# Patient Record
Sex: FEMALE | Race: BLACK OR AFRICAN AMERICAN | NOT HISPANIC OR LATINO | Employment: OTHER | URBAN - METROPOLITAN AREA
[De-identification: names, ages, dates, MRNs, and addresses within clinical notes are randomized per-mention and may not be internally consistent; named-entity substitution may affect disease eponyms.]

---

## 2023-03-29 DIAGNOSIS — F32.A DEPRESSION, UNSPECIFIED DEPRESSION TYPE: Primary | ICD-10-CM

## 2023-03-30 RX ORDER — BUPROPION HYDROCHLORIDE 300 MG/1
TABLET ORAL
Qty: 90 TABLET | Refills: 0 | Status: SHIPPED | OUTPATIENT
Start: 2023-03-30 | End: 2023-09-23

## 2023-04-24 DIAGNOSIS — I10 HYPERTENSION, UNSPECIFIED TYPE: Primary | ICD-10-CM

## 2023-04-24 RX ORDER — LOSARTAN POTASSIUM 100 MG/1
100 TABLET ORAL DAILY
COMMUNITY
End: 2023-04-24 | Stop reason: SDUPTHER

## 2023-04-25 RX ORDER — LOSARTAN POTASSIUM 100 MG/1
100 TABLET ORAL DAILY
Qty: 10 TABLET | Refills: 0 | Status: SHIPPED | OUTPATIENT
Start: 2023-04-25 | End: 2023-07-20 | Stop reason: ALTCHOICE

## 2023-06-21 LAB
ALANINE AMINOTRANSFERASE (SGPT) (U/L) IN SER/PLAS: NORMAL
ASPARTATE AMINOTRANSFERASE (SGOT) (U/L) IN SER/PLAS: NORMAL
BASOPHILS (10*3/UL) IN BLOOD BY AUTOMATED COUNT: 0.04 X10E9/L (ref 0–0.1)
BASOPHILS/100 LEUKOCYTES IN BLOOD BY AUTOMATED COUNT: 0.4 % (ref 0–2)
CHOLESTEROL (MG/DL) IN SER/PLAS: NORMAL
CHOLESTEROL IN HDL (MG/DL) IN SER/PLAS: NORMAL
CHOLESTEROL/HDL RATIO: NORMAL
EOSINOPHILS (10*3/UL) IN BLOOD BY AUTOMATED COUNT: 0.51 X10E9/L (ref 0–0.4)
EOSINOPHILS/100 LEUKOCYTES IN BLOOD BY AUTOMATED COUNT: 5.1 % (ref 0–6)
ERYTHROCYTE DISTRIBUTION WIDTH (RATIO) BY AUTOMATED COUNT: 18.8 % (ref 11.5–14.5)
ERYTHROCYTE MEAN CORPUSCULAR HEMOGLOBIN CONCENTRATION (G/DL) BY AUTOMATED: 28.8 G/DL (ref 32–36)
ERYTHROCYTE MEAN CORPUSCULAR VOLUME (FL) BY AUTOMATED COUNT: 79 FL (ref 80–100)
ERYTHROCYTES (10*6/UL) IN BLOOD BY AUTOMATED COUNT: 4.95 X10E12/L (ref 4–5.2)
HEMATOCRIT (%) IN BLOOD BY AUTOMATED COUNT: 38.9 % (ref 36–46)
HEMOGLOBIN (G/DL) IN BLOOD: 11.2 G/DL (ref 12–16)
IMMATURE GRANULOCYTES/100 LEUKOCYTES IN BLOOD BY AUTOMATED COUNT: 0.4 % (ref 0–0.9)
LDL: NORMAL
LEUKOCYTES (10*3/UL) IN BLOOD BY AUTOMATED COUNT: 10.1 X10E9/L (ref 4.4–11.3)
LYMPHOCYTES (10*3/UL) IN BLOOD BY AUTOMATED COUNT: 1.95 X10E9/L (ref 0.8–3)
LYMPHOCYTES/100 LEUKOCYTES IN BLOOD BY AUTOMATED COUNT: 19.4 % (ref 13–44)
MONOCYTES (10*3/UL) IN BLOOD BY AUTOMATED COUNT: 0.62 X10E9/L (ref 0.05–0.8)
MONOCYTES/100 LEUKOCYTES IN BLOOD BY AUTOMATED COUNT: 6.2 % (ref 2–10)
NEUTROPHILS (10*3/UL) IN BLOOD BY AUTOMATED COUNT: 6.91 X10E9/L (ref 1.6–5.5)
NEUTROPHILS/100 LEUKOCYTES IN BLOOD BY AUTOMATED COUNT: 68.5 % (ref 40–80)
NON HDL CHOLESTEROL: NORMAL
NRBC (PER 100 WBCS) BY AUTOMATED COUNT: 0 /100 WBC (ref 0–0)
PLATELETS (10*3/UL) IN BLOOD AUTOMATED COUNT: 246 X10E9/L (ref 150–450)
TRIGLYCERIDE (MG/DL) IN SER/PLAS: NORMAL
VLDL: NORMAL

## 2023-06-22 DIAGNOSIS — D50.0 IRON DEFICIENCY ANEMIA DUE TO CHRONIC BLOOD LOSS: Primary | ICD-10-CM

## 2023-06-22 LAB
ALANINE AMINOTRANSFERASE (SGPT) (U/L) IN SER/PLAS: 6 U/L (ref 7–45)
ANION GAP IN SER/PLAS: 16 MMOL/L (ref 10–20)
ASPARTATE AMINOTRANSFERASE (SGOT) (U/L) IN SER/PLAS: 10 U/L (ref 9–39)
CALCIUM (MG/DL) IN SER/PLAS: 8.9 MG/DL (ref 8.6–10.6)
CARBON DIOXIDE, TOTAL (MMOL/L) IN SER/PLAS: 23 MMOL/L (ref 21–32)
CHLORIDE (MMOL/L) IN SER/PLAS: 106 MMOL/L (ref 98–107)
CHOLESTEROL (MG/DL) IN SER/PLAS: 128 MG/DL (ref 0–199)
CHOLESTEROL IN HDL (MG/DL) IN SER/PLAS: 45.1 MG/DL
CHOLESTEROL/HDL RATIO: 2.8
CREATININE (MG/DL) IN SER/PLAS: 1.9 MG/DL (ref 0.5–1.05)
FERRITIN (UG/LL) IN SER/PLAS: 68 UG/L (ref 8–150)
GFR FEMALE: 27 ML/MIN/1.73M2
GLUCOSE (MG/DL) IN SER/PLAS: 84 MG/DL (ref 74–99)
IRON (UG/DL) IN SER/PLAS: 49 UG/DL (ref 35–150)
IRON BINDING CAPACITY (UG/DL) IN SER/PLAS: 314 UG/DL (ref 240–445)
IRON SATURATION (%) IN SER/PLAS: 16 % (ref 25–45)
LDL: 69 MG/DL (ref 0–99)
POTASSIUM (MMOL/L) IN SER/PLAS: 5 MMOL/L (ref 3.5–5.3)
SODIUM (MMOL/L) IN SER/PLAS: 140 MMOL/L (ref 136–145)
TRIGLYCERIDE (MG/DL) IN SER/PLAS: 72 MG/DL (ref 0–149)
UREA NITROGEN (MG/DL) IN SER/PLAS: 30 MG/DL (ref 6–23)
VLDL: 14 MG/DL (ref 0–40)

## 2023-06-23 ENCOUNTER — TELEPHONE (OUTPATIENT)
Dept: PRIMARY CARE | Facility: CLINIC | Age: 73
End: 2023-06-23
Payer: MEDICARE

## 2023-06-23 NOTE — TELEPHONE ENCOUNTER
----- Message from Thais Sanchez MD sent at 6/22/2023 11:49 AM EDT -----  She has iron deficiency anemia and needs colonoscopy; she is way overdue to come in; she has kidney failure and is way overdue to see nephrologist as well. Mail her this please.

## 2023-06-23 NOTE — TELEPHONE ENCOUNTER
I notified Faina Murray  of Dr. Sanchez message. Mailed results and low cholesterol, saturated fat and trans fat diet and diabetic diet as instructed.

## 2023-07-14 ENCOUNTER — OFFICE VISIT (OUTPATIENT)
Dept: PRIMARY CARE | Facility: CLINIC | Age: 73
End: 2023-07-14
Payer: MEDICARE

## 2023-07-14 VITALS
HEART RATE: 70 BPM | OXYGEN SATURATION: 95 % | SYSTOLIC BLOOD PRESSURE: 136 MMHG | DIASTOLIC BLOOD PRESSURE: 84 MMHG | BODY MASS INDEX: 49.51 KG/M2 | WEIGHT: 290 LBS | HEIGHT: 64 IN | RESPIRATION RATE: 17 BRPM | TEMPERATURE: 96.9 F

## 2023-07-14 DIAGNOSIS — I10 ESSENTIAL HYPERTENSION: ICD-10-CM

## 2023-07-14 DIAGNOSIS — N18.30 STAGE 3 CHRONIC KIDNEY DISEASE, UNSPECIFIED WHETHER STAGE 3A OR 3B CKD (MULTI): ICD-10-CM

## 2023-07-14 DIAGNOSIS — I50.32 CHRONIC DIASTOLIC HEART FAILURE (MULTI): ICD-10-CM

## 2023-07-14 DIAGNOSIS — D47.2 MGUS (MONOCLONAL GAMMOPATHY OF UNKNOWN SIGNIFICANCE): ICD-10-CM

## 2023-07-14 DIAGNOSIS — N18.30 CKD STAGE 3 DUE TO TYPE 2 DIABETES MELLITUS (MULTI): ICD-10-CM

## 2023-07-14 DIAGNOSIS — E11.22 CKD STAGE 3 DUE TO TYPE 2 DIABETES MELLITUS (MULTI): ICD-10-CM

## 2023-07-14 DIAGNOSIS — J45.40 MODERATE PERSISTENT ASTHMA WITHOUT COMPLICATION (HHS-HCC): ICD-10-CM

## 2023-07-14 DIAGNOSIS — E78.5 DYSLIPIDEMIA: ICD-10-CM

## 2023-07-14 DIAGNOSIS — M54.50 CHRONIC LOW BACK PAIN, UNSPECIFIED BACK PAIN LATERALITY, UNSPECIFIED WHETHER SCIATICA PRESENT: ICD-10-CM

## 2023-07-14 DIAGNOSIS — F32.A DEPRESSION, UNSPECIFIED DEPRESSION TYPE: ICD-10-CM

## 2023-07-14 DIAGNOSIS — G47.33 OSA ON CPAP: ICD-10-CM

## 2023-07-14 DIAGNOSIS — E66.01 CLASS 3 SEVERE OBESITY DUE TO EXCESS CALORIES WITH SERIOUS COMORBIDITY AND BODY MASS INDEX (BMI) OF 45.0 TO 49.9 IN ADULT (MULTI): ICD-10-CM

## 2023-07-14 DIAGNOSIS — E11.22 TYPE 2 DIABETES MELLITUS WITH CHRONIC KIDNEY DISEASE, WITHOUT LONG-TERM CURRENT USE OF INSULIN, UNSPECIFIED CKD STAGE (MULTI): ICD-10-CM

## 2023-07-14 DIAGNOSIS — G89.29 CHRONIC LOW BACK PAIN, UNSPECIFIED BACK PAIN LATERALITY, UNSPECIFIED WHETHER SCIATICA PRESENT: ICD-10-CM

## 2023-07-14 DIAGNOSIS — J30.9 ALLERGIC RHINITIS, UNSPECIFIED SEASONALITY, UNSPECIFIED TRIGGER: ICD-10-CM

## 2023-07-14 DIAGNOSIS — Z00.00 MEDICARE ANNUAL WELLNESS VISIT, SUBSEQUENT: Primary | ICD-10-CM

## 2023-07-14 PROBLEM — E66.9 OBESITY: Status: ACTIVE | Noted: 2023-07-14

## 2023-07-14 PROBLEM — E11.9 DIABETES MELLITUS (MULTI): Status: ACTIVE | Noted: 2023-07-14

## 2023-07-14 PROBLEM — D63.1 ANEMIA IN CKD (CHRONIC KIDNEY DISEASE): Status: ACTIVE | Noted: 2023-07-14

## 2023-07-14 PROBLEM — E66.813 CLASS 3 SEVERE OBESITY DUE TO EXCESS CALORIES WITH SERIOUS COMORBIDITY AND BODY MASS INDEX (BMI) OF 45.0 TO 49.9 IN ADULT: Status: ACTIVE | Noted: 2023-07-14

## 2023-07-14 PROBLEM — F41.9 ANXIETY: Status: ACTIVE | Noted: 2023-07-14

## 2023-07-14 PROBLEM — N18.9 ANEMIA IN CKD (CHRONIC KIDNEY DISEASE): Status: ACTIVE | Noted: 2023-07-14

## 2023-07-14 PROBLEM — E66.812 CLASS 2 OBESITY DUE TO EXCESS CALORIES IN ADULT: Status: RESOLVED | Noted: 2023-07-14 | Resolved: 2023-07-14

## 2023-07-14 PROBLEM — J41.0 SIMPLE CHRONIC BRONCHITIS (MULTI): Status: ACTIVE | Noted: 2023-07-14

## 2023-07-14 PROBLEM — R05.9 COUGH: Status: ACTIVE | Noted: 2023-07-14

## 2023-07-14 PROBLEM — H53.9 VISUAL DISTURBANCE: Status: ACTIVE | Noted: 2023-07-14

## 2023-07-14 PROBLEM — E66.09 CLASS 2 OBESITY DUE TO EXCESS CALORIES IN ADULT: Status: ACTIVE | Noted: 2023-07-14

## 2023-07-14 PROBLEM — E66.812 CLASS 2 OBESITY DUE TO EXCESS CALORIES IN ADULT: Status: ACTIVE | Noted: 2023-07-14

## 2023-07-14 PROBLEM — H53.40 VISUAL FIELD DEFECT: Status: ACTIVE | Noted: 2023-07-14

## 2023-07-14 PROBLEM — J32.9 CHRONIC SINUSITIS: Status: ACTIVE | Noted: 2023-07-14

## 2023-07-14 PROBLEM — H53.461 HEMIANOPIA OF RIGHT EYE: Status: ACTIVE | Noted: 2023-07-14

## 2023-07-14 PROBLEM — E66.9 OBESITY (BMI 30-39.9): Status: RESOLVED | Noted: 2023-07-14 | Resolved: 2023-07-14

## 2023-07-14 PROBLEM — M54.9 CHRONIC BACK PAIN: Status: ACTIVE | Noted: 2023-07-14

## 2023-07-14 PROBLEM — Z97.3 WEARS EYEGLASSES: Status: ACTIVE | Noted: 2023-07-14

## 2023-07-14 PROBLEM — H25.813 MIXED TYPE AGE-RELATED CATARACT, BOTH EYES: Status: ACTIVE | Noted: 2023-07-14

## 2023-07-14 PROBLEM — I25.10 CORONARY ATHEROSCLEROSIS: Status: ACTIVE | Noted: 2023-07-14

## 2023-07-14 PROBLEM — K21.9 GERD (GASTROESOPHAGEAL REFLUX DISEASE): Status: ACTIVE | Noted: 2023-07-14

## 2023-07-14 PROBLEM — E66.09 CLASS 2 OBESITY DUE TO EXCESS CALORIES IN ADULT: Status: RESOLVED | Noted: 2023-07-14 | Resolved: 2023-07-14

## 2023-07-14 PROBLEM — L98.9 SKIN LESION: Status: ACTIVE | Noted: 2023-07-14

## 2023-07-14 PROBLEM — N28.1 RENAL CYST: Status: ACTIVE | Noted: 2023-07-14

## 2023-07-14 PROBLEM — J45.909 ASTHMA (HHS-HCC): Status: ACTIVE | Noted: 2023-07-14

## 2023-07-14 PROBLEM — I88.9 LYMPHADENITIS: Status: ACTIVE | Noted: 2023-07-14

## 2023-07-14 PROBLEM — E66.9 OBESITY (BMI 30-39.9): Status: ACTIVE | Noted: 2023-07-14

## 2023-07-14 PROBLEM — H26.9 CATARACT: Status: ACTIVE | Noted: 2023-07-14

## 2023-07-14 PROBLEM — M47.816 DJD (DEGENERATIVE JOINT DISEASE), LUMBAR: Status: ACTIVE | Noted: 2023-07-14

## 2023-07-14 PROBLEM — K63.5 POLYPUS, COLON: Status: ACTIVE | Noted: 2023-07-14

## 2023-07-14 PROBLEM — R80.9 PROTEINURIA: Status: ACTIVE | Noted: 2023-07-14

## 2023-07-14 PROBLEM — N25.81 SECONDARY RENAL HYPERPARATHYROIDISM (MULTI): Status: ACTIVE | Noted: 2023-07-14

## 2023-07-14 PROBLEM — R77.8 ABNORMAL SERUM PROTEIN ELECTROPHORESIS: Status: ACTIVE | Noted: 2023-07-14

## 2023-07-14 PROBLEM — R06.02 SHORTNESS OF BREATH ON EXERTION: Status: ACTIVE | Noted: 2023-07-14

## 2023-07-14 PROBLEM — E16.2 HYPOGLYCEMIA: Status: ACTIVE | Noted: 2023-07-14

## 2023-07-14 PROBLEM — M54.16 LUMBAR RADICULOPATHY: Status: ACTIVE | Noted: 2023-07-14

## 2023-07-14 PROBLEM — M25.50 ARTHRALGIA: Status: ACTIVE | Noted: 2023-07-14

## 2023-07-14 PROBLEM — M25.569 KNEE PAIN: Status: ACTIVE | Noted: 2023-07-14

## 2023-07-14 PROBLEM — E16.2 HYPOGLYCEMIA: Status: RESOLVED | Noted: 2023-07-14 | Resolved: 2023-07-14

## 2023-07-14 PROCEDURE — G0439 PPPS, SUBSEQ VISIT: HCPCS | Performed by: PEDIATRICS

## 2023-07-14 PROCEDURE — 3075F SYST BP GE 130 - 139MM HG: CPT | Performed by: PEDIATRICS

## 2023-07-14 PROCEDURE — 84165 PROTEIN E-PHORESIS SERUM: CPT | Performed by: STUDENT IN AN ORGANIZED HEALTH CARE EDUCATION/TRAINING PROGRAM

## 2023-07-14 PROCEDURE — 99213 OFFICE O/P EST LOW 20 MIN: CPT | Performed by: PEDIATRICS

## 2023-07-14 PROCEDURE — 4010F ACE/ARB THERAPY RXD/TAKEN: CPT | Performed by: PEDIATRICS

## 2023-07-14 PROCEDURE — 3066F NEPHROPATHY DOC TX: CPT | Performed by: PEDIATRICS

## 2023-07-14 PROCEDURE — 80048 BASIC METABOLIC PNL TOTAL CA: CPT | Performed by: PEDIATRICS

## 2023-07-14 PROCEDURE — 86334 IMMUNOFIX E-PHORESIS SERUM: CPT

## 2023-07-14 PROCEDURE — 84165 PROTEIN E-PHORESIS SERUM: CPT

## 2023-07-14 PROCEDURE — 1036F TOBACCO NON-USER: CPT | Performed by: PEDIATRICS

## 2023-07-14 PROCEDURE — 84443 ASSAY THYROID STIM HORMONE: CPT | Performed by: PEDIATRICS

## 2023-07-14 PROCEDURE — 1170F FXNL STATUS ASSESSED: CPT | Performed by: PEDIATRICS

## 2023-07-14 PROCEDURE — 82306 VITAMIN D 25 HYDROXY: CPT | Performed by: PEDIATRICS

## 2023-07-14 PROCEDURE — 86320 SERUM IMMUNOELECTROPHORESIS: CPT | Performed by: STUDENT IN AN ORGANIZED HEALTH CARE EDUCATION/TRAINING PROGRAM

## 2023-07-14 PROCEDURE — 1126F AMNT PAIN NOTED NONE PRSNT: CPT | Performed by: PEDIATRICS

## 2023-07-14 PROCEDURE — 3079F DIAST BP 80-89 MM HG: CPT | Performed by: PEDIATRICS

## 2023-07-14 PROCEDURE — 3008F BODY MASS INDEX DOCD: CPT | Performed by: PEDIATRICS

## 2023-07-14 PROCEDURE — 83036 HEMOGLOBIN GLYCOSYLATED A1C: CPT | Performed by: PEDIATRICS

## 2023-07-14 RX ORDER — IPRATROPIUM BROMIDE AND ALBUTEROL 20; 100 UG/1; UG/1
SPRAY, METERED RESPIRATORY (INHALATION)
COMMUNITY
End: 2023-08-10 | Stop reason: ALTCHOICE

## 2023-07-14 RX ORDER — FLUTICASONE PROPIONATE 50 MCG
2 SPRAY, SUSPENSION (ML) NASAL 2 TIMES DAILY
Qty: 16 G | Refills: 3 | Status: SHIPPED | OUTPATIENT
Start: 2023-07-14

## 2023-07-14 RX ORDER — INSULIN GLARGINE 100 [IU]/ML
INJECTION, SOLUTION SUBCUTANEOUS
COMMUNITY
Start: 2014-03-07 | End: 2023-07-14 | Stop reason: ALTCHOICE

## 2023-07-14 RX ORDER — MONTELUKAST SODIUM 10 MG/1
1 TABLET ORAL NIGHTLY
COMMUNITY
Start: 2019-04-23

## 2023-07-14 RX ORDER — DULOXETIN HYDROCHLORIDE 30 MG/1
CAPSULE, DELAYED RELEASE ORAL
Qty: 60 CAPSULE | Refills: 11 | Status: SHIPPED | OUTPATIENT
Start: 2023-07-14 | End: 2023-08-10 | Stop reason: SINTOL

## 2023-07-14 RX ORDER — NITROGLYCERIN 0.4 MG/1
TABLET SUBLINGUAL
COMMUNITY
Start: 2014-08-29

## 2023-07-14 RX ORDER — ALBUTEROL SULFATE 90 UG/1
AEROSOL, METERED RESPIRATORY (INHALATION)
COMMUNITY
Start: 2022-06-16 | End: 2023-07-14 | Stop reason: SDUPTHER

## 2023-07-14 RX ORDER — LEVOTHYROXINE SODIUM 200 UG/1
TABLET ORAL
COMMUNITY
Start: 2013-10-28 | End: 2023-07-20 | Stop reason: ALTCHOICE

## 2023-07-14 RX ORDER — AMLODIPINE BESYLATE 10 MG/1
1 TABLET ORAL DAILY
COMMUNITY
Start: 2013-10-28 | End: 2023-07-14 | Stop reason: SDUPTHER

## 2023-07-14 RX ORDER — FLUTICASONE PROPIONATE 50 MCG
1 SPRAY, SUSPENSION (ML) NASAL 2 TIMES DAILY
COMMUNITY
Start: 2022-06-16 | End: 2023-07-14 | Stop reason: SDUPTHER

## 2023-07-14 RX ORDER — ALBUTEROL SULFATE 90 UG/1
2 AEROSOL, METERED RESPIRATORY (INHALATION) EVERY 4 HOURS PRN
Qty: 18 G | Refills: 2 | Status: SHIPPED | OUTPATIENT
Start: 2023-07-14 | End: 2023-12-29

## 2023-07-14 RX ORDER — POLYETHYLENE GLYCOL 3350 17 G/17G
POWDER, FOR SOLUTION ORAL EVERY OTHER DAY
COMMUNITY
Start: 2016-07-06 | End: 2023-08-10 | Stop reason: ALTCHOICE

## 2023-07-14 RX ORDER — OMEPRAZOLE 40 MG/1
CAPSULE, DELAYED RELEASE ORAL
COMMUNITY
Start: 2022-03-24 | End: 2024-04-05 | Stop reason: SDUPTHER

## 2023-07-14 RX ORDER — DULOXETIN HYDROCHLORIDE 60 MG/1
60 CAPSULE, DELAYED RELEASE ORAL DAILY
Qty: 30 CAPSULE | Refills: 5 | Status: SHIPPED | OUTPATIENT
Start: 2023-07-14 | End: 2023-08-10 | Stop reason: SINTOL

## 2023-07-14 RX ORDER — TIRZEPATIDE 2.5 MG/.5ML
2.5 INJECTION, SOLUTION SUBCUTANEOUS
Qty: 2 ML | Refills: 0 | Status: SHIPPED | OUTPATIENT
Start: 2023-07-14

## 2023-07-14 RX ORDER — PRAVASTATIN SODIUM 40 MG/1
1 TABLET ORAL NIGHTLY
COMMUNITY
Start: 2021-04-12 | End: 2024-05-08 | Stop reason: SDUPTHER

## 2023-07-14 RX ORDER — EZETIMIBE 10 MG/1
10 TABLET ORAL DAILY
Qty: 90 TABLET | Refills: 3 | Status: SHIPPED | OUTPATIENT
Start: 2023-07-14 | End: 2024-03-11 | Stop reason: SDUPTHER

## 2023-07-14 RX ORDER — ACETAMINOPHEN 500 MG
TABLET ORAL
COMMUNITY
End: 2024-06-03 | Stop reason: ALTCHOICE

## 2023-07-14 RX ORDER — TOPIRAMATE 50 MG/1
1 TABLET, FILM COATED ORAL 2 TIMES DAILY
COMMUNITY
Start: 2022-05-12 | End: 2024-06-03 | Stop reason: ALTCHOICE

## 2023-07-14 RX ORDER — AMLODIPINE BESYLATE 10 MG/1
10 TABLET ORAL DAILY
Qty: 90 TABLET | Refills: 1 | Status: SHIPPED | OUTPATIENT
Start: 2023-07-14 | End: 2023-11-28

## 2023-07-14 RX ORDER — FLUTICASONE FUROATE, UMECLIDINIUM BROMIDE AND VILANTEROL TRIFENATATE 200; 62.5; 25 UG/1; UG/1; UG/1
1 POWDER RESPIRATORY (INHALATION) DAILY
COMMUNITY
Start: 2023-06-23 | End: 2023-12-13 | Stop reason: SDUPTHER

## 2023-07-14 RX ORDER — EZETIMIBE 10 MG/1
10 TABLET ORAL DAILY
COMMUNITY
End: 2023-07-14 | Stop reason: SDUPTHER

## 2023-07-14 RX ORDER — TIOTROPIUM BROMIDE 18 UG/1
CAPSULE ORAL; RESPIRATORY (INHALATION)
COMMUNITY
Start: 2022-03-24 | End: 2023-07-14 | Stop reason: ALTCHOICE

## 2023-07-14 RX ORDER — METOPROLOL TARTRATE 100 MG/1
1 TABLET ORAL DAILY
COMMUNITY
End: 2023-07-20 | Stop reason: ALTCHOICE

## 2023-07-14 RX ORDER — METOPROLOL SUCCINATE 100 MG/1
1 TABLET, EXTENDED RELEASE ORAL DAILY
COMMUNITY
Start: 2021-05-07

## 2023-07-14 ASSESSMENT — ENCOUNTER SYMPTOMS: HYPERTENSION: 1

## 2023-07-14 ASSESSMENT — ACTIVITIES OF DAILY LIVING (ADL)
TAKING_MEDICATION: INDEPENDENT
BATHING: INDEPENDENT
DOING_HOUSEWORK: NEEDS ASSISTANCE
MANAGING_FINANCES: INDEPENDENT
DRESSING: INDEPENDENT
GROCERY_SHOPPING: INDEPENDENT

## 2023-07-14 ASSESSMENT — PAIN SCALES - GENERAL: PAINLEVEL: 0-NO PAIN

## 2023-07-14 ASSESSMENT — PATIENT HEALTH QUESTIONNAIRE - PHQ9
SUM OF ALL RESPONSES TO PHQ9 QUESTIONS 1 AND 2: 2
2. FEELING DOWN, DEPRESSED OR HOPELESS: SEVERAL DAYS
10. IF YOU CHECKED OFF ANY PROBLEMS, HOW DIFFICULT HAVE THESE PROBLEMS MADE IT FOR YOU TO DO YOUR WORK, TAKE CARE OF THINGS AT HOME, OR GET ALONG WITH OTHER PEOPLE: VERY DIFFICULT
1. LITTLE INTEREST OR PLEASURE IN DOING THINGS: SEVERAL DAYS

## 2023-07-14 NOTE — PATIENT INSTRUCTIONS
Get shingrix from pharmacy  See me in 3 months  Call me in a month about duloxetine and mounjaro effects

## 2023-07-14 NOTE — PROGRESS NOTES
"Subjective   Reason for Visit: Analia Murray is an 73 y.o. female here for a Medicare Wellness visit.               Diabetes    Hypertension      Patient with COPD, CHF, DJD, DM, and HTN is here for check up.  Mammogram and dexa and colonoscopy to be done this summer.  Patient Care Team:  Thais Sanchez MD as PCP - General     Review of Systems    Objective   Vitals:  /84 (BP Location: Left arm, Patient Position: Sitting, BP Cuff Size: Large adult)   Pulse 70   Temp 36.1 °C (96.9 °F) (Temporal)   Resp 17   Ht 1.626 m (5' 4\")   Wt 132 kg (290 lb)   LMP  (LMP Unknown)   SpO2 95%   BMI 49.78 kg/m²       Physical Exam  Vitals reviewed.   Constitutional:       General: She is not in acute distress.  HENT:      Head: Normocephalic.      Right Ear: Tympanic membrane normal.      Left Ear: Tympanic membrane normal.      Nose: Nose normal.      Mouth/Throat:      Pharynx: Oropharynx is clear.   Cardiovascular:      Rate and Rhythm: Normal rate and regular rhythm.      Heart sounds: Normal heart sounds.   Pulmonary:      Breath sounds: Normal breath sounds.   Abdominal:      Palpations: Abdomen is soft.      Tenderness: There is no abdominal tenderness.   Musculoskeletal:         General: No tenderness.   Skin:     Findings: No rash.      Comments: Pulses ok and sensation intact   Neurological:      General: No focal deficit present.      Mental Status: She is alert.   Psychiatric:         Mood and Affect: Mood normal.         Assessment/Plan   Problem List Items Addressed This Visit    None         Problem List Items Addressed This Visit       Asthma    Relevant Medications    albuterol (Ventolin HFA) 90 mcg/actuation inhaler    Chronic back pain    Relevant Orders    Tens Device Four Lead    Chronic diastolic heart failure (CMS/HCC)    Current Assessment & Plan     Saw Dr Ackerman this spring         Relevant Medications    metoprolol succinate XL (Toprol-XL) 100 mg 24 hr tablet    metoprolol tartrate " (Lopressor) 100 mg tablet    nitroglycerin (Nitrostat) 0.4 mg SL tablet    amLODIPine (Norvasc) 10 mg tablet    Other Relevant Orders    Basic Metabolic Panel    RESOLVED: Chronic kidney disease (CKD), stage III (moderate) (CMS/[facility])    Relevant Orders    Referral to Nephrology    CKD stage 3 due to type 2 diabetes mellitus (CMS/[facility])    Current Assessment & Plan     Wants a new nephrologist         Depression    Relevant Medications    DULoxetine (Cymbalta) 30 mg DR capsule    DULoxetine (Cymbalta) 60 mg DR capsule    Dyslipidemia - Primary    Current Assessment & Plan     Recently checked and normal on Pravastatin 40         Relevant Medications    ezetimibe (Zetia) 10 mg tablet    Essential hypertension    Relevant Medications    amLODIPine (Norvasc) 10 mg tablet    Diabetes mellitus (CMS/[facility])    Relevant Medications    tirzepatide (Mounjaro) 2.5 mg/0.5 mL pen injector    Other Relevant Orders    Hemoglobin A1C    Thyroid Stimulating Hormone    MGUS (monoclonal gammopathy of unknown significance)    Relevant Orders    Serum Protein Electrophoresis + Immunofixation    Referral to Hematology    Class 3 severe obesity due to excess calories with serious comorbidity and body mass index (BMI) of 45.0 to 49.9 in adult (CMS/[facility])    Current Assessment & Plan     Willing to get a referral         Relevant Orders    Vitamin D, Total    HOLDEN on CPAP    Current Assessment & Plan     Uses CPAP          Other Visit Diagnoses       Allergic rhinitis, unspecified seasonality, unspecified trigger        Relevant Medications    fluticasone (Flonase) 50 mcg/actuation nasal spray

## 2023-07-18 DIAGNOSIS — I10 ESSENTIAL HYPERTENSION: Primary | ICD-10-CM

## 2023-07-18 DIAGNOSIS — E87.5 HYPERKALEMIA: ICD-10-CM

## 2023-07-18 DIAGNOSIS — E03.9 HYPOTHYROIDISM, UNSPECIFIED TYPE: ICD-10-CM

## 2023-07-20 RX ORDER — CHLORTHALIDONE 25 MG/1
25 TABLET ORAL DAILY
Qty: 30 TABLET | Refills: 2 | Status: SHIPPED | OUTPATIENT
Start: 2023-07-20 | End: 2024-06-03

## 2023-07-20 RX ORDER — LEVOTHYROXINE SODIUM 175 UG/1
175 TABLET ORAL DAILY
Qty: 30 TABLET | Refills: 1 | Status: SHIPPED | OUTPATIENT
Start: 2023-07-20 | End: 2023-12-13 | Stop reason: ALTCHOICE

## 2023-07-23 LAB
ALBUMIN ELP: 3.5 G/DL (ref 3.4–5)
ALPHA 1: 0.4 G/DL (ref 0.2–0.6)
ALPHA 2: 0.8 G/DL (ref 0.4–1.1)
BETA: 1.4 G/DL (ref 0.5–1.2)
GAMMA GLOBULIN: 1.5 G/DL (ref 0.5–1.4)
M-PROTEIN 1: 0.2 G/DL
PATH REVIEW - SERUM IMMUNOFIXATION: NORMAL
PATH REVIEW-SERUM PROTEIN ELECTROPHORESIS: NORMAL
PROTEIN ELECTROPHORESIS INTERPRETATION: ABNORMAL
PROTEIN TOTAL: 7.7 G/DL (ref 6.4–8.2)
SERUM IMMUNOFIXATION INTERPRETATION: ABNORMAL

## 2023-07-25 ENCOUNTER — TELEPHONE (OUTPATIENT)
Dept: PRIMARY CARE | Facility: CLINIC | Age: 73
End: 2023-07-25
Payer: MEDICARE

## 2023-07-27 ENCOUNTER — TELEPHONE (OUTPATIENT)
Dept: PRIMARY CARE | Facility: CLINIC | Age: 73
End: 2023-07-27
Payer: MEDICARE

## 2023-07-27 NOTE — TELEPHONE ENCOUNTER
----- Message from Thais Sanchez MD sent at 7/23/2023  4:32 PM EDT -----  Please see hematologist about  abnormal protein in your blood.

## 2023-07-27 NOTE — TELEPHONE ENCOUNTER
----- Message from Thais Sanchez MD sent at 7/20/2023  6:46 PM EDT -----  Roxy, please add on free thyroxine index to this lab if possible. Please send this to Analia as I cannot reach her by phone:  DUE TO HIGH POTASSIUM, I CALLED DR SCHWAB, YOUR KIDNEY DOCTOR, WHO ADVISED YOU TO STOP LOSARTAN AND START CHLORTHALIDONE WHICH I ORDERED FOR YOU AT [facility]S. RECHECK LABS IN 6 WEEKS.  THYROID IS TOO HIGH; PLEASE REDUCE YOUR LEVOTHYROXINE . PLEASE  FROM [facility]'S. RECHECK LAB IN 6 WEEKS.   DR SCHWAB IS LEAVING . CALL AND ASK FOR THEM FOR A NEW DOCTOR

## 2023-08-10 ENCOUNTER — OFFICE VISIT (OUTPATIENT)
Dept: PRIMARY CARE | Facility: CLINIC | Age: 73
End: 2023-08-10
Payer: MEDICARE

## 2023-08-10 VITALS
OXYGEN SATURATION: 95 % | SYSTOLIC BLOOD PRESSURE: 160 MMHG | HEART RATE: 74 BPM | RESPIRATION RATE: 17 BRPM | HEIGHT: 64 IN | TEMPERATURE: 97.1 F | DIASTOLIC BLOOD PRESSURE: 85 MMHG | BODY MASS INDEX: 49.78 KG/M2

## 2023-08-10 DIAGNOSIS — Z12.31 SCREENING MAMMOGRAM FOR BREAST CANCER: ICD-10-CM

## 2023-08-10 DIAGNOSIS — J45.909 ASTHMA, UNSPECIFIED ASTHMA SEVERITY, UNSPECIFIED WHETHER COMPLICATED, UNSPECIFIED WHETHER PERSISTENT (HHS-HCC): Primary | ICD-10-CM

## 2023-08-10 DIAGNOSIS — I10 ESSENTIAL HYPERTENSION: ICD-10-CM

## 2023-08-10 DIAGNOSIS — D47.2 MGUS (MONOCLONAL GAMMOPATHY OF UNKNOWN SIGNIFICANCE): ICD-10-CM

## 2023-08-10 DIAGNOSIS — E11.22 CKD STAGE 3 DUE TO TYPE 2 DIABETES MELLITUS (MULTI): ICD-10-CM

## 2023-08-10 DIAGNOSIS — N18.30 CKD STAGE 3 DUE TO TYPE 2 DIABETES MELLITUS (MULTI): ICD-10-CM

## 2023-08-10 DIAGNOSIS — G47.33 OSA ON CPAP: ICD-10-CM

## 2023-08-10 PROCEDURE — 1036F TOBACCO NON-USER: CPT | Performed by: PEDIATRICS

## 2023-08-10 PROCEDURE — 1126F AMNT PAIN NOTED NONE PRSNT: CPT | Performed by: PEDIATRICS

## 2023-08-10 PROCEDURE — 99214 OFFICE O/P EST MOD 30 MIN: CPT | Performed by: PEDIATRICS

## 2023-08-10 PROCEDURE — 3077F SYST BP >= 140 MM HG: CPT | Performed by: PEDIATRICS

## 2023-08-10 PROCEDURE — 3066F NEPHROPATHY DOC TX: CPT | Performed by: PEDIATRICS

## 2023-08-10 PROCEDURE — 3079F DIAST BP 80-89 MM HG: CPT | Performed by: PEDIATRICS

## 2023-08-10 PROCEDURE — 3008F BODY MASS INDEX DOCD: CPT | Performed by: PEDIATRICS

## 2023-08-10 RX ORDER — VIT C/E/ZN/COPPR/LUTEIN/ZEAXAN 250MG-90MG
CAPSULE ORAL
COMMUNITY
Start: 2013-10-28 | End: 2023-08-10 | Stop reason: ALTCHOICE

## 2023-08-10 RX ORDER — FLUTICASONE PROPIONATE AND SALMETEROL 500; 50 UG/1; UG/1
POWDER RESPIRATORY (INHALATION)
COMMUNITY
Start: 2023-02-08 | End: 2023-08-10 | Stop reason: ALTCHOICE

## 2023-08-10 RX ORDER — AZELASTINE 1 MG/ML
SPRAY, METERED NASAL
COMMUNITY
Start: 2023-02-09 | End: 2024-06-03 | Stop reason: ALTCHOICE

## 2023-08-10 RX ORDER — AZITHROMYCIN 500 MG/1
500 TABLET, FILM COATED ORAL DAILY
Qty: 3 TABLET | Refills: 0 | COMMUNITY
Start: 2023-06-08 | End: 2023-08-10 | Stop reason: ALTCHOICE

## 2023-08-10 RX ORDER — CALCIUM CARBONATE/VITAMIN D3 600MG-5MCG
1 TABLET ORAL DAILY
COMMUNITY
Start: 2018-09-17 | End: 2024-06-03 | Stop reason: ALTCHOICE

## 2023-08-10 RX ORDER — DULAGLUTIDE 0.75 MG/.5ML
INJECTION, SOLUTION SUBCUTANEOUS
COMMUNITY
Start: 2021-06-24 | End: 2023-08-10

## 2023-08-10 RX ORDER — HYDRALAZINE HYDROCHLORIDE 10 MG/1
10 TABLET, FILM COATED ORAL 2 TIMES DAILY
Qty: 60 TABLET | Refills: 1 | Status: SHIPPED | OUTPATIENT
Start: 2023-08-10 | End: 2023-09-11 | Stop reason: SDUPTHER

## 2023-08-10 RX ORDER — FLUTICASONE PROPIONATE AND SALMETEROL 250; 50 UG/1; UG/1
POWDER RESPIRATORY (INHALATION) 2 TIMES DAILY
COMMUNITY
End: 2023-08-10 | Stop reason: ALTCHOICE

## 2023-08-10 RX ORDER — DULAGLUTIDE 1.5 MG/.5ML
INJECTION, SOLUTION SUBCUTANEOUS
COMMUNITY
Start: 2023-04-29 | End: 2024-01-15 | Stop reason: WASHOUT

## 2023-08-10 ASSESSMENT — PATIENT HEALTH QUESTIONNAIRE - PHQ9
2. FEELING DOWN, DEPRESSED OR HOPELESS: NOT AT ALL
1. LITTLE INTEREST OR PLEASURE IN DOING THINGS: NOT AT ALL
SUM OF ALL RESPONSES TO PHQ9 QUESTIONS 1 AND 2: 0

## 2023-08-10 NOTE — PATIENT INSTRUCTIONS
I will call you about Dr Ackerman's and Dr DAWSON's recommendations; Stop Advair; just take Trelegy  Return in 3 months

## 2023-08-10 NOTE — PROGRESS NOTES
"Subjective   Patient ID: Analia Murray is a 73 y.o. female who presents for high potassium and protein in the blood.    HPI   DM--Dr Glover suggested finishing Trulicity before starting Mounjaro; She has 2 more weeks of Trulicity  Duloxetine helped pain but made her anxious and tearful.  Nephrology appt set for Oct  Will be seeing Dr Ackerman soon  Has appt with Dr Jon for MGUS  Has colonoscopy next month  Review of Systems    Objective   /85 (BP Location: Left arm, Patient Position: Sitting, BP Cuff Size: Large adult)   Pulse 74   Temp 36.2 °C (97.1 °F) (Temporal)   Resp 17   Ht 1.626 m (5' 4\")   LMP  (LMP Unknown)   SpO2 95%   BMI 49.78 kg/m²     Physical Exam  Lungs clear  Heart reg  NO abd pain  No edema  Assessment/Plan   Problem List Items Addressed This Visit       Asthma - Primary    CKD stage 3 due to type 2 diabetes mellitus (CMS/HCC)    Essential hypertension    Relevant Medications    hydrALAZINE (Apresoline) 10 mg tablet    MGUS (monoclonal gammopathy of unknown significance)    HOLDEN on CPAP     Other Visit Diagnoses       Screening mammogram for breast cancer        Relevant Orders    BI mammo bilateral screening tomosynthesis          Discussed with Dr Ackerman; will try adding Hydralazine     "

## 2023-08-30 PROBLEM — E11.65 TYPE 2 DIABETES MELLITUS WITH HYPERGLYCEMIA (MULTI): Status: ACTIVE | Noted: 2023-08-30

## 2023-08-30 PROBLEM — I27.20 PULMONARY HYPERTENSION (MULTI): Status: ACTIVE | Noted: 2023-08-30

## 2023-08-30 PROBLEM — E78.2 MIXED HYPERLIPIDEMIA: Status: ACTIVE | Noted: 2023-08-30

## 2023-08-30 PROBLEM — H52.00 HYPEROPIA: Status: ACTIVE | Noted: 2023-08-30

## 2023-08-30 PROBLEM — I51.7 RIGHT VENTRICULAR DILATION: Status: ACTIVE | Noted: 2023-08-30

## 2023-08-30 RX ORDER — ASPIRIN 81 MG/1
1 TABLET ORAL DAILY
COMMUNITY
Start: 2013-10-28

## 2023-08-30 RX ORDER — LEVOTHYROXINE SODIUM 200 UG/1
200 TABLET ORAL
COMMUNITY

## 2023-08-30 RX ORDER — INSULIN GLARGINE 100 [IU]/ML
36 INJECTION, SOLUTION SUBCUTANEOUS NIGHTLY
COMMUNITY
Start: 2014-03-07 | End: 2024-06-03 | Stop reason: ALTCHOICE

## 2023-08-30 RX ORDER — DULAGLUTIDE 0.75 MG/.5ML
0.75 INJECTION, SOLUTION SUBCUTANEOUS
COMMUNITY
End: 2024-01-15 | Stop reason: WASHOUT

## 2023-08-30 RX ORDER — FUROSEMIDE 20 MG/1
20 TABLET ORAL DAILY
COMMUNITY
End: 2024-06-03 | Stop reason: SDUPTHER

## 2023-08-30 RX ORDER — POLYETHYLENE GLYCOL 3350 17 G/17G
1 POWDER, FOR SOLUTION ORAL EVERY OTHER DAY
COMMUNITY

## 2023-08-30 RX ORDER — BUDESONIDE AND FORMOTEROL FUMARATE DIHYDRATE 160; 4.5 UG/1; UG/1
2 AEROSOL RESPIRATORY (INHALATION) DAILY
COMMUNITY

## 2023-08-30 RX ORDER — ACETAMINOPHEN 325 MG/1
1-2 TABLET ORAL EVERY 6 HOURS PRN
COMMUNITY
End: 2024-06-03 | Stop reason: ALTCHOICE

## 2023-08-30 RX ORDER — VIT C/E/ZN/COPPR/LUTEIN/ZEAXAN 250MG-90MG
CAPSULE ORAL
COMMUNITY
Start: 2013-10-28

## 2023-09-08 ENCOUNTER — HOSPITAL ENCOUNTER (OUTPATIENT)
Dept: DATA CONVERSION | Facility: HOSPITAL | Age: 73
End: 2023-09-08
Attending: INTERNAL MEDICINE
Payer: MEDICARE

## 2023-09-08 DIAGNOSIS — Z79.899 OTHER LONG TERM (CURRENT) DRUG THERAPY: ICD-10-CM

## 2023-09-08 DIAGNOSIS — D12.3 BENIGN NEOPLASM OF TRANSVERSE COLON: ICD-10-CM

## 2023-09-08 DIAGNOSIS — Z12.11 ENCOUNTER FOR SCREENING FOR MALIGNANT NEOPLASM OF COLON: ICD-10-CM

## 2023-09-08 DIAGNOSIS — K57.30 DIVERTICULOSIS OF LARGE INTESTINE WITHOUT PERFORATION OR ABSCESS WITHOUT BLEEDING: ICD-10-CM

## 2023-09-08 DIAGNOSIS — D50.0 IRON DEFICIENCY ANEMIA SECONDARY TO BLOOD LOSS (CHRONIC): ICD-10-CM

## 2023-09-08 LAB — POCT GLUCOSE: 101 MG/DL (ref 74–99)

## 2023-09-11 DIAGNOSIS — E11.22 TYPE 2 DIABETES MELLITUS WITH CHRONIC KIDNEY DISEASE, WITHOUT LONG-TERM CURRENT USE OF INSULIN, UNSPECIFIED CKD STAGE (MULTI): ICD-10-CM

## 2023-09-11 DIAGNOSIS — I10 ESSENTIAL HYPERTENSION: ICD-10-CM

## 2023-09-16 RX ORDER — TIRZEPATIDE 2.5 MG/.5ML
INJECTION, SOLUTION SUBCUTANEOUS
Qty: 2 ML | Refills: 11 | OUTPATIENT
Start: 2023-09-16

## 2023-09-16 RX ORDER — HYDRALAZINE HYDROCHLORIDE 10 MG/1
10 TABLET, FILM COATED ORAL 2 TIMES DAILY
Qty: 90 TABLET | Refills: 1 | Status: SHIPPED | OUTPATIENT
Start: 2023-09-16 | End: 2024-04-05 | Stop reason: SDUPTHER

## 2023-09-22 DIAGNOSIS — F32.A DEPRESSION, UNSPECIFIED DEPRESSION TYPE: ICD-10-CM

## 2023-09-23 RX ORDER — BUPROPION HYDROCHLORIDE 300 MG/1
TABLET ORAL
Qty: 90 TABLET | Refills: 3 | Status: SHIPPED | OUTPATIENT
Start: 2023-09-23 | End: 2023-12-13 | Stop reason: ALTCHOICE

## 2023-09-28 LAB
COMPLETE PATHOLOGY REPORT: NORMAL
CONVERTED CLINICAL DIAGNOSIS-HISTORY: NORMAL
CONVERTED FINAL DIAGNOSIS: NORMAL
CONVERTED FINAL REPORT PDF LINK TO COPY AND PASTE: NORMAL
CONVERTED GROSS DESCRIPTION: NORMAL

## 2023-10-04 ENCOUNTER — TELEPHONE (OUTPATIENT)
Dept: PHARMACY | Facility: HOSPITAL | Age: 73
End: 2023-10-04
Payer: MEDICARE

## 2023-10-04 NOTE — TELEPHONE ENCOUNTER
I reviewed the progress note and agree with the resident’s findings and plans as written. Case discussed with resident.    Emeka Monge, PharmD

## 2023-10-04 NOTE — TELEPHONE ENCOUNTER
Population Health: Outreach by Ambulatory Pharmacy Team    Payor: [employer] [place]  Reason: Adherence  Medication: Losartan 100 mg tablet  Outcome: Patient Reached: Barriers Identified, patient stated that Losartan was discontinued by her cardiologist.      RYDER BARKSDALE CPhT

## 2023-11-25 DIAGNOSIS — I10 ESSENTIAL HYPERTENSION: ICD-10-CM

## 2023-11-28 RX ORDER — AMLODIPINE BESYLATE 10 MG/1
10 TABLET ORAL DAILY
Qty: 90 TABLET | Refills: 0 | Status: SHIPPED | OUTPATIENT
Start: 2023-11-28 | End: 2023-12-29

## 2023-12-06 ENCOUNTER — HOSPITAL ENCOUNTER (OUTPATIENT)
Dept: RADIOLOGY | Facility: EXTERNAL LOCATION | Age: 73
Discharge: HOME | End: 2023-12-06
Payer: MEDICARE

## 2023-12-06 DIAGNOSIS — J44.1 CHRONIC OBSTRUCTIVE PULMONARY DISEASE WITH ACUTE EXACERBATION (MULTI): ICD-10-CM

## 2023-12-13 ENCOUNTER — TELEPHONE (OUTPATIENT)
Dept: PRIMARY CARE | Facility: CLINIC | Age: 73
End: 2023-12-13

## 2023-12-13 ENCOUNTER — OFFICE VISIT (OUTPATIENT)
Dept: PRIMARY CARE | Facility: CLINIC | Age: 73
End: 2023-12-13
Payer: MEDICARE

## 2023-12-13 VITALS
HEIGHT: 64 IN | BODY MASS INDEX: 49.68 KG/M2 | OXYGEN SATURATION: 97 % | TEMPERATURE: 97.5 F | WEIGHT: 291 LBS | RESPIRATION RATE: 17 BRPM | DIASTOLIC BLOOD PRESSURE: 90 MMHG | SYSTOLIC BLOOD PRESSURE: 132 MMHG | HEART RATE: 76 BPM

## 2023-12-13 DIAGNOSIS — Z78.0 MENOPAUSE: ICD-10-CM

## 2023-12-13 DIAGNOSIS — N18.30 CKD STAGE 3 DUE TO TYPE 2 DIABETES MELLITUS (MULTI): ICD-10-CM

## 2023-12-13 DIAGNOSIS — E11.22 TYPE 2 DIABETES MELLITUS WITH CHRONIC KIDNEY DISEASE, WITHOUT LONG-TERM CURRENT USE OF INSULIN, UNSPECIFIED CKD STAGE (MULTI): ICD-10-CM

## 2023-12-13 DIAGNOSIS — E03.9 HYPOTHYROIDISM, UNSPECIFIED TYPE: ICD-10-CM

## 2023-12-13 DIAGNOSIS — E11.22 CKD STAGE 3 DUE TO TYPE 2 DIABETES MELLITUS (MULTI): ICD-10-CM

## 2023-12-13 DIAGNOSIS — I50.32 CHRONIC DIASTOLIC HEART FAILURE (MULTI): ICD-10-CM

## 2023-12-13 DIAGNOSIS — E78.5 DYSLIPIDEMIA: ICD-10-CM

## 2023-12-13 DIAGNOSIS — J44.1 CHRONIC OBSTRUCTIVE PULMONARY DISEASE WITH ACUTE EXACERBATION (MULTI): ICD-10-CM

## 2023-12-13 DIAGNOSIS — I10 HTN (HYPERTENSION), BENIGN: Primary | ICD-10-CM

## 2023-12-13 DIAGNOSIS — G47.33 OSA ON CPAP: ICD-10-CM

## 2023-12-13 DIAGNOSIS — I27.20 PULMONARY HYPERTENSION (MULTI): ICD-10-CM

## 2023-12-13 DIAGNOSIS — R77.8 ABNORMAL SERUM PROTEIN ELECTROPHORESIS: ICD-10-CM

## 2023-12-13 DIAGNOSIS — M54.16 LUMBAR RADICULOPATHY: Primary | ICD-10-CM

## 2023-12-13 DIAGNOSIS — J45.909 ASTHMA, UNSPECIFIED ASTHMA SEVERITY, UNSPECIFIED WHETHER COMPLICATED, UNSPECIFIED WHETHER PERSISTENT (HHS-HCC): ICD-10-CM

## 2023-12-13 PROCEDURE — 3075F SYST BP GE 130 - 139MM HG: CPT | Performed by: PEDIATRICS

## 2023-12-13 PROCEDURE — 3008F BODY MASS INDEX DOCD: CPT | Performed by: PEDIATRICS

## 2023-12-13 PROCEDURE — 3079F DIAST BP 80-89 MM HG: CPT | Performed by: PEDIATRICS

## 2023-12-13 PROCEDURE — 3066F NEPHROPATHY DOC TX: CPT | Performed by: PEDIATRICS

## 2023-12-13 PROCEDURE — 1036F TOBACCO NON-USER: CPT | Performed by: PEDIATRICS

## 2023-12-13 PROCEDURE — 99214 OFFICE O/P EST MOD 30 MIN: CPT | Performed by: PEDIATRICS

## 2023-12-13 PROCEDURE — 1159F MED LIST DOCD IN RCRD: CPT | Performed by: PEDIATRICS

## 2023-12-13 PROCEDURE — 1126F AMNT PAIN NOTED NONE PRSNT: CPT | Performed by: PEDIATRICS

## 2023-12-13 RX ORDER — FLUTICASONE FUROATE, UMECLIDINIUM BROMIDE AND VILANTEROL TRIFENATATE 200; 62.5; 25 UG/1; UG/1; UG/1
1 POWDER RESPIRATORY (INHALATION) DAILY
Qty: 60 EACH | Refills: 2 | Status: SHIPPED | OUTPATIENT
Start: 2023-12-13 | End: 2023-12-29

## 2023-12-13 RX ORDER — DOXYCYCLINE HYCLATE 100 MG
TABLET ORAL
COMMUNITY
Start: 2023-12-06 | End: 2024-06-03 | Stop reason: ALTCHOICE

## 2023-12-13 RX ORDER — NYSTATIN 100000 [USP'U]/ML
SUSPENSION ORAL
COMMUNITY
Start: 2023-12-06

## 2023-12-13 RX ORDER — METHYLPREDNISOLONE 4 MG/1
TABLET ORAL
COMMUNITY
Start: 2023-12-06

## 2023-12-13 ASSESSMENT — PAIN SCALES - GENERAL: PAINLEVEL: 0-NO PAIN

## 2023-12-13 NOTE — PROGRESS NOTES
"Subjective   Patient ID: Analia Murray is a 73 y.o. female who presents for Hyperlipidemia, Hypertension, COPD, and Cough.    HPI   Patient was seen at urgent care on 12/9 with cough, trouble breathing, chills, neck ache and HA. Covid test was negative. Was given doxycycline and medrol dose pack. She feels better. Can sleep now laying down. Developed thrush which was treated with nystatin.  Will see eye doctor in February  Colonoscopy done this year  Mammogram still not done  Review of Systems    Objective   /90   Pulse 76   Temp 36.4 °C (97.5 °F) (Oral)   Resp 17   Ht 1.626 m (5' 4\")   Wt 132 kg (291 lb)   LMP  (LMP Unknown)   SpO2 97%   BMI 49.95 kg/m²     Physical Exam  Lungs clear  Heart RRR  Abd soft  Assessment/Plan   Problem List Items Addressed This Visit             ICD-10-CM    Abnormal serum protein electrophoresis R77.8    Relevant Orders    Serum Protein Electrophoresis + Immunofixation    Asthma J45.909     Continue Trelegy and montelukast         Chronic diastolic heart failure (CMS/HCC) I50.32     Saw Dr Ackerman this summer         CKD stage 3 due to type 2 diabetes mellitus (CMS/HCC) E11.22, N18.30     Is under care of nephrologist         Relevant Orders    Basic Metabolic Panel    Dyslipidemia E78.5     Cholesterol 128 this summer; continue Ezetimibe and Pravastatin 40         HTN (hypertension), benign - Primary I10     Has not been checking at home  Continue Amlodipine 10, Metoprolol 100,   Chlorthalidone 25; get home BP         Diabetes mellitus (CMS/HCC) E11.9     Sugar 90s --checked last 2 weeks ago  Endocrinologist manages this         HOLDEN on CPAP G47.33    Pulmonary hypertension (CMS/HCC) I27.20     Echo recommended by cardiology this summer; not done         Relevant Orders    Transthoracic Echo (TTE) Complete    Hypothyroid E03.9     Managed by Dr Cortes          Other Visit Diagnoses         Codes    Chronic obstructive pulmonary disease with acute exacerbation " (CMS/[facility])     J44.1    Relevant Medications    Trelegy Ellipta 200-62.5-25 mcg blister with device    Menopause     Z78.0    Relevant Orders    XR DEXA bone density

## 2023-12-13 NOTE — ASSESSMENT & PLAN NOTE
Has not been checking at home  Continue Amlodipine 10, Metoprolol 100,   Chlorthalidone 25; get home BP

## 2023-12-14 PROBLEM — J32.9 CHRONIC SINUSITIS: Status: RESOLVED | Noted: 2023-07-14 | Resolved: 2023-12-14

## 2023-12-14 PROBLEM — E78.2 MIXED HYPERLIPIDEMIA: Status: RESOLVED | Noted: 2023-08-30 | Resolved: 2023-12-14

## 2023-12-14 NOTE — ADDENDUM NOTE
Addended by: AMBROSIO BLEVINS on: 12/14/2023 07:22 AM     Modules accepted: Orders, Level of Service

## 2023-12-29 DIAGNOSIS — J44.1 CHRONIC OBSTRUCTIVE PULMONARY DISEASE WITH ACUTE EXACERBATION (MULTI): ICD-10-CM

## 2023-12-29 DIAGNOSIS — E11.65 TYPE 2 DIABETES MELLITUS WITH HYPERGLYCEMIA, UNSPECIFIED WHETHER LONG TERM INSULIN USE (MULTI): Primary | ICD-10-CM

## 2023-12-29 DIAGNOSIS — J45.40 MODERATE PERSISTENT ASTHMA WITHOUT COMPLICATION (HHS-HCC): ICD-10-CM

## 2023-12-29 DIAGNOSIS — I10 ESSENTIAL HYPERTENSION: ICD-10-CM

## 2023-12-29 RX ORDER — AMLODIPINE BESYLATE 10 MG/1
10 TABLET ORAL DAILY
Qty: 90 TABLET | Refills: 0 | Status: SHIPPED | OUTPATIENT
Start: 2023-12-29 | End: 2024-04-02

## 2023-12-29 RX ORDER — ALBUTEROL SULFATE 90 UG/1
2 AEROSOL, METERED RESPIRATORY (INHALATION) EVERY 4 HOURS PRN
Qty: 54 G | Refills: 3 | Status: SHIPPED | OUTPATIENT
Start: 2023-12-29 | End: 2024-05-08

## 2023-12-29 RX ORDER — FLUTICASONE FUROATE, UMECLIDINIUM BROMIDE AND VILANTEROL TRIFENATATE 200; 62.5; 25 UG/1; UG/1; UG/1
POWDER RESPIRATORY (INHALATION)
Qty: 180 EACH | Refills: 3 | Status: SHIPPED | OUTPATIENT
Start: 2023-12-29

## 2023-12-29 RX ORDER — DULAGLUTIDE 3 MG/.5ML
3 INJECTION, SOLUTION SUBCUTANEOUS
Qty: 2 ML | Refills: 11 | Status: SHIPPED | OUTPATIENT
Start: 2023-12-29 | End: 2024-05-08 | Stop reason: SDUPTHER

## 2023-12-29 NOTE — TELEPHONE ENCOUNTER
Pt states that she has been taking Mounjaro, and she is completely out of it. She also states that the Mounjaro made her feet swell. She would like to know if she can switch back to Trulicity?

## 2024-01-02 ENCOUNTER — LAB (OUTPATIENT)
Dept: LAB | Facility: LAB | Age: 74
End: 2024-01-02
Payer: MEDICARE

## 2024-01-02 DIAGNOSIS — R77.8 ABNORMAL SERUM PROTEIN ELECTROPHORESIS: ICD-10-CM

## 2024-01-02 DIAGNOSIS — E11.22 CKD STAGE 3 DUE TO TYPE 2 DIABETES MELLITUS (MULTI): ICD-10-CM

## 2024-01-02 DIAGNOSIS — N18.30 CKD STAGE 3 DUE TO TYPE 2 DIABETES MELLITUS (MULTI): ICD-10-CM

## 2024-01-02 LAB
ANION GAP SERPL CALC-SCNC: 14 MMOL/L (ref 10–20)
BUN SERPL-MCNC: 26 MG/DL (ref 6–23)
CALCIUM SERPL-MCNC: 8.7 MG/DL (ref 8.6–10.6)
CHLORIDE SERPL-SCNC: 106 MMOL/L (ref 98–107)
CO2 SERPL-SCNC: 24 MMOL/L (ref 21–32)
CREAT SERPL-MCNC: 1.69 MG/DL (ref 0.5–1.05)
GFR SERPL CREATININE-BSD FRML MDRD: 32 ML/MIN/1.73M*2
GLUCOSE SERPL-MCNC: 80 MG/DL (ref 74–99)
POTASSIUM SERPL-SCNC: 5.2 MMOL/L (ref 3.5–5.3)
PROT SERPL-MCNC: 7.1 G/DL (ref 6.4–8.2)
PROT SERPL-MCNC: 7.1 G/DL (ref 6.4–8.2)
SODIUM SERPL-SCNC: 139 MMOL/L (ref 136–145)

## 2024-01-02 PROCEDURE — 84155 ASSAY OF PROTEIN SERUM: CPT

## 2024-01-02 PROCEDURE — 84165 PROTEIN E-PHORESIS SERUM: CPT

## 2024-01-02 PROCEDURE — 80048 BASIC METABOLIC PNL TOTAL CA: CPT

## 2024-01-02 PROCEDURE — 36415 COLL VENOUS BLD VENIPUNCTURE: CPT

## 2024-01-02 PROCEDURE — 86334 IMMUNOFIX E-PHORESIS SERUM: CPT

## 2024-01-02 PROCEDURE — 86320 SERUM IMMUNOELECTROPHORESIS: CPT | Performed by: PEDIATRICS

## 2024-01-02 PROCEDURE — 84165 PROTEIN E-PHORESIS SERUM: CPT | Performed by: PEDIATRICS

## 2024-01-07 LAB
ALBUMIN: 3.3 G/DL (ref 3.4–5)
ALPHA 1 GLOBULIN: 0.4 G/DL (ref 0.2–0.6)
ALPHA 2 GLOBULIN: 0.9 G/DL (ref 0.4–1.1)
BETA GLOBULIN: 1.3 G/DL (ref 0.5–1.2)
GAMMA GLOBULIN: 1.2 G/DL (ref 0.5–1.4)
IMMUNOFIXATION COMMENT: ABNORMAL
M-PROTEIN 1: 0.2 G/DL
PATH REVIEW - SERUM IMMUNOFIXATION: ABNORMAL
PATH REVIEW-SERUM PROTEIN ELECTROPHORESIS: ABNORMAL
PROTEIN ELECTROPHORESIS COMMENT: ABNORMAL

## 2024-01-11 DIAGNOSIS — D47.2 MGUS (MONOCLONAL GAMMOPATHY OF UNKNOWN SIGNIFICANCE): Primary | ICD-10-CM

## 2024-01-11 DIAGNOSIS — E11.22 CKD STAGE 3 DUE TO TYPE 2 DIABETES MELLITUS (MULTI): ICD-10-CM

## 2024-01-11 DIAGNOSIS — E11.22 TYPE 2 DIABETES MELLITUS WITH CHRONIC KIDNEY DISEASE, WITHOUT LONG-TERM CURRENT USE OF INSULIN, UNSPECIFIED CKD STAGE (MULTI): ICD-10-CM

## 2024-01-11 DIAGNOSIS — N18.30 CKD STAGE 3 DUE TO TYPE 2 DIABETES MELLITUS (MULTI): ICD-10-CM

## 2024-01-11 RX ORDER — DAPAGLIFLOZIN 5 MG/1
5 TABLET, FILM COATED ORAL DAILY
Qty: 30 TABLET | Refills: 0 | Status: SHIPPED | OUTPATIENT
Start: 2024-01-11 | End: 2024-01-15

## 2024-01-12 RX ORDER — DAPAGLIFLOZIN 5 MG/1
5 TABLET, FILM COATED ORAL DAILY
Qty: 30 TABLET | Refills: 0 | OUTPATIENT
Start: 2024-01-12 | End: 2025-01-11

## 2024-01-15 ENCOUNTER — TELEPHONE (OUTPATIENT)
Dept: PRIMARY CARE | Facility: CLINIC | Age: 74
End: 2024-01-15
Payer: MEDICARE

## 2024-01-15 ENCOUNTER — TELEPHONE (OUTPATIENT)
Dept: ENDOCRINOLOGY | Facility: CLINIC | Age: 74
End: 2024-01-15
Payer: MEDICARE

## 2024-01-15 DIAGNOSIS — E11.65 TYPE 2 DIABETES MELLITUS WITH HYPERGLYCEMIA, WITHOUT LONG-TERM CURRENT USE OF INSULIN (MULTI): Primary | ICD-10-CM

## 2024-01-15 DIAGNOSIS — E11.65 TYPE 2 DIABETES MELLITUS WITH HYPERGLYCEMIA, UNSPECIFIED WHETHER LONG TERM INSULIN USE (MULTI): Primary | ICD-10-CM

## 2024-01-15 NOTE — TELEPHONE ENCOUNTER
Dr. Sanchez, [place] Pharmacist at [facility] left a voicemail message saying that the farxiga is not covered by Pt. insurance, insurance prefer invokana or jardiance if with questions call [facility] at [phone].

## 2024-01-15 NOTE — TELEPHONE ENCOUNTER
Pt states that her local pharmacy does not have Trulicity 3mg, and neither does [facility]. She also states that [facility] only has Trulicity 1.5mg, or 4.5mg available. Can you send in one of these for her?

## 2024-01-25 DIAGNOSIS — E11.65 TYPE 2 DIABETES MELLITUS WITH HYPERGLYCEMIA, UNSPECIFIED WHETHER LONG TERM INSULIN USE (MULTI): Primary | ICD-10-CM

## 2024-01-25 RX ORDER — DULAGLUTIDE 4.5 MG/.5ML
0.75 INJECTION, SOLUTION SUBCUTANEOUS
Qty: 2 ML | Refills: 11 | Status: SHIPPED | OUTPATIENT
Start: 2024-01-25 | End: 2024-06-03 | Stop reason: WASHOUT

## 2024-02-27 ENCOUNTER — OFFICE VISIT (OUTPATIENT)
Dept: HEMATOLOGY/ONCOLOGY | Facility: CLINIC | Age: 74
End: 2024-02-27
Payer: MEDICARE

## 2024-02-27 VITALS
SYSTOLIC BLOOD PRESSURE: 171 MMHG | TEMPERATURE: 96.1 F | HEIGHT: 63 IN | RESPIRATION RATE: 18 BRPM | BODY MASS INDEX: 51.91 KG/M2 | HEART RATE: 96 BPM | OXYGEN SATURATION: 92 % | WEIGHT: 293 LBS | DIASTOLIC BLOOD PRESSURE: 78 MMHG

## 2024-02-27 DIAGNOSIS — D64.9 ANEMIA, UNSPECIFIED TYPE: ICD-10-CM

## 2024-02-27 DIAGNOSIS — D47.2 MGUS (MONOCLONAL GAMMOPATHY OF UNKNOWN SIGNIFICANCE): Primary | ICD-10-CM

## 2024-02-27 LAB
ALBUMIN SERPL BCP-MCNC: 3.9 G/DL (ref 3.4–5)
ALP SERPL-CCNC: 146 U/L (ref 33–136)
ALT SERPL W P-5'-P-CCNC: 7 U/L (ref 7–45)
ANION GAP SERPL CALC-SCNC: 14 MMOL/L (ref 10–20)
AST SERPL W P-5'-P-CCNC: 14 U/L (ref 9–39)
BASOPHILS # BLD AUTO: 0.02 X10*3/UL (ref 0–0.1)
BASOPHILS NFR BLD AUTO: 0.2 %
BILIRUB SERPL-MCNC: 0.4 MG/DL (ref 0–1.2)
BUN SERPL-MCNC: 26 MG/DL (ref 6–23)
CALCIUM SERPL-MCNC: 8.5 MG/DL (ref 8.6–10.3)
CHLORIDE SERPL-SCNC: 106 MMOL/L (ref 98–107)
CO2 SERPL-SCNC: 26 MMOL/L (ref 21–32)
CREAT SERPL-MCNC: 1.87 MG/DL (ref 0.5–1.05)
EGFRCR SERPLBLD CKD-EPI 2021: 28 ML/MIN/1.73M*2
EOSINOPHIL # BLD AUTO: 0.44 X10*3/UL (ref 0–0.4)
EOSINOPHIL NFR BLD AUTO: 5.4 %
ERYTHROCYTE [DISTWIDTH] IN BLOOD BY AUTOMATED COUNT: 18.4 % (ref 11.5–14.5)
FERRITIN SERPL-MCNC: 123 NG/ML (ref 8–150)
GLUCOSE SERPL-MCNC: 91 MG/DL (ref 74–99)
HCT VFR BLD AUTO: 38.1 % (ref 36–46)
HGB BLD-MCNC: 11.4 G/DL (ref 12–16)
IMM GRANULOCYTES # BLD AUTO: 0.02 X10*3/UL (ref 0–0.5)
IMM GRANULOCYTES NFR BLD AUTO: 0.2 % (ref 0–0.9)
IRON SATN MFR SERPL: 21 % (ref 25–45)
IRON SERPL-MCNC: 62 UG/DL (ref 35–150)
LYMPHOCYTES # BLD AUTO: 1.27 X10*3/UL (ref 0.8–3)
LYMPHOCYTES NFR BLD AUTO: 15.6 %
MCH RBC QN AUTO: 23.3 PG (ref 26–34)
MCHC RBC AUTO-ENTMCNC: 29.9 G/DL (ref 32–36)
MCV RBC AUTO: 78 FL (ref 80–100)
MONOCYTES # BLD AUTO: 0.59 X10*3/UL (ref 0.05–0.8)
MONOCYTES NFR BLD AUTO: 7.2 %
NEUTROPHILS # BLD AUTO: 5.8 X10*3/UL (ref 1.6–5.5)
NEUTROPHILS NFR BLD AUTO: 71.4 %
NRBC BLD-RTO: 0 /100 WBCS (ref 0–0)
PLATELET # BLD AUTO: 190 X10*3/UL (ref 150–450)
POTASSIUM SERPL-SCNC: 4.9 MMOL/L (ref 3.5–5.3)
PROT SERPL-MCNC: 7.7 G/DL (ref 6.4–8.2)
PROT SERPL-MCNC: 7.8 G/DL (ref 6.4–8.2)
RBC # BLD AUTO: 4.89 X10*6/UL (ref 4–5.2)
SODIUM SERPL-SCNC: 141 MMOL/L (ref 136–145)
TIBC SERPL-MCNC: 294 UG/DL (ref 240–445)
UIBC SERPL-MCNC: 232 UG/DL (ref 110–370)
WBC # BLD AUTO: 8.1 X10*3/UL (ref 4.4–11.3)

## 2024-02-27 PROCEDURE — 99214 OFFICE O/P EST MOD 30 MIN: CPT | Performed by: NURSE PRACTITIONER

## 2024-02-27 PROCEDURE — 84155 ASSAY OF PROTEIN SERUM: CPT | Mod: 59 | Performed by: NURSE PRACTITIONER

## 2024-02-27 PROCEDURE — 84165 PROTEIN E-PHORESIS SERUM: CPT | Performed by: NURSE PRACTITIONER

## 2024-02-27 PROCEDURE — 83521 IG LIGHT CHAINS FREE EACH: CPT | Performed by: NURSE PRACTITIONER

## 2024-02-27 PROCEDURE — 1036F TOBACCO NON-USER: CPT | Performed by: NURSE PRACTITIONER

## 2024-02-27 PROCEDURE — 1126F AMNT PAIN NOTED NONE PRSNT: CPT | Performed by: NURSE PRACTITIONER

## 2024-02-27 PROCEDURE — 82728 ASSAY OF FERRITIN: CPT | Performed by: NURSE PRACTITIONER

## 2024-02-27 PROCEDURE — 3008F BODY MASS INDEX DOCD: CPT | Performed by: NURSE PRACTITIONER

## 2024-02-27 PROCEDURE — 1159F MED LIST DOCD IN RCRD: CPT | Performed by: NURSE PRACTITIONER

## 2024-02-27 PROCEDURE — 86320 SERUM IMMUNOELECTROPHORESIS: CPT | Performed by: NURSE PRACTITIONER

## 2024-02-27 PROCEDURE — 82784 ASSAY IGA/IGD/IGG/IGM EACH: CPT | Performed by: NURSE PRACTITIONER

## 2024-02-27 PROCEDURE — 83540 ASSAY OF IRON: CPT | Performed by: NURSE PRACTITIONER

## 2024-02-27 PROCEDURE — 86334 IMMUNOFIX E-PHORESIS SERUM: CPT | Performed by: NURSE PRACTITIONER

## 2024-02-27 ASSESSMENT — PATIENT HEALTH QUESTIONNAIRE - PHQ9
SUM OF ALL RESPONSES TO PHQ9 QUESTIONS 1 AND 2: 1
1. LITTLE INTEREST OR PLEASURE IN DOING THINGS: NOT AT ALL
2. FEELING DOWN, DEPRESSED OR HOPELESS: SEVERAL DAYS

## 2024-02-27 ASSESSMENT — COLUMBIA-SUICIDE SEVERITY RATING SCALE - C-SSRS
6. HAVE YOU EVER DONE ANYTHING, STARTED TO DO ANYTHING, OR PREPARED TO DO ANYTHING TO END YOUR LIFE?: NO
1. IN THE PAST MONTH, HAVE YOU WISHED YOU WERE DEAD OR WISHED YOU COULD GO TO SLEEP AND NOT WAKE UP?: NO
2. HAVE YOU ACTUALLY HAD ANY THOUGHTS OF KILLING YOURSELF?: NO

## 2024-02-27 ASSESSMENT — PAIN SCALES - GENERAL: PAINLEVEL: 0-NO PAIN

## 2024-02-27 ASSESSMENT — ENCOUNTER SYMPTOMS
OCCASIONAL FEELINGS OF UNSTEADINESS: 0
DEPRESSION: 0
LOSS OF SENSATION IN FEET: 0

## 2024-02-27 NOTE — PROGRESS NOTES
Patient ID: Analia Murray is a 73 y.o. female.  Primary Care/Referring Physician: Dr. Thais Sanchez  Reason for Referral: MGUS    Hematologic History:  I previously saw her in November 2021, she was then again lost to follow-up. She was previously seen Dr. Jon at [facility] for her MGUS around [id number]. She was then lost to follow-up. She has also seen Dr. Vivian Canela at [facility] in 2018, lost to follow-up as they did not wish to drive to [facility]. For a time she was monitored by her primary provider, Dr. Sharp, who did draw intermittent SPEPs. The patient has now met with a new primary care provider and was again referred back to our office for her MGUS. She has long standing anemia in the setting of stage 3 chronic kidney disease.   The patient has undergone two bone marrow biopsies, first in February 2008 with 5% plasma cells noted and again in July 2018, when there was 1% plasma cells. Most recent bone survey was completed in July 2018. This was found to be negative.     Interval History:  Patient presents today for new patient evaluation. In talking to patient I discovered I had seen her in November 2021, she was then again lost to follow-up. On presentation today, the patient denies any fevers, chills or night sweats. No cough or chest pain. Shortness of breath is unchanged and has been similar for many years. No nausea or vomiting. No constipation or diarrhea.     Review of Systems:  A review of systems has been completed and are negative for complaints except what is stated in the HPI and/or past medical history    Allergies:  Ciprofloxacin, Morphine, PCN    Medications:  Medication list reviewed with patient and updated in EMR    Past Medical History:  Hyperlipidemia, HTN, COPD, asthma, chronic diastolic heart failure, CKD stage 3 in setting of DM  type 2, dyslipidemia, HOLDEN on CPAP, hypothyroidism (autoimmune thyroiditis)     Past Surgical  "History:  Diverticulitis, causing a fistula     Family History:  No known cancers or blood disorders    Social History:  , presents today with .  No history of tobacco use, no history of illicit drug use, social wine typically on holidays.    Vital Signs:  /78 (BP Location: Left arm, Patient Position: Sitting, BP Cuff Size: Large adult long)   Pulse 96   Temp 35.6 °C (96.1 °F) (Temporal)   Resp 18   Ht 1.588 m (5' 2.52\")   Wt 133 kg (293 lb 8.7 oz)   LMP  (LMP Unknown)   SpO2 92%   BMI 52.80 kg/m²     Physical Exam:  ECO-1  Pain: 0  Constitutional: Well developed, awake/alert/oriented x3, no distress, alert and cooperative. Presents in wheel chair.   Eyes: PER. sclera anicteric  ENMT: Oral mucosa moist  Respiratory/Thorax: Breathing is non-labored. Lungs are clear to auscultation bilaterally. No adventitious breath sounds  Cardiovascular: S1-S2. Regular rate and rhythm. No murmurs, rubs, or gallops appreciated  Gastrointestinal: Abdomen soft nontender, nondistended, normal active bowel sounds.   Musculoskeletal: ROM intact, no joint swelling, normal strength  Extremities: normal extremities, no cyanosis, no edema, no clubbing  Neurologic: alert and oriented x3. Nonfocal exam. No myoclonus  Psychological: Pleasant, appropriate and easily engaged     Lab Results:  2024  BUN 26, creat 1.69, eGFR 32  M-protein 0.2    2023  WBC 10.1, hemoglobin 11.2, hematocrit 38.9, platelets 146,000    Assessment:  Patient is referred back to our clinic for history of MGUS, initially diagnosed in .    PCP - Thais Sanchez   Renal - [facility]    Plan:  - MGUS - today we will update myeloma work-up including repeat CBCd, CMP, serum protein electrophoresis plus immunofixation, kappa lambda free light chains with ratio, quantitative immunoglobulins  - Anemia - we will draw iron panel and ferritin  - follow-up 6 months with labs 1 week prior (CBCd, CMP, serum protein " electrophoresis plus immunofixation, kappa lambda free light chains with ratio, quantitative immunoglobulins). If stable will plan annual visits.    Amadou Hoff

## 2024-02-27 NOTE — PROGRESS NOTES
Pt presents to clinic as new pt for MGUS with Amadou Hoff.     Pharmacy location current on file. Allergies updated. Medications reconciled.     Per orders,     Patient verbalizes understanding of the above instructions with no further questions or concerns at this time.

## 2024-02-27 NOTE — PROGRESS NOTES
Pt presents to clinic as new patient for MGUS referred to Amadou Hoff.     Pharmacy location current on file. Allergies updated. Medications reconciled.     Patient provided with a new pt folder and information on how to contact clinic.     Per orders, pt will have labs drawn and seen back in August.     Patient verbalizes understanding of the above instructions with no further questions or concerns at this time.

## 2024-02-27 NOTE — PROGRESS NOTES
"Patient ID: Analia Murray is a 73 y.o. female.  Referring Physician: Thais Sanchez MD  [address]  [address]  [place] [address]  Primary Care Provider: Thais Sanchez MD      Allergies:  Ciprofloxacin, Morphine, PCN    Medications:    Past Medical History:  Hyperlipidemia, HTN, COPD, asthma, chronic diastolic heart failure, CKD stage 3 in setting of DM  type 2, dyslipidemia, HOLDEN on CPAP, hypothyroidism (autoimmune thyroiditis)     Past Surgical History:    Family History:    Social History:    Vital Signs:  /78 (BP Location: Left arm, Patient Position: Sitting, BP Cuff Size: Large adult long)   Pulse 96   Temp 35.6 °C (96.1 °F) (Temporal)   Resp 18   Ht 1.588 m (5' 2.52\")   Wt 133 kg (293 lb 8.7 oz)   LMP  (LMP Unknown)   SpO2 92%   BMI 52.80 kg/m²     Physical Exam:  ECOG:  Pain:  Constitutional: Well developed, awake/alert/oriented x3, no distress, alert and cooperative  Eyes: PER. sclera anicteric  ENMT: Oral mucosa moist  Respiratory/Thorax: Breathing is non-labored. Lungs are clear to auscultation bilaterally. No adventitious breath sounds  Cardiovascular: S1-S2. Regular rate and rhythm. No murmurs, rubs, or gallops appreciated  Gastrointestinal: Abdomen soft nontender, nondistended, normal active bowel sounds. No hepatosplenomegly  Musculoskeletal: ROM intact, no joint swelling, normal strength  Extremities: normal extremities, no cyanosis, no edema, no clubbing  Breast:  Lymphatics:  Neurologic: alert and oriented x3. Nonfocal exam. No myoclonus  Psychological: Pleasant, appropriate and easily engaged     Lab Results:    Assessment:    Renal [facility]    Plan:  - - today we will update myeloma work-up including repeat CBCd, CMP, serum protein electrophoresis plus immunofixation, kappa lambda free light chains with ratio, quantitative immunoglobulins    Amadou Hoff, APRN-CNP                         "

## 2024-02-28 LAB
IGA SERPL-MCNC: 515 MG/DL (ref 70–400)
IGG SERPL-MCNC: 1700 MG/DL (ref 700–1600)
IGM SERPL-MCNC: 155 MG/DL (ref 40–230)
KAPPA LC SERPL-MCNC: 18.56 MG/DL (ref 0.33–1.94)
KAPPA LC/LAMBDA SER: 4.26 {RATIO} (ref 0.26–1.65)
LAMBDA LC SERPL-MCNC: 4.36 MG/DL (ref 0.57–2.63)

## 2024-02-29 LAB
ALBUMIN: 3.7 G/DL (ref 3.4–5)
ALPHA 1 GLOBULIN: 0.4 G/DL (ref 0.2–0.6)
ALPHA 2 GLOBULIN: 0.9 G/DL (ref 0.4–1.1)
BETA GLOBULIN: 1.4 G/DL (ref 0.5–1.2)
GAMMA GLOBULIN: 1.4 G/DL (ref 0.5–1.4)
IMMUNOFIXATION COMMENT: ABNORMAL
M-PROTEIN 1: 0.2 G/DL
PATH REVIEW - SERUM IMMUNOFIXATION: ABNORMAL
PATH REVIEW-SERUM PROTEIN ELECTROPHORESIS: ABNORMAL
PROTEIN ELECTROPHORESIS COMMENT: ABNORMAL

## 2024-03-11 DIAGNOSIS — E78.5 DYSLIPIDEMIA: Primary | ICD-10-CM

## 2024-03-11 RX ORDER — EZETIMIBE 10 MG/1
10 TABLET ORAL DAILY
Qty: 90 TABLET | Refills: 3 | Status: SHIPPED | OUTPATIENT
Start: 2024-03-11 | End: 2024-04-05 | Stop reason: SDUPTHER

## 2024-03-30 DIAGNOSIS — I10 ESSENTIAL HYPERTENSION: ICD-10-CM

## 2024-04-02 RX ORDER — AMLODIPINE BESYLATE 10 MG/1
10 TABLET ORAL DAILY
Qty: 90 TABLET | Refills: 0 | Status: SHIPPED | OUTPATIENT
Start: 2024-04-02 | End: 2024-05-08

## 2024-04-05 DIAGNOSIS — K21.9 GASTROESOPHAGEAL REFLUX DISEASE, UNSPECIFIED WHETHER ESOPHAGITIS PRESENT: Primary | ICD-10-CM

## 2024-04-05 DIAGNOSIS — E78.5 DYSLIPIDEMIA: ICD-10-CM

## 2024-04-05 DIAGNOSIS — I10 ESSENTIAL HYPERTENSION: ICD-10-CM

## 2024-04-06 RX ORDER — HYDRALAZINE HYDROCHLORIDE 10 MG/1
10 TABLET, FILM COATED ORAL 2 TIMES DAILY
Qty: 90 TABLET | Refills: 1 | Status: SHIPPED | OUTPATIENT
Start: 2024-04-06 | End: 2024-05-08

## 2024-04-06 RX ORDER — OMEPRAZOLE 40 MG/1
40 CAPSULE, DELAYED RELEASE ORAL
Qty: 90 CAPSULE | Refills: 1 | Status: SHIPPED | OUTPATIENT
Start: 2024-04-06

## 2024-04-06 RX ORDER — EZETIMIBE 10 MG/1
10 TABLET ORAL DAILY
Qty: 90 TABLET | Refills: 3 | Status: SHIPPED | OUTPATIENT
Start: 2024-04-06

## 2024-04-12 ENCOUNTER — HOSPITAL ENCOUNTER (OUTPATIENT)
Dept: RADIOLOGY | Facility: CLINIC | Age: 74
Discharge: HOME | End: 2024-04-12
Payer: MEDICARE

## 2024-04-12 VITALS — BODY MASS INDEX: 57.52 KG/M2 | HEIGHT: 60 IN | WEIGHT: 293 LBS

## 2024-04-12 DIAGNOSIS — Z78.0 MENOPAUSE: ICD-10-CM

## 2024-04-12 DIAGNOSIS — Z12.31 SCREENING MAMMOGRAM FOR BREAST CANCER: ICD-10-CM

## 2024-04-12 PROCEDURE — 77080 DXA BONE DENSITY AXIAL: CPT | Performed by: RADIOLOGY

## 2024-04-12 PROCEDURE — 77067 SCR MAMMO BI INCL CAD: CPT | Mod: BILATERAL PROCEDURE | Performed by: RADIOLOGY

## 2024-04-12 PROCEDURE — 77063 BREAST TOMOSYNTHESIS BI: CPT | Mod: BILATERAL PROCEDURE | Performed by: RADIOLOGY

## 2024-04-12 PROCEDURE — 77080 DXA BONE DENSITY AXIAL: CPT

## 2024-04-12 PROCEDURE — 77067 SCR MAMMO BI INCL CAD: CPT

## 2024-04-17 DIAGNOSIS — M81.0 OSTEOPOROSIS, UNSPECIFIED OSTEOPOROSIS TYPE, UNSPECIFIED PATHOLOGICAL FRACTURE PRESENCE: Primary | ICD-10-CM

## 2024-04-18 ENCOUNTER — TELEPHONE (OUTPATIENT)
Dept: PRIMARY CARE | Facility: CLINIC | Age: 74
End: 2024-04-18

## 2024-04-18 NOTE — TELEPHONE ENCOUNTER
----- Message from Thais Sanchez MD sent at 4/17/2024 10:02 PM EDT -----  Please see rheumatologist about osteoporosis as treating this condition is a bit challenging in the setting of low kidney function; referral placed; call ;

## 2024-05-07 ENCOUNTER — TELEPHONE (OUTPATIENT)
Dept: ENDOCRINOLOGY | Facility: CLINIC | Age: 74
End: 2024-05-07
Payer: MEDICARE

## 2024-05-07 DIAGNOSIS — J45.40 MODERATE PERSISTENT ASTHMA WITHOUT COMPLICATION (HHS-HCC): ICD-10-CM

## 2024-05-07 DIAGNOSIS — I10 ESSENTIAL HYPERTENSION: ICD-10-CM

## 2024-05-07 NOTE — TELEPHONE ENCOUNTER
Patient is having difficulty getting her Trulicity from her pharmacy.  She is wondering what to do.     Patient was advised to call [facility] to see if they have any Trulicity.     Romelia

## 2024-05-08 DIAGNOSIS — E11.65 TYPE 2 DIABETES MELLITUS WITH HYPERGLYCEMIA, UNSPECIFIED WHETHER LONG TERM INSULIN USE (MULTI): ICD-10-CM

## 2024-05-08 DIAGNOSIS — E78.5 DYSLIPIDEMIA: Primary | ICD-10-CM

## 2024-05-08 PROCEDURE — RXMED WILLOW AMBULATORY MEDICATION CHARGE

## 2024-05-08 RX ORDER — ALBUTEROL SULFATE 90 UG/1
AEROSOL, METERED RESPIRATORY (INHALATION)
Qty: 108 G | Refills: 3 | Status: SHIPPED | OUTPATIENT
Start: 2024-05-08

## 2024-05-08 RX ORDER — DULAGLUTIDE 3 MG/.5ML
3 INJECTION, SOLUTION SUBCUTANEOUS
Qty: 2 ML | Refills: 11 | Status: SHIPPED | OUTPATIENT
Start: 2024-05-12 | End: 2024-06-03 | Stop reason: ALTCHOICE

## 2024-05-08 RX ORDER — AMLODIPINE BESYLATE 10 MG/1
10 TABLET ORAL DAILY
Qty: 90 TABLET | Refills: 0 | Status: SHIPPED | OUTPATIENT
Start: 2024-05-08

## 2024-05-08 RX ORDER — HYDRALAZINE HYDROCHLORIDE 10 MG/1
10 TABLET, FILM COATED ORAL 2 TIMES DAILY
Qty: 180 TABLET | Refills: 0 | Status: SHIPPED | OUTPATIENT
Start: 2024-05-08

## 2024-05-09 ENCOUNTER — PHARMACY VISIT (OUTPATIENT)
Dept: PHARMACY | Facility: CLINIC | Age: 74
End: 2024-05-09
Payer: COMMERCIAL

## 2024-05-09 RX ORDER — PRAVASTATIN SODIUM 40 MG/1
40 TABLET ORAL NIGHTLY
Qty: 90 TABLET | Refills: 0 | Status: SHIPPED | OUTPATIENT
Start: 2024-05-09

## 2024-05-25 NOTE — PROGRESS NOTES
Subjective   73 y.o. female who I am asked to see in consultation for No chief complaint on file..    HPI  Consult  Osteoporosis  DM  Renal in suff   Heart and lung issues see notes       2018 PTH very high    Latest Reference Range & Units 02/27/24 14:13   GLUCOSE 74 - 99 mg/dL 91   SODIUM 136 - 145 mmol/L 141   POTASSIUM 3.5 - 5.3 mmol/L 4.9   CHLORIDE 98 - 107 mmol/L 106   Bicarbonate 21 - 32 mmol/L 26   Anion Gap 10 - 20 mmol/L 14   Blood Urea Nitrogen 6 - 23 mg/dL 26 (H)   Creatinine 0.50 - 1.05 mg/dL 1.87 (H)   EGFR >60 mL/min/1.73m*2 28 (L)   Calcium 8.6 - 10.3 mg/dL 8.5 (L)   Albumin 3.4 - 5.0 g/dL 3.9   Alkaline Phosphatase 33 - 136 U/L 146 (H)   ALT 7 - 45 U/L 7   AST 9 - 39 U/L 14   Bilirubin Total 0.0 - 1.2 mg/dL 0.4         Osteoporosis Risk Factors     Personal Hx of fracture: no  Hx of fracture in Mother: Femur  Height loss:  3-4 inches  Tobacco use: never  Alcoholism: never  Recurrent falls:  no   Inadequate physical activity: none  Calcium: 600 mg daily   Vit D: in  units onlyu    Hx of GERD or other esophageal disease:    Plans for invasive dental procedures:      ROS    LMP  (LMP Unknown)     PHYSICAL EXAM    Imaging  Bone Density: April 2024  SPINE L1-L4  Bone Mineral Density: 1.087  T-Score 0.4  Z-Score 2  Bone Mineral Density change vs baseline:  Not reported  Bone Mineral Density change vs previous: Not reported      LEFT FEMUR -TOTAL  Bone Mineral Density: 0.728  T-Score -1.8   Z-Score  -0.7  Bone Mineral Density change vs baseline: Not reported  Bone Mineral Density change vs previous: Not reported      LEFT FEMUR -NECK  Bone Mineral Density: 0.519  T-Score -3  Z-Score -1.5    FRAX SCORE     Current Outpatient Medications   Medication Sig Dispense Refill    acetaminophen (TylenoL) 325 mg tablet Take 1-2 tablets (325-650 mg) by mouth every 6 hours if needed for mild pain (1 - 3).      acetaminophen (Tylenol) 500 mg tablet TAKE 1 TABLET BY MOUTH EVERY 6 TO 8 HOURS AS NEEDED FOR PAIN       amLODIPine (Norvasc) 10 mg tablet TAKE 1 TABLET BY MOUTH ONCE  DAILY 90 tablet 0    aspirin 81 mg EC tablet Take 1 tablet (81 mg) by mouth once daily.      azelastine (Astelin) 137 mcg (0.1 %) nasal spray       budesonide-formoteroL (Symbicort) 160-4.5 mcg/actuation inhaler Inhale 2 puffs once daily.      calcium carbonate-vitamin D3 600 mg-5 mcg (200 unit) tablet Take 1 tablet by mouth once daily.      chlorthalidone (Hygroton) 25 mg tablet Take 1 tablet (25 mg) by mouth once daily. 30 tablet 2    cholecalciferol (Vitamin D-3) 25 MCG (1000 UT) capsule TAKE AS DIRECTED.      doxycycline (Vibra-Tabs) 100 mg tablet TAKE 1 TABLET BY MOUTH EVERY 12 HOURS FOR 7 DAYS. TAKE WITH FOOD. FINISH ALL MEDICAITON      dulaglutide (Trulicity) 3 mg/0.5 mL pen injector Inject 3 mg under the skin 1 (one) time per week. 2 mL 11    dulaglutide (Trulicity) 4.5 mg/0.5 mL pen injector Inject 0.75 mg under the skin 1 (one) time per week. 2 mL 11    empagliflozin (Jardiance) 10 mg Take 1 tablet (10 mg) by mouth once daily. (Patient not taking: Reported on 2/27/2024) 30 tablet 1    ezetimibe (Zetia) 10 mg tablet Take 1 tablet (10 mg) by mouth once daily. 90 tablet 3    ferrous sulfate  mg ER tablet Take 1 tablet by mouth once daily.      fluticasone (Flonase) 50 mcg/actuation nasal spray Administer 2 sprays into each nostril 2 times a day. 16 g 3    furosemide (Lasix) 20 mg tablet Take 1 tablet (20 mg) by mouth once daily.      hydrALAZINE (Apresoline) 10 mg tablet TAKE 1 TABLET BY MOUTH TWICE  DAILY 180 tablet 0    insulin glargine (Lantus U-100 Insulin) 100 unit/mL injection Inject 36 Units under the skin once daily at bedtime.      levothyroxine (Synthroid) 200 mcg tablet Take 1 tablet (200 mcg) by mouth once daily in the morning. Take before meals.      methylPREDNISolone (Medrol Dospak) 4 mg tablets TAKE BY MOUTH FOLLOW PACKAGE DIRECTIONS      metoprolol succinate XL (Toprol-XL) 100 mg 24 hr tablet Take 1 tablet (100 mg) by mouth  "once daily.      montelukast (Singulair) 10 mg tablet Take 1 tablet (10 mg) by mouth once daily at bedtime.      nitroglycerin (Nitrostat) 0.4 mg SL tablet Place under the tongue.      nystatin (Mycostatin) 100,000 unit/mL suspension SWISH AND SPIT 5 ML BY MOUTH 2-3 TIMES A DAY      omeprazole (PriLOSEC) 40 mg DR capsule Take 1 capsule (40 mg) by mouth once daily in the morning. Take before meals. 90 capsule 1    polyethylene glycol (Miralax) 17 gram packet Take 17 g by mouth every other day.      pravastatin (Pravachol) 40 mg tablet Take 1 tablet (40 mg) by mouth once daily at bedtime. 90 tablet 0    tirzepatide (Mounjaro) 2.5 mg/0.5 mL pen injector Inject 2.5 mg under the skin 1 (one) time per week. (Patient not taking: Reported on 2/27/2024) 2 mL 0    topiramate (Topamax) 50 mg tablet Take 1 tablet (50 mg) by mouth 2 times a day.      Trelegy Ellipta 200-62.5-25 mcg blister with device INHALE 1 INHALATION BY MOUTH  ONCE DAILY 180 each 3    Ventolin HFA 90 mcg/actuation inhaler USE 2 INHALATIONS BY MOUTH EVERY 4 HOURS AS NEEDED FOR WHEEZING 108 g 3     Current Facility-Administered Medications   Medication Dose Route Frequency Provider Last Rate Last Admin    dulaglutide (TRULICITY) injection 0.75 mg  0.75 mg subcutaneous Once Earl Koroma MD             Lab Review  Lab Results   Component Value Date    CALCIUM 8.5 (L) 02/27/2024     Lab Results   Component Value Date    CREATININE 1.87 (H) 02/27/2024     No results found for: \"EGFRMUTATION\"  Lab Results   Component Value Date    .1 (H) 05/29/2018     Lab Results   Component Value Date    VITD25 33 08/06/2019     Lab Results   Component Value Date    TSH 0.37 (L) 10/21/2022     No components found for: \"CMP\"     Assessment/Plan   Diagnoses and all orders for this visit:  Secondary renal hyperparathyroidism (Multi)  MGUS (monoclonal gammopathy of unknown significance)  Osteoporosis, unspecified osteoporosis type, unspecified pathological fracture " presence    Ms. Murray has osteoporosis and secondary hyperparathyroidism    Her bone density is significantly abnormal at the femoral neck with a T-score of -3.0    Her risk factors include her mother who fractured her femur, significant height loss and diabetes.    I discussed changes including increasing her vitamin D but we will get a recent vitamin D level first    I discussed disease modifying drugs to decrease risk of fracture and believe that Prolia would be the most appropriate drug for her, however we need to get her calcium level up to normal (present level 8.5) prior to beginning Prolia    She will get lab work including vitamin D CMP and serum CTX which needs to be fasting and in the AM.    I will communicate back to her about how much vitamin D to increase from her 1300 mg daily.  After her calcium is normalized she can begin the Prolia.    Repeat DXA in

## 2024-05-26 PROBLEM — M81.0 OSTEOPOROSIS: Status: ACTIVE | Noted: 2024-05-26

## 2024-05-30 DIAGNOSIS — E11.65 TYPE 2 DIABETES MELLITUS WITH HYPERGLYCEMIA, UNSPECIFIED WHETHER LONG TERM INSULIN USE (MULTI): Primary | ICD-10-CM

## 2024-05-30 NOTE — TELEPHONE ENCOUNTER
Patient requesting 0.75 mg of Trulicity be sent into [facility] until the dose she is supposed to be on is back in stock.     Rx pending to be sent in.   Romelia

## 2024-05-31 ENCOUNTER — OFFICE VISIT (OUTPATIENT)
Dept: RHEUMATOLOGY | Facility: CLINIC | Age: 74
End: 2024-05-31
Payer: MEDICARE

## 2024-05-31 VITALS
OXYGEN SATURATION: 94 % | SYSTOLIC BLOOD PRESSURE: 142 MMHG | WEIGHT: 292 LBS | HEART RATE: 81 BPM | BODY MASS INDEX: 57.03 KG/M2 | DIASTOLIC BLOOD PRESSURE: 85 MMHG

## 2024-05-31 DIAGNOSIS — M81.0 OSTEOPOROSIS, UNSPECIFIED OSTEOPOROSIS TYPE, UNSPECIFIED PATHOLOGICAL FRACTURE PRESENCE: ICD-10-CM

## 2024-05-31 DIAGNOSIS — D47.2 MGUS (MONOCLONAL GAMMOPATHY OF UNKNOWN SIGNIFICANCE): ICD-10-CM

## 2024-05-31 DIAGNOSIS — N25.81 SECONDARY RENAL HYPERPARATHYROIDISM (MULTI): Primary | ICD-10-CM

## 2024-05-31 PROCEDURE — 3077F SYST BP >= 140 MM HG: CPT | Performed by: INTERNAL MEDICINE

## 2024-05-31 PROCEDURE — 99204 OFFICE O/P NEW MOD 45 MIN: CPT | Performed by: INTERNAL MEDICINE

## 2024-05-31 PROCEDURE — 3079F DIAST BP 80-89 MM HG: CPT | Performed by: INTERNAL MEDICINE

## 2024-05-31 PROCEDURE — 1159F MED LIST DOCD IN RCRD: CPT | Performed by: INTERNAL MEDICINE

## 2024-05-31 PROCEDURE — 3008F BODY MASS INDEX DOCD: CPT | Performed by: INTERNAL MEDICINE

## 2024-06-03 ENCOUNTER — OFFICE VISIT (OUTPATIENT)
Dept: CARDIOLOGY | Facility: CLINIC | Age: 74
End: 2024-06-03
Payer: MEDICARE

## 2024-06-03 VITALS
HEIGHT: 63 IN | WEIGHT: 272 LBS | DIASTOLIC BLOOD PRESSURE: 89 MMHG | OXYGEN SATURATION: 96 % | HEART RATE: 82 BPM | BODY MASS INDEX: 48.2 KG/M2 | SYSTOLIC BLOOD PRESSURE: 143 MMHG

## 2024-06-03 DIAGNOSIS — R06.02 SHORTNESS OF BREATH ON EXERTION: ICD-10-CM

## 2024-06-03 DIAGNOSIS — E78.5 DYSLIPIDEMIA: Primary | ICD-10-CM

## 2024-06-03 DIAGNOSIS — I25.10 ATHEROSCLEROSIS OF NATIVE CORONARY ARTERY OF NATIVE HEART WITHOUT ANGINA PECTORIS: ICD-10-CM

## 2024-06-03 DIAGNOSIS — I10 HTN (HYPERTENSION), BENIGN: ICD-10-CM

## 2024-06-03 PROCEDURE — 99214 OFFICE O/P EST MOD 30 MIN: CPT | Performed by: NURSE PRACTITIONER

## 2024-06-03 PROCEDURE — 3008F BODY MASS INDEX DOCD: CPT | Performed by: NURSE PRACTITIONER

## 2024-06-03 PROCEDURE — 3077F SYST BP >= 140 MM HG: CPT | Performed by: NURSE PRACTITIONER

## 2024-06-03 PROCEDURE — 1159F MED LIST DOCD IN RCRD: CPT | Performed by: NURSE PRACTITIONER

## 2024-06-03 PROCEDURE — 3079F DIAST BP 80-89 MM HG: CPT | Performed by: NURSE PRACTITIONER

## 2024-06-03 PROCEDURE — 1036F TOBACCO NON-USER: CPT | Performed by: NURSE PRACTITIONER

## 2024-06-03 PROCEDURE — 93005 ELECTROCARDIOGRAM TRACING: CPT | Performed by: NURSE PRACTITIONER

## 2024-06-03 PROCEDURE — 1126F AMNT PAIN NOTED NONE PRSNT: CPT | Performed by: NURSE PRACTITIONER

## 2024-06-03 RX ORDER — FUROSEMIDE 20 MG/1
20 TABLET ORAL DAILY
Qty: 90 TABLET | Refills: 3 | Status: SHIPPED | OUTPATIENT
Start: 2024-06-03

## 2024-06-03 ASSESSMENT — ENCOUNTER SYMPTOMS
LOSS OF SENSATION IN FEET: 0
OCCASIONAL FEELINGS OF UNSTEADINESS: 1
DEPRESSION: 1

## 2024-06-03 ASSESSMENT — COLUMBIA-SUICIDE SEVERITY RATING SCALE - C-SSRS
1. IN THE PAST MONTH, HAVE YOU WISHED YOU WERE DEAD OR WISHED YOU COULD GO TO SLEEP AND NOT WAKE UP?: NO
6. HAVE YOU EVER DONE ANYTHING, STARTED TO DO ANYTHING, OR PREPARED TO DO ANYTHING TO END YOUR LIFE?: NO
2. HAVE YOU ACTUALLY HAD ANY THOUGHTS OF KILLING YOURSELF?: NO

## 2024-06-03 ASSESSMENT — PATIENT HEALTH QUESTIONNAIRE - PHQ9
1. LITTLE INTEREST OR PLEASURE IN DOING THINGS: SEVERAL DAYS
10. IF YOU CHECKED OFF ANY PROBLEMS, HOW DIFFICULT HAVE THESE PROBLEMS MADE IT FOR YOU TO DO YOUR WORK, TAKE CARE OF THINGS AT HOME, OR GET ALONG WITH OTHER PEOPLE: NOT DIFFICULT AT ALL
SUM OF ALL RESPONSES TO PHQ9 QUESTIONS 1 AND 2: 3
2. FEELING DOWN, DEPRESSED OR HOPELESS: MORE THAN HALF THE DAYS

## 2024-06-03 ASSESSMENT — PAIN SCALES - GENERAL: PAINLEVEL: 0-NO PAIN

## 2024-06-03 NOTE — PROGRESS NOTES
Subjective   Analia Murray is a 73 y.o. female.    Chief Complaint:  Hypertension and Hyperlipidemia    Mrs. Murray returns for a follow up. She was last seen a year ago. She is seen in collaboration with Dr. Ackerman. She has remained compliant with her medications, denying any intolerances. She unfortunately is limited in her mobility due to arthritis, though denies any chest pain at her current activity level. She is however struggling with quite a bit of dyspnea. Her dyspnea causes her to feel anxious, and she has a hard time sleeping. She denies any recent ER visits or hospitalizations. She offers no cardiovascular complaints or concerns today. She denies any complaints of chest pain,  lightheadedness, dizziness, palpitations, syncope, orthopnea, paroxysmal nocturnal dyspnea, lower extremity swelling or bleeding concerns.          Review of Systems   All other systems reviewed and are negative.      Objective   Physical Exam  Constitutional:       Appearance: Healthy appearance. In no distress  Pulmonary:      Effort: Pulmonary effort is normal.      Breath sounds: Normal breath sounds.   Cardiovascular:      Normal rate. Regular rhythm. Normal S1. Normal S2.       Murmurs: There is no murmur.      Carotids: right carotid pulse +2, no bruit heard over the right carotid. left carotid pulse +2, no bruit heard over the left carotid.  Edema:     Peripheral edema absent.   Abdominal:      Palpations: Abdomen is soft.   Musculoskeletal:       Cervical back: Normal range of motion.   Skin:     General: Skin is warm and dry. Normal color and pigmentation   Neurological:      Mental Status: Alert and oriented to person, place and time.   Psychiatric:     Mood and Affect: appropriate mood and appropriate affect.     EKG obtained and reviewed. Normal sinus rhythm. Bifascicular block. T wave abnormality. HR 78      Lab Review:   Lab Results   Component Value Date     02/27/2024    K 4.9 02/27/2024      02/27/2024    CO2 26 02/27/2024    BUN 26 (H) 02/27/2024    CREATININE 1.87 (H) 02/27/2024    GLUCOSE 91 02/27/2024    CALCIUM 8.5 (L) 02/27/2024     Lab Results   Component Value Date    WBC 8.1 02/27/2024    HGB 11.4 (L) 02/27/2024    HCT 38.1 02/27/2024    MCV 78 (L) 02/27/2024     02/27/2024     Lab Results   Component Value Date    CHOL 128 06/21/2023    TRIG 72 06/21/2023    HDL 45.1 06/21/2023       Assessment/Plan   Mrs. Murray is a pleasant 73-year-old  female with a past medical history significant for hypertension, hyperlipidemia, CKD, diabetes, HOLDEN compliant with CPAP, obesity, chronic diastolic HF and medically managed coronary artery disease by heart catheterization in 2015. Echocardiogram 6/2021 showed a reduced EF of 45-50% with a moderately enlarged RV and mild to moderate pulmonary hypertension. She presents today for routine follow up fairly stable from a cardiac standpoint. Her VS and ECG remain stable. She remains on appropriate antiplatelet and lipid lowering therapy with her LDL at goal. She unfortunately is quite limited in her mobility due to arthritis, and is starting to struggle with more dyspnea on exertion. I will increase her lasix for the next few days to see if this makes any difference. She will continue all other medications unchanged. I will have her schedule the previously ordered echocardiogram to reassess her LV function and pulmonary HTN. She will follow up with us in clinic in 2-3 weeks. She knows to call for any concerns.

## 2024-06-03 NOTE — PATIENT INSTRUCTIONS
Increase lasix to 60 mg for the next 3 days, then 40 mg daily    Call next Monday with an update    Schedule an echo    Follow up in 2-3 weeks

## 2024-06-12 ENCOUNTER — HOSPITAL ENCOUNTER (OUTPATIENT)
Dept: CARDIOLOGY | Facility: CLINIC | Age: 74
Discharge: HOME | End: 2024-06-12
Payer: MEDICARE

## 2024-06-12 DIAGNOSIS — I27.20 PULMONARY HYPERTENSION (MULTI): ICD-10-CM

## 2024-06-12 DIAGNOSIS — I25.10 ATHEROSCLEROSIS OF NATIVE CORONARY ARTERY OF NATIVE HEART WITHOUT ANGINA PECTORIS: Primary | ICD-10-CM

## 2024-06-12 PROCEDURE — 2500000004 HC RX 250 GENERAL PHARMACY W/ HCPCS (ALT 636 FOR OP/ED): Performed by: NURSE PRACTITIONER

## 2024-06-12 PROCEDURE — 93306 TTE W/DOPPLER COMPLETE: CPT

## 2024-06-12 PROCEDURE — 93306 TTE W/DOPPLER COMPLETE: CPT | Performed by: INTERNAL MEDICINE

## 2024-06-15 LAB
AORTIC VALVE MEAN GRADIENT: 4.1 MMHG
AORTIC VALVE PEAK VELOCITY: 1.31 M/S
ATRIAL RATE: 78 BPM
AV PEAK GRADIENT: 6.8 MMHG
AVA (PEAK VEL): 1.54 CM2
AVA (VTI): 1.46 CM2
EJECTION FRACTION APICAL 4 CHAMBER: 34.8
LEFT ATRIUM VOLUME AREA LENGTH INDEX BSA: 78 ML/M2
LEFT VENTRICLE INTERNAL DIMENSION DIASTOLE: 6.54 CM (ref 3.5–6)
LEFT VENTRICULAR OUTFLOW TRACT DIAMETER: 2.3 CM
MITRAL VALVE E/A RATIO: 1.32
MITRAL VALVE E/E' RATIO: 11.55
P AXIS: 27 DEGREES
P OFFSET: 168 MS
P ONSET: 120 MS
PR INTERVAL: 156 MS
Q ONSET: 198 MS
QRS COUNT: 13 BEATS
QRS DURATION: 148 MS
QT INTERVAL: 430 MS
QTC CALCULATION(BAZETT): 490 MS
QTC FREDERICIA: 469 MS
R AXIS: -52 DEGREES
RIGHT VENTRICLE FREE WALL PEAK S': 9.83 CM/S
RIGHT VENTRICLE PEAK SYSTOLIC PRESSURE: 70.4 MMHG
T AXIS: 75 DEGREES
T OFFSET: 413 MS
TRICUSPID ANNULAR PLANE SYSTOLIC EXCURSION: 2.3 CM
VENTRICULAR RATE: 78 BPM

## 2024-06-17 ENCOUNTER — APPOINTMENT (OUTPATIENT)
Dept: CARDIOLOGY | Facility: CLINIC | Age: 74
End: 2024-06-17
Payer: MEDICARE

## 2024-07-11 ENCOUNTER — APPOINTMENT (OUTPATIENT)
Dept: CARDIOLOGY | Facility: HOSPITAL | Age: 74
End: 2024-07-11
Payer: MEDICARE

## 2024-07-11 ENCOUNTER — APPOINTMENT (OUTPATIENT)
Dept: RADIOLOGY | Facility: HOSPITAL | Age: 74
End: 2024-07-11
Payer: MEDICARE

## 2024-07-11 ENCOUNTER — HOSPITAL ENCOUNTER (OUTPATIENT)
Facility: HOSPITAL | Age: 74
Setting detail: OBSERVATION
Discharge: HOME | End: 2024-07-12
Attending: EMERGENCY MEDICINE | Admitting: INTERNAL MEDICINE
Payer: MEDICARE

## 2024-07-11 DIAGNOSIS — E03.9 HYPOTHYROIDISM, UNSPECIFIED TYPE: ICD-10-CM

## 2024-07-11 DIAGNOSIS — I10 HTN (HYPERTENSION), BENIGN: ICD-10-CM

## 2024-07-11 DIAGNOSIS — R07.9 CHEST PAIN, UNSPECIFIED TYPE: Primary | ICD-10-CM

## 2024-07-11 DIAGNOSIS — K59.00 CONSTIPATION, UNSPECIFIED CONSTIPATION TYPE: ICD-10-CM

## 2024-07-11 DIAGNOSIS — J81.1 CHRONIC PULMONARY EDEMA (HHS-HCC): ICD-10-CM

## 2024-07-11 DIAGNOSIS — G47.00 INSOMNIA, UNSPECIFIED TYPE: ICD-10-CM

## 2024-07-11 LAB
ALBUMIN SERPL-MCNC: 3.9 G/DL (ref 3.5–5)
ALP BLD-CCNC: 103 U/L (ref 35–125)
ALT SERPL-CCNC: 10 U/L (ref 5–40)
ANION GAP SERPL CALC-SCNC: 11 MMOL/L
AST SERPL-CCNC: 13 U/L (ref 5–40)
BASOPHILS # BLD AUTO: 0.02 X10*3/UL (ref 0–0.1)
BASOPHILS NFR BLD AUTO: 0.3 %
BILIRUB SERPL-MCNC: 0.6 MG/DL (ref 0.1–1.2)
BUN SERPL-MCNC: 29 MG/DL (ref 8–25)
CALCIUM SERPL-MCNC: 8.5 MG/DL (ref 8.5–10.4)
CHLORIDE SERPL-SCNC: 102 MMOL/L (ref 97–107)
CO2 SERPL-SCNC: 24 MMOL/L (ref 24–31)
CREAT SERPL-MCNC: 2 MG/DL (ref 0.4–1.6)
EGFRCR SERPLBLD CKD-EPI 2021: 26 ML/MIN/1.73M*2
EOSINOPHIL # BLD AUTO: 0.14 X10*3/UL (ref 0–0.4)
EOSINOPHIL NFR BLD AUTO: 1.8 %
ERYTHROCYTE [DISTWIDTH] IN BLOOD BY AUTOMATED COUNT: 18.6 % (ref 11.5–14.5)
EST. AVERAGE GLUCOSE BLD GHB EST-MCNC: 108 MG/DL
GLUCOSE BLD MANUAL STRIP-MCNC: 115 MG/DL (ref 74–99)
GLUCOSE BLD MANUAL STRIP-MCNC: 92 MG/DL (ref 74–99)
GLUCOSE BLD MANUAL STRIP-MCNC: 94 MG/DL (ref 74–99)
GLUCOSE SERPL-MCNC: 110 MG/DL (ref 65–99)
HBA1C MFR BLD: 5.4 %
HCT VFR BLD AUTO: 34.9 % (ref 36–46)
HGB BLD-MCNC: 10.3 G/DL (ref 12–16)
IMM GRANULOCYTES # BLD AUTO: 0.03 X10*3/UL (ref 0–0.5)
IMM GRANULOCYTES NFR BLD AUTO: 0.4 % (ref 0–0.9)
LYMPHOCYTES # BLD AUTO: 1.03 X10*3/UL (ref 0.8–3)
LYMPHOCYTES NFR BLD AUTO: 12.9 %
MAGNESIUM SERPL-MCNC: 1.9 MG/DL (ref 1.6–3.1)
MCH RBC QN AUTO: 23.3 PG (ref 26–34)
MCHC RBC AUTO-ENTMCNC: 29.5 G/DL (ref 32–36)
MCV RBC AUTO: 79 FL (ref 80–100)
MONOCYTES # BLD AUTO: 0.55 X10*3/UL (ref 0.05–0.8)
MONOCYTES NFR BLD AUTO: 6.9 %
NEUTROPHILS # BLD AUTO: 6.23 X10*3/UL (ref 1.6–5.5)
NEUTROPHILS NFR BLD AUTO: 77.7 %
NRBC BLD-RTO: 0 /100 WBCS (ref 0–0)
NT-PROBNP SERPL-MCNC: 6876 PG/ML (ref 0–624)
PLATELET # BLD AUTO: 143 X10*3/UL (ref 150–450)
POTASSIUM SERPL-SCNC: 4.7 MMOL/L (ref 3.4–5.1)
PROT SERPL-MCNC: 7.7 G/DL (ref 5.9–7.9)
RBC # BLD AUTO: 4.42 X10*6/UL (ref 4–5.2)
SODIUM SERPL-SCNC: 137 MMOL/L (ref 133–145)
TROPONIN T SERPL-MCNC: 37 NG/L
TROPONIN T SERPL-MCNC: 38 NG/L
TROPONIN T SERPL-MCNC: 41 NG/L
WBC # BLD AUTO: 8 X10*3/UL (ref 4.4–11.3)

## 2024-07-11 PROCEDURE — 99285 EMERGENCY DEPT VISIT HI MDM: CPT | Mod: 25

## 2024-07-11 PROCEDURE — 2500000004 HC RX 250 GENERAL PHARMACY W/ HCPCS (ALT 636 FOR OP/ED): Performed by: NURSE PRACTITIONER

## 2024-07-11 PROCEDURE — 71046 X-RAY EXAM CHEST 2 VIEWS: CPT | Performed by: RADIOLOGY

## 2024-07-11 PROCEDURE — 71046 X-RAY EXAM CHEST 2 VIEWS: CPT

## 2024-07-11 PROCEDURE — 2500000001 HC RX 250 WO HCPCS SELF ADMINISTERED DRUGS (ALT 637 FOR MEDICARE OP): Performed by: NURSE PRACTITIONER

## 2024-07-11 PROCEDURE — 84484 ASSAY OF TROPONIN QUANT: CPT | Performed by: EMERGENCY MEDICINE

## 2024-07-11 PROCEDURE — 36415 COLL VENOUS BLD VENIPUNCTURE: CPT | Performed by: EMERGENCY MEDICINE

## 2024-07-11 PROCEDURE — 83036 HEMOGLOBIN GLYCOSYLATED A1C: CPT | Performed by: NURSE PRACTITIONER

## 2024-07-11 PROCEDURE — 99223 1ST HOSP IP/OBS HIGH 75: CPT | Performed by: NURSE PRACTITIONER

## 2024-07-11 PROCEDURE — 2500000002 HC RX 250 W HCPCS SELF ADMINISTERED DRUGS (ALT 637 FOR MEDICARE OP, ALT 636 FOR OP/ED): Performed by: NURSE PRACTITIONER

## 2024-07-11 PROCEDURE — 84075 ASSAY ALKALINE PHOSPHATASE: CPT | Performed by: EMERGENCY MEDICINE

## 2024-07-11 PROCEDURE — 96374 THER/PROPH/DIAG INJ IV PUSH: CPT | Mod: 59

## 2024-07-11 PROCEDURE — 96372 THER/PROPH/DIAG INJ SC/IM: CPT | Performed by: NURSE PRACTITIONER

## 2024-07-11 PROCEDURE — 93005 ELECTROCARDIOGRAM TRACING: CPT

## 2024-07-11 PROCEDURE — G0378 HOSPITAL OBSERVATION PER HR: HCPCS

## 2024-07-11 PROCEDURE — 2500000001 HC RX 250 WO HCPCS SELF ADMINISTERED DRUGS (ALT 637 FOR MEDICARE OP): Performed by: PHYSICIAN ASSISTANT

## 2024-07-11 PROCEDURE — 83880 ASSAY OF NATRIURETIC PEPTIDE: CPT | Performed by: PHYSICIAN ASSISTANT

## 2024-07-11 PROCEDURE — 83735 ASSAY OF MAGNESIUM: CPT | Performed by: EMERGENCY MEDICINE

## 2024-07-11 PROCEDURE — 85025 COMPLETE CBC W/AUTO DIFF WBC: CPT | Performed by: EMERGENCY MEDICINE

## 2024-07-11 PROCEDURE — 82947 ASSAY GLUCOSE BLOOD QUANT: CPT | Mod: 59

## 2024-07-11 PROCEDURE — 93010 ELECTROCARDIOGRAM REPORT: CPT | Performed by: INTERNAL MEDICINE

## 2024-07-11 RX ORDER — PRAVASTATIN SODIUM 40 MG/1
40 TABLET ORAL NIGHTLY
Status: DISCONTINUED | OUTPATIENT
Start: 2024-07-11 | End: 2024-07-12 | Stop reason: HOSPADM

## 2024-07-11 RX ORDER — ACETAMINOPHEN 650 MG/1
650 SUPPOSITORY RECTAL EVERY 4 HOURS PRN
Status: DISCONTINUED | OUTPATIENT
Start: 2024-07-11 | End: 2024-07-12 | Stop reason: HOSPADM

## 2024-07-11 RX ORDER — NAPROXEN SODIUM 220 MG/1
81 TABLET, FILM COATED ORAL DAILY
Status: DISCONTINUED | OUTPATIENT
Start: 2024-07-11 | End: 2024-07-12 | Stop reason: HOSPADM

## 2024-07-11 RX ORDER — EZETIMIBE 10 MG/1
10 TABLET ORAL DAILY
Status: DISCONTINUED | OUTPATIENT
Start: 2024-07-11 | End: 2024-07-12 | Stop reason: HOSPADM

## 2024-07-11 RX ORDER — DEXTROSE 50 % IN WATER (D50W) INTRAVENOUS SYRINGE
12.5
Status: DISCONTINUED | OUTPATIENT
Start: 2024-07-11 | End: 2024-07-12 | Stop reason: HOSPADM

## 2024-07-11 RX ORDER — FLUTICASONE FUROATE AND VILANTEROL 200; 25 UG/1; UG/1
1 POWDER RESPIRATORY (INHALATION)
Status: DISCONTINUED | OUTPATIENT
Start: 2024-07-12 | End: 2024-07-12 | Stop reason: HOSPADM

## 2024-07-11 RX ORDER — INSULIN LISPRO 100 [IU]/ML
0-5 INJECTION, SOLUTION INTRAVENOUS; SUBCUTANEOUS
Status: DISCONTINUED | OUTPATIENT
Start: 2024-07-11 | End: 2024-07-12 | Stop reason: HOSPADM

## 2024-07-11 RX ORDER — ACETAMINOPHEN 325 MG/1
650 TABLET ORAL EVERY 4 HOURS PRN
Status: DISCONTINUED | OUTPATIENT
Start: 2024-07-11 | End: 2024-07-12 | Stop reason: HOSPADM

## 2024-07-11 RX ORDER — MAGNESIUM HYDROXIDE 2400 MG/10ML
10 SUSPENSION ORAL DAILY PRN
Status: DISCONTINUED | OUTPATIENT
Start: 2024-07-11 | End: 2024-07-12 | Stop reason: HOSPADM

## 2024-07-11 RX ORDER — FERROUS SULFATE 325(65) MG
65 TABLET ORAL DAILY
Status: DISCONTINUED | OUTPATIENT
Start: 2024-07-12 | End: 2024-07-12 | Stop reason: HOSPADM

## 2024-07-11 RX ORDER — NAPROXEN SODIUM 220 MG/1
324 TABLET, FILM COATED ORAL ONCE
Status: COMPLETED | OUTPATIENT
Start: 2024-07-11 | End: 2024-07-11

## 2024-07-11 RX ORDER — ONDANSETRON 4 MG/1
4 TABLET, FILM COATED ORAL 4 TIMES DAILY PRN
Status: DISCONTINUED | OUTPATIENT
Start: 2024-07-11 | End: 2024-07-12 | Stop reason: HOSPADM

## 2024-07-11 RX ORDER — LEVOTHYROXINE SODIUM 100 UG/1
200 TABLET ORAL
Status: DISCONTINUED | OUTPATIENT
Start: 2024-07-12 | End: 2024-07-12 | Stop reason: HOSPADM

## 2024-07-11 RX ORDER — FLUTICASONE PROPIONATE 50 MCG
2 SPRAY, SUSPENSION (ML) NASAL 2 TIMES DAILY
Status: DISCONTINUED | OUTPATIENT
Start: 2024-07-11 | End: 2024-07-12 | Stop reason: HOSPADM

## 2024-07-11 RX ORDER — HYDRALAZINE HYDROCHLORIDE 10 MG/1
10 TABLET, FILM COATED ORAL 2 TIMES DAILY
Status: DISCONTINUED | OUTPATIENT
Start: 2024-07-11 | End: 2024-07-12

## 2024-07-11 RX ORDER — ONDANSETRON HYDROCHLORIDE 2 MG/ML
4 INJECTION, SOLUTION INTRAVENOUS 4 TIMES DAILY PRN
Status: DISCONTINUED | OUTPATIENT
Start: 2024-07-11 | End: 2024-07-12 | Stop reason: HOSPADM

## 2024-07-11 RX ORDER — TALC
3 POWDER (GRAM) TOPICAL NIGHTLY PRN
Status: DISCONTINUED | OUTPATIENT
Start: 2024-07-11 | End: 2024-07-12 | Stop reason: HOSPADM

## 2024-07-11 RX ORDER — ACETAMINOPHEN 160 MG/5ML
650 SOLUTION ORAL EVERY 4 HOURS PRN
Status: DISCONTINUED | OUTPATIENT
Start: 2024-07-11 | End: 2024-07-12 | Stop reason: HOSPADM

## 2024-07-11 RX ORDER — METOPROLOL SUCCINATE 100 MG/1
100 TABLET, EXTENDED RELEASE ORAL DAILY
Status: DISCONTINUED | OUTPATIENT
Start: 2024-07-12 | End: 2024-07-12 | Stop reason: HOSPADM

## 2024-07-11 RX ORDER — PANTOPRAZOLE SODIUM 40 MG/1
40 TABLET, DELAYED RELEASE ORAL
Status: DISCONTINUED | OUTPATIENT
Start: 2024-07-11 | End: 2024-07-12 | Stop reason: HOSPADM

## 2024-07-11 RX ORDER — DEXTROSE 50 % IN WATER (D50W) INTRAVENOUS SYRINGE
25
Status: DISCONTINUED | OUTPATIENT
Start: 2024-07-11 | End: 2024-07-12 | Stop reason: HOSPADM

## 2024-07-11 RX ORDER — ALBUTEROL SULFATE 0.83 MG/ML
3 SOLUTION RESPIRATORY (INHALATION) EVERY 6 HOURS PRN
Status: DISCONTINUED | OUTPATIENT
Start: 2024-07-11 | End: 2024-07-12 | Stop reason: HOSPADM

## 2024-07-11 RX ORDER — MONTELUKAST SODIUM 10 MG/1
10 TABLET ORAL NIGHTLY
Status: DISCONTINUED | OUTPATIENT
Start: 2024-07-11 | End: 2024-07-12 | Stop reason: HOSPADM

## 2024-07-11 RX ORDER — NITROGLYCERIN 0.4 MG/1
0.4 TABLET SUBLINGUAL EVERY 5 MIN PRN
Status: DISCONTINUED | OUTPATIENT
Start: 2024-07-11 | End: 2024-07-12 | Stop reason: HOSPADM

## 2024-07-11 RX ORDER — HYDROXYZINE HYDROCHLORIDE 10 MG/1
10 TABLET, FILM COATED ORAL EVERY 8 HOURS PRN
Status: DISCONTINUED | OUTPATIENT
Start: 2024-07-11 | End: 2024-07-12 | Stop reason: HOSPADM

## 2024-07-11 RX ORDER — AMLODIPINE BESYLATE 10 MG/1
10 TABLET ORAL DAILY
Status: DISCONTINUED | OUTPATIENT
Start: 2024-07-12 | End: 2024-07-12 | Stop reason: HOSPADM

## 2024-07-11 RX ORDER — ENOXAPARIN SODIUM 100 MG/ML
60 INJECTION SUBCUTANEOUS EVERY 12 HOURS SCHEDULED
Status: DISCONTINUED | OUTPATIENT
Start: 2024-07-11 | End: 2024-07-12 | Stop reason: HOSPADM

## 2024-07-11 RX ORDER — FUROSEMIDE 10 MG/ML
40 INJECTION INTRAMUSCULAR; INTRAVENOUS 2 TIMES DAILY
Status: DISCONTINUED | OUTPATIENT
Start: 2024-07-11 | End: 2024-07-12 | Stop reason: HOSPADM

## 2024-07-11 SDOH — ECONOMIC STABILITY: INCOME INSECURITY: IN THE LAST 12 MONTHS, WAS THERE A TIME WHEN YOU WERE NOT ABLE TO PAY THE MORTGAGE OR RENT ON TIME?: NO

## 2024-07-11 SDOH — SOCIAL STABILITY: SOCIAL NETWORK
IN A TYPICAL WEEK, HOW MANY TIMES DO YOU TALK ON THE PHONE WITH FAMILY, FRIENDS, OR NEIGHBORS?: MORE THAN THREE TIMES A WEEK

## 2024-07-11 SDOH — HEALTH STABILITY: MENTAL HEALTH
STRESS IS WHEN SOMEONE FEELS TENSE, NERVOUS, ANXIOUS, OR CAN'T SLEEP AT NIGHT BECAUSE THEIR MIND IS TROUBLED. HOW STRESSED ARE YOU?: NOT AT ALL

## 2024-07-11 SDOH — HEALTH STABILITY: MENTAL HEALTH: HOW MANY STANDARD DRINKS CONTAINING ALCOHOL DO YOU HAVE ON A TYPICAL DAY?: PATIENT DOES NOT DRINK

## 2024-07-11 SDOH — SOCIAL STABILITY: SOCIAL INSECURITY: ARE YOU OR HAVE YOU BEEN THREATENED OR ABUSED PHYSICALLY, EMOTIONALLY, OR SEXUALLY BY ANYONE?: NO

## 2024-07-11 SDOH — HEALTH STABILITY: MENTAL HEALTH: HOW OFTEN DO YOU HAVE A DRINK CONTAINING ALCOHOL?: NEVER

## 2024-07-11 SDOH — ECONOMIC STABILITY: FOOD INSECURITY: WITHIN THE PAST 12 MONTHS, THE FOOD YOU BOUGHT JUST DIDN'T LAST AND YOU DIDN'T HAVE MONEY TO GET MORE.: NEVER TRUE

## 2024-07-11 SDOH — SOCIAL STABILITY: SOCIAL INSECURITY
WITHIN THE LAST YEAR, HAVE YOU BEEN KICKED, HIT, SLAPPED, OR OTHERWISE PHYSICALLY HURT BY YOUR PARTNER OR EX-PARTNER?: NO

## 2024-07-11 SDOH — SOCIAL STABILITY: SOCIAL NETWORK: ARE YOU MARRIED, WIDOWED, DIVORCED, SEPARATED, NEVER MARRIED, OR LIVING WITH A PARTNER?: MARRIED

## 2024-07-11 SDOH — ECONOMIC STABILITY: INCOME INSECURITY: IN THE PAST 12 MONTHS, HAS THE ELECTRIC, GAS, OIL, OR WATER COMPANY THREATENED TO SHUT OFF SERVICE IN YOUR HOME?: NO

## 2024-07-11 SDOH — SOCIAL STABILITY: SOCIAL NETWORK: HOW OFTEN DO YOU ATTEND CHURCH OR RELIGIOUS SERVICES?: MORE THAN 4 TIMES PER YEAR

## 2024-07-11 SDOH — SOCIAL STABILITY: SOCIAL INSECURITY: WITHIN THE LAST YEAR, HAVE YOU BEEN AFRAID OF YOUR PARTNER OR EX-PARTNER?: NO

## 2024-07-11 SDOH — HEALTH STABILITY: MENTAL HEALTH
HOW OFTEN DO YOU NEED TO HAVE SOMEONE HELP YOU WHEN YOU READ INSTRUCTIONS, PAMPHLETS, OR OTHER WRITTEN MATERIAL FROM YOUR DOCTOR OR PHARMACY?: NEVER

## 2024-07-11 SDOH — SOCIAL STABILITY: SOCIAL INSECURITY: HAS ANYONE EVER THREATENED TO HURT YOUR FAMILY OR YOUR PETS?: NO

## 2024-07-11 SDOH — SOCIAL STABILITY: SOCIAL INSECURITY: HAVE YOU HAD THOUGHTS OF HARMING ANYONE ELSE?: NO

## 2024-07-11 SDOH — SOCIAL STABILITY: SOCIAL INSECURITY: POSSIBLE ABUSE REPORTED TO:: ADVOCATE

## 2024-07-11 SDOH — SOCIAL STABILITY: SOCIAL NETWORK: HOW OFTEN DO YOU GET TOGETHER WITH FRIENDS OR RELATIVES?: MORE THAN THREE TIMES A WEEK

## 2024-07-11 SDOH — ECONOMIC STABILITY: HOUSING INSECURITY: AT ANY TIME IN THE PAST 12 MONTHS, WERE YOU HOMELESS OR LIVING IN A SHELTER (INCLUDING NOW)?: NO

## 2024-07-11 SDOH — SOCIAL STABILITY: SOCIAL INSECURITY
WITHIN THE LAST YEAR, HAVE TO BEEN RAPED OR FORCED TO HAVE ANY KIND OF SEXUAL ACTIVITY BY YOUR PARTNER OR EX-PARTNER?: NO

## 2024-07-11 SDOH — HEALTH STABILITY: MENTAL HEALTH: HOW OFTEN DO YOU HAVE 6 OR MORE DRINKS ON ONE OCCASION?: NEVER

## 2024-07-11 SDOH — ECONOMIC STABILITY: INCOME INSECURITY: HOW HARD IS IT FOR YOU TO PAY FOR THE VERY BASICS LIKE FOOD, HOUSING, MEDICAL CARE, AND HEATING?: NOT VERY HARD

## 2024-07-11 SDOH — SOCIAL STABILITY: SOCIAL INSECURITY: WITHIN THE LAST YEAR, HAVE YOU BEEN HUMILIATED OR EMOTIONALLY ABUSED IN OTHER WAYS BY YOUR PARTNER OR EX-PARTNER?: NO

## 2024-07-11 SDOH — SOCIAL STABILITY: SOCIAL NETWORK
DO YOU BELONG TO ANY CLUBS OR ORGANIZATIONS SUCH AS CHURCH GROUPS UNIONS, FRATERNAL OR ATHLETIC GROUPS, OR SCHOOL GROUPS?: YES

## 2024-07-11 SDOH — SOCIAL STABILITY: SOCIAL NETWORK: HOW OFTEN DO YOU ATTENT MEETINGS OF THE CLUB OR ORGANIZATION YOU BELONG TO?: MORE THAN 4 TIMES PER YEAR

## 2024-07-11 SDOH — SOCIAL STABILITY: SOCIAL INSECURITY: DOES ANYONE TRY TO KEEP YOU FROM HAVING/CONTACTING OTHER FRIENDS OR DOING THINGS OUTSIDE YOUR HOME?: NO

## 2024-07-11 SDOH — SOCIAL STABILITY: SOCIAL INSECURITY: HAVE YOU HAD ANY THOUGHTS OF HARMING ANYONE ELSE?: NO

## 2024-07-11 SDOH — ECONOMIC STABILITY: FOOD INSECURITY: WITHIN THE PAST 12 MONTHS, YOU WORRIED THAT YOUR FOOD WOULD RUN OUT BEFORE YOU GOT MONEY TO BUY MORE.: NEVER TRUE

## 2024-07-11 SDOH — HEALTH STABILITY: PHYSICAL HEALTH: ON AVERAGE, HOW MANY DAYS PER WEEK DO YOU ENGAGE IN MODERATE TO STRENUOUS EXERCISE (LIKE A BRISK WALK)?: 0 DAYS

## 2024-07-11 SDOH — ECONOMIC STABILITY: TRANSPORTATION INSECURITY
IN THE PAST 12 MONTHS, HAS LACK OF TRANSPORTATION KEPT YOU FROM MEETINGS, WORK, OR FROM GETTING THINGS NEEDED FOR DAILY LIVING?: NO

## 2024-07-11 SDOH — HEALTH STABILITY: PHYSICAL HEALTH: ON AVERAGE, HOW MANY MINUTES DO YOU ENGAGE IN EXERCISE AT THIS LEVEL?: 10 MIN

## 2024-07-11 SDOH — ECONOMIC STABILITY: TRANSPORTATION INSECURITY
IN THE PAST 12 MONTHS, HAS THE LACK OF TRANSPORTATION KEPT YOU FROM MEDICAL APPOINTMENTS OR FROM GETTING MEDICATIONS?: NO

## 2024-07-11 SDOH — SOCIAL STABILITY: SOCIAL INSECURITY: ARE THERE ANY APPARENT SIGNS OF INJURIES/BEHAVIORS THAT COULD BE RELATED TO ABUSE/NEGLECT?: NO

## 2024-07-11 SDOH — ECONOMIC STABILITY: HOUSING INSECURITY: IN THE PAST 12 MONTHS, HOW MANY TIMES HAVE YOU MOVED WHERE YOU WERE LIVING?: 1

## 2024-07-11 SDOH — SOCIAL STABILITY: SOCIAL INSECURITY: ABUSE: ADULT

## 2024-07-11 SDOH — SOCIAL STABILITY: SOCIAL INSECURITY: DO YOU FEEL ANYONE HAS EXPLOITED OR TAKEN ADVANTAGE OF YOU FINANCIALLY OR OF YOUR PERSONAL PROPERTY?: NO

## 2024-07-11 SDOH — SOCIAL STABILITY: SOCIAL INSECURITY: DO YOU FEEL UNSAFE GOING BACK TO THE PLACE WHERE YOU ARE LIVING?: NO

## 2024-07-11 SDOH — HEALTH STABILITY: MENTAL HEALTH: EXPERIENCED ANY OF THE FOLLOWING LIFE EVENTS: DEATH OF FAMILY/FRIEND

## 2024-07-11 SDOH — SOCIAL STABILITY: SOCIAL INSECURITY: WERE YOU ABLE TO COMPLETE ALL THE BEHAVIORAL HEALTH SCREENINGS?: YES

## 2024-07-11 ASSESSMENT — ACTIVITIES OF DAILY LIVING (ADL)
DRESSING YOURSELF: NEEDS ASSISTANCE
GROOMING: NEEDS ASSISTANCE
HEARING - RIGHT EAR: FUNCTIONAL
ADEQUATE_TO_COMPLETE_ADL: YES
PATIENT'S MEMORY ADEQUATE TO SAFELY COMPLETE DAILY ACTIVITIES?: YES
TOILETING: NEEDS ASSISTANCE
HEARING - LEFT EAR: FUNCTIONAL
BATHING: NEEDS ASSISTANCE
JUDGMENT_ADEQUATE_SAFELY_COMPLETE_DAILY_ACTIVITIES: YES
WALKS IN HOME: NEEDS ASSISTANCE
FEEDING YOURSELF: INDEPENDENT

## 2024-07-11 ASSESSMENT — ENCOUNTER SYMPTOMS
ALLERGIC/IMMUNOLOGIC NEGATIVE: 1
CONSTITUTIONAL NEGATIVE: 1
WHEEZING: 0
EYES NEGATIVE: 1
HEMATOLOGIC/LYMPHATIC NEGATIVE: 1
APNEA: 0
PALPITATIONS: 0
PSYCHIATRIC NEGATIVE: 1
COUGH: 0
SHORTNESS OF BREATH: 1
ENDOCRINE NEGATIVE: 1
NEUROLOGICAL NEGATIVE: 1
CHOKING: 0
STRIDOR: 0
CHEST TIGHTNESS: 0
GASTROINTESTINAL NEGATIVE: 1
MUSCULOSKELETAL NEGATIVE: 1

## 2024-07-11 ASSESSMENT — PAIN - FUNCTIONAL ASSESSMENT
PAIN_FUNCTIONAL_ASSESSMENT: 0-10
PAIN_FUNCTIONAL_ASSESSMENT: 0-10

## 2024-07-11 ASSESSMENT — COGNITIVE AND FUNCTIONAL STATUS - GENERAL
PATIENT BASELINE BEDBOUND: NO
DRESSING REGULAR LOWER BODY CLOTHING: A LITTLE
DRESSING REGULAR UPPER BODY CLOTHING: A LITTLE
MOBILITY SCORE: 20
TOILETING: A LOT
CLIMB 3 TO 5 STEPS WITH RAILING: A LOT
DAILY ACTIVITIY SCORE: 18
PERSONAL GROOMING: A LITTLE
HELP NEEDED FOR BATHING: A LITTLE
WALKING IN HOSPITAL ROOM: A LITTLE
STANDING UP FROM CHAIR USING ARMS: A LITTLE

## 2024-07-11 ASSESSMENT — HEART SCORE
AGE: 65+
ECG: NORMAL
TROPONIN: 1-3 TIMES NORMAL LIMIT
RISK FACTORS: >2 RISK FACTORS OR HX OF ATHEROSCLEROTIC DISEASE
HISTORY: SLIGHTLY SUSPICIOUS
HEART SCORE: 5

## 2024-07-11 ASSESSMENT — PAIN DESCRIPTION - ORIENTATION: ORIENTATION: MID

## 2024-07-11 ASSESSMENT — PAIN SCALES - GENERAL
PAINLEVEL_OUTOF10: 0 - NO PAIN
PAINLEVEL_OUTOF10: 5 - MODERATE PAIN

## 2024-07-11 ASSESSMENT — LIFESTYLE VARIABLES
TOTAL SCORE: 0
HOW MANY STANDARD DRINKS CONTAINING ALCOHOL DO YOU HAVE ON A TYPICAL DAY: PATIENT DOES NOT DRINK
SKIP TO QUESTIONS 9-10: 1
AUDIT-C TOTAL SCORE: 0
HOW OFTEN DO YOU HAVE A DRINK CONTAINING ALCOHOL: NEVER
AUDIT-C TOTAL SCORE: 0
HOW OFTEN DO YOU HAVE 6 OR MORE DRINKS ON ONE OCCASION: NEVER
SKIP TO QUESTIONS 9-10: 1
HAVE YOU EVER FELT YOU SHOULD CUT DOWN ON YOUR DRINKING: NO
EVER HAD A DRINK FIRST THING IN THE MORNING TO STEADY YOUR NERVES TO GET RID OF A HANGOVER: NO
EVER FELT BAD OR GUILTY ABOUT YOUR DRINKING: NO
HAVE PEOPLE ANNOYED YOU BY CRITICIZING YOUR DRINKING: NO
SUBSTANCE_ABUSE_PAST_12_MONTHS: NO
AUDIT-C TOTAL SCORE: 0
PRESCIPTION_ABUSE_PAST_12_MONTHS: NO

## 2024-07-11 ASSESSMENT — COLUMBIA-SUICIDE SEVERITY RATING SCALE - C-SSRS
1. IN THE PAST MONTH, HAVE YOU WISHED YOU WERE DEAD OR WISHED YOU COULD GO TO SLEEP AND NOT WAKE UP?: NO
2. HAVE YOU ACTUALLY HAD ANY THOUGHTS OF KILLING YOURSELF?: NO
6. HAVE YOU EVER DONE ANYTHING, STARTED TO DO ANYTHING, OR PREPARED TO DO ANYTHING TO END YOUR LIFE?: NO

## 2024-07-11 ASSESSMENT — PATIENT HEALTH QUESTIONNAIRE - PHQ9
SUM OF ALL RESPONSES TO PHQ9 QUESTIONS 1 & 2: 0
2. FEELING DOWN, DEPRESSED OR HOPELESS: NOT AT ALL
1. LITTLE INTEREST OR PLEASURE IN DOING THINGS: NOT AT ALL

## 2024-07-11 ASSESSMENT — PAIN DESCRIPTION - ONSET: ONSET: ONGOING

## 2024-07-11 ASSESSMENT — PAIN DESCRIPTION - PROGRESSION: CLINICAL_PROGRESSION: NOT CHANGED

## 2024-07-11 ASSESSMENT — PAIN DESCRIPTION - PAIN TYPE: TYPE: ACUTE PAIN

## 2024-07-11 ASSESSMENT — PAIN DESCRIPTION - LOCATION: LOCATION: CHEST

## 2024-07-11 ASSESSMENT — PAIN DESCRIPTION - FREQUENCY: FREQUENCY: CONSTANT/CONTINUOUS

## 2024-07-11 ASSESSMENT — PAIN DESCRIPTION - DESCRIPTORS
DESCRIPTORS: ACHING
DESCRIPTORS: ACHING

## 2024-07-11 NOTE — ED TRIAGE NOTES
"Chest pain this morning at 0730, hx CHF.  Patient believes she is dehydrated r/t \"water pill\". Chest pain has subsided now, just \"pangs\".  "

## 2024-07-11 NOTE — NURSING NOTE
Assumed care of patient from ED, Patient is A&Ox4 and was ab le to transfer from [facility] to bed with minimal assistance. Patient is here for Chest pain but has not had any pain while in ED per ED nurse. Family is at bed side and patient is oriented to room. Call light and possessions within reach.

## 2024-07-11 NOTE — H&P
History Of Present Illness  Analia Murray is a 74 y.o. female presenting with chest pain, shortness of breath, and lower extremity swelling. She reports her symptoms started last night. She felt short of breath when she laid flat, so she slept sitting up. She also felt a mild pressure in the center of her chest. This AM she developed chest pain. She reports it was a pinching pain and she also felt like her chest was heavy. She took her BP and found it to be elevated at 180/121. She started getting anxious, so she came to the ED. She is currently sitting up in bed. She denies any chest pain currently. Also denies any shortness of breath at this time. She admits that she has been noncompliant with her Lasix due to it making her have to urinate constantly, which is difficult for her because she has mobility issues. She also reports she has noticed lower extremity edema that has been progressively worsening over the past few days. She follows with Dr. Ackerman for cardiology.     ED Course  EKG: Sinus rhythm with left axis deviation ventricular rate 71 no acute ischemic changes   HEART score: 5  HScTns: 41, 38  ProBNP: 6,876  BUN / creat: 29 / 2.00 (near patient's baseline)  H&H: 10.3 / 34.9 ( near baseline)  Plts: 143  CXR: cardiomegaly with mild interstitial prominence  BP: 156/97  Given ASA in ED    Past Cardiac History  -Follows with Dr. Ackerman  -TTE 6/2024: EF 45-50%,  L atrium is moderate to severely dilated, moderate mitral valve regurgitation, moderate to severe tricuspid regurgitation, severely elevated right ventricular systolic pressure, moderate pulmonic valve regurgitation, severely elevated pulmonary artery pressure, estimated PASP is 70 mmHg  -Moderate LCx and RCA lesions noted by remote catheterization in 2005.  She also had a high-grade lesion and a small branch off a diagonal artery.      Past Medical History  Past Medical History:   Diagnosis Date    CHF (congestive heart failure) (Multi)      Chronic kidney disease     Coronary artery disease     Cough, unspecified 2017    Cough    Diverticulitis of large intestine with perforation and abscess without bleeding 10/21/2016    Colonic diverticular abscess    Fistula of intestine 2017    Enterocutaneous fistula    Hyperlipidemia     Hypertension     Morbid (severe) obesity due to excess calories (Multi)     Morbid obesity    Obstructive sleep apnea (adult) (pediatric) 2018    HOLDEN on CPAP    Other conditions influencing health status     DEXA Body Composition Study    Personal history of other diseases of the circulatory system     History of hypertension    Personal history of other diseases of the digestive system 2018    History of esophagitis    Personal history of other diseases of the digestive system 2017    History of diverticulitis    Personal history of other endocrine, nutritional and metabolic disease 2020    History of hyperlipidemia    Personal history of other endocrine, nutritional and metabolic disease     History of hyperlipidemia    Personal history of other endocrine, nutritional and metabolic disease     History of type 1 diabetes mellitus    Personal history of other mental and behavioral disorders 2018    History of anxiety    Unspecified asthma, uncomplicated ([facility]) 10/05/2022    Asthma       Surgical History  Past Surgical History:   Procedure Laterality Date    CARDIAC CATHETERIZATION       SECTION, CLASSIC  2014     Section    COLONOSCOPY  2013    Complete Colonoscopy    SMALL INTESTINE SURGERY  2016    Small Bowel Resection        Social History  She reports that she has never smoked. She has never been exposed to tobacco smoke. She has never used smokeless tobacco. She reports that she does not drink alcohol and does not use drugs.    Family History  Family History   Problem Relation Name Age of Onset    Coronary artery disease Mother      Heart  attack Mother          Allergies  Ciprofloxacin, Morphine, and Penicillins    Review of Systems   Constitutional: Negative.    HENT: Negative.     Eyes: Negative.    Respiratory:  Positive for shortness of breath. Negative for apnea, cough, choking, chest tightness, wheezing and stridor.    Cardiovascular:  Positive for chest pain and leg swelling. Negative for palpitations.   Gastrointestinal: Negative.    Endocrine: Negative.    Genitourinary: Negative.    Musculoskeletal: Negative.    Skin: Negative.    Allergic/Immunologic: Negative.    Neurological: Negative.    Hematological: Negative.    Psychiatric/Behavioral: Negative.          Physical Exam  Constitutional:       General: She is not in acute distress.     Appearance: Normal appearance. She is obese. She is not ill-appearing, toxic-appearing or diaphoretic.   HENT:      Head: Normocephalic and atraumatic.      Right Ear: External ear normal.      Left Ear: External ear normal.      Nose: Nose normal.      Mouth/Throat:      Mouth: Mucous membranes are moist.   Eyes:      Extraocular Movements: Extraocular movements intact.   Cardiovascular:      Rate and Rhythm: Normal rate and regular rhythm.      Pulses: Normal pulses.      Heart sounds: Normal heart sounds.   Pulmonary:      Effort: Pulmonary effort is normal. No respiratory distress.      Breath sounds: Normal breath sounds.   Abdominal:      General: Bowel sounds are normal. There is no distension.      Palpations: Abdomen is soft.      Tenderness: There is no abdominal tenderness.   Musculoskeletal:         General: No tenderness. Normal range of motion.      Cervical back: Normal range of motion and neck supple.      Right lower le+ Pitting Edema present.      Left lower le+ Pitting Edema present.   Skin:     General: Skin is warm and dry.      Capillary Refill: Capillary refill takes less than 2 seconds.   Neurological:      General: No focal deficit present.      Mental Status: She is alert  "and oriented to person, place, and time.   Psychiatric:         Mood and Affect: Mood normal.         Behavior: Behavior normal.          Last Recorded Vitals  Blood pressure (!) 159/97, pulse 77, temperature 36.6 °C (97.9 °F), temperature source Tympanic, resp. rate 17, height 1.6 m (5' 3\"), weight 131 kg (289 lb), SpO2 100%.    Relevant Results  Scheduled medications  amLODIPine, 10 mg, oral, Daily  aspirin, 81 mg, oral, Daily  dulaglutide, 0.75 mg, subcutaneous, Once  enoxaparin, 60 mg, subcutaneous, q12h JAYA  ezetimibe, 10 mg, oral, Daily  ferrous sulfate (325 mg ferrous sulfate), 65 mg of iron, oral, Daily  fluticasone, 2 spray, Each Nostril, BID  tiotropium, 2 puff, inhalation, Daily   And  fluticasone furoate-vilanteroL, 1 puff, inhalation, Daily  furosemide, 40 mg, intravenous, BID  hydrALAZINE, 10 mg, oral, BID  insulin lispro, 0-5 Units, subcutaneous, TID  [START ON 7/12/2024] levothyroxine, 200 mcg, oral, Daily before breakfast  metoprolol succinate XL, 100 mg, oral, Daily  montelukast, 10 mg, oral, Nightly  [START ON 7/12/2024] pantoprazole, 40 mg, oral, Daily before breakfast  pravastatin, 40 mg, oral, Nightly  psyllium, 1 packet, oral, Daily      Continuous medications     PRN medications  Results for orders placed or performed during the hospital encounter of 07/11/24 (from the past 24 hour(s))   CBC and Auto Differential   Result Value Ref Range    WBC 8.0 4.4 - 11.3 x10*3/uL    nRBC 0.0 0.0 - 0.0 /100 WBCs    RBC 4.42 4.00 - 5.20 x10*6/uL    Hemoglobin 10.3 (L) 12.0 - 16.0 g/dL    Hematocrit 34.9 (L) 36.0 - 46.0 %    MCV 79 (L) 80 - 100 fL    MCH 23.3 (L) 26.0 - 34.0 pg    MCHC 29.5 (L) 32.0 - 36.0 g/dL    RDW 18.6 (H) 11.5 - 14.5 %    Platelets 143 (L) 150 - 450 x10*3/uL    Neutrophils % 77.7 40.0 - 80.0 %    Immature Granulocytes %, Automated 0.4 0.0 - 0.9 %    Lymphocytes % 12.9 13.0 - 44.0 %    Monocytes % 6.9 2.0 - 10.0 %    Eosinophils % 1.8 0.0 - 6.0 %    Basophils % 0.3 0.0 - 2.0 %    " Neutrophils Absolute 6.23 (H) 1.60 - 5.50 x10*3/uL    Immature Granulocytes Absolute, Automated 0.03 0.00 - 0.50 x10*3/uL    Lymphocytes Absolute 1.03 0.80 - 3.00 x10*3/uL    Monocytes Absolute 0.55 0.05 - 0.80 x10*3/uL    Eosinophils Absolute 0.14 0.00 - 0.40 x10*3/uL    Basophils Absolute 0.02 0.00 - 0.10 x10*3/uL   Comprehensive Metabolic Panel   Result Value Ref Range    Glucose 110 (H) 65 - 99 mg/dL    Sodium 137 133 - 145 mmol/L    Potassium 4.7 3.4 - 5.1 mmol/L    Chloride 102 97 - 107 mmol/L    Bicarbonate 24 24 - 31 mmol/L    Urea Nitrogen 29 (H) 8 - 25 mg/dL    Creatinine 2.00 (H) 0.40 - 1.60 mg/dL    eGFR 26 (L) >60 mL/min/1.73m*2    Calcium 8.5 8.5 - 10.4 mg/dL    Albumin 3.9 3.5 - 5.0 g/dL    Alkaline Phosphatase 103 35 - 125 U/L    Total Protein 7.7 5.9 - 7.9 g/dL    AST 13 5 - 40 U/L    Bilirubin, Total 0.6 0.1 - 1.2 mg/dL    ALT 10 5 - 40 U/L    Anion Gap 11 <=19 mmol/L   Magnesium   Result Value Ref Range    Magnesium 1.90 1.60 - 3.10 mg/dL   Serial Troponin, Initial ([facility])   Result Value Ref Range    Troponin T, High Sensitivity 41 (HH) <=14 ng/L   NT Pro-BNP   Result Value Ref Range    PROBNP 6,876 (H) 0 - 624 pg/mL   Serial Troponin, 2 Hour ([facility])   Result Value Ref Range    Troponin T, High Sensitivity 38 (HH) <=14 ng/L   XR chest 2 views    Result Date: 7/11/2024  Interpreted By:  Ramon Andrade, STUDY: XR CHEST 2 VIEWS;  7/11/2024 9:22 am   INDICATION: Signs/Symptoms:Chest Pain.   COMPARISON: None.   ACCESSION NUMBER(S): [id number]   ORDERING CLINICIAN: ERICA VALENCIA   FINDINGS: AP portable view of the chest is obtained.  Limited exam due to portable nature. Magnified cardiac silhouette. Prominence of both lynette. Mild interstitial prominence diffusely.       1. Cardiomegaly with mild interstitial prominence diffusely. Consider mild CHF and pulmonary edema.       MACRO: None   Signed by: Ramon Andrade 7/11/2024 10:12 AM Dictation workstation:   [id number]     PRN medications: acetaminophen  **OR** acetaminophen **OR** acetaminophen, albuterol, dextrose, dextrose, glucagon, glucagon, hydrOXYzine HCL, magnesium hydroxide, melatonin, nitroglycerin, ondansetron **OR** ondansetron, oxygen      Assessment/Plan   Principal Problem:    Chest pain, unspecified type      Acute on chronic diastolic heart failure  -2/2 noncompliance with Lasix  -ProBNP 6,876  -Give IV Lasix 40mg IV Q12hr  -Monitor I&Os, weights  -EF 45-50% on TTE last month  -Home meds: metoprolol, chlorthalidone, Lasix    Chest pain  -Now resolved  -Trend HScTn: 41, 38  -EKG nonischemic  -PRN EKG and nitroglycerin for ACS symptoms  -ASA PO QD  -NPO after midnight for possible further testing in AM      HTN  -BP mildly elevated  -Continue home Norvasc, hydralazine, metoprolol, and IV Lasix  -Monitor    CAD  -Continue statin and ezetimibe    CKD III with anemia of chronic disease  -BUN and creatinine near baseline  -H&H near baseline  -Follows with nephrology  -CBC and BMP in AM    DM II  -Follows with endocrinology  -Check HgA1c   -POC BG AC and HS  -DM diet  -SSI  -Takes Trulicity and Mounjaro    Hypothyroidism   -Follows with endocrinology  -Continue Synthroid    Asthma  -Follows with pulmonology  -No signs of exacerbation  -Continue home Trelegy and montelukast    HOLDEN  -Does not currently use CPAP    MGUS  -Follows with Hem-Onc  -H&H near baseline    DVT prophylaxis  -Lovenox and SCDs     Overall Impression / Plan  Admit for observation for CHF exacerbation and chest pain (now resolved). Diurese with IV Lasix. Chest pain work up. If improvement in fluid volume status tomorrow, anticipate DC home. If still needs further diuresis, will transition to inpatient.     I spent 60 minutes in the professional and overall care of this patient.      Nena Olivas, APRN-CNP

## 2024-07-11 NOTE — NURSING NOTE
BSSR given and patient in in bed resting with sone at bedside, Patient's daughter is going to spend the night with patient and it was Ok'ed by ANM. Call light and possessions within reach.

## 2024-07-11 NOTE — NURSING NOTE
Patient's IV clotted off called IV nurse to replace IV due to patient is a US guided insert. Patient has 10 people in room and one daughter is very controlling with staff, told nurse she could give the Lovenox injection, nurse ask if nurse could give it and daughter did allow nurse to give. Daughter also said she is going to wash mom up and nurse gathered washing supplies for daughter. Daughter would also like mo to use BSC and not bed pan. Family also was eating [employer] pa[employer] a and nurse is not sure if patient eat any.

## 2024-07-11 NOTE — LETTER
July 11, 2024    Patient: Analia Murray   YOB: 1950   Date of Visit: 7/11/2024       To Whom It May Concern:    Analia Murray was seen and treated in our emergency department on 7/11/2024.     If you have any questions or concerns, please don't hesitate to call.              CC: No Recipients  
NAUSEA

## 2024-07-11 NOTE — PROGRESS NOTES
Pharmacy Medication History Review    Analia Murray is a 74 y.o. female admitted for Chest pain, unspecified type. Pharmacy reviewed the patient's ksafs-ea-djjysssnt medications and allergies for accuracy.    Medications ADDED:  none  Medications CHANGED:  none  Medications REMOVED:   none     The list below reflects the updated PTA list. Comments regarding how patient may be taking medications differently can be found in the Admit Orders Activity  Prior to Admission Medications   Prescriptions Last Dose Informant   Yuniel Ellipta 200-62.5-25 mcg blister with device 7/10/2024 Self   Sig: INHALE 1 INHALATION BY MOUTH  ONCE DAILY   Ventolin HFA 90 mcg/actuation inhaler 7/10/2024 Self   Sig: USE 2 INHALATIONS BY MOUTH EVERY 4 HOURS AS NEEDED FOR WHEEZING   amLODIPine (Norvasc) 10 mg tablet 7/11/2024 Self   Sig: TAKE 1 TABLET BY MOUTH ONCE  DAILY   aspirin 81 mg EC tablet 7/11/2024 Self   Sig: Take 1 tablet (81 mg) by mouth once daily.   chlorthalidone (Hygroton) 25 mg tablet     Sig: Take 1 tablet (25 mg) by mouth once daily.   cholecalciferol (Vitamin D-3) 25 MCG (1000 UT) capsule 7/11/2024 Self   Sig: TAKE AS DIRECTED.   dulaglutide (Trulicity) 0.75 mg/0.5 mL pen injector 7/4/2024 Self   Sig: Inject 0.75 mg under the skin 1 (one) time per week.   ezetimibe (Zetia) 10 mg tablet 7/10/2024 Self   Sig: Take 1 tablet (10 mg) by mouth once daily.   ferrous sulfate  mg ER tablet 7/11/2024 Self   Sig: Take 1 tablet by mouth once daily.   fluticasone (Flonase) 50 mcg/actuation nasal spray Unknown Self   Sig: Administer 2 sprays into each nostril 2 times a day.   furosemide (Lasix) 20 mg tablet 7/10/2024 Self   Sig: Take 1 tablet (20 mg) by mouth once daily.   hydrALAZINE (Apresoline) 10 mg tablet 7/11/2024 Self   Sig: TAKE 1 TABLET BY MOUTH TWICE  DAILY   levothyroxine (Synthroid) 200 mcg tablet 7/11/2024 Self   Sig: Take 1 tablet (200 mcg) by mouth once daily in the morning. Take before meals. 1 tablet EVERY DAY   AND 2 ON WEDNESDAY   metoprolol succinate XL (Toprol-XL) 100 mg 24 hr tablet 7/11/2024 Self   Sig: Take 1 tablet (100 mg) by mouth once daily.   montelukast (Singulair) 10 mg tablet 7/10/2024 Self   Sig: Take 1 tablet (10 mg) by mouth once daily at bedtime.   nitroglycerin (Nitrostat) 0.4 mg SL tablet Unknown Self   Sig: Place under the tongue.   omeprazole (PriLOSEC) 40 mg DR capsule 7/10/2024 Self   Sig: Take 1 capsule (40 mg) by mouth once daily in the morning. Take before meals.   pravastatin (Pravachol) 40 mg tablet 7/10/2024 Self   Sig: Take 1 tablet (40 mg) by mouth once daily at bedtime.   tirzepatide (Mounjaro) 2.5 mg/0.5 mL pen injector Not Taking Self   Sig: Inject 2.5 mg under the skin 1 (one) time per week.   Patient not taking: Reported on 7/11/2024      Facility-Administered Medications Last Administration Doses Remaining   dulaglutide (TRULICITY) injection 0.75 mg None recorded 1           The list below reflects the updated allergy list. Please review each documented allergy for additional clarification and justification.  Allergies  Reviewed by Sofy Fernandez on 7/11/2024        Severity Reactions Comments    Ciprofloxacin High Itching, Nausea Only, Rash, Other Chills, N and V    Morphine High Unknown, Hallucinations     Penicillins High Anaphylaxis, Hives, Itching, Swelling > 20 yrs ago            Pharmacy has been updated to [facility].    Sources used to complete the med history include PTA medication list, dispense history, patient interview. Patient is a fair historian.    Below are additional concerns with the patient's PTA list.  Patient reports using advair, could not confirm with any dispense history or historical notes or AVS. Patient also reports taking calcium. Could not confirm formulation or dosing,    Sofy Fernandez, [facility]  Please reach out via [employer] Secure Chat for questions

## 2024-07-11 NOTE — ED PROVIDER NOTES
"[facility]   Chief Complaint   Patient presents with    Chest Pain     Chest pain this morning at 0730, hx CHF.  Patient believes she is dehydrated r/t \"water pill\". Chest pain has subsided now, just \"pangs\".       HPI     Patient is a 74-year-old female with a history of hypertension, hyperlipidemia, CHF, COPD and type 2 diabetes presenting for evaluation of minor chest pain, shortness of breath and extremity swelling.  Patient states that she noticed her extremities have been swollen over the last 2 days.  She took a water pill today and believes that she may be dehydrated because she has not been supplementing with oral intake.  Patient states that her chest pain began approximately 0800 this morning shortly after she woke up.  She states the pain is located to the midsternal chest, described as a tingling like sensation.  She states that the chest pain has now since subsided almost completely.  She denies radiation of pain to the back or her extremities.  Denies abdominal pain, nausea, vomiting, diarrhea or constipation.  Denies dysuria or hematuria.  No flank pain.  No headache.  No fevers or chills.  No recent illness.               [place] Coma Scale Score: 15   HEART Score: 5                   Patient History   Past Medical History:   Diagnosis Date    CHF (congestive heart failure) (Multi)     Cough, unspecified 03/02/2017    Cough    Diverticulitis of large intestine with perforation and abscess without bleeding 10/21/2016    Colonic diverticular abscess    Fistula of intestine 09/05/2017    Enterocutaneous fistula    Morbid (severe) obesity due to excess calories (Multi)     Morbid obesity    Obstructive sleep apnea (adult) (pediatric) 06/27/2018    HOLDEN on CPAP    Other conditions influencing health status     DEXA Body Composition Study    Personal history of other diseases of the circulatory system     History of hypertension    Personal history of other diseases of the digestive system 02/07/2018    History " of esophagitis    Personal history of other diseases of the digestive system 2017    History of diverticulitis    Personal history of other endocrine, nutritional and metabolic disease 2020    History of hyperlipidemia    Personal history of other endocrine, nutritional and metabolic disease     History of hyperlipidemia    Personal history of other endocrine, nutritional and metabolic disease     History of type 1 diabetes mellitus    Personal history of other mental and behavioral disorders 2018    History of anxiety    Unspecified asthma, uncomplicated ([facility]) 10/05/2022    Asthma     Past Surgical History:   Procedure Laterality Date     SECTION, CLASSIC  2014     Section    COLONOSCOPY  2013    Complete Colonoscopy    SMALL INTESTINE SURGERY  2016    Small Bowel Resection     Family History   Problem Relation Name Age of Onset    Coronary artery disease Mother      Heart attack Mother       Social History     Tobacco Use    Smoking status: Never     Passive exposure: Never    Smokeless tobacco: Never   Vaping Use    Vaping status: Never Used   Substance Use Topics    Alcohol use: Never    Drug use: Never       Physical Exam   ED Triage Vitals [24 0823]   Temperature Heart Rate Respirations BP   36.1 °C (97 °F) 74 20 (!) 148/91      Pulse Ox Temp Source Heart Rate Source Patient Position   94 % Tympanic Monitor Sitting      BP Location FiO2 (%)     Left arm --       Physical Exam  Vitals and nursing note reviewed.   Constitutional:       Appearance: Normal appearance.   HENT:      Head: Normocephalic and atraumatic.   Eyes:      Extraocular Movements: Extraocular movements intact.      Pupils: Pupils are equal, round, and reactive to light.   Cardiovascular:      Rate and Rhythm: Normal rate and regular rhythm.      Comments: +2 bilateral lower extremity pitting edema.  Pulmonary:      Effort: Pulmonary effort is normal.      Breath sounds: Normal  breath sounds.   Abdominal:      General: Abdomen is flat. Bowel sounds are normal.      Palpations: Abdomen is soft.   Musculoskeletal:      Cervical back: Normal range of motion and neck supple.   Neurological:      Mental Status: She is alert.         ED Course & MDM   ED Course as of 07/11/24 1223   Thu Jul 11, 2024   0830 EKG personally interpreted by me performed at 827  Sinus rhythm with left axis deviation ventricular rate 71 no acute ischemic changes [EF]      ED Course User Index  [EF] Jeanine PENN Jaminchristian, DO         Diagnoses as of 07/11/24 1223   Chest pain, unspecified type   Chronic pulmonary edema ([facility])       Medical Decision Making  Parts of this chart have been completed using voice recognition software. Please excuse any errors of transcription. Despite the medical decision making time stamp above-my medical decision making has taken place during the patient's entire visit. My thought process and reason for plan has been formulated from the time that I saw the patient until the time of disposition and is not specific to one specific moment during their visit and furthermore my MDM encompasses this entire chart and not only this text box.      HPI: Detailed above.    Exam: A medically appropriate exam performed, outlined above, given the known history and presentation.    History obtained from: Patient    Social Determinants of Health considered during this visit: From home    EKG interpreted by my attending physician, reviewed by myself.    Labs Reviewed   CBC WITH AUTO DIFFERENTIAL - Abnormal       Result Value    WBC 8.0      nRBC 0.0      RBC 4.42      Hemoglobin 10.3 (*)     Hematocrit 34.9 (*)     MCV 79 (*)     MCH 23.3 (*)     MCHC 29.5 (*)     RDW 18.6 (*)     Platelets 143 (*)     Neutrophils % 77.7      Immature Granulocytes %, Automated 0.4      Lymphocytes % 12.9      Monocytes % 6.9      Eosinophils % 1.8      Basophils % 0.3      Neutrophils Absolute 6.23 (*)     Immature Granulocytes  Absolute, Automated 0.03      Lymphocytes Absolute 1.03      Monocytes Absolute 0.55      Eosinophils Absolute 0.14      Basophils Absolute 0.02     COMPREHENSIVE METABOLIC PANEL - Abnormal    Glucose 110 (*)     Sodium 137      Potassium 4.7      Chloride 102      Bicarbonate 24      Urea Nitrogen 29 (*)     Creatinine 2.00 (*)     eGFR 26 (*)     Calcium 8.5      Albumin 3.9      Alkaline Phosphatase 103      Total Protein 7.7      AST 13      Bilirubin, Total 0.6      ALT 10      Anion Gap 11     SERIAL TROPONIN, INITIAL ([facility]) - Abnormal    Troponin T, High Sensitivity 41 (*)    N-TERMINAL PROBNP - Abnormal    PROBNP 6,876 (*)     Narrative:     Reference ranges are based on clinical submission data. These ranges represent the 95th percentile of normal cut-off points. As NT Pro- BNP values approach 1000 pg/ml, clinical symptoms are more likely associated with CHF.   SERIAL TROPONIN,  2 HOUR ([facility]) - Abnormal    Troponin T, High Sensitivity 38 (*)    MAGNESIUM - Normal    Magnesium 1.90     TROPONIN T SERIES, HIGH SENSITIVITY (0, 2 HR, 6 HR)    Narrative:     The following orders were created for panel order Troponin T Series, High Sensitivity (0, 2HR, 6HR).  Procedure                               Abnormality         Status                     ---------                               -----------         ------                     Serial Troponin, Initial...[[id number]]  Abnormal            Final result               Serial Troponin, 2 Hour ...[[id number]]  Abnormal            Final result               Serial Troponin, 6 Hour ...[[id number]]                                                   Please view results for these tests on the individual orders.   SERIAL TROPONIN, 6 HOUR ([facility])     XR chest 2 views   Final Result   1. Cardiomegaly with mild interstitial prominence diffusely. Consider   mild CHF and pulmonary edema.                  MACRO:   None        Signed by: Ramon Andrade 7/11/2024 10:12 AM   Dictation  workstation:   [id number]        Medications   aspirin chewable tablet 324 mg (324 mg oral Given 7/11/24 0220)     Differential diagnoses considered for this visit include: Acute coronary syndrome versus fluid overload versus STEMI versus NSTEMI    Considerations/further MDM:    Patient is a 74-year-old female presenting for evaluation of chest pain, shortness of breath and bilateral lower extremity swelling.  Signs demonstrate hypertension but otherwise within normal limits.  No hypoxia or tachycardia.  Physical examination demonstrated an obese, well-nourished well-developed female in no acute distress.  Heart and lung sounds were clear.  Abdomen was soft and nontender.  Cardiac workup was initiated on patient's arrival.    CBC was within normal limits.  CMP shows kidney function trending along patient's baseline.  Magnesium unremarkable.  proBNP elevated at 6876.  Initial troponin of 38, repeat of 41. Chest x-ray shows cardiomegaly with mild interstitial prominence diffusely.  Consider mild CHF and pulmonary edema.  Patient is complaining of chest pain with elevated heart score of 5, admittance to the hospital was warranted.  I spoke to Nena at the cardiac observation unit, and the patient was admitted to the cardiac observation unit under Dr. Vincent in good condition.    The patient/family was counseled on clinical impression, expectations, and plan along with recommendations to admission. All questions were answered and involved parties were understanding and agreeable to course of treatment. Case was discussed with admitting physician and any consultants. Bed type, ED treatment and further ED workup decided by joint decision making with admitting team and any consultants. Patient stable for admission per my assessment and further management of patient will be deferred to the inpatient setting.    Patient was seen in conjunction with attending physician Dr. Jeanine Huertas   Patient's history, physical exam,  diagnostic studies, and treatment plan were discussed thoroughly.    Procedure  Procedures     Anne Gonzalez PA-C  07/11/24 8510

## 2024-07-12 ENCOUNTER — PHARMACY VISIT (OUTPATIENT)
Dept: PHARMACY | Facility: CLINIC | Age: 74
End: 2024-07-12
Payer: COMMERCIAL

## 2024-07-12 VITALS
WEIGHT: 289 LBS | HEART RATE: 64 BPM | BODY MASS INDEX: 51.21 KG/M2 | SYSTOLIC BLOOD PRESSURE: 132 MMHG | RESPIRATION RATE: 20 BRPM | DIASTOLIC BLOOD PRESSURE: 77 MMHG | HEIGHT: 63 IN | TEMPERATURE: 97.9 F | OXYGEN SATURATION: 97 %

## 2024-07-12 LAB
ANION GAP SERPL CALC-SCNC: 10 MMOL/L
BUN SERPL-MCNC: 31 MG/DL (ref 8–25)
CALCIUM SERPL-MCNC: 8.4 MG/DL (ref 8.5–10.4)
CHLORIDE SERPL-SCNC: 103 MMOL/L (ref 97–107)
CO2 SERPL-SCNC: 27 MMOL/L (ref 24–31)
CREAT SERPL-MCNC: 2.2 MG/DL (ref 0.4–1.6)
EGFRCR SERPLBLD CKD-EPI 2021: 23 ML/MIN/1.73M*2
ERYTHROCYTE [DISTWIDTH] IN BLOOD BY AUTOMATED COUNT: 18.2 % (ref 11.5–14.5)
GLUCOSE BLD MANUAL STRIP-MCNC: 102 MG/DL (ref 74–99)
GLUCOSE BLD MANUAL STRIP-MCNC: 86 MG/DL (ref 74–99)
GLUCOSE BLD MANUAL STRIP-MCNC: 87 MG/DL (ref 74–99)
GLUCOSE SERPL-MCNC: 94 MG/DL (ref 65–99)
HCT VFR BLD AUTO: 32.2 % (ref 36–46)
HGB BLD-MCNC: 9.5 G/DL (ref 12–16)
MCH RBC QN AUTO: 23.2 PG (ref 26–34)
MCHC RBC AUTO-ENTMCNC: 29.5 G/DL (ref 32–36)
MCV RBC AUTO: 79 FL (ref 80–100)
NRBC BLD-RTO: 0 /100 WBCS (ref 0–0)
PLATELET # BLD AUTO: 135 X10*3/UL (ref 150–450)
POTASSIUM SERPL-SCNC: 4.4 MMOL/L (ref 3.4–5.1)
RBC # BLD AUTO: 4.09 X10*6/UL (ref 4–5.2)
SODIUM SERPL-SCNC: 140 MMOL/L (ref 133–145)
WBC # BLD AUTO: 7 X10*3/UL (ref 4.4–11.3)

## 2024-07-12 PROCEDURE — G0378 HOSPITAL OBSERVATION PER HR: HCPCS

## 2024-07-12 PROCEDURE — 36415 COLL VENOUS BLD VENIPUNCTURE: CPT | Performed by: NURSE PRACTITIONER

## 2024-07-12 PROCEDURE — 2500000002 HC RX 250 W HCPCS SELF ADMINISTERED DRUGS (ALT 637 FOR MEDICARE OP, ALT 636 FOR OP/ED): Performed by: NURSE PRACTITIONER

## 2024-07-12 PROCEDURE — 99239 HOSP IP/OBS DSCHRG MGMT >30: CPT

## 2024-07-12 PROCEDURE — 94664 DEMO&/EVAL PT USE INHALER: CPT | Mod: 59

## 2024-07-12 PROCEDURE — 82947 ASSAY GLUCOSE BLOOD QUANT: CPT | Mod: 59

## 2024-07-12 PROCEDURE — 94640 AIRWAY INHALATION TREATMENT: CPT

## 2024-07-12 PROCEDURE — 84443 ASSAY THYROID STIM HORMONE: CPT | Performed by: PEDIATRICS

## 2024-07-12 PROCEDURE — 85027 COMPLETE CBC AUTOMATED: CPT | Performed by: NURSE PRACTITIONER

## 2024-07-12 PROCEDURE — 9420000001 HC RT PATIENT EDUCATION 5 MIN

## 2024-07-12 PROCEDURE — RXMED WILLOW AMBULATORY MEDICATION CHARGE

## 2024-07-12 PROCEDURE — 80048 BASIC METABOLIC PNL TOTAL CA: CPT | Performed by: NURSE PRACTITIONER

## 2024-07-12 PROCEDURE — RXOTC WILLOW AMBULATORY OTC CHARGE

## 2024-07-12 PROCEDURE — 2500000004 HC RX 250 GENERAL PHARMACY W/ HCPCS (ALT 636 FOR OP/ED): Performed by: NURSE PRACTITIONER

## 2024-07-12 PROCEDURE — 96372 THER/PROPH/DIAG INJ SC/IM: CPT | Performed by: NURSE PRACTITIONER

## 2024-07-12 PROCEDURE — 96376 TX/PRO/DX INJ SAME DRUG ADON: CPT

## 2024-07-12 PROCEDURE — 2500000001 HC RX 250 WO HCPCS SELF ADMINISTERED DRUGS (ALT 637 FOR MEDICARE OP): Performed by: NURSE PRACTITIONER

## 2024-07-12 RX ORDER — HYDRALAZINE HYDROCHLORIDE 25 MG/1
25 TABLET, FILM COATED ORAL 2 TIMES DAILY
Status: DISCONTINUED | OUTPATIENT
Start: 2024-07-12 | End: 2024-07-12 | Stop reason: HOSPADM

## 2024-07-12 RX ORDER — TALC
3 POWDER (GRAM) TOPICAL NIGHTLY PRN
Qty: 14 TABLET | Refills: 0 | Status: SHIPPED | OUTPATIENT
Start: 2024-07-12 | End: 2024-07-26

## 2024-07-12 RX ORDER — HYDRALAZINE HYDROCHLORIDE 25 MG/1
25 TABLET, FILM COATED ORAL 2 TIMES DAILY
Qty: 60 TABLET | Refills: 0 | Status: SHIPPED | OUTPATIENT
Start: 2024-07-12 | End: 2024-08-11

## 2024-07-12 SDOH — ECONOMIC STABILITY: INCOME INSECURITY: IN THE LAST 12 MONTHS, WAS THERE A TIME WHEN YOU WERE NOT ABLE TO PAY THE MORTGAGE OR RENT ON TIME?: NO

## 2024-07-12 SDOH — ECONOMIC STABILITY: INCOME INSECURITY: HOW HARD IS IT FOR YOU TO PAY FOR THE VERY BASICS LIKE FOOD, HOUSING, MEDICAL CARE, AND HEATING?: NOT HARD AT ALL

## 2024-07-12 SDOH — SOCIAL STABILITY: SOCIAL NETWORK
DO YOU BELONG TO ANY CLUBS OR ORGANIZATIONS SUCH AS CHURCH GROUPS UNIONS, FRATERNAL OR ATHLETIC GROUPS, OR SCHOOL GROUPS?: NO

## 2024-07-12 SDOH — HEALTH STABILITY: MENTAL HEALTH
STRESS IS WHEN SOMEONE FEELS TENSE, NERVOUS, ANXIOUS, OR CAN'T SLEEP AT NIGHT BECAUSE THEIR MIND IS TROUBLED. HOW STRESSED ARE YOU?: TO SOME EXTENT

## 2024-07-12 SDOH — HEALTH STABILITY: MENTAL HEALTH: HOW OFTEN DO YOU HAVE 6 OR MORE DRINKS ON ONE OCCASION?: NEVER

## 2024-07-12 SDOH — ECONOMIC STABILITY: INCOME INSECURITY: IN THE PAST 12 MONTHS, HAS THE ELECTRIC, GAS, OIL, OR WATER COMPANY THREATENED TO SHUT OFF SERVICE IN YOUR HOME?: NO

## 2024-07-12 SDOH — HEALTH STABILITY: MENTAL HEALTH: HOW MANY STANDARD DRINKS CONTAINING ALCOHOL DO YOU HAVE ON A TYPICAL DAY?: PATIENT DOES NOT DRINK

## 2024-07-12 SDOH — SOCIAL STABILITY: SOCIAL NETWORK: HOW OFTEN DO YOU ATTEND CHURCH OR RELIGIOUS SERVICES?: NEVER

## 2024-07-12 SDOH — ECONOMIC STABILITY: HOUSING INSECURITY: AT ANY TIME IN THE PAST 12 MONTHS, WERE YOU HOMELESS OR LIVING IN A SHELTER (INCLUDING NOW)?: NO

## 2024-07-12 SDOH — SOCIAL STABILITY: SOCIAL NETWORK: ARE YOU MARRIED, WIDOWED, DIVORCED, SEPARATED, NEVER MARRIED, OR LIVING WITH A PARTNER?: MARRIED

## 2024-07-12 SDOH — SOCIAL STABILITY: SOCIAL NETWORK: HOW OFTEN DO YOU ATTENT MEETINGS OF THE CLUB OR ORGANIZATION YOU BELONG TO?: NEVER

## 2024-07-12 SDOH — SOCIAL STABILITY: SOCIAL INSECURITY: WITHIN THE LAST YEAR, HAVE YOU BEEN HUMILIATED OR EMOTIONALLY ABUSED IN OTHER WAYS BY YOUR PARTNER OR EX-PARTNER?: NO

## 2024-07-12 SDOH — ECONOMIC STABILITY: FOOD INSECURITY: WITHIN THE PAST 12 MONTHS, THE FOOD YOU BOUGHT JUST DIDN'T LAST AND YOU DIDN'T HAVE MONEY TO GET MORE.: NEVER TRUE

## 2024-07-12 SDOH — ECONOMIC STABILITY: HOUSING INSECURITY: IN THE PAST 12 MONTHS, HOW MANY TIMES HAVE YOU MOVED WHERE YOU WERE LIVING?: 0

## 2024-07-12 SDOH — HEALTH STABILITY: MENTAL HEALTH: HOW OFTEN DO YOU HAVE A DRINK CONTAINING ALCOHOL?: NEVER

## 2024-07-12 SDOH — ECONOMIC STABILITY: FOOD INSECURITY: WITHIN THE PAST 12 MONTHS, YOU WORRIED THAT YOUR FOOD WOULD RUN OUT BEFORE YOU GOT MONEY TO BUY MORE.: NEVER TRUE

## 2024-07-12 SDOH — SOCIAL STABILITY: SOCIAL INSECURITY: WITHIN THE LAST YEAR, HAVE YOU BEEN AFRAID OF YOUR PARTNER OR EX-PARTNER?: NO

## 2024-07-12 SDOH — HEALTH STABILITY: PHYSICAL HEALTH: ON AVERAGE, HOW MANY DAYS PER WEEK DO YOU ENGAGE IN MODERATE TO STRENUOUS EXERCISE (LIKE A BRISK WALK)?: 0 DAYS

## 2024-07-12 SDOH — SOCIAL STABILITY: SOCIAL NETWORK: HOW OFTEN DO YOU GET TOGETHER WITH FRIENDS OR RELATIVES?: MORE THAN THREE TIMES A WEEK

## 2024-07-12 SDOH — HEALTH STABILITY: PHYSICAL HEALTH: ON AVERAGE, HOW MANY MINUTES DO YOU ENGAGE IN EXERCISE AT THIS LEVEL?: 0 MIN

## 2024-07-12 ASSESSMENT — COGNITIVE AND FUNCTIONAL STATUS - GENERAL
DAILY ACTIVITIY SCORE: 18
DRESSING REGULAR LOWER BODY CLOTHING: A LITTLE
DRESSING REGULAR UPPER BODY CLOTHING: A LITTLE
CLIMB 3 TO 5 STEPS WITH RAILING: A LOT
MOBILITY SCORE: 20
PERSONAL GROOMING: A LITTLE
WALKING IN HOSPITAL ROOM: A LITTLE
STANDING UP FROM CHAIR USING ARMS: A LITTLE
HELP NEEDED FOR BATHING: A LITTLE
TOILETING: A LOT

## 2024-07-12 ASSESSMENT — LIFESTYLE VARIABLES
SKIP TO QUESTIONS 9-10: 1
AUDIT-C TOTAL SCORE: 0

## 2024-07-12 ASSESSMENT — ACTIVITIES OF DAILY LIVING (ADL): LACK_OF_TRANSPORTATION: NO

## 2024-07-12 ASSESSMENT — PAIN SCALES - GENERAL
PAINLEVEL_OUTOF10: 0 - NO PAIN
PAINLEVEL_OUTOF10: 0 - NO PAIN

## 2024-07-12 NOTE — NURSING NOTE
0730: Assumed care of patient at this time. Patient resting in bed with eyes closed. Respirations equal and unlabored. Patient prefers to wear VM vs NC for comfort. Respiratory at bedside during BSSR.

## 2024-07-12 NOTE — PROGRESS NOTES
07/12/24 0837   [facility] Disability Status   Are you deaf or do you have serious difficulty hearing? N   Are you blind or do you have serious difficulty seeing, even when wearing glasses? N   Because of a physical, mental, or emotional condition, do you have serious difficulty concentrating, remembering, or making decisions? (5 years old or older) N   Do you have serious difficulty walking or climbing stairs? Y   Do you have serious difficulty dressing or bathing? N   Because of a physical, mental, or emotional condition, do you have serious difficulty doing errands alone such as visiting the doctor? Y  ( DRIVES HER TO HER APPOINTMENTS)

## 2024-07-12 NOTE — DISCHARGE SUMMARY
Discharge Diagnosis  Chest pain, unspecified type    Issues Requiring Follow-Up  Acute on chronic diastolic heart failure    Discharge Meds     Your medication list        START taking these medications        Instructions Last Dose Given Next Dose Due   melatonin 3 mg tablet      Take 1 tablet (3 mg) by mouth as needed at bedtime for sleep for up to 14 days.       psyllium 3.4 gram packet  Commonly known as: Metamucil  Start taking on: July 13, 2024      Take 1 packet by mouth once daily.              CHANGE how you take these medications        Instructions Last Dose Given Next Dose Due   hydrALAZINE 25 mg tablet  Commonly known as: Apresoline  What changed:   medication strength  how much to take      Take 1 tablet (25 mg) by mouth 2 times a day.              CONTINUE taking these medications        Instructions Last Dose Given Next Dose Due   amLODIPine 10 mg tablet  Commonly known as: Norvasc      TAKE 1 TABLET BY MOUTH ONCE  DAILY       aspirin 81 mg EC tablet           cholecalciferol 25 MCG (1000 UT) capsule  Commonly known as: Vitamin D-3           dulaglutide 0.75 mg/0.5 mL pen injector  Commonly known as: Trulicity      Inject 0.75 mg under the skin 1 (one) time per week.       ezetimibe 10 mg tablet  Commonly known as: Zetia      Take 1 tablet (10 mg) by mouth once daily.       ferrous sulfate  mg ER tablet           fluticasone 50 mcg/actuation nasal spray  Commonly known as: Flonase      Administer 2 sprays into each nostril 2 times a day.       furosemide 20 mg tablet  Commonly known as: Lasix      Take 1 tablet (20 mg) by mouth once daily.       metoprolol succinate  mg 24 hr tablet  Commonly known as: Toprol-XL           montelukast 10 mg tablet  Commonly known as: Singulair           nitroglycerin 0.4 mg SL tablet  Commonly known as: Nitrostat           omeprazole 40 mg DR capsule  Commonly known as: PriLOSEC      Take 1 capsule (40 mg) by mouth once daily in the morning. Take before  meals.       pravastatin 40 mg tablet  Commonly known as: Pravachol      Take 1 tablet (40 mg) by mouth once daily at bedtime.       Synthroid 200 mcg tablet  Generic drug: levothyroxine           Trelegy Ellipta 200-62.5-25 mcg blister with device  Generic drug: fluticasone-umeclidin-vilanter      INHALE 1 INHALATION BY MOUTH  ONCE DAILY       Ventolin HFA 90 mcg/actuation inhaler  Generic drug: albuterol      USE 2 INHALATIONS BY MOUTH EVERY 4 HOURS AS NEEDED FOR WHEEZING              STOP taking these medications      chlorthalidone 25 mg tablet  Commonly known as: Hygroton        Mounjaro 2.5 mg/0.5 mL pen injector  Generic drug: tirzepatide                  Where to Get Your Medications        These medications were sent to [facility]  [id number] [address] [id number]      Hours: 9 AM to 6 PM Mon-Fri, 9 AM to 1 PM Sat Phone: [phone]   hydrALAZINE 25 mg tablet  melatonin 3 mg tablet  psyllium 3.4 gram packet         Test Results Pending At Discharge  Pending Labs       No current pending labs.          History Of Present Illness  Analia Murray is a 74 y.o. female presenting with chest pain, shortness of breath, and lower extremity swelling. She reports her symptoms started last night. She felt short of breath when she laid flat, so she slept sitting up. She also felt a mild pressure in the center of her chest. This AM she developed chest pain. She reports it was a pinching pain and she also felt like her chest was heavy. She took her BP and found it to be elevated at 180/121. She started getting anxious, so she came to the ED. She is currently sitting up in bed. She denies any chest pain currently. Also denies any shortness of breath at this time. She admits that she has been noncompliant with her Lasix due to it making her have to urinate constantly, which is difficult for her because she has mobility issues. She also reports she has noticed lower extremity edema that has been  progressively worsening over the past few days. She follows with Dr. Ackerman for cardiology.      ED Course  EKG: Sinus rhythm with left axis deviation ventricular rate 71 no acute ischemic changes   HEART score: 5  HScTns: 41, 38  ProBNP: 6,876  BUN / creat: 29 / 2.00 (near patient's baseline)  H&H: 10.3 / 34.9 ( near baseline)  Plts: 143  CXR: cardiomegaly with mild interstitial prominence  BP: 156/97  Given ASA in ED     Past Cardiac History  -Follows with Dr. Ackerman  -TTE 6/2024: EF 45-50%,  L atrium is moderate to severely dilated, moderate mitral valve regurgitation, moderate to severe tricuspid regurgitation, severely elevated right ventricular systolic pressure, moderate pulmonic valve regurgitation, severely elevated pulmonary artery pressure, estimated PASP is 70 mmHg  -Moderate LCx and RCA lesions noted by remote catheterization in 2005.  She also had a high-grade lesion and a small branch off a diagonal artery.    Hospital Course     You were admitted to [facility]U observation for orthopnea and shortness of breath with mild chest pressure/chest pain.  You were on diuretics, and you were non-compliant with diuretic therapy due to increased urination.  Echocardiogram from last month revealed mildly distressed left ventricular function with a moderate degree of mitral valve regurgitation, moderate to severe degree of tricuspid valve regurgitation and significant pulmonary hypertension.  Diuretic therapy was reinitiated initiated with IV Lasix 40 twice daily.  High-sensitivity troponins were slightly elevated in a flat pattern not suggestive of an acute coronary syndrome in a patient with chronic kidney disease and glomerular filtration rate of 26.  Nephrology follows for CKD. Hydralazine increased to 25 mg twice daily for better control on your hypertension.  Patient is clinically stable.  Her lower extremity edema 2+ bilaterally.  She is clinically stable for discharge.  She is being discharged  home today on furosemide 20 mg daily.  Educate about discharge instructions and medication compliance.  Follow-up with her cardiologist Dr. Ackerman in 2 weeks.    Follow-up with her primary care next week as scheduled on 2024.    Pertinent Physical Exam At Time of Discharge  Physical Exam  Vitals and nursing note reviewed.   Constitutional:       General: She is not in acute distress.     Appearance: She is obese. She is not diaphoretic.   HENT:      Head: Normocephalic and atraumatic.      Nose: Nose normal.      Mouth/Throat:      Mouth: Mucous membranes are moist.   Eyes:      Pupils: Pupils are equal, round, and reactive to light.   Cardiovascular:      Rate and Rhythm: Normal rate and regular rhythm.      Pulses: Normal pulses.      Heart sounds: Normal heart sounds.   Pulmonary:      Effort: Pulmonary effort is normal. No respiratory distress.      Breath sounds: Normal breath sounds. No wheezing.   Chest:      Chest wall: No tenderness.   Abdominal:      General: Bowel sounds are normal. There is no distension.      Palpations: Abdomen is soft.      Tenderness: There is no abdominal tenderness.   Genitourinary:     Comments: Deferred  Musculoskeletal:         General: Swelling present. Normal range of motion.      Cervical back: Normal range of motion and neck supple.      Right lower le+ Edema present.      Left lower le+ Edema present.      Right foot: Swelling present.      Left foot: Swelling present.   Skin:     General: Skin is warm and dry.      Capillary Refill: Capillary refill takes less than 2 seconds.   Neurological:      General: No focal deficit present.      Mental Status: She is alert and oriented to person, place, and time.   Psychiatric:         Mood and Affect: Mood normal.         Behavior: Behavior normal.       Outpatient Follow-Up  Future Appointments   Date Time Provider Department Center   2024  1:30 PM Thais Sanchez MD [id number] [facility]   2024  2:00 PM Marisol  A Meine, APRN-CNP [id number] [facility]   8/20/2024  2:15 PM Thais Barber OD [id number] [facility]   8/27/2024  2:00 PM SEEMA Ramirez [id number] [facility]   12/9/2024  2:00 PM Serenity San MD [id number] [facility]     I spent 30 minutes in the professional and overall care of this patient.     DILCIA Thompson-CNP

## 2024-07-12 NOTE — PROGRESS NOTES
07/12/24 0836   Discharge Planning   Living Arrangements Spouse/significant other   Support Systems Spouse/significant other   Type of Residence Private residence   Number of Stairs to Enter Residence 3   Number of Stairs Within Residence 24  (2 STORY HOME WITH A BASEMENT)   Do you have animals or pets at home? No   Who is requesting discharge planning? Provider   Home or Post Acute Services None   Expected Discharge Disposition Home   Does the patient need discharge transport arranged? No   Financial Resource Strain   How hard is it for you to pay for the very basics like food, housing, medical care, and heating? Not hard   Housing Stability   In the last 12 months, was there a time when you were not able to pay the mortgage or rent on time? N   In the past 12 months, how many times have you moved where you were living? 0   At any time in the past 12 months, were you homeless or living in a shelter (including now)? N   Transportation Needs   In the past 12 months, has lack of transportation kept you from medical appointments or from getting medications? no   In the past 12 months, has lack of transportation kept you from meetings, work, or from getting things needed for daily living? No   Patient Choice   Patient / Family choosing to utilize agency / facility established prior to hospitalization No

## 2024-07-12 NOTE — NURSING NOTE
Patient will discharge home later this afternoon after receiving her 2nd dose of IV lasix for the day per cardiology orders. Patient educated on lasix importance. Patient OK with decision/plan.

## 2024-07-12 NOTE — PROGRESS NOTES
07/12/24 0838   Current Planned Discharge Disposition   Current Planned Discharge Disposition Home

## 2024-07-12 NOTE — CARE PLAN
The patient's goals for the shift include monitor heart rhythm    The clinical goals for the shift include safety    Over the shift, the patient did   Problem: Pain - Adult  Goal: Verbalizes/displays adequate comfort level or baseline comfort level  Outcome: Progressing     Problem: Skin  Goal: Prevent/manage excess moisture  Outcome: Progressing     Problem: Safety - Adult  Goal: Free from fall injury  Outcome: Progressing   make progress toward the following goals.

## 2024-07-12 NOTE — CARE PLAN
Pt does not have a POA or Living Will  ADOD: 1 day    Pt lives at home with her  in a 2 story home with a basement and 3 steps to climb to enter the home  She uses a cane, she does not drive, her  drives her to her appointments. Her dtr shops,  and dtr cleans, and her granddaughter does the laundry.   She wears glasses, no hearing aids. She said that she can read and write and comprehend what she reads  Denies depression and anxiety. No falls  No home 02/cpap/bipap/nebulizer. She is a diabetic and she said that she monitors her BS daily  Her PCP is Dr. Thais Sanchez and she uses [facility] for her meds and she can afford them  Pt is here for chest pain; no anticipated discharge needs. Pt is in OBS status; expected discharge today or tomorrow.      DISCHARGE PLAN: HOME WITH

## 2024-07-12 NOTE — PROGRESS NOTES
Analia Murray is a 74 y.o. female on day 0 of admission presenting with Chest pain, unspecified type.      Subjective   Patient seen and examined at bedside, vitals reviewed.  Patient resting comfortably, not in acute distress.  Uneventful night. She denies chest pain/pressure, shortness of breath, headache/dizziness, and nausea/vomiting/diarrhea.       Objective   VSS  NSR on tele monitor HR 66     Last Recorded Vitals  /78 (BP Location: Left arm, Patient Position: Lying)   Pulse 66   Temp 36.4 °C (97.5 °F) (Oral)   Resp 19   Wt 131 kg (289 lb)   SpO2 94%   Intake/Output last 3 Shifts:    Intake/Output Summary (Last 24 hours) at 7/12/2024 0812  Last data filed at 7/12/2024 0734  Gross per 24 hour   Intake 300 ml   Output 950 ml   Net -650 ml       Admission Weight  Weight: 131 kg (289 lb) (07/11/24 0823)    Daily Weight  07/11/24 : 131 kg (289 lb)    Image Results  XR chest 2 views  Narrative: Interpreted By:  Ramon Andrade,   STUDY:  XR CHEST 2 VIEWS;  7/11/2024 9:22 am      INDICATION:  Signs/Symptoms:Chest Pain.      COMPARISON:  None.      ACCESSION NUMBER(S):  [id number]      ORDERING CLINICIAN:  ERICA VALENCIA      FINDINGS:  AP portable view of the chest is obtained.  Limited exam due to  portable nature. Magnified cardiac silhouette. Prominence of both  lynette. Mild interstitial prominence diffusely.      Impression: 1. Cardiomegaly with mild interstitial prominence diffusely. Consider  mild CHF and pulmonary edema.              MACRO:  None      Signed by: aRmon Andrade 7/11/2024 10:12 AM  Dictation workstation:   [id number]      Physical Exam  Vitals and nursing note reviewed.   Constitutional:       General: She is not in acute distress.     Appearance: She is obese. She is not diaphoretic.   HENT:      Head: Normocephalic and atraumatic.      Nose: Nose normal.      Mouth/Throat:      Mouth: Mucous membranes are moist.   Eyes:      Pupils: Pupils are equal, round, and reactive to light.    Cardiovascular:      Rate and Rhythm: Normal rate and regular rhythm.      Pulses: Normal pulses.      Heart sounds: Normal heart sounds. No murmur heard.     No gallop.   Pulmonary:      Effort: Pulmonary effort is normal. No respiratory distress.      Breath sounds: Normal breath sounds. No wheezing or rales.   Abdominal:      General: Bowel sounds are normal. There is no distension.      Palpations: Abdomen is soft.      Tenderness: There is no abdominal tenderness.   Genitourinary:     Comments: Deferred  Musculoskeletal:         General: No tenderness. Normal range of motion.      Cervical back: Normal range of motion and neck supple. No rigidity or tenderness.      Right lower le+ Pitting Edema present.      Left lower le+ Pitting Edema present.      Right foot: Swelling present.      Left foot: Swelling present.   Skin:     General: Skin is warm and dry.      Capillary Refill: Capillary refill takes less than 2 seconds.   Neurological:      General: No focal deficit present.      Mental Status: She is alert and oriented to person, place, and time.   Psychiatric:         Mood and Affect: Mood normal.         Behavior: Behavior normal.       Relevant Results             Results for orders placed or performed during the hospital encounter of 24 (from the past 24 hour(s))   CBC and Auto Differential   Result Value Ref Range    WBC 8.0 4.4 - 11.3 x10*3/uL    nRBC 0.0 0.0 - 0.0 /100 WBCs    RBC 4.42 4.00 - 5.20 x10*6/uL    Hemoglobin 10.3 (L) 12.0 - 16.0 g/dL    Hematocrit 34.9 (L) 36.0 - 46.0 %    MCV 79 (L) 80 - 100 fL    MCH 23.3 (L) 26.0 - 34.0 pg    MCHC 29.5 (L) 32.0 - 36.0 g/dL    RDW 18.6 (H) 11.5 - 14.5 %    Platelets 143 (L) 150 - 450 x10*3/uL    Neutrophils % 77.7 40.0 - 80.0 %    Immature Granulocytes %, Automated 0.4 0.0 - 0.9 %    Lymphocytes % 12.9 13.0 - 44.0 %    Monocytes % 6.9 2.0 - 10.0 %    Eosinophils % 1.8 0.0 - 6.0 %    Basophils % 0.3 0.0 - 2.0 %    Neutrophils Absolute 6.23  (H) 1.60 - 5.50 x10*3/uL    Immature Granulocytes Absolute, Automated 0.03 0.00 - 0.50 x10*3/uL    Lymphocytes Absolute 1.03 0.80 - 3.00 x10*3/uL    Monocytes Absolute 0.55 0.05 - 0.80 x10*3/uL    Eosinophils Absolute 0.14 0.00 - 0.40 x10*3/uL    Basophils Absolute 0.02 0.00 - 0.10 x10*3/uL   Comprehensive Metabolic Panel   Result Value Ref Range    Glucose 110 (H) 65 - 99 mg/dL    Sodium 137 133 - 145 mmol/L    Potassium 4.7 3.4 - 5.1 mmol/L    Chloride 102 97 - 107 mmol/L    Bicarbonate 24 24 - 31 mmol/L    Urea Nitrogen 29 (H) 8 - 25 mg/dL    Creatinine 2.00 (H) 0.40 - 1.60 mg/dL    eGFR 26 (L) >60 mL/min/1.73m*2    Calcium 8.5 8.5 - 10.4 mg/dL    Albumin 3.9 3.5 - 5.0 g/dL    Alkaline Phosphatase 103 35 - 125 U/L    Total Protein 7.7 5.9 - 7.9 g/dL    AST 13 5 - 40 U/L    Bilirubin, Total 0.6 0.1 - 1.2 mg/dL    ALT 10 5 - 40 U/L    Anion Gap 11 <=19 mmol/L   Magnesium   Result Value Ref Range    Magnesium 1.90 1.60 - 3.10 mg/dL   Serial Troponin, Initial ([facility])   Result Value Ref Range    Troponin T, High Sensitivity 41 (HH) <=14 ng/L   NT Pro-BNP   Result Value Ref Range    PROBNP 6,876 (H) 0 - 624 pg/mL   Hemoglobin A1C   Result Value Ref Range    Hemoglobin A1C 5.4 See below %    Estimated Average Glucose 108 Not Established mg/dL   Serial Troponin, 2 Hour ([facility])   Result Value Ref Range    Troponin T, High Sensitivity 38 (HH) <=14 ng/L   POCT GLUCOSE   Result Value Ref Range    POCT Glucose 94 74 - 99 mg/dL   Serial Troponin, 6 Hour ([facility])   Result Value Ref Range    Troponin T, High Sensitivity 37 (HH) <=14 ng/L   POCT GLUCOSE   Result Value Ref Range    POCT Glucose 92 74 - 99 mg/dL   POCT GLUCOSE   Result Value Ref Range    POCT Glucose 115 (H) 74 - 99 mg/dL   CBC   Result Value Ref Range    WBC 7.0 4.4 - 11.3 x10*3/uL    nRBC 0.0 0.0 - 0.0 /100 WBCs    RBC 4.09 4.00 - 5.20 x10*6/uL    Hemoglobin 9.5 (L) 12.0 - 16.0 g/dL    Hematocrit 32.2 (L) 36.0 - 46.0 %    MCV 79 (L) 80 - 100 fL    MCH 23.2 (L) 26.0 -  34.0 pg    MCHC 29.5 (L) 32.0 - 36.0 g/dL    RDW 18.2 (H) 11.5 - 14.5 %    Platelets 135 (L) 150 - 450 x10*3/uL   Basic metabolic panel   Result Value Ref Range    Glucose 94 65 - 99 mg/dL    Sodium 140 133 - 145 mmol/L    Potassium 4.4 3.4 - 5.1 mmol/L    Chloride 103 97 - 107 mmol/L    Bicarbonate 27 24 - 31 mmol/L    Urea Nitrogen 31 (H) 8 - 25 mg/dL    Creatinine 2.20 (H) 0.40 - 1.60 mg/dL    eGFR 23 (L) >60 mL/min/1.73m*2    Calcium 8.4 (L) 8.5 - 10.4 mg/dL    Anion Gap 10 <=19 mmol/L   POCT GLUCOSE   Result Value Ref Range    POCT Glucose 87 74 - 99 mg/dL       Assessment/Plan        Principal Problem:    Chest pain, unspecified type    Acute on chronic diastolic heart failure  -noncompliance with Lasix  -ProBNP 6,876  -Give IV Lasix 40mg IV Q12hr  -Monitor I&Os, weights  -EF 45-50% on TTE last month  -Home meds: metoprolol, chlorthalidone, Lasix     Chest pain  -Now resolved  -Trend HScTn: 41, 38, 37  -Monitor on telemetry  -EKG nonischemic  -PRN EKG and nitroglycerin for ACS symptoms  -ASA PO QD  -Cardiac diet      HTN  -BP mildly elevated  -Continue home Norvasc, hydralazine, metoprolol, and IV Lasix  -Monitor BP     CAD  -Continue statin and ezetimibe     CKD III with anemia of chronic disease  -BUN and creatinine near baseline  -H&H near baseline  -Follows with nephrology  -CBC and BMP in AM     DM II  -Follows with endocrinology  -Hg A1c 5.4  -POC BG AC and HS  -DM diet  -SSI  -Takes Trulicity and Mounjaro     Hypothyroidism   -Follows with endocrinology  -Continue Synthroid     Asthma  -Follows with pulmonology  -No signs of exacerbation  -Continue home Trelegy and montelukast     HOLDEN  -Does not currently use CPAP     MGUS  -Follows with Hem-Onc  -H&H near baseline     DVT prophylaxis  -Lovenox and SCDs        Overall Impression / Plan  Admit for observation for CHF exacerbation and chest pain (now resolved). Diurese with IV Lasix. Chest pain work up. If improvement in fluid volume status tomorrow,  anticipate DC home. If still needs further diuresis, will transition to inpatient.     7/12/2024: Patient was seen and examined, vitals reviewed.  Patient denies chest pressure/pain, shortness of breath and states she is feeling better, symptoms improved overnight.  Troponins are slightly elevated in a flat pattern and not suggestive of ACS.  She has CKD and follows nephrology.  She is clinically stable, we will discharge her home today and have her follow-up with her cardiologist Dr. Ackerman.     I spent 20 minutes in the professional and overall care of this patient.       Zoltan Araya, APRN-CNP

## 2024-07-12 NOTE — PROGRESS NOTES
07/12/24 0834   Physical Activity   On average, how many days per week do you engage in moderate to strenuous exercise (like a brisk walk)? 0 days   On average, how many minutes do you engage in exercise at this level? 0 min   Financial Resource Strain   How hard is it for you to pay for the very basics like food, housing, medical care, and heating? Not hard   Housing Stability   In the last 12 months, was there a time when you were not able to pay the mortgage or rent on time? N   In the past 12 months, how many times have you moved where you were living? 0   At any time in the past 12 months, were you homeless or living in a shelter (including now)? N   Transportation Needs   In the past 12 months, has lack of transportation kept you from medical appointments or from getting medications? no   In the past 12 months, has lack of transportation kept you from meetings, work, or from getting things needed for daily living? No   Food Insecurity   Within the past 12 months, you worried that your food would run out before you got the money to buy more. Never true   Within the past 12 months, the food you bought just didn't last and you didn't have money to get more. Never true   Stress   Do you feel stress - tense, restless, nervous, or anxious, or unable to sleep at night because your mind is troubled all the time - these days? To some exte   Social Connections   In a typical week, how many times do you talk on the phone with family, friends, or neighbors? More than 3   How often do you get together with friends or relatives? More than 3   How often do you attend Sabianism or Buddhist services? Never   Do you belong to any clubs or organizations such as Sabianism groups, unions, fraternal or athletic groups, or school groups? No   How often do you attend meetings of the clubs or organizations you belong to? Never   Are you , , , , never , or living with a partner?    Intimate  Partner Violence   Within the last year, have you been afraid of your partner or ex-partner? No   Within the last year, have you been humiliated or emotionally abused in other ways by your partner or ex-partner? No   Within the last year, have you been kicked, hit, slapped, or otherwise physically hurt by your partner or ex-partner? No   Within the last year, have you been raped or forced to have any kind of sexual activity by your partner or ex-partner? No   Alcohol Use   Q1: How often do you have a drink containing alcohol? Never   Q2: How many drinks containing alcohol do you have on a typical day when you are drinking? None   Q3: How often do you have six or more drinks on one occasion? Never   Utilities   In the past 12 months has the electric, gas, oil, or water company threatened to shut off services in your home? No   Health Literacy   How often do you need to have someone help you when you read instructions, pamphlets, or other written material from your doctor or pharmacy? Never

## 2024-07-13 LAB
ATRIAL RATE: 71 BPM
P AXIS: 17 DEGREES
P OFFSET: 164 MS
P ONSET: 120 MS
PR INTERVAL: 172 MS
Q ONSET: 206 MS
QRS COUNT: 12 BEATS
QRS DURATION: 130 MS
QT INTERVAL: 506 MS
QTC CALCULATION(BAZETT): 549 MS
QTC FREDERICIA: 535 MS
R AXIS: -53 DEGREES
T AXIS: -55 DEGREES
T OFFSET: 459 MS
VENTRICULAR RATE: 71 BPM

## 2024-07-15 DIAGNOSIS — E78.5 DYSLIPIDEMIA: ICD-10-CM

## 2024-07-17 ENCOUNTER — APPOINTMENT (OUTPATIENT)
Dept: PRIMARY CARE | Facility: CLINIC | Age: 74
End: 2024-07-17
Payer: MEDICARE

## 2024-07-17 VITALS
HEART RATE: 76 BPM | TEMPERATURE: 98.1 F | WEIGHT: 284 LBS | DIASTOLIC BLOOD PRESSURE: 78 MMHG | OXYGEN SATURATION: 98 % | BODY MASS INDEX: 50.31 KG/M2 | SYSTOLIC BLOOD PRESSURE: 131 MMHG

## 2024-07-17 DIAGNOSIS — G47.33 OSA ON CPAP: ICD-10-CM

## 2024-07-17 DIAGNOSIS — J45.909 ASTHMA, UNSPECIFIED ASTHMA SEVERITY, UNSPECIFIED WHETHER COMPLICATED, UNSPECIFIED WHETHER PERSISTENT (HHS-HCC): ICD-10-CM

## 2024-07-17 DIAGNOSIS — E11.65 TYPE 2 DIABETES MELLITUS WITH HYPERGLYCEMIA, WITHOUT LONG-TERM CURRENT USE OF INSULIN (MULTI): ICD-10-CM

## 2024-07-17 DIAGNOSIS — I50.32 CHRONIC DIASTOLIC HEART FAILURE (MULTI): ICD-10-CM

## 2024-07-17 DIAGNOSIS — E03.9 HYPOTHYROIDISM, UNSPECIFIED TYPE: ICD-10-CM

## 2024-07-17 DIAGNOSIS — E11.22 CKD STAGE 3 DUE TO TYPE 2 DIABETES MELLITUS (MULTI): ICD-10-CM

## 2024-07-17 DIAGNOSIS — E66.01 CLASS 3 SEVERE OBESITY DUE TO EXCESS CALORIES WITH SERIOUS COMORBIDITY AND BODY MASS INDEX (BMI) OF 45.0 TO 49.9 IN ADULT (MULTI): ICD-10-CM

## 2024-07-17 DIAGNOSIS — M25.561 CHRONIC PAIN OF RIGHT KNEE: ICD-10-CM

## 2024-07-17 DIAGNOSIS — D63.1 ANEMIA IN CHRONIC KIDNEY DISEASE, UNSPECIFIED CKD STAGE: ICD-10-CM

## 2024-07-17 DIAGNOSIS — E78.5 DYSLIPIDEMIA: ICD-10-CM

## 2024-07-17 DIAGNOSIS — N28.1 RENAL CYST: ICD-10-CM

## 2024-07-17 DIAGNOSIS — K21.9 GASTROESOPHAGEAL REFLUX DISEASE WITHOUT ESOPHAGITIS: ICD-10-CM

## 2024-07-17 DIAGNOSIS — M81.0 AGE-RELATED OSTEOPOROSIS WITHOUT CURRENT PATHOLOGICAL FRACTURE: ICD-10-CM

## 2024-07-17 DIAGNOSIS — N18.9 ANEMIA IN CHRONIC KIDNEY DISEASE, UNSPECIFIED CKD STAGE: ICD-10-CM

## 2024-07-17 DIAGNOSIS — D47.2 MGUS (MONOCLONAL GAMMOPATHY OF UNKNOWN SIGNIFICANCE): ICD-10-CM

## 2024-07-17 DIAGNOSIS — E11.22 TYPE 2 DIABETES MELLITUS WITH CHRONIC KIDNEY DISEASE, WITHOUT LONG-TERM CURRENT USE OF INSULIN, UNSPECIFIED CKD STAGE (MULTI): ICD-10-CM

## 2024-07-17 DIAGNOSIS — N18.30 CKD STAGE 3 DUE TO TYPE 2 DIABETES MELLITUS (MULTI): ICD-10-CM

## 2024-07-17 DIAGNOSIS — I10 HTN (HYPERTENSION), BENIGN: Primary | ICD-10-CM

## 2024-07-17 DIAGNOSIS — G89.29 CHRONIC PAIN OF RIGHT KNEE: ICD-10-CM

## 2024-07-17 DIAGNOSIS — Z00.00 MEDICARE ANNUAL WELLNESS VISIT, SUBSEQUENT: ICD-10-CM

## 2024-07-17 DIAGNOSIS — M80.00XS OSTEOPOROSIS WITH CURRENT PATHOLOGICAL FRACTURE, UNSPECIFIED OSTEOPOROSIS TYPE, SEQUELA: ICD-10-CM

## 2024-07-17 DIAGNOSIS — F32.A DEPRESSION, UNSPECIFIED DEPRESSION TYPE: ICD-10-CM

## 2024-07-17 PROBLEM — R06.02 SHORTNESS OF BREATH ON EXERTION: Status: RESOLVED | Noted: 2023-07-14 | Resolved: 2024-07-17

## 2024-07-17 LAB — TSH SERPL DL<=0.05 MIU/L-ACNC: 0.7 MIU/L (ref 0.27–4.2)

## 2024-07-17 PROCEDURE — 1158F ADVNC CARE PLAN TLK DOCD: CPT | Performed by: PEDIATRICS

## 2024-07-17 PROCEDURE — G0439 PPPS, SUBSEQ VISIT: HCPCS | Performed by: PEDIATRICS

## 2024-07-17 PROCEDURE — 1123F ACP DISCUSS/DSCN MKR DOCD: CPT | Performed by: PEDIATRICS

## 2024-07-17 PROCEDURE — 1170F FXNL STATUS ASSESSED: CPT | Performed by: PEDIATRICS

## 2024-07-17 PROCEDURE — 99214 OFFICE O/P EST MOD 30 MIN: CPT | Performed by: PEDIATRICS

## 2024-07-17 PROCEDURE — 3044F HG A1C LEVEL LT 7.0%: CPT | Performed by: PEDIATRICS

## 2024-07-17 PROCEDURE — 3075F SYST BP GE 130 - 139MM HG: CPT | Performed by: PEDIATRICS

## 2024-07-17 PROCEDURE — G0009 ADMIN PNEUMOCOCCAL VACCINE: HCPCS | Performed by: PEDIATRICS

## 2024-07-17 PROCEDURE — 3078F DIAST BP <80 MM HG: CPT | Performed by: PEDIATRICS

## 2024-07-17 PROCEDURE — 90677 PCV20 VACCINE IM: CPT | Performed by: PEDIATRICS

## 2024-07-17 ASSESSMENT — ACTIVITIES OF DAILY LIVING (ADL)
DRESSING: NEEDS ASSISTANCE
TAKING_MEDICATION: INDEPENDENT
MANAGING_FINANCES: INDEPENDENT
DOING_HOUSEWORK: NEEDS ASSISTANCE
BATHING: NEEDS ASSISTANCE
GROCERY_SHOPPING: TOTAL CARE

## 2024-07-17 ASSESSMENT — PATIENT HEALTH QUESTIONNAIRE - PHQ9
1. LITTLE INTEREST OR PLEASURE IN DOING THINGS: NOT AT ALL
2. FEELING DOWN, DEPRESSED OR HOPELESS: NOT AT ALL
SUM OF ALL RESPONSES TO PHQ9 QUESTIONS 1 AND 2: 0

## 2024-07-17 NOTE — PATIENT INSTRUCTIONS
Consider RSV and shingles shots  Keep losing weight  Ask Dr Koroma about Ozempic and Farxiga  Return in 6 months

## 2024-07-17 NOTE — PROGRESS NOTES
Subjective   Reason for Visit: Analia Murray is an 74 y.o. female here for a Medicare Wellness visit.     HPI    Here today for medicare wellness visit.   Recent ED visit on 7/11 presenting for SOB and chest pressure/pain. Admitted for observation for one night.  Discharged on furosemide 20mg and told to follow up with cardiologist and PCP.  States that since coming home from the hospital she has been feeling better.  She is able to sleep through the night now without feeling SOB.   She has daytime sleepiness and fatigue however.    Mammogram - April 2024 - no malignancy   Dexa - April 2024- osteoporosis-->sees rheumatologist  Colonoscopy - September 2023- repeat in 7 years   Vision: has an appointment in August- goes yearly    Cardiologist Dr. Ackerman  - has an appointment next week.  Rheumatology - Dr. Lucio  Nephrology for CKD--Dr San  Endocrinology - Dr. Koroma     Patient Care Team:  Thais Sanchez MD as PCP - General (Internal Medicine)  Thais Sanchez MD     Review of Systems    Objective   Vitals:  /78   Pulse 76   Temp 36.7 °C (98.1 °F)   Wt 129 kg (284 lb)   LMP  (LMP Unknown)   SpO2 98%   BMI 50.31 kg/m²       Physical Exam  Vitals reviewed.   Constitutional:       General: She is not in acute distress.  HENT:      Head: Normocephalic.      Right Ear: Tympanic membrane normal.      Left Ear: Tympanic membrane normal.      Nose: Nose normal.      Mouth/Throat:      Pharynx: Oropharynx is clear.   Cardiovascular:      Rate and Rhythm: Normal rate and regular rhythm.      Heart sounds: Normal heart sounds.   Pulmonary:      Breath sounds: Normal breath sounds.   Abdominal:      Palpations: Abdomen is soft.      Tenderness: There is no abdominal tenderness.   Musculoskeletal:         General: No tenderness.   Skin:     Findings: No rash.      Comments: Pedal pulses and sensation intact   Neurological:      General: No focal deficit present.      Mental Status: She is alert.    Psychiatric:         Mood and Affect: Mood normal.         Assessment/Plan   Problem List Items Addressed This Visit       Anemia in CKD (chronic kidney disease)    Current Assessment & Plan     Takes iron supplements          Asthma    Current Assessment & Plan     Uses rescue inhaler rarely         Chronic diastolic heart failure    Current Assessment & Plan     Will be seeing Dr Ackerman soon         CKD stage 3 due to type 2 diabetes mellitus (Multi)    Current Assessment & Plan     Sees nephrologist          Depression    Current Assessment & Plan     Doing better now         Dyslipidemia    Current Assessment & Plan     Cholesterol last year WNL          GERD (gastroesophageal reflux disease)    Current Assessment & Plan     Doing well with omeprazole         HTN (hypertension), benign - Primary    Diabetes mellitus (Multi)    Current Assessment & Plan     A1c 5.4         Relevant Orders    Albumin-Creatinine Ratio, Urine Random (Completed)    Knee pain    Current Assessment & Plan     Takes tylenol          MGUS (monoclonal gammopathy of unknown significance)    Current Assessment & Plan     Follows with hematologist         Class 3 severe obesity due to excess calories with serious comorbidity and body mass index (BMI) of 50.0 to 59.9 in adult    Current Assessment & Plan     Had some weight loss on Trulicity         Severe sleep apnea    Current Assessment & Plan     No longer using CPAP   Has daytime fatigue and sleepiness         Renal cyst    Current Assessment & Plan     2018 ultrasound showed it decreased in size         Type 2 diabetes mellitus with hyperglycemia (Multi)    Hypothyroid    Relevant Orders    TSH (Completed)    Osteoporosis    Current Assessment & Plan     Follows with rheumatology         Relevant Orders    Vitamin D 25-Hydroxy,Total (for eval of Vitamin D levels) (Completed)    Vitamin D 25-Hydroxy,Total (for eval of Vitamin D levels) (Completed)     Other Visit Diagnoses        Medicare annual wellness visit, subsequent

## 2024-07-18 RX ORDER — PRAVASTATIN SODIUM 40 MG/1
40 TABLET ORAL NIGHTLY
Qty: 90 TABLET | Refills: 3 | Status: SHIPPED | OUTPATIENT
Start: 2024-07-18

## 2024-07-19 DIAGNOSIS — E11.65 TYPE 2 DIABETES MELLITUS WITH HYPERGLYCEMIA, WITHOUT LONG-TERM CURRENT USE OF INSULIN (MULTI): ICD-10-CM

## 2024-07-19 DIAGNOSIS — I50.32 CHRONIC DIASTOLIC HEART FAILURE (MULTI): ICD-10-CM

## 2024-07-19 DIAGNOSIS — J45.909 ASTHMA, UNSPECIFIED ASTHMA SEVERITY, UNSPECIFIED WHETHER COMPLICATED, UNSPECIFIED WHETHER PERSISTENT (HHS-HCC): Primary | ICD-10-CM

## 2024-07-21 DIAGNOSIS — E11.65 TYPE 2 DIABETES MELLITUS WITH HYPERGLYCEMIA, UNSPECIFIED WHETHER LONG TERM INSULIN USE (MULTI): Primary | ICD-10-CM

## 2024-07-22 RX ORDER — LEVOTHYROXINE SODIUM 200 UG/1
TABLET ORAL
Qty: 100 TABLET | Refills: 3 | Status: SHIPPED | OUTPATIENT
Start: 2024-07-22

## 2024-07-23 DIAGNOSIS — I25.10 ATHEROSCLEROSIS OF NATIVE CORONARY ARTERY OF NATIVE HEART WITHOUT ANGINA PECTORIS: Primary | ICD-10-CM

## 2024-07-23 RX ORDER — METOPROLOL SUCCINATE 100 MG/1
100 TABLET, EXTENDED RELEASE ORAL DAILY
Qty: 90 TABLET | Refills: 3 | Status: SHIPPED | OUTPATIENT
Start: 2024-07-23

## 2024-07-26 PROBLEM — Z86.79 HISTORY OF HYPERTENSION: Status: ACTIVE | Noted: 2024-07-26

## 2024-07-26 PROBLEM — J06.9 ACUTE UPPER RESPIRATORY INFECTION: Status: ACTIVE | Noted: 2024-07-26

## 2024-07-26 PROBLEM — T81.49XA POSTOPERATIVE WOUND INFECTION: Status: ACTIVE | Noted: 2024-07-26

## 2024-07-26 PROBLEM — R11.0 NAUSEA: Status: ACTIVE | Noted: 2024-07-26

## 2024-07-26 PROBLEM — D12.3 BENIGN NEOPLASM OF TRANSVERSE COLON: Status: ACTIVE | Noted: 2023-09-08

## 2024-07-26 PROBLEM — N39.0 URINARY TRACT INFECTION: Status: ACTIVE | Noted: 2024-07-26

## 2024-07-26 PROBLEM — A04.8 HELICOBACTER PYLORI INFECTION: Status: ACTIVE | Noted: 2024-07-26

## 2024-07-26 PROBLEM — Z86.39 HISTORY OF TYPE 1 DIABETES MELLITUS: Status: ACTIVE | Noted: 2024-07-26

## 2024-07-26 PROBLEM — J30.9 ALLERGIC RHINITIS: Status: ACTIVE | Noted: 2024-07-26

## 2024-07-29 ENCOUNTER — OFFICE VISIT (OUTPATIENT)
Dept: CARDIOLOGY | Facility: CLINIC | Age: 74
End: 2024-07-29
Payer: MEDICARE

## 2024-07-29 VITALS
DIASTOLIC BLOOD PRESSURE: 62 MMHG | SYSTOLIC BLOOD PRESSURE: 117 MMHG | HEIGHT: 64 IN | HEART RATE: 77 BPM | BODY MASS INDEX: 49.1 KG/M2 | OXYGEN SATURATION: 96 % | WEIGHT: 287.6 LBS

## 2024-07-29 DIAGNOSIS — R06.02 SHORTNESS OF BREATH ON EXERTION: ICD-10-CM

## 2024-07-29 PROCEDURE — 3044F HG A1C LEVEL LT 7.0%: CPT | Performed by: NURSE PRACTITIONER

## 2024-07-29 PROCEDURE — 99215 OFFICE O/P EST HI 40 MIN: CPT | Performed by: NURSE PRACTITIONER

## 2024-07-29 PROCEDURE — 1159F MED LIST DOCD IN RCRD: CPT | Performed by: NURSE PRACTITIONER

## 2024-07-29 PROCEDURE — 3078F DIAST BP <80 MM HG: CPT | Performed by: NURSE PRACTITIONER

## 2024-07-29 PROCEDURE — 1160F RVW MEDS BY RX/DR IN RCRD: CPT | Performed by: NURSE PRACTITIONER

## 2024-07-29 PROCEDURE — 1036F TOBACCO NON-USER: CPT | Performed by: NURSE PRACTITIONER

## 2024-07-29 PROCEDURE — 3074F SYST BP LT 130 MM HG: CPT | Performed by: NURSE PRACTITIONER

## 2024-07-29 PROCEDURE — 1126F AMNT PAIN NOTED NONE PRSNT: CPT | Performed by: NURSE PRACTITIONER

## 2024-07-29 RX ORDER — FUROSEMIDE 20 MG/1
20 TABLET ORAL DAILY
Qty: 90 TABLET | Refills: 3 | Status: CANCELLED | OUTPATIENT
Start: 2024-07-29

## 2024-07-29 ASSESSMENT — COLUMBIA-SUICIDE SEVERITY RATING SCALE - C-SSRS
2. HAVE YOU ACTUALLY HAD ANY THOUGHTS OF KILLING YOURSELF?: NO
6. HAVE YOU EVER DONE ANYTHING, STARTED TO DO ANYTHING, OR PREPARED TO DO ANYTHING TO END YOUR LIFE?: NO
1. IN THE PAST MONTH, HAVE YOU WISHED YOU WERE DEAD OR WISHED YOU COULD GO TO SLEEP AND NOT WAKE UP?: NO

## 2024-07-29 ASSESSMENT — PATIENT HEALTH QUESTIONNAIRE - PHQ9
SUM OF ALL RESPONSES TO PHQ9 QUESTIONS 1 AND 2: 0
2. FEELING DOWN, DEPRESSED OR HOPELESS: NOT AT ALL
1. LITTLE INTEREST OR PLEASURE IN DOING THINGS: NOT AT ALL

## 2024-07-29 ASSESSMENT — PAIN SCALES - GENERAL: PAINLEVEL: 0-NO PAIN

## 2024-07-29 ASSESSMENT — ENCOUNTER SYMPTOMS
RESPIRATORY NEGATIVE: 1
CONSTITUTIONAL NEGATIVE: 1

## 2024-07-29 NOTE — PROGRESS NOTES
Subjective   Analia Murray is a 74 y.o. female.    Chief Complaint:  I was in the hospital    HPI  Mrs. Murray is seen for a hospital followup.  She is seen in collaboration with Dr. Ackerman.  She was recently hospitalized for acute on chronic CHF and HTN in part due to noncompliance with furosemide.  She is feeling well now breathing has returned to her normal as well as edema.  She is taking her medication compliantly and appears to tolerate them well.  She is able to lie down flat. She is not weighing herself at home but is trying to follow a low sodium diet. Hydrazine was increased to 25 mg twice daily.  She believes she was told to take only 20 mg of furosemide. She has no particular cardiac concerns today.     Review of Systems   Constitutional: Negative.   Cardiovascular:  Positive for leg swelling.   Respiratory: Negative.         Objective   Labs reviewed   Vitals reviewed.   Constitutional:       Appearance: Healthy appearance. Not in distress.   Neck:      Vascular: JVD normal.   Pulmonary:      Effort: Pulmonary effort is normal.      Breath sounds: Normal breath sounds. No wheezing. No rhonchi. No rales.   Chest:      Chest wall: Not tender to palpatation.   Cardiovascular:      Normal rate. Regular rhythm. Normal S1. Normal S2.       Murmurs: There is no murmur.      No gallop.  No click. No rub.   Edema:     Ankle: bilateral 1+ edema of the ankle.     Feet: bilateral trace edema of the feet.  Abdominal:      Tenderness: There is no abdominal tenderness.   Musculoskeletal:         General: No tenderness. Skin:     General: Skin is warm and dry.   Neurological:      General: No focal deficit present.      Mental Status: Alert and oriented to person, place and time.         Lab Review:   Lab Results   Component Value Date     07/12/2024    K 4.4 07/12/2024     07/12/2024    CO2 27 07/12/2024    BUN 31 (H) 07/12/2024    CREATININE 2.20 (H) 07/12/2024    GLUCOSE 94 07/12/2024     CALCIUM 8.4 (L) 07/12/2024     Lab Results   Component Value Date    WBC 7.0 07/12/2024    HGB 9.5 (L) 07/12/2024    HCT 32.2 (L) 07/12/2024    MCV 79 (L) 07/12/2024     (L) 07/12/2024     Lab Results   Component Value Date    CHOL 128 06/21/2023    TRIG 72 06/21/2023    HDL 45.1 06/21/2023     ECHO 6/2024:   1. Left ventricular systolic function is mildly decreased with a 45-50% estimated ejection fraction.   2. The left atrium is moderate to severely dilated.   3. Moderate mitral valve regurgitation.   4. Moderate to severe tricuspid regurgitation visualized.   5. Severely elevated right ventricular systolic pressure.   6. There is moderate pulmonic valve regurgitation.   7. Severely elevated pulmonary artery pressure.   8. The estimated PASP is 70 mmHg.      Assessment/Plan   Mrs. Murray is a pleasant 74-year-old  female with a past medical history significant for hypertension, hyperlipidemia, CKD, diabetes, HOLDEN not currently using CPAP, obesity, chronic diastolic HF and medically managed coronary artery disease by heart catheterization in 2015. Echocardiogram 6/2024 showed a reduced EF of 45-50% with moderate MR and moderate to severe TR with severely elevated PAP. She presents today for followup after CHF hospitalization.  VS are stable.  She appears euvolemic on exam except for some ankle edema. She is not weighing daily and on my scale appears to be 2 pounds less than her discharge weight. I asked that she weight herself daily and in addition take 40 mg of lasix daily after taking 60 mg for 3 days.  Other medication will remain unchanged.  Labs were reviewed.  LDL remain at goal. She will return in August for followup.  She has a home sleep study to schedule. She will be connected with Clinical Pharmacy as well for assistance for medication compliance and education. She will call for interim concerns.

## 2024-07-30 ENCOUNTER — HOSPITAL ENCOUNTER (EMERGENCY)
Facility: HOSPITAL | Age: 74
Discharge: HOME | End: 2024-07-31
Attending: EMERGENCY MEDICINE
Payer: MEDICARE

## 2024-07-30 ENCOUNTER — APPOINTMENT (OUTPATIENT)
Dept: RADIOLOGY | Facility: HOSPITAL | Age: 74
End: 2024-07-30
Payer: MEDICARE

## 2024-07-30 DIAGNOSIS — U07.1 COVID-19: Primary | ICD-10-CM

## 2024-07-30 DIAGNOSIS — R60.0 LEG EDEMA, RIGHT: ICD-10-CM

## 2024-07-30 LAB
ALBUMIN SERPL-MCNC: 3.9 G/DL (ref 3.5–5)
ALP BLD-CCNC: 107 U/L (ref 35–125)
ALT SERPL-CCNC: 8 U/L (ref 5–40)
ANION GAP SERPL CALC-SCNC: 16 MMOL/L
APPEARANCE UR: CLEAR
AST SERPL-CCNC: 14 U/L (ref 5–40)
BASOPHILS # BLD AUTO: 0.02 X10*3/UL (ref 0–0.1)
BASOPHILS NFR BLD AUTO: 0.3 %
BILIRUB SERPL-MCNC: 0.4 MG/DL (ref 0.1–1.2)
BILIRUB UR STRIP.AUTO-MCNC: NEGATIVE MG/DL
BUN SERPL-MCNC: 38 MG/DL (ref 8–25)
CALCIUM SERPL-MCNC: 8.9 MG/DL (ref 8.5–10.4)
CHLORIDE SERPL-SCNC: 98 MMOL/L (ref 97–107)
CO2 SERPL-SCNC: 25 MMOL/L (ref 24–31)
COLOR UR: COLORLESS
CREAT SERPL-MCNC: 2.7 MG/DL (ref 0.4–1.6)
EGFRCR SERPLBLD CKD-EPI 2021: 18 ML/MIN/1.73M*2
EOSINOPHIL # BLD AUTO: 0.1 X10*3/UL (ref 0–0.4)
EOSINOPHIL NFR BLD AUTO: 1.4 %
ERYTHROCYTE [DISTWIDTH] IN BLOOD BY AUTOMATED COUNT: 17.9 % (ref 11.5–14.5)
GLUCOSE SERPL-MCNC: 120 MG/DL (ref 65–99)
GLUCOSE UR STRIP.AUTO-MCNC: NORMAL MG/DL
HCT VFR BLD AUTO: 35.5 % (ref 36–46)
HGB BLD-MCNC: 10.7 G/DL (ref 12–16)
IMM GRANULOCYTES # BLD AUTO: 0.02 X10*3/UL (ref 0–0.5)
IMM GRANULOCYTES NFR BLD AUTO: 0.3 % (ref 0–0.9)
KETONES UR STRIP.AUTO-MCNC: NEGATIVE MG/DL
LACTATE BLDV-SCNC: 0.8 MMOL/L (ref 0.4–2)
LEUKOCYTE ESTERASE UR QL STRIP.AUTO: NEGATIVE
LYMPHOCYTES # BLD AUTO: 0.99 X10*3/UL (ref 0.8–3)
LYMPHOCYTES NFR BLD AUTO: 14.3 %
MCH RBC QN AUTO: 23.3 PG (ref 26–34)
MCHC RBC AUTO-ENTMCNC: 30.1 G/DL (ref 32–36)
MCV RBC AUTO: 77 FL (ref 80–100)
MONOCYTES # BLD AUTO: 0.69 X10*3/UL (ref 0.05–0.8)
MONOCYTES NFR BLD AUTO: 10 %
NEUTROPHILS # BLD AUTO: 5.08 X10*3/UL (ref 1.6–5.5)
NEUTROPHILS NFR BLD AUTO: 73.7 %
NITRITE UR QL STRIP.AUTO: NEGATIVE
NRBC BLD-RTO: 0 /100 WBCS (ref 0–0)
NT-PROBNP SERPL-MCNC: 5618 PG/ML (ref 0–624)
PH UR STRIP.AUTO: 6.5 [PH]
PLATELET # BLD AUTO: 161 X10*3/UL (ref 150–450)
POTASSIUM SERPL-SCNC: 4.6 MMOL/L (ref 3.4–5.1)
PROT SERPL-MCNC: 8.1 G/DL (ref 5.9–7.9)
PROT UR STRIP.AUTO-MCNC: NEGATIVE MG/DL
RBC # BLD AUTO: 4.59 X10*6/UL (ref 4–5.2)
RBC # UR STRIP.AUTO: NEGATIVE /UL
SARS-COV-2 RNA RESP QL NAA+PROBE: DETECTED
SODIUM SERPL-SCNC: 139 MMOL/L (ref 133–145)
SP GR UR STRIP.AUTO: 1.01
TROPONIN T SERPL-MCNC: 57 NG/L
UROBILINOGEN UR STRIP.AUTO-MCNC: NORMAL MG/DL
WBC # BLD AUTO: 6.9 X10*3/UL (ref 4.4–11.3)

## 2024-07-30 PROCEDURE — 71046 X-RAY EXAM CHEST 2 VIEWS: CPT

## 2024-07-30 PROCEDURE — 84484 ASSAY OF TROPONIN QUANT: CPT | Performed by: CLINICAL NURSE SPECIALIST

## 2024-07-30 PROCEDURE — 87040 BLOOD CULTURE FOR BACTERIA: CPT | Mod: WESLAB | Performed by: CLINICAL NURSE SPECIALIST

## 2024-07-30 PROCEDURE — 83880 ASSAY OF NATRIURETIC PEPTIDE: CPT | Performed by: CLINICAL NURSE SPECIALIST

## 2024-07-30 PROCEDURE — 87635 SARS-COV-2 COVID-19 AMP PRB: CPT | Performed by: CLINICAL NURSE SPECIALIST

## 2024-07-30 PROCEDURE — 71046 X-RAY EXAM CHEST 2 VIEWS: CPT | Performed by: RADIOLOGY

## 2024-07-30 PROCEDURE — 93971 EXTREMITY STUDY: CPT

## 2024-07-30 PROCEDURE — 85025 COMPLETE CBC W/AUTO DIFF WBC: CPT | Performed by: CLINICAL NURSE SPECIALIST

## 2024-07-30 PROCEDURE — 99284 EMERGENCY DEPT VISIT MOD MDM: CPT | Mod: 25

## 2024-07-30 PROCEDURE — 80053 COMPREHEN METABOLIC PANEL: CPT | Performed by: CLINICAL NURSE SPECIALIST

## 2024-07-30 PROCEDURE — 83605 ASSAY OF LACTIC ACID: CPT | Performed by: CLINICAL NURSE SPECIALIST

## 2024-07-30 PROCEDURE — 81003 URINALYSIS AUTO W/O SCOPE: CPT | Performed by: CLINICAL NURSE SPECIALIST

## 2024-07-30 PROCEDURE — 93971 EXTREMITY STUDY: CPT | Performed by: RADIOLOGY

## 2024-07-30 PROCEDURE — 36415 COLL VENOUS BLD VENIPUNCTURE: CPT | Performed by: CLINICAL NURSE SPECIALIST

## 2024-07-30 ASSESSMENT — PAIN SCALES - GENERAL: PAINLEVEL_OUTOF10: 5 - MODERATE PAIN

## 2024-07-30 ASSESSMENT — PAIN DESCRIPTION - ONSET: ONSET: GRADUAL

## 2024-07-30 ASSESSMENT — PAIN - FUNCTIONAL ASSESSMENT: PAIN_FUNCTIONAL_ASSESSMENT: 0-10

## 2024-07-30 ASSESSMENT — COLUMBIA-SUICIDE SEVERITY RATING SCALE - C-SSRS
2. HAVE YOU ACTUALLY HAD ANY THOUGHTS OF KILLING YOURSELF?: NO
1. IN THE PAST MONTH, HAVE YOU WISHED YOU WERE DEAD OR WISHED YOU COULD GO TO SLEEP AND NOT WAKE UP?: NO
6. HAVE YOU EVER DONE ANYTHING, STARTED TO DO ANYTHING, OR PREPARED TO DO ANYTHING TO END YOUR LIFE?: NO

## 2024-07-30 ASSESSMENT — PAIN DESCRIPTION - DESCRIPTORS: DESCRIPTORS: ACHING

## 2024-07-30 ASSESSMENT — PAIN DESCRIPTION - FREQUENCY: FREQUENCY: CONSTANT/CONTINUOUS

## 2024-07-30 ASSESSMENT — PAIN DESCRIPTION - PROGRESSION: CLINICAL_PROGRESSION: NOT CHANGED

## 2024-07-30 ASSESSMENT — PAIN DESCRIPTION - LOCATION: LOCATION: LEG

## 2024-07-30 ASSESSMENT — PAIN DESCRIPTION - PAIN TYPE: TYPE: ACUTE PAIN

## 2024-07-30 ASSESSMENT — PAIN DESCRIPTION - ORIENTATION: ORIENTATION: RIGHT;LEFT

## 2024-07-31 VITALS
TEMPERATURE: 98.6 F | DIASTOLIC BLOOD PRESSURE: 60 MMHG | WEIGHT: 278 LBS | HEIGHT: 64 IN | SYSTOLIC BLOOD PRESSURE: 154 MMHG | RESPIRATION RATE: 18 BRPM | BODY MASS INDEX: 47.46 KG/M2 | HEART RATE: 75 BPM | OXYGEN SATURATION: 98 %

## 2024-07-31 PROBLEM — R60.0 LEG EDEMA, RIGHT: Status: ACTIVE | Noted: 2024-07-31

## 2024-07-31 PROBLEM — U07.1 COVID-19: Status: ACTIVE | Noted: 2024-07-31

## 2024-07-31 LAB — HOLD SPECIMEN: NORMAL

## 2024-07-31 NOTE — DISCHARGE INSTRUCTIONS
Thank you for choosing [facility] Emergency Department. It was my pleasure to be involved in your care today.         As of today's visit, based on reasonable likelihood, that it is safe for you to be discharged back to your residence to follow-up as an outpatient for ongoing management of your medical problem. You should follow-up with any referrals / primary provider as soon as possible. The contacts (number, addresses) are listed below.         Important:  Even though we think it is safe for you to go home, there is always a small chance that we are missing something that could require hospitalization.  Therefore it is very important that if you get worse or develops any new symptoms that you return here as soon as possible to be re-evaluated.  This includes return of symptoms that have resolved such as fainting, chest pain, or symptoms that could be warning signs for stroke important:  Even though we think it is safe for you to go home, there is always a small chance that we are missing something that could require hospitalization.  Therefore it is very important that if you get worse or develops any new symptoms that you return here as soon as possible to be re-evaluated.  This includes return of symptoms that have resolved such as fainting, chest pain, or symptoms that could be warning signs for stroke         Make sure your pharmacy and primary doctor is aware of any new medications prescribed today.          It is your responsibility to contact as soon as possible, and follow through with, any referrals you were given today. We do recommend you inform them you are a [facility] follow-up patient, as often they can better accommodate your need to be seen, provided their schedules allow. We will, and have, made every effort to ensure you have access to adequate follow-up specialists available.          All problems may not be able to be fixed in one ER visit. This is why timely ongoing care is important, and this  is a responsibility you share in. Further, you are free to follow up with any provider you choose, and this is not limited to our suggestion.          If cultures were obtained today, you will be contacted should anything result that would require further treatment. Please contact the ED at the number provided with questions.          Having trouble affording medications? Try [email or web address]! (This is not a hospital endorsed website, merely a recommendation based on my own personal experiences with [facility])

## 2024-07-31 NOTE — ED PROVIDER NOTES
Department of Emergency Medicine   ED  Provider Note  Admit Date/RoomTime: 7/30/2024  8:52 PM  ED Room: [facility]        History of Present Illness:  Chief Complaint   Patient presents with    Leg Swelling     Pt did not take water pills was told by php to double up last 3xdays, pt is now having hot flashes, dizzy          Analiaaria Murray is a 74 y.o. female history of CHF, chronic kidney disease, coronary artery disease, hyperlipidemia, hypertension, morbid obesity, obstructive sleep apnea presents emergency department with complaints of chills.  Patient states she was seen evaluated by her primary care physician yesterday for swelling in her right leg.  Advised to double up on her Lasix.  Reports this morning she woke up she had l chills hot and cold flashes.  Per the triage note states 3 days however patient states this was yesterday.  Patient reports she doubled up on her Lasix.  She has continued swelling in her right leg and then about 5:00 started feeling worse.  She complains of congestion and occasional cough.  No abdominal pain nausea or vomiting.  No pressure burning or frequency with urination denies fever complains of sweats.  No ear pain.  No increased redness to the right leg.  Reports she has been urinating frequently.  Review of Systems:   Pertinent positives and negatives are stated within HPI, all other systems reviewed and are negative.        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), Hyperlipidemia, Hypertension, Morbid (severe) obesity due to excess calories (Multi), Obstructive sleep apnea (adult) (pediatric) (06/27/2018), Other  conditions influencing health status, Personal history of other diseases of the circulatory system, Personal history of other diseases of the digestive system (2018), Personal history of other diseases of the digestive system (2017), Personal history of other endocrine, nutritional and metabolic disease (2020), Personal history of other endocrine, nutritional and metabolic disease, Personal history of other endocrine, nutritional and metabolic disease, Personal history of other mental and behavioral disorders (2018), and Unspecified asthma, uncomplicated ([facility]) (10/05/2022).  Past Surgical History:  has a past surgical history that includes Colonoscopy (2013);  section, classic (2014); Small intestine surgery (2016); and Cardiac catheterization.  Social History:  reports that she has never smoked. She has never been exposed to tobacco smoke. She has never used smokeless tobacco. She reports that she does not drink alcohol and does not use drugs.  Family History: family history includes Coronary artery disease in her mother; Heart attack in her mother.. Unless otherwise noted, family history is non contributory  The patient’s home medications have been reviewed.  Allergies: Ciprofloxacin, Morphine, and Penicillins        ---------------------------------------------------PHYSICAL EXAM--------------------------------------    GENERAL APPEARANCE: Awake and alert.   VITAL SIGNS: As per the nurses' triage record.   HEENT: Normocephalic, atraumatic. Extraocular muscles are intact. Pupils equal round and reactive to light. Conjunctiva are pink. Negative scleral icterus. Mucous membranes are moist. Tongue in the midline. Pharynx was without erythema or exudates, uvula midline  NECK: Soft Nontender and supple, full gross ROM, no meningeal signs.  CHEST: Nontender to palpation. Clear to auscultation bilaterally. No rales, rhonchi, or wheezing.   HEART: S1, S2. Regular rate  "and rhythm. No murmurs, gallops or rubs.  Strong and equal pulses in the extremities.   ABDOMEN: Soft, nontender, nondistended, positive bowel sounds, no palpable masses.  MUSCULCSKELETAL: The calves are nontender to palpation. Full gross active range of motion. Ambulating on own with no acute difficulties  NEUROLOGICAL: Awake, alert and oriented x 3. Power intact in the upper and lower extremities. Sensation is intact to light touch in the upper and lower extremities.   IMMUNOLOGICAL: No lymphatic streaking noted   DERM: No petechiae, rashes, or ecchymoses.          ------------------------- NURSING NOTES AND VITALS REVIEWED ---------------------------  The nursing notes within the ED encounter and vital signs as below have been reviewed by myself  /60 (BP Location: Left arm, Patient Position: Sitting)   Pulse 75   Temp 37 °C (98.6 °F) (Oral)   Resp 18   Ht 1.626 m (5' 4\")   Wt 126 kg (278 lb)   LMP  (LMP Unknown)   SpO2 98%   BMI 47.72 kg/m²     Oxygen Saturation Interpretation: Normal    The cardiac monitor revealed sinus rhythm with a heart rate in the 70s as interpreted by me. The cardiac monitor was ordered secondary to the patient's heart rate and to monitor the patient for dysrhythmia.       The patient’s available past medical records and past encounters were reviewed.          -----------------------DIAGNOSTIC RESULTS------------------------  LABS:    Labs Reviewed   CBC WITH AUTO DIFFERENTIAL - Abnormal       Result Value    WBC 6.9      nRBC 0.0      RBC 4.59      Hemoglobin 10.7 (*)     Hematocrit 35.5 (*)     MCV 77 (*)     MCH 23.3 (*)     MCHC 30.1 (*)     RDW 17.9 (*)     Platelets 161      Neutrophils % 73.7      Immature Granulocytes %, Automated 0.3      Lymphocytes % 14.3      Monocytes % 10.0      Eosinophils % 1.4      Basophils % 0.3      Neutrophils Absolute 5.08      Immature Granulocytes Absolute, Automated 0.02      Lymphocytes Absolute 0.99      Monocytes Absolute 0.69      " Eosinophils Absolute 0.10      Basophils Absolute 0.02     COMPREHENSIVE METABOLIC PANEL - Abnormal    Glucose 120 (*)     Sodium 139      Potassium 4.6      Chloride 98      Bicarbonate 25      Urea Nitrogen 38 (*)     Creatinine 2.70 (*)     eGFR 18 (*)     Calcium 8.9      Albumin 3.9      Alkaline Phosphatase 107      Total Protein 8.1 (*)     AST 14      Bilirubin, Total 0.4      ALT 8      Anion Gap 16     N-TERMINAL PROBNP - Abnormal    PROBNP 5,618 (*)     Narrative:     Reference ranges are based on clinical submission data. These ranges represent the 95th percentile of normal cut-off points. As NT Pro- BNP values approach 1000 pg/ml, clinical symptoms are more likely associated with CHF.   SARS-COV-2 PCR - Abnormal    Coronavirus 2019, PCR Detected (*)     Narrative:     This assay has received FDA Emergency Use Authorization (EUA) and is only authorized for the duration of time that circumstances exist to justify the authorization of the emergency use of in vitro diagnostic tests for the detection of SARS-CoV-2 virus and/or diagnosis of COVID-19 infection under section 564(b)(1) of the Act, 21 U.S.C. 360bbb-3(b)(1). This assay is an in vitro diagnostic nucleic acid amplification test for the qualitative detection of SARS-CoV-2 from nasopharyngeal specimens and has been validated for use at [facility]. Negative results do not preclude COVID-19 infections and should not be used as the sole basis for diagnosis, treatment, or other management decisions.     SERIAL TROPONIN, INITIAL ([facility]) - Abnormal    Troponin T, High Sensitivity 57 (*)    URINALYSIS WITH REFLEX CULTURE AND MICROSCOPIC - Abnormal    Color, Urine Colorless (*)     Appearance, Urine Clear      Specific Gravity, Urine 1.010      pH, Urine 6.5      Protein, Urine NEGATIVE      Glucose, Urine Normal      Blood, Urine NEGATIVE      Ketones, Urine NEGATIVE      Bilirubin, Urine NEGATIVE      Urobilinogen, Urine Normal       Nitrite, Urine NEGATIVE      Leukocyte Esterase, Urine NEGATIVE     BLOOD CULTURE - Normal    Blood Culture Loaded on Instrument - Culture in progress     BLOOD CULTURE - Normal    Blood Culture Loaded on Instrument - Culture in progress     BLOOD GAS LACTIC ACID, VENOUS - Normal    POCT Lactate, Venous 0.8     TROPONIN T SERIES, HIGH SENSITIVITY (0, 2 HR, 6 HR)    Narrative:     The following orders were created for panel order Troponin T Series, High Sensitivity (0, 2HR, 6HR).  Procedure                               Abnormality         Status                     ---------                               -----------         ------                     Serial Troponin, Initial...[[id number]]  Abnormal            Final result               Serial Troponin, 2 Hour ...[[id number]]                                                   Please view results for these tests on the individual orders.   URINALYSIS WITH REFLEX CULTURE AND MICROSCOPIC    Narrative:     The following orders were created for panel order Urinalysis with Reflex Culture and Microscopic.  Procedure                               Abnormality         Status                     ---------                               -----------         ------                     Urinalysis with Reflex C...[[id number]]  Abnormal            Final result               Extra Urine Gray Tube[[id number]]                            In process                   Please view results for these tests on the individual orders.   EXTRA URINE GRAY TUBE   SERIAL TROPONIN,  2 HOUR ([facility])       As interpreted by me, the above displayed labs are abnormal. All other labs obtained during this visit were within normal range or not returned as of this dictation.      EKG Interpretation    Per attending note        Lower extremity venous duplex right   Final Result   No sonographic evidence for deep vein thrombosis within the evaluated   veins of the right lower extremity.        MACRO:   None         Signed by: Ronnie Shannon 7/30/2024 11:42 PM   Dictation workstation:   [id number]      XR chest 2 views   Final Result   1.  Enlarged cardiac silhouette. Moderate pulmonary edema                  MACRO:   None        Signed by: Dayron Jerome 7/30/2024 10:13 PM   Dictation workstation:   [id number]              Lower extremity venous duplex right   Final Result   No sonographic evidence for deep vein thrombosis within the evaluated   veins of the right lower extremity.        MACRO:   None        Signed by: Ronnie Shannon 7/30/2024 11:42 PM   Dictation workstation:   [id number]      XR chest 2 views   Final Result   1.  Enlarged cardiac silhouette. Moderate pulmonary edema                  MACRO:   None        Signed by: Dayron Jerome 7/30/2024 10:13 PM   Dictation workstation:   [id number]              ------------------------------ ED COURSE/MEDICAL DECISION MAKING----------------------  Medical Decision Making:   Exam: A medically appropriate exam performed, outlined above, given the known history and presentation.    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), Hyperlipidemia, Hypertension, Morbid (severe) obesity due to excess calories (Multi), Obstructive sleep apnea (adult) (pediatric) (06/27/2018), Other conditions influencing health status, Personal history of other diseases of the circulatory system, Personal history of other diseases of the digestive system (02/07/2018), Personal history of other  diseases of the digestive system (09/05/2017), Personal history of other endocrine, nutritional and metabolic disease (04/20/2020), Personal history of other endocrine, nutritional and metabolic disease, Personal history of other endocrine, nutritional and metabolic disease, Personal history of other mental and behavioral disorders (05/21/2018), and Unspecified asthma, uncomplicated ([facility]) (10/05/2022).       CC/HPI Summary, Social Determinants of health, Records Reviewed, DDx, testing done/not done, ED Course, Reassessment, disposition considerations/shared decision making with patient, consults, disposition:   Presents to the emergency department with complaints of right leg swelling hot and cold chills cough congestion  Plan  Chest x-ray1. Enlarged cardiac silhouette. Moderate pulmonary edema   EKG  Venous Doppler right lower extremity No sonographic evidence for deep vein thrombosis within the evaluated  veins of the right lower extremity.No sonographic evidence for deep vein thrombosis within the evaluated  veins of the right lower extremity.  Blood cultures  Lactic acid  CBC  CMP  proBNP  Troponin  COVID  Urine    Medical Decision Making/Differential Diagnosis:  Differentials include but not limited to medication reaction versus congestive heart failure versus DVT versus infectious process  Review  Glucose 120  Electrolytes within normal limits  BUN 38  Creatinine 2.7 mildly elevated from baseline  LFTs within normal limits  Troponin 57 2 weeks ago 41  proBNP 5618 improved from 2 weeks ago  COVID-positive  Leukocytes 6.9  Hemoglobin 10.7  Urine negative for leukocytes and blood  Lactic acid 0.8  Patient presents emergency department complaints of right leg swelling.  Chills cough nasal congestion.  She is COVID-positive.  No signs of respiratory distress.  She is not hypoxic tachypneic or febrile.  She does not appear to be septic.  Electrolytes within normal limits she is not hypoglycemic.  Creatinine and  BUN are elevated with history of the same.  LFTs are within normal limits.  Troponin elevated likely secondary to her renal failure.  Baseline of 41.  No complaints of chest pain.  proBNP is elevated but improved from her baseline.  Patient is on Lasix at home.  Chest x-ray showed enlarged cardiac silhouette with moderate pulmonary edema does not appear to be in acute respiratory distress.she is anemic with no signs of active bleeding improved from baseline urine is not consistent with UTI lactic acid negative Doppler study of the right lower extremity no sonographic evidence of DVT.  Patient reports improvement of symptoms.  She is in no acute distress at discharge per attending note.  Impression COVID-19 right lower leg edema.  Advised on supportive care measures at home close follow-up with primary care physician.  Return with any worsening symptoms or concerns  Patient seen evaluated with attending physician Dr. Aquino  PROCEDURES  Unless otherwise noted below, none      CONSULTS:   None      Diagnoses as of 07/31/24 0208   COVID-19   Leg edema, right         This patient has remained hemodynamically stable during their ED course.      Critical Care: none       Counseling:  The emergency provider has spoken with the patient family and discussed today’s results, in addition to providing specific details for the plan of care and counseling regarding the diagnosis and prognosis.  Questions are answered at this time and they are agreeable with the plan.         --------------------------------- IMPRESSION AND DISPOSITION ---------------------------------    IMPRESSION  1. COVID-19    2. Leg edema, right        DISPOSITION  Disposition: Discharge home  Patient condition is stable improved        NOTE: This report was transcribed using voice recognition software. Every effort was made to ensure accuracy; however, inadvertent computerized transcription errors may be present      Dolores Parmar, DILCIA-SPENCER  07/31/24  0203

## 2024-08-04 LAB
BACTERIA BLD CULT: NORMAL
BACTERIA BLD CULT: NORMAL

## 2024-08-19 ENCOUNTER — OFFICE VISIT (OUTPATIENT)
Dept: CARDIOLOGY | Facility: CLINIC | Age: 74
End: 2024-08-19
Payer: MEDICARE

## 2024-08-19 ENCOUNTER — LAB (OUTPATIENT)
Dept: LAB | Facility: LAB | Age: 74
End: 2024-08-19
Payer: MEDICARE

## 2024-08-19 VITALS
SYSTOLIC BLOOD PRESSURE: 133 MMHG | OXYGEN SATURATION: 97 % | HEIGHT: 64 IN | DIASTOLIC BLOOD PRESSURE: 69 MMHG | HEART RATE: 74 BPM | WEIGHT: 288 LBS | BODY MASS INDEX: 49.17 KG/M2

## 2024-08-19 DIAGNOSIS — E11.22 TYPE 2 DIABETES MELLITUS WITH CHRONIC KIDNEY DISEASE, WITHOUT LONG-TERM CURRENT USE OF INSULIN, UNSPECIFIED CKD STAGE (MULTI): ICD-10-CM

## 2024-08-19 DIAGNOSIS — I10 HTN (HYPERTENSION), BENIGN: ICD-10-CM

## 2024-08-19 DIAGNOSIS — I50.32 CHRONIC DIASTOLIC HEART FAILURE (MULTI): Primary | ICD-10-CM

## 2024-08-19 DIAGNOSIS — M81.0 AGE-RELATED OSTEOPOROSIS WITHOUT CURRENT PATHOLOGICAL FRACTURE: ICD-10-CM

## 2024-08-19 DIAGNOSIS — M80.00XS OSTEOPOROSIS WITH CURRENT PATHOLOGICAL FRACTURE, UNSPECIFIED OSTEOPOROSIS TYPE, SEQUELA: ICD-10-CM

## 2024-08-19 LAB
25(OH)D3 SERPL-MCNC: 46 NG/ML (ref 30–100)
ANION GAP SERPL CALC-SCNC: 13 MMOL/L (ref 10–20)
BUN SERPL-MCNC: 32 MG/DL (ref 6–23)
CALCIUM SERPL-MCNC: 8.6 MG/DL (ref 8.6–10.6)
CHLORIDE SERPL-SCNC: 107 MMOL/L (ref 98–107)
CO2 SERPL-SCNC: 25 MMOL/L (ref 21–32)
CREAT SERPL-MCNC: 1.86 MG/DL (ref 0.5–1.05)
EGFRCR SERPLBLD CKD-EPI 2021: 28 ML/MIN/1.73M*2
GLUCOSE SERPL-MCNC: 106 MG/DL (ref 74–99)
POTASSIUM SERPL-SCNC: 4.5 MMOL/L (ref 3.5–5.3)
SODIUM SERPL-SCNC: 140 MMOL/L (ref 136–145)

## 2024-08-19 PROCEDURE — 1160F RVW MEDS BY RX/DR IN RCRD: CPT | Performed by: NURSE PRACTITIONER

## 2024-08-19 PROCEDURE — 3075F SYST BP GE 130 - 139MM HG: CPT | Performed by: NURSE PRACTITIONER

## 2024-08-19 PROCEDURE — 82043 UR ALBUMIN QUANTITATIVE: CPT

## 2024-08-19 PROCEDURE — 1126F AMNT PAIN NOTED NONE PRSNT: CPT | Performed by: NURSE PRACTITIONER

## 2024-08-19 PROCEDURE — 3044F HG A1C LEVEL LT 7.0%: CPT | Performed by: NURSE PRACTITIONER

## 2024-08-19 PROCEDURE — 3078F DIAST BP <80 MM HG: CPT | Performed by: NURSE PRACTITIONER

## 2024-08-19 PROCEDURE — 99214 OFFICE O/P EST MOD 30 MIN: CPT | Performed by: NURSE PRACTITIONER

## 2024-08-19 PROCEDURE — 1036F TOBACCO NON-USER: CPT | Performed by: NURSE PRACTITIONER

## 2024-08-19 PROCEDURE — 36415 COLL VENOUS BLD VENIPUNCTURE: CPT

## 2024-08-19 PROCEDURE — 82570 ASSAY OF URINE CREATININE: CPT

## 2024-08-19 PROCEDURE — 80048 BASIC METABOLIC PNL TOTAL CA: CPT

## 2024-08-19 PROCEDURE — 82306 VITAMIN D 25 HYDROXY: CPT

## 2024-08-19 PROCEDURE — 1159F MED LIST DOCD IN RCRD: CPT | Performed by: NURSE PRACTITIONER

## 2024-08-19 RX ORDER — HYDRALAZINE HYDROCHLORIDE 25 MG/1
25 TABLET, FILM COATED ORAL 2 TIMES DAILY
Qty: 180 TABLET | Refills: 3 | Status: SHIPPED | OUTPATIENT
Start: 2024-08-19 | End: 2025-08-14

## 2024-08-19 ASSESSMENT — PAIN SCALES - GENERAL: PAINLEVEL: 0-NO PAIN

## 2024-08-19 NOTE — PROGRESS NOTES
Subjective   Analia Murray is a 74 y.o. female.    Chief Complaint:  Follow-up, Hypertension, and Heart Failure    Mrs. Murray returns for a routine 3 week follow up. She is seen in collaboration with Dr. Ackerman. She has been feeling well from a cardiac standpoint. Her biggest complaint today is a lingering cough, though she recently had COVID. She did not increase her lasix after her last visit, though her weight appears to be stable on 20 mg daily. She unfortunately remains somewhat sedentary and limited in her mobility due to arthritis. She offers no other cardiovascular complaints or concerns today. She denies any complaints of chest pain, shortness of breath, lightheadedness, dizziness, palpitations, syncope, orthopnea, paroxysmal nocturnal dyspnea, lower extremity swelling or bleeding concerns.        Review of Systems   All other systems reviewed and are negative.      Objective   Physical Exam  Constitutional:       Appearance: Healthy appearance. In no distress  Pulmonary:      Effort: Pulmonary effort is normal.      Breath sounds: Normal breath sounds.   Cardiovascular:      Normal rate. Regular rhythm. Normal S1. Normal S2.       Murmurs: There is soft systolic murmur.      Carotids: right carotid pulse +2, no bruit heard over the right carotid. left carotid pulse +2, no bruit heard over the left carotid.  Edema:     Peripheral edema absent.   Abdominal:      Palpations: Abdomen is soft.   Musculoskeletal:       Cervical back: Normal range of motion.   Skin:     General: Skin is warm and dry. Normal color and pigmentation   Neurological:      Mental Status: Alert and oriented to person, place and time.   Psychiatric:     Mood and Affect: appropriate mood and appropriate affect.       Lab Review:   Lab Results   Component Value Date     07/30/2024    K 4.6 07/30/2024    CL 98 07/30/2024    CO2 25 07/30/2024    BUN 38 (H) 07/30/2024    CREATININE 2.70 (H) 07/30/2024    GLUCOSE 120 (H)  07/30/2024    CALCIUM 8.9 07/30/2024     Lab Results   Component Value Date    WBC 6.9 07/30/2024    HGB 10.7 (L) 07/30/2024    HCT 35.5 (L) 07/30/2024    MCV 77 (L) 07/30/2024     07/30/2024     Lab Results   Component Value Date    CHOL 128 06/21/2023    TRIG 72 06/21/2023    HDL 45.1 06/21/2023       Assessment/Plan   Mrs. Murray is a pleasant 73-year-old  female with a past medical history significant for hypertension, hyperlipidemia, CKD, diabetes, HOLDEN compliant with CPAP, obesity, arthritis, chronic diastolic HF and medically managed coronary artery disease by heart catheterization in 2015. Echocardiogram 6/2024 showed an EF of 45-50% with moderate MR, moderate-severe TR and severe pulmonary HTN. She presents today for routine follow up fairly stable from a cardiac standpoint. Her VS remain stable. She remains on appropriate antiplatelet and lipid lowering therapy with her LDL at goal. She remains euvolemic on exam. I will have her continue all medications unchanged. I will have her obtain a BMP today to recheck her kidney function. She has an appointment with a nephrologist though not until December. She will follow up with us in clinic in 6 weeks. She knows to call for any concerns.

## 2024-08-20 ENCOUNTER — APPOINTMENT (OUTPATIENT)
Dept: OPHTHALMOLOGY | Facility: CLINIC | Age: 74
End: 2024-08-20
Payer: MEDICARE

## 2024-08-20 DIAGNOSIS — H25.13 NUCLEAR SCLEROTIC CATARACT OF BOTH EYES: Primary | ICD-10-CM

## 2024-08-20 DIAGNOSIS — H52.4 PRESBYOPIA: ICD-10-CM

## 2024-08-20 DIAGNOSIS — H52.223 REGULAR ASTIGMATISM OF BOTH EYES: ICD-10-CM

## 2024-08-20 DIAGNOSIS — H52.03 HYPERMETROPIA OF BOTH EYES: ICD-10-CM

## 2024-08-20 DIAGNOSIS — E11.9 TYPE 2 DIABETES MELLITUS WITHOUT COMPLICATION, WITHOUT LONG-TERM CURRENT USE OF INSULIN (MULTI): ICD-10-CM

## 2024-08-20 LAB
CREAT UR-MCNC: 174.8 MG/DL (ref 20–320)
MICROALBUMIN UR-MCNC: 166.2 MG/L
MICROALBUMIN/CREAT UR: 95.1 UG/MG CREAT

## 2024-08-20 PROCEDURE — 92015 DETERMINE REFRACTIVE STATE: CPT | Performed by: OPTOMETRIST

## 2024-08-20 PROCEDURE — 99214 OFFICE O/P EST MOD 30 MIN: CPT | Performed by: OPTOMETRIST

## 2024-08-20 ASSESSMENT — REFRACTION_WEARINGRX
OD_SPHERE: +1.25
OS_CYLINDER: SPHER
OS_SPHERE: +2.25
OD_AXIS: 100
OS_ADD: +2.75
OD_ADD: +2.75
OD_CYLINDER: -0.50

## 2024-08-20 ASSESSMENT — CONF VISUAL FIELD
OS_INFERIOR_TEMPORAL_RESTRICTION: 0
METHOD: COUNTING FINGERS
OS_SUPERIOR_NASAL_RESTRICTION: 0
OD_SUPERIOR_TEMPORAL_RESTRICTION: 0
OS_NORMAL: 1
OD_NORMAL: 1
OS_INFERIOR_NASAL_RESTRICTION: 0
OD_INFERIOR_TEMPORAL_RESTRICTION: 0
OS_SUPERIOR_TEMPORAL_RESTRICTION: 0
OD_SUPERIOR_NASAL_RESTRICTION: 0
OD_INFERIOR_NASAL_RESTRICTION: 0

## 2024-08-20 ASSESSMENT — ENCOUNTER SYMPTOMS
PSYCHIATRIC NEGATIVE: 0
CARDIOVASCULAR NEGATIVE: 0
ENDOCRINE NEGATIVE: 0
GASTROINTESTINAL NEGATIVE: 0
HEMATOLOGIC/LYMPHATIC NEGATIVE: 0
CONSTITUTIONAL NEGATIVE: 0
EYES NEGATIVE: 1
MUSCULOSKELETAL NEGATIVE: 0
RESPIRATORY NEGATIVE: 0
ALLERGIC/IMMUNOLOGIC NEGATIVE: 0
NEUROLOGICAL NEGATIVE: 0

## 2024-08-20 ASSESSMENT — VISUAL ACUITY
OS_CC+: -1
METHOD: SNELLEN - LINEAR
CORRECTION_TYPE: GLASSES
OS_CC: 20/30
OD_CC: 20/20

## 2024-08-20 ASSESSMENT — REFRACTION_MANIFEST
OD_SPHERE: +1.50
OS_ADD: +2.75
OD_CYLINDER: -0.50
OD_AXIS: 100
OD_ADD: +2.75
OS_AXIS: 102
OS_SPHERE: +3.25
OS_CYLINDER: -0.50

## 2024-08-20 ASSESSMENT — REFRACTION
OD_CYLINDER: +0.50
OD_AXIS: 010
OS_ADD: +2.75
OS_CYLINDER: +0.50
OD_ADD: +2.75
OS_AXIS: 012
OS_SPHERE: +2.25
OD_SPHERE: +1.00

## 2024-08-20 ASSESSMENT — TONOMETRY
OD_IOP_MMHG: 11
IOP_METHOD: TONOPEN
OS_IOP_MMHG: 10

## 2024-08-20 ASSESSMENT — EXTERNAL EXAM - RIGHT EYE: OD_EXAM: NORMAL

## 2024-08-20 ASSESSMENT — SLIT LAMP EXAM - LIDS
COMMENTS: CLEAN AND CLEAR
COMMENTS: CLEAN AND CLEAR

## 2024-08-20 ASSESSMENT — CUP TO DISC RATIO
OD_RATIO: 0.3
OS_RATIO: 0.3

## 2024-08-20 ASSESSMENT — EXTERNAL EXAM - LEFT EYE: OS_EXAM: NORMAL

## 2024-08-25 DIAGNOSIS — K21.9 GASTROESOPHAGEAL REFLUX DISEASE, UNSPECIFIED WHETHER ESOPHAGITIS PRESENT: ICD-10-CM

## 2024-08-25 DIAGNOSIS — I10 ESSENTIAL HYPERTENSION: ICD-10-CM

## 2024-08-26 RX ORDER — AMLODIPINE BESYLATE 10 MG/1
10 TABLET ORAL DAILY
Qty: 90 TABLET | Refills: 3 | Status: SHIPPED | OUTPATIENT
Start: 2024-08-26

## 2024-08-26 RX ORDER — OMEPRAZOLE 40 MG/1
40 CAPSULE, DELAYED RELEASE ORAL
Qty: 90 CAPSULE | Refills: 3 | Status: SHIPPED | OUTPATIENT
Start: 2024-08-26

## 2024-08-29 DIAGNOSIS — E11.21 DIABETIC NEPHROPATHY ASSOCIATED WITH TYPE 2 DIABETES MELLITUS (MULTI): Primary | ICD-10-CM

## 2024-08-30 PROCEDURE — RXMED WILLOW AMBULATORY MEDICATION CHARGE

## 2024-09-03 ENCOUNTER — PHARMACY VISIT (OUTPATIENT)
Dept: PHARMACY | Facility: CLINIC | Age: 74
End: 2024-09-03
Payer: COMMERCIAL

## 2024-09-20 ENCOUNTER — PROCEDURE VISIT (OUTPATIENT)
Dept: SLEEP MEDICINE | Facility: HOSPITAL | Age: 74
End: 2024-09-20
Payer: MEDICARE

## 2024-09-20 DIAGNOSIS — G47.33 OSA ON CPAP: ICD-10-CM

## 2024-09-20 PROCEDURE — 95806 SLEEP STUDY UNATT&RESP EFFT: CPT | Performed by: INTERNAL MEDICINE

## 2024-09-26 DIAGNOSIS — G47.30 SEVERE SLEEP APNEA: Primary | ICD-10-CM

## 2024-09-29 ENCOUNTER — OFFICE VISIT (OUTPATIENT)
Dept: URGENT CARE | Age: 74
End: 2024-09-29
Payer: MEDICARE

## 2024-09-29 VITALS
HEIGHT: 63 IN | OXYGEN SATURATION: 93 % | BODY MASS INDEX: 49.79 KG/M2 | RESPIRATION RATE: 18 BRPM | SYSTOLIC BLOOD PRESSURE: 168 MMHG | TEMPERATURE: 97.2 F | DIASTOLIC BLOOD PRESSURE: 91 MMHG | WEIGHT: 281 LBS | HEART RATE: 69 BPM

## 2024-09-29 DIAGNOSIS — F41.9 ANXIETY: ICD-10-CM

## 2024-09-29 DIAGNOSIS — J45.20 MILD INTERMITTENT ASTHMA WITHOUT COMPLICATION (HHS-HCC): ICD-10-CM

## 2024-09-29 DIAGNOSIS — R05.3 POST-COVID CHRONIC COUGH: Primary | ICD-10-CM

## 2024-09-29 DIAGNOSIS — U09.9 POST-COVID CHRONIC COUGH: Primary | ICD-10-CM

## 2024-09-29 RX ORDER — PREDNISONE 20 MG/1
40 TABLET ORAL DAILY
Qty: 10 TABLET | Refills: 0 | Status: SHIPPED | OUTPATIENT
Start: 2024-09-29 | End: 2024-10-04

## 2024-09-29 RX ORDER — BENZONATATE 200 MG/1
200 CAPSULE ORAL 3 TIMES DAILY PRN
Qty: 30 CAPSULE | Refills: 0 | Status: SHIPPED | OUTPATIENT
Start: 2024-09-29 | End: 2024-10-09

## 2024-09-29 RX ORDER — INHALER, ASSIST DEVICES
SPACER (EA) MISCELLANEOUS
Qty: 1 EACH | Refills: 0 | Status: SHIPPED | OUTPATIENT
Start: 2024-09-29

## 2024-09-29 RX ORDER — DOXYCYCLINE 100 MG/1
100 CAPSULE ORAL 2 TIMES DAILY
Qty: 14 CAPSULE | Refills: 0 | Status: SHIPPED | OUTPATIENT
Start: 2024-09-29 | End: 2024-10-06

## 2024-09-29 NOTE — PATIENT INSTRUCTIONS
You have maxillary and frontal sinusitis. This can be a bacterial or viral infection. Based on your history and the clinical exam I am treating this as a bacterial infection.  Please increase your oral fluids for the next 7-10 days  Please take doxycycline as prescribed. Do not take this medication at the same meal that you are consuming dairy products or nutritional supplements as it will reduce absorption of the antibiotic. However try to take this medicine with at least an 8 ounce glass of water and some food to reduce the chances of stomach upset. I recommend using probiotics while taking the antibiotic and for a week to 10 days after you have completed this medication. Please ask the pharmacist for advice on appropriate product for this purpose.    May use Mucinex as per label instructions for nasal congestion  You may use nasal saline drops for relief of congestion as needed  You may mix 1 teaspoon of table salt with 8 ounces of warm water to rinse and gargle your sore throat.  You may do this repeatedly for up to 15 minutes if it seems to relieve your discomfort.  Do not swallow this liquid  May take Tylenol (acetaminophen) 325 mg, 2 tablets by mouth every 4-6 hours as needed for fever or discomfort. May take Motrin or Advil (ibuprofen) 200 mg, 2 tablets by mouth every 8 hours as needed for fever   If no improvement in 5-7 days please follow-up with your primary care provider  If fever greater than 102 degrees Fahrenheit, chills, nausea, vomiting, increased redness, tenderness, pain over for head or cheek bone areas, bloody nasal discharge, increased difficulty breathing, difficulty swallowing, increased wheezing, shortness of breath please go immediately to emergency room for further evaluation    You have an upper respiratory infection with cough. You have bronchitis with bronchospasm  Please increase oral fluids for the next 7-10 days  Please use albuterol inhaler ii puffs via spacer. Take 5-6 easy  breaths, rest 2 minutes, repeat  Please take prednisone as a single dose early in the day with food  May use Tessalon as needed for cough  Please follow up with PCP as scheduled  If fever, chills, nausea, vomiting, increased shortness of breath, chest tightness go to nearest ED. call 911 for assistance  This note was generated by voice recognition software. Minor transcription/grammatical errors may be present. Please call for clarification.

## 2024-09-30 ENCOUNTER — APPOINTMENT (OUTPATIENT)
Dept: CARDIOLOGY | Facility: CLINIC | Age: 74
End: 2024-09-30
Payer: MEDICARE

## 2024-10-10 ASSESSMENT — ENCOUNTER SYMPTOMS
CONSTIPATION: 0
VOMITING: 0
SORE THROAT: 0
CHILLS: 1
CHEST TIGHTNESS: 1
SHORTNESS OF BREATH: 1
COUGH: 1
NAUSEA: 0
HEADACHES: 0
DYSURIA: 0
FREQUENCY: 0
ABDOMINAL PAIN: 0
DIARRHEA: 0
FEVER: 0
PALPITATIONS: 1
RHINORRHEA: 1
SINUS PRESSURE: 0
WHEEZING: 1

## 2024-10-10 NOTE — PROGRESS NOTES
Subjective   Patient ID: Analia Murray is a 74 y.o. female.    HPI: 74-year-old female presents with family members with complaint of difficulty sleeping increased anxiety productive cough since having COVID few days ago.      History provided by:  Patient and relative   used: No    Cough  Associated symptoms include chills, rhinorrhea, shortness of breath and wheezing. Pertinent negatives include no ear pain, fever, headaches or sore throat.       The following portions of the chart were reviewed this encounter and updated as appropriate:  Tobacco  Allergies  Meds  Problems  Med Hx  Surg Hx  Fam Hx         Review of Systems   Constitutional:  Positive for chills. Negative for fever.   HENT:  Positive for congestion and rhinorrhea. Negative for ear pain, sinus pressure and sore throat.    Respiratory:  Positive for cough, chest tightness, shortness of breath and wheezing.    Cardiovascular:  Positive for palpitations.   Gastrointestinal:  Negative for abdominal pain, constipation, diarrhea, nausea and vomiting.   Genitourinary:  Negative for dysuria and frequency.   Neurological:  Negative for headaches.     Objective   Vital signs are reviewed.  Blood pressure elevated at 168/91.  Pulse oximetry 93% on room air.  Alert and oriented x3 with normal mood and affect  Patient is well nourished, well-developed, alert and in no acute distress    External eyes, orbits, conjunctiva and eyelids are normal in appearance  Pupils are equal, round, reactive to light and accommodation, extraocular movements intact    External ears appear normal  External canals are normal in appearance  Right tympanic membrane is intact and pale gray in appearance  Left tympanic membrane is intact and pale gray in appearance  There is no middle ear effusion noted on the right  There is no middle ear effusion noted on the left  External appearance of the nose is normal  Nasal mucosa, septum, turbinates are  moderately swollen and moderately reddened in appearance  There is thick yellow nasal discharge in both nares    Oral mucosa is uniformly pink and moist  Palate is pink, symmetric and intact  Tongue is moist, mobile and midline  Posterior pharynx moderately erythematous with no concretions or exudates present  No cervical lymphadenopathy palpated    Heart has regular rate and rhythm. No murmurs, rubs or gallops are auscultated at this exam.    Respiratory rate rhythm and effort are normal. Breath sounds bilaterally are coarse on auscultation with bibasilar crackles.  No rhonchi or friction rub.  Inspiratory and expiratory wheezing.  Airway exchange sounds restricted.    Abdomen: Normal bowel sounds on auscultation. Soft, nontender without rebound or rigidity on palpation          Procedures    Assessment/Plan   Diagnoses and all orders for this visit:  Post-COVID chronic cough  -     predniSONE (Deltasone) 20 mg tablet; Take 2 tablets (40 mg) by mouth once daily for 5 days.  -     inhalational spacing device (Aerochamber MV) inhaler; Use as instructed  -     doxycycline (Vibramycin) 100 mg capsule; Take 1 capsule (100 mg) by mouth 2 times a day for 7 days. Take with at least 8 ounces (large glass) of water, do not lie down for 30 minutes after  -     benzonatate (Tessalon) 200 mg capsule; Take 1 capsule (200 mg) by mouth 3 times a day as needed for cough for up to 10 days. Do not crush or chew.  Mild intermittent asthma without complication ([facility])  -     predniSONE (Deltasone) 20 mg tablet; Take 2 tablets (40 mg) by mouth once daily for 5 days.  -     inhalational spacing device (Aerochamber MV) inhaler; Use as instructed  -     doxycycline (Vibramycin) 100 mg capsule; Take 1 capsule (100 mg) by mouth 2 times a day for 7 days. Take with at least 8 ounces (large glass) of water, do not lie down for 30 minutes after  -     benzonatate (Tessalon) 200 mg capsule; Take 1 capsule (200 mg) by mouth 3 times a day as  needed for cough for up to 10 days. Do not crush or chew.  Anxiety  -     predniSONE (Deltasone) 20 mg tablet; Take 2 tablets (40 mg) by mouth once daily for 5 days.  -     inhalational spacing device (Aerochamber MV) inhaler; Use as instructed  -     doxycycline (Vibramycin) 100 mg capsule; Take 1 capsule (100 mg) by mouth 2 times a day for 7 days. Take with at least 8 ounces (large glass) of water, do not lie down for 30 minutes after  -     benzonatate (Tessalon) 200 mg capsule; Take 1 capsule (200 mg) by mouth 3 times a day as needed for cough for up to 10 days. Do not crush or chew.    Patient disposition: Home

## 2024-10-23 ENCOUNTER — OFFICE VISIT (OUTPATIENT)
Dept: PULMONOLOGY | Facility: HOSPITAL | Age: 74
End: 2024-10-23
Payer: MEDICARE

## 2024-10-23 VITALS
DIASTOLIC BLOOD PRESSURE: 76 MMHG | TEMPERATURE: 97.9 F | BODY MASS INDEX: 51.55 KG/M2 | SYSTOLIC BLOOD PRESSURE: 121 MMHG | HEART RATE: 73 BPM | WEIGHT: 291 LBS | OXYGEN SATURATION: 94 %

## 2024-10-23 DIAGNOSIS — R06.00 DYSPNEA, UNSPECIFIED TYPE: Primary | ICD-10-CM

## 2024-10-23 PROCEDURE — 99204 OFFICE O/P NEW MOD 45 MIN: CPT | Performed by: STUDENT IN AN ORGANIZED HEALTH CARE EDUCATION/TRAINING PROGRAM

## 2024-10-23 PROCEDURE — 1159F MED LIST DOCD IN RCRD: CPT | Performed by: STUDENT IN AN ORGANIZED HEALTH CARE EDUCATION/TRAINING PROGRAM

## 2024-10-23 PROCEDURE — 3044F HG A1C LEVEL LT 7.0%: CPT | Performed by: STUDENT IN AN ORGANIZED HEALTH CARE EDUCATION/TRAINING PROGRAM

## 2024-10-23 PROCEDURE — 1126F AMNT PAIN NOTED NONE PRSNT: CPT | Performed by: STUDENT IN AN ORGANIZED HEALTH CARE EDUCATION/TRAINING PROGRAM

## 2024-10-23 PROCEDURE — 3078F DIAST BP <80 MM HG: CPT | Performed by: STUDENT IN AN ORGANIZED HEALTH CARE EDUCATION/TRAINING PROGRAM

## 2024-10-23 PROCEDURE — 3060F POS MICROALBUMINURIA REV: CPT | Performed by: STUDENT IN AN ORGANIZED HEALTH CARE EDUCATION/TRAINING PROGRAM

## 2024-10-23 PROCEDURE — 99214 OFFICE O/P EST MOD 30 MIN: CPT | Mod: GC | Performed by: STUDENT IN AN ORGANIZED HEALTH CARE EDUCATION/TRAINING PROGRAM

## 2024-10-23 PROCEDURE — 1160F RVW MEDS BY RX/DR IN RCRD: CPT | Performed by: STUDENT IN AN ORGANIZED HEALTH CARE EDUCATION/TRAINING PROGRAM

## 2024-10-23 PROCEDURE — 3074F SYST BP LT 130 MM HG: CPT | Performed by: STUDENT IN AN ORGANIZED HEALTH CARE EDUCATION/TRAINING PROGRAM

## 2024-10-23 RX ORDER — FLUTICASONE PROPIONATE AND SALMETEROL 250; 50 UG/1; UG/1
1 POWDER RESPIRATORY (INHALATION)
Qty: 60 EACH | Refills: 3 | Status: SHIPPED | OUTPATIENT
Start: 2024-10-23

## 2024-10-23 ASSESSMENT — LIFESTYLE VARIABLES
SKIP TO QUESTIONS 9-10: 1
AUDIT-C TOTAL SCORE: 0
HOW MANY STANDARD DRINKS CONTAINING ALCOHOL DO YOU HAVE ON A TYPICAL DAY: PATIENT DOES NOT DRINK
HOW OFTEN DO YOU HAVE SIX OR MORE DRINKS ON ONE OCCASION: NEVER
HOW OFTEN DO YOU HAVE A DRINK CONTAINING ALCOHOL: NEVER

## 2024-10-23 ASSESSMENT — PAIN SCALES - GENERAL: PAINLEVEL_OUTOF10: 0-NO PAIN

## 2024-10-23 NOTE — PATIENT INSTRUCTIONS
It was a pleasure to see you today in clinic!     Today we discussed your shortness of breath especially with exertion, and your cough that is slowly improving but still bothersome to you.    The plan going forward is as follows:   We can change your regimen to include Advair, please then discontinue the Trelegy.  Please continue to also use your Albuterol rescue inhaler as needed.    Perform the following diagnostic tests:   - You will benefit from repeat pulmonary function testing, that will be ordered for you, and they will call to schedule this.  - You will also benefit from CT imaging of your lungs.    Follow-up with other practitioners and referrals given:  - We will discuss with heart doctors to consider the findings of your heart ultrasound in June and discuss best next steps  - Please follow up with your sleep appointment in 1 month so you can restart use of your CPAP.    Please follow up with me in: 2-3 months    If you have any further questions feel free to call my office at [phone] (choose #2 to reach a pulmonary nurse) and please allow 1-2 business days for a response.     If you have any scheduling needs feel free to call [phone] (for breathing or walking test), [phone] (for EKG's, echocardiograms or cardiopulmonary stress test), and [phone] (for radiology tests such as CT scan, MRIs and nuclear medicine tests).    Fadia Li  Pulmonary and Critical Care Fellow     [id number] [id number]

## 2024-10-28 NOTE — PROGRESS NOTES
Department of Medicine I Division of Pulmonary, Critical Care, and Sleep Medicine   [address] [place] [address] [address] [place] [address]  Phone: [phone]  Fax: [phone]    History of Present Illness   Analia Murray is a 74 y.o. female with PMHx significant for HTN, HFrEF, HLD, HOLDEN, previously diagnosed asthma, chronic sinusitis and cough, who is presenting to pulmonary clinic after being seen in urgent care for progressive dyspnea on exertion. She feels extremely short of breath especially in the evening, and notes that the feeling is associated with pressure on her chest. She has a persistent cough with associated nasal drainage; the cough is productive of clear colored phlegm. The cough she feels has been worse than her normal cough since she got covid in July. She has been congested since, and most clinically disruptive to her is her shortness of breath.     She was previously seen by pulmonary in 2022, at which time she was being managed for her asthma (hx of childhood asthma) and chronic sinusitis, associated with chronic cough. She was started on Trelegy at that time, which she now reports she does not like as much as her Advait that she was previously on. She was continued on Montelukast, Flonase, and Azelastine.     She has been using her trelegy consistently, and is using her albuterol inhaler about 3-4 times a day.    Additionally and of note, her TTE is concerning for pulmonary hypertension, and previously her dyspnea on exertion have been attributed to her HFrEF and right heart findings thought to be in the setting of left heart failure. She is currently following with cardiology and on a medical regimen to manage this as well as previously noted CAD (cath 2015).    Lastly she is consistent with her CPAP for HOLDEN.    Past Medical History     Past Medical History:   Diagnosis Date    CHF (congestive heart failure)     Chronic kidney disease     Coronary artery disease     Cough,  unspecified 03/02/2017    Cough    Diverticulitis of large intestine with perforation and abscess without bleeding 10/21/2016    Colonic diverticular abscess    Fistula of intestine 09/05/2017    Enterocutaneous fistula    Hyperlipidemia     Hypertension     Morbid (severe) obesity due to excess calories (Multi)     Morbid obesity    Obstructive sleep apnea (adult) (pediatric) 06/27/2018    HOLDEN on CPAP    Other conditions influencing health status     DEXA Body Composition Study    Personal history of other diseases of the circulatory system     History of hypertension    Personal history of other diseases of the digestive system 02/07/2018    History of esophagitis    Personal history of other diseases of the digestive system 09/05/2017    History of diverticulitis    Personal history of other endocrine, nutritional and metabolic disease 04/20/2020    History of hyperlipidemia    Personal history of other endocrine, nutritional and metabolic disease     History of hyperlipidemia    Personal history of other endocrine, nutritional and metabolic disease     History of type 1 diabetes mellitus    Personal history of other mental and behavioral disorders 05/21/2018    History of anxiety    Unspecified asthma, uncomplicated ([facility]) 10/05/2022    Asthma         Immunizations     Immunization History   Administered Date(s) Administered    Flu vaccine, quadrivalent, high-dose, preservative free, age 65y+ (FLUZONE) 10/04/2022    Flu vaccine, trivalent, preservative free, HIGH-DOSE, age 65y+ (Fluzone) 10/10/2014, 10/17/2017, 10/22/2018    Influenza, Unspecified 09/04/2009, 10/04/2010, 10/04/2022    Influenza, injectable, quadrivalent 06/02/2021    Influenza, seasonal, injectable 09/04/2009, 10/04/2010, 10/10/2016    Moderna SARS-CoV-2 Vaccination 02/18/2021, 03/14/2021, 12/22/2021, 07/08/2022    Pfizer COVID-19 vaccine, bivalent, age 12 years and older (30 mcg/0.3 mL) 07/19/2023    Pneumococcal conjugate vaccine, 13-valent  (PREVNAR 13) 06/02/2021    Pneumococcal conjugate vaccine, 20-valent (PREVNAR 20) 07/17/2024    Pneumococcal polysaccharide vaccine, 23-valent, age 2 years and older (PNEUMOVAX 23) 05/07/2021       Medications and Allergies     Current Outpatient Medications   Medication Instructions    amLODIPine (NORVASC) 10 mg, oral, Daily    aspirin 81 mg EC tablet 1 tablet, Daily    cholecalciferol (Vitamin D-3) 25 MCG (1000 UT) capsule TAKE AS DIRECTED.    dulaglutide (TRULICITY) 0.75 mg, subcutaneous, Once Weekly    ezetimibe (ZETIA) 10 mg, oral, Daily    ferrous sulfate  mg ER tablet 1 tablet, Daily    fluticasone (Flonase) 50 mcg/actuation nasal spray 2 sprays, Each Nostril, 2 times daily    fluticasone propion-salmeteroL (Advair Diskus) 250-50 mcg/dose diskus inhaler 1 puff, inhalation, 2 times daily RT, Rinse mouth with water after use to reduce aftertaste and incidence of candidiasis. Do not swallow.    furosemide (LASIX) 20 mg, oral, Daily    hydrALAZINE (APRESOLINE) 25 mg, oral, 2 times daily    inhalational spacing device (Aerochamber MV) inhaler Use as instructed    metoprolol succinate XL (TOPROL-XL) 100 mg, oral, Daily    montelukast (Singulair) 10 mg tablet 1 tablet, Nightly    nitroglycerin (Nitrostat) 0.4 mg SL tablet Place under the tongue.    pravastatin (PRAVACHOL) 40 mg, oral, Nightly    Synthroid 200 mcg tablet TAKE 8 TABLETS BY MOUTH WEEKLY  AS DIRECTED    Ventolin HFA 90 mcg/actuation inhaler USE 2 INHALATIONS BY MOUTH EVERY 4 HOURS AS NEEDED FOR WHEEZING        Allergies:  Ciprofloxacin, Morphine, and Penicillins    Social History   She reports that she has never smoked. She has never been exposed to tobacco smoke. She has never used smokeless tobacco. She reports that she does not drink alcohol and does not use drugs.    Smoking History: none previously  Does report that her  of 52 years smokes, though he tries not to smoke in the house    Family History     Family History   Problem  "Relation Name Age of Onset    Coronary artery disease Mother      Heart attack Mother           Surgical History   She has a past surgical history that includes Colonoscopy (2013);  section, classic (2014); Small intestine surgery (2016); and Cardiac catheterization.    Physical Exam         2024     4:15 PM 2024     1:36 PM 2024     1:51 PM 2024     9:01 PM 2024     2:11 PM 2024     4:51 PM 10/23/2024     3:04 PM   Vitals   Systolic 132 131 117 154 133 168 121   Diastolic 77 78 62 60 69 91 76   Heart Rate 64 76 77 75 74 69 73   Temp 36.6 °C (97.9 °F) 36.7 °C (98.1 °F)  37 °C (98.6 °F)  36.2 °C (97.2 °F) 36.6 °C (97.9 °F)   Resp 20   18  18    Height (in)   1.626 m (5' 4\") 1.626 m (5' 4\") 1.626 m (5' 4\") 1.6 m (5' 3\")    Weight (lb)  284 287.6 278 288 281 291   BMI  50.31 kg/m2 49.37 kg/m2 47.72 kg/m2 49.44 kg/m2 49.78 kg/m2 51.55 kg/m2   BSA (m2)  2.39 m2 2.42 m2 2.39 m2 2.43 m2 2.38 m2 2.42 m2   Visit Report  Report Report  Report Report Report        Physical Exam  Constitutional: alert, obese, not in acute distress  HEENT: normocephalic atraumatic mmm  Cardiovascular: S1 and S2 present, normal rate and rhythm  Respiratory: clear to auscultation bilaterally, no apparent wheezing  Extremities: pedal edema L>R    Results   Pulmonary Function Tests:  PFTs   No obstruction  No bronchodilator response  No gas trapping, DLCO wnl    Chest Radiograph:  XR chest 2 views 2024  FINDINGS:  There is enlargement of the cardiac silhouette with moderate  pulmonary edema, similar to the prior. No dense consolidation or  effusion seen.  Impression  1.  Enlarged cardiac silhouette. Moderate pulmonary edema    Chest CT Scan:  No results found for this or any previous visit from the past 2000 days.     ECHO:  TTE 2024  CONCLUSIONS:   1. Left ventricular systolic function is mildly decreased with a 45-50% estimated ejection fraction.   2. The left atrium is moderate " to severely dilated.   3. Moderate mitral valve regurgitation.   4. Moderate to severe tricuspid regurgitation visualized.   5. Severely elevated right ventricular systolic pressure.   6. There is moderate pulmonic valve regurgitation.   7. Severely elevated pulmonary artery pressure.   8. The estimated PASP is 70 mmHg.    Labs:  Lab Results   Component Value Date    WBC 6.9 07/30/2024    HGB 10.7 (L) 07/30/2024    HCT 35.5 (L) 07/30/2024    MCV 77 (L) 07/30/2024     07/30/2024       Lab Results   Component Value Date    CREATININE 1.86 (H) 08/19/2024    BUN 32 (H) 08/19/2024     08/19/2024    K 4.5 08/19/2024     08/19/2024    CO2 25 08/19/2024        Lab Results   Component Value Date    TONO NEGATIVE 05/29/2018    RF 14 05/29/2018        Respiratory Allergy Panel:  Positive 4/2019    AEC:   Eosinophils Absolute (x10*3/uL)   Date Value   07/30/2024 0.10     Eosinophils % (%)   Date Value   07/30/2024 1.4       Assessment and Plan   Analia Murray is a 74 y.o. female with PMHx significant for HTN, HFrEF, HLD, HOLDEN, previously diagnosed asthma, chronic sinusitis and cough, who is presenting to pulmonary clinic after being seen in urgent care for persistent, progressive dyspnea on exertion. She was being previously managed by pulmonary for her asthma (hx of childhood asthma) and chronic sinusitis, associated with chronic cough, on a regimen of Advair --> Trelegy, prn Albuterol, Montelukast, Flonase, and Azelastine. Additionally and of note, her TTE is concerning for HFrEF and pulmonary hypertension, and previously her dyspnea on exertion have been attributed to her HFrEF and right heart findings thought to be in the setting of left heart failure. Her current presentation likely is contributed to by all above factors; will evaluate her pulmonary symptoms further with updated PFTs and imaging. Her cardiac and pHTN findings warrant further evaluation as well.    - ordered pulmonary function  testing  - will obtain CT chest wo contrast  - continue inhaler regimen: will return to Advair per patient preference (discontinue trelegy), and albuterol PRN  - continue montelukast and flonase  - we will discuss with cardiology the potential benefit of further evaluation of findings on TTE suggesting of pulmonary hypertension that would warrant RHC, LHC once euvolemic if appropriate given kidney function     Follow-up:  3 months    Patient was seen examined and discussed with attending physician, Dr. Bush.    Fadia Li MD

## 2024-11-20 ENCOUNTER — APPOINTMENT (OUTPATIENT)
Dept: RADIOLOGY | Facility: CLINIC | Age: 74
End: 2024-11-20
Payer: MEDICARE

## 2024-11-26 ENCOUNTER — HOSPITAL ENCOUNTER (EMERGENCY)
Facility: HOSPITAL | Age: 74
Discharge: HOME | End: 2024-11-26
Attending: EMERGENCY MEDICINE
Payer: MEDICARE

## 2024-11-26 ENCOUNTER — APPOINTMENT (OUTPATIENT)
Dept: RADIOLOGY | Facility: HOSPITAL | Age: 74
End: 2024-11-26
Payer: MEDICARE

## 2024-11-26 ENCOUNTER — TELEPHONE (OUTPATIENT)
Dept: PRIMARY CARE | Facility: CLINIC | Age: 74
End: 2024-11-26
Payer: MEDICARE

## 2024-11-26 VITALS
DIASTOLIC BLOOD PRESSURE: 86 MMHG | HEIGHT: 64 IN | RESPIRATION RATE: 20 BRPM | WEIGHT: 291 LBS | OXYGEN SATURATION: 95 % | BODY MASS INDEX: 49.68 KG/M2 | SYSTOLIC BLOOD PRESSURE: 162 MMHG | HEART RATE: 83 BPM | TEMPERATURE: 97.7 F

## 2024-11-26 DIAGNOSIS — R06.00 DYSPNEA, UNSPECIFIED TYPE: ICD-10-CM

## 2024-11-26 DIAGNOSIS — I50.9 CHRONIC HEART FAILURE, UNSPECIFIED HEART FAILURE TYPE: Primary | ICD-10-CM

## 2024-11-26 DIAGNOSIS — I50.9 CHRONIC HEART FAILURE, UNSPECIFIED HEART FAILURE TYPE: ICD-10-CM

## 2024-11-26 DIAGNOSIS — R05.9 COUGH, UNSPECIFIED TYPE: Primary | ICD-10-CM

## 2024-11-26 LAB
ALBUMIN SERPL BCP-MCNC: 4 G/DL (ref 3.4–5)
ALP SERPL-CCNC: 104 U/L (ref 33–136)
ALT SERPL W P-5'-P-CCNC: 10 U/L (ref 7–45)
ANION GAP SERPL CALCULATED.3IONS-SCNC: 12 MMOL/L (ref 10–20)
AST SERPL W P-5'-P-CCNC: 12 U/L (ref 9–39)
BASOPHILS # BLD AUTO: 0.02 X10*3/UL (ref 0–0.1)
BASOPHILS NFR BLD AUTO: 0.2 %
BILIRUB SERPL-MCNC: 0.7 MG/DL (ref 0–1.2)
BNP SERPL-MCNC: 380 PG/ML (ref 0–99)
BUN SERPL-MCNC: 33 MG/DL (ref 6–23)
CALCIUM SERPL-MCNC: 8.7 MG/DL (ref 8.6–10.3)
CARDIAC TROPONIN I PNL SERPL HS: 9 NG/L (ref 0–13)
CARDIAC TROPONIN I PNL SERPL HS: 9 NG/L (ref 0–13)
CHLORIDE SERPL-SCNC: 107 MMOL/L (ref 98–107)
CO2 SERPL-SCNC: 24 MMOL/L (ref 21–32)
CREAT SERPL-MCNC: 2.16 MG/DL (ref 0.5–1.05)
EGFRCR SERPLBLD CKD-EPI 2021: 23 ML/MIN/1.73M*2
EOSINOPHIL # BLD AUTO: 0.18 X10*3/UL (ref 0–0.4)
EOSINOPHIL NFR BLD AUTO: 1.9 %
ERYTHROCYTE [DISTWIDTH] IN BLOOD BY AUTOMATED COUNT: 19.7 % (ref 11.5–14.5)
FLUAV RNA RESP QL NAA+PROBE: NOT DETECTED
FLUBV RNA RESP QL NAA+PROBE: NOT DETECTED
GLUCOSE SERPL-MCNC: 92 MG/DL (ref 74–99)
HCT VFR BLD AUTO: 33.2 % (ref 36–46)
HGB BLD-MCNC: 10.1 G/DL (ref 12–16)
IMM GRANULOCYTES # BLD AUTO: 0.05 X10*3/UL (ref 0–0.5)
IMM GRANULOCYTES NFR BLD AUTO: 0.5 % (ref 0–0.9)
LYMPHOCYTES # BLD AUTO: 0.96 X10*3/UL (ref 0.8–3)
LYMPHOCYTES NFR BLD AUTO: 10.2 %
MAGNESIUM SERPL-MCNC: 1.98 MG/DL (ref 1.6–2.4)
MCH RBC QN AUTO: 23.9 PG (ref 26–34)
MCHC RBC AUTO-ENTMCNC: 30.4 G/DL (ref 32–36)
MCV RBC AUTO: 79 FL (ref 80–100)
MONOCYTES # BLD AUTO: 0.61 X10*3/UL (ref 0.05–0.8)
MONOCYTES NFR BLD AUTO: 6.5 %
NEUTROPHILS # BLD AUTO: 7.58 X10*3/UL (ref 1.6–5.5)
NEUTROPHILS NFR BLD AUTO: 80.7 %
NRBC BLD-RTO: 0.2 /100 WBCS (ref 0–0)
PLATELET # BLD AUTO: 147 X10*3/UL (ref 150–450)
POTASSIUM SERPL-SCNC: 4.9 MMOL/L (ref 3.5–5.3)
PROT SERPL-MCNC: 7.9 G/DL (ref 6.4–8.2)
RBC # BLD AUTO: 4.23 X10*6/UL (ref 4–5.2)
SARS-COV-2 RNA RESP QL NAA+PROBE: NOT DETECTED
SODIUM SERPL-SCNC: 138 MMOL/L (ref 136–145)
WBC # BLD AUTO: 9.4 X10*3/UL (ref 4.4–11.3)

## 2024-11-26 PROCEDURE — 96374 THER/PROPH/DIAG INJ IV PUSH: CPT

## 2024-11-26 PROCEDURE — 80053 COMPREHEN METABOLIC PANEL: CPT

## 2024-11-26 PROCEDURE — 94640 AIRWAY INHALATION TREATMENT: CPT

## 2024-11-26 PROCEDURE — 83735 ASSAY OF MAGNESIUM: CPT

## 2024-11-26 PROCEDURE — 84484 ASSAY OF TROPONIN QUANT: CPT

## 2024-11-26 PROCEDURE — 2500000004 HC RX 250 GENERAL PHARMACY W/ HCPCS (ALT 636 FOR OP/ED)

## 2024-11-26 PROCEDURE — 87636 SARSCOV2 & INF A&B AMP PRB: CPT

## 2024-11-26 PROCEDURE — 71046 X-RAY EXAM CHEST 2 VIEWS: CPT

## 2024-11-26 PROCEDURE — 83880 ASSAY OF NATRIURETIC PEPTIDE: CPT

## 2024-11-26 PROCEDURE — 36415 COLL VENOUS BLD VENIPUNCTURE: CPT

## 2024-11-26 PROCEDURE — 2500000002 HC RX 250 W HCPCS SELF ADMINISTERED DRUGS (ALT 637 FOR MEDICARE OP, ALT 636 FOR OP/ED)

## 2024-11-26 PROCEDURE — 71046 X-RAY EXAM CHEST 2 VIEWS: CPT | Performed by: RADIOLOGY

## 2024-11-26 PROCEDURE — 99284 EMERGENCY DEPT VISIT MOD MDM: CPT | Mod: 25

## 2024-11-26 PROCEDURE — 85025 COMPLETE CBC W/AUTO DIFF WBC: CPT

## 2024-11-26 RX ORDER — BENZONATATE 100 MG/1
100 CAPSULE ORAL 3 TIMES DAILY PRN
Qty: 15 CAPSULE | Refills: 0 | Status: SHIPPED | OUTPATIENT
Start: 2024-11-26 | End: 2024-11-26 | Stop reason: ALTCHOICE

## 2024-11-26 RX ORDER — BENZONATATE 200 MG/1
200 CAPSULE ORAL 3 TIMES DAILY PRN
Qty: 42 CAPSULE | Refills: 2 | Status: SHIPPED | OUTPATIENT
Start: 2024-11-26 | End: 2025-01-07

## 2024-11-26 RX ORDER — IPRATROPIUM BROMIDE AND ALBUTEROL SULFATE 2.5; .5 MG/3ML; MG/3ML
3 SOLUTION RESPIRATORY (INHALATION) ONCE
Status: COMPLETED | OUTPATIENT
Start: 2024-11-26 | End: 2024-11-26

## 2024-11-26 RX ORDER — FUROSEMIDE 10 MG/ML
40 INJECTION INTRAMUSCULAR; INTRAVENOUS ONCE
Status: COMPLETED | OUTPATIENT
Start: 2024-11-26 | End: 2024-11-26

## 2024-11-26 ASSESSMENT — PAIN SCALES - GENERAL: PAINLEVEL_OUTOF10: 0 - NO PAIN

## 2024-11-26 ASSESSMENT — PAIN - FUNCTIONAL ASSESSMENT: PAIN_FUNCTIONAL_ASSESSMENT: 0-10

## 2024-11-26 NOTE — ED PROVIDER NOTES
HPI   Chief Complaint   Patient presents with    Shortness of Breath     Pt to ED for increased sob with cough with clear production, chills, intermittent chest pain midsternal with cough. Pt with bilateral lower leg/feet swelling for the past 3 weeks, does take lasix and has not missed doses. No pain reported currently, just an uncomfortable sensation.        Patient is a 74-year-old female presents emergency department for evaluation of shortness of breath anxiety, intermittent chest pains and lower extremity swelling.  Patient states over the last several weeks she has had increasing shortness of breath.  She states over the last week she has had worsening shortness of breath with exertion and with lying flat which causes her to feel very anxious at night.  She states that she has had a productive cough for several weeks and has follow-up with pulmonology to do pulmonary function testing in the neck several days.  She does note that she has a history of congestive heart failure following with cardiology on home Lasix and has not missed any doses.  Daughter reports that she isincreasing swelling in the bilateral lower extremities and heaviness over the last 3 weeks.  Patient denies any lightheadedness, dizziness, nausea, vomiting, or other symptoms at this time.      History provided by:  Patient   used: No            Patient History   Past Medical History:   Diagnosis Date    CHF (congestive heart failure)     Chronic kidney disease     Coronary artery disease     Cough, unspecified 03/02/2017    Cough    Diverticulitis of large intestine with perforation and abscess without bleeding 10/21/2016    Colonic diverticular abscess    Fistula of intestine 09/05/2017    Enterocutaneous fistula    Hyperlipidemia     Hypertension     Morbid (severe) obesity due to excess calories (Multi)     Morbid obesity    Obstructive sleep apnea (adult) (pediatric) 06/27/2018    HOLDEN on CPAP    Other conditions  influencing health status     DEXA Body Composition Study    Personal history of other diseases of the circulatory system     History of hypertension    Personal history of other diseases of the digestive system 2018    History of esophagitis    Personal history of other diseases of the digestive system 2017    History of diverticulitis    Personal history of other endocrine, nutritional and metabolic disease 2020    History of hyperlipidemia    Personal history of other endocrine, nutritional and metabolic disease     History of hyperlipidemia    Personal history of other endocrine, nutritional and metabolic disease     History of type 1 diabetes mellitus    Personal history of other mental and behavioral disorders 2018    History of anxiety    Unspecified asthma, uncomplicated ([facility]) 10/05/2022    Asthma     Past Surgical History:   Procedure Laterality Date    CARDIAC CATHETERIZATION       SECTION, CLASSIC  2014     Section    COLONOSCOPY  2013    Complete Colonoscopy    SMALL INTESTINE SURGERY  2016    Small Bowel Resection     Family History   Problem Relation Name Age of Onset    Coronary artery disease Mother      Heart attack Mother       Social History     Tobacco Use    Smoking status: Never     Passive exposure: Never    Smokeless tobacco: Never   Vaping Use    Vaping status: Never Used   Substance Use Topics    Alcohol use: Never    Drug use: Never       Physical Exam   ED Triage Vitals [24 1245]   Temperature Heart Rate Respirations BP   36.5 °C (97.7 °F) 86 (!) 24 (!) 134/101      Pulse Ox Temp Source Heart Rate Source Patient Position   94 % Oral Monitor Sitting      BP Location FiO2 (%)     Right arm --       Physical Exam  Constitutional:       Appearance: She is well-developed.   Cardiovascular:      Rate and Rhythm: Normal rate and regular rhythm.   Pulmonary:      Effort: Pulmonary effort is normal.      Breath sounds: Examination  of the right-lower field reveals decreased breath sounds. Examination of the left-lower field reveals decreased breath sounds. Decreased breath sounds present.   Musculoskeletal:         General: Normal range of motion.   Skin:     General: Skin is warm and dry.      Comments: Bilateral lower extremity pitting edema   Neurological:      General: No focal deficit present.      Mental Status: She is alert and oriented to person, place, and time.           ED Course & MDM   ED Course as of 11/26/24 1730   Tue Nov 26, 2024   1257 EKG Time:1253  EKG Interpretation time:1257  EKG Interpretation: EKG shows sinus rhythm with premature atrial complexes with a rate of 81 bpm, left axis deviation, right bundle branch block, left anterior fascicular block, QTc prolonged at 515, no evidence of STEMI.    EKG was interpreted by myself independently [JL]      ED Course User Index  [JL] Abdifatah Abraham DO         Diagnoses as of 11/26/24 1730   Chronic heart failure, unspecified heart failure type   Dyspnea, unspecified type                 No data recorded     Sheng Coma Scale Score: 15 (11/26/24 1248 : Skye Correa RN)                           Medical Decision Making  Patient is a 74-year-old female presents emergency department for evaluation of increasing swelling bilateral lower extremities, and dyspnea.    EKG was interpreted by attending physician and reviewed by me.    Lab work done today included CMP, CBC, magnesium, troponins, proBNP, and flu/COVID swabs.  Lab work with initial troponin of 9 and repeat troponin of 9 within normal range with elevated proBNP at 380, chronic renal insufficiency at baseline, anemia at baseline and otherwise unremarkable.    Scans done today were interpreted/confirmed by radiologist and also interpreted by me which included chest x-ray.  Chest x-ray shows cardiomegaly with prominence of the perihilar basilar bronchovascular markings suggestive of mild interstitial edema.    Medications  given at today's visit include DuoNeb breathing treatment     I saw this patient in conjunction with Dr. Phelps.  Patient corinne oxygenating well on room air and no significant respiratory distress.  She is able to ambulate in the emergency department without any oxygen desaturations.  Lab work without significant acute abnormality and troponins within normal range.  EKG showing no evidence of ischemia.  Chest x-ray does show some mild interstitial edema likely related to patient's chronic heart failure.  She was given dose of Lasix, but ultimately no indication for admission at this time.  Patient need to follow-up closely with her cardiologist and continue her home Lasix.  She is also scheduled to follow-up closely with pulmonology tomorrow for further pulmonary function testing as well.  Ultimately given her cough she is given short course of Tessalon Perles to help at night while sleeping and agreeable to plan and discharge at this time.  Emergent pathologies were considered for this patient, although I have low suspicion for anything acutely emergent given patient's clinical presentation, history, physical exam, stable vital signs, and relatively unremarkable workup.  Discharging patient home is reasonable plan of care for outpatient management.    All labs, imaging, and diagnostic studies were reviewed by me and patient was counseled on clinical impression, expectations, and plan.  Patient was educated to follow-up with PCP in the following 1-2 days.  All questions from patient were answered. They elicited understanding and were agreeable to course of treatment.  Patient was discharged in stable condition and given strict return precautions.    Prescriptions given on discharge: PO Tessalon Perles    ** Disclaimer:  Parts of this document were written utilizing a voice to text dictation software.  Note may contain minor transcription or typographical errors that were inadvertently transcribed by the computer  software.        Procedure  Procedures     Antoinette Dhaliwal PA-C  11/26/24 8527

## 2024-11-26 NOTE — DISCHARGE INSTRUCTIONS
Please follow-up closely with your primary care provider in the following week.  Go to your pulmonology appointment tomorrow and follow close with your pulmonologist.  Follow-up closely with your cardiologist in the following week.  Continue your home Lasix.

## 2024-11-26 NOTE — PROGRESS NOTES
Attestation/Supervisory note for ADAMA Dhaliwal      The patient is a 74-year-old female presenting to the emergency department for evaluation of dyspnea, dyspnea on exertion, nonproductive cough and subjective chills.  She states that she has had symptoms over the past 3 weeks.  She states that intermittently she has had some substernal chest pressure.  She states that she went to urgent care but they told her that she needed to come to the emergency room.  She states that she has not attempted follow-up with her primary care physician or cardiologist for her symptoms.  She does have a history of CHF with similar symptoms.  She states that the pressure is intermittent.  No better or worse.  No radiation.  It is substernal.  She denies having any current chest pain at this time.  She denies any fever.  No headache or visual changes.  No neck or back pain.  No abdominal pain.  No nausea vomiting.  No diarrhea or constipation.   no urinary complaints.  No focal weakness or numbness.  All pertinent positives and negatives are recorded above.  All other systems reviewed and otherwise negative.  Vital signs with diastolic hypertension and mild tachypnea but otherwise within normal limits.  Discal exam with a well-nourished well-developed female in no acute distress.  HEENT exam within normal limits.  She is playing a game on her phone during her examination.  She has no evidence of airway compromise or respiratory distress.  She is able to converse in complete sentences without difficulty.  Abdominal exam is benign.  She does not have any gross motor, neurologic or vascular deficits on exam.  Pulses are equal bilaterally.      EKG with normal sinus rhythm at 81 bpm, occasional PACs, leftward axis, right bundle branch block pattern, normal ST segment, and normal T waves      IV Lasix and DuoNeb given with improvement in her symptoms.      Diagnostic labs with mild anemia, mildly elevated BNP, chronic renal insufficiency, but  otherwise unremarkable.      Initial troponin 9.  Repeat trop 9      XR chest 2 views   Final Result   Cardiomegaly with prominence of the perihilar basilar bronchovascular   markings suggesting interstitial edema.        Signed by: Marco Antonio Duffy 11/26/2024 1:47 PM   Dictation workstation:   [id number]           The patient does not have any evidence of airway compromise or respiratory distress on exam.  She is hemodynamically stable while in the emergency room.  She does not appear to be in any distress.  EKG and cardiac enzymes without evidence of STEMI.  Diagnostic labs with mild anemia and mildly elevated BNP and chronic renal insufficiency but otherwise unremarkable.  No events on telemetry.  Chest x-ray with cardiomegaly and evidence of CHF but otherwise unremarkable.  No evidence of pneumonia.  No widening of the mediastinum.  Her pulses are equal bilaterally.  The patient did complete a trial of ambulation without difficulty in the emergency room without desaturation.      The patient was released in good condition in the company of her family members.  She will follow-up with her primary care physician within 1 to 2 days for further management of her current symptoms.  She will return to the emergency department sooner with worsening of symptoms or onset of new symptoms.      Impression/diagnosis  Dyspnea, dyspnea on exertion  CHF, acute on chronic  Diastolic hypertension  4.  Anemia, unspecified      Critical care time billing is not warranted at this time      I personally saw the patient and made/approve the management plan and take responsibility for the patient management.      I independently interpreted the following study (S) EKG and diagnostic labs      I personally discussed the patient's management with the patient      I reviewed the results of the diagnostic labs and diagnostic imaging.  Formal radiology read was completed by the radiologist.      Tammy Phelps MD

## 2024-11-26 NOTE — TELEPHONE ENCOUNTER
Dr. Sanchez Pt. left a voicemail message stating she have a chronic cough please write a prescription for benzonatate 200 mg she received this medication from urgent care haven't been able to sleep at night and breathing is not good Pt. contact number is [phone].

## 2024-11-26 NOTE — PROGRESS NOTES
Patient ID: Analia Murray is a 74 y.o. female.  Patient is a ******** year old *******. Has a past medical history of ******.  Was previously established with DILCIA Prieto and was lost to follow up.     Initial MGUS Workup   - SPEP: Pending  - SFLC: Pending  - Immunoglobulins: Pending  - UPEP: Pending    -S: N/A  -Li: Pending  -M: N/A  -C:   -R: SrCr  -A: Hgb  -B: N/A  SUBJECTIVE     HPI  Review of Systems - Oncology    Past Medical History:   Diagnosis Date    CHF (congestive heart failure)     Chronic kidney disease     Coronary artery disease     Cough, unspecified 03/02/2017    Cough    Diverticulitis of large intestine with perforation and abscess without bleeding 10/21/2016    Colonic diverticular abscess    Fistula of intestine 09/05/2017    Enterocutaneous fistula    Hyperlipidemia     Hypertension     Morbid (severe) obesity due to excess calories (Multi)     Morbid obesity    Obstructive sleep apnea (adult) (pediatric) 06/27/2018    HOLDEN on CPAP    Other conditions influencing health status     DEXA Body Composition Study    Personal history of other diseases of the circulatory system     History of hypertension    Personal history of other diseases of the digestive system 02/07/2018    History of esophagitis    Personal history of other diseases of the digestive system 09/05/2017    History of diverticulitis    Personal history of other endocrine, nutritional and metabolic disease 04/20/2020    History of hyperlipidemia    Personal history of other endocrine, nutritional and metabolic disease     History of hyperlipidemia    Personal history of other endocrine, nutritional and metabolic disease     History of type 1 diabetes mellitus    Personal history of other mental and behavioral disorders 05/21/2018    History of anxiety    Unspecified asthma, uncomplicated ([facility]) 10/05/2022    Asthma     Family History   Problem Relation Name Age of Onset    Coronary artery disease Mother      Heart  attack Mother       Past Surgical History:   Procedure Laterality Date    CARDIAC CATHETERIZATION       SECTION, CLASSIC  2014     Section    COLONOSCOPY  2013    Complete Colonoscopy    SMALL INTESTINE SURGERY  2016    Small Bowel Resection           OBJECTIVE      BSA: There is no height or weight on file to calculate BSA.  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 Unchanged from the previous analysis on 24.    IEPIN  2024 Known monoclonal IgA kappa in the beta region at 0.2 g/dL. Unchanged from the previous analysis on 23.    IEPIN ABNORMAL 2023    IEPIN ABNORMAL 2021    IEPIN ABNORMAL 2018      IgG Lab Results   Component Value Date    IGG 1,700 (H) 2024    IGG 1,550 2018    IGG 1,630 (H) 2018      IgA Lab Results   Component Value Date     (H) 2024     (H) 2018     (H) 2018      IgM Lab Results   Component Value Date     2024     2018     2018            ASSESSMENT & PLAN     No problem-specific Assessment & Plan notes found for this encounter.      Oncology History    No history exists.        Roxann Thomas, APRN-CNP

## 2024-11-27 ENCOUNTER — HOSPITAL ENCOUNTER (OUTPATIENT)
Dept: RADIOLOGY | Facility: HOSPITAL | Age: 74
Discharge: HOME | End: 2024-11-27
Payer: MEDICARE

## 2024-11-27 DIAGNOSIS — R06.00 DYSPNEA, UNSPECIFIED TYPE: ICD-10-CM

## 2024-11-27 PROCEDURE — 71250 CT THORAX DX C-: CPT

## 2024-11-27 RX ORDER — BENZONATATE 100 MG/1
100 CAPSULE ORAL EVERY 8 HOURS
Qty: 21 CAPSULE | Refills: 0 | Status: SHIPPED | OUTPATIENT
Start: 2024-11-27 | End: 2024-12-04

## 2024-11-28 LAB
ATRIAL RATE: 81 BPM
P AXIS: 37 DEGREES
P OFFSET: 168 MS
P ONSET: 116 MS
PR INTERVAL: 162 MS
Q ONSET: 197 MS
QRS COUNT: 13 BEATS
QRS DURATION: 150 MS
QT INTERVAL: 444 MS
QTC CALCULATION(BAZETT): 515 MS
QTC FREDERICIA: 490 MS
R AXIS: -50 DEGREES
T AXIS: 84 DEGREES
T OFFSET: 419 MS
VENTRICULAR RATE: 81 BPM

## 2024-12-02 ENCOUNTER — APPOINTMENT (OUTPATIENT)
Dept: HEMATOLOGY/ONCOLOGY | Facility: CLINIC | Age: 74
End: 2024-12-02
Payer: MEDICARE

## 2024-12-03 ENCOUNTER — APPOINTMENT (OUTPATIENT)
Dept: SLEEP MEDICINE | Facility: CLINIC | Age: 74
End: 2024-12-03
Payer: MEDICARE

## 2024-12-09 ENCOUNTER — APPOINTMENT (OUTPATIENT)
Dept: NEPHROLOGY | Facility: CLINIC | Age: 74
End: 2024-12-09
Payer: MEDICARE

## 2024-12-11 ENCOUNTER — ANCILLARY PROCEDURE (OUTPATIENT)
Dept: URGENT CARE | Age: 74
End: 2024-12-11
Payer: MEDICARE

## 2024-12-11 ENCOUNTER — OFFICE VISIT (OUTPATIENT)
Dept: URGENT CARE | Age: 74
End: 2024-12-11
Payer: MEDICARE

## 2024-12-11 VITALS
HEART RATE: 89 BPM | RESPIRATION RATE: 18 BRPM | SYSTOLIC BLOOD PRESSURE: 160 MMHG | DIASTOLIC BLOOD PRESSURE: 99 MMHG | TEMPERATURE: 98.4 F | OXYGEN SATURATION: 97 % | BODY MASS INDEX: 47.97 KG/M2 | WEIGHT: 281 LBS | HEIGHT: 64 IN

## 2024-12-11 DIAGNOSIS — T14.90XA INJURY: ICD-10-CM

## 2024-12-11 DIAGNOSIS — S63.502A SPRAIN OF LEFT WRIST, INITIAL ENCOUNTER: Primary | ICD-10-CM

## 2024-12-11 PROCEDURE — 73110 X-RAY EXAM OF WRIST: CPT | Mod: LEFT SIDE | Performed by: PHYSICIAN ASSISTANT

## 2024-12-11 RX ORDER — METHYLPREDNISOLONE 4 MG/1
TABLET ORAL
Qty: 21 TABLET | Refills: 0 | Status: SHIPPED | OUTPATIENT
Start: 2024-12-11

## 2024-12-11 NOTE — PROGRESS NOTES
Subjective   Patient ID: Analia Murray is a 74 y.o. female. They present today with a chief complaint of Injury (HURT RT WRIST ABOUT A WEEK AGO).    History of Present Illness  Patient is a pleasant 74-year-old -American female, past medical history of hypertension, hyperlipidemia, diabetes, CAD, presenting to the clinic for chief complaint of wrist pain.  Patient daughter at bedside reporting approximately 1 week history of left wrist pain.  States the pain started shortly after the patient went to catch her grandson falling with her left arm.  Presenting out of concern for pain swelling redness and warmth associated with the left wrist over the radial aspect.  Patient denies any numbness or tingling.  Denies any pain in the hand.  States she is able to move all her fingers without difficulty.  She does report increased pain with flexion extension of the wrist as well as with supination of the hand.  No other injuries.  No further complaints at this time.      Injury      Past Medical History  Allergies as of 12/11/2024 - Reviewed 12/11/2024   Allergen Reaction Noted    Ciprofloxacin Itching, Nausea Only, Rash, and Other 07/14/2023    Morphine Unknown and Hallucinations 07/14/2023    Penicillins Anaphylaxis, Hives, Itching, and Swelling 07/14/2023       (Not in a hospital admission)         Past Medical History:   Diagnosis Date    CHF (congestive heart failure)     Chronic kidney disease     Coronary artery disease     Cough, unspecified 03/02/2017    Cough    Diverticulitis of large intestine with perforation and abscess without bleeding 10/21/2016    Colonic diverticular abscess    Fistula of intestine 09/05/2017    Enterocutaneous fistula    Hyperlipidemia     Hypertension     Morbid (severe) obesity due to excess calories (Multi)     Morbid obesity    Obstructive sleep apnea (adult) (pediatric) 06/27/2018    HOLDEN on CPAP    Other conditions influencing health status     DEXA Body Composition Study  "   Personal history of other diseases of the circulatory system     History of hypertension    Personal history of other diseases of the digestive system 2018    History of esophagitis    Personal history of other diseases of the digestive system 2017    History of diverticulitis    Personal history of other endocrine, nutritional and metabolic disease 2020    History of hyperlipidemia    Personal history of other endocrine, nutritional and metabolic disease     History of hyperlipidemia    Personal history of other endocrine, nutritional and metabolic disease     History of type 1 diabetes mellitus    Personal history of other mental and behavioral disorders 2018    History of anxiety    Unspecified asthma, uncomplicated ([facility]) 10/05/2022    Asthma       Past Surgical History:   Procedure Laterality Date    CARDIAC CATHETERIZATION       SECTION, CLASSIC  2014     Section    COLONOSCOPY  2013    Complete Colonoscopy    SMALL INTESTINE SURGERY  2016    Small Bowel Resection        reports that she has never smoked. She has never been exposed to tobacco smoke. She has never used smokeless tobacco. She reports that she does not drink alcohol and does not use drugs.    Review of Systems  Review of Systems                               Objective    Vitals:    24 1102   BP: (!) 160/99   BP Location: Right arm   Patient Position: Sitting   BP Cuff Size: Adult   Pulse: 89   Resp: 18   Temp: 36.9 °C (98.4 °F)   SpO2: 97%   Weight: 127 kg (281 lb)   Height: 1.626 m (5' 4\")     No LMP recorded (lmp unknown). Patient is postmenopausal.    Physical Exam  General: Vitals noted, no distress. Afebrile.     EENT: TMs clear. Eyes unremarkable. Posterior oropharynx unremarkable.   Cardiac: Regular, rate, rhythm, no murmur.     Pulmonary: Lungs clear bilaterally with good aeration. No adventitious breath sounds.     Abdomen: Soft, nontender, nonsurgical. No peritoneal " signs. Normoactive bowel sounds.     Extremities: No peripheral edema. Negative Homans bilaterally, no cords. Exam of the left wrist shows tenderness to palpation over the distal radius with some localized swelling and erythema and slight warmth. Is nontender over the anatomic snuffbox. Has no pain with axial loading of thumb. The skin is intact. Is neurovascularly intact distally. Is nontender over the hand. The remainder of the extremity is nontender.     Skin: No rash.     Neuro: No focal neurologic deficits  Procedures    Point of Care Test & Imaging Results from this visit    XR wrist left 3+ views    Result Date: 12/11/2024  Interpreted By:  Dayron Jerome, STUDY: XR WRIST LEFT 3+ VIEWS; ;  12/11/2024 11:20 am   INDICATION: Signs/Symptoms:injury.   ,T14.90XA Injury, unspecified, initial encounter   COMPARISON: None.   ACCESSION NUMBER(S): [id number]   ORDERING CLINICIAN: CHARLES TRUJILLO   FINDINGS: Left wrist, three views   There is deformity with an oblique line through the 5th proximal phalanx compatible with a mild displaced fracture. No other fracture seen. There is no dislocation. Deformity of the 5th metacarpal diaphysis suggestive of a remote injury. Mild osteoarthritis in the radiocarpal, triscaphe, 1st CMC and 1st MCP joint there is an oblique fracture       Findings concerning for a mildly displaced fracture through the 5th proximal phalanx. Recommend hand radiographs for further evaluation as this is incompletely evaluated on these views     MACRO: None   Signed by: Dayron Jerome 12/11/2024 11:23 AM Dictation workstation:   [id number]     Diagnostic study results (if any) were reviewed by Charles Trujillo PA-C.    Assessment/Plan   Allergies, medications, history, and pertinent labs/EKGs/Imaging reviewed by Charles Trujillo PA-C.     Medical Decision Making   Patient was seen eval in the clinic with complaint of left wrist pain.  On exam patient is nontoxic well-appearing respect  comfortably no acute distress.  Vital signs are stable, afebrile.  Chest is clear, heart is regular, belly is soft and nontender.  Evaluation of the left wrist as above.  X-ray imaging was obtained which reveals no underlying acute osseous normalities throughout the left wrist however does note a potential fracture of the proximal fifth phalanx.  Patient has no pain at this location and has never injured dislocation and minimal concern for acute fracture at this time.  Patient will be treated with a Medrol Dosepak and advise she ice and elevate the affected area every day.  Also advised Tylenol as needed.  Discharge home at this time.  Advised to follow-up with her primary care physician in the next week.  Reviewed my impression, plan, strict return precautions with the patient daughter at bedside.  Both expressed understanding and agreement plan of care.    Orders and Diagnoses  Diagnoses and all orders for this visit:  Injury  -     XR wrist left 3+ views; Future        Medical Admin Record      Follow Up Instructions  No follow-ups on file.    Patient disposition: Home    Electronically signed by Charles Brower PA-C  11:24 AM

## 2024-12-12 ENCOUNTER — HOSPITAL ENCOUNTER (OUTPATIENT)
Dept: RESPIRATORY THERAPY | Facility: HOSPITAL | Age: 74
Setting detail: THERAPIES SERIES
Discharge: HOME | End: 2024-12-12
Payer: MEDICARE

## 2024-12-12 DIAGNOSIS — R06.00 DYSPNEA, UNSPECIFIED TYPE: ICD-10-CM

## 2024-12-12 PROCEDURE — 94726 PLETHYSMOGRAPHY LUNG VOLUMES: CPT

## 2024-12-12 PROCEDURE — 2500000002 HC RX 250 W HCPCS SELF ADMINISTERED DRUGS (ALT 637 FOR MEDICARE OP, ALT 636 FOR OP/ED)

## 2024-12-12 RX ORDER — ALBUTEROL SULFATE 0.83 MG/ML
SOLUTION RESPIRATORY (INHALATION)
Status: COMPLETED
Start: 2024-12-12 | End: 2024-12-12

## 2024-12-16 ENCOUNTER — APPOINTMENT (OUTPATIENT)
Dept: CARDIOLOGY | Facility: CLINIC | Age: 74
End: 2024-12-16
Payer: MEDICARE

## 2024-12-24 ENCOUNTER — TELEPHONE (OUTPATIENT)
Dept: PULMONOLOGY | Facility: HOSPITAL | Age: 74
End: 2024-12-24

## 2024-12-24 DIAGNOSIS — R06.00 DYSPNEA, UNSPECIFIED TYPE: ICD-10-CM

## 2024-12-24 NOTE — TELEPHONE ENCOUNTER
Pt's pharmacy reached out requesting a refill on pt's Advair. Refill order placed and routed to Dr. Li to sign.

## 2025-01-13 ENCOUNTER — TELEPHONE (OUTPATIENT)
Dept: HEMATOLOGY/ONCOLOGY | Facility: CLINIC | Age: 75
End: 2025-01-13

## 2025-01-13 NOTE — TELEPHONE ENCOUNTER
Call placed to patient, not able to leave VM as there was not an option to leave a VM. Call initially placed in regards to missed appointment today with Roxann Thomas today 1/13 at 11am. Will attempt to call phone number again or leave message with alternate  the patient's spouse.

## 2025-01-15 ENCOUNTER — APPOINTMENT (OUTPATIENT)
Dept: PULMONOLOGY | Facility: HOSPITAL | Age: 75
End: 2025-01-15
Payer: MEDICARE

## 2025-01-15 VITALS
HEART RATE: 84 BPM | WEIGHT: 291 LBS | SYSTOLIC BLOOD PRESSURE: 147 MMHG | DIASTOLIC BLOOD PRESSURE: 76 MMHG | OXYGEN SATURATION: 96 % | BODY MASS INDEX: 49.95 KG/M2 | TEMPERATURE: 98.8 F

## 2025-01-15 DIAGNOSIS — R06.00 DYSPNEA, UNSPECIFIED TYPE: ICD-10-CM

## 2025-01-15 DIAGNOSIS — J45.909 ASTHMA, UNSPECIFIED ASTHMA SEVERITY, UNSPECIFIED WHETHER COMPLICATED, UNSPECIFIED WHETHER PERSISTENT (HHS-HCC): ICD-10-CM

## 2025-01-15 DIAGNOSIS — J30.9 ALLERGIC RHINITIS, UNSPECIFIED SEASONALITY, UNSPECIFIED TRIGGER: ICD-10-CM

## 2025-01-15 DIAGNOSIS — M79.89 SWELLING OF LEFT LOWER EXTREMITY: ICD-10-CM

## 2025-01-15 DIAGNOSIS — R22.42 LOCALIZED SWELLING OF LEFT LOWER EXTREMITY: Primary | ICD-10-CM

## 2025-01-15 PROCEDURE — 3077F SYST BP >= 140 MM HG: CPT | Performed by: STUDENT IN AN ORGANIZED HEALTH CARE EDUCATION/TRAINING PROGRAM

## 2025-01-15 PROCEDURE — 99213 OFFICE O/P EST LOW 20 MIN: CPT | Mod: GC | Performed by: STUDENT IN AN ORGANIZED HEALTH CARE EDUCATION/TRAINING PROGRAM

## 2025-01-15 PROCEDURE — 3078F DIAST BP <80 MM HG: CPT | Performed by: STUDENT IN AN ORGANIZED HEALTH CARE EDUCATION/TRAINING PROGRAM

## 2025-01-15 PROCEDURE — 1159F MED LIST DOCD IN RCRD: CPT | Performed by: STUDENT IN AN ORGANIZED HEALTH CARE EDUCATION/TRAINING PROGRAM

## 2025-01-15 PROCEDURE — G0008 ADMIN INFLUENZA VIRUS VAC: HCPCS | Mod: GC | Performed by: STUDENT IN AN ORGANIZED HEALTH CARE EDUCATION/TRAINING PROGRAM

## 2025-01-15 PROCEDURE — 99213 OFFICE O/P EST LOW 20 MIN: CPT | Performed by: STUDENT IN AN ORGANIZED HEALTH CARE EDUCATION/TRAINING PROGRAM

## 2025-01-15 PROCEDURE — 1126F AMNT PAIN NOTED NONE PRSNT: CPT | Performed by: STUDENT IN AN ORGANIZED HEALTH CARE EDUCATION/TRAINING PROGRAM

## 2025-01-15 RX ORDER — FLUTICASONE PROPIONATE 50 MCG
2 SPRAY, SUSPENSION (ML) NASAL 2 TIMES DAILY
Qty: 16 G | Refills: 3 | Status: SHIPPED | OUTPATIENT
Start: 2025-01-15

## 2025-01-15 ASSESSMENT — PAIN SCALES - GENERAL: PAINLEVEL_OUTOF10: 0-NO PAIN

## 2025-01-15 NOTE — PROGRESS NOTES
Department of Medicine I Division of Pulmonary, Critical Care, and Sleep Medicine   [id number] [place] [address] [address] [place] [address]  Phone: [phone]  Fax: [phone]    History of Present Illness   Analia Murray is a 74 y.o. female with PMHx significant for HTN, HFrEF, HLD, HOLDEN, previously diagnosed asthma, chronic sinusitis and cough, who is presenting to pulmonary clinic after being seen in urgent care for progressive dyspnea on exertion. She feels extremely short of breath especially in the evening, and notes that the feeling is associated with pressure on her chest. She has a persistent cough with associated nasal drainage; the cough is productive of clear colored phlegm. The cough she feels has been worse than her normal cough since she got covid in July. She has been congested since, and most clinically disruptive to her is her shortness of breath.     She was previously seen by pulmonary in 2022, at which time she was being managed for her asthma (hx of childhood asthma) and chronic sinusitis, associated with chronic cough. She was recently restarted on Advair for asthma. She is using albuterol a couple times per day.     She is currently out of breath with walking a few steps. She does have coughing that wakes her up at night. She does notice this all worsens when she lays flat. This has been on going for 6 months. She does have swelling in her leg. Had been on lasix but stopped this a few weeks ago. She has concerns for sleep apnea and has a follow up appointment        Additionally and of note, her TTE is concerning for pulmonary hypertension, and previously her dyspnea on exertion have been attributed to her HFrEF and right heart findings thought to be in the setting of left heart failure. She is currently following with cardiology and on a medical regimen to manage this as well as previously noted CAD (cath 2015).    Lastly she is consistent with her CPAP for  HOLDEN.      Immunizations     Immunization History   Administered Date(s) Administered    Flu vaccine, quadrivalent, high-dose, preservative free, age 65y+ (FLUZONE) 10/04/2022    Flu vaccine, trivalent, preservative free, HIGH-DOSE, age 65y+ (Fluzone) 10/10/2014, 10/17/2017, 10/22/2018    Influenza, Unspecified 09/04/2009, 10/04/2010, 10/04/2022    Influenza, injectable, quadrivalent 06/02/2021    Influenza, seasonal, injectable 09/04/2009, 10/04/2010, 10/10/2016    Moderna SARS-CoV-2 Vaccination 02/18/2021, 03/14/2021, 12/22/2021, 07/08/2022    Pfizer COVID-19 vaccine, bivalent, age 12 years and older (30 mcg/0.3 mL) 07/19/2023    Pneumococcal conjugate vaccine, 13-valent (PREVNAR 13) 06/02/2021    Pneumococcal conjugate vaccine, 20-valent (PREVNAR 20) 07/17/2024    Pneumococcal polysaccharide vaccine, 23-valent, age 2 years and older (PNEUMOVAX 23) 05/07/2021       Medications and Allergies     Current Outpatient Medications   Medication Instructions    amLODIPine (NORVASC) 10 mg, oral, Daily    aspirin 81 mg EC tablet 1 tablet, Daily    cholecalciferol (Vitamin D-3) 25 MCG (1000 UT) capsule TAKE AS DIRECTED.    dulaglutide (TRULICITY) 0.75 mg, subcutaneous, Once Weekly    ezetimibe (ZETIA) 10 mg, oral, Daily    ferrous sulfate  mg ER tablet 1 tablet, Daily    fluticasone (Flonase) 50 mcg/actuation nasal spray 2 sprays, Each Nostril, 2 times daily    fluticasone propion-salmeteroL (Advair Diskus) 250-50 mcg/dose diskus inhaler 1 puff, inhalation, 2 times daily RT, Rinse mouth with water after use to reduce aftertaste and incidence of candidiasis. Do not swallow.    furosemide (LASIX) 20 mg, oral, Daily    hydrALAZINE (APRESOLINE) 25 mg, oral, 2 times daily    inhalational spacing device (Aerochamber MV) inhaler Use as instructed    methylPREDNISolone (Medrol Dospak) 4 mg tablets Take as directed on package.    metoprolol succinate XL (TOPROL-XL) 100 mg, oral, Daily    montelukast (Singulair) 10 mg tablet 1  "tablet, Nightly    nitroglycerin (Nitrostat) 0.4 mg SL tablet Place under the tongue.    pravastatin (PRAVACHOL) 40 mg, oral, Nightly    Synthroid 200 mcg tablet TAKE 8 TABLETS BY MOUTH WEEKLY  AS DIRECTED    Ventolin HFA 90 mcg/actuation inhaler USE 2 INHALATIONS BY MOUTH EVERY 4 HOURS AS NEEDED FOR WHEEZING        Allergies:  Ciprofloxacin, Morphine, and Penicillins    Social History   She reports that she has never smoked. She has never been exposed to tobacco smoke. She has never used smokeless tobacco. She reports that she does not drink alcohol and does not use drugs.    Smoking History: none previously  Does report that her  of 52 years smokes, though he tries not to smoke in the house      She has a past surgical history that includes Colonoscopy (2013);  section, classic (2014); Small intestine surgery (2016); and Cardiac catheterization.    Physical Exam         2024     2:11 PM 2024     4:51 PM 10/23/2024     3:04 PM 2024    12:45 PM 2024     4:02 PM 2024    11:02 AM 1/15/2025     2:01 PM   Vitals   Systolic 133 168 121 134 162 160 147   Diastolic 69 91 76 101 86 99 76   BP Location Right arm Left arm  Right arm  Right arm    Heart Rate 74 69 73 86 83 89 84   Temp  36.2 °C (97.2 °F) 36.6 °C (97.9 °F) 36.5 °C (97.7 °F)  36.9 °C (98.4 °F) 37.1 °C (98.8 °F)   Resp  18  24 20 18    Height 1.626 m (5' 4\") 1.6 m (5' 3\")  1.626 m (5' 4\")  1.626 m (5' 4\")    Weight (lb) 288 281 291 291  281 291   BMI 49.44 kg/m2 49.78 kg/m2 51.55 kg/m2 49.95 kg/m2  48.23 kg/m2 49.95 kg/m2   BSA (m2) 2.43 m2 2.38 m2 2.42 m2 2.44 m2  2.39 m2 2.44 m2   Visit Report Report Report Report   Report Report        Physical Exam  Constitutional: alert, obese, not in acute distress  HEENT: normocephalic atraumatic mmm  Cardiovascular: S1 and S2 present, normal rate and rhythm  Respiratory: clear to auscultation bilaterally, no apparent wheezing  Extremities: pedal edema " L>R    Results   Pulmonary Function Tests:  PFTs 2018  No obstruction  No bronchodilator response  No gas trapping, DLCO wnl    Chest Radiograph:  XR chest 2 views 07/30/2024  FINDINGS:  There is enlargement of the cardiac silhouette with moderate  pulmonary edema, similar to the prior. No dense consolidation or  effusion seen.  Impression  1.  Enlarged cardiac silhouette. Moderate pulmonary edema       ECHO:  TTE 6/2024  CONCLUSIONS:   1. Left ventricular systolic function is mildly decreased with a 45-50% estimated ejection fraction.   2. The left atrium is moderate to severely dilated.   3. Moderate mitral valve regurgitation.   4. Moderate to severe tricuspid regurgitation visualized.   5. Severely elevated right ventricular systolic pressure.   6. There is moderate pulmonic valve regurgitation.   7. Severely elevated pulmonary artery pressure.   8. The estimated PASP is 70 mmHg.      CT Chest: Oct 2024: There is a lot of motion artifact present.     Respiratory Allergy Panel:  Positive 4/2019    PFTs 12/12/24: Normal ratio with reduced FEV, FEV1, and TLC consistent with restrictive lung disease, No significant bronchodilator response, Decreased DLCO   FEV: 1.34 (59%) FEV1: 1.11 (63%) TLC: 3.37 (69%)  DLCO : 12.28 (68%)      A/ssessment and Plan   Analia Murray is a 74 y.o. female with PMHx significant for HTN, HFrEF, HLD, HOLDEN, previously diagnosed asthma, chronic sinusitis and cough, who is following up in pulmonary clinic for shortness of breath and elevated RSVP on echo.     Asthma: on a regimen of Advair, prn Albuterol, Montelukast, Flonase.   - With spacer added to aid with albuterol administration  - Flu vaccine     TTE is concerning for HFrEF and pulmonary hypertension: She also has lower extremity swelling and stopped taking lasix about 1 month ago. She does have follow up with cardiology next week  -Discuss with cardiology about right heart cath and lasix  - Left lower extremity doppler as  swelling is greater in left leg than right  - Plan for dopplers  - lasix 20 mg restarted with plans for follow up  - we will discuss with cardiology the potential benefit of further evaluation of findings on TTE suggesting of pulmonary hypertension that would warrant RHC, LHC       HOLDEN: Had an CPAP machine that she was using regularly, it then broke and she has been trying to get one back  - Follows with sleep medicine next week    Follow-up:  4 months    Patient was discussed with attending physician, Dr. Bush.    Harsha Gupta MD

## 2025-01-15 NOTE — PATIENT INSTRUCTIONS
It was a pleasure to see you today in clinic!       The plan going forward is as follows:   Continue the following medications:   - Please restart lasix 20 mg daily in the morning  -Flonase has been ordered for nasal congestion  -Please obtain a left leg lower extremity doppler  - A Cpap machine has been ordered for you      Please follow up with me in: 4 months    If you have any further questions feel free to call my office at [phone] (choose #2 to reach a pulmonary nurse) and please allow 1-2 business days for a response.     If you have any scheduling needs feel free to call [phone] (for breathing or walking test), [phone] (for EKG's, echocardiograms or cardiopulmonary stress test), and [phone] (for radiology tests such as CT scan, MRIs and nuclear medicine tests).      Pulmonary and Critical Care Fellow     [address] [place] [address]

## 2025-01-20 ENCOUNTER — ANCILLARY PROCEDURE (OUTPATIENT)
Dept: VASCULAR MEDICINE | Facility: CLINIC | Age: 75
End: 2025-01-20
Payer: MEDICARE

## 2025-01-20 DIAGNOSIS — M79.89 SWELLING OF LEFT LOWER EXTREMITY: ICD-10-CM

## 2025-01-20 DIAGNOSIS — R06.00 DYSPNEA, UNSPECIFIED TYPE: ICD-10-CM

## 2025-01-20 PROCEDURE — 93971 EXTREMITY STUDY: CPT

## 2025-01-20 PROCEDURE — 93971 EXTREMITY STUDY: CPT | Performed by: SURGERY

## 2025-01-22 ENCOUNTER — APPOINTMENT (OUTPATIENT)
Dept: PRIMARY CARE | Facility: CLINIC | Age: 75
End: 2025-01-22
Payer: MEDICARE

## 2025-01-22 DIAGNOSIS — E78.5 DYSLIPIDEMIA: ICD-10-CM

## 2025-01-23 RX ORDER — EZETIMIBE 10 MG/1
10 TABLET ORAL DAILY
Qty: 90 TABLET | Refills: 3 | Status: SHIPPED | OUTPATIENT
Start: 2025-01-23

## 2025-01-27 ENCOUNTER — OFFICE VISIT (OUTPATIENT)
Dept: CARDIOLOGY | Facility: CLINIC | Age: 75
End: 2025-01-27
Payer: MEDICARE

## 2025-01-27 VITALS
OXYGEN SATURATION: 96 % | SYSTOLIC BLOOD PRESSURE: 145 MMHG | DIASTOLIC BLOOD PRESSURE: 68 MMHG | BODY MASS INDEX: 50.02 KG/M2 | WEIGHT: 293 LBS | HEIGHT: 64 IN | HEART RATE: 87 BPM

## 2025-01-27 DIAGNOSIS — I25.10 ATHEROSCLEROSIS OF NATIVE CORONARY ARTERY OF NATIVE HEART WITHOUT ANGINA PECTORIS: Primary | ICD-10-CM

## 2025-01-27 DIAGNOSIS — R06.02 SHORTNESS OF BREATH ON EXERTION: ICD-10-CM

## 2025-01-27 DIAGNOSIS — I10 ESSENTIAL HYPERTENSION: ICD-10-CM

## 2025-01-27 DIAGNOSIS — I10 HTN (HYPERTENSION), BENIGN: ICD-10-CM

## 2025-01-27 DIAGNOSIS — I50.32 CHRONIC DIASTOLIC HEART FAILURE: ICD-10-CM

## 2025-01-27 PROCEDURE — 93005 ELECTROCARDIOGRAM TRACING: CPT | Performed by: NURSE PRACTITIONER

## 2025-01-27 PROCEDURE — 3077F SYST BP >= 140 MM HG: CPT | Performed by: NURSE PRACTITIONER

## 2025-01-27 PROCEDURE — 3078F DIAST BP <80 MM HG: CPT | Performed by: NURSE PRACTITIONER

## 2025-01-27 PROCEDURE — 1159F MED LIST DOCD IN RCRD: CPT | Performed by: NURSE PRACTITIONER

## 2025-01-27 PROCEDURE — 1160F RVW MEDS BY RX/DR IN RCRD: CPT | Performed by: NURSE PRACTITIONER

## 2025-01-27 PROCEDURE — 1125F AMNT PAIN NOTED PAIN PRSNT: CPT | Performed by: NURSE PRACTITIONER

## 2025-01-27 PROCEDURE — 1036F TOBACCO NON-USER: CPT | Performed by: NURSE PRACTITIONER

## 2025-01-27 PROCEDURE — 99214 OFFICE O/P EST MOD 30 MIN: CPT | Performed by: NURSE PRACTITIONER

## 2025-01-27 RX ORDER — NITROGLYCERIN 0.4 MG/1
0.4 TABLET SUBLINGUAL EVERY 5 MIN PRN
Qty: 90 TABLET | Refills: 3 | Status: SHIPPED | OUTPATIENT
Start: 2025-01-27

## 2025-01-27 RX ORDER — FUROSEMIDE 20 MG/1
10 TABLET ORAL DAILY
Start: 2025-01-27

## 2025-01-27 RX ORDER — AMLODIPINE BESYLATE 10 MG/1
10 TABLET ORAL DAILY
Qty: 90 TABLET | Refills: 3 | Status: SHIPPED | OUTPATIENT
Start: 2025-01-27

## 2025-01-27 ASSESSMENT — PATIENT HEALTH QUESTIONNAIRE - PHQ9
1. LITTLE INTEREST OR PLEASURE IN DOING THINGS: NOT AT ALL
SUM OF ALL RESPONSES TO PHQ9 QUESTIONS 1 AND 2: 0
2. FEELING DOWN, DEPRESSED OR HOPELESS: NOT AT ALL

## 2025-01-27 ASSESSMENT — PAIN SCALES - GENERAL: PAINLEVEL_OUTOF10: 7

## 2025-01-27 NOTE — PROGRESS NOTES
Subjective   Analia Murray is a 74 y.o. female.    Chief Complaint:  Follow-up, Shortness of Breath, Hypertension, Hyperlipidemia, and Coronary Artery Disease    Mrs. Murray returns for a 5 month follow up. She is seen in collaboration with Dr. Ackerman. She has been feeling well from a cardiac standpoint. She has remained compliant with her medications, denying any intolerances. She has only been taking lasix 1/2 tablet (10 mg daily) though this seems to be keeping her swelling under control. Her chronic dyspnea also remains stable. She has been trying to do some light exercising and feels good. She denies any recent ER visits or hospitalizations. She offers no specific cardiovascular complaints or concerns today. She denies any complaints of chest pain, shortness of breath, lightheadedness, dizziness, palpitations, syncope, orthopnea, paroxysmal nocturnal dyspnea, lower extremity swelling or bleeding concerns.          Review of Systems   All other systems reviewed and are negative.      Objective   Physical Exam  Constitutional:       Appearance: Healthy appearance. In no distress  Pulmonary:      Effort: Pulmonary effort is normal.      Breath sounds: Normal breath sounds.   Cardiovascular:      Normal rate. Regular rhythm. Normal S1. Normal S2.       Murmurs: There is no murmur.      Carotids: right carotid pulse +2, no bruit heard over the right carotid. left carotid pulse +2, no bruit heard over the left carotid.  Edema:     Peripheral edema absent.   Abdominal:      Palpations: Abdomen is soft.   Musculoskeletal:       Cervical back: Normal range of motion.   Skin:     General: Skin is warm and dry. Normal color and pigmentation   Neurological:      Mental Status: Alert and oriented to person, place and time.   Psychiatric:     Mood and Affect: appropriate mood and appropriate affect.     EKG obtained and reviewed. Sinus rhythm with PVC. Bifascicular block. HR 83      Lab Review:   Lab Results    Component Value Date     11/26/2024    K 4.9 11/26/2024     11/26/2024    CO2 24 11/26/2024    BUN 33 (H) 11/26/2024    CREATININE 2.16 (H) 11/26/2024    GLUCOSE 92 11/26/2024    CALCIUM 8.7 11/26/2024     Lab Results   Component Value Date    WBC 9.4 11/26/2024    HGB 10.1 (L) 11/26/2024    HCT 33.2 (L) 11/26/2024    MCV 79 (L) 11/26/2024     (L) 11/26/2024     Lab Results   Component Value Date    CHOL 128 06/21/2023    TRIG 72 06/21/2023    HDL 45.1 06/21/2023       Assessment/Plan   Mrs. Murray is a pleasant 74-year-old  female with a past medical history significant for hypertension, hyperlipidemia, CKD, diabetes, HOLDEN compliant with CPAP, obesity, arthritis, chronic diastolic HF and medically managed CAD by heart catheterization in 2015. Echocardiogram 6/2024 showed an EF of 45-50% with moderate MR, moderate-severe TR and severe pulmonary HTN. She presents today for routine follow up stable from a cardiac standpoint. Her VS and EKG remain stable. She denies any chest pain at a limited activity level, and her dyspnea remains stable. I will have her continue all medications unchanged. She was encouraged to try and stay active. She will follow up with us in clinic in 6 months. She knows to call for any concerns.

## 2025-01-28 LAB
ATRIAL RATE: 83 BPM
P AXIS: 54 DEGREES
P OFFSET: 174 MS
P ONSET: 110 MS
PR INTERVAL: 180 MS
Q ONSET: 200 MS
QRS COUNT: 13 BEATS
QRS DURATION: 148 MS
QT INTERVAL: 426 MS
QTC CALCULATION(BAZETT): 500 MS
QTC FREDERICIA: 474 MS
R AXIS: -53 DEGREES
T AXIS: 85 DEGREES
T OFFSET: 413 MS
VENTRICULAR RATE: 83 BPM

## 2025-01-28 RX ORDER — FLUTICASONE PROPIONATE AND SALMETEROL 250; 50 UG/1; UG/1
1 POWDER RESPIRATORY (INHALATION)
Qty: 60 EACH | Refills: 0 | Status: SHIPPED | OUTPATIENT
Start: 2025-01-28

## 2025-01-30 ENCOUNTER — HOSPITAL ENCOUNTER (OUTPATIENT)
Dept: RADIOLOGY | Facility: CLINIC | Age: 75
Discharge: HOME | End: 2025-01-30
Payer: MEDICARE

## 2025-01-30 ENCOUNTER — APPOINTMENT (OUTPATIENT)
Dept: PRIMARY CARE | Facility: CLINIC | Age: 75
End: 2025-01-30
Payer: MEDICARE

## 2025-01-30 ENCOUNTER — OFFICE VISIT (OUTPATIENT)
Dept: ORTHOPEDIC SURGERY | Facility: CLINIC | Age: 75
End: 2025-01-30
Payer: MEDICARE

## 2025-01-30 VITALS
HEART RATE: 78 BPM | SYSTOLIC BLOOD PRESSURE: 141 MMHG | TEMPERATURE: 98.4 F | WEIGHT: 293 LBS | BODY MASS INDEX: 50.02 KG/M2 | RESPIRATION RATE: 17 BRPM | OXYGEN SATURATION: 96 % | DIASTOLIC BLOOD PRESSURE: 84 MMHG | HEIGHT: 64 IN

## 2025-01-30 DIAGNOSIS — M80.00XS OSTEOPOROSIS WITH CURRENT PATHOLOGICAL FRACTURE, UNSPECIFIED OSTEOPOROSIS TYPE, SEQUELA: ICD-10-CM

## 2025-01-30 DIAGNOSIS — I27.20 PULMONARY HYPERTENSION (MULTI): ICD-10-CM

## 2025-01-30 DIAGNOSIS — D63.1 ANEMIA IN CHRONIC KIDNEY DISEASE, UNSPECIFIED CKD STAGE: ICD-10-CM

## 2025-01-30 DIAGNOSIS — F41.9 ANXIETY AND DEPRESSION: ICD-10-CM

## 2025-01-30 DIAGNOSIS — D47.2 MGUS (MONOCLONAL GAMMOPATHY OF UNKNOWN SIGNIFICANCE): ICD-10-CM

## 2025-01-30 DIAGNOSIS — E78.5 DYSLIPIDEMIA: ICD-10-CM

## 2025-01-30 DIAGNOSIS — E11.22 CKD STAGE 3 DUE TO TYPE 2 DIABETES MELLITUS (MULTI): ICD-10-CM

## 2025-01-30 DIAGNOSIS — G47.30 SEVERE SLEEP APNEA: ICD-10-CM

## 2025-01-30 DIAGNOSIS — M25.532 LEFT WRIST PAIN: ICD-10-CM

## 2025-01-30 DIAGNOSIS — I10 HTN (HYPERTENSION), BENIGN: ICD-10-CM

## 2025-01-30 DIAGNOSIS — E03.9 HYPOTHYROIDISM, UNSPECIFIED TYPE: ICD-10-CM

## 2025-01-30 DIAGNOSIS — N18.30 CKD STAGE 3 DUE TO TYPE 2 DIABETES MELLITUS (MULTI): ICD-10-CM

## 2025-01-30 DIAGNOSIS — Z12.31 SCREENING MAMMOGRAM FOR BREAST CANCER: ICD-10-CM

## 2025-01-30 DIAGNOSIS — E11.22 TYPE 2 DIABETES MELLITUS WITH CHRONIC KIDNEY DISEASE, WITHOUT LONG-TERM CURRENT USE OF INSULIN, UNSPECIFIED CKD STAGE (MULTI): ICD-10-CM

## 2025-01-30 DIAGNOSIS — M79.645 PAIN IN FINGER OF LEFT HAND: ICD-10-CM

## 2025-01-30 DIAGNOSIS — M54.16 LUMBAR RADICULOPATHY: ICD-10-CM

## 2025-01-30 DIAGNOSIS — E66.01 CLASS 3 SEVERE OBESITY DUE TO EXCESS CALORIES WITH SERIOUS COMORBIDITY AND BODY MASS INDEX (BMI) OF 50.0 TO 59.9 IN ADULT: ICD-10-CM

## 2025-01-30 DIAGNOSIS — I50.32 CHRONIC DIASTOLIC HEART FAILURE: ICD-10-CM

## 2025-01-30 DIAGNOSIS — J45.909 ASTHMA, UNSPECIFIED ASTHMA SEVERITY, UNSPECIFIED WHETHER COMPLICATED, UNSPECIFIED WHETHER PERSISTENT (HHS-HCC): ICD-10-CM

## 2025-01-30 DIAGNOSIS — E11.65 TYPE 2 DIABETES MELLITUS WITH HYPERGLYCEMIA, WITHOUT LONG-TERM CURRENT USE OF INSULIN: ICD-10-CM

## 2025-01-30 DIAGNOSIS — N18.4 CHRONIC KIDNEY DISEASE (CKD), STAGE IV (SEVERE) (MULTI): ICD-10-CM

## 2025-01-30 DIAGNOSIS — E66.813 CLASS 3 SEVERE OBESITY DUE TO EXCESS CALORIES WITH SERIOUS COMORBIDITY AND BODY MASS INDEX (BMI) OF 50.0 TO 59.9 IN ADULT: ICD-10-CM

## 2025-01-30 DIAGNOSIS — Z00.00 MEDICARE ANNUAL WELLNESS VISIT, SUBSEQUENT: Primary | ICD-10-CM

## 2025-01-30 DIAGNOSIS — N18.9 ANEMIA IN CHRONIC KIDNEY DISEASE, UNSPECIFIED CKD STAGE: ICD-10-CM

## 2025-01-30 DIAGNOSIS — N25.81 SECONDARY RENAL HYPERPARATHYROIDISM (MULTI): ICD-10-CM

## 2025-01-30 DIAGNOSIS — F32.A ANXIETY AND DEPRESSION: ICD-10-CM

## 2025-01-30 PROBLEM — U07.1 COVID-19: Status: RESOLVED | Noted: 2024-07-31 | Resolved: 2025-01-30

## 2025-01-30 PROBLEM — M25.50 ARTHRALGIA: Status: RESOLVED | Noted: 2023-07-14 | Resolved: 2025-01-30

## 2025-01-30 PROBLEM — Z86.39 HISTORY OF TYPE 1 DIABETES MELLITUS: Status: RESOLVED | Noted: 2024-07-26 | Resolved: 2025-01-30

## 2025-01-30 PROBLEM — J06.9 ACUTE UPPER RESPIRATORY INFECTION: Status: RESOLVED | Noted: 2024-07-26 | Resolved: 2025-01-30

## 2025-01-30 PROBLEM — N39.0 URINARY TRACT INFECTION: Status: RESOLVED | Noted: 2024-07-26 | Resolved: 2025-01-30

## 2025-01-30 PROBLEM — R05.9 COUGH: Status: RESOLVED | Noted: 2023-07-14 | Resolved: 2025-01-30

## 2025-01-30 PROBLEM — M47.816 DJD (DEGENERATIVE JOINT DISEASE), LUMBAR: Status: RESOLVED | Noted: 2023-07-14 | Resolved: 2025-01-30

## 2025-01-30 PROBLEM — T81.49XA POSTOPERATIVE WOUND INFECTION: Status: RESOLVED | Noted: 2024-07-26 | Resolved: 2025-01-30

## 2025-01-30 PROBLEM — J41.0 SIMPLE CHRONIC BRONCHITIS (MULTI): Status: RESOLVED | Noted: 2023-07-14 | Resolved: 2025-01-30

## 2025-01-30 PROBLEM — R77.8 ABNORMAL SERUM PROTEIN ELECTROPHORESIS: Status: RESOLVED | Noted: 2023-07-14 | Resolved: 2025-01-30

## 2025-01-30 PROBLEM — Z86.79 HISTORY OF HYPERTENSION: Status: RESOLVED | Noted: 2024-07-26 | Resolved: 2025-01-30

## 2025-01-30 PROBLEM — R07.9 CHEST PAIN, UNSPECIFIED TYPE: Status: RESOLVED | Noted: 2024-07-11 | Resolved: 2025-01-30

## 2025-01-30 LAB — POC FINGERSTICK BLOOD GLUCOSE: 104 MG/DL (ref 70–100)

## 2025-01-30 PROCEDURE — 1170F FXNL STATUS ASSESSED: CPT | Performed by: PEDIATRICS

## 2025-01-30 PROCEDURE — 99214 OFFICE O/P EST MOD 30 MIN: CPT | Performed by: PEDIATRICS

## 2025-01-30 PROCEDURE — 1159F MED LIST DOCD IN RCRD: CPT | Performed by: PEDIATRICS

## 2025-01-30 PROCEDURE — 82962 GLUCOSE BLOOD TEST: CPT | Performed by: PEDIATRICS

## 2025-01-30 PROCEDURE — 1158F ADVNC CARE PLAN TLK DOCD: CPT | Performed by: PEDIATRICS

## 2025-01-30 PROCEDURE — 73140 X-RAY EXAM OF FINGER(S): CPT | Mod: LT

## 2025-01-30 PROCEDURE — 1123F ACP DISCUSS/DSCN MKR DOCD: CPT | Performed by: PEDIATRICS

## 2025-01-30 PROCEDURE — 3077F SYST BP >= 140 MM HG: CPT | Performed by: PEDIATRICS

## 2025-01-30 PROCEDURE — 1125F AMNT PAIN NOTED PAIN PRSNT: CPT | Performed by: PEDIATRICS

## 2025-01-30 PROCEDURE — G0439 PPPS, SUBSEQ VISIT: HCPCS | Performed by: PEDIATRICS

## 2025-01-30 PROCEDURE — 3079F DIAST BP 80-89 MM HG: CPT | Performed by: PEDIATRICS

## 2025-01-30 PROCEDURE — 1036F TOBACCO NON-USER: CPT | Performed by: PEDIATRICS

## 2025-01-30 RX ORDER — GABAPENTIN 100 MG/1
100 CAPSULE ORAL 3 TIMES DAILY
Qty: 90 CAPSULE | Refills: 5 | Status: SHIPPED | OUTPATIENT
Start: 2025-01-30 | End: 2025-07-29

## 2025-01-30 RX ORDER — HYDRALAZINE HYDROCHLORIDE 50 MG/1
50 TABLET, FILM COATED ORAL 2 TIMES DAILY
Qty: 60 TABLET | Refills: 2 | Status: SHIPPED | OUTPATIENT
Start: 2025-01-30 | End: 2026-01-30

## 2025-01-30 RX ORDER — SERTRALINE HYDROCHLORIDE 50 MG/1
50 TABLET, FILM COATED ORAL DAILY
Qty: 30 TABLET | Refills: 1 | Status: SHIPPED | OUTPATIENT
Start: 2025-01-30 | End: 2025-03-31

## 2025-01-30 RX ORDER — SERTRALINE HYDROCHLORIDE 25 MG/1
25 TABLET, FILM COATED ORAL DAILY
Qty: 3 TABLET | Refills: 0 | Status: SHIPPED | OUTPATIENT
Start: 2025-01-30 | End: 2025-02-02

## 2025-01-30 ASSESSMENT — PATIENT HEALTH QUESTIONNAIRE - PHQ9
10. IF YOU CHECKED OFF ANY PROBLEMS, HOW DIFFICULT HAVE THESE PROBLEMS MADE IT FOR YOU TO DO YOUR WORK, TAKE CARE OF THINGS AT HOME, OR GET ALONG WITH OTHER PEOPLE: SOMEWHAT DIFFICULT
1. LITTLE INTEREST OR PLEASURE IN DOING THINGS: SEVERAL DAYS
SUM OF ALL RESPONSES TO PHQ9 QUESTIONS 1 AND 2: 2
2. FEELING DOWN, DEPRESSED OR HOPELESS: SEVERAL DAYS

## 2025-01-30 ASSESSMENT — ACTIVITIES OF DAILY LIVING (ADL)
MANAGING_FINANCES: INDEPENDENT
BATHING: NEEDS ASSISTANCE
GROCERY_SHOPPING: NEEDS ASSISTANCE
DRESSING: NEEDS ASSISTANCE
DOING_HOUSEWORK: TOTAL CARE
TAKING_MEDICATION: INDEPENDENT

## 2025-01-30 ASSESSMENT — PAIN SCALES - GENERAL: PAINLEVEL_OUTOF10: 5

## 2025-01-30 NOTE — PROGRESS NOTES
CHIEF COMPLAINT         Left wrist pain    ASSESSMENT + PLAN     ***        HISTORY OF PRESENT ILLNESS       Patient is a 74 y.o. ***-hand dominant female ***, who presents today for evaluation of ***      REVIEW OF SYSTEMS       A 30-item multi-system Review Of Systems was obtained on today's intake form.  This was reviewed with the patient and is correct.  The pertinent positives and negatives are listed above.  The form has been scanned separately into the medical record.      PHYSICAL EXAM    Constitutional:    Appears stated age. Well-developed and well-nourished ***.  Psychiatric:         Pleasant normal mood and affect. Behavior is appropriate for the situation.   Head:                   Normocephalic and atraumatic.  Eyes:                    Pupils are equal and round.  Cardiovascular:  2+ radial and ulnar pulses. Fingers well-perfused.  Respiratory:        Effort normal. No respiratory distress. Speaking in complete sentences.  Neurologic:       Alert and oriented to person, place, and time.  Skin:                Skin is intact, warm and dry.  Hematologic / Lymphatic:    No lymphedema or lymphangitis.    Extremities / Musculoskeletal:                      ***      IMAGING / LABS / EMGs           ***      Past Medical History:   Diagnosis Date    CHF (congestive heart failure)     Chronic kidney disease     Coronary artery disease     Cough, unspecified 03/02/2017    Cough    Diverticulitis of large intestine with perforation and abscess without bleeding 10/21/2016    Colonic diverticular abscess    Fistula of intestine 09/05/2017    Enterocutaneous fistula    Hyperlipidemia     Hypertension     Morbid (severe) obesity due to excess calories (Multi)     Morbid obesity    Obstructive sleep apnea (adult) (pediatric) 06/27/2018    HOLDEN on CPAP    Other conditions influencing health status     DEXA Body Composition Study    Personal history of other diseases of the circulatory system     History of hypertension     Personal history of other diseases of the digestive system 02/07/2018    History of esophagitis    Personal history of other diseases of the digestive system 09/05/2017    History of diverticulitis    Personal history of other endocrine, nutritional and metabolic disease 04/20/2020    History of hyperlipidemia    Personal history of other endocrine, nutritional and metabolic disease     History of hyperlipidemia    Personal history of other endocrine, nutritional and metabolic disease     History of type 1 diabetes mellitus    Personal history of other mental and behavioral disorders 05/21/2018    History of anxiety    Unspecified asthma, uncomplicated ([facility]) 10/05/2022    Asthma       Medication Documentation Review Audit       Reviewed by Roxy Bryant CMA (Medical Assistant) on 01/30/25 at 1425      Medication Order Taking? Sig Documenting Provider Last Dose Status     Discontinued 01/27/25 1137   amLODIPine (Norvasc) 10 mg tablet [id number] Yes Take 1 tablet (10 mg) by mouth once daily. Alivia Rasmussen, APRN-CNP  Active   aspirin 81 mg EC tablet [id number] Yes Take 1 tablet (81 mg) by mouth once daily. Historical Provider, MD  Active   cholecalciferol (Vitamin D-3) 25 MCG (1000 UT) capsule [id number] Yes TAKE AS DIRECTED. Historical Provider, MD  Active   dulaglutide (Trulicity) 0.75 mg/0.5 mL pen injector [id number] Yes Inject 0.75 mg under the skin 1 (one) time per week. Earl Koroma MD  Active   dulaglutide (TRULICITY) injection 0.75 mg [id number]   Earl Koroma MD  Active   ezetimibe (Zetia) 10 mg tablet [id number] Yes TAKE 1 TABLET BY MOUTH ONCE  DAILY Thais Sanchez MD  Active   ferrous sulfate  mg ER tablet [id number] Yes Take 1 tablet by mouth once daily. Historical Provider, MD  Active   fluticasone (Flonase) 50 mcg/actuation nasal spray [id number] Yes Administer 2 sprays into each nostril 2 times a day. Harsha Gupta MD  Active   fluticasone propion-salmeteroL (Advair Diskus) 250-50 mcg/dose  diskus inhaler [id number] Yes Inhale 1 puff 2 times a day. Rinse mouth with water after use to reduce aftertaste and incidence of candidiasis. Do not swallow. Fadia Li MD  Active     Discontinued 01/27/25 1133   furosemide (Lasix) 20 mg tablet [id number] Yes Take 0.5 tablets (10 mg) by mouth once daily. SEEMA Aceves  Active   hydrALAZINE (Apresoline) 25 mg tablet [id number] Yes Take 1 tablet (25 mg) by mouth 2 times a day. SEEMA Aceves  Active   inhalational spacing device (Aerochamber MV) inhaler [id number] Yes Use as instructed Apolinar Sage DO  Active   methylPREDNISolone (Medrol Dospak) 4 mg tablets [id number]  Take as directed on package.   Patient not taking: Reported on 1/30/2025    Charles Brower PA-C  Active   metoprolol succinate XL (Toprol-XL) 100 mg 24 hr tablet [id number] Yes TAKE 1 TABLET BY MOUTH DAILY SEEMA Aceves  Active   montelukast (Singulair) 10 mg tablet [id number] Yes Take 1 tablet (10 mg) by mouth once daily at bedtime. Ish Franco MD  Active     Discontinued 01/27/25 1137   nitroglycerin (Nitrostat) 0.4 mg SL tablet [id number] Yes Place 1 tablet (0.4 mg) under the tongue every 5 minutes if needed for chest pain. SEEMA Aceves  Active   pravastatin (Pravachol) 40 mg tablet [id number] Yes TAKE 1 TABLET BY MOUTH ONCE  DAILY AT BEDTIME Thais Sanchez MD  Active   Synthroid 200 mcg tablet [id number] Yes TAKE 8 TABLETS BY MOUTH WEEKLY  AS DIRECTED Earl Koroma MD  Active   Ventolin HFA 90 mcg/actuation inhaler [id number] Yes USE 2 INHALATIONS BY MOUTH EVERY 4 HOURS AS NEEDED FOR WHEEZING Thais Sanchez MD  Active                    Allergies   Allergen Reactions    Ciprofloxacin Itching, Nausea Only, Rash and Other     Chills, N and V    Morphine Unknown and Hallucinations    Penicillins Anaphylaxis, Hives, Itching and Swelling     > 20 yrs ago       Social History     Socioeconomic History    Marital status:      Spouse name: Not on file     Number of children: Not on file    Years of education: Not on file    Highest education level: Not on file   Occupational History    Not on file   Tobacco Use    Smoking status: Never     Passive exposure: Never    Smokeless tobacco: Never   Vaping Use    Vaping status: Never Used   Substance and Sexual Activity    Alcohol use: Never    Drug use: Never    Sexual activity: Not on file   Other Topics Concern    Not on file   Social History Narrative    Not on file     Social Drivers of Health     Financial Resource Strain: Low Risk  (7/12/2024)    Overall Financial Resource Strain (CARDIA)     Difficulty of Paying Living Expenses: Not hard at all   Food Insecurity: No Food Insecurity (7/12/2024)    Hunger Vital Sign     Worried About Running Out of Food in the Last Year: Never true     Ran Out of Food in the Last Year: Never true   Transportation Needs: No Transportation Needs (7/12/2024)    PRAPARE - Transportation     Lack of Transportation (Medical): No     Lack of Transportation (Non-Medical): No   Physical Activity: Inactive (7/12/2024)    Exercise Vital Sign     Days of Exercise per Week: 0 days     Minutes of Exercise per Session: 0 min   Stress: Stress Concern Present (7/12/2024)    [employer] [place] [employer] - Occupational Stress Questionnaire     Feeling of Stress : To some extent   Social Connections: Moderately Isolated (7/12/2024)    Social Connection and Isolation Panel [NHANES]     Frequency of Communication with Friends and Family: More than three times a week     Frequency of Social Gatherings with Friends and Family: More than three times a week     Attends [employer] Services: Never     Active Member of Clubs or Organizations: No     Attends Club or Organization Meetings: Never     Marital Status:    Intimate Partner Violence: Not At Risk (7/12/2024)    Humiliation, Afraid, Rape, and Kick questionnaire     Fear of Current or Ex-Partner: No     Emotionally Abused: No      Physically Abused: No     Sexually Abused: No   Housing Stability: Low Risk  (2024)    Housing Stability Vital Sign     Unable to Pay for Housing in the Last Year: No     Number of Times Moved in the Last Year: 0     Homeless in the Last Year: No       Past Surgical History:   Procedure Laterality Date    CARDIAC CATHETERIZATION       SECTION, CLASSIC  2014     Section    COLONOSCOPY  2013    Complete Colonoscopy    SMALL INTESTINE SURGERY  2016    Small Bowel Resection         Electronically Signed      PAKO Espinoza MD      Orthopaedic Hand Surgery      [phone]

## 2025-01-30 NOTE — PATIENT INSTRUCTIONS
See kidney specialist  Dr Padgett  Return in 5 weeks   Increase Hydralazine to 50 twice a day  Start Ozempic after finishing Trulicity

## 2025-01-30 NOTE — ASSESSMENT & PLAN NOTE
Orders:    gabapentin (Neurontin) 100 mg capsule; Take 1 capsule (100 mg) by mouth 3 times a day.

## 2025-01-30 NOTE — PROGRESS NOTES
"Subjective   Reason for Visit: Analia Murray is an 74 y.o. female here for a Medicare Wellness visit.               HPI  Dexa due next year  Mammogram due April  No more colonoscopies  Saw eye doctor in August  Patient Care Team:  Thais Sanchez MD as PCP - General (Internal Medicine)  Thais Sanchez MD     Review of Systems    Objective   Vitals:  /84 (BP Location: Right arm, Patient Position: Sitting, BP Cuff Size: Large adult)   Pulse 78   Temp 36.9 °C (98.4 °F) (Temporal)   Resp 17   Ht 1.626 m (5' 4\")   Wt 134 kg (296 lb)   LMP  (LMP Unknown)   SpO2 96%   BMI 50.81 kg/m²       Physical Exam  HEENT neg  Lungs clear   Heart RRR  Mild leg edema;  Decreased pedal pulses   Sensation intact  Left wrist warm and tender with pain on medial flexion  Assessment & Plan  HTN (hypertension), benign    Orders:    hydrALAZINE (Apresoline) 50 mg tablet; Take 1 tablet (50 mg) by mouth 2 times a day.    Type 2 diabetes mellitus with chronic kidney disease, without long-term current use of insulin, unspecified CKD stage (Multi)    Orders:    semaglutide 0.25 mg or 0.5 mg (2 mg/3 mL) pen injector; Inject 0.5 mg under the skin 1 (one) time per week.    Hemoglobin A1C; Future    Lipid Panel; Future    Comprehensive Metabolic Panel; Future    Anemia in chronic kidney disease, unspecified CKD stage    Orders:    CBC; Future    Chronic diastolic heart failure  Sees Dr Ackerman         Dyslipidemia         Pulmonary hypertension (Multi)         Class 3 severe obesity due to excess calories with serious comorbidity and body mass index (BMI) of 50.0 to 59.9 in adult         Hypothyroidism, unspecified type         Osteoporosis with current pathological fracture, unspecified osteoporosis type, sequela  Dr Lucio mentioned Prolia once calcium is normalized which it is.         Type 2 diabetes mellitus with hyperglycemia, without long-term current use of insulin    Orders:    POCT fingerstick glucose manually " resulted    Secondary renal hyperparathyroidism (Multi)         CKD stage 3 due to type 2 diabetes mellitus (Multi)         MGUS (monoclonal gammopathy of unknown significance)  Follows up with Dr Thomas         Asthma, unspecified asthma severity, unspecified whether complicated, unspecified whether persistent ([facility])  stable         Severe sleep apnea  Will get CPAP on the 5th         Lumbar radiculopathy    Orders:    gabapentin (Neurontin) 100 mg capsule; Take 1 capsule (100 mg) by mouth 3 times a day.    Anxiety and depression    Orders:    sertraline (Zoloft) 25 mg tablet; Take 1 tablet (25 mg) by mouth once daily for 3 days.    sertraline (Zoloft) 50 mg tablet; Take 1 tablet (50 mg) by mouth once daily.    Screening mammogram for breast cancer    Orders:    BI mammo bilateral screening tomosynthesis; Future    Left wrist pain    Orders:    Uric acid; Future    Referral to Orthopaedic Surgery; Future    Pain in finger of left hand    Orders:    XR fingers left 2+ views; Future    Referral to Orthopaedic Surgery; Future    Medicare annual wellness visit, subsequent         Chronic kidney disease (CKD), stage IV (severe) (Multi)

## 2025-01-31 ENCOUNTER — APPOINTMENT (OUTPATIENT)
Dept: ORTHOPEDIC SURGERY | Facility: HOSPITAL | Age: 75
End: 2025-01-31
Payer: MEDICARE

## 2025-01-31 PROBLEM — N18.4 CHRONIC KIDNEY DISEASE (CKD), STAGE IV (SEVERE) (MULTI): Status: ACTIVE | Noted: 2025-01-31

## 2025-01-31 NOTE — ASSESSMENT & PLAN NOTE
Orders:    semaglutide 0.25 mg or 0.5 mg (2 mg/3 mL) pen injector; Inject 0.5 mg under the skin 1 (one) time per week.    Hemoglobin A1C; Future    Lipid Panel; Future    Comprehensive Metabolic Panel; Future

## 2025-01-31 NOTE — ASSESSMENT & PLAN NOTE
Orders:    hydrALAZINE (Apresoline) 50 mg tablet; Take 1 tablet (50 mg) by mouth 2 times a day.

## 2025-02-10 ENCOUNTER — APPOINTMENT (OUTPATIENT)
Dept: HEMATOLOGY/ONCOLOGY | Facility: CLINIC | Age: 75
End: 2025-02-10
Payer: MEDICARE

## 2025-02-10 ENCOUNTER — LAB (OUTPATIENT)
Dept: LAB | Facility: CLINIC | Age: 75
End: 2025-02-10
Payer: MEDICARE

## 2025-02-10 VITALS
WEIGHT: 293 LBS | BODY MASS INDEX: 52.46 KG/M2 | SYSTOLIC BLOOD PRESSURE: 143 MMHG | HEART RATE: 78 BPM | DIASTOLIC BLOOD PRESSURE: 82 MMHG | OXYGEN SATURATION: 93 % | TEMPERATURE: 97.3 F | RESPIRATION RATE: 18 BRPM

## 2025-02-10 DIAGNOSIS — D47.2 MGUS (MONOCLONAL GAMMOPATHY OF UNKNOWN SIGNIFICANCE): Primary | ICD-10-CM

## 2025-02-10 DIAGNOSIS — D47.2 MGUS (MONOCLONAL GAMMOPATHY OF UNKNOWN SIGNIFICANCE): ICD-10-CM

## 2025-02-10 LAB
ALBUMIN SERPL BCP-MCNC: 3.6 G/DL (ref 3.4–5)
ALP SERPL-CCNC: 102 U/L (ref 33–136)
ALT SERPL W P-5'-P-CCNC: 9 U/L (ref 7–45)
ANION GAP SERPL CALC-SCNC: 15 MMOL/L (ref 10–20)
AST SERPL W P-5'-P-CCNC: 10 U/L (ref 9–39)
B2 MICROGLOB SERPL-MCNC: 7.4 MG/L (ref 0.7–2.2)
BASOPHILS # BLD AUTO: 0.02 X10*3/UL (ref 0–0.1)
BASOPHILS NFR BLD AUTO: 0.2 %
BILIRUB SERPL-MCNC: 0.5 MG/DL (ref 0–1.2)
BUN SERPL-MCNC: 33 MG/DL (ref 6–23)
CALCIUM SERPL-MCNC: 8.5 MG/DL (ref 8.6–10.6)
CHLORIDE SERPL-SCNC: 106 MMOL/L (ref 98–107)
CO2 SERPL-SCNC: 27 MMOL/L (ref 21–32)
CREAT SERPL-MCNC: 1.87 MG/DL (ref 0.5–1.05)
EGFRCR SERPLBLD CKD-EPI 2021: 28 ML/MIN/1.73M*2
EOSINOPHIL # BLD AUTO: 0.25 X10*3/UL (ref 0–0.4)
EOSINOPHIL NFR BLD AUTO: 3.1 %
ERYTHROCYTE [DISTWIDTH] IN BLOOD BY AUTOMATED COUNT: 19.4 % (ref 11.5–14.5)
EST. AVERAGE GLUCOSE BLD GHB EST-MCNC: 128 MG/DL
GLUCOSE SERPL-MCNC: 96 MG/DL (ref 74–99)
HBA1C MFR BLD: 6.1 %
HCT VFR BLD AUTO: 33.4 % (ref 36–46)
HGB BLD-MCNC: 9.7 G/DL (ref 12–16)
IGA SERPL-MCNC: 493 MG/DL (ref 70–400)
IGG SERPL-MCNC: 1570 MG/DL (ref 700–1600)
IGM SERPL-MCNC: 155 MG/DL (ref 40–230)
IMM GRANULOCYTES # BLD AUTO: 0.01 X10*3/UL (ref 0–0.5)
IMM GRANULOCYTES NFR BLD AUTO: 0.1 % (ref 0–0.9)
LYMPHOCYTES # BLD AUTO: 1.08 X10*3/UL (ref 0.8–3)
LYMPHOCYTES NFR BLD AUTO: 13.2 %
MCH RBC QN AUTO: 22.7 PG (ref 26–34)
MCHC RBC AUTO-ENTMCNC: 29 G/DL (ref 32–36)
MCV RBC AUTO: 78 FL (ref 80–100)
MONOCYTES # BLD AUTO: 0.47 X10*3/UL (ref 0.05–0.8)
MONOCYTES NFR BLD AUTO: 5.8 %
NEUTROPHILS # BLD AUTO: 6.33 X10*3/UL (ref 1.6–5.5)
NEUTROPHILS NFR BLD AUTO: 77.6 %
NRBC BLD-RTO: ABNORMAL /100{WBCS}
PLATELET # BLD AUTO: 170 X10*3/UL (ref 150–450)
POTASSIUM SERPL-SCNC: 4.8 MMOL/L (ref 3.5–5.3)
PROT SERPL-MCNC: 7.1 G/DL (ref 6.4–8.2)
PROT SERPL-MCNC: 7.1 G/DL (ref 6.4–8.2)
RBC # BLD AUTO: 4.28 X10*6/UL (ref 4–5.2)
SODIUM SERPL-SCNC: 143 MMOL/L (ref 136–145)
URATE SERPL-MCNC: 10 MG/DL (ref 2.3–6.7)
WBC # BLD AUTO: 8.2 X10*3/UL (ref 4.4–11.3)

## 2025-02-10 PROCEDURE — 82232 ASSAY OF BETA-2 PROTEIN: CPT

## 2025-02-10 PROCEDURE — 36415 COLL VENOUS BLD VENIPUNCTURE: CPT

## 2025-02-10 PROCEDURE — 82784 ASSAY IGA/IGD/IGG/IGM EACH: CPT

## 2025-02-10 PROCEDURE — 1126F AMNT PAIN NOTED NONE PRSNT: CPT

## 2025-02-10 PROCEDURE — 3079F DIAST BP 80-89 MM HG: CPT

## 2025-02-10 PROCEDURE — 1036F TOBACCO NON-USER: CPT

## 2025-02-10 PROCEDURE — 99215 OFFICE O/P EST HI 40 MIN: CPT

## 2025-02-10 PROCEDURE — 3077F SYST BP >= 140 MM HG: CPT

## 2025-02-10 PROCEDURE — 80053 COMPREHEN METABOLIC PANEL: CPT

## 2025-02-10 PROCEDURE — 84155 ASSAY OF PROTEIN SERUM: CPT | Mod: 59

## 2025-02-10 PROCEDURE — 84165 PROTEIN E-PHORESIS SERUM: CPT

## 2025-02-10 PROCEDURE — 1159F MED LIST DOCD IN RCRD: CPT

## 2025-02-10 PROCEDURE — 83521 IG LIGHT CHAINS FREE EACH: CPT

## 2025-02-10 PROCEDURE — 83036 HEMOGLOBIN GLYCOSYLATED A1C: CPT | Performed by: PEDIATRICS

## 2025-02-10 PROCEDURE — 85025 COMPLETE CBC W/AUTO DIFF WBC: CPT

## 2025-02-10 PROCEDURE — 84550 ASSAY OF BLOOD/URIC ACID: CPT | Performed by: PEDIATRICS

## 2025-02-10 ASSESSMENT — ENCOUNTER SYMPTOMS
ENDOCRINE NEGATIVE: 1
LOSS OF SENSATION IN FEET: 0
BACK PAIN: 1
EYES NEGATIVE: 1
NEUROLOGICAL NEGATIVE: 1
CONSTITUTIONAL NEGATIVE: 1
OCCASIONAL FEELINGS OF UNSTEADINESS: 1
HEMATOLOGIC/LYMPHATIC NEGATIVE: 1
GASTROINTESTINAL NEGATIVE: 1
CARDIOVASCULAR NEGATIVE: 1
DEPRESSION: 0
PSYCHIATRIC NEGATIVE: 1
RESPIRATORY NEGATIVE: 1

## 2025-02-10 ASSESSMENT — PAIN SCALES - GENERAL: PAINLEVEL_OUTOF10: 0-NO PAIN

## 2025-02-10 NOTE — PROGRESS NOTES
Patient ID: Analia Murray is a 74 y.o. female.    Initial MGUS Workup 9/27/21   - SPEP: IgA Kappa 0.2 g/dl  - SFLC: K 18.56, L 4.36, R 4.26  - Immunoglobulins: IgG 1700, IgA 515  - UPEP: No monoclonal proteins detected    -S: N/A  -Li: K 18.56, L 4.36, R 4.26  -M: N/A  -C: 8.9  -R: SrCr 1.84  -A: Hgb 10.0  -B: N/A    BMBX 7/9/2018  FINAL DIAGNOSIS   A&B: BONE MARROW, ASPIRATE WITH CLOT AND CORE BIOPSY WITH TOUCH IMPRINT,   ILIAC CREST:     --CELLULAR BONE MARROW SPECIMEN WITH MATURING TRILINEAGE HEMATOPOIESIS AND   MINIMAL INVOLVEMENT BY PLASMA CELL NEOPLASM WITH LESS THAN 10% CLONAL PLASMA   CELLS, SEE NOTE.   --THERE IS NO MORPHOLOGIC EVIDENCE OF MYELODYSPLASTIC SYNDROME   SUBJECTIVE   Patient presents as a new patient to me for evaluation for MGUS. She was previously followed by Amadou Hoff NP but was lost to follow up. Reports feeling well today. Denies fevers, chills, n/v/d/c, night sweats, unintentional weight loss, neuropathy, chest pain, dizziness.       Review of Systems   Constitutional: Negative.    HENT:  Negative.     Eyes: Negative.    Respiratory: Negative.     Cardiovascular: Negative.    Gastrointestinal: Negative.    Endocrine: Negative.    Genitourinary: Negative.     Musculoskeletal:  Positive for back pain.   Skin: Negative.    Neurological: Negative.    Hematological: Negative.    Psychiatric/Behavioral: Negative.         Past Medical History:   Diagnosis Date    CHF (congestive heart failure)     Chronic kidney disease     Coronary artery disease     Cough, unspecified 03/02/2017    Cough    Diverticulitis of large intestine with perforation and abscess without bleeding 10/21/2016    Colonic diverticular abscess    Fistula of intestine 09/05/2017    Enterocutaneous fistula    Hyperlipidemia     Hypertension     Morbid (severe) obesity due to excess calories (Multi)     Morbid obesity    Obstructive sleep apnea (adult) (pediatric) 06/27/2018    HOLDEN on CPAP    Other conditions  influencing health status     DEXA Body Composition Study    Personal history of other diseases of the circulatory system     History of hypertension    Personal history of other diseases of the digestive system 2018    History of esophagitis    Personal history of other diseases of the digestive system 2017    History of diverticulitis    Personal history of other endocrine, nutritional and metabolic disease 2020    History of hyperlipidemia    Personal history of other endocrine, nutritional and metabolic disease     History of hyperlipidemia    Personal history of other endocrine, nutritional and metabolic disease     History of type 1 diabetes mellitus    Personal history of other mental and behavioral disorders 2018    History of anxiety    Unspecified asthma, uncomplicated ([facility]) 10/05/2022    Asthma     Family History   Problem Relation Name Age of Onset    Coronary artery disease Mother      Heart attack Mother       Past Surgical History:   Procedure Laterality Date    CARDIAC CATHETERIZATION       SECTION, CLASSIC  2014     Section    COLONOSCOPY  2013    Complete Colonoscopy    SMALL INTESTINE SURGERY  2016    Small Bowel Resection       OBJECTIVE      BSA: 2.51 meters squared  /82 (BP Location: Right arm, Patient Position: Sitting, BP Cuff Size: Large adult)   Pulse 78   Temp 36.3 °C (97.3 °F) (Core)   Resp 18   Wt 139 kg (305 lb 9.6 oz)   LMP  (LMP Unknown)   SpO2 93%   BMI 52.46 kg/m²     Physical Exam  Constitutional:       Appearance: Normal appearance.   HENT:      Mouth/Throat:      Mouth: Mucous membranes are moist.      Pharynx: Oropharynx is clear.   Eyes:      Extraocular Movements: Extraocular movements intact.      Conjunctiva/sclera: Conjunctivae normal.      Pupils: Pupils are equal, round, and reactive to light.   Cardiovascular:      Rate and Rhythm: Normal rate and regular rhythm.      Pulses: Normal pulses.       Heart sounds: Normal heart sounds.   Pulmonary:      Effort: Pulmonary effort is normal.      Breath sounds: Normal breath sounds.   Abdominal:      General: Abdomen is flat. Bowel sounds are normal.      Palpations: Abdomen is soft.   Musculoskeletal:         General: Normal range of motion.      Cervical back: Normal range of motion.   Skin:     General: Skin is warm and dry.   Neurological:      General: No focal deficit present.      Mental Status: She is alert and oriented to person, place, and time. Mental status is at baseline.   Psychiatric:         Mood and Affect: Mood normal.         Thought Content: Thought content normal.         Performance Status:  Karnofsky Score: 100 - Fully active, able to carry on all pre-disease performed without restriction    MONITORING: MYELOMA LAB MARKERS  MARKERS RECENT LAB VALUES   Free Kappa Light Chains Lab Results   Component Value Date    KAPPA 18.56 (H) 02/27/2024    KAPPA 10.80 (H) 09/07/2018    KAPPA 9.88 (H) 07/09/2018      Free Lambda Light Chains Lab Results   Component Value Date    LAMBDA 4.36 (H) 02/27/2024    LAMBDA 3.89 (H) 09/07/2018    LAMBDA 3.28 (H) 07/09/2018      Free Light Chain Ratio Lab Results   Component Value Date    KAPLS 4.26 (H) 02/27/2024       Immunofixation Lab Results   Component Value Date    IEPIN  02/27/2024 2/27/24  Known monoclonal IgA kappa in the beta region at 0.2 g/dL. Unchanged from the previous analysis on 1/2/24.    IEPIN  01/02/2024 01/02/24 Known monoclonal IgA kappa in the beta region at 0.2 g/dL. Unchanged from the previous analysis on 07/14/23.    IEPIN ABNORMAL 07/14/2023    IEPIN ABNORMAL 09/27/2021    IEPIN ABNORMAL 05/29/2018      IgG Lab Results   Component Value Date    IGG 1,700 (H) 02/27/2024    IGG 1,550 09/07/2018    IGG 1,630 (H) 07/09/2018      IgA Lab Results   Component Value Date     (H) 02/27/2024     (H) 09/07/2018     (H) 07/09/2018      IgM Lab Results   Component Value Date      02/27/2024     09/07/2018     07/09/2018        Other Labs  Lab Results   Component Value Date    WBC 8.2 02/10/2025    NRBC  02/10/2025      Comment:      Not Measured    RBC 4.28 02/10/2025    HGB 9.7 (L) 02/10/2025    HCT 33.4 (L) 02/10/2025    MCV 78 (L) 02/10/2025    MCH 22.7 (L) 02/10/2025    MCHC 29.0 (L) 02/10/2025    RDW 19.4 (H) 02/10/2025     02/10/2025    IGPCT 0.1 02/10/2025    LYMPHOPCT 13.2 02/10/2025    MONOPCT 5.8 02/10/2025    EOSPCT 3.1 02/10/2025    BASOPCT 0.2 02/10/2025    NEUTROABS 6.33 (H) 02/10/2025    MONOSABS 0.47 02/10/2025    EOSABS 0.25 02/10/2025       Lab Results   Component Value Date    GLUCOSE 92 11/26/2024     11/26/2024    K 4.9 11/26/2024     11/26/2024    CO2 24 11/26/2024    ANIONGAP 12 11/26/2024    BUN 33 (H) 11/26/2024    CREATININE 2.16 (H) 11/26/2024    EGFR 23 (L) 11/26/2024    CALCIUM 8.7 11/26/2024    ALBUMIN 4.0 11/26/2024    ALKPHOS 104 11/26/2024    PROT 7.9 11/26/2024    AST 12 11/26/2024    BILITOT 0.7 11/26/2024    ALT 10 11/26/2024       Lab Results   Component Value Date     11/01/2021       ASSESSMENT & PLAN   IgA [place] MGUS  - labs today: CBC, CMP, SPEP, UPEP, FLC, B2 microglobin, immunoglobulin  - CKD/HTN  - discussed implications for monitoring MGUS  - follow up in 2 weeks to discuss labs     Roxann Thomas, DILCIA-CNP

## 2025-02-11 ENCOUNTER — OFFICE VISIT (OUTPATIENT)
Dept: ORTHOPEDIC SURGERY | Facility: CLINIC | Age: 75
End: 2025-02-11
Payer: MEDICARE

## 2025-02-11 ENCOUNTER — TELEPHONE (OUTPATIENT)
Dept: PRIMARY CARE | Facility: CLINIC | Age: 75
End: 2025-02-11

## 2025-02-11 DIAGNOSIS — E79.0 HYPERURICEMIA: Primary | ICD-10-CM

## 2025-02-11 DIAGNOSIS — M79.645 PAIN IN FINGER OF LEFT HAND: ICD-10-CM

## 2025-02-11 DIAGNOSIS — M65.932 EXTENSOR TENOSYNOVITIS OF WRIST, LEFT: ICD-10-CM

## 2025-02-11 DIAGNOSIS — M25.532 LEFT WRIST PAIN: ICD-10-CM

## 2025-02-11 LAB
KAPPA LC SERPL-MCNC: 14.71 MG/DL (ref 0.33–1.94)
KAPPA LC/LAMBDA SER: 3.64 {RATIO} (ref 0.26–1.65)
LAMBDA LC SERPL-MCNC: 4.04 MG/DL (ref 0.57–2.63)

## 2025-02-11 PROCEDURE — 1160F RVW MEDS BY RX/DR IN RCRD: CPT | Performed by: ORTHOPAEDIC SURGERY

## 2025-02-11 PROCEDURE — 1036F TOBACCO NON-USER: CPT | Performed by: ORTHOPAEDIC SURGERY

## 2025-02-11 PROCEDURE — 99204 OFFICE O/P NEW MOD 45 MIN: CPT | Performed by: ORTHOPAEDIC SURGERY

## 2025-02-11 PROCEDURE — 1159F MED LIST DOCD IN RCRD: CPT | Performed by: ORTHOPAEDIC SURGERY

## 2025-02-11 PROCEDURE — 99214 OFFICE O/P EST MOD 30 MIN: CPT | Performed by: ORTHOPAEDIC SURGERY

## 2025-02-11 PROCEDURE — 3044F HG A1C LEVEL LT 7.0%: CPT | Performed by: ORTHOPAEDIC SURGERY

## 2025-02-11 RX ORDER — ALLOPURINOL 100 MG/1
50 TABLET ORAL EVERY OTHER DAY
Qty: 45 TABLET | Refills: 0 | Status: SHIPPED | OUTPATIENT
Start: 2025-02-11 | End: 2025-08-10

## 2025-02-11 NOTE — TELEPHONE ENCOUNTER
Result Communication    No results found from the In Basket message.    8:32 AM      Results were successfully communicated with the patient and they acknowledged their understanding.

## 2025-02-11 NOTE — PROGRESS NOTES
CHIEF COMPLAINT         Left wrist pain    ASSESSMENT + PLAN     ***        HISTORY OF PRESENT ILLNESS       Patient is a 74 y.o. ***-hand dominant female ***, who presents today for evaluation of ***      REVIEW OF SYSTEMS       A 30-item multi-system Review Of Systems was obtained on today's intake form.  This was reviewed with the patient and is correct.  The pertinent positives and negatives are listed above.  The form has been scanned separately into the medical record.      PHYSICAL EXAM    Constitutional:    Appears stated age. Well-developed and well-nourished ***.  Psychiatric:         Pleasant normal mood and affect. Behavior is appropriate for the situation.   Head:                   Normocephalic and atraumatic.  Eyes:                    Pupils are equal and round.  Cardiovascular:  2+ radial and ulnar pulses. Fingers well-perfused.  Respiratory:        Effort normal. No respiratory distress. Speaking in complete sentences.  Neurologic:       Alert and oriented to person, place, and time.  Skin:                Skin is intact, warm and dry.  Hematologic / Lymphatic:    No lymphedema or lymphangitis.    Extremities / Musculoskeletal:                      ***      IMAGING / LABS / EMGs           ***      Past Medical History:   Diagnosis Date    CHF (congestive heart failure)     Chronic kidney disease     Coronary artery disease     Cough, unspecified 03/02/2017    Cough    Diverticulitis of large intestine with perforation and abscess without bleeding 10/21/2016    Colonic diverticular abscess    Fistula of intestine 09/05/2017    Enterocutaneous fistula    Hyperlipidemia     Hypertension     Morbid (severe) obesity due to excess calories (Multi)     Morbid obesity    Obstructive sleep apnea (adult) (pediatric) 06/27/2018    HOLDEN on CPAP    Other conditions influencing health status     DEXA Body Composition Study    Personal history of other diseases of the circulatory system     History of hypertension     Personal history of other diseases of the digestive system 02/07/2018    History of esophagitis    Personal history of other diseases of the digestive system 09/05/2017    History of diverticulitis    Personal history of other endocrine, nutritional and metabolic disease 04/20/2020    History of hyperlipidemia    Personal history of other endocrine, nutritional and metabolic disease     History of hyperlipidemia    Personal history of other endocrine, nutritional and metabolic disease     History of type 1 diabetes mellitus    Personal history of other mental and behavioral disorders 05/21/2018    History of anxiety    Unspecified asthma, uncomplicated ([facility]) 10/05/2022    Asthma       Medication Documentation Review Audit       Reviewed by Jennifer Lozoya MA (Medical Assistant) on 02/10/25 at 1424      Medication Order Taking? Sig Documenting Provider Last Dose Status   amLODIPine (Norvasc) 10 mg tablet [id number]  Take 1 tablet (10 mg) by mouth once daily. Alivia Rasmussen, APRN-CNP  Active   aspirin 81 mg EC tablet [id number]  Take 1 tablet (81 mg) by mouth once daily. Historical Provider, MD  Active   cholecalciferol (Vitamin D-3) 25 MCG (1000 UT) capsule [id number] No TAKE AS DIRECTED. Historical Provider, MD Taking Active   dulaglutide (TRULICITY) injection 0.75 mg [id number]   Earl Koroma MD  Active   ezetimibe (Zetia) 10 mg tablet [id number]  TAKE 1 TABLET BY MOUTH ONCE  DAILY Thais Sanchez MD  Active   ferrous sulfate  mg ER tablet [id number] No Take 1 tablet by mouth once daily. Historical Provider, MD Taking Active   fluticasone (Flonase) 50 mcg/actuation nasal spray [id number]  Administer 2 sprays into each nostril 2 times a day. Harsha Gupta MD  Active   fluticasone propion-salmeteroL (Advair Diskus) 250-50 mcg/dose diskus inhaler [id number]  Inhale 1 puff 2 times a day. Rinse mouth with water after use to reduce aftertaste and incidence of candidiasis. Do not swallow. Fadia Li MD  Active    furosemide (Lasix) 20 mg tablet [id number]  Take 0.5 tablets (10 mg) by mouth once daily. SEEMA Aceves  Active   gabapentin (Neurontin) 100 mg capsule [id number]  Take 1 capsule (100 mg) by mouth 3 times a day. Thais Sanchez MD  Active   hydrALAZINE (Apresoline) 50 mg tablet [id number]  Take 1 tablet (50 mg) by mouth 2 times a day. Thais Sanchez MD  Active   inhalational spacing device (Aerochamber MV) inhaler [id number]  Use as instructed Apolinar Sage DO  Active   metoprolol succinate XL (Toprol-XL) 100 mg 24 hr tablet [id number] No TAKE 1 TABLET BY MOUTH DAILY SEEMA Aceves Taking Active   montelukast (Singulair) 10 mg tablet [id number] No Take 1 tablet (10 mg) by mouth once daily at bedtime. Ish Franco MD Taking Active   nitroglycerin (Nitrostat) 0.4 mg SL tablet [id number]  Place 1 tablet (0.4 mg) under the tongue every 5 minutes if needed for chest pain. SEEMA Acevse  Active   pravastatin (Pravachol) 40 mg tablet [id number] No TAKE 1 TABLET BY MOUTH ONCE  DAILY AT BEDTIME Thais Sanchez MD Taking Active   semaglutide 0.25 mg or 0.5 mg (2 mg/3 mL) pen injector [id number]  Inject 0.5 mg under the skin 1 (one) time per week. Thais Sanchez MD  Active   sertraline (Zoloft) 25 mg tablet [id number]  Take 1 tablet (25 mg) by mouth once daily for 3 days. Thais Sanchez MD   25 2359   sertraline (Zoloft) 50 mg tablet [id number]  Take 1 tablet (50 mg) by mouth once daily. Thais Sanchez MD  Active   Synthroid 200 mcg tablet [id number] No TAKE 8 TABLETS BY MOUTH WEEKLY  AS DIRECTED Earl Koroma MD Taking Active   Ventolin HFA 90 mcg/actuation inhaler [id number] No USE 2 INHALATIONS BY MOUTH EVERY 4 HOURS AS NEEDED FOR WHEEZING Thais Sanchez MD Taking Active                    Allergies   Allergen Reactions    Ciprofloxacin Itching, Nausea Only, Rash and Other     Chills, N and V    Morphine Unknown and Hallucinations    Penicillins Anaphylaxis, Hives, Itching and Swelling     > 20  yrs ago       Social History     Socioeconomic History    Marital status:      Spouse name: Not on file    Number of children: Not on file    Years of education: Not on file    Highest education level: Not on file   Occupational History    Not on file   Tobacco Use    Smoking status: Never     Passive exposure: Never    Smokeless tobacco: Never   Vaping Use    Vaping status: Never Used   Substance and Sexual Activity    Alcohol use: Never    Drug use: Never    Sexual activity: Not on file   Other Topics Concern    Not on file   Social History Narrative    Not on file     Social Drivers of Health     Financial Resource Strain: Low Risk  (7/12/2024)    Overall Financial Resource Strain (CARDIA)     Difficulty of Paying Living Expenses: Not hard at all   Food Insecurity: No Food Insecurity (7/12/2024)    Hunger Vital Sign     Worried About Running Out of Food in the Last Year: Never true     Ran Out of Food in the Last Year: Never true   Transportation Needs: No Transportation Needs (7/12/2024)    PRAPARE - Transportation     Lack of Transportation (Medical): No     Lack of Transportation (Non-Medical): No   Physical Activity: Inactive (7/12/2024)    Exercise Vital Sign     Days of Exercise per Week: 0 days     Minutes of Exercise per Session: 0 min   Stress: Stress Concern Present (7/12/2024)    Citizen of the [place] of Occupational Health - Occupational Stress Questionnaire     Feeling of Stress : To some extent   Social Connections: Moderately Isolated (7/12/2024)    Social Connection and Isolation Panel [NHANES]     Frequency of Communication with Friends and Family: More than three times a week     Frequency of Social Gatherings with Friends and Family: More than three times a week     Attends Druze Services: Never     Active Member of Clubs or Organizations: No     Attends Club or Organization Meetings: Never     Marital Status:    Intimate Partner Violence: Not At Risk (7/12/2024)    Humiliation,  Afraid, Rape, and Kick questionnaire     Fear of Current or Ex-Partner: No     Emotionally Abused: No     Physically Abused: No     Sexually Abused: No   Housing Stability: Low Risk  (2024)    Housing Stability Vital Sign     Unable to Pay for Housing in the Last Year: No     Number of Times Moved in the Last Year: 0     Homeless in the Last Year: No       Past Surgical History:   Procedure Laterality Date    CARDIAC CATHETERIZATION       SECTION, CLASSIC  2014     Section    COLONOSCOPY  2013    Complete Colonoscopy    SMALL INTESTINE SURGERY  2016    Small Bowel Resection         Electronically Signed      PAKO Espionza MD      Orthopaedic Hand Surgery      [phone]   times a week     Attends Mandaen Services: Never     Active Member of Clubs or Organizations: No     Attends Club or Organization Meetings: Never     Marital Status:    Intimate Partner Violence: Not At Risk (2024)    Humiliation, Afraid, Rape, and Kick questionnaire     Fear of Current or Ex-Partner: No     Emotionally Abused: No     Physically Abused: No     Sexually Abused: No   Housing Stability: Low Risk  (2024)    Housing Stability Vital Sign     Unable to Pay for Housing in the Last Year: No     Number of Times Moved in the Last Year: 0     Homeless in the Last Year: No       Past Surgical History:   Procedure Laterality Date    CARDIAC CATHETERIZATION       SECTION, CLASSIC  2014     Section    COLONOSCOPY  2013    Complete Colonoscopy    SMALL INTESTINE SURGERY  2016    Small Bowel Resection         Electronically Signed      PAKO Espinoza MD      Orthopaedic Hand Surgery      [phone]

## 2025-02-11 NOTE — Clinical Note
February 12, 2025     Thais Sanchez MD  [address]  [address]  [place] [address]    Patient: Analia Murray   YOB: 1950   Date of Visit: 2/11/2025       Dear Dr. Thais Sanchez MD:    Thank you for referring Analia Murray to me for evaluation. Below are my notes for this consultation.  If you have questions, please do not hesitate to call me. I look forward to following your patient along with you.       Sincerely,     Abdifatah Espinoza MD      CC: No Recipients  ______________________________________________________________________________________

## 2025-02-12 LAB
ALBUMIN: 3.5 G/DL (ref 3.4–5)
ALPHA 1 GLOBULIN: 0.4 G/DL (ref 0.2–0.6)
ALPHA 2 GLOBULIN: 0.7 G/DL (ref 0.4–1.1)
BETA GLOBULIN: 1.3 G/DL (ref 0.5–1.2)
GAMMA GLOBULIN: 1.2 G/DL (ref 0.5–1.4)
IMMUNOFIXATION COMMENT: ABNORMAL
M-PROTEIN 1: 0.2 G/DL
PATH REVIEW - SERUM IMMUNOFIXATION: ABNORMAL
PATH REVIEW-SERUM PROTEIN ELECTROPHORESIS: ABNORMAL
PROTEIN ELECTROPHORESIS COMMENT: ABNORMAL

## 2025-02-21 NOTE — TELEPHONE ENCOUNTER
Dr. Sanchez [facility] [employer] left a voicemail message stating this patients order a CPAP the notes from 7/17/42 is  a Medicare Wellness visit. Pt. Insurance will not cover  or pay unless HOLDEN  symptoms are  stated. If with questions call [phone] opt #[phone] then 3 the fax number is [phone].

## 2025-02-24 ENCOUNTER — TELEMEDICINE (OUTPATIENT)
Dept: HEMATOLOGY/ONCOLOGY | Facility: CLINIC | Age: 75
End: 2025-02-24
Payer: MEDICARE

## 2025-02-24 DIAGNOSIS — D47.2 MGUS (MONOCLONAL GAMMOPATHY OF UNKNOWN SIGNIFICANCE): Primary | ICD-10-CM

## 2025-02-24 PROCEDURE — 3044F HG A1C LEVEL LT 7.0%: CPT

## 2025-02-24 PROCEDURE — 99213 OFFICE O/P EST LOW 20 MIN: CPT

## 2025-02-24 ASSESSMENT — ENCOUNTER SYMPTOMS
GASTROINTESTINAL NEGATIVE: 1
CARDIOVASCULAR NEGATIVE: 1
BACK PAIN: 1
NEUROLOGICAL NEGATIVE: 1
EYES NEGATIVE: 1
RESPIRATORY NEGATIVE: 1
HEMATOLOGIC/LYMPHATIC NEGATIVE: 1
CONSTITUTIONAL NEGATIVE: 1
ENDOCRINE NEGATIVE: 1
PSYCHIATRIC NEGATIVE: 1

## 2025-02-24 NOTE — PROGRESS NOTES
Patient ID: Aanlia Murray is a 74 y.o. female.    Initial MGUS Workup 9/27/21   - SPEP: IgA Kappa 0.2 g/dl  - SFLC: K 18.56, L 4.36, R 4.26  - Immunoglobulins: IgG 1700, IgA 515  - UPEP: No monoclonal proteins detected    -S: N/A  -Li: K 18.56, L 4.36, R 4.26  -M: N/A  -C: 8.9  -R: SrCr 1.84  -A: Hgb 10.0  -B: N/A    BMBX 7/9/2018  FINAL DIAGNOSIS   A&B: BONE MARROW, ASPIRATE WITH CLOT AND CORE BIOPSY WITH TOUCH IMPRINT,   ILIAC CREST:     --CELLULAR BONE MARROW SPECIMEN WITH MATURING TRILINEAGE HEMATOPOIESIS AND   MINIMAL INVOLVEMENT BY PLASMA CELL NEOPLASM WITH LESS THAN 10% CLONAL PLASMA   CELLS, SEE NOTE.   --THERE IS NO MORPHOLOGIC EVIDENCE OF MYELODYSPLASTIC SYNDROME   SUBJECTIVE   Patient presents as a new patient to me for evaluation for MGUS. She was previously followed by Amadou Hoff NP but was lost to follow up. Reports feeling well today. Denies fevers, chills, n/v/d/c, night sweats, unintentional weight loss, neuropathy, chest pain, dizziness.       Review of Systems   Constitutional: Negative.    HENT:  Negative.     Eyes: Negative.    Respiratory: Negative.     Cardiovascular: Negative.    Gastrointestinal: Negative.    Endocrine: Negative.    Genitourinary: Negative.     Musculoskeletal:  Positive for back pain.   Skin: Negative.    Neurological: Negative.    Hematological: Negative.    Psychiatric/Behavioral: Negative.         Past Medical History:   Diagnosis Date    CHF (congestive heart failure)     Chronic kidney disease     Coronary artery disease     Cough, unspecified 03/02/2017    Cough    Diverticulitis of large intestine with perforation and abscess without bleeding 10/21/2016    Colonic diverticular abscess    Fistula of intestine 09/05/2017    Enterocutaneous fistula    Hyperlipidemia     Hypertension     Morbid (severe) obesity due to excess calories (Multi)     Morbid obesity    Obstructive sleep apnea (adult) (pediatric) 06/27/2018    HOLDEN on CPAP    Other conditions  influencing health status     DEXA Body Composition Study    Personal history of other diseases of the circulatory system     History of hypertension    Personal history of other diseases of the digestive system 2018    History of esophagitis    Personal history of other diseases of the digestive system 2017    History of diverticulitis    Personal history of other endocrine, nutritional and metabolic disease 2020    History of hyperlipidemia    Personal history of other endocrine, nutritional and metabolic disease     History of hyperlipidemia    Personal history of other endocrine, nutritional and metabolic disease     History of type 1 diabetes mellitus    Personal history of other mental and behavioral disorders 2018    History of anxiety    Unspecified asthma, uncomplicated ([facility]) 10/05/2022    Asthma     Family History   Problem Relation Name Age of Onset    Coronary artery disease Mother      Heart attack Mother       Past Surgical History:   Procedure Laterality Date    CARDIAC CATHETERIZATION       SECTION, CLASSIC  2014     Section    COLONOSCOPY  2013    Complete Colonoscopy    SMALL INTESTINE SURGERY  2016    Small Bowel Resection       OBJECTIVE      BSA: There is no height or weight on file to calculate BSA.  LMP  (LMP Unknown)     Physical Exam    Performance Status:  Karnofsky Score: 100 - Fully active, able to carry on all pre-disease performed without restriction    MONITORING: MYELOMA LAB MARKERS  MARKERS RECENT LAB VALUES   Free Kappa Light Chains Lab Results   Component Value Date    KAPPA 14.71 (H) 02/10/2025    KAPPA 18.56 (H) 2024    KAPPA 10.80 (H) 2018    KAPPA 9.88 (H) 2018      Free Lambda Light Chains Lab Results   Component Value Date    LAMBDA 4.04 (H) 02/10/2025    LAMBDA 4.36 (H) 2024    LAMBDA 3.89 (H) 2018    LAMBDA 3.28 (H) 2018      Free Light Chain Ratio Lab Results   Component Value  Date    KAPLS 3.64 (H) 02/10/2025    KAPLS 4.26 (H) 02/27/2024       Immunofixation Lab Results   Component Value Date    IEPIN  02/10/2025     2/10/25 Known monoclonal IgA kappa in the beta region at 0.2 g/dL. Unchanged from the previous analysis on 2/27/24.    IEPIN  02/27/2024 2/27/24  Known monoclonal IgA kappa in the beta region at 0.2 g/dL. Unchanged from the previous analysis on 1/2/24.    IEPIN  01/02/2024 01/02/24 Known monoclonal IgA kappa in the beta region at 0.2 g/dL. Unchanged from the previous analysis on 07/14/23.      IgG Lab Results   Component Value Date    IGG 1,570 02/10/2025    IGG 1,700 (H) 02/27/2024    IGG 1,550 09/07/2018    IGG 1,630 (H) 07/09/2018      IgA Lab Results   Component Value Date     (H) 02/10/2025     (H) 02/27/2024     (H) 09/07/2018     (H) 07/09/2018      IgM Lab Results   Component Value Date     02/10/2025     02/27/2024     09/07/2018     07/09/2018        Other Labs  Lab Results   Component Value Date    WBC 8.2 02/10/2025    NRBC  02/10/2025      Comment:      Not Measured    RBC 4.28 02/10/2025    HGB 9.7 (L) 02/10/2025    HCT 33.4 (L) 02/10/2025    MCV 78 (L) 02/10/2025    MCH 22.7 (L) 02/10/2025    MCHC 29.0 (L) 02/10/2025    RDW 19.4 (H) 02/10/2025     02/10/2025    IGPCT 0.1 02/10/2025    LYMPHOPCT 13.2 02/10/2025    MONOPCT 5.8 02/10/2025    EOSPCT 3.1 02/10/2025    BASOPCT 0.2 02/10/2025    NEUTROABS 6.33 (H) 02/10/2025    MONOSABS 0.47 02/10/2025    EOSABS 0.25 02/10/2025       Lab Results   Component Value Date    GLUCOSE 96 02/10/2025     02/10/2025    K 4.8 02/10/2025     02/10/2025    CO2 27 02/10/2025    ANIONGAP 15 02/10/2025    BUN 33 (H) 02/10/2025    CREATININE 1.87 (H) 02/10/2025    EGFR 28 (L) 02/10/2025    CALCIUM 8.5 (L) 02/10/2025    ALBUMIN 3.6 02/10/2025    ALKPHOS 102 02/10/2025    PROT 7.1 02/10/2025    PROT 7.1 02/10/2025    AST 10 02/10/2025    BILITOT 0.5  02/10/2025    ALT 9 02/10/2025       Lab Results   Component Value Date     11/01/2021       ASSESSMENT & PLAN   IgA [facility] MGUS  - labs today: CBC, CMP, SPEP, UPEP, FLC, B2 microglobin, immunoglobulin  - CKD/HTN  - discussed implications for monitoring MGUS  - follow up in 6 mo for lab monitoring     Roxann Thomas, DILCIA-CNP

## 2025-02-25 ENCOUNTER — TELEPHONE (OUTPATIENT)
Dept: PRIMARY CARE | Facility: CLINIC | Age: 75
End: 2025-02-25
Payer: MEDICARE

## 2025-02-25 NOTE — TELEPHONE ENCOUNTER
Dr. Sanchez  someone from [facility] left a voicemail message stating they received an order for a CPAP Machine and they received office notes dated 7/17/24 but no symptoms on there. The sleep study was done on 9/20/24.Please amend the notes from 7/17/24 to show at least one symptom of HOLDEN the insurance requires office notes prior to sleep diagnoses study if this cannot be resolved patient cannot have her machine fax to [phone].

## 2025-03-08 PROBLEM — M65.932 EXTENSOR TENOSYNOVITIS OF WRIST, LEFT: Status: ACTIVE | Noted: 2025-03-08

## 2025-03-11 DIAGNOSIS — E79.0 HYPERURICEMIA: ICD-10-CM

## 2025-03-16 ENCOUNTER — HOSPITAL ENCOUNTER (EMERGENCY)
Facility: HOSPITAL | Age: 75
Discharge: HOME | End: 2025-03-16
Attending: EMERGENCY MEDICINE
Payer: MEDICARE

## 2025-03-16 VITALS
DIASTOLIC BLOOD PRESSURE: 87 MMHG | HEIGHT: 64 IN | WEIGHT: 291 LBS | TEMPERATURE: 97.5 F | BODY MASS INDEX: 49.68 KG/M2 | OXYGEN SATURATION: 94 % | SYSTOLIC BLOOD PRESSURE: 171 MMHG | HEART RATE: 85 BPM | RESPIRATION RATE: 18 BRPM

## 2025-03-16 DIAGNOSIS — G47.00 INSOMNIA, UNSPECIFIED TYPE: ICD-10-CM

## 2025-03-16 DIAGNOSIS — R45.89 SYMPTOMS OF DEPRESSION: Primary | ICD-10-CM

## 2025-03-16 DIAGNOSIS — R79.89 ELEVATED TSH: ICD-10-CM

## 2025-03-16 DIAGNOSIS — M54.9 CHRONIC BACK PAIN, UNSPECIFIED BACK LOCATION, UNSPECIFIED BACK PAIN LATERALITY: ICD-10-CM

## 2025-03-16 DIAGNOSIS — G89.29 CHRONIC BACK PAIN, UNSPECIFIED BACK LOCATION, UNSPECIFIED BACK PAIN LATERALITY: ICD-10-CM

## 2025-03-16 LAB
ALBUMIN SERPL BCP-MCNC: 3.9 G/DL (ref 3.4–5)
ALP SERPL-CCNC: 89 U/L (ref 33–136)
ALT SERPL W P-5'-P-CCNC: 6 U/L (ref 7–45)
ANION GAP SERPL CALC-SCNC: 15 MMOL/L (ref 10–20)
APPEARANCE UR: CLEAR
AST SERPL W P-5'-P-CCNC: 11 U/L (ref 9–39)
BASOPHILS # BLD AUTO: 0.02 X10*3/UL (ref 0–0.1)
BASOPHILS NFR BLD AUTO: 0.3 %
BILIRUB SERPL-MCNC: 0.6 MG/DL (ref 0–1.2)
BILIRUB UR STRIP.AUTO-MCNC: NEGATIVE MG/DL
BUN SERPL-MCNC: 25 MG/DL (ref 6–23)
CALCIUM SERPL-MCNC: 9.1 MG/DL (ref 8.6–10.6)
CHLORIDE SERPL-SCNC: 102 MMOL/L (ref 98–107)
CO2 SERPL-SCNC: 27 MMOL/L (ref 21–32)
COLOR UR: ABNORMAL
CREAT SERPL-MCNC: 2.12 MG/DL (ref 0.5–1.05)
EGFRCR SERPLBLD CKD-EPI 2021: 24 ML/MIN/1.73M*2
EOSINOPHIL # BLD AUTO: 0.22 X10*3/UL (ref 0–0.4)
EOSINOPHIL NFR BLD AUTO: 3 %
ERYTHROCYTE [DISTWIDTH] IN BLOOD BY AUTOMATED COUNT: 19.2 % (ref 11.5–14.5)
GLUCOSE SERPL-MCNC: 104 MG/DL (ref 74–99)
GLUCOSE UR STRIP.AUTO-MCNC: NORMAL MG/DL
HCT VFR BLD AUTO: 35.2 % (ref 36–46)
HGB BLD-MCNC: 10.8 G/DL (ref 12–16)
IMM GRANULOCYTES # BLD AUTO: 0.02 X10*3/UL (ref 0–0.5)
IMM GRANULOCYTES NFR BLD AUTO: 0.3 % (ref 0–0.9)
KETONES UR STRIP.AUTO-MCNC: NEGATIVE MG/DL
LEUKOCYTE ESTERASE UR QL STRIP.AUTO: ABNORMAL
LYMPHOCYTES # BLD AUTO: 1.27 X10*3/UL (ref 0.8–3)
LYMPHOCYTES NFR BLD AUTO: 17.3 %
MAGNESIUM SERPL-MCNC: 2 MG/DL (ref 1.6–2.4)
MCH RBC QN AUTO: 22.9 PG (ref 26–34)
MCHC RBC AUTO-ENTMCNC: 30.7 G/DL (ref 32–36)
MCV RBC AUTO: 75 FL (ref 80–100)
MONOCYTES # BLD AUTO: 0.51 X10*3/UL (ref 0.05–0.8)
MONOCYTES NFR BLD AUTO: 6.9 %
NEUTROPHILS # BLD AUTO: 5.3 X10*3/UL (ref 1.6–5.5)
NEUTROPHILS NFR BLD AUTO: 72.2 %
NITRITE UR QL STRIP.AUTO: NEGATIVE
NRBC BLD-RTO: 0 /100 WBCS (ref 0–0)
PH UR STRIP.AUTO: 8 [PH]
PLATELET # BLD AUTO: 170 X10*3/UL (ref 150–450)
POTASSIUM SERPL-SCNC: 4.8 MMOL/L (ref 3.5–5.3)
PROT SERPL-MCNC: 8.1 G/DL (ref 6.4–8.2)
PROT UR STRIP.AUTO-MCNC: ABNORMAL MG/DL
RBC # BLD AUTO: 4.72 X10*6/UL (ref 4–5.2)
RBC # UR STRIP.AUTO: NEGATIVE MG/DL
RBC #/AREA URNS AUTO: ABNORMAL /HPF
RENAL EPI CELLS #/AREA UR COMP ASSIST: ABNORMAL /HPF
SODIUM SERPL-SCNC: 139 MMOL/L (ref 136–145)
SP GR UR STRIP.AUTO: 1.01
T4 FREE SERPL-MCNC: 1.05 NG/DL (ref 0.78–1.48)
TSH SERPL-ACNC: 21.57 MIU/L (ref 0.44–3.98)
UROBILINOGEN UR STRIP.AUTO-MCNC: NORMAL MG/DL
WBC # BLD AUTO: 7.3 X10*3/UL (ref 4.4–11.3)
WBC #/AREA URNS AUTO: ABNORMAL /HPF

## 2025-03-16 PROCEDURE — 36415 COLL VENOUS BLD VENIPUNCTURE: CPT

## 2025-03-16 PROCEDURE — 84443 ASSAY THYROID STIM HORMONE: CPT

## 2025-03-16 PROCEDURE — 85025 COMPLETE CBC W/AUTO DIFF WBC: CPT

## 2025-03-16 PROCEDURE — 83735 ASSAY OF MAGNESIUM: CPT | Performed by: EMERGENCY MEDICINE

## 2025-03-16 PROCEDURE — 99284 EMERGENCY DEPT VISIT MOD MDM: CPT | Performed by: EMERGENCY MEDICINE

## 2025-03-16 PROCEDURE — 84439 ASSAY OF FREE THYROXINE: CPT

## 2025-03-16 PROCEDURE — 99285 EMERGENCY DEPT VISIT HI MDM: CPT | Performed by: EMERGENCY MEDICINE

## 2025-03-16 PROCEDURE — 87086 URINE CULTURE/COLONY COUNT: CPT | Performed by: EMERGENCY MEDICINE

## 2025-03-16 PROCEDURE — 81001 URINALYSIS AUTO W/SCOPE: CPT | Performed by: EMERGENCY MEDICINE

## 2025-03-16 PROCEDURE — 80053 COMPREHEN METABOLIC PANEL: CPT

## 2025-03-16 PROCEDURE — 2500000001 HC RX 250 WO HCPCS SELF ADMINISTERED DRUGS (ALT 637 FOR MEDICARE OP)

## 2025-03-16 RX ORDER — HYDROXYZINE HYDROCHLORIDE 25 MG/1
12.5 TABLET, FILM COATED ORAL ONCE
Status: COMPLETED | OUTPATIENT
Start: 2025-03-16 | End: 2025-03-16

## 2025-03-16 RX ORDER — MELATONIN 5 MG
5 CAPSULE ORAL NIGHTLY
Qty: 60 CAPSULE | Refills: 0 | Status: ON HOLD | OUTPATIENT
Start: 2025-03-16 | End: 2025-05-15

## 2025-03-16 RX ADMIN — HYDROXYZINE HYDROCHLORIDE 12.5 MG: 25 TABLET, FILM COATED ORAL at 16:21

## 2025-03-16 SDOH — HEALTH STABILITY: MENTAL HEALTH: BEHAVIORAL HEALTH(WDL): EXCEPTIONS TO WDL

## 2025-03-16 SDOH — HEALTH STABILITY: MENTAL HEALTH: HAVE YOU ACTUALLY HAD ANY THOUGHTS OF KILLING YOURSELF?: NO

## 2025-03-16 SDOH — HEALTH STABILITY: MENTAL HEALTH: HAVE YOU EVER DONE ANYTHING, STARTED TO DO ANYTHING, OR PREPARED TO DO ANYTHING TO END YOUR LIFE?: NO

## 2025-03-16 SDOH — SOCIAL STABILITY: SOCIAL NETWORK: VISITOR BEHAVIORS: APPROPRIATE FOR SITUATION

## 2025-03-16 SDOH — HEALTH STABILITY: MENTAL HEALTH: NEEDS EXPRESSED: DENIES

## 2025-03-16 SDOH — HEALTH STABILITY: MENTAL HEALTH: SUICIDE ASSESSMENT: ADULT (C-SSRS)

## 2025-03-16 SDOH — SOCIAL STABILITY: SOCIAL NETWORK: PARENT/GUARDIAN/SIGNIFICANT OTHER INVOLVEMENT: ATTENTIVE TO PATIENT NEEDS

## 2025-03-16 SDOH — HEALTH STABILITY: MENTAL HEALTH: SLEEP PATTERN: INSOMNIA

## 2025-03-16 SDOH — SOCIAL STABILITY: SOCIAL NETWORK: EMOTIONAL SUPPORT GIVEN: PATIENT COUNSELING

## 2025-03-16 SDOH — HEALTH STABILITY: MENTAL HEALTH: BEHAVIORS/MOOD: ANXIOUS

## 2025-03-16 SDOH — SOCIAL STABILITY: SOCIAL INSECURITY: FAMILY BEHAVIORS: APPROPRIATE FOR SITUATION

## 2025-03-16 SDOH — HEALTH STABILITY: MENTAL HEALTH: HAVE YOU WISHED YOU WERE DEAD OR WISHED YOU COULD GO TO SLEEP AND NOT WAKE UP?: NO

## 2025-03-16 ASSESSMENT — LIFESTYLE VARIABLES
HAVE YOU EVER FELT YOU SHOULD CUT DOWN ON YOUR DRINKING: NO
EVER HAD A DRINK FIRST THING IN THE MORNING TO STEADY YOUR NERVES TO GET RID OF A HANGOVER: NO
TOTAL SCORE: 0
HAVE PEOPLE ANNOYED YOU BY CRITICIZING YOUR DRINKING: NO
EVER FELT BAD OR GUILTY ABOUT YOUR DRINKING: NO

## 2025-03-16 ASSESSMENT — PAIN SCALES - GENERAL
PAINLEVEL_OUTOF10: 0 - NO PAIN
PAINLEVEL_OUTOF10: 0 - NO PAIN

## 2025-03-16 ASSESSMENT — PAIN - FUNCTIONAL ASSESSMENT: PAIN_FUNCTIONAL_ASSESSMENT: 0-10

## 2025-03-16 NOTE — DISCHARGE INSTRUCTIONS
You have been evaluated in the Emergency Department today for your anxiety and feelings of sadness.  Your labs showed your slightly hypothyroid.  Otherwise your labs were normal.    You were given a referral to see psychiatry for anxiety, PT/OT for your back pain, and endocrinology for your thyroid.     I think it may be beneficial for you to visit your local gym and start to exercise.  You need to prioritize getting out of the house more, as this will help with your anxiety and depression.    Please follow up with your PCP as soon as possible. Please use the resources given to you in the Emergency Department.    Return to the Emergency Department if you experience thoughts of hurting yourself or others, audio or visual hallucinations, or for any other concerning symptoms.

## 2025-03-16 NOTE — ED PROVIDER NOTES
Emergency Department Provider Note        History of Present Illness     History provided by: Patient and Family Member  Limitations to History: None  External Records Reviewed with Brief Summary: None    HPI:  Analia Murray is a 74 y.o. female with past medical history significant for asthma, hypertension, CKD, diabetes, presenting the ED with 3 weeks of anxiety and depressive symptoms.  She reports it may be secondary to her chronic back pain and having more difficulty with ambulating.  Patient was recent prescribed Zoloft, her first dose was last night, she does not believe that it is working.  She is also endorsing difficulty falling asleep at night.  Patient denies any suicidal or homicidal ideations.  She is otherwise feeling well.  No infectious symptoms.    Physical Exam   Triage vitals:  T 36.6 °C (97.8 °F)  HR 80  /82  RR 17  O2 95 % None (Room air)    General: Awake, alert, in no acute distress  Eyes: Gaze conjugate.  No scleral icterus or injection  HENT: Normo-cephalic, atraumatic. No stridor  CV: Regular rate, regular rhythm. Radial pulses 2+ bilaterally  Resp: Breathing non-labored, speaking in full sentences.  Clear to auscultation bilaterally  GI: Soft, non-distended, non-tender. No rebound or guarding.  MSK/Extremities: No gross bony deformities. Moving all extremities  Skin: Warm. Appropriate color  Neuro: Alert. Oriented. Face symmetric. Speech is fluent.  Gross strength and sensation intact in b/l UE and LEs  Psych: Slightly depressed mood, tearful on exam    Medical Decision Making & ED Course   Medical Decision Makin y.o. female with past medical history as noted above, presenting the ED with anxiety and depressive symptoms.  Vital signs are stable on arrival.  Physical exam is grossly unremarkable.  As patient is not expressing suicidal or homicidal ideation and has a good support system, I do not think patient needs to be admitted for a psychiatry evaluation.  Provided  patient with a referral for psychiatry outpatient.  Recommended seeing PT outpatient for the chronic back pain, referral placed.  Prescribed melatonin for nightly use.    TSH elevated, with normal low T4, likely subclinical hypothyroidism.  Patient has been taking her levothyroxine as prescribed.  Patient has a endocrinologist outpatient, she will follow-up this week.  A referral was placed for patient to see a psychiatrist for anxiety.  Also placed a referral for PT for chronic back pain.    At this time, patient is feeling improvement in symptoms and is ready for her to be discharged.  Discussed return precautions, patient verbalized agreement.  Patient discharged in stable condition.  ----      Differential diagnoses considered include but are not limited to: Hypothyroidism, anxiety, depression     Social Determinants of Health which Significantly Impact Care: None identified     EKG Independent Interpretation: EKG not obtained    Independent Result Review and Interpretation: Relevant laboratory and radiographic results were reviewed and independently interpreted by myself.  As necessary, they are commented on in the ED Course.    Chronic conditions affecting the patient's care: As documented above in [facility]    The patient was discussed with the following consultants/services: None    Care Considerations: As documented above in [facility]    ED Course:  ED Course as of 03/16/25 1706   Sun Mar 16, 2025   1557 CBC and Auto Differential(!)  Unremarkable, no leukocytosis. [NM]   1557 Comprehensive metabolic panel(!)  Creatinine slightly elevated, 2.12, consistent with baseline. [NM]   1558 Urinalysis with Reflex Culture and Microscopic(!)  Low concern for UTI. [NM]   1618 Thyroxine, Free: 1.05 [AS]   1705 TSH with reflex to Free T4 if abnormal(!)  Elevated. [NM]      ED Course User Index  [AS] Gerald Carpio MD  [NM] Silvia Del Rio DO         Diagnoses as of 03/16/25 1706   Symptoms of depression   Chronic back pain,  unspecified back location, unspecified back pain laterality   Insomnia, unspecified type   Elevated TSH     Disposition   As a result of the work-up, the patient was discharged home.  she was informed of her diagnosis and instructed to come back with any concerns or worsening of condition.  she and was agreeable to the plan as discussed above.  she was given the opportunity to ask questions.  All of the patient's questions were answered.    Procedures   Procedures    This was a shared visit with an ED attending.  The patient was seen and discussed with the ED attending    Silvia Del Rio DO  Emergency Medicine     Silvia Del Rio DO  Resident  03/16/25 1791

## 2025-03-16 NOTE — ED TRIAGE NOTES
"Pt presents to  ED for c/o anxiety/difficulty sleeping. Pt states the last three weeks, pt has been unable to sleep for more than 3 hours a night. Pt states she took zoloft for the first time last night, which kept her awake all night. Pt states she has been having increased moods swings along with \"crying\" fits. Pt's daughter states pt has been having difficulty with ambulation and \"needs physical therapy.\" Pt's daughter states she believes pt's anxiety/depression may have to do with decreased mobility.    "

## 2025-03-16 NOTE — PROGRESS NOTES
Analia Murray is a 74 y.o. female on day 0 of admission presenting with No Principal Problem: There is no principal problem currently on the Problem List. Please update the Problem List and refresh..    Social Work Note; consult conversation    Met with patient, her son, daughter and grand daughter.  Per family and patient, patient is isolated(by her own choice) and has become less mobile with increased anxiety and depression.  It was reported that family has been encouraging patient to address her current emotional and physical condition.   There was agreement that patient could benefit from a conversation with her GP in order to access PT/OT, mental health referral and medication that may help anxiety until her new prescription begins working.  We also discussed community support and social opportunities; patient is familiar with the [facility].      Updated clinical team.    Baljinder Reynolds LCSW

## 2025-03-17 ENCOUNTER — TELEPHONE (OUTPATIENT)
Dept: ENDOCRINOLOGY | Facility: CLINIC | Age: 75
End: 2025-03-17
Payer: MEDICARE

## 2025-03-17 LAB
BACTERIA UR CULT: NORMAL
HOLD SPECIMEN: NORMAL

## 2025-03-18 ENCOUNTER — TELEPHONE (OUTPATIENT)
Dept: ENDOCRINOLOGY | Facility: CLINIC | Age: 75
End: 2025-03-18
Payer: MEDICARE

## 2025-03-18 NOTE — TELEPHONE ENCOUNTER
Patient said she is having anxiety,fast heartbeat, and she can't sleep she doesn't know if she should take any Synthroid

## 2025-03-19 NOTE — PROGRESS NOTES
HPI    73 yo with of Diabetes 2, HTN, Dyslipidemia, copd/asthma, hashimoto's disease, MGUS(follows with hematology), ckd (follows with nephrology) presents for follow up (last seen over 1 year ago). A1c was 6.1% recently.    Here for acute visit as recent tsh was 23.  Pt here with her daughter who is assisting with history as well.  Pt recently went to er.  She is feeling very anxious/not sleeping well.  Has been dealing with depressive symptoms.  She is inquiring about any medication that might help her symptoms.  I'm not clear if she is currently taking sertraline at the moment.    In regards to thyroid pt taking synthroid 200mcg (8 pills/week).  She is taking this in the am and has been taking it with an iron supplement she believes over the past year.    Current Outpatient Medications:     allopurinol (Zyloprim) 100 mg tablet, Take 0.5 tablets (50 mg) by mouth every other day., Disp: 45 tablet, Rfl: 0    amLODIPine (Norvasc) 10 mg tablet, Take 1 tablet (10 mg) by mouth once daily., Disp: 90 tablet, Rfl: 3    aspirin 81 mg EC tablet, Take 1 tablet (81 mg) by mouth once daily., Disp: , Rfl:     cholecalciferol (Vitamin D-3) 25 MCG (1000 UT) capsule, TAKE AS DIRECTED., Disp: , Rfl:     ezetimibe (Zetia) 10 mg tablet, TAKE 1 TABLET BY MOUTH ONCE  DAILY, Disp: 90 tablet, Rfl: 3    ferrous sulfate  mg ER tablet, Take 1 tablet by mouth once daily., Disp: , Rfl:     fluticasone (Flonase) 50 mcg/actuation nasal spray, Administer 2 sprays into each nostril 2 times a day., Disp: 16 g, Rfl: 3    fluticasone propion-salmeteroL (Advair Diskus) 250-50 mcg/dose diskus inhaler, Inhale 1 puff 2 times a day. Rinse mouth with water after use to reduce aftertaste and incidence of candidiasis. Do not swallow., Disp: 60 each, Rfl: 0    furosemide (Lasix) 20 mg tablet, Take 0.5 tablets (10 mg) by mouth once daily., Disp: , Rfl:     gabapentin (Neurontin) 100 mg capsule, Take 1 capsule (100 mg) by mouth 3 times a day., Disp: 90  capsule, Rfl: 5    hydrALAZINE (Apresoline) 50 mg tablet, Take 1 tablet (50 mg) by mouth 2 times a day., Disp: 60 tablet, Rfl: 2    inhalational spacing device (Aerochamber MV) inhaler, Use as instructed, Disp: 1 each, Rfl: 0    melatonin 5 mg capsule, Take 1 capsule (5 mg) by mouth once daily at bedtime., Disp: 60 capsule, Rfl: 0    metoprolol succinate XL (Toprol-XL) 100 mg 24 hr tablet, TAKE 1 TABLET BY MOUTH DAILY, Disp: 90 tablet, Rfl: 3    montelukast (Singulair) 10 mg tablet, Take 1 tablet (10 mg) by mouth once daily at bedtime., Disp: , Rfl:     nitroglycerin (Nitrostat) 0.4 mg SL tablet, Place 1 tablet (0.4 mg) under the tongue every 5 minutes if needed for chest pain., Disp: 90 tablet, Rfl: 3    pravastatin (Pravachol) 40 mg tablet, TAKE 1 TABLET BY MOUTH ONCE  DAILY AT BEDTIME, Disp: 90 tablet, Rfl: 3    semaglutide 0.25 mg or 0.5 mg (2 mg/3 mL) pen injector, Inject 0.5 mg under the skin 1 (one) time per week., Disp: 3 mL, Rfl: 2    sertraline (Zoloft) 25 mg tablet, Take 1 tablet (25 mg) by mouth once daily for 3 days., Disp: 3 tablet, Rfl: 0    sertraline (Zoloft) 50 mg tablet, Take 1 tablet (50 mg) by mouth once daily., Disp: 30 tablet, Rfl: 1    Synthroid 200 mcg tablet, TAKE 8 TABLETS BY MOUTH WEEKLY  AS DIRECTED, Disp: 100 tablet, Rfl: 3    Ventolin HFA 90 mcg/actuation inhaler, USE 2 INHALATIONS BY MOUTH EVERY 4 HOURS AS NEEDED FOR WHEEZING, Disp: 108 g, Rfl: 3    Current Facility-Administered Medications:     dulaglutide (TRULICITY) injection 0.75 mg, 0.75 mg, subcutaneous, Once, Earl Koroma MD      Allergies as of 03/20/2025 - Reviewed 03/16/2025   Allergen Reaction Noted    Ciprofloxacin Itching, Nausea Only, Rash, and Other 07/14/2023    Morphine Unknown and Hallucinations 07/14/2023    Penicillins Anaphylaxis, Hives, Itching, and Swelling 07/14/2023         Review of Systems   Cardiology: Lightheadedness-denies.  Chest pain-denies.  Leg edema-denies.  Palpitations-denies.  Respiratory:  Cough-denies. Shortness of breath-denies.  Wheezing-denies.  Gastroenterology: Constipation-denies.  Diarrhea-denies.  Heartburn-denies.  Endocrinology: Cold intolerance-denies.  Heat intolerance-denies.  Sweats-denies.  Neurology: Headache-denies.  Tremor-denies.  Neuropathy in extremities-denies.  Psychology: Low energy +.  Irritability +  Sleep disturbances +      LMP  (LMP Unknown)       Labs:  Lab Results   Component Value Date    WBC 7.3 03/16/2025    NRBC 0.0 03/16/2025    RBC 4.72 03/16/2025    HGB 10.8 (L) 03/16/2025    HCT 35.2 (L) 03/16/2025     03/16/2025     Lab Results   Component Value Date    CALCIUM 9.1 03/16/2025    AST 11 03/16/2025    ALKPHOS 89 03/16/2025    BILITOT 0.6 03/16/2025    PROT 8.1 03/16/2025    ALBUMIN 3.9 03/16/2025     03/16/2025    K 4.8 03/16/2025     03/16/2025    CO2 27 03/16/2025    ANIONGAP 15 03/16/2025    BUN 25 (H) 03/16/2025    CREATININE 2.12 (H) 03/16/2025    GLUCOSE 104 (H) 03/16/2025    ALT 6 (L) 03/16/2025    EGFR 24 (L) 03/16/2025     Lab Results   Component Value Date    CHOL 128 06/21/2023    TRIG 72 06/21/2023    HDL 45.1 06/21/2023     Lab Results   Component Value Date    MICROALBCREA 95.1 (H) 08/19/2024     Lab Results   Component Value Date    TSH 21.57 (H) 03/16/2025     Lab Results   Component Value Date    EPDEDWGH60 477 07/09/2018     Lab Results   Component Value Date    HGBA1C 6.1 (H) 02/10/2025         Physical Exam   General Appearance: pleasant, cooperative, no acute distress  HEENT: no chemosis, no proptosis, no lid lag, no lid retraction  Neck: no lymphadenopathy, no thyromegaly, no dominant thyroid nodules  Heart: no murmurs, regular rate and rhythm, S1 and S2  Lungs: no wheezes, no rhonci, no rales  Extremities: no lower extremity swelling      Assessment/Plan   Depression/anxiety  -pt is very anxious/upset/not sleeping and asking for help  -would definitely benefit from psychiatric intervention, suggest stopping at  [facility] for and evaluation today  -pt's daughter will take her on the way home    -I don't believe her thyroid is leading to these sx    2. Hypothyroidism, unspecified type  -get separate pill container for this med and take early am/empty stomach  -suspect pt forgetting at times and malabsorbing as she is taking with an iron supplement together  -take iron supplement at lunch/dinner    -repeat labs in 2 months    Follow Up:  Prn if following with pcp    -labs/tests/notes reviewed  -reviewed and counseled patient on medication monitoring and side effects

## 2025-03-20 ENCOUNTER — OFFICE VISIT (OUTPATIENT)
Dept: ENDOCRINOLOGY | Facility: CLINIC | Age: 75
End: 2025-03-20
Payer: MEDICARE

## 2025-03-20 VITALS — SYSTOLIC BLOOD PRESSURE: 139 MMHG | HEART RATE: 77 BPM | DIASTOLIC BLOOD PRESSURE: 76 MMHG

## 2025-03-20 DIAGNOSIS — E03.9 HYPOTHYROIDISM, UNSPECIFIED TYPE: ICD-10-CM

## 2025-03-20 DIAGNOSIS — E11.65 TYPE 2 DIABETES MELLITUS WITH HYPERGLYCEMIA, UNSPECIFIED WHETHER LONG TERM INSULIN USE (MULTI): Primary | ICD-10-CM

## 2025-03-20 DIAGNOSIS — I10 HTN (HYPERTENSION), BENIGN: ICD-10-CM

## 2025-03-20 PROCEDURE — 3075F SYST BP GE 130 - 139MM HG: CPT | Performed by: INTERNAL MEDICINE

## 2025-03-20 PROCEDURE — 3044F HG A1C LEVEL LT 7.0%: CPT | Performed by: INTERNAL MEDICINE

## 2025-03-20 PROCEDURE — 3078F DIAST BP <80 MM HG: CPT | Performed by: INTERNAL MEDICINE

## 2025-03-20 PROCEDURE — 1036F TOBACCO NON-USER: CPT | Performed by: INTERNAL MEDICINE

## 2025-03-20 PROCEDURE — 99214 OFFICE O/P EST MOD 30 MIN: CPT | Performed by: INTERNAL MEDICINE

## 2025-03-25 ENCOUNTER — APPOINTMENT (OUTPATIENT)
Dept: RADIOLOGY | Facility: HOSPITAL | Age: 75
DRG: 871 | End: 2025-03-25
Payer: MEDICARE

## 2025-03-25 ENCOUNTER — APPOINTMENT (OUTPATIENT)
Dept: PRIMARY CARE | Facility: CLINIC | Age: 75
End: 2025-03-25
Payer: MEDICARE

## 2025-03-25 ENCOUNTER — APPOINTMENT (OUTPATIENT)
Dept: CARDIOLOGY | Facility: HOSPITAL | Age: 75
DRG: 871 | End: 2025-03-25
Payer: MEDICARE

## 2025-03-25 ENCOUNTER — HOSPITAL ENCOUNTER (INPATIENT)
Facility: HOSPITAL | Age: 75
End: 2025-03-25
Attending: STUDENT IN AN ORGANIZED HEALTH CARE EDUCATION/TRAINING PROGRAM | Admitting: INTERNAL MEDICINE
Payer: MEDICARE

## 2025-03-25 DIAGNOSIS — E87.29 RESPIRATORY ACIDOSIS: ICD-10-CM

## 2025-03-25 DIAGNOSIS — E03.9 HYPOTHYROIDISM, UNSPECIFIED TYPE: ICD-10-CM

## 2025-03-25 DIAGNOSIS — J96.01 ACUTE RESPIRATORY FAILURE WITH HYPOXIA: Primary | ICD-10-CM

## 2025-03-25 DIAGNOSIS — R06.02 SHORTNESS OF BREATH: ICD-10-CM

## 2025-03-25 DIAGNOSIS — N18.30 CKD STAGE 3 DUE TO TYPE 2 DIABETES MELLITUS (MULTI): ICD-10-CM

## 2025-03-25 DIAGNOSIS — J45.909 ASTHMA, UNSPECIFIED ASTHMA SEVERITY, UNSPECIFIED WHETHER COMPLICATED, UNSPECIFIED WHETHER PERSISTENT (HHS-HCC): ICD-10-CM

## 2025-03-25 DIAGNOSIS — E11.22 CKD STAGE 3 DUE TO TYPE 2 DIABETES MELLITUS (MULTI): ICD-10-CM

## 2025-03-25 DIAGNOSIS — G47.30 SEVERE SLEEP APNEA: ICD-10-CM

## 2025-03-25 DIAGNOSIS — I50.9 ACUTE ON CHRONIC CONGESTIVE HEART FAILURE, UNSPECIFIED HEART FAILURE TYPE: ICD-10-CM

## 2025-03-25 LAB
ALBUMIN SERPL BCP-MCNC: 3.1 G/DL (ref 3.4–5)
ALBUMIN SERPL BCP-MCNC: 3.4 G/DL (ref 3.4–5)
ALBUMIN SERPL BCP-MCNC: 3.6 G/DL (ref 3.4–5)
ALP SERPL-CCNC: 90 U/L (ref 33–136)
ALT SERPL W P-5'-P-CCNC: 7 U/L (ref 7–45)
ANION GAP BLDA CALCULATED.4IONS-SCNC: 8 MMO/L (ref 10–25)
ANION GAP BLDA CALCULATED.4IONS-SCNC: 8 MMO/L (ref 10–25)
ANION GAP BLDA CALCULATED.4IONS-SCNC: 9 MMO/L (ref 10–25)
ANION GAP BLDA CALCULATED.4IONS-SCNC: 9 MMO/L (ref 10–25)
ANION GAP SERPL CALCULATED.3IONS-SCNC: 12 MMOL/L (ref 10–20)
ANION GAP SERPL CALCULATED.3IONS-SCNC: 13 MMOL/L (ref 10–20)
ANION GAP SERPL CALCULATED.3IONS-SCNC: 14 MMOL/L (ref 10–20)
APPARATUS: ABNORMAL
ARTERIAL PATENCY WRIST A: POSITIVE
AST SERPL W P-5'-P-CCNC: 10 U/L (ref 9–39)
BASE EXCESS BLDA CALC-SCNC: 0.5 MMOL/L (ref -2–3)
BASE EXCESS BLDA CALC-SCNC: 1.1 MMOL/L (ref -2–3)
BASE EXCESS BLDA CALC-SCNC: 1.1 MMOL/L (ref -2–3)
BASE EXCESS BLDA CALC-SCNC: 1.8 MMOL/L (ref -2–3)
BASOPHILS # BLD AUTO: 0.01 X10*3/UL (ref 0–0.1)
BASOPHILS # BLD AUTO: 0.03 X10*3/UL (ref 0–0.1)
BASOPHILS NFR BLD AUTO: 0.1 %
BASOPHILS NFR BLD AUTO: 0.3 %
BILIRUB SERPL-MCNC: 0.5 MG/DL (ref 0–1.2)
BNP SERPL-MCNC: 725 PG/ML (ref 0–99)
BODY TEMPERATURE: 37 DEGREES CELSIUS
BUN SERPL-MCNC: 30 MG/DL (ref 6–23)
BUN SERPL-MCNC: 32 MG/DL (ref 6–23)
BUN SERPL-MCNC: 38 MG/DL (ref 6–23)
CA-I BLDA-SCNC: 1.1 MMOL/L (ref 1.1–1.33)
CA-I BLDA-SCNC: 1.13 MMOL/L (ref 1.1–1.33)
CA-I BLDA-SCNC: 1.13 MMOL/L (ref 1.1–1.33)
CA-I BLDA-SCNC: 1.21 MMOL/L (ref 1.1–1.33)
CALCIUM SERPL-MCNC: 8 MG/DL (ref 8.6–10.3)
CALCIUM SERPL-MCNC: 8.5 MG/DL (ref 8.6–10.3)
CALCIUM SERPL-MCNC: 8.6 MG/DL (ref 8.6–10.3)
CARDIAC TROPONIN I PNL SERPL HS: 13 NG/L (ref 0–13)
CARDIAC TROPONIN I PNL SERPL HS: 14 NG/L (ref 0–13)
CHLORIDE BLDA-SCNC: 104 MMOL/L (ref 98–107)
CHLORIDE BLDA-SCNC: 104 MMOL/L (ref 98–107)
CHLORIDE BLDA-SCNC: 105 MMOL/L (ref 98–107)
CHLORIDE BLDA-SCNC: 105 MMOL/L (ref 98–107)
CHLORIDE SERPL-SCNC: 103 MMOL/L (ref 98–107)
CHLORIDE SERPL-SCNC: 103 MMOL/L (ref 98–107)
CHLORIDE SERPL-SCNC: 104 MMOL/L (ref 98–107)
CO2 SERPL-SCNC: 24 MMOL/L (ref 21–32)
CO2 SERPL-SCNC: 27 MMOL/L (ref 21–32)
CO2 SERPL-SCNC: 27 MMOL/L (ref 21–32)
CREAT SERPL-MCNC: 2.32 MG/DL (ref 0.5–1.05)
CREAT SERPL-MCNC: 2.42 MG/DL (ref 0.5–1.05)
CREAT SERPL-MCNC: 2.76 MG/DL (ref 0.5–1.05)
EGFRCR SERPLBLD CKD-EPI 2021: 17 ML/MIN/1.73M*2
EGFRCR SERPLBLD CKD-EPI 2021: 20 ML/MIN/1.73M*2
EGFRCR SERPLBLD CKD-EPI 2021: 21 ML/MIN/1.73M*2
EOSINOPHIL # BLD AUTO: 0.03 X10*3/UL (ref 0–0.4)
EOSINOPHIL # BLD AUTO: 0.73 X10*3/UL (ref 0–0.4)
EOSINOPHIL NFR BLD AUTO: 0.4 %
EOSINOPHIL NFR BLD AUTO: 7 %
EPAP CMH2O: 5 CM H2O
ERYTHROCYTE [DISTWIDTH] IN BLOOD BY AUTOMATED COUNT: 19.9 % (ref 11.5–14.5)
ERYTHROCYTE [DISTWIDTH] IN BLOOD BY AUTOMATED COUNT: 20 % (ref 11.5–14.5)
EST. AVERAGE GLUCOSE BLD GHB EST-MCNC: 120 MG/DL
FLUAV RNA RESP QL NAA+PROBE: NOT DETECTED
FLUBV RNA RESP QL NAA+PROBE: NOT DETECTED
GLUCOSE BLD MANUAL STRIP-MCNC: 128 MG/DL (ref 74–99)
GLUCOSE BLD MANUAL STRIP-MCNC: 142 MG/DL (ref 74–99)
GLUCOSE BLD MANUAL STRIP-MCNC: 165 MG/DL (ref 74–99)
GLUCOSE BLD MANUAL STRIP-MCNC: 169 MG/DL (ref 74–99)
GLUCOSE BLD MANUAL STRIP-MCNC: 200 MG/DL (ref 74–99)
GLUCOSE BLDA-MCNC: 146 MG/DL (ref 74–99)
GLUCOSE BLDA-MCNC: 155 MG/DL (ref 74–99)
GLUCOSE BLDA-MCNC: 160 MG/DL (ref 74–99)
GLUCOSE BLDA-MCNC: 210 MG/DL (ref 74–99)
GLUCOSE SERPL-MCNC: 141 MG/DL (ref 74–99)
GLUCOSE SERPL-MCNC: 167 MG/DL (ref 74–99)
GLUCOSE SERPL-MCNC: 171 MG/DL (ref 74–99)
HBA1C MFR BLD: 5.8 %
HCO3 BLDA-SCNC: 25 MMOL/L (ref 22–26)
HCO3 BLDA-SCNC: 27.1 MMOL/L (ref 22–26)
HCO3 BLDA-SCNC: 29.5 MMOL/L (ref 22–26)
HCO3 BLDA-SCNC: 31.2 MMOL/L (ref 22–26)
HCT VFR BLD AUTO: 34.5 % (ref 36–46)
HCT VFR BLD AUTO: 35.8 % (ref 36–46)
HCT VFR BLD EST: 27 % (ref 36–46)
HCT VFR BLD EST: 29 % (ref 36–46)
HCT VFR BLD EST: 32 % (ref 36–46)
HCT VFR BLD EST: 32 % (ref 36–46)
HGB BLD-MCNC: 10.4 G/DL (ref 12–16)
HGB BLD-MCNC: 9.9 G/DL (ref 12–16)
HGB BLDA-MCNC: 10.5 G/DL (ref 12–16)
HGB BLDA-MCNC: 10.6 G/DL (ref 12–16)
HGB BLDA-MCNC: 9 G/DL (ref 12–16)
HGB BLDA-MCNC: 9.7 G/DL (ref 12–16)
IMM GRANULOCYTES # BLD AUTO: 0.04 X10*3/UL (ref 0–0.5)
IMM GRANULOCYTES # BLD AUTO: 0.05 X10*3/UL (ref 0–0.5)
IMM GRANULOCYTES NFR BLD AUTO: 0.5 % (ref 0–0.9)
IMM GRANULOCYTES NFR BLD AUTO: 0.5 % (ref 0–0.9)
INHALED O2 CONCENTRATION: 100 %
INHALED O2 CONCENTRATION: 36 %
INHALED O2 CONCENTRATION: 40 %
INHALED O2 CONCENTRATION: 50 %
IPAP CMH2O: 18 CM H2O
LACTATE BLDA-SCNC: 0.5 MMOL/L (ref 0.4–2)
LACTATE BLDA-SCNC: 0.5 MMOL/L (ref 0.4–2)
LACTATE BLDA-SCNC: 0.7 MMOL/L (ref 0.4–2)
LACTATE BLDA-SCNC: 0.9 MMOL/L (ref 0.4–2)
LYMPHOCYTES # BLD AUTO: 0.27 X10*3/UL (ref 0.8–3)
LYMPHOCYTES # BLD AUTO: 1.17 X10*3/UL (ref 0.8–3)
LYMPHOCYTES NFR BLD AUTO: 11.1 %
LYMPHOCYTES NFR BLD AUTO: 3.2 %
MAGNESIUM SERPL-MCNC: 1.91 MG/DL (ref 1.6–2.4)
MAGNESIUM SERPL-MCNC: 2.16 MG/DL (ref 1.6–2.4)
MCH RBC QN AUTO: 23 PG (ref 26–34)
MCH RBC QN AUTO: 23.4 PG (ref 26–34)
MCHC RBC AUTO-ENTMCNC: 28.7 G/DL (ref 32–36)
MCHC RBC AUTO-ENTMCNC: 29.1 G/DL (ref 32–36)
MCV RBC AUTO: 80 FL (ref 80–100)
MCV RBC AUTO: 80 FL (ref 80–100)
MONOCYTES # BLD AUTO: 0.16 X10*3/UL (ref 0.05–0.8)
MONOCYTES # BLD AUTO: 1.03 X10*3/UL (ref 0.05–0.8)
MONOCYTES NFR BLD AUTO: 1.9 %
MONOCYTES NFR BLD AUTO: 9.8 %
NEUTROPHILS # BLD AUTO: 7.49 X10*3/UL (ref 1.6–5.5)
NEUTROPHILS # BLD AUTO: 7.88 X10*3/UL (ref 1.6–5.5)
NEUTROPHILS NFR BLD AUTO: 71.3 %
NEUTROPHILS NFR BLD AUTO: 93.9 %
NRBC BLD-RTO: 0 /100 WBCS (ref 0–0)
NRBC BLD-RTO: 0 /100 WBCS (ref 0–0)
OXYHGB MFR BLDA: 94.3 % (ref 94–98)
OXYHGB MFR BLDA: 97 % (ref 94–98)
OXYHGB MFR BLDA: 97.5 % (ref 94–98)
OXYHGB MFR BLDA: 98.3 % (ref 94–98)
PCO2 BLDA: 36 MM HG (ref 38–42)
PCO2 BLDA: 49 MM HG (ref 38–42)
PCO2 BLDA: 72 MM HG (ref 38–42)
PCO2 BLDA: 78 MM HG (ref 38–42)
PEEP CMH2O: 5 CM H2O
PEEP CMH2O: 5 CM H2O
PH BLDA: 7.21 PH (ref 7.38–7.42)
PH BLDA: 7.22 PH (ref 7.38–7.42)
PH BLDA: 7.35 PH (ref 7.38–7.42)
PH BLDA: 7.45 PH (ref 7.38–7.42)
PHOSPHATE SERPL-MCNC: 4.1 MG/DL (ref 2.5–4.9)
PHOSPHATE SERPL-MCNC: 5.3 MG/DL (ref 2.5–4.9)
PLATELET # BLD AUTO: 147 X10*3/UL (ref 150–450)
PLATELET # BLD AUTO: 170 X10*3/UL (ref 150–450)
PO2 BLDA: 102 MM HG (ref 85–95)
PO2 BLDA: 140 MM HG (ref 85–95)
PO2 BLDA: 352 MM HG (ref 85–95)
PO2 BLDA: 90 MM HG (ref 85–95)
POTASSIUM BLDA-SCNC: 5 MMOL/L (ref 3.5–5.3)
POTASSIUM BLDA-SCNC: 5.1 MMOL/L (ref 3.5–5.3)
POTASSIUM BLDA-SCNC: 5.7 MMOL/L (ref 3.5–5.3)
POTASSIUM BLDA-SCNC: 5.9 MMOL/L (ref 3.5–5.3)
POTASSIUM SERPL-SCNC: 4.5 MMOL/L (ref 3.5–5.3)
POTASSIUM SERPL-SCNC: 5 MMOL/L (ref 3.5–5.3)
POTASSIUM SERPL-SCNC: 5.3 MMOL/L (ref 3.5–5.3)
PROT SERPL-MCNC: 7.7 G/DL (ref 6.4–8.2)
RBC # BLD AUTO: 4.3 X10*6/UL (ref 4–5.2)
RBC # BLD AUTO: 4.45 X10*6/UL (ref 4–5.2)
RSV RNA RESP QL NAA+PROBE: NOT DETECTED
SAO2 % BLDA: 100 % (ref 94–100)
SAO2 % BLDA: 100 % (ref 94–100)
SAO2 % BLDA: 97 % (ref 94–100)
SAO2 % BLDA: 99 % (ref 94–100)
SARS-COV-2 RNA RESP QL NAA+PROBE: NOT DETECTED
SODIUM BLDA-SCNC: 132 MMOL/L (ref 136–145)
SODIUM BLDA-SCNC: 134 MMOL/L (ref 136–145)
SODIUM BLDA-SCNC: 137 MMOL/L (ref 136–145)
SODIUM BLDA-SCNC: 139 MMOL/L (ref 136–145)
SODIUM SERPL-SCNC: 136 MMOL/L (ref 136–145)
SODIUM SERPL-SCNC: 138 MMOL/L (ref 136–145)
SODIUM SERPL-SCNC: 138 MMOL/L (ref 136–145)
SPECIMEN DRAWN FROM PATIENT: ABNORMAL
SPONTANEOUS TIDAL VOLUME: 421 ML
T4 FREE SERPL-MCNC: 0.86 NG/DL (ref 0.61–1.12)
TIDAL VOLUME: 420 ML
TIDAL VOLUME: 420 ML
TSH SERPL-ACNC: 11.37 MIU/L (ref 0.44–3.98)
VENTILATOR MODE: ABNORMAL
VENTILATOR RATE: 18 BPM
VENTILATOR RATE: 20 BPM
VENTILATOR RATE: 22 BPM
WBC # BLD AUTO: 10.5 X10*3/UL (ref 4.4–11.3)
WBC # BLD AUTO: 8.4 X10*3/UL (ref 4.4–11.3)

## 2025-03-25 PROCEDURE — 2500000005 HC RX 250 GENERAL PHARMACY W/O HCPCS: Performed by: INTERNAL MEDICINE

## 2025-03-25 PROCEDURE — 2500000004 HC RX 250 GENERAL PHARMACY W/ HCPCS (ALT 636 FOR OP/ED): Performed by: STUDENT IN AN ORGANIZED HEALTH CARE EDUCATION/TRAINING PROGRAM

## 2025-03-25 PROCEDURE — 71045 X-RAY EXAM CHEST 1 VIEW: CPT

## 2025-03-25 PROCEDURE — 82947 ASSAY GLUCOSE BLOOD QUANT: CPT

## 2025-03-25 PROCEDURE — 83735 ASSAY OF MAGNESIUM: CPT

## 2025-03-25 PROCEDURE — 85025 COMPLETE CBC W/AUTO DIFF WBC: CPT

## 2025-03-25 PROCEDURE — 84443 ASSAY THYROID STIM HORMONE: CPT | Performed by: STUDENT IN AN ORGANIZED HEALTH CARE EDUCATION/TRAINING PROGRAM

## 2025-03-25 PROCEDURE — 87637 SARSCOV2&INF A&B&RSV AMP PRB: CPT | Performed by: STUDENT IN AN ORGANIZED HEALTH CARE EDUCATION/TRAINING PROGRAM

## 2025-03-25 PROCEDURE — 2500000005 HC RX 250 GENERAL PHARMACY W/O HCPCS

## 2025-03-25 PROCEDURE — 84075 ASSAY ALKALINE PHOSPHATASE: CPT | Performed by: STUDENT IN AN ORGANIZED HEALTH CARE EDUCATION/TRAINING PROGRAM

## 2025-03-25 PROCEDURE — 2500000002 HC RX 250 W HCPCS SELF ADMINISTERED DRUGS (ALT 637 FOR MEDICARE OP, ALT 636 FOR OP/ED): Performed by: STUDENT IN AN ORGANIZED HEALTH CARE EDUCATION/TRAINING PROGRAM

## 2025-03-25 PROCEDURE — 84132 ASSAY OF SERUM POTASSIUM: CPT | Performed by: STUDENT IN AN ORGANIZED HEALTH CARE EDUCATION/TRAINING PROGRAM

## 2025-03-25 PROCEDURE — 84132 ASSAY OF SERUM POTASSIUM: CPT

## 2025-03-25 PROCEDURE — 94640 AIRWAY INHALATION TREATMENT: CPT

## 2025-03-25 PROCEDURE — 31500 INSERT EMERGENCY AIRWAY: CPT

## 2025-03-25 PROCEDURE — 5A09457 ASSISTANCE WITH RESPIRATORY VENTILATION, 24-96 CONSECUTIVE HOURS, CONTINUOUS POSITIVE AIRWAY PRESSURE: ICD-10-PCS | Performed by: STUDENT IN AN ORGANIZED HEALTH CARE EDUCATION/TRAINING PROGRAM

## 2025-03-25 PROCEDURE — 84439 ASSAY OF FREE THYROXINE: CPT | Performed by: STUDENT IN AN ORGANIZED HEALTH CARE EDUCATION/TRAINING PROGRAM

## 2025-03-25 PROCEDURE — 83880 ASSAY OF NATRIURETIC PEPTIDE: CPT | Performed by: STUDENT IN AN ORGANIZED HEALTH CARE EDUCATION/TRAINING PROGRAM

## 2025-03-25 PROCEDURE — 2500000004 HC RX 250 GENERAL PHARMACY W/ HCPCS (ALT 636 FOR OP/ED): Performed by: INTERNAL MEDICINE

## 2025-03-25 PROCEDURE — 93005 ELECTROCARDIOGRAM TRACING: CPT

## 2025-03-25 PROCEDURE — 94003 VENT MGMT INPAT SUBQ DAY: CPT

## 2025-03-25 PROCEDURE — 84484 ASSAY OF TROPONIN QUANT: CPT | Performed by: STUDENT IN AN ORGANIZED HEALTH CARE EDUCATION/TRAINING PROGRAM

## 2025-03-25 PROCEDURE — 2500000005 HC RX 250 GENERAL PHARMACY W/O HCPCS: Performed by: STUDENT IN AN ORGANIZED HEALTH CARE EDUCATION/TRAINING PROGRAM

## 2025-03-25 PROCEDURE — 94002 VENT MGMT INPAT INIT DAY: CPT

## 2025-03-25 PROCEDURE — 94660 CPAP INITIATION&MGMT: CPT

## 2025-03-25 PROCEDURE — 99292 CRITICAL CARE ADDL 30 MIN: CPT | Performed by: INTERNAL MEDICINE

## 2025-03-25 PROCEDURE — 85025 COMPLETE CBC W/AUTO DIFF WBC: CPT | Performed by: STUDENT IN AN ORGANIZED HEALTH CARE EDUCATION/TRAINING PROGRAM

## 2025-03-25 PROCEDURE — 5A1945Z RESPIRATORY VENTILATION, 24-96 CONSECUTIVE HOURS: ICD-10-PCS

## 2025-03-25 PROCEDURE — 2500000001 HC RX 250 WO HCPCS SELF ADMINISTERED DRUGS (ALT 637 FOR MEDICARE OP)

## 2025-03-25 PROCEDURE — 71045 X-RAY EXAM CHEST 1 VIEW: CPT | Performed by: STUDENT IN AN ORGANIZED HEALTH CARE EDUCATION/TRAINING PROGRAM

## 2025-03-25 PROCEDURE — 2500000002 HC RX 250 W HCPCS SELF ADMINISTERED DRUGS (ALT 637 FOR MEDICARE OP, ALT 636 FOR OP/ED)

## 2025-03-25 PROCEDURE — 2500000004 HC RX 250 GENERAL PHARMACY W/ HCPCS (ALT 636 FOR OP/ED)

## 2025-03-25 PROCEDURE — 36415 COLL VENOUS BLD VENIPUNCTURE: CPT | Performed by: STUDENT IN AN ORGANIZED HEALTH CARE EDUCATION/TRAINING PROGRAM

## 2025-03-25 PROCEDURE — 83036 HEMOGLOBIN GLYCOSYLATED A1C: CPT | Mod: WESLAB

## 2025-03-25 PROCEDURE — 99291 CRITICAL CARE FIRST HOUR: CPT | Performed by: STUDENT IN AN ORGANIZED HEALTH CARE EDUCATION/TRAINING PROGRAM

## 2025-03-25 PROCEDURE — 2020000001 HC ICU ROOM DAILY

## 2025-03-25 PROCEDURE — 36600 WITHDRAWAL OF ARTERIAL BLOOD: CPT

## 2025-03-25 PROCEDURE — 99291 CRITICAL CARE FIRST HOUR: CPT

## 2025-03-25 PROCEDURE — 96374 THER/PROPH/DIAG INJ IV PUSH: CPT | Mod: 59

## 2025-03-25 PROCEDURE — 0BH17EZ INSERTION OF ENDOTRACHEAL AIRWAY INTO TRACHEA, VIA NATURAL OR ARTIFICIAL OPENING: ICD-10-PCS

## 2025-03-25 PROCEDURE — 94799 UNLISTED PULMONARY SVC/PX: CPT

## 2025-03-25 PROCEDURE — 71045 X-RAY EXAM CHEST 1 VIEW: CPT | Performed by: RADIOLOGY

## 2025-03-25 PROCEDURE — 9420000001 HC RT PATIENT EDUCATION 5 MIN

## 2025-03-25 RX ORDER — LEVOTHYROXINE SODIUM 100 UG/1
200 TABLET ORAL
Status: DISCONTINUED | OUTPATIENT
Start: 2025-03-26 | End: 2025-04-02

## 2025-03-25 RX ORDER — ETOMIDATE 2 MG/ML
INJECTION INTRAVENOUS CODE/TRAUMA/SEDATION MEDICATION
Status: COMPLETED | OUTPATIENT
Start: 2025-03-25 | End: 2025-03-25

## 2025-03-25 RX ORDER — NITROGLYCERIN 0.4 MG/1
0.4 TABLET SUBLINGUAL EVERY 5 MIN PRN
Status: DISCONTINUED | OUTPATIENT
Start: 2025-03-25 | End: 2025-04-17 | Stop reason: HOSPADM

## 2025-03-25 RX ORDER — MIDAZOLAM HYDROCHLORIDE 1 MG/ML
2 INJECTION, SOLUTION INTRAMUSCULAR; INTRAVENOUS ONCE
Status: DISCONTINUED | OUTPATIENT
Start: 2025-03-25 | End: 2025-03-25

## 2025-03-25 RX ORDER — AMLODIPINE BESYLATE 10 MG/1
10 TABLET ORAL DAILY
Status: DISCONTINUED | OUTPATIENT
Start: 2025-03-25 | End: 2025-04-17 | Stop reason: HOSPADM

## 2025-03-25 RX ORDER — POLYETHYLENE GLYCOL 3350 17 G/17G
17 POWDER, FOR SOLUTION ORAL DAILY
Status: DISCONTINUED | OUTPATIENT
Start: 2025-03-25 | End: 2025-04-10

## 2025-03-25 RX ORDER — ETOMIDATE 2 MG/ML
0.3 INJECTION INTRAVENOUS ONCE
Status: COMPLETED | OUTPATIENT
Start: 2025-03-25 | End: 2025-03-25

## 2025-03-25 RX ORDER — IPRATROPIUM BROMIDE AND ALBUTEROL SULFATE 2.5; .5 MG/3ML; MG/3ML
3 SOLUTION RESPIRATORY (INHALATION)
Status: DISCONTINUED | OUTPATIENT
Start: 2025-03-25 | End: 2025-04-05

## 2025-03-25 RX ORDER — FENTANYL CITRATE-0.9 % NACL/PF 10 MCG/ML
0-300 PLASTIC BAG, INJECTION (ML) INTRAVENOUS CONTINUOUS
Status: DISCONTINUED | OUTPATIENT
Start: 2025-03-25 | End: 2025-03-27

## 2025-03-25 RX ORDER — DEXTROSE 50 % IN WATER (D50W) INTRAVENOUS SYRINGE
25
Status: DISCONTINUED | OUTPATIENT
Start: 2025-03-25 | End: 2025-04-03

## 2025-03-25 RX ORDER — FENTANYL CITRATE 50 UG/ML
25 INJECTION, SOLUTION INTRAMUSCULAR; INTRAVENOUS ONCE
Status: DISCONTINUED | OUTPATIENT
Start: 2025-03-25 | End: 2025-03-25

## 2025-03-25 RX ORDER — NYSTATIN 100000 [USP'U]/G
1 POWDER TOPICAL 3 TIMES DAILY
Status: DISCONTINUED | OUTPATIENT
Start: 2025-03-25 | End: 2025-04-17 | Stop reason: HOSPADM

## 2025-03-25 RX ORDER — METOPROLOL SUCCINATE 100 MG/1
100 TABLET, EXTENDED RELEASE ORAL DAILY
Status: DISCONTINUED | OUTPATIENT
Start: 2025-03-25 | End: 2025-04-17 | Stop reason: HOSPADM

## 2025-03-25 RX ORDER — DEXTROSE 50 % IN WATER (D50W) INTRAVENOUS SYRINGE
12.5
Status: DISCONTINUED | OUTPATIENT
Start: 2025-03-25 | End: 2025-04-03

## 2025-03-25 RX ORDER — INSULIN LISPRO 100 [IU]/ML
0-10 INJECTION, SOLUTION INTRAVENOUS; SUBCUTANEOUS EVERY 4 HOURS
Status: DISCONTINUED | OUTPATIENT
Start: 2025-03-25 | End: 2025-04-02

## 2025-03-25 RX ORDER — LEVOTHYROXINE SODIUM 100 UG/1
200 TABLET ORAL DAILY
Status: DISCONTINUED | OUTPATIENT
Start: 2025-03-25 | End: 2025-04-17 | Stop reason: HOSPADM

## 2025-03-25 RX ORDER — PANTOPRAZOLE SODIUM 40 MG/10ML
40 INJECTION, POWDER, LYOPHILIZED, FOR SOLUTION INTRAVENOUS
Status: DISCONTINUED | OUTPATIENT
Start: 2025-03-25 | End: 2025-03-28

## 2025-03-25 RX ORDER — PANTOPRAZOLE SODIUM 40 MG/1
40 TABLET, DELAYED RELEASE ORAL
Status: DISCONTINUED | OUTPATIENT
Start: 2025-03-25 | End: 2025-03-28

## 2025-03-25 RX ORDER — IPRATROPIUM BROMIDE AND ALBUTEROL SULFATE 2.5; .5 MG/3ML; MG/3ML
3 SOLUTION RESPIRATORY (INHALATION)
Status: DISCONTINUED | OUTPATIENT
Start: 2025-03-25 | End: 2025-03-25 | Stop reason: SDUPTHER

## 2025-03-25 RX ORDER — GABAPENTIN 100 MG/1
100 CAPSULE ORAL 3 TIMES DAILY
Status: DISCONTINUED | OUTPATIENT
Start: 2025-03-25 | End: 2025-03-29

## 2025-03-25 RX ORDER — ASPIRIN 81 MG/1
81 TABLET ORAL DAILY
Status: DISCONTINUED | OUTPATIENT
Start: 2025-03-25 | End: 2025-03-28

## 2025-03-25 RX ORDER — PROPOFOL 10 MG/ML
INJECTION, EMULSION INTRAVENOUS CODE/TRAUMA/SEDATION MEDICATION
Status: COMPLETED | OUTPATIENT
Start: 2025-03-25 | End: 2025-03-25

## 2025-03-25 RX ORDER — EZETIMIBE 10 MG/1
10 TABLET ORAL DAILY
Status: DISCONTINUED | OUTPATIENT
Start: 2025-03-25 | End: 2025-04-17 | Stop reason: HOSPADM

## 2025-03-25 RX ORDER — ESOMEPRAZOLE MAGNESIUM 40 MG/1
40 GRANULE, DELAYED RELEASE ORAL
Status: DISCONTINUED | OUTPATIENT
Start: 2025-03-25 | End: 2025-03-28

## 2025-03-25 RX ORDER — PROPOFOL 10 MG/ML
5-50 INJECTION, EMULSION INTRAVENOUS CONTINUOUS
Status: DISCONTINUED | OUTPATIENT
Start: 2025-03-25 | End: 2025-03-27

## 2025-03-25 RX ORDER — MONTELUKAST SODIUM 10 MG/1
10 TABLET ORAL NIGHTLY
Status: DISCONTINUED | OUTPATIENT
Start: 2025-03-25 | End: 2025-04-17 | Stop reason: HOSPADM

## 2025-03-25 RX ORDER — MAGNESIUM SULFATE 1 G/100ML
1 INJECTION INTRAVENOUS ONCE
Status: COMPLETED | OUTPATIENT
Start: 2025-03-25 | End: 2025-03-25

## 2025-03-25 RX ORDER — SERTRALINE HYDROCHLORIDE 50 MG/1
50 TABLET, FILM COATED ORAL DAILY
Status: DISCONTINUED | OUTPATIENT
Start: 2025-03-25 | End: 2025-04-13

## 2025-03-25 RX ORDER — ALLOPURINOL 100 MG/1
50 TABLET ORAL EVERY OTHER DAY
Status: DISCONTINUED | OUTPATIENT
Start: 2025-03-25 | End: 2025-04-17 | Stop reason: HOSPADM

## 2025-03-25 RX ORDER — METOPROLOL TARTRATE 50 MG/1
50 TABLET ORAL 2 TIMES DAILY
Status: DISCONTINUED | OUTPATIENT
Start: 2025-03-25 | End: 2025-04-17 | Stop reason: HOSPADM

## 2025-03-25 RX ORDER — FUROSEMIDE 10 MG/ML
40 INJECTION INTRAMUSCULAR; INTRAVENOUS ONCE
Status: COMPLETED | OUTPATIENT
Start: 2025-03-25 | End: 2025-03-25

## 2025-03-25 RX ORDER — PRAVASTATIN SODIUM 20 MG/1
40 TABLET ORAL NIGHTLY
Status: DISCONTINUED | OUTPATIENT
Start: 2025-03-25 | End: 2025-04-17 | Stop reason: HOSPADM

## 2025-03-25 RX ORDER — HEPARIN SODIUM 5000 [USP'U]/ML
7500 INJECTION, SOLUTION INTRAVENOUS; SUBCUTANEOUS EVERY 8 HOURS SCHEDULED
Status: DISCONTINUED | OUTPATIENT
Start: 2025-03-25 | End: 2025-04-17 | Stop reason: HOSPADM

## 2025-03-25 RX ORDER — HYDRALAZINE HYDROCHLORIDE 50 MG/1
50 TABLET, FILM COATED ORAL 2 TIMES DAILY
Status: DISCONTINUED | OUTPATIENT
Start: 2025-03-25 | End: 2025-04-17 | Stop reason: HOSPADM

## 2025-03-25 RX ORDER — MIDAZOLAM HYDROCHLORIDE 5 MG/ML
INJECTION INTRAMUSCULAR; INTRAVENOUS CODE/TRAUMA/SEDATION MEDICATION
Status: COMPLETED | OUTPATIENT
Start: 2025-03-25 | End: 2025-03-25

## 2025-03-25 RX ORDER — FLUTICASONE PROPIONATE 50 MCG
2 SPRAY, SUSPENSION (ML) NASAL 2 TIMES DAILY
Status: DISCONTINUED | OUTPATIENT
Start: 2025-03-25 | End: 2025-04-17 | Stop reason: HOSPADM

## 2025-03-25 RX ADMIN — MONTELUKAST 10 MG: 10 TABLET, FILM COATED ORAL at 21:09

## 2025-03-25 RX ADMIN — Medication 50 PERCENT: at 07:53

## 2025-03-25 RX ADMIN — DEXTROSE MONOHYDRATE 500 MG: 50 INJECTION, SOLUTION INTRAVENOUS at 06:13

## 2025-03-25 RX ADMIN — HEPARIN SODIUM 7500 UNITS: 5000 INJECTION INTRAVENOUS; SUBCUTANEOUS at 21:08

## 2025-03-25 RX ADMIN — PRAVASTATIN SODIUM 40 MG: 20 TABLET ORAL at 21:09

## 2025-03-25 RX ADMIN — INSULIN LISPRO 2 UNITS: 100 INJECTION, SOLUTION INTRAVENOUS; SUBCUTANEOUS at 12:19

## 2025-03-25 RX ADMIN — Medication 25 MCG/HR: at 06:16

## 2025-03-25 RX ADMIN — Medication 100 MCG/HR: at 16:58

## 2025-03-25 RX ADMIN — NYSTATIN 1 APPLICATION: 100000 POWDER TOPICAL at 08:26

## 2025-03-25 RX ADMIN — NYSTATIN 1 APPLICATION: 100000 POWDER TOPICAL at 15:37

## 2025-03-25 RX ADMIN — IPRATROPIUM BROMIDE AND ALBUTEROL SULFATE 3 ML: 2.5; .5 SOLUTION RESPIRATORY (INHALATION) at 11:58

## 2025-03-25 RX ADMIN — PROPOFOL 10 MG: 10 INJECTION, EMULSION INTRAVENOUS at 04:57

## 2025-03-25 RX ADMIN — HEPARIN SODIUM 7500 UNITS: 5000 INJECTION INTRAVENOUS; SUBCUTANEOUS at 15:28

## 2025-03-25 RX ADMIN — METOPROLOL TARTRATE 50 MG: 50 TABLET, FILM COATED ORAL at 08:27

## 2025-03-25 RX ADMIN — FLUTICASONE PROPIONATE 2 SPRAY: 50 SPRAY, METERED NASAL at 21:08

## 2025-03-25 RX ADMIN — POLYETHYLENE GLYCOL 3350 17 G: 17 POWDER, FOR SOLUTION ORAL at 08:27

## 2025-03-25 RX ADMIN — METHYLPREDNISOLONE SODIUM SUCCINATE 125 MG: 125 INJECTION, POWDER, FOR SOLUTION INTRAMUSCULAR; INTRAVENOUS at 00:39

## 2025-03-25 RX ADMIN — Medication 50 PERCENT: at 20:29

## 2025-03-25 RX ADMIN — FLUTICASONE PROPIONATE 2 SPRAY: 50 SPRAY, METERED NASAL at 12:19

## 2025-03-25 RX ADMIN — LEVOTHYROXINE SODIUM 200 MCG: 0.1 TABLET ORAL at 06:29

## 2025-03-25 RX ADMIN — MAGNESIUM SULFATE IN DEXTROSE 1 G: 10 INJECTION, SOLUTION INTRAVENOUS at 12:19

## 2025-03-25 RX ADMIN — Medication 100 PERCENT: at 05:18

## 2025-03-25 RX ADMIN — MIDAZOLAM HYDROCHLORIDE 2 MG: 5 INJECTION, SOLUTION INTRAMUSCULAR; INTRAVENOUS at 04:42

## 2025-03-25 RX ADMIN — PROPOFOL 40 MCG/KG/MIN: 10 INJECTION, EMULSION INTRAVENOUS at 06:09

## 2025-03-25 RX ADMIN — EZETIMIBE 10 MG: 10 TABLET ORAL at 12:19

## 2025-03-25 RX ADMIN — Medication 175 MCG/HR: at 09:39

## 2025-03-25 RX ADMIN — HYDRALAZINE HYDROCHLORIDE 50 MG: 50 TABLET ORAL at 08:27

## 2025-03-25 RX ADMIN — PROPOFOL 20 MCG/KG/MIN: 10 INJECTION, EMULSION INTRAVENOUS at 15:28

## 2025-03-25 RX ADMIN — PROPOFOL 20 MCG/KG/MIN: 10 INJECTION, EMULSION INTRAVENOUS at 20:06

## 2025-03-25 RX ADMIN — SODIUM CHLORIDE, SODIUM LACTATE, POTASSIUM CHLORIDE, AND CALCIUM CHLORIDE 500 ML: 600; 310; 30; 20 INJECTION, SOLUTION INTRAVENOUS at 16:59

## 2025-03-25 RX ADMIN — HEPARIN SODIUM 7500 UNITS: 5000 INJECTION INTRAVENOUS; SUBCUTANEOUS at 05:44

## 2025-03-25 RX ADMIN — IPRATROPIUM BROMIDE AND ALBUTEROL SULFATE 3 ML: 2.5; .5 SOLUTION RESPIRATORY (INHALATION) at 07:53

## 2025-03-25 RX ADMIN — Medication 40 PERCENT: at 03:30

## 2025-03-25 RX ADMIN — ALLOPURINOL 50 MG: 100 TABLET ORAL at 08:27

## 2025-03-25 RX ADMIN — AMLODIPINE BESYLATE 10 MG: 10 TABLET ORAL at 08:27

## 2025-03-25 RX ADMIN — GABAPENTIN 100 MG: 100 CAPSULE ORAL at 15:28

## 2025-03-25 RX ADMIN — GABAPENTIN 100 MG: 100 CAPSULE ORAL at 08:27

## 2025-03-25 RX ADMIN — IPRATROPIUM BROMIDE AND ALBUTEROL SULFATE 3 ML: 2.5; .5 SOLUTION RESPIRATORY (INHALATION) at 20:29

## 2025-03-25 RX ADMIN — PROPOFOL 25 MCG/KG/MIN: 10 INJECTION, EMULSION INTRAVENOUS at 08:16

## 2025-03-25 RX ADMIN — SERTRALINE HYDROCHLORIDE 50 MG: 50 TABLET ORAL at 08:27

## 2025-03-25 RX ADMIN — ASPIRIN 81 MG: 81 TABLET, COATED ORAL at 08:27

## 2025-03-25 RX ADMIN — NYSTATIN 1 APPLICATION: 100000 POWDER TOPICAL at 21:25

## 2025-03-25 RX ADMIN — PROPOFOL 20 MCG/KG/MIN: 10 INJECTION, EMULSION INTRAVENOUS at 09:20

## 2025-03-25 RX ADMIN — ETOMIDATE INJECTION 40 MG: 2 SOLUTION INTRAVENOUS at 04:52

## 2025-03-25 RX ADMIN — FUROSEMIDE 40 MG: 10 INJECTION, SOLUTION INTRAMUSCULAR; INTRAVENOUS at 02:21

## 2025-03-25 RX ADMIN — HYDRALAZINE HYDROCHLORIDE 50 MG: 50 TABLET ORAL at 21:09

## 2025-03-25 RX ADMIN — ETOMIDATE 40 MG: 2 INJECTION, SOLUTION INTRAVENOUS at 04:46

## 2025-03-25 RX ADMIN — IPRATROPIUM BROMIDE AND ALBUTEROL SULFATE 3 ML: 2.5; .5 SOLUTION RESPIRATORY (INHALATION) at 00:39

## 2025-03-25 RX ADMIN — PANTOPRAZOLE SODIUM 40 MG: 40 INJECTION, POWDER, FOR SOLUTION INTRAVENOUS at 06:29

## 2025-03-25 RX ADMIN — INSULIN LISPRO 2 UNITS: 100 INJECTION, SOLUTION INTRAVENOUS; SUBCUTANEOUS at 15:39

## 2025-03-25 RX ADMIN — METOPROLOL TARTRATE 50 MG: 50 TABLET, FILM COATED ORAL at 21:08

## 2025-03-25 RX ADMIN — GABAPENTIN 100 MG: 100 CAPSULE ORAL at 21:09

## 2025-03-25 SDOH — SOCIAL STABILITY: SOCIAL NETWORK: HOW OFTEN DO YOU ATTEND CHURCH OR RELIGIOUS SERVICES?: PATIENT UNABLE TO ANSWER

## 2025-03-25 SDOH — ECONOMIC STABILITY: FOOD INSECURITY
WITHIN THE PAST 12 MONTHS, YOU WORRIED THAT YOUR FOOD WOULD RUN OUT BEFORE YOU GOT THE MONEY TO BUY MORE.: PATIENT UNABLE TO ANSWER

## 2025-03-25 SDOH — HEALTH STABILITY: PHYSICAL HEALTH: ON AVERAGE, HOW MANY DAYS PER WEEK DO YOU ENGAGE IN MODERATE TO STRENUOUS EXERCISE (LIKE A BRISK WALK)?: 0 DAYS

## 2025-03-25 SDOH — ECONOMIC STABILITY: HOUSING INSECURITY: IN THE PAST 12 MONTHS, HOW MANY TIMES HAVE YOU MOVED WHERE YOU WERE LIVING?: 0

## 2025-03-25 SDOH — HEALTH STABILITY: PHYSICAL HEALTH
ON AVERAGE, HOW MANY DAYS PER WEEK DO YOU ENGAGE IN MODERATE TO STRENUOUS EXERCISE (LIKE A BRISK WALK)?: PATIENT UNABLE TO ANSWER

## 2025-03-25 SDOH — HEALTH STABILITY: MENTAL HEALTH: HOW OFTEN DO YOU HAVE SIX OR MORE DRINKS ON ONE OCCASION?: PATIENT UNABLE TO ANSWER

## 2025-03-25 SDOH — HEALTH STABILITY: PHYSICAL HEALTH
HOW OFTEN DO YOU NEED TO HAVE SOMEONE HELP YOU WHEN YOU READ INSTRUCTIONS, PAMPHLETS, OR OTHER WRITTEN MATERIAL FROM YOUR DOCTOR OR PHARMACY?: PATIENT UNABLE TO RESPOND

## 2025-03-25 SDOH — HEALTH STABILITY: MENTAL HEALTH: HOW OFTEN DO YOU HAVE SIX OR MORE DRINKS ON ONE OCCASION?: NEVER

## 2025-03-25 SDOH — SOCIAL STABILITY: SOCIAL NETWORK
DO YOU BELONG TO ANY CLUBS OR ORGANIZATIONS SUCH AS CHURCH GROUPS, UNIONS, FRATERNAL OR ATHLETIC GROUPS, OR SCHOOL GROUPS?: NO

## 2025-03-25 SDOH — HEALTH STABILITY: MENTAL HEALTH: HOW OFTEN DO YOU HAVE A DRINK CONTAINING ALCOHOL?: PATIENT UNABLE TO ANSWER

## 2025-03-25 SDOH — HEALTH STABILITY: MENTAL HEALTH: HOW MANY DRINKS CONTAINING ALCOHOL DO YOU HAVE ON A TYPICAL DAY WHEN YOU ARE DRINKING?: PATIENT UNABLE TO ANSWER

## 2025-03-25 SDOH — HEALTH STABILITY: PHYSICAL HEALTH: ON AVERAGE, HOW MANY MINUTES DO YOU ENGAGE IN EXERCISE AT THIS LEVEL?: PATIENT UNABLE TO ANSWER

## 2025-03-25 SDOH — ECONOMIC STABILITY: TRANSPORTATION INSECURITY
IN THE PAST 12 MONTHS, HAS LACK OF TRANSPORTATION KEPT YOU FROM MEDICAL APPOINTMENTS OR FROM GETTING MEDICATIONS?: PATIENT UNABLE TO ANSWER

## 2025-03-25 SDOH — SOCIAL STABILITY: SOCIAL INSECURITY
WITHIN THE LAST YEAR, HAVE YOU BEEN RAPED OR FORCED TO HAVE ANY KIND OF SEXUAL ACTIVITY BY YOUR PARTNER OR EX-PARTNER?: PATIENT UNABLE TO ANSWER

## 2025-03-25 SDOH — HEALTH STABILITY: PHYSICAL HEALTH: ON AVERAGE, HOW MANY MINUTES DO YOU ENGAGE IN EXERCISE AT THIS LEVEL?: 0 MIN

## 2025-03-25 SDOH — SOCIAL STABILITY: SOCIAL INSECURITY
WITHIN THE LAST YEAR, HAVE YOU BEEN KICKED, HIT, SLAPPED, OR OTHERWISE PHYSICALLY HURT BY YOUR PARTNER OR EX-PARTNER?: PATIENT UNABLE TO ANSWER

## 2025-03-25 SDOH — ECONOMIC STABILITY: INCOME INSECURITY: IN THE PAST 12 MONTHS HAS THE ELECTRIC, GAS, OIL, OR WATER COMPANY THREATENED TO SHUT OFF SERVICES IN YOUR HOME?: NO

## 2025-03-25 SDOH — ECONOMIC STABILITY: TRANSPORTATION INSECURITY: IN THE PAST 12 MONTHS, HAS LACK OF TRANSPORTATION KEPT YOU FROM MEDICAL APPOINTMENTS OR FROM GETTING MEDICATIONS?: NO

## 2025-03-25 SDOH — SOCIAL STABILITY: SOCIAL INSECURITY: ARE YOU MARRIED, WIDOWED, DIVORCED, SEPARATED, NEVER MARRIED, OR LIVING WITH A PARTNER?: PATIENT UNABLE TO ANSWER

## 2025-03-25 SDOH — ECONOMIC STABILITY: HOUSING INSECURITY: IN THE LAST 12 MONTHS, WAS THERE A TIME WHEN YOU WERE NOT ABLE TO PAY THE MORTGAGE OR RENT ON TIME?: NO

## 2025-03-25 SDOH — SOCIAL STABILITY: SOCIAL NETWORK: IN A TYPICAL WEEK, HOW MANY TIMES DO YOU TALK ON THE PHONE WITH FAMILY, FRIENDS, OR NEIGHBORS?: PATIENT UNABLE TO ANSWER

## 2025-03-25 SDOH — ECONOMIC STABILITY: HOUSING INSECURITY
IN THE LAST 12 MONTHS, WAS THERE A TIME WHEN YOU WERE NOT ABLE TO PAY THE MORTGAGE OR RENT ON TIME?: PATIENT UNABLE TO ANSWER

## 2025-03-25 SDOH — SOCIAL STABILITY: SOCIAL INSECURITY: WITHIN THE LAST YEAR, HAVE YOU BEEN AFRAID OF YOUR PARTNER OR EX-PARTNER?: PATIENT UNABLE TO ANSWER

## 2025-03-25 SDOH — HEALTH STABILITY: MENTAL HEALTH: HOW MANY DRINKS CONTAINING ALCOHOL DO YOU HAVE ON A TYPICAL DAY WHEN YOU ARE DRINKING?: PATIENT DOES NOT DRINK

## 2025-03-25 SDOH — HEALTH STABILITY: MENTAL HEALTH: HOW OFTEN DO YOU HAVE A DRINK CONTAINING ALCOHOL?: NEVER

## 2025-03-25 SDOH — SOCIAL STABILITY: SOCIAL NETWORK
DO YOU BELONG TO ANY CLUBS OR ORGANIZATIONS SUCH AS CHURCH GROUPS, UNIONS, FRATERNAL OR ATHLETIC GROUPS, OR SCHOOL GROUPS?: PATIENT UNABLE TO ANSWER

## 2025-03-25 SDOH — SOCIAL STABILITY: SOCIAL INSECURITY: ARE YOU MARRIED, WIDOWED, DIVORCED, SEPARATED, NEVER MARRIED, OR LIVING WITH A PARTNER?: MARRIED

## 2025-03-25 SDOH — ECONOMIC STABILITY: INCOME INSECURITY
IN THE PAST 12 MONTHS HAS THE ELECTRIC, GAS, OIL, OR WATER COMPANY THREATENED TO SHUT OFF SERVICES IN YOUR HOME?: PATIENT UNABLE TO ANSWER

## 2025-03-25 SDOH — ECONOMIC STABILITY: FOOD INSECURITY
WITHIN THE PAST 12 MONTHS, THE FOOD YOU BOUGHT JUST DIDN'T LAST AND YOU DIDN'T HAVE MONEY TO GET MORE.: PATIENT UNABLE TO ANSWER

## 2025-03-25 SDOH — SOCIAL STABILITY: SOCIAL INSECURITY: ARE YOU OR HAVE YOU BEEN THREATENED OR ABUSED PHYSICALLY, EMOTIONALLY, OR SEXUALLY BY ANYONE?: UNABLE TO ASSESS

## 2025-03-25 SDOH — ECONOMIC STABILITY: HOUSING INSECURITY: AT ANY TIME IN THE PAST 12 MONTHS, WERE YOU HOMELESS OR LIVING IN A SHELTER (INCLUDING NOW)?: NO

## 2025-03-25 SDOH — SOCIAL STABILITY: SOCIAL INSECURITY: HAVE YOU HAD THOUGHTS OF HARMING ANYONE ELSE?: UNABLE TO ASSESS

## 2025-03-25 SDOH — SOCIAL STABILITY: SOCIAL INSECURITY
WITHIN THE LAST YEAR, HAVE YOU BEEN HUMILIATED OR EMOTIONALLY ABUSED IN OTHER WAYS BY YOUR PARTNER OR EX-PARTNER?: PATIENT UNABLE TO ANSWER

## 2025-03-25 SDOH — HEALTH STABILITY: MENTAL HEALTH
DO YOU FEEL STRESS - TENSE, RESTLESS, NERVOUS, OR ANXIOUS, OR UNABLE TO SLEEP AT NIGHT BECAUSE YOUR MIND IS TROUBLED ALL THE TIME - THESE DAYS?: VERY MUCH

## 2025-03-25 SDOH — ECONOMIC STABILITY: HOUSING INSECURITY: AT ANY TIME IN THE PAST 12 MONTHS, WERE YOU HOMELESS OR LIVING IN A SHELTER (INCLUDING NOW)?: PATIENT UNABLE TO ANSWER

## 2025-03-25 SDOH — ECONOMIC STABILITY: FOOD INSECURITY: WITHIN THE PAST 12 MONTHS, YOU WORRIED THAT YOUR FOOD WOULD RUN OUT BEFORE YOU GOT THE MONEY TO BUY MORE.: NEVER TRUE

## 2025-03-25 SDOH — SOCIAL STABILITY: SOCIAL INSECURITY: ARE THERE ANY APPARENT SIGNS OF INJURIES/BEHAVIORS THAT COULD BE RELATED TO ABUSE/NEGLECT?: UNABLE TO ASSESS

## 2025-03-25 SDOH — SOCIAL STABILITY: SOCIAL NETWORK: HOW OFTEN DO YOU ATTEND MEETINGS OF THE CLUBS OR ORGANIZATIONS YOU BELONG TO?: PATIENT UNABLE TO ANSWER

## 2025-03-25 SDOH — SOCIAL STABILITY: SOCIAL NETWORK: HOW OFTEN DO YOU ATTEND CHURCH OR RELIGIOUS SERVICES?: NEVER

## 2025-03-25 SDOH — SOCIAL STABILITY: SOCIAL INSECURITY: DOES ANYONE TRY TO KEEP YOU FROM HAVING/CONTACTING OTHER FRIENDS OR DOING THINGS OUTSIDE YOUR HOME?: UNABLE TO ASSESS

## 2025-03-25 SDOH — ECONOMIC STABILITY: FOOD INSECURITY: WITHIN THE PAST 12 MONTHS, THE FOOD YOU BOUGHT JUST DIDN'T LAST AND YOU DIDN'T HAVE MONEY TO GET MORE.: NEVER TRUE

## 2025-03-25 SDOH — SOCIAL STABILITY: SOCIAL NETWORK: HOW OFTEN DO YOU ATTEND MEETINGS OF THE CLUBS OR ORGANIZATIONS YOU BELONG TO?: NEVER

## 2025-03-25 SDOH — ECONOMIC STABILITY: FOOD INSECURITY
HOW HARD IS IT FOR YOU TO PAY FOR THE VERY BASICS LIKE FOOD, HOUSING, MEDICAL CARE, AND HEATING?: PATIENT UNABLE TO ANSWER

## 2025-03-25 SDOH — HEALTH STABILITY: MENTAL HEALTH
DO YOU FEEL STRESS - TENSE, RESTLESS, NERVOUS, OR ANXIOUS, OR UNABLE TO SLEEP AT NIGHT BECAUSE YOUR MIND IS TROUBLED ALL THE TIME - THESE DAYS?: PATIENT UNABLE TO ANSWER

## 2025-03-25 SDOH — SOCIAL STABILITY: SOCIAL INSECURITY: ABUSE: ADULT

## 2025-03-25 SDOH — SOCIAL STABILITY: SOCIAL INSECURITY: HAS ANYONE EVER THREATENED TO HURT YOUR FAMILY OR YOUR PETS?: UNABLE TO ASSESS

## 2025-03-25 SDOH — SOCIAL STABILITY: SOCIAL NETWORK: HOW OFTEN DO YOU GET TOGETHER WITH FRIENDS OR RELATIVES?: PATIENT UNABLE TO ANSWER

## 2025-03-25 SDOH — ECONOMIC STABILITY: FOOD INSECURITY: HOW HARD IS IT FOR YOU TO PAY FOR THE VERY BASICS LIKE FOOD, HOUSING, MEDICAL CARE, AND HEATING?: NOT HARD AT ALL

## 2025-03-25 SDOH — SOCIAL STABILITY: SOCIAL NETWORK: HOW OFTEN DO YOU GET TOGETHER WITH FRIENDS OR RELATIVES?: MORE THAN THREE TIMES A WEEK

## 2025-03-25 SDOH — SOCIAL STABILITY: SOCIAL INSECURITY: DO YOU FEEL ANYONE HAS EXPLOITED OR TAKEN ADVANTAGE OF YOU FINANCIALLY OR OF YOUR PERSONAL PROPERTY?: UNABLE TO ASSESS

## 2025-03-25 SDOH — SOCIAL STABILITY: SOCIAL INSECURITY: HAVE YOU HAD ANY THOUGHTS OF HARMING ANYONE ELSE?: UNABLE TO ASSESS

## 2025-03-25 SDOH — SOCIAL STABILITY: SOCIAL INSECURITY: WERE YOU ABLE TO COMPLETE ALL THE BEHAVIORAL HEALTH SCREENINGS?: NO

## 2025-03-25 SDOH — SOCIAL STABILITY: SOCIAL INSECURITY: DO YOU FEEL UNSAFE GOING BACK TO THE PLACE WHERE YOU ARE LIVING?: UNABLE TO ASSESS

## 2025-03-25 ASSESSMENT — ACTIVITIES OF DAILY LIVING (ADL)
HEARING - RIGHT EAR: UNABLE TO ASSESS
LACK_OF_TRANSPORTATION: PATIENT UNABLE TO ANSWER
TOILETING: UNABLE TO ASSESS
DRESSING YOURSELF: UNABLE TO ASSESS
FEEDING YOURSELF: UNABLE TO ASSESS
LACK_OF_TRANSPORTATION: NO
WALKS IN HOME: UNABLE TO ASSESS
JUDGMENT_ADEQUATE_SAFELY_COMPLETE_DAILY_ACTIVITIES: UNABLE TO ASSESS
LACK_OF_TRANSPORTATION: NO
GROOMING: UNABLE TO ASSESS
PATIENT'S MEMORY ADEQUATE TO SAFELY COMPLETE DAILY ACTIVITIES?: UNABLE TO ASSESS
LACK_OF_TRANSPORTATION: PATIENT UNABLE TO ANSWER
ASSISTIVE_DEVICE: OTHER (COMMENT)
HEARING - LEFT EAR: UNABLE TO ASSESS
BATHING: UNABLE TO ASSESS
ADEQUATE_TO_COMPLETE_ADL: UNABLE TO ASSESS

## 2025-03-25 ASSESSMENT — COGNITIVE AND FUNCTIONAL STATUS - GENERAL
TOILETING: TOTAL
EATING MEALS: TOTAL
MOVING TO AND FROM BED TO CHAIR: TOTAL
HELP NEEDED FOR BATHING: TOTAL
TURNING FROM BACK TO SIDE WHILE IN FLAT BAD: TOTAL
EATING MEALS: TOTAL
HELP NEEDED FOR BATHING: TOTAL
EATING MEALS: TOTAL
HELP NEEDED FOR BATHING: TOTAL
MOBILITY SCORE: 6
MOBILITY SCORE: 6
DRESSING REGULAR UPPER BODY CLOTHING: TOTAL
DRESSING REGULAR UPPER BODY CLOTHING: TOTAL
TURNING FROM BACK TO SIDE WHILE IN FLAT BAD: TOTAL
WALKING IN HOSPITAL ROOM: TOTAL
STANDING UP FROM CHAIR USING ARMS: TOTAL
MOVING TO AND FROM BED TO CHAIR: TOTAL
CLIMB 3 TO 5 STEPS WITH RAILING: TOTAL
PERSONAL GROOMING: TOTAL
DRESSING REGULAR LOWER BODY CLOTHING: TOTAL
CLIMB 3 TO 5 STEPS WITH RAILING: TOTAL
DRESSING REGULAR UPPER BODY CLOTHING: TOTAL
MOVING TO AND FROM BED TO CHAIR: TOTAL
MOVING FROM LYING ON BACK TO SITTING ON SIDE OF FLAT BED WITH BEDRAILS: TOTAL
TOILETING: TOTAL
DAILY ACTIVITIY SCORE: 6
WALKING IN HOSPITAL ROOM: TOTAL
DRESSING REGULAR LOWER BODY CLOTHING: TOTAL
TURNING FROM BACK TO SIDE WHILE IN FLAT BAD: TOTAL
MOVING FROM LYING ON BACK TO SITTING ON SIDE OF FLAT BED WITH BEDRAILS: TOTAL
DAILY ACTIVITIY SCORE: 6
DAILY ACTIVITIY SCORE: 6
CLIMB 3 TO 5 STEPS WITH RAILING: TOTAL
DRESSING REGULAR LOWER BODY CLOTHING: TOTAL
PERSONAL GROOMING: TOTAL
TOILETING: TOTAL
STANDING UP FROM CHAIR USING ARMS: TOTAL
PERSONAL GROOMING: TOTAL
WALKING IN HOSPITAL ROOM: TOTAL
MOBILITY SCORE: 6
STANDING UP FROM CHAIR USING ARMS: TOTAL
PATIENT BASELINE BEDBOUND: UNABLE TO ASSESS AT THIS TIME
MOVING FROM LYING ON BACK TO SITTING ON SIDE OF FLAT BED WITH BEDRAILS: TOTAL

## 2025-03-25 ASSESSMENT — PAIN SCALES - PAIN ASSESSMENT IN ADVANCED DEMENTIA (PAINAD)
FACIALEXPRESSION: SMILING OR INEXPRESSIVE
FACIALEXPRESSION: SMILING OR INEXPRESSIVE
TOTALSCORE: 0
BREATHING: NOISY LABORED BREATHING, LONG PERIODS OF HYPERVENTILATION, CHEYNE-STOKES RESPIRATIONS
BREATHING: NORMAL
CONSOLABILITY: NO NEED TO CONSOLE
BODYLANGUAGE: RIGID, FISTS CLENCHED, KNEES UP, PUSHING/PULLING AWAY, STRIKES OUT
TOTALSCORE: REPOSITIONED
TOTALSCORE: REPOSITIONED
CONSOLABILITY: NO NEED TO CONSOLE
TOTALSCORE: 4
BODYLANGUAGE: RELAXED

## 2025-03-25 ASSESSMENT — LIFESTYLE VARIABLES
SKIP TO QUESTIONS 9-10: 1
AUDIT-C TOTAL SCORE: -1
SKIP TO QUESTIONS 9-10: 0
AUDIT-C TOTAL SCORE: -1
AUDIT-C TOTAL SCORE: -1
HOW OFTEN DO YOU HAVE A DRINK CONTAINING ALCOHOL: PATIENT UNABLE TO ANSWER
HOW MANY STANDARD DRINKS CONTAINING ALCOHOL DO YOU HAVE ON A TYPICAL DAY: PATIENT UNABLE TO ANSWER
HOW OFTEN DO YOU HAVE 6 OR MORE DRINKS ON ONE OCCASION: PATIENT UNABLE TO ANSWER
AUDIT-C TOTAL SCORE: 0
SKIP TO QUESTIONS 9-10: 0

## 2025-03-25 ASSESSMENT — RESPIRATORY DISTRESS OBSERVATION SCALE (RDOS)
RDOS TOTAL SCORE: 6
GRUNTING AT END OF EXPIRATION: 0 - NONE
RESPIRATORY RATE PER MINUTE: 2 - >30 BREATHS
LOOK OF FEAR: 0 - NONE
PARADOXICAL BREATHING PATTERN: 0 - NONE
INVOLUNTARY NASAL FLARING: 0 - NONE
HEART RATE PER MINUTE: 1 - 90-109 BEATS
ACCESSORY MUSCLE RISE IN CLAVICLE DURING INSPIRATION: 1 - SLIGHT RISE
RESTLESS NONPURPOSEFUL MOVEMENTS: 2 - FREQUENT MOVEMENTS

## 2025-03-25 ASSESSMENT — PATIENT HEALTH QUESTIONNAIRE - PHQ9
1. LITTLE INTEREST OR PLEASURE IN DOING THINGS: SEVERAL DAYS
2. FEELING DOWN, DEPRESSED OR HOPELESS: SEVERAL DAYS
SUM OF ALL RESPONSES TO PHQ9 QUESTIONS 1 & 2: 2

## 2025-03-25 ASSESSMENT — PAIN - FUNCTIONAL ASSESSMENT
PAIN_FUNCTIONAL_ASSESSMENT: CPOT (CRITICAL CARE PAIN OBSERVATION TOOL)

## 2025-03-25 NOTE — PROGRESS NOTES
Analia Murray is a 74 y.o. female on day 0 of admission presenting with Acute respiratory failure with hypoxia (Multi).  Patient with h/o asthma/COPD, HOLDEN, HTN, DLP, CHF, CKD IV, T2DM, hypothyroidism, anemia, MGUS, who p/w SOB x 2 weeks associated with cough productive of yellow sputum and chest tightness. In the ED work up revealed tachypnea, hypoxia, MARY ANN, BNP 700s, 7/21/78/140 on ABG and mild pulmonary edema on chest imaging. Received Solu-Medrol, Lasix, placed on BiPAP and admitted to ICU with the diagnosis of acute on chronic hypercarbic respiratory failure. Repeat ABG showed persistent hypercarbia while on BiPAP and  was consequently intubated after arrival to the ICU.   Subjective   Intubated upon arrival. Otherwise no significant overnight events. Cr slightly increased.      Currently is intubated and sedated and cannot provide history.   Objective   Scheduled medications  allopurinol, 50 mg, oral, Every other day  amLODIPine, 10 mg, oral, Daily  aspirin, 81 mg, oral, Daily  [START ON 3/26/2025] azithromycin, 500 mg, intravenous, q24h  pantoprazole, 40 mg, oral, Daily before breakfast   Or  esomeprazole, 40 mg, nasoduodenal tube, Daily before breakfast   Or  pantoprazole, 40 mg, intravenous, Daily before breakfast  ezetimibe, 10 mg, oral, Daily  fluticasone, 2 spray, Each Nostril, BID  gabapentin, 100 mg, oral, TID  heparin (porcine), 7,500 Units, subcutaneous, q8h JAYA  hydrALAZINE, 50 mg, oral, BID  insulin lispro, 0-10 Units, subcutaneous, q4h  ipratropium-albuteroL, 3 mL, nebulization, q6h  levothyroxine, 200 mcg, oral, Daily  [START ON 3/26/2025] levothyroxine, 200 mcg, oral, Every Wednesday  magnesium sulfate, 1 g, intravenous, Once  [START ON 3/26/2025] methylPREDNISolone sodium succinate (PF), 40 mg, intravenous, Daily  [Held by provider] metoprolol succinate XL, 100 mg, oral, Daily  metoprolol tartrate, 50 mg, oral, BID  montelukast, 10 mg, oral, Nightly  nystatin, 1 Application, Topical,  "TID  oxygen, , inhalation, Continuous - Inhalation  polyethylene glycol, 17 g, oral, Daily  pravastatin, 40 mg, oral, Nightly  sertraline, 50 mg, oral, Daily    Continuous medications  fentaNYL, 0-300 mcg/hr, Last Rate: 25 mcg/hr (03/25/25 0616)  propofol, 5-50 mcg/kg/min, Last Rate: 40 mcg/kg/min (03/25/25 0609)    PRN medications  PRN medications: dextrose, dextrose, fentaNYL, glucagon, glucagon, [Held by provider] nitroglycerin   Physical Exam  Constitutional:       General: She is not in acute distress.     Appearance: She is obese. She is not ill-appearing or toxic-appearing.   HENT:      Head: Normocephalic and atraumatic.      Mouth/Throat:      Comments: ET and G tube in place.   Eyes:      General: No scleral icterus.     Pupils: Pupils are equal, round, and reactive to light.   Neck:      Vascular: No carotid bruit.   Cardiovascular:      Rate and Rhythm: Normal rate and regular rhythm.      Heart sounds: No murmur heard.     No friction rub. No gallop.   Pulmonary:      Effort: Pulmonary effort is normal. No respiratory distress.      Breath sounds: No wheezing or rales.      Comments: Clear lung fields b/l with fair air entry on MV. No wheezing.   Abdominal:      General: Bowel sounds are normal. There is no distension.      Palpations: Abdomen is soft.      Tenderness: There is no abdominal tenderness.   Musculoskeletal:         General: No tenderness.      Cervical back: Neck supple. No rigidity.      Right lower leg: No edema.      Left lower leg: No edema.   Lymphadenopathy:      Cervical: No cervical adenopathy.   Skin:     General: Skin is warm and dry.      Coloration: Skin is not jaundiced.   Neurological:      Comments: Cannot fully assess. RASS -2   Psychiatric:      Comments: Cannot fully assess. RASS -2   Last Recorded Vitals  Blood pressure (!) 168/127, pulse 83, temperature 36.6 °C (97.9 °F), temperature source Oral, resp. rate 20, height 1.626 m (5' 4.02\"), weight 134 kg (295 lb 6.7 oz), " SpO2 100%.  Intake/Output last 3 Shifts:  I/O last 3 completed shifts:  In: 250 (1.9 mL/kg) [IV Piggyback:250]  Out: - (0 mL/kg)   Weight: 134 kg   Relevant Results  Results for orders placed or performed during the hospital encounter of 03/25/25 (from the past 24 hours)   CBC and Auto Differential   Result Value Ref Range    WBC 10.5 4.4 - 11.3 x10*3/uL    nRBC 0.0 0.0 - 0.0 /100 WBCs    RBC 4.45 4.00 - 5.20 x10*6/uL    Hemoglobin 10.4 (L) 12.0 - 16.0 g/dL    Hematocrit 35.8 (L) 36.0 - 46.0 %    MCV 80 80 - 100 fL    MCH 23.4 (L) 26.0 - 34.0 pg    MCHC 29.1 (L) 32.0 - 36.0 g/dL    RDW 20.0 (H) 11.5 - 14.5 %    Platelets 170 150 - 450 x10*3/uL    Neutrophils % 71.3 40.0 - 80.0 %    Immature Granulocytes %, Automated 0.5 0.0 - 0.9 %    Lymphocytes % 11.1 13.0 - 44.0 %    Monocytes % 9.8 2.0 - 10.0 %    Eosinophils % 7.0 0.0 - 6.0 %    Basophils % 0.3 0.0 - 2.0 %    Neutrophils Absolute 7.49 (H) 1.60 - 5.50 x10*3/uL    Immature Granulocytes Absolute, Automated 0.05 0.00 - 0.50 x10*3/uL    Lymphocytes Absolute 1.17 0.80 - 3.00 x10*3/uL    Monocytes Absolute 1.03 (H) 0.05 - 0.80 x10*3/uL    Eosinophils Absolute 0.73 (H) 0.00 - 0.40 x10*3/uL    Basophils Absolute 0.03 0.00 - 0.10 x10*3/uL   Comprehensive metabolic panel   Result Value Ref Range    Glucose 141 (H) 74 - 99 mg/dL    Sodium 138 136 - 145 mmol/L    Potassium 4.5 3.5 - 5.3 mmol/L    Chloride 103 98 - 107 mmol/L    Bicarbonate 27 21 - 32 mmol/L    Anion Gap 13 10 - 20 mmol/L    Urea Nitrogen 30 (H) 6 - 23 mg/dL    Creatinine 2.32 (H) 0.50 - 1.05 mg/dL    eGFR 21 (L) >60 mL/min/1.73m*2    Calcium 8.6 8.6 - 10.3 mg/dL    Albumin 3.6 3.4 - 5.0 g/dL    Alkaline Phosphatase 90 33 - 136 U/L    Total Protein 7.7 6.4 - 8.2 g/dL    AST 10 9 - 39 U/L    Bilirubin, Total 0.5 0.0 - 1.2 mg/dL    ALT 7 7 - 45 U/L   B-type natriuretic peptide   Result Value Ref Range     (H) 0 - 99 pg/mL   Sars-CoV-2, Influenza A/B and RSV PCR   Result Value Ref Range    Coronavirus  2019, PCR Not Detected Not Detected    Flu A Result Not Detected Not Detected    Flu B Result Not Detected Not Detected    RSV PCR Not Detected Not Detected   Troponin I, High Sensitivity, Initial   Result Value Ref Range    Troponin I, High Sensitivity 14 (H) 0 - 13 ng/L   TSH with reflex to Free T4 if abnormal   Result Value Ref Range    Thyroid Stimulating Hormone 11.37 (H) 0.44 - 3.98 mIU/L   Thyroxine, Free   Result Value Ref Range    Thyroxine, Free 0.86 0.61 - 1.12 ng/dL   Troponin, High Sensitivity, 1 Hour   Result Value Ref Range    Troponin I, High Sensitivity 13 0 - 13 ng/L   BLOOD GAS ARTERIAL FULL PANEL   Result Value Ref Range    POCT pH, Arterial 7.21 (LL) 7.38 - 7.42 pH    POCT pCO2, Arterial 78 (HH) 38 - 42 mm Hg    POCT pO2, Arterial 140 (H) 85 - 95 mm Hg    POCT SO2, Arterial 99 94 - 100 %    POCT Oxy Hemoglobin, Arterial 97.0 94.0 - 98.0 %    POCT Hematocrit Calculated, Arterial 32.0 (L) 36.0 - 46.0 %    POCT Sodium, Arterial 139 136 - 145 mmol/L    POCT Potassium, Arterial 5.0 3.5 - 5.3 mmol/L    POCT Chloride, Arterial 104 98 - 107 mmol/L    POCT Ionized Calcium, Arterial 1.21 1.10 - 1.33 mmol/L    POCT Glucose, Arterial 155 (H) 74 - 99 mg/dL    POCT Lactate, Arterial 0.5 0.4 - 2.0 mmol/L    POCT Base Excess, Arterial 1.8 -2.0 - 3.0 mmol/L    POCT HCO3 Calculated, Arterial 31.2 (H) 22.0 - 26.0 mmol/L    POCT Hemoglobin, Arterial 10.5 (L) 12.0 - 16.0 g/dL    POCT Anion Gap, Arterial 9 (L) 10 - 25 mmo/L    Patient Temperature 37.0 degrees Celsius    FiO2 36 %    Apparatus CANNULA     Site of Arterial Puncture Radial Right     Wes's Test Positive    BLOOD GAS ARTERIAL FULL PANEL   Result Value Ref Range    POCT pH, Arterial 7.22 (LL) 7.38 - 7.42 pH    POCT pCO2, Arterial 72 (HH) 38 - 42 mm Hg    POCT pO2, Arterial 90 85 - 95 mm Hg    POCT SO2, Arterial 97 94 - 100 %    POCT Oxy Hemoglobin, Arterial 94.3 94.0 - 98.0 %    POCT Hematocrit Calculated, Arterial 32.0 (L) 36.0 - 46.0 %    POCT Sodium,  Arterial 137 136 - 145 mmol/L    POCT Potassium, Arterial 5.1 3.5 - 5.3 mmol/L    POCT Chloride, Arterial 105 98 - 107 mmol/L    POCT Ionized Calcium, Arterial 1.13 1.10 - 1.33 mmol/L    POCT Glucose, Arterial 160 (H) 74 - 99 mg/dL    POCT Lactate, Arterial 0.5 0.4 - 2.0 mmol/L    POCT Base Excess, Arterial 0.5 -2.0 - 3.0 mmol/L    POCT HCO3 Calculated, Arterial 29.5 (H) 22.0 - 26.0 mmol/L    POCT Hemoglobin, Arterial 10.6 (L) 12.0 - 16.0 g/dL    POCT Anion Gap, Arterial 8 (L) 10 - 25 mmo/L    Patient Temperature 37.0 degrees Celsius    FiO2 40 %    Apparatus      Ventilator Mode BiPAP     Ventilator Rate 22 bpm    Ipap CMH2O 18.0 cm H2O    Epap CMH2O 5.0 cm H2O    Site of Arterial Puncture Brachial Right     Wes's Test Positive    CBC and Auto Differential   Result Value Ref Range    WBC 8.4 4.4 - 11.3 x10*3/uL    nRBC 0.0 0.0 - 0.0 /100 WBCs    RBC 4.30 4.00 - 5.20 x10*6/uL    Hemoglobin 9.9 (L) 12.0 - 16.0 g/dL    Hematocrit 34.5 (L) 36.0 - 46.0 %    MCV 80 80 - 100 fL    MCH 23.0 (L) 26.0 - 34.0 pg    MCHC 28.7 (L) 32.0 - 36.0 g/dL    RDW 19.9 (H) 11.5 - 14.5 %    Platelets 147 (L) 150 - 450 x10*3/uL    Neutrophils % 93.9 40.0 - 80.0 %    Immature Granulocytes %, Automated 0.5 0.0 - 0.9 %    Lymphocytes % 3.2 13.0 - 44.0 %    Monocytes % 1.9 2.0 - 10.0 %    Eosinophils % 0.4 0.0 - 6.0 %    Basophils % 0.1 0.0 - 2.0 %    Neutrophils Absolute 7.88 (H) 1.60 - 5.50 x10*3/uL    Immature Granulocytes Absolute, Automated 0.04 0.00 - 0.50 x10*3/uL    Lymphocytes Absolute 0.27 (L) 0.80 - 3.00 x10*3/uL    Monocytes Absolute 0.16 0.05 - 0.80 x10*3/uL    Eosinophils Absolute 0.03 0.00 - 0.40 x10*3/uL    Basophils Absolute 0.01 0.00 - 0.10 x10*3/uL   Renal Function Panel   Result Value Ref Range    Glucose 167 (H) 74 - 99 mg/dL    Sodium 138 136 - 145 mmol/L    Potassium 5.3 3.5 - 5.3 mmol/L    Chloride 104 98 - 107 mmol/L    Bicarbonate 27 21 - 32 mmol/L    Anion Gap 12 10 - 20 mmol/L    Urea Nitrogen 32 (H) 6 - 23 mg/dL     Creatinine 2.42 (H) 0.50 - 1.05 mg/dL    eGFR 20 (L) >60 mL/min/1.73m*2    Calcium 8.5 (L) 8.6 - 10.3 mg/dL    Phosphorus 5.3 (H) 2.5 - 4.9 mg/dL    Albumin 3.4 3.4 - 5.0 g/dL   Magnesium   Result Value Ref Range    Magnesium 1.91 1.60 - 2.40 mg/dL   BLOOD GAS ARTERIAL FULL PANEL   Result Value Ref Range    POCT pH, Arterial 7.35 (L) 7.38 - 7.42 pH    POCT pCO2, Arterial 49 (H) 38 - 42 mm Hg    POCT pO2, Arterial 352 (H) 85 - 95 mm Hg    POCT SO2, Arterial 100 94 - 100 %    POCT Oxy Hemoglobin, Arterial 97.5 94.0 - 98.0 %    POCT Hematocrit Calculated, Arterial 29.0 (L) 36.0 - 46.0 %    POCT Sodium, Arterial 134 (L) 136 - 145 mmol/L    POCT Potassium, Arterial 5.7 (H) 3.5 - 5.3 mmol/L    POCT Chloride, Arterial 105 98 - 107 mmol/L    POCT Ionized Calcium, Arterial 1.10 1.10 - 1.33 mmol/L    POCT Glucose, Arterial 210 (H) 74 - 99 mg/dL    POCT Lactate, Arterial 0.7 0.4 - 2.0 mmol/L    POCT Base Excess, Arterial 1.1 -2.0 - 3.0 mmol/L    POCT HCO3 Calculated, Arterial 27.1 (H) 22.0 - 26.0 mmol/L    POCT Hemoglobin, Arterial 9.7 (L) 12.0 - 16.0 g/dL    POCT Anion Gap, Arterial 8 (L) 10 - 25 mmo/L    Patient Temperature 37.0 degrees Celsius    FiO2 100 %    Ventilator Mode A/C     Ventilator Rate 18 bpm    Tidal Volume 420 mL    Spontaneous Tidal Volume 421 mL    Peep CHM2O 5.0 cm H2O    Site of Arterial Puncture Radial Right     Wes's Test Positive    POCT GLUCOSE   Result Value Ref Range    POCT Glucose 200 (H) 74 - 99 mg/dL     *Note: Due to a large number of results and/or encounters for the requested time period, some results have not been displayed. A complete set of results can be found in Results Review.   XR chest 1 view    Result Date: 3/25/2025  Interpreted By:  Ella To, STUDY: XR CHEST 1 VIEW;  3/25/2025 5:23 am   INDICATION: Signs/Symptoms:intubated and OG placement.     COMPARISON: 03/25/2025   ACCESSION NUMBER(S): [id number]   ORDERING CLINICIAN: MICHAEL STRONG   FINDINGS: AP radiograph of the  chest was provided.   Enteric tube terminates approximately 1.5 cm above the bladimir. Enteric tube courses below the diaphragm with tip excluded from imaging.   CARDIOMEDIASTINAL SILHOUETTE: Cardiomediastinal silhouette is stable in size and configuration.   LUNGS: Bilateral hilar prominence similar to prior imaging slightly bulky on the right. Hazy parenchymal and interstitial opacities predominantly in the right lower lobe. No sizable pleural effusion or pneumothorax.   ABDOMEN: No remarkable upper abdominal findings.   BONES: No acute osseous changes.       1.  Medical devices as detailed. 2. Interval progression of airspace opacities predominantly in the right lower lobe. Clinical correlation suggested.       MACRO: None   Signed by: Ella To 3/25/2025 6:50 AM Dictation workstation:   [id number]    XR chest 1 view    Result Date: 3/25/2025  Interpreted By:  Christian Mendoza, STUDY: XR CHEST 1 VIEW;  3/25/2025 12:49 am   INDICATION: Signs/Symptoms:productive cough and SOB.   COMPARISON: CXR 11/26/2024   ACCESSION NUMBER(S): [id number]   ORDERING CLINICIAN: LELO BECERRA   FINDINGS: Marked cardiomegaly. Cardiomediastinal contours unchanged.   No acute soft tissue or osseous abnormalities. No acute findings in the upper abdomen.   No large pleural effusion or pneumothorax.   Central vascular congestion. Mild prominence of the interstitium.       1. Mild prominence of the interstitium is nonspecific and may represent mild pulmonary edema or viral infectious process. No focal consolidations detected although the retrocardiac space is not well evaluated. 2. Marked cardiomegaly, unchanged.   MACRO: None   Signed by: Christian Mendoza 3/25/2025 1:30 AM Dictation workstation:   [id number]    Assessment/Plan   Assessment & Plan  Acute respiratory failure with hypoxia (Multi)    74 YOF with h/o asthma/COPD, HOLDEN, HTN, DLP, CHF, CKD IV, T2DM, hypothyroidism, anemia, MGUS, who p/w SOB x 2 weeks associated with cough  productive of yellow sputum and chest tightness. In the ED work up revealed tachypnea, hypoxia, MARY ANN, BNP 700s, 7/21/78/140 on ABG and mild pulmonary edema on chest imaging. Received Solu-Medrol, Lasix, placed on BiPAP and admitted to ICU with the diagnosis of acute on chronic hypercarbic respiratory failure. Repeat ABG showed persistent hypercarbia while on BiPAP and  was consequently intubated after arrival to the ICU.     Neuro: acute metabolic encephalopathy likely due to hypercarbia, anxiety and depression. Currently is intubated and sedated        Continue home Zoloft and Gabapentin       Continue sedation with propofol, analgesics with Fentanyl        Supportive measures     CV: h/o CHF, HTN, DLP, now probably with CHF exacerbation, given . Currently hemodynamically stable.       Diuresis as needed       Home Lasix on hold       Continue home Norvasc and hydralazine       Continue metoprolol       Continue home Atorvastatin, Zetia and ASA      Pulmonary: acute on chronic hypoxic hypercarbic respiratory failure 2/2 CHF vs COPD exacerbation. Now is requiring intubation and MV after failing NIV. H/o COPD, HOLDEN on CPAP and asthma. Vent requirements minimal       Continue MV: AC//18/5/50%       Continue home Flonase, Singulair.        Continue Duo-Neb       Continue Solu-medrol 40 mg daily       BPH    : MARY ANN on CKD IV, baseline Cr 2.2, now up to 2.7. Pre-renal vs cardiorenal. Low urine output today       Will give a bolus of IVF       Is/Os       Daily RFP       Treat electrolyte abnormalities as indicated       Consider diuresis and nephrology consult if no improvement in UO with IVF    GI: no acute issues       NPO for now, will start tube feeds if not extubated within 48-72 hours       GI prophylaxis with PPI       Bowel regimen    Endo: h/o T2DM, hypothyroidism. Currently no acute issues       Continue home Synthroid       Hold home dulaglutide and semaglutide       POCT every 4 hours       SSI        Hypoglycemic protocol    Hem/Onc: h/o anemia of chronic diseases likely due to MGUS.        Continue to monitor with daily CBC    ID: no acute issues.        Continue Azithromycin       FU cultures      DVT prophylaxis with subcutaneous heparin.     This was a critical care visit due to respiratory failure. Total time 50 min.     Charisse Chavez MD

## 2025-03-25 NOTE — SIGNIFICANT EVENT
Asked to admit the patient for hypoxemic respiratory failure.  On exam, patient is somnolent.  She does wake but falls asleep very easily during interview multiple times.  I did ask for an ABG be done which did reveal a hypercapnic respiratory failure with acidosis, and ER physician will start on BiPAP and reach out to the ICU team.

## 2025-03-25 NOTE — PROGRESS NOTES
03/25/25 1547   [facility] Disability Status   Are you deaf or do you have serious difficulty hearing? N   Are you blind or do you have serious difficulty seeing, even when wearing glasses? N   Because of a physical, mental, or emotional condition, do you have serious difficulty concentrating, remembering, or making decisions? (5 years old or older) N   Do you have serious difficulty walking or climbing stairs? Y   Do you have serious difficulty dressing or bathing? Y   Because of a physical, mental, or emotional condition, do you have serious difficulty doing errands alone such as visiting the doctor? Y

## 2025-03-25 NOTE — H&P
[facility] Critical Care Medicine       Date:  3/25/2025  Patient:  Analia Murray  YOB: 1950  MRN:  [id number]   Admit Date:  3/25/2025      Chief Complaint   Patient presents with    Shortness of Breath         History of Present Illness:  Analia Murray is a 74 y.o. year old female patient with Past Medical History of anxiety, depression, lumbar radiculopathy, osteoporosis, HOLDEN, COPD, asthma, CHF, HTN, HLD, CKD stage IV, T2DM, hypothyroidism, anemia of chronic disease, and MGUS (monoclonal gammopathy of unknown significant) presented to the ED for concern of shortness of breath.  Pt has been notable short of breath over the past couple weeks, but has noted it to be worse over the last 1-2 days.  Pt has an associated cough with some productive yellow sputum, and developed some chest tightness today prior to arrival.  Pt pulse ox was checked at home by a family member, and was noted to be 83% prompting her to come to the ED.  Pt has been around her  who tested positive for the flu 2 weeks ago, but denies any fever of chills.  Pt denies abdominal pain, nausea, vomiting.      Pt vitals on arrival to the ED include temp 36.8, HR 85, /83, RR 26, SpO2 82% on RA.  CMP significant for glucose 141 and BUN/Cr 30/2.32. BNP elevated at 725.  Troponin 14 with repeat 13.  CBC with WBC 10.5 and H/H 10.4/35.8.  TSH elevated at 11.37 with T4 within normal limits at 0.86.  Pt negative for Flu A/B and Covid-19.  ABG with pH 7.21, pCO2 78, pO2 140, HCO3 31.2.  CXR with mild prominence of the interstitium (may represent mild pulmonary edema or viral infectious process) with no focal consolidations and cardiomegaly.  Pt was placed on BIPAP, given a 1x dose of solumedrol, and 1x dose of lasix and will be admitted to the ICU for further treatment.      Interval ICU Events:  3/25: Pt admitted to the ICU on continuous BIPAP for noted respiratory acidosis with elevated pCO2.  Pt becoming more lethargic  and not responding, requiring intubation.  Will continue the pt on solumedrol.  Start pt on azithromycin x3 days.  Continue diuresis as tolerated with IV lasix, and hold pt home lasix.       Medical History:  Past Medical History:   Diagnosis Date    CHF (congestive heart failure)     Chronic kidney disease     Coronary artery disease     Cough, unspecified 2017    Cough    Diverticulitis of large intestine with perforation and abscess without bleeding 10/21/2016    Colonic diverticular abscess    Fistula of intestine 2017    Enterocutaneous fistula    Hyperlipidemia     Hypertension     Morbid (severe) obesity due to excess calories (Multi)     Morbid obesity    Obstructive sleep apnea (adult) (pediatric) 2018    HOLDEN on CPAP    Other conditions influencing health status     DEXA Body Composition Study    Personal history of other diseases of the circulatory system     History of hypertension    Personal history of other diseases of the digestive system 2018    History of esophagitis    Personal history of other diseases of the digestive system 2017    History of diverticulitis    Personal history of other endocrine, nutritional and metabolic disease 2020    History of hyperlipidemia    Personal history of other endocrine, nutritional and metabolic disease     History of hyperlipidemia    Personal history of other endocrine, nutritional and metabolic disease     History of type 1 diabetes mellitus    Personal history of other mental and behavioral disorders 2018    History of anxiety    Unspecified asthma, uncomplicated ([facility]) 10/05/2022    Asthma     Past Surgical History:   Procedure Laterality Date    CARDIAC CATHETERIZATION       SECTION, CLASSIC  2014     Section    COLONOSCOPY  2013    Complete Colonoscopy    SMALL INTESTINE SURGERY  2016    Small Bowel Resection     (Not in a hospital admission)    Ciprofloxacin, Morphine, and  Penicillins  Social History     Tobacco Use    Smoking status: Never     Passive exposure: Never    Smokeless tobacco: Never   Vaping Use    Vaping status: Never Used   Substance Use Topics    Alcohol use: Never    Drug use: Never     Family History   Problem Relation Name Age of Onset    Coronary artery disease Mother      Heart attack Mother         Review of Systems:  14 point review of systems was completed and negative except for those specially mention in my HPI    Physical Exam:    Physical Exam  Constitutional:       Appearance: She is obese. She is ill-appearing.      Comments: Pt on BIPAP   HENT:      Mouth/Throat:      Mouth: Mucous membranes are moist.      Pharynx: Oropharynx is clear.   Eyes:      Pupils: Pupils are equal, round, and reactive to light.   Cardiovascular:      Rate and Rhythm: Normal rate and regular rhythm.      Pulses: Normal pulses.      Heart sounds: Normal heart sounds.   Pulmonary:      Effort: Tachypnea present.      Breath sounds: Decreased breath sounds and wheezing present.   Abdominal:      General: Bowel sounds are normal.      Palpations: Abdomen is soft.      Tenderness: There is no abdominal tenderness.   Musculoskeletal:         General: Normal range of motion.      Right lower leg: Edema present.      Left lower leg: Edema present.   Skin:     General: Skin is warm and dry.      Capillary Refill: Capillary refill takes less than 2 seconds.   Neurological:      Mental Status: She is oriented to person, place, and time. She is lethargic.         Vitals:  Most recent :  Vitals:    03/25/25 0330   BP:    Pulse:    Resp:    Temp:    SpO2: 96%       24hr Min/Max:  Temp  Min: 36.8 °C (98.2 °F)  Max: 36.8 °C (98.2 °F)  Pulse  Min: 74  Max: 85  BP  Min: 147/83  Max: 148/83  Resp  Min: 20  Max: 26  SpO2  Min: 82 %  Max: 96 %    Vent Settings:  FiO2 (%):  [40 %] 40 %  S RR:  [22] 22    Objective:    Scheduled Medications:  dulaglutide, 0.75 mg, subcutaneous,  Once  ipratropium-albuteroL, 3 mL, nebulization, q6h  oxygen, , inhalation, Continuous - Inhalation        Continuous Medications:       PRN Medications:      Labs/Radiology/Diagnostic Review:  Results for orders placed or performed during the hospital encounter of 03/25/25 (from the past 24 hours)   CBC and Auto Differential   Result Value Ref Range    WBC 10.5 4.4 - 11.3 x10*3/uL    nRBC 0.0 0.0 - 0.0 /100 WBCs    RBC 4.45 4.00 - 5.20 x10*6/uL    Hemoglobin 10.4 (L) 12.0 - 16.0 g/dL    Hematocrit 35.8 (L) 36.0 - 46.0 %    MCV 80 80 - 100 fL    MCH 23.4 (L) 26.0 - 34.0 pg    MCHC 29.1 (L) 32.0 - 36.0 g/dL    RDW 20.0 (H) 11.5 - 14.5 %    Platelets 170 150 - 450 x10*3/uL    Neutrophils % 71.3 40.0 - 80.0 %    Immature Granulocytes %, Automated 0.5 0.0 - 0.9 %    Lymphocytes % 11.1 13.0 - 44.0 %    Monocytes % 9.8 2.0 - 10.0 %    Eosinophils % 7.0 0.0 - 6.0 %    Basophils % 0.3 0.0 - 2.0 %    Neutrophils Absolute 7.49 (H) 1.60 - 5.50 x10*3/uL    Immature Granulocytes Absolute, Automated 0.05 0.00 - 0.50 x10*3/uL    Lymphocytes Absolute 1.17 0.80 - 3.00 x10*3/uL    Monocytes Absolute 1.03 (H) 0.05 - 0.80 x10*3/uL    Eosinophils Absolute 0.73 (H) 0.00 - 0.40 x10*3/uL    Basophils Absolute 0.03 0.00 - 0.10 x10*3/uL   Comprehensive metabolic panel   Result Value Ref Range    Glucose 141 (H) 74 - 99 mg/dL    Sodium 138 136 - 145 mmol/L    Potassium 4.5 3.5 - 5.3 mmol/L    Chloride 103 98 - 107 mmol/L    Bicarbonate 27 21 - 32 mmol/L    Anion Gap 13 10 - 20 mmol/L    Urea Nitrogen 30 (H) 6 - 23 mg/dL    Creatinine 2.32 (H) 0.50 - 1.05 mg/dL    eGFR 21 (L) >60 mL/min/1.73m*2    Calcium 8.6 8.6 - 10.3 mg/dL    Albumin 3.6 3.4 - 5.0 g/dL    Alkaline Phosphatase 90 33 - 136 U/L    Total Protein 7.7 6.4 - 8.2 g/dL    AST 10 9 - 39 U/L    Bilirubin, Total 0.5 0.0 - 1.2 mg/dL    ALT 7 7 - 45 U/L   B-type natriuretic peptide   Result Value Ref Range     (H) 0 - 99 pg/mL   Sars-CoV-2, Influenza A/B and RSV PCR   Result Value  Ref Range    Coronavirus 2019, PCR Not Detected Not Detected    Flu A Result Not Detected Not Detected    Flu B Result Not Detected Not Detected    RSV PCR Not Detected Not Detected   Troponin I, High Sensitivity, Initial   Result Value Ref Range    Troponin I, High Sensitivity 14 (H) 0 - 13 ng/L   TSH with reflex to Free T4 if abnormal   Result Value Ref Range    Thyroid Stimulating Hormone 11.37 (H) 0.44 - 3.98 mIU/L   Thyroxine, Free   Result Value Ref Range    Thyroxine, Free 0.86 0.61 - 1.12 ng/dL   Troponin, High Sensitivity, 1 Hour   Result Value Ref Range    Troponin I, High Sensitivity 13 0 - 13 ng/L   BLOOD GAS ARTERIAL FULL PANEL   Result Value Ref Range    POCT pH, Arterial 7.21 (LL) 7.38 - 7.42 pH    POCT pCO2, Arterial 78 (HH) 38 - 42 mm Hg    POCT pO2, Arterial 140 (H) 85 - 95 mm Hg    POCT SO2, Arterial 99 94 - 100 %    POCT Oxy Hemoglobin, Arterial 97.0 94.0 - 98.0 %    POCT Hematocrit Calculated, Arterial 32.0 (L) 36.0 - 46.0 %    POCT Sodium, Arterial 139 136 - 145 mmol/L    POCT Potassium, Arterial 5.0 3.5 - 5.3 mmol/L    POCT Chloride, Arterial 104 98 - 107 mmol/L    POCT Ionized Calcium, Arterial 1.21 1.10 - 1.33 mmol/L    POCT Glucose, Arterial 155 (H) 74 - 99 mg/dL    POCT Lactate, Arterial 0.5 0.4 - 2.0 mmol/L    POCT Base Excess, Arterial 1.8 -2.0 - 3.0 mmol/L    POCT HCO3 Calculated, Arterial 31.2 (H) 22.0 - 26.0 mmol/L    POCT Hemoglobin, Arterial 10.5 (L) 12.0 - 16.0 g/dL    POCT Anion Gap, Arterial 9 (L) 10 - 25 mmo/L    Patient Temperature 37.0 degrees Celsius    FiO2 36 %    Apparatus CANNULA     Site of Arterial Puncture Radial Right     Wes's Test Positive      *Note: Due to a large number of results and/or encounters for the requested time period, some results have not been displayed. A complete set of results can be found in Results Review.       XR chest 1 view    Result Date: 3/25/2025  Interpreted By:  Christian Mendoza, STUDY: XR CHEST 1 VIEW;  3/25/2025 12:49 am   INDICATION:  Signs/Symptoms:productive cough and SOB.   COMPARISON: CXR 11/26/2024   ACCESSION NUMBER(S): [id number]   ORDERING CLINICIAN: LELO BECERRA   FINDINGS: Marked cardiomegaly. Cardiomediastinal contours unchanged.   No acute soft tissue or osseous abnormalities. No acute findings in the upper abdomen.   No large pleural effusion or pneumothorax.   Central vascular congestion. Mild prominence of the interstitium.       1. Mild prominence of the interstitium is nonspecific and may represent mild pulmonary edema or viral infectious process. No focal consolidations detected although the retrocardiac space is not well evaluated. 2. Marked cardiomegaly, unchanged.   MACRO: None   Signed by: Christian Mendoza 3/25/2025 1:30 AM Dictation workstation:   [id number]    I/O:  No intake or output data in the 24 hours ending 03/25/25 0351    I have reviewed all medications, laboratory results, and imaging pertinent for today's encounter    Assessment/Plan:    I am currently managing this critically ill patient for the following problems:    Neuro/Psych/Pain Ctrl/Sedation:  Acute metabolic encephalopathy   Hx of anxiety and depression   Hx of Lumbar radiculopathy   -Continue neuro checks per protocol.   -Sedation: propofol   -Pain control: fentanyl   -Continue home gabapentin and zoloft   -CAM ICU score q-shift  -Sleep/wake cycle hygiene  -Delirium precaution    Respiratory/ENT:  Acute on chronic hypercapnic/hypoxic respiratory failure 2/2 COPD exacerbation vs CHF exacerbation   Hx of COPD  Hx of HOLDEN, supposed to wear a CPAP  Hx of asthma   -Currently intubated on Fio2 100%/PEEP 5/RR 18/, wean as tolerated   -Continuous pulse oximetry, maintain SpO2 >90%  -Continue home flonase   -Continue home singulair   -Duonebs q6h   -Pt started on solumedrol 40mg daily  -Aggressive pulmonary/bronchial hygiene     Cardiovascular:  Hx of CHF  Hx of HTN  Hx of HLD  -Diuresis daily as tolerated   -Hold home lasix   -Continue home norvasc  and hydralazine   -Metoprolol succinate changed to metoprolol tartrate while intubated   -Continue home ASA and pravastatin   -Continue home ezetimibe  -Continuous cardiac monitoring per ICU protocol   -Goal to maintain MAP>65    GI:  -Diet: NPO, pending nutrition recs   -Bowel regimen: miralax   -GI prophylaxis: PPI    Renal/Volume Status (Intra & Extravascular):  CKD stage IV baseline Serum Cr ~2.20  -Daily RFP and Mag monitoring   -Replace electrolytes per unit protocol   -Goal Mag>2 and K>4  -Strict I/Os monitoring   -Goal urine output 0.5-1.0 ml/kg/hr     Endocrine  Hx of T2DM  Hx of hypothyroidism   Hgb A1c, pending   TSH elevated at 11.37, free thyroxine within normal limits at 0.86   -Continue home levothyroxine  -Hold home dulaglutide and semaglutide   -Q4h POCT glucose test   -SSI q4h as indicated   -Monitor for hypo/hyperglycemia     Infectious Disease:  Sputum culture pending   -Prophylactic azithromycin for COPD exacerbation   -Monitor for s/s of infection including SIRS criteria      Heme/Onc:  Hx of anemia of chronic disease   Hx of MGUS (monoclonal gammopathy of unknown significance)  -Daily CBC monitoring   -Transfuse for Hgb <7 or symptomatic anemia     Derm/MSK:  Hx of Osteoporosis   -Continue home allopurinol   -ICU skin protocol  -Padded pressure points   -PT/OT eval and treat when appropriate   -Nistatin power TID     Ethics/Code Status:  Full Code - discussed with pt with pt son and daughter at bedside     :  DVT Prophylaxis: heparin   GI Prophylaxis: PPI  Bowel Regimen: miralax   Diet: NPO  CVC: none   Viki: none   Panchal: not indicated   Restraints: bilateral soft upper extremity   Dispo: admit to ICU     Critical Care Time:  61 minutes spent in preparing to see patient (I.e. review of medical records), evaluation of diagnostics (I.e. labs, imaging, etc.), documentation, discussing plan of care with patient/ family/ caregiver, and/ or coordination of care with  multidisciplinary team. Time does not include completion of procedure time.        Pt discussed with intensivist Dr. Scott Neil PA-C  Pulmonary and Critical Care Medicine   [facility]

## 2025-03-25 NOTE — LETTER
March 25, 2025    Patient: Analia Murray   YOB: 1950   Date of Visit: 3/25/2025       To Whom It May Concern:    Analia Murray had been hospitalized in our intensive care unit since  3/25/2025. Her daughter Yas Murray has been by bedside on the above day to provide support. For that reason I would be appreciated if she could be excused from work.     If you have any questions or concerns, please don't hesitate to call.              CC: No Recipients

## 2025-03-25 NOTE — LETTER
March 25, 2025     Patient: Analia Murray   YOB: 1950   Date of Visit: 3/25/2025       To Whom It May Concern:    Analia Murray has been hospitalized in our intensive care unit since 3/25/2025. Her son, Nickolas Murray has been by her bedside on the above date to provide support. For that reason, it would be appreciated if he could be excused from work.     If you have any questions or concerns, please don't hesitate to call.      Sincerely,       Charisse Chavez MD          No name on file        CC: No Recipients

## 2025-03-25 NOTE — CARE PLAN
Problem: Mechanical Ventilation  Goal: Patient Will Maintain Patent Airway  Outcome: Progressing  Flowsheets (Taken 3/25/2025 1710)  Patient Will Maintain Patent Airway: Suctioning secretions as indicated to maintain patent airway  Goal: Oral health is maintained or improved  Outcome: Progressing  Flowsheets (Taken 3/25/2025 1710)  Oral Health is Maintained or Improved: Perform oral care with an oral swab     Problem: Skin  Goal: Decreased wound size/increased tissue granulation at next dressing change  Outcome: Progressing  Flowsheets (Taken 3/25/2025 1710)  Decreased wound size/increased tissue granulation at next dressing change: Protective dressings over bony prominences  Goal: Participates in plan/prevention/treatment measures  Outcome: Progressing  Flowsheets (Taken 3/25/2025 1710)  Participates in plan/prevention/treatment measures: Elevate heels  Goal: Prevent/manage excess moisture  Outcome: Progressing  Flowsheets (Taken 3/25/2025 1710)  Prevent/manage excess moisture: Cleanse incontinence/protect with barrier cream     Problem: Fall/Injury  Goal: Not fall by end of shift  Outcome: Progressing  Goal: Be free from injury by end of the shift  Outcome: Progressing  Goal: Verbalize understanding of personal risk factors for fall in the hospital  Outcome: Progressing

## 2025-03-25 NOTE — PROGRESS NOTES
03/25/25 1544   Physical Activity   On average, how many days per week do you engage in moderate to strenuous exercise (like a brisk walk)? 0 days   On average, how many minutes do you engage in exercise at this level? 0 min   Financial Resource Strain   How hard is it for you to pay for the very basics like food, housing, medical care, and heating? Not hard   Housing Stability   In the last 12 months, was there a time when you were not able to pay the mortgage or rent on time? N   In the past 12 months, how many times have you moved where you were living? 0   At any time in the past 12 months, were you homeless or living in a shelter (including now)? N   Transportation Needs   In the past 12 months, has lack of transportation kept you from medical appointments or from getting medications? no   In the past 12 months, has lack of transportation kept you from meetings, work, or from getting things needed for daily living? No   Food Insecurity   Within the past 12 months, you worried that your food would run out before you got the money to buy more. Never true   Within the past 12 months, the food you bought just didn't last and you didn't have money to get more. Never true   Stress   Do you feel stress - tense, restless, nervous, or anxious, or unable to sleep at night because your mind is troubled all the time - these days? Very much   Social Connections   In a typical week, how many times do you talk on the phone with family, friends, or neighbors? More than 3   How often do you get together with friends or relatives? More than 3   How often do you attend Mu-ism or Anglican services? Never   Do you belong to any clubs or organizations such as Mu-ism groups, unions, fraternal or athletic groups, or school groups? No   How often do you attend meetings of the clubs or organizations you belong to? Never   Are you , , , , never , or living with a partner?    Intimate  Partner Violence   Within the last year, have you been afraid of your partner or ex-partner? Pt Unable   Within the last year, have you been humiliated or emotionally abused in other ways by your partner or ex-partner? Pt Unable   Within the last year, have you been kicked, hit, slapped, or otherwise physically hurt by your partner or ex-partner? Pt Unable   Within the last year, have you been raped or forced to have any kind of sexual activity by your partner or ex-partner? Pt Unable   Alcohol Use   Q1: How often do you have a drink containing alcohol? Never   Q2: How many drinks containing alcohol do you have on a typical day when you are drinking? None   Q3: How often do you have six or more drinks on one occasion? Never   Utilities   In the past 12 months has the electric, gas, oil, or water company threatened to shut off services in your home? No   Health Literacy   How often do you need to have someone help you when you read instructions, pamphlets, or other written material from your doctor or pharmacy? Never

## 2025-03-25 NOTE — CARE PLAN
Problem: Mechanical Ventilation  Goal: Patient Will Maintain Patent Airway  Outcome: Progressing  Goal: Oral health is maintained or improved  Outcome: Progressing  Goal: Tracheostomy will be managed safely  Outcome: Progressing  Goal: ET tube will be managed safely  Outcome: Progressing

## 2025-03-25 NOTE — PROCEDURES
Intubation    Date/Time: 3/25/2025 4:55 AM    Performed by: Sanjiv Neil PA-C  Authorized by: Sanjiv Neil PA-C    Consent:     Consent obtained:  Verbal    Consent given by:  Spouse    Risks, benefits, and alternatives were discussed: yes    Universal protocol:     Procedure explained and questions answered to patient or proxy's satisfaction: yes      Immediately prior to procedure, a time out was called: yes      Patient identity confirmed:  Arm band  Pre-procedure details:     Indications: airway protection, altered consciousness, respiratory distress and respiratory failure      Patient status:  Unresponsive    Look externally: large tongue      Mouth opening - incisor distance:  2 finger widths    Obstruction: none      Neck mobility: normal      Pharmacologic strategy: sedation      Sedation: Versed and Etomidate.    Paralytics:  None  Procedure details:     Preoxygenation:  Bag valve mask    CPR in progress: no      Number of attempts:  1  Successful intubation attempt details:     Intubation method:  Oral    Intubation technique: video assisted      Laryngoscope blade:  Hypercurved    Bougie used: no      Tube size (mm):  7.5    Tube type:  Cuffed    Tube visualized through cords: yes    Placement assessment:     ETT at teeth/gumline (cm):  26    Tube secured with:  ETT tirado    Breath sounds:  Equal    Placement verification: chest rise, colorimetric ETCO2, CXR verification, direct visualization and equal breath sounds      CXR findings:  Appropriate position  Post-procedure details:     Procedure completion:  Tolerated well, no immediate complications    Sanjiv Neil PA-C  Pulmonary and Critical Care Medicine   [facility]

## 2025-03-25 NOTE — PROGRESS NOTES
03/25/25 1545   Discharge Planning   Living Arrangements Spouse/significant other   Support Systems Spouse/significant other;Children   Type of Residence Private residence   Number of Stairs to Enter Residence 4   Number of Stairs Within Residence 24   Do you have animals or pets at home? No   Who is requesting discharge planning? Provider   Home or Post Acute Services Post acute facilities (Rehab/SNF/etc)   Type of Post Acute Facility Services Skilled nursing   Expected Discharge Disposition SNF   Does the patient need discharge transport arranged? Yes   RoundTrip coordination needed? Yes   Has discharge transport been arranged? No   Financial Resource Strain   How hard is it for you to pay for the very basics like food, housing, medical care, and heating? Not hard   Housing Stability   In the last 12 months, was there a time when you were not able to pay the mortgage or rent on time? N   In the past 12 months, how many times have you moved where you were living? 0   At any time in the past 12 months, were you homeless or living in a shelter (including now)? N   Transportation Needs   In the past 12 months, has lack of transportation kept you from medical appointments or from getting medications? no   In the past 12 months, has lack of transportation kept you from meetings, work, or from getting things needed for daily living? No   Patient Choice   Provider Choice list and CMS website (https://medicare.gov/care-compare#search) for post-acute Quality and Resource Measure Data were provided and reviewed with: Family   Patient / Family choosing to utilize agency / facility established prior to hospitalization No   Stroke Family Assessment   Stroke Family Assessment Needed No

## 2025-03-25 NOTE — CARE PLAN
Problem: Mechanical Ventilation  Goal: Patient Will Maintain Patent Airway  Outcome: Progressing  Flowsheets (Taken 3/25/2025 1710)  Patient Will Maintain Patent Airway: Suctioning secretions as indicated to maintain patent airway  Goal: Oral health is maintained or improved  Outcome: Progressing  Flowsheets (Taken 3/25/2025 1710)  Oral Health is Maintained or Improved: Perform oral care with an oral swab     Problem: Skin  Goal: Decreased wound size/increased tissue granulation at next dressing change  Outcome: Progressing  Flowsheets (Taken 3/25/2025 1710)  Decreased wound size/increased tissue granulation at next dressing change: Protective dressings over bony prominences  Goal: Participates in plan/prevention/treatment measures  Outcome: Progressing  Flowsheets (Taken 3/25/2025 1710)  Participates in plan/prevention/treatment measures: Elevate heels  Goal: Prevent/manage excess moisture  Outcome: Progressing  Flowsheets (Taken 3/25/2025 1710)  Prevent/manage excess moisture: Cleanse incontinence/protect with barrier cream     Problem: Fall/Injury  Goal: Not fall by end of shift  Outcome: Progressing  Goal: Be free from injury by end of the shift  Outcome: Progressing  Goal: Verbalize understanding of personal risk factors for fall in the hospital  Outcome: Progressing     Problem: Safety - Medical Restraint  Goal: Remains free of injury from restraints (Restraint for Interference with Medical Device)  Outcome: Progressing  Flowsheets (Taken 3/25/2025 1728)  Remains free of injury from restraints (restraint for interference with medical device): Every 2 hours: Monitor safety, psychosocial status, comfort, nutrition and hydration  Goal: Free from restraint(s) (Restraint for Interference with Medical Device)  Outcome: Progressing  Flowsheets (Taken 3/25/2025 1728)  Free from restraint(s) (restraint for interference with medical device): ONCE/SHIFT or MINIMUM Every 12 hours: Assess and document the continuing need  for restraints

## 2025-03-25 NOTE — Clinical Note
March 25, 2025    Patient: Analia Murray   YOB: 1950   Date of Visit: 3/25/2025       To Whom It May Concern:    Analia Murray was seen and treated in our emergency department on 3/25/2025. She {Return to school/sport/work:25392}.    If you have any questions or concerns, please don't hesitate to call.              CC: No Recipients

## 2025-03-25 NOTE — LETTER
March 25, 2025     Patient: Analia Murray   YOB: 1950   Date of Visit: 3/25/2025       To Whom It May Concern:    Analia Murray has been hospitalized in our intensive care unit since 3/25/2025. Her daughter, Yas Murray has been by her bedside on the above day to provide support. For that reason, it would be appreciated if she could be excused from work.     If you have any questions or concerns, please don't hesitate to call.         Sincerely,     Charisse Chavez MD    No name on file        CC: No Recipients

## 2025-03-25 NOTE — LETTER
March 25, 2025     Patient: Analia Murray   YOB: 1950   Date of Visit: 3/25/2025       To Whom It May Concern:    Analia Murray has been hospitalized in our intensive care unit since 3/25/2025. Her son, To Murray has been by her bedside on the above date to provide support. For that reason, it would be appreciated if he could be excused from work.     If you have any questions or concerns, please don't hesitate to call.      Sincerely,       Charisse Chavez MD    No name on file        CC: No Recipients

## 2025-03-25 NOTE — CARE PLAN
Pt does not have a POA or Living Will  ADOD: 4 days  Pt is intubated, information obtained from pts , dtr and son    Pt lives at home with her  in a 2 story home with a basement; she does not use the stairs  Her dtr Tiffanie helps with bathing and dressing  She sues a walker and a cane  Her  and children transport her to her appointments, they all assist with the house work, laundry, shopping, cooking  She wears glasses, no hearing aids.  She has a hx of depression and anxiety, not treated; per family no S.I  She can read and comprehend what she is reading  She does not use home 02,cpap,bipap, nebulizer  Her PCP is Dr. Elenita Sanchez and she uses [facility] in [place]   Pt is here for Acute Respiratory failure  Discussed discharge planning with family. List of A.R., SNF, and C emailed to jose miguel Moreno to [email or web address]  Contacts are as follow:  Jose [phone]; jose miguel Moreno [phone] and son To [phone]    DISCHARGE PLAN: TBD--DO NOT DISCHARGE PATIENT BEFORE SPEAKING WITH CARE COORDINATION; PT DOES NOT YET HAVE A DISCHARGE PLAN IN PLACE

## 2025-03-25 NOTE — PROGRESS NOTES
Spiritual Care Visit  Spiritual Care Request    Reason for Visit:  Routine Visit: Introduction  Continue Visiting: Yes  Crisis Visit: Other (Comment)     Request Received From:  Referral From:     Focus of Care:  Visited With: Patient, Family   Care Provided for Crisis Visit: Supported family     Refer to :  Referral To:        Spiritual Care Assessment    Spiritual Assessment:       Family Spiritual Care Encounters  Family Coping: Sadness, Anxiety  Family Participation in Care: Consistently demonstrated  Family Support During Treatment: Consistently demonstrated  Caregiver-Patient Relationship: Not compromised              Care Provided:       Sense of Community and or Sikhism Affiliation:  Caodaism         Addressed Needs/Concerns and/or Sergo Through:          Outcome:  Outcome of Spiritual Care Visit: Identifying spiritual/emotional issues, Comfort/healing presence, Identifying patient's strengths/source of hope, Support system identified     Advance Directives:         Spiritual Care Annotation      Annotation:  provide supportive care for patient in our ICU. Patient was intubated and was unable to participate in the visit today.   offered a calm reassurance for the family by communicating directly sharing the words of comfort in our ICU, reassuring them of their being safe in the capable care of the nurses and physicians.  encouraged the family to interact with the patient and share their thoughts as they would if she were seated in the bed.   offered emotional support.      Spiritual care will remain available for support as requested or desired.

## 2025-03-25 NOTE — CARE PLAN
Problem: Mechanical Ventilation  Goal: Patient Will Maintain Patent Airway  3/25/2025 0549 by Anne Vela RN  Outcome: Progressing  Flowsheets (Taken 3/25/2025 0547)  Patient Will Maintain Patent Airway:   Assess and monitor airway secretions and suction as needed per patient's condition and according to policy   Hyper oxygenate with 100% FiO2 prior to suctioning   Suctioning secretions as indicated to maintain patent airway   Elevate head of bed 30 degrees if patient has tube feeding  3/25/2025 0547 by Anne Vela RN  Outcome: Progressing  Flowsheets (Taken 3/25/2025 0547)  Patient Will Maintain Patent Airway:   Assess and monitor airway secretions and suction as needed per patient's condition and according to policy   Hyper oxygenate with 100% FiO2 prior to suctioning   Suctioning secretions as indicated to maintain patent airway   Elevate head of bed 30 degrees if patient has tube feeding  Goal: Oral health is maintained or improved  3/25/2025 0549 by Anne Vela RN  Outcome: Progressing  Flowsheets (Taken 3/25/2025 0547)  Oral Health is Maintained or Improved:   Assess and monitor condition of lips and mouth and perform oral care per hospital policy   Apply water-based moisturizer to lips   Perform oral care with an oral swab   Suction oral pharynx  3/25/2025 0547 by Anne Vela RN  Outcome: Progressing  Flowsheets (Taken 3/25/2025 0547)  Oral Health is Maintained or Improved:   Assess and monitor condition of lips and mouth and perform oral care per hospital policy   Apply water-based moisturizer to lips   Perform oral care with an oral swab   Suction oral pharynx  Goal: Tracheostomy will be managed safely  3/25/2025 0549 by Anne Vela RN  Outcome: Progressing  Flowsheets (Taken 3/25/2025 0547)  Tracheostomy Will Be Managed Safely:   Utilize tracheostomy securing device and change as necessary to ensure tracheostomy is secured to patient   Support ventilator tubing to avoid  pressure from drag of tubing   Keep ambu bag, mask, oxygen connection tubing, and extra tracheostomy at bedside and accompanying patient at all times   Utilize trach securing device   Cleanse site with hydrogen peroxide   Protect skin with moisture barrier cream  3/25/2025 0547 by Anne Vela RN  Outcome: Progressing  Flowsheets (Taken 3/25/2025 0547)  Tracheostomy Will Be Managed Safely:   Utilize tracheostomy securing device and change as necessary to ensure tracheostomy is secured to patient   Support ventilator tubing to avoid pressure from drag of tubing   Keep ambu bag, mask, oxygen connection tubing, and extra tracheostomy at bedside and accompanying patient at all times   Utilize trach securing device   Cleanse site with hydrogen peroxide   Protect skin with moisture barrier cream  Goal: ET tube will be managed safely  3/25/2025 0549 by Anne Vela RN  Outcome: Progressing  Flowsheets (Taken 3/25/2025 0547)  Endotracheal Tube Will Be Managed Safely:   Assess and monitor insertion depth at lip/nare line   Utilize ETT securing device and change as necessary to ensure ETT is properly secured to patient   Support ventilator tubing to avoid pressure from drag of tubing   Keep ambu bag, mask, oxygen connection tubing, and extra ETT at bedside and accompanying patient at all times   Utilize endotracheal tube securing device   Reposition endotracheal tube to opposite side of mouth  3/25/2025 0547 by Anne Vela RN  Outcome: Progressing  Flowsheets (Taken 3/25/2025 0547)  Endotracheal Tube Will Be Managed Safely:   Assess and monitor insertion depth at lip/nare line   Utilize ETT securing device and change as necessary to ensure ETT is properly secured to patient   Support ventilator tubing to avoid pressure from drag of tubing   Keep ambu bag, mask, oxygen connection tubing, and extra ETT at bedside and accompanying patient at all times   Utilize endotracheal tube securing device   Reposition  endotracheal tube to opposite side of mouth     Problem: Skin  Goal: Decreased wound size/increased tissue granulation at next dressing change  Outcome: Progressing  Flowsheets (Taken 3/25/2025 0547)  Decreased wound size/increased tissue granulation at next dressing change:   Promote sleep for wound healing   Protective dressings over bony prominences   Utilize specialty bed per algorithm  Goal: Participates in plan/prevention/treatment measures  Outcome: Progressing  Flowsheets (Taken 3/25/2025 0547)  Participates in plan/prevention/treatment measures:   Discuss with provider PT/OT consult   Elevate heels  Goal: Prevent/manage excess moisture  Outcome: Progressing  Flowsheets (Taken 3/25/2025 0547)  Prevent/manage excess moisture:   Cleanse incontinence/protect with barrier cream   Use wicking fabric (obtain order)   Follow provider orders for dressing changes   Moisturize dry skin   Monitor for/manage infection if present  Goal: Prevent/minimize sheer/friction injuries  Outcome: Progressing  Flowsheets (Taken 3/25/2025 0547)  Prevent/minimize sheer/friction injuries:   Complete micro-shifts as needed if patient unable. Adjust patient position to relieve pressure points, not a full turn   Increase activity/out of bed for meals   Use pull sheet   HOB 30 degrees or less   Utilize specialty bed per algorithm   Turn/reposition every 2 hours/use positioning/transfer devices  Goal: Promote/optimize nutrition  Outcome: Progressing  Flowsheets (Taken 3/25/2025 0547)  Promote/optimize nutrition:   Monitor/record intake including meals   Discuss with provider if NPO > 2 days  Goal: Promote skin healing  Outcome: Progressing  Flowsheets (Taken 3/25/2025 0547)  Promote skin healing:   Turn/reposition every 2 hours/use positioning/transfer devices   Assess skin/pad under line(s)/device(s)   Protective dressings over bony prominences   Ensure correct size (line/device) and apply per  instructions   Rotate device  position/do not position patient on device     Problem: Fall/Injury  Goal: Not fall by end of shift  Outcome: Progressing  Goal: Be free from injury by end of the shift  Outcome: Progressing  Goal: Verbalize understanding of personal risk factors for fall in the hospital  Outcome: Progressing  Goal: Verbalize understanding of risk factor reduction measures to prevent injury from fall in the home  Outcome: Progressing  Goal: Use assistive devices by end of the shift  Outcome: Progressing  Goal: Pace activities to prevent fatigue by end of the shift  Outcome: Progressing   The patient's goals for the shift include      The clinical goals for the shift include hemodynamically stable    Over the shift, the patient did not make progress toward the following goals. Barriers to progression include . Recommendations to address these barriers include .

## 2025-03-25 NOTE — LETTER
March 25, 2025     Patient: Analia Murray   YOB: 1950   Date of Visit: 3/25/2025       To Whom It May Concern:    Analia Murray has been hospitalized in our intensive care unit since 3/25/2025. Her daughter, Tiffanie Murray has been by her bedside on the above date to provide support. For that reason, it would be appreciated if she could be excused from work.     If you have any questions or concerns, please don't hesitate to call.      Sincerely,       Charisse Chavez MD  Sincerely,         No name on file        CC: No Recipients

## 2025-03-25 NOTE — CONSULTS
Nutrition Assessement Note    Nutrition Assessment    Reason for Assessment: Admission nursing screening, Dietitian discretion (MST 2 Unsure, CHF, Vent)    Reason for Hospital Admission:  Analia Murray is a 74 y.o. female who is admitted for SOB. Intubated/sedated in ICU. Spoke with MD. No plans for nutrition at this time.     Malnutrition Screening Tool (MST)  Have you recently lost weight without trying?: Unsure  If yes, how much weight have you lost?: Unsure  Weight Loss Score: 2  Have you been eating poorly because of a decreased appetite?: No (JERROD)  Malnutrition Score: 2  Nutrition Screen  Stage 3 or 4 Pressure Injury or Multiple Non-Healing Wounds: No  Home Tube Feeding or Total Parenteral Nutrition (TPN): No  Dietitian Consult Needed: No    Past Medical History:   Diagnosis Date    CHF (congestive heart failure)     Chronic kidney disease     Coronary artery disease     Cough, unspecified 03/02/2017    Cough    Diverticulitis of large intestine with perforation and abscess without bleeding 10/21/2016    Colonic diverticular abscess    Fistula of intestine 09/05/2017    Enterocutaneous fistula    Hyperlipidemia     Hypertension     Morbid (severe) obesity due to excess calories (Multi)     Morbid obesity    Obstructive sleep apnea (adult) (pediatric) 06/27/2018    HOLDEN on CPAP    Other conditions influencing health status     DEXA Body Composition Study    Personal history of other diseases of the circulatory system     History of hypertension    Personal history of other diseases of the digestive system 02/07/2018    History of esophagitis    Personal history of other diseases of the digestive system 09/05/2017    History of diverticulitis    Personal history of other endocrine, nutritional and metabolic disease 04/20/2020    History of hyperlipidemia    Personal history of other endocrine, nutritional and metabolic disease     History of hyperlipidemia    Personal history of other endocrine, nutritional  "and metabolic disease     History of type 1 diabetes mellitus    Personal history of other mental and behavioral disorders 2018    History of anxiety    Unspecified asthma, uncomplicated ([facility]) 10/05/2022    Asthma      Past Surgical History:   Procedure Laterality Date    CARDIAC CATHETERIZATION       SECTION, CLASSIC  2014     Section    COLONOSCOPY  2013    Complete Colonoscopy    SMALL INTESTINE SURGERY  2016    Small Bowel Resection       Nutrition History:  Food and Nutrient History: NPO/vent     Anthropometrics:  Ht: 162.6 cm (5' 4.02\"), Wt: 134 kg (295 lb 6.7 oz), BMI: 50.68  IBW/kg (Dietitian Calculated): 54.54 kg  Percent of IBW: 246 %     Weight Change:  Daily Weight  25 : 134 kg (295 lb 6.7 oz)  25 : 132 kg (291 lb)  02/10/25 : 139 kg (305 lb 9.6 oz)  25 : 134 kg (296 lb)  25 : 135 kg (297 lb 1.6 oz)  01/15/25 : 132 kg (291 lb)  24 : 127 kg (281 lb)  24 : 132 kg (291 lb)  10/23/24 : 132 kg (291 lb)  24 : 127 kg (281 lb)    Weight History / % Weight Change: records indicate stable wt  Significant Weight Loss: No        Nutrition Focused Physical Exam Findings: defer: severe obesity/vent     Nutrition Significant Labs:  Lab Results   Component Value Date    WBC 8.4 2025    HGB 9.9 (L) 2025    HCT 34.5 (L) 2025     (L) 2025    CHOL 128 2023    TRIG 72 2023    HDL 45.1 2023    ALT 7 2025    AST 10 2025     2025    K 5.3 2025     2025    CREATININE 2.42 (H) 2025    BUN 32 (H) 2025    CO2 27 2025    TSH 11.37 (H) 2025    HGBA1C 6.1 (H) 02/10/2025     Nutrition Specific Medications:  allopurinol, 50 mg, oral, Every other day  amLODIPine, 10 mg, oral, Daily  aspirin, 81 mg, oral, Daily  [START ON 3/26/2025] azithromycin, 500 mg, intravenous, q24h  pantoprazole, 40 mg, oral, Daily before breakfast   Or  esomeprazole, " 40 mg, nasoduodenal tube, Daily before breakfast   Or  pantoprazole, 40 mg, intravenous, Daily before breakfast  ezetimibe, 10 mg, oral, Daily  fluticasone, 2 spray, Each Nostril, BID  gabapentin, 100 mg, oral, TID  heparin (porcine), 7,500 Units, subcutaneous, q8h JAYA  hydrALAZINE, 50 mg, oral, BID  insulin lispro, 0-10 Units, subcutaneous, q4h  ipratropium-albuteroL, 3 mL, nebulization, q6h  levothyroxine, 200 mcg, oral, Daily  [START ON 3/26/2025] levothyroxine, 200 mcg, oral, Every Wednesday  magnesium sulfate, 1 g, intravenous, Once  [START ON 3/26/2025] methylPREDNISolone sodium succinate (PF), 40 mg, intravenous, Daily  [Held by provider] metoprolol succinate XL, 100 mg, oral, Daily  metoprolol tartrate, 50 mg, oral, BID  montelukast, 10 mg, oral, Nightly  nystatin, 1 Application, Topical, TID  oxygen, , inhalation, Continuous - Inhalation  polyethylene glycol, 17 g, oral, Daily  pravastatin, 40 mg, oral, Nightly  sertraline, 50 mg, oral, Daily      fentaNYL, 0-300 mcg/hr, Last Rate: 225 mcg/hr (03/25/25 0700)  propofol, 5-50 mcg/kg/min, Last Rate: 20 mcg/kg/min (03/25/25 0920)      Propofol @ 31.7 ml/hr provides: 837 kcals/day    Dietary Orders (From admission, onward)       Start     Ordered    03/25/25 0812  May Participate in Room Service  ( ROOM SERVICE MAY PARTICIPATE)  Once        Question:  .  Answer:  Yes    03/25/25 0811    03/25/25 0531  NPO Diet Except: Sips with meds; Effective now  Diet effective now        Question:  Except:  Answer:  Sips with meds    03/25/25 0530                   Estimated Needs:   Estimated Energy Needs  Total Energy Estimated Needs in 24 hours (kCal):  (1315-0197)  Energy Estimated Needs per kg Body Weight in 24 hours (kCal/kg):  (11-14)  Method for Estimating Needs: Actual Wt    Estimated Protein Needs  Total Protein Estimated Needs in 24 Hours (g):  ()  Protein Estimated Needs per kg Body Weight in 24 Hours (g/kg):  (1.5-2.5)  Method for Estimating 24 Hour  Protein Needs: IBW    Estimated Fluid Needs  Total Fluid Estimated Needs in 24 Hours (mL):  (6230-4314)  Method for Estimating 24 Hour Fluid Needs: 1 ml/kcal or per MD      Nutrition Diagnosis   Nutrition Diagnosis:  Malnutrition Diagnosis  Patient has Malnutrition Diagnosis: No    Nutrition Diagnosis  Patient has Nutrition Diagnosis: Yes  Diagnosis Status (1): New  Nutrition Diagnosis 1: Inadequate oral intake  Related to (1): decreased ability to consume sufficient energy  As Evidenced by (1): NPO/Mechanical Ventilation     Nutrition Interventions/Recommendations   Nutrition Interventions and Recommendations:  Nutrition Prescription: Nutrition prescription for enteral nutrition    Nutrition Recommendations:  Individualized Nutrition Prescription Provided for : 2398-4618 calories,  gm protein    Nutrition Interventions/Goals:   Food and/or Nutrient Delivery Interventions  Interventions: Meals and snacks, Enteral intake  Meals and Snacks: Carbohydrate-modified diet  Goal: advance as able  Enteral Intake: Other (Comment)  Goal: consider enteral nutrition if unable to extubate    Education Documentation  No documentation found.         Nutrition Monitoring and Evaluation   Monitoring/Evaluation:   Food/Nutrient Related History Monitoring  Monitoring and Evaluation Plan: Estimated Energy Intake  Estimated Energy Intake: Other criteria  Criteria: monitoring for diet advancement    Goal Status: New goal(s) identified    Follow Up  Time Spent (min): 30 minutes  Last Date of Nutrition Visit: 03/25/25  Nutrition Follow-Up Needed?: 3-5 days  Follow up Comment: 3/28/25

## 2025-03-25 NOTE — ED PROCEDURE NOTE
Procedure  Critical Care    Performed by: Charles Branch DO  Authorized by: Charles Branch DO    Critical care provider statement:     Critical care time (minutes):  32    Critical care time was exclusive of:  Separately billable procedures and treating other patients    Critical care was necessary to treat or prevent imminent or life-threatening deterioration of the following conditions:  Respiratory failure    Critical care was time spent personally by me on the following activities:  Ordering and performing treatments and interventions, ordering and review of laboratory studies, ordering and review of radiographic studies, pulse oximetry, re-evaluation of patient's condition, evaluation of patient's response to treatment, review of old charts, development of treatment plan with patient or surrogate, obtaining history from patient or surrogate and examination of patient    Care discussed with: admitting provider                 Charles Branch DO  03/25/25 0212

## 2025-03-25 NOTE — ED PROVIDER NOTES
HPI   Chief Complaint   Patient presents with    Shortness of Breath       This is a 74-year-old female with a past medical history of CKD, COPD, type 2 diabetes, hypothyroidism, CHF presenting to the ED for evaluation of shortness of breath.  She reports that she has been short of breath for the past couple of weeks, worsening over the past 1 to 2 days.  She states that her  had a virus, tested positive for the flu about 2 weeks ago.  His symptoms improved and she started becoming short of breath shortly afterwards.  She never had a fever or chills, but she does complain of a cough productive of some yellow sputum.  She did develop some chest tightness tonight as well.  She does have a family member who is a physical therapy student who checked her pulse ox, it was 83% at home today so they brought her to the ED for evaluation.      History provided by:  Patient and relative   used: No            Patient History   Past Medical History:   Diagnosis Date    CHF (congestive heart failure)     Chronic kidney disease     Coronary artery disease     Cough, unspecified 03/02/2017    Cough    Diverticulitis of large intestine with perforation and abscess without bleeding 10/21/2016    Colonic diverticular abscess    Fistula of intestine 09/05/2017    Enterocutaneous fistula    Hyperlipidemia     Hypertension     Morbid (severe) obesity due to excess calories (Multi)     Morbid obesity    Obstructive sleep apnea (adult) (pediatric) 06/27/2018    HOLDEN on CPAP    Other conditions influencing health status     DEXA Body Composition Study    Personal history of other diseases of the circulatory system     History of hypertension    Personal history of other diseases of the digestive system 02/07/2018    History of esophagitis    Personal history of other diseases of the digestive system 09/05/2017    History of diverticulitis    Personal history of other endocrine, nutritional and metabolic disease  2020    History of hyperlipidemia    Personal history of other endocrine, nutritional and metabolic disease     History of hyperlipidemia    Personal history of other endocrine, nutritional and metabolic disease     History of type 1 diabetes mellitus    Personal history of other mental and behavioral disorders 2018    History of anxiety    Unspecified asthma, uncomplicated ([facility]) 10/05/2022    Asthma     Past Surgical History:   Procedure Laterality Date    CARDIAC CATHETERIZATION       SECTION, CLASSIC  2014     Section    COLONOSCOPY  2013    Complete Colonoscopy    SMALL INTESTINE SURGERY  2016    Small Bowel Resection     Family History   Problem Relation Name Age of Onset    Coronary artery disease Mother      Heart attack Mother       Social History     Tobacco Use    Smoking status: Never     Passive exposure: Never    Smokeless tobacco: Never   Vaping Use    Vaping status: Never Used   Substance Use Topics    Alcohol use: Never    Drug use: Never       Physical Exam   ED Triage Vitals [25 0024]   Temperature Heart Rate Respirations BP   36.8 °C (98.2 °F) 85 (!) 26 148/83      Pulse Ox Temp src Heart Rate Source Patient Position   (!) 82 % -- Monitor --      BP Location FiO2 (%)     -- --       Physical Exam  General: well developed, obese elderly female who is awake and alert, in no apparent distress  HENT: normocephalic, atraumatic.  CV: regular rate and rhythm, no murmur, no gallops, or rubs. radial and dorsalis pedis pulses +2/4 bilaterally  Resp: Diminished lung sounds throughout, expiratory wheezing present.  Faint crackles in the bases.  MSK: Trace to 1+ edema to the lower legs bilaterally.  Psych: appropriate mood and affect, cooperative with exam  Skin: warm, dry, without evidence of rash or abrasions    ED Course & MDM   ED Course as of 25 0318   Tue Mar 25, 2025   0025 EKG on interpretation shows sinus rhythm with PVCs.  Rate is 83 bpm,  right bundle branch block present.  Left axis deviation.  LVH present.  QTc is 519 ms, IN interval 174.  No ST elevation or depression, no acute ischemic pattern.  No STEMI. [NT]   0134 I reviewed the patient's chest x-ray, she does have some scattered markings consistent with pulmonary edema BNP is 725 indicative of a component of CHF.  After DuoNeb treatments, patient remains at 90 to 91% on 2 L of oxygen by nasal cannula.  I turned her oxygen up to 3 L with improved oxygenation.  Discussed plan to admit with patient and family at bedside who are agreeable given her persistent dependence on oxygen. [NT]      ED Course User Index  [NT] Charles ORONA DO Jose Carlos         Diagnoses as of 03/25/25 0318   Acute respiratory failure with hypoxia (Multi)   Acute on chronic congestive heart failure, unspecified heart failure type   Respiratory acidosis                 No data recorded     [place] Coma Scale Score: 15 (03/25/25 0041 : Malika Higginbotham RN)                           Medical Decision Making  On arrival the patient was 82% on room air.  She was placed on 2 L of oxygen by nasal cannula with improvement to about 90 to 91%.  I turned her up to 3 L with improvement of her oxygen saturation above 92%.  She is mildly hypertensive, vitals otherwise normal.  She was given DuoNebs and IV Solu-Medrol with improvement of her wheezing, there is some persistent wheezing on my reassessment.  Workup shows elevated BNP and evidence of pulmonary edema on the chest x-ray.  No leukocytosis, no evidence of pneumonia, viral testing all negative as well.  I suspect a mix of COPD exacerbation and CHF exacerbation with pulmonary edema causing her acute respiratory failure with hypoxia.  She is persistently requiring supplemental oxygen by nasal cannula which she does not wear at home.  She was admitted to the hospital for further medical management.    XR chest 1 view   Final Result   1. Mild prominence of the interstitium is nonspecific  and may   represent mild pulmonary edema or viral infectious process. No focal   consolidations detected although the retrocardiac space is not well   evaluated.   2. Marked cardiomegaly, unchanged.        MACRO:   None        Signed by: Christian Mendoza 3/25/2025 1:30 AM   Dictation workstation:   [id number]        Labs Reviewed   CBC WITH AUTO DIFFERENTIAL - Abnormal       Result Value    WBC 10.5      nRBC 0.0      RBC 4.45      Hemoglobin 10.4 (*)     Hematocrit 35.8 (*)     MCV 80      MCH 23.4 (*)     MCHC 29.1 (*)     RDW 20.0 (*)     Platelets 170      Neutrophils % 71.3      Immature Granulocytes %, Automated 0.5      Lymphocytes % 11.1      Monocytes % 9.8      Eosinophils % 7.0      Basophils % 0.3      Neutrophils Absolute 7.49 (*)     Immature Granulocytes Absolute, Automated 0.05      Lymphocytes Absolute 1.17      Monocytes Absolute 1.03 (*)     Eosinophils Absolute 0.73 (*)     Basophils Absolute 0.03     COMPREHENSIVE METABOLIC PANEL - Abnormal    Glucose 141 (*)     Sodium 138      Potassium 4.5      Chloride 103      Bicarbonate 27      Anion Gap 13      Urea Nitrogen 30 (*)     Creatinine 2.32 (*)     eGFR 21 (*)     Calcium 8.6      Albumin 3.6      Alkaline Phosphatase 90      Total Protein 7.7      AST 10      Bilirubin, Total 0.5      ALT 7     B-TYPE NATRIURETIC PEPTIDE - Abnormal     (*)     Narrative:        <100 pg/mL - Heart failure unlikely  100-299 pg/mL - Intermediate probability of acute heart                  failure exacerbation. Correlate with clinical                  context and patient history.    >=300 pg/mL - Heart Failure likely. Correlate with clinical                  context and patient history.    BNP testing is performed using different testing methodology at [facility] than at other [facility] hospitals. Direct result comparisons should only be made within the same method.      SERIAL TROPONIN-INITIAL - Abnormal    Troponin I, High Sensitivity 14 (*)      Narrative:     Less than 99th percentile of normal range cutoff-  Female and children under 18 years old <14 ng/L; Male <21 ng/L: Negative  Repeat testing should be performed if clinically indicated.     Female and children under 18 years old 14-50 ng/L; Male 21-50 ng/L:  Consistent with possible cardiac damage and possible increased clinical   risk. Serial measurements may help to assess extent of myocardial damage.     >50 ng/L: Consistent with cardiac damage, increased clinical risk and  myocardial infarction. Serial measurements may help assess extent of   myocardial damage.      NOTE: Children less than 1 year old may have higher baseline troponin   levels and results should be interpreted in conjunction with the overall   clinical context.     NOTE: Troponin I testing is performed using a different   testing methodology at [facility] than at [facility]. Direct result comparisons should only   be made within the same method.   SARS-COV-2, INFLUENZA A/B AND RSV PCR - Normal    Coronavirus 2019, PCR Not Detected      Flu A Result Not Detected      Flu B Result Not Detected      RSV PCR Not Detected      Narrative:     This assay is an FDA-cleared, in vitro diagnostic nucleic acid amplification test for the qualitative detection and differentiation of SARS CoV-2/ Influenza A/B/ RSV from nasopharyngeal specimens collected from individuals with signs and symptoms of respiratory tract infections, and has been validated for use at [facility]. Negative results do not preclude COVID-19/ Influenza A/B/ RSV infections and should not be used as the sole basis for diagnosis, treatment, or other management decisions. Testing for SARS CoV-2 is recommended only for patients who meet current clinical and/or epidemiological criteria defined by federal, state, or local public health directives.   TROPONIN SERIES- (INITIAL, 1 HR)    Narrative:     The following orders were  created for panel order Troponin Series, (0, 1 HR).  Procedure                               Abnormality         Status                     ---------                               -----------         ------                     Troponin I, High Sensiti...[[id number]]  Abnormal            Final result               Troponin, High Sensitivi...[[id number]]                                                   Please view results for these tests on the individual orders.   SERIAL TROPONIN, 1 HOUR         Procedure  Procedures     Charles Branch,   03/25/25 0137    Patient was reassessed by the hospitalist, she was becoming more somnolent.  He requested ABG be performed, I did order an ABG and on review of the results, pH is low at 7.21 and pCO2 is high at 78.  She will need continuous BiPAP for the time being, and will need to be in the ICU.  I discussed the case with the ICU providers who accept the patient, she was admitted in stable condition on BiPAP to go to the ICU.     Charles Branch,   03/25/25 0311       Charles Branch,   03/25/25 0318

## 2025-03-26 ENCOUNTER — APPOINTMENT (OUTPATIENT)
Dept: CARDIOLOGY | Facility: HOSPITAL | Age: 75
End: 2025-03-26
Payer: MEDICARE

## 2025-03-26 ENCOUNTER — APPOINTMENT (OUTPATIENT)
Dept: RADIOLOGY | Facility: HOSPITAL | Age: 75
DRG: 871 | End: 2025-03-26
Payer: MEDICARE

## 2025-03-26 LAB
ALBUMIN SERPL BCP-MCNC: 3.2 G/DL (ref 3.4–5)
ALBUMIN SERPL BCP-MCNC: 3.3 G/DL (ref 3.4–5)
ANION GAP SERPL CALCULATED.3IONS-SCNC: 16 MMOL/L (ref 10–20)
ANION GAP SERPL CALCULATED.3IONS-SCNC: 16 MMOL/L (ref 10–20)
AORTIC VALVE MEAN GRADIENT: 4 MMHG
AORTIC VALVE PEAK VELOCITY: 1.38 M/S
AV PEAK GRADIENT: 8 MMHG
AVA (PEAK VEL): 1.35 CM2
AVA (VTI): 1.47 CM2
BASOPHILS # BLD AUTO: 0.01 X10*3/UL (ref 0–0.1)
BASOPHILS NFR BLD AUTO: 0.1 %
BUN SERPL-MCNC: 41 MG/DL (ref 6–23)
BUN SERPL-MCNC: 48 MG/DL (ref 6–23)
CALCIUM SERPL-MCNC: 8.3 MG/DL (ref 8.6–10.3)
CALCIUM SERPL-MCNC: 8.4 MG/DL (ref 8.6–10.3)
CHLORIDE SERPL-SCNC: 100 MMOL/L (ref 98–107)
CHLORIDE SERPL-SCNC: 99 MMOL/L (ref 98–107)
CO2 SERPL-SCNC: 23 MMOL/L (ref 21–32)
CO2 SERPL-SCNC: 23 MMOL/L (ref 21–32)
CREAT SERPL-MCNC: 3.47 MG/DL (ref 0.5–1.05)
CREAT SERPL-MCNC: 4.03 MG/DL (ref 0.5–1.05)
EGFRCR SERPLBLD CKD-EPI 2021: 11 ML/MIN/1.73M*2
EGFRCR SERPLBLD CKD-EPI 2021: 13 ML/MIN/1.73M*2
EJECTION FRACTION APICAL 4 CHAMBER: 41.6
EJECTION FRACTION: 48 %
EOSINOPHIL # BLD AUTO: 0 X10*3/UL (ref 0–0.4)
EOSINOPHIL NFR BLD AUTO: 0 %
ERYTHROCYTE [DISTWIDTH] IN BLOOD BY AUTOMATED COUNT: 20 % (ref 11.5–14.5)
GLUCOSE BLD MANUAL STRIP-MCNC: 120 MG/DL (ref 74–99)
GLUCOSE BLD MANUAL STRIP-MCNC: 121 MG/DL (ref 74–99)
GLUCOSE BLD MANUAL STRIP-MCNC: 126 MG/DL (ref 74–99)
GLUCOSE BLD MANUAL STRIP-MCNC: 138 MG/DL (ref 74–99)
GLUCOSE BLD MANUAL STRIP-MCNC: 146 MG/DL (ref 74–99)
GLUCOSE BLD MANUAL STRIP-MCNC: 193 MG/DL (ref 74–99)
GLUCOSE SERPL-MCNC: 158 MG/DL (ref 74–99)
GLUCOSE SERPL-MCNC: 176 MG/DL (ref 74–99)
HCT VFR BLD AUTO: 32.8 % (ref 36–46)
HGB BLD-MCNC: 9.9 G/DL (ref 12–16)
IMM GRANULOCYTES # BLD AUTO: 0.04 X10*3/UL (ref 0–0.5)
IMM GRANULOCYTES NFR BLD AUTO: 0.4 % (ref 0–0.9)
LEFT VENTRICLE INTERNAL DIMENSION DIASTOLE: 6.61 CM (ref 3.5–6)
LEFT VENTRICULAR OUTFLOW TRACT DIAMETER: 2 CM
LV EJECTION FRACTION BIPLANE: 47 %
LYMPHOCYTES # BLD AUTO: 1.69 X10*3/UL (ref 0.8–3)
LYMPHOCYTES NFR BLD AUTO: 18 %
MAGNESIUM SERPL-MCNC: 2.24 MG/DL (ref 1.6–2.4)
MAGNESIUM SERPL-MCNC: 2.48 MG/DL (ref 1.6–2.4)
MCH RBC QN AUTO: 23.1 PG (ref 26–34)
MCHC RBC AUTO-ENTMCNC: 30.2 G/DL (ref 32–36)
MCV RBC AUTO: 77 FL (ref 80–100)
MITRAL VALVE E/A RATIO: 1.5
MONOCYTES # BLD AUTO: 0.85 X10*3/UL (ref 0.05–0.8)
MONOCYTES NFR BLD AUTO: 9.1 %
NEUTROPHILS # BLD AUTO: 6.8 X10*3/UL (ref 1.6–5.5)
NEUTROPHILS NFR BLD AUTO: 72.4 %
NRBC BLD-RTO: 0 /100 WBCS (ref 0–0)
PHOSPHATE SERPL-MCNC: 4.4 MG/DL (ref 2.5–4.9)
PHOSPHATE SERPL-MCNC: 4.8 MG/DL (ref 2.5–4.9)
PLATELET # BLD AUTO: 171 X10*3/UL (ref 150–450)
POTASSIUM SERPL-SCNC: 5.1 MMOL/L (ref 3.5–5.3)
POTASSIUM SERPL-SCNC: 5.1 MMOL/L (ref 3.5–5.3)
RBC # BLD AUTO: 4.29 X10*6/UL (ref 4–5.2)
RIGHT VENTRICLE FREE WALL PEAK S': 9.36 CM/S
RIGHT VENTRICLE PEAK SYSTOLIC PRESSURE: 56 MMHG
SODIUM SERPL-SCNC: 133 MMOL/L (ref 136–145)
SODIUM SERPL-SCNC: 134 MMOL/L (ref 136–145)
TRICUSPID ANNULAR PLANE SYSTOLIC EXCURSION: 2.1 CM
WBC # BLD AUTO: 9.4 X10*3/UL (ref 4.4–11.3)

## 2025-03-26 PROCEDURE — 85025 COMPLETE CBC W/AUTO DIFF WBC: CPT

## 2025-03-26 PROCEDURE — 2020000001 HC ICU ROOM DAILY

## 2025-03-26 PROCEDURE — 80069 RENAL FUNCTION PANEL: CPT

## 2025-03-26 PROCEDURE — 76770 US EXAM ABDO BACK WALL COMP: CPT | Performed by: RADIOLOGY

## 2025-03-26 PROCEDURE — 2500000005 HC RX 250 GENERAL PHARMACY W/O HCPCS: Performed by: INTERNAL MEDICINE

## 2025-03-26 PROCEDURE — 83735 ASSAY OF MAGNESIUM: CPT

## 2025-03-26 PROCEDURE — 2500000002 HC RX 250 W HCPCS SELF ADMINISTERED DRUGS (ALT 637 FOR MEDICARE OP, ALT 636 FOR OP/ED)

## 2025-03-26 PROCEDURE — 99291 CRITICAL CARE FIRST HOUR: CPT | Performed by: INTERNAL MEDICINE

## 2025-03-26 PROCEDURE — 36415 COLL VENOUS BLD VENIPUNCTURE: CPT

## 2025-03-26 PROCEDURE — 2500000001 HC RX 250 WO HCPCS SELF ADMINISTERED DRUGS (ALT 637 FOR MEDICARE OP)

## 2025-03-26 PROCEDURE — C8929 TTE W OR WO FOL WCON,DOPPLER: HCPCS

## 2025-03-26 PROCEDURE — 93306 TTE W/DOPPLER COMPLETE: CPT | Performed by: INTERNAL MEDICINE

## 2025-03-26 PROCEDURE — 76770 US EXAM ABDO BACK WALL COMP: CPT

## 2025-03-26 PROCEDURE — 2500000004 HC RX 250 GENERAL PHARMACY W/ HCPCS (ALT 636 FOR OP/ED)

## 2025-03-26 PROCEDURE — 94640 AIRWAY INHALATION TREATMENT: CPT

## 2025-03-26 PROCEDURE — 2500000004 HC RX 250 GENERAL PHARMACY W/ HCPCS (ALT 636 FOR OP/ED): Performed by: INTERNAL MEDICINE

## 2025-03-26 PROCEDURE — 2500000005 HC RX 250 GENERAL PHARMACY W/O HCPCS

## 2025-03-26 PROCEDURE — 94003 VENT MGMT INPAT SUBQ DAY: CPT

## 2025-03-26 PROCEDURE — 82947 ASSAY GLUCOSE BLOOD QUANT: CPT

## 2025-03-26 PROCEDURE — 9420000001 HC RT PATIENT EDUCATION 5 MIN

## 2025-03-26 RX ORDER — SODIUM CHLORIDE 450 MG/100ML
50 INJECTION, SOLUTION INTRAVENOUS CONTINUOUS
Status: DISCONTINUED | OUTPATIENT
Start: 2025-03-26 | End: 2025-03-28

## 2025-03-26 RX ORDER — FUROSEMIDE 10 MG/ML
40 INJECTION INTRAMUSCULAR; INTRAVENOUS ONCE
Status: COMPLETED | OUTPATIENT
Start: 2025-03-26 | End: 2025-03-26

## 2025-03-26 RX ADMIN — NYSTATIN 1 APPLICATION: 100000 POWDER TOPICAL at 14:14

## 2025-03-26 RX ADMIN — NYSTATIN 1 APPLICATION: 100000 POWDER TOPICAL at 20:04

## 2025-03-26 RX ADMIN — Medication 50 PERCENT: at 08:05

## 2025-03-26 RX ADMIN — IPRATROPIUM BROMIDE AND ALBUTEROL SULFATE 3 ML: 2.5; .5 SOLUTION RESPIRATORY (INHALATION) at 08:04

## 2025-03-26 RX ADMIN — PANTOPRAZOLE SODIUM 40 MG: 40 INJECTION, POWDER, FOR SOLUTION INTRAVENOUS at 05:14

## 2025-03-26 RX ADMIN — ASPIRIN 81 MG: 81 TABLET, COATED ORAL at 08:55

## 2025-03-26 RX ADMIN — HEPARIN SODIUM 7500 UNITS: 5000 INJECTION INTRAVENOUS; SUBCUTANEOUS at 22:00

## 2025-03-26 RX ADMIN — Medication 100 MCG/HR: at 20:41

## 2025-03-26 RX ADMIN — LEVOTHYROXINE SODIUM 200 MCG: 0.1 TABLET ORAL at 05:11

## 2025-03-26 RX ADMIN — PROPOFOL 20 MCG/KG/MIN: 10 INJECTION, EMULSION INTRAVENOUS at 02:08

## 2025-03-26 RX ADMIN — Medication 100 MCG/HR: at 10:50

## 2025-03-26 RX ADMIN — IPRATROPIUM BROMIDE AND ALBUTEROL SULFATE 3 ML: 2.5; .5 SOLUTION RESPIRATORY (INHALATION) at 11:35

## 2025-03-26 RX ADMIN — PRAVASTATIN SODIUM 40 MG: 20 TABLET ORAL at 20:02

## 2025-03-26 RX ADMIN — NYSTATIN 1 APPLICATION: 100000 POWDER TOPICAL at 09:09

## 2025-03-26 RX ADMIN — FLUTICASONE PROPIONATE 2 SPRAY: 50 SPRAY, METERED NASAL at 08:54

## 2025-03-26 RX ADMIN — IPRATROPIUM BROMIDE AND ALBUTEROL SULFATE 3 ML: 2.5; .5 SOLUTION RESPIRATORY (INHALATION) at 20:32

## 2025-03-26 RX ADMIN — PROPOFOL 10 MCG/KG/MIN: 10 INJECTION, EMULSION INTRAVENOUS at 15:34

## 2025-03-26 RX ADMIN — MONTELUKAST 10 MG: 10 TABLET, FILM COATED ORAL at 20:03

## 2025-03-26 RX ADMIN — PERFLUTREN 3 ML OF DILUTION: 6.52 INJECTION, SUSPENSION INTRAVENOUS at 10:42

## 2025-03-26 RX ADMIN — Medication 40 PERCENT: at 20:32

## 2025-03-26 RX ADMIN — EZETIMIBE 10 MG: 10 TABLET ORAL at 08:54

## 2025-03-26 RX ADMIN — INSULIN LISPRO 2 UNITS: 100 INJECTION, SOLUTION INTRAVENOUS; SUBCUTANEOUS at 17:05

## 2025-03-26 RX ADMIN — IPRATROPIUM BROMIDE AND ALBUTEROL SULFATE 3 ML: 2.5; .5 SOLUTION RESPIRATORY (INHALATION) at 00:18

## 2025-03-26 RX ADMIN — SERTRALINE HYDROCHLORIDE 50 MG: 50 TABLET ORAL at 08:55

## 2025-03-26 RX ADMIN — POLYETHYLENE GLYCOL 3350 17 G: 17 POWDER, FOR SOLUTION ORAL at 08:54

## 2025-03-26 RX ADMIN — PROPOFOL 10 MCG/KG/MIN: 10 INJECTION, EMULSION INTRAVENOUS at 07:22

## 2025-03-26 RX ADMIN — HEPARIN SODIUM 7500 UNITS: 5000 INJECTION INTRAVENOUS; SUBCUTANEOUS at 05:11

## 2025-03-26 RX ADMIN — HYDRALAZINE HYDROCHLORIDE 50 MG: 50 TABLET ORAL at 08:54

## 2025-03-26 RX ADMIN — SODIUM CHLORIDE 100 ML/HR: 450 INJECTION, SOLUTION INTRAVENOUS at 10:05

## 2025-03-26 RX ADMIN — DEXTROSE MONOHYDRATE 500 MG: 50 INJECTION, SOLUTION INTRAVENOUS at 03:01

## 2025-03-26 RX ADMIN — FUROSEMIDE 40 MG: 10 INJECTION, SOLUTION INTRAMUSCULAR; INTRAVENOUS at 05:52

## 2025-03-26 RX ADMIN — GABAPENTIN 100 MG: 100 CAPSULE ORAL at 20:03

## 2025-03-26 RX ADMIN — PROPOFOL 20 MCG/KG/MIN: 10 INJECTION, EMULSION INTRAVENOUS at 20:17

## 2025-03-26 RX ADMIN — GABAPENTIN 100 MG: 100 CAPSULE ORAL at 14:13

## 2025-03-26 RX ADMIN — SODIUM CHLORIDE 100 ML/HR: 450 INJECTION, SOLUTION INTRAVENOUS at 20:02

## 2025-03-26 RX ADMIN — METHYLPREDNISOLONE SODIUM SUCCINATE 40 MG: 40 INJECTION, POWDER, FOR SOLUTION INTRAMUSCULAR; INTRAVENOUS at 08:54

## 2025-03-26 RX ADMIN — FLUTICASONE PROPIONATE 2 SPRAY: 50 SPRAY, METERED NASAL at 20:04

## 2025-03-26 RX ADMIN — GABAPENTIN 100 MG: 100 CAPSULE ORAL at 08:54

## 2025-03-26 RX ADMIN — HEPARIN SODIUM 7500 UNITS: 5000 INJECTION INTRAVENOUS; SUBCUTANEOUS at 14:13

## 2025-03-26 RX ADMIN — Medication 100 MCG/HR: at 03:01

## 2025-03-26 ASSESSMENT — PAIN - FUNCTIONAL ASSESSMENT
PAIN_FUNCTIONAL_ASSESSMENT: CPOT (CRITICAL CARE PAIN OBSERVATION TOOL)
PAIN_FUNCTIONAL_ASSESSMENT: CPOT (CRITICAL CARE PAIN OBSERVATION TOOL)

## 2025-03-26 NOTE — CONSULTS
.Reason For Consult  Acute kidney injury on top of chronic kidney disease stage IIIb    History Of Present Illness  Analia Murray is a 74 y.o. female who is known to have underlying CKD stage IIIb as [id number] she follows up with nephrology in [facility], she also had a history of diabetes mellitus type 2, hypertension, hyperlipidemia, COPD and history of CHF obesity who basically for last few days she has been complaining of increased shortness of breath wheezing and cough family checked her pulse oximeter which was abnormal with low oxygenation decided to bring her to the emergency room was found to have acute respiratory failure with hypoxemia she was intubated placed on mechanical ventilation admitted to the intensive care unit I was asked to see the patient renal consultation because of acute kidney injury with rising creatinine level comparing to his baseline as well as no urine output for the last 24 hours patient did not have any hypotensive episodes she is not on any pressors currently intubated on the vent and sedated and her son is by the bedside.  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), Hyperlipidemia, Hypertension, Morbid (severe) obesity due to excess calories (Multi), Obstructive sleep apnea (adult) (pediatric) (06/27/2018), Other conditions influencing health status, Personal history of other diseases of the circulatory system, Personal history of other diseases of the digestive system (02/07/2018), Personal history of other diseases of the digestive  system (2017), Personal history of other endocrine, nutritional and metabolic disease (2020), Personal history of other endocrine, nutritional and metabolic disease, Personal history of other endocrine, nutritional and metabolic disease, Personal history of other mental and behavioral disorders (2018), and Unspecified asthma, uncomplicated ([facility]) (10/05/2022).    Surgical History  She has a past surgical history that includes Colonoscopy (2013);  section, classic (2014); Small intestine surgery (2016); and Cardiac catheterization.     Social History  She reports that she has never smoked. She has never been exposed to tobacco smoke. She has never used smokeless tobacco. She reports that she does not drink alcohol and does not use drugs.    Family History  Family History   Problem Relation Name Age of Onset    Coronary artery disease Mother      Heart attack Mother          Current Facility-Administered Medications:     allopurinol (Zyloprim) tablet 50 mg, 50 mg, oral, Every other day, Sanjiv E Hipps, PA-C, 50 mg at 25 0827    amLODIPine (Norvasc) tablet 10 mg, 10 mg, oral, Daily, Sanjiv E Hipps, PA-C, 10 mg at 25 0827    aspirin EC tablet 81 mg, 81 mg, oral, Daily, Sanjiv E Hipps, PA-C, 81 mg at 25 0855    azithromycin (Zithromax) 500 mg in dextrose 5%  mL, 500 mg, intravenous, q24h, Sanjiv E Hipps, PA-C, Stopped at 25 0401    dextrose 50 % injection 12.5 g, 12.5 g, intravenous, q15 min PRN, Sanjiv E Hipps, PA-C    dextrose 50 % injection 25 g, 25 g, intravenous, q15 min PRN, Sanjiv E Hipps, PA-C    pantoprazole (ProtoNix) EC tablet 40 mg, 40 mg, oral, Daily before breakfast **OR** esomeprazole (NexIUM) suspension 40 mg, 40 mg, nasoduodenal tube, Daily before breakfast **OR** pantoprazole (ProtoNix) injection 40 mg, 40 mg, intravenous, Daily before breakfast, Sanjiv E Hipps, PA-C, 40 mg at 25 0514    ezetimibe (Zetia) tablet 10 mg, 10 mg, oral,  Daily, Sanjiv E Hipps, PA-C, 10 mg at 03/26/25 0854    fentanyl (Sublimaze) 10 mcg/mL in sodium chloride 0.9% infusion, 0-300 mcg/hr, intravenous, Continuous, Sanjiv E Hipps, PA-C, Last Rate: 10 mL/hr at 03/26/25 0927, 100 mcg/hr at 03/26/25 0927    fentaNYL bolus from bag 25 mcg, 25 mcg, intravenous, q10 min PRN, Sanjiv E Hipps, PA-C    fluticasone (Flonase) nasal spray 2 spray, 2 spray, Each Nostril, BID, Sanjiv E Hipps, PA-C, 2 spray at 03/26/25 0854    gabapentin (Neurontin) capsule 100 mg, 100 mg, oral, TID, Sanjiv E Hipps, PA-C, 100 mg at 03/26/25 0854    glucagon (Glucagen) injection 1 mg, 1 mg, intramuscular, q15 min PRN, Sanjiv E Hipps, PA-C    glucagon (Glucagen) injection 1 mg, 1 mg, intramuscular, q15 min PRN, Sanjiv E Hipps, PA-C    heparin (porcine) injection 7,500 Units, 7,500 Units, subcutaneous, q8h JAYA, Sanjiv E Hipps, PA-C, 7,500 Units at 03/26/25 0511    hydrALAZINE (Apresoline) tablet 50 mg, 50 mg, oral, BID, Sanjiv E Hipps, PA-C, 50 mg at 03/26/25 0854    insulin lispro injection 0-10 Units, 0-10 Units, subcutaneous, q4h, Sanjiv E Hipps, PA-C, 2 Units at 03/25/25 1539    ipratropium-albuteroL (Duo-Neb) 0.5-2.5 mg/3 mL nebulizer solution 3 mL, 3 mL, nebulization, q6h, Sanjiv E Hipps, PA-C, 3 mL at 03/26/25 0804    levothyroxine (Synthroid, Levoxyl) tablet 200 mcg, 200 mcg, oral, Daily, Sanjiv E Hipps, PA-C, 200 mcg at 03/26/25 0511    levothyroxine (Synthroid, Levoxyl) tablet 200 mcg, 200 mcg, oral, Every Wednesday, ADAMA Delgado-C    methylPREDNISolone sod succinate (SOLU-Medrol) 40 mg/mL injection 40 mg, 40 mg, intravenous, Daily, Sanjiv Neil, PA-C, 40 mg at 03/26/25 0854    [Held by provider] metoprolol succinate XL (Toprol-XL) 24 hr tablet 100 mg, 100 mg, oral, Daily, ADAMA Delgado-C    metoprolol tartrate (Lopressor) tablet 50 mg, 50 mg, oral, BID, Sanjiv Neil, PA-C, 50 mg at 03/25/25 2108    montelukast (Singulair) tablet 10 mg, 10 mg, oral, Nightly, Sanjiv Neil, PA-C, 10 mg at 03/25/25 2109    [Held by  provider] nitroglycerin (Nitrostat) SL tablet 0.4 mg, 0.4 mg, sublingual, q5 min PRN, Sanjiv VAZQUEZ Hipps, PA-C    nystatin (Mycostatin) 100,000 unit/gram powder 1 Application, 1 Application, Topical, TID, Sanjiv E Hipps, PA-C, 1 Application at 03/26/25 0909    oxygen (O2) therapy, , inhalation, Continuous - Inhalation, Sanjiv E Hipps, PA-C, 50 percent at 03/26/25 0805    polyethylene glycol (Glycolax, Miralax) packet 17 g, 17 g, oral, Daily, Sanjiv E Hipps, PA-C, 17 g at 03/26/25 0854    pravastatin (Pravachol) tablet 40 mg, 40 mg, oral, Nightly, Sanjiv E Hipps, PA-C, 40 mg at 03/25/25 2109    propofol (Diprivan) infusion, 5-50 mcg/kg/min, intravenous, Continuous, Sanjiv E Hipps, PA-C, Last Rate: 7.92 mL/hr at 03/26/25 0927, 10 mcg/kg/min at 03/26/25 0927    sertraline (Zoloft) tablet 50 mg, 50 mg, oral, Daily, Sanjiv E Hipps, PA-C, 50 mg at 03/26/25 0855    sodium chloride 0.45 % infusion, 100 mL/hr, intravenous, Continuous, Sixto Slater MD   Allergies  Ciprofloxacin, Morphine, and Penicillins         Physical Exam  Physical Exam  Constitutional:       General: She is not in acute distress.     Appearance: She is not toxic-appearing.      Comments: Patient is intubated on the vent and sedated however she is able to open her eyes for me responding to verbal command   HENT:      Head: Normocephalic and atraumatic.   Eyes:      Extraocular Movements: Extraocular movements intact.      Pupils: Pupils are equal, round, and reactive to light.   Neck:      Vascular: No carotid bruit.   Cardiovascular:      Rate and Rhythm: Normal rate and regular rhythm.   Pulmonary:      Effort: No respiratory distress.      Breath sounds: No stridor. Wheezing and rhonchi present. No rales.   Chest:      Chest wall: No tenderness.   Abdominal:      General: There is no distension.      Palpations: There is no mass.      Tenderness: There is no abdominal tenderness. There is no right CVA tenderness, left CVA tenderness or guarding.      Hernia: No hernia  is present.   Musculoskeletal:         General: No swelling or tenderness.      Cervical back: No rigidity.      Right lower leg: No edema.      Left lower leg: No edema.   Lymphadenopathy:      Cervical: No cervical adenopathy.   Skin:     General: Skin is warm and dry.      Coloration: Skin is not jaundiced or pale.      Findings: No bruising or erythema.   Neurological:      Mental Status: She is alert.      Comments: Intubated on the ventilator              I&O 24HR    Intake/Output Summary (Last 24 hours) at 3/26/2025 1000  Last data filed at 3/26/2025 0927  Gross per 24 hour   Intake 2124.56 ml   Output 0 ml   Net 2124.56 ml       Vitals 24HR  Heart Rate:  [55-72]   Temp:  [35.9 °C (96.6 °F)-36.6 °C (97.9 °F)]   Resp:  [18-21]   BP: ()/(50-85)   Weight:  [137 kg (302 lb 0.5 oz)]   SpO2:  [93 %-100 %]     Relevant Results        Results for orders placed or performed during the hospital encounter of 03/25/25 (from the past 96 hours)   CBC and Auto Differential   Result Value Ref Range    WBC 10.5 4.4 - 11.3 x10*3/uL    nRBC 0.0 0.0 - 0.0 /100 WBCs    RBC 4.45 4.00 - 5.20 x10*6/uL    Hemoglobin 10.4 (L) 12.0 - 16.0 g/dL    Hematocrit 35.8 (L) 36.0 - 46.0 %    MCV 80 80 - 100 fL    MCH 23.4 (L) 26.0 - 34.0 pg    MCHC 29.1 (L) 32.0 - 36.0 g/dL    RDW 20.0 (H) 11.5 - 14.5 %    Platelets 170 150 - 450 x10*3/uL    Neutrophils % 71.3 40.0 - 80.0 %    Immature Granulocytes %, Automated 0.5 0.0 - 0.9 %    Lymphocytes % 11.1 13.0 - 44.0 %    Monocytes % 9.8 2.0 - 10.0 %    Eosinophils % 7.0 0.0 - 6.0 %    Basophils % 0.3 0.0 - 2.0 %    Neutrophils Absolute 7.49 (H) 1.60 - 5.50 x10*3/uL    Immature Granulocytes Absolute, Automated 0.05 0.00 - 0.50 x10*3/uL    Lymphocytes Absolute 1.17 0.80 - 3.00 x10*3/uL    Monocytes Absolute 1.03 (H) 0.05 - 0.80 x10*3/uL    Eosinophils Absolute 0.73 (H) 0.00 - 0.40 x10*3/uL    Basophils Absolute 0.03 0.00 - 0.10 x10*3/uL   Comprehensive metabolic panel   Result Value Ref Range     Glucose 141 (H) 74 - 99 mg/dL    Sodium 138 136 - 145 mmol/L    Potassium 4.5 3.5 - 5.3 mmol/L    Chloride 103 98 - 107 mmol/L    Bicarbonate 27 21 - 32 mmol/L    Anion Gap 13 10 - 20 mmol/L    Urea Nitrogen 30 (H) 6 - 23 mg/dL    Creatinine 2.32 (H) 0.50 - 1.05 mg/dL    eGFR 21 (L) >60 mL/min/1.73m*2    Calcium 8.6 8.6 - 10.3 mg/dL    Albumin 3.6 3.4 - 5.0 g/dL    Alkaline Phosphatase 90 33 - 136 U/L    Total Protein 7.7 6.4 - 8.2 g/dL    AST 10 9 - 39 U/L    Bilirubin, Total 0.5 0.0 - 1.2 mg/dL    ALT 7 7 - 45 U/L   B-type natriuretic peptide   Result Value Ref Range     (H) 0 - 99 pg/mL   Sars-CoV-2, Influenza A/B and RSV PCR   Result Value Ref Range    Coronavirus 2019, PCR Not Detected Not Detected    Flu A Result Not Detected Not Detected    Flu B Result Not Detected Not Detected    RSV PCR Not Detected Not Detected   Troponin I, High Sensitivity, Initial   Result Value Ref Range    Troponin I, High Sensitivity 14 (H) 0 - 13 ng/L   TSH with reflex to Free T4 if abnormal   Result Value Ref Range    Thyroid Stimulating Hormone 11.37 (H) 0.44 - 3.98 mIU/L   Thyroxine, Free   Result Value Ref Range    Thyroxine, Free 0.86 0.61 - 1.12 ng/dL   Troponin, High Sensitivity, 1 Hour   Result Value Ref Range    Troponin I, High Sensitivity 13 0 - 13 ng/L   BLOOD GAS ARTERIAL FULL PANEL   Result Value Ref Range    POCT pH, Arterial 7.21 (LL) 7.38 - 7.42 pH    POCT pCO2, Arterial 78 (HH) 38 - 42 mm Hg    POCT pO2, Arterial 140 (H) 85 - 95 mm Hg    POCT SO2, Arterial 99 94 - 100 %    POCT Oxy Hemoglobin, Arterial 97.0 94.0 - 98.0 %    POCT Hematocrit Calculated, Arterial 32.0 (L) 36.0 - 46.0 %    POCT Sodium, Arterial 139 136 - 145 mmol/L    POCT Potassium, Arterial 5.0 3.5 - 5.3 mmol/L    POCT Chloride, Arterial 104 98 - 107 mmol/L    POCT Ionized Calcium, Arterial 1.21 1.10 - 1.33 mmol/L    POCT Glucose, Arterial 155 (H) 74 - 99 mg/dL    POCT Lactate, Arterial 0.5 0.4 - 2.0 mmol/L    POCT Base Excess, Arterial 1.8  -2.0 - 3.0 mmol/L    POCT HCO3 Calculated, Arterial 31.2 (H) 22.0 - 26.0 mmol/L    POCT Hemoglobin, Arterial 10.5 (L) 12.0 - 16.0 g/dL    POCT Anion Gap, Arterial 9 (L) 10 - 25 mmo/L    Patient Temperature 37.0 degrees Celsius    FiO2 36 %    Apparatus CANNULA     Site of Arterial Puncture Radial Right     Wes's Test Positive    BLOOD GAS ARTERIAL FULL PANEL   Result Value Ref Range    POCT pH, Arterial 7.22 (LL) 7.38 - 7.42 pH    POCT pCO2, Arterial 72 (HH) 38 - 42 mm Hg    POCT pO2, Arterial 90 85 - 95 mm Hg    POCT SO2, Arterial 97 94 - 100 %    POCT Oxy Hemoglobin, Arterial 94.3 94.0 - 98.0 %    POCT Hematocrit Calculated, Arterial 32.0 (L) 36.0 - 46.0 %    POCT Sodium, Arterial 137 136 - 145 mmol/L    POCT Potassium, Arterial 5.1 3.5 - 5.3 mmol/L    POCT Chloride, Arterial 105 98 - 107 mmol/L    POCT Ionized Calcium, Arterial 1.13 1.10 - 1.33 mmol/L    POCT Glucose, Arterial 160 (H) 74 - 99 mg/dL    POCT Lactate, Arterial 0.5 0.4 - 2.0 mmol/L    POCT Base Excess, Arterial 0.5 -2.0 - 3.0 mmol/L    POCT HCO3 Calculated, Arterial 29.5 (H) 22.0 - 26.0 mmol/L    POCT Hemoglobin, Arterial 10.6 (L) 12.0 - 16.0 g/dL    POCT Anion Gap, Arterial 8 (L) 10 - 25 mmo/L    Patient Temperature 37.0 degrees Celsius    FiO2 40 %    Apparatus      Ventilator Mode BiPAP     Ventilator Rate 22 bpm    Ipap CMH2O 18.0 cm H2O    Epap CMH2O 5.0 cm H2O    Site of Arterial Puncture Brachial Right     Wes's Test Positive    Hemoglobin A1c   Result Value Ref Range    Hemoglobin A1C 5.8 (H) See comment %    Estimated Average Glucose 120 Not Established mg/dL   CBC and Auto Differential   Result Value Ref Range    WBC 8.4 4.4 - 11.3 x10*3/uL    nRBC 0.0 0.0 - 0.0 /100 WBCs    RBC 4.30 4.00 - 5.20 x10*6/uL    Hemoglobin 9.9 (L) 12.0 - 16.0 g/dL    Hematocrit 34.5 (L) 36.0 - 46.0 %    MCV 80 80 - 100 fL    MCH 23.0 (L) 26.0 - 34.0 pg    MCHC 28.7 (L) 32.0 - 36.0 g/dL    RDW 19.9 (H) 11.5 - 14.5 %    Platelets 147 (L) 150 - 450 x10*3/uL     Neutrophils % 93.9 40.0 - 80.0 %    Immature Granulocytes %, Automated 0.5 0.0 - 0.9 %    Lymphocytes % 3.2 13.0 - 44.0 %    Monocytes % 1.9 2.0 - 10.0 %    Eosinophils % 0.4 0.0 - 6.0 %    Basophils % 0.1 0.0 - 2.0 %    Neutrophils Absolute 7.88 (H) 1.60 - 5.50 x10*3/uL    Immature Granulocytes Absolute, Automated 0.04 0.00 - 0.50 x10*3/uL    Lymphocytes Absolute 0.27 (L) 0.80 - 3.00 x10*3/uL    Monocytes Absolute 0.16 0.05 - 0.80 x10*3/uL    Eosinophils Absolute 0.03 0.00 - 0.40 x10*3/uL    Basophils Absolute 0.01 0.00 - 0.10 x10*3/uL   Renal Function Panel   Result Value Ref Range    Glucose 167 (H) 74 - 99 mg/dL    Sodium 138 136 - 145 mmol/L    Potassium 5.3 3.5 - 5.3 mmol/L    Chloride 104 98 - 107 mmol/L    Bicarbonate 27 21 - 32 mmol/L    Anion Gap 12 10 - 20 mmol/L    Urea Nitrogen 32 (H) 6 - 23 mg/dL    Creatinine 2.42 (H) 0.50 - 1.05 mg/dL    eGFR 20 (L) >60 mL/min/1.73m*2    Calcium 8.5 (L) 8.6 - 10.3 mg/dL    Phosphorus 5.3 (H) 2.5 - 4.9 mg/dL    Albumin 3.4 3.4 - 5.0 g/dL   Magnesium   Result Value Ref Range    Magnesium 1.91 1.60 - 2.40 mg/dL   BLOOD GAS ARTERIAL FULL PANEL   Result Value Ref Range    POCT pH, Arterial 7.35 (L) 7.38 - 7.42 pH    POCT pCO2, Arterial 49 (H) 38 - 42 mm Hg    POCT pO2, Arterial 352 (H) 85 - 95 mm Hg    POCT SO2, Arterial 100 94 - 100 %    POCT Oxy Hemoglobin, Arterial 97.5 94.0 - 98.0 %    POCT Hematocrit Calculated, Arterial 29.0 (L) 36.0 - 46.0 %    POCT Sodium, Arterial 134 (L) 136 - 145 mmol/L    POCT Potassium, Arterial 5.7 (H) 3.5 - 5.3 mmol/L    POCT Chloride, Arterial 105 98 - 107 mmol/L    POCT Ionized Calcium, Arterial 1.10 1.10 - 1.33 mmol/L    POCT Glucose, Arterial 210 (H) 74 - 99 mg/dL    POCT Lactate, Arterial 0.7 0.4 - 2.0 mmol/L    POCT Base Excess, Arterial 1.1 -2.0 - 3.0 mmol/L    POCT HCO3 Calculated, Arterial 27.1 (H) 22.0 - 26.0 mmol/L    POCT Hemoglobin, Arterial 9.7 (L) 12.0 - 16.0 g/dL    POCT Anion Gap, Arterial 8 (L) 10 - 25 mmo/L    Patient  Temperature 37.0 degrees Celsius    FiO2 100 %    Ventilator Mode A/C     Ventilator Rate 18 bpm    Tidal Volume 420 mL    Spontaneous Tidal Volume 421 mL    Peep CHM2O 5.0 cm H2O    Site of Arterial Puncture Radial Right     Wes's Test Positive    POCT GLUCOSE   Result Value Ref Range    POCT Glucose 200 (H) 74 - 99 mg/dL   POCT GLUCOSE   Result Value Ref Range    POCT Glucose 169 (H) 74 - 99 mg/dL   Renal function panel   Result Value Ref Range    Glucose 171 (H) 74 - 99 mg/dL    Sodium 136 136 - 145 mmol/L    Potassium 5.0 3.5 - 5.3 mmol/L    Chloride 103 98 - 107 mmol/L    Bicarbonate 24 21 - 32 mmol/L    Anion Gap 14 10 - 20 mmol/L    Urea Nitrogen 38 (H) 6 - 23 mg/dL    Creatinine 2.76 (H) 0.50 - 1.05 mg/dL    eGFR 17 (L) >60 mL/min/1.73m*2    Calcium 8.0 (L) 8.6 - 10.3 mg/dL    Phosphorus 4.1 2.5 - 4.9 mg/dL    Albumin 3.1 (L) 3.4 - 5.0 g/dL   Magnesium   Result Value Ref Range    Magnesium 2.16 1.60 - 2.40 mg/dL   POCT GLUCOSE   Result Value Ref Range    POCT Glucose 165 (H) 74 - 99 mg/dL   POCT GLUCOSE   Result Value Ref Range    POCT Glucose 128 (H) 74 - 99 mg/dL   POCT GLUCOSE   Result Value Ref Range    POCT Glucose 142 (H) 74 - 99 mg/dL   BLOOD GAS ARTERIAL FULL PANEL   Result Value Ref Range    POCT pH, Arterial 7.45 (H) 7.38 - 7.42 pH    POCT pCO2, Arterial 36 (L) 38 - 42 mm Hg    POCT pO2, Arterial 102 (H) 85 - 95 mm Hg    POCT SO2, Arterial 100 94 - 100 %    POCT Oxy Hemoglobin, Arterial 98.3 (H) 94.0 - 98.0 %    POCT Hematocrit Calculated, Arterial 27.0 (L) 36.0 - 46.0 %    POCT Sodium, Arterial 132 (L) 136 - 145 mmol/L    POCT Potassium, Arterial 5.9 (H) 3.5 - 5.3 mmol/L    POCT Chloride, Arterial 104 98 - 107 mmol/L    POCT Ionized Calcium, Arterial 1.13 1.10 - 1.33 mmol/L    POCT Glucose, Arterial 146 (H) 74 - 99 mg/dL    POCT Lactate, Arterial 0.9 0.4 - 2.0 mmol/L    POCT Base Excess, Arterial 1.1 -2.0 - 3.0 mmol/L    POCT HCO3 Calculated, Arterial 25.0 22.0 - 26.0 mmol/L    POCT Hemoglobin,  Arterial 9.0 (L) 12.0 - 16.0 g/dL    POCT Anion Gap, Arterial 9 (L) 10 - 25 mmo/L    Patient Temperature 37.0 degrees Celsius    FiO2 50 %    Apparatus      Ventilator Mode      Ventilator Rate 20 bpm    Tidal Volume 420 mL    Peep CHM2O 5.0 cm H2O    Site of Arterial Puncture Arterial Line    POCT GLUCOSE   Result Value Ref Range    POCT Glucose 138 (H) 74 - 99 mg/dL   CBC and Auto Differential   Result Value Ref Range    WBC 9.4 4.4 - 11.3 x10*3/uL    nRBC 0.0 0.0 - 0.0 /100 WBCs    RBC 4.29 4.00 - 5.20 x10*6/uL    Hemoglobin 9.9 (L) 12.0 - 16.0 g/dL    Hematocrit 32.8 (L) 36.0 - 46.0 %    MCV 77 (L) 80 - 100 fL    MCH 23.1 (L) 26.0 - 34.0 pg    MCHC 30.2 (L) 32.0 - 36.0 g/dL    RDW 20.0 (H) 11.5 - 14.5 %    Platelets 171 150 - 450 x10*3/uL    Neutrophils % 72.4 40.0 - 80.0 %    Immature Granulocytes %, Automated 0.4 0.0 - 0.9 %    Lymphocytes % 18.0 13.0 - 44.0 %    Monocytes % 9.1 2.0 - 10.0 %    Eosinophils % 0.0 0.0 - 6.0 %    Basophils % 0.1 0.0 - 2.0 %    Neutrophils Absolute 6.80 (H) 1.60 - 5.50 x10*3/uL    Immature Granulocytes Absolute, Automated 0.04 0.00 - 0.50 x10*3/uL    Lymphocytes Absolute 1.69 0.80 - 3.00 x10*3/uL    Monocytes Absolute 0.85 (H) 0.05 - 0.80 x10*3/uL    Eosinophils Absolute 0.00 0.00 - 0.40 x10*3/uL    Basophils Absolute 0.01 0.00 - 0.10 x10*3/uL   Renal Function Panel   Result Value Ref Range    Glucose 158 (H) 74 - 99 mg/dL    Sodium 134 (L) 136 - 145 mmol/L    Potassium 5.1 3.5 - 5.3 mmol/L    Chloride 100 98 - 107 mmol/L    Bicarbonate 23 21 - 32 mmol/L    Anion Gap 16 10 - 20 mmol/L    Urea Nitrogen 41 (H) 6 - 23 mg/dL    Creatinine 3.47 (H) 0.50 - 1.05 mg/dL    eGFR 13 (L) >60 mL/min/1.73m*2    Calcium 8.4 (L) 8.6 - 10.3 mg/dL    Phosphorus 4.4 2.5 - 4.9 mg/dL    Albumin 3.2 (L) 3.4 - 5.0 g/dL   Magnesium   Result Value Ref Range    Magnesium 2.48 (H) 1.60 - 2.40 mg/dL   POCT GLUCOSE   Result Value Ref Range    POCT Glucose 121 (H) 74 - 99 mg/dL          Assessment/Plan     XR  chest 1 view    Result Date: 3/25/2025  Interpreted By:  Ella To, STUDY: XR CHEST 1 VIEW;  3/25/2025 5:23 am   INDICATION: Signs/Symptoms:intubated and OG placement.     COMPARISON: 03/25/2025   ACCESSION NUMBER(S): [id number]   ORDERING CLINICIAN: MICHAEL STRONG   FINDINGS: AP radiograph of the chest was provided.   Enteric tube terminates approximately 1.5 cm above the bladimir. Enteric tube courses below the diaphragm with tip excluded from imaging.   CARDIOMEDIASTINAL SILHOUETTE: Cardiomediastinal silhouette is stable in size and configuration.   LUNGS: Bilateral hilar prominence similar to prior imaging slightly bulky on the right. Hazy parenchymal and interstitial opacities predominantly in the right lower lobe. No sizable pleural effusion or pneumothorax.   ABDOMEN: No remarkable upper abdominal findings.   BONES: No acute osseous changes.       1.  Medical devices as detailed. 2. Interval progression of airspace opacities predominantly in the right lower lobe. Clinical correlation suggested.       MACRO: None   Signed by: Ella To 3/25/2025 6:50 AM Dictation workstation:   [id number]    XR chest 1 view    Result Date: 3/25/2025  Interpreted By:  Christian Mendoza, STUDY: XR CHEST 1 VIEW;  3/25/2025 12:49 am   INDICATION: Signs/Symptoms:productive cough and SOB.   COMPARISON: CXR 11/26/2024   ACCESSION NUMBER(S): [id number]   ORDERING CLINICIAN: LELO BECERRA   FINDINGS: Marked cardiomegaly. Cardiomediastinal contours unchanged.   No acute soft tissue or osseous abnormalities. No acute findings in the upper abdomen.   No large pleural effusion or pneumothorax.   Central vascular congestion. Mild prominence of the interstitium.       1. Mild prominence of the interstitium is nonspecific and may represent mild pulmonary edema or viral infectious process. No focal consolidations detected although the retrocardiac space is not well evaluated. 2. Marked cardiomegaly, unchanged.   MACRO: None    Signed by: Christian Mendoza 3/25/2025 1:30 AM Dictation workstation:   [id number]     Assessment:  Acute kidney injury superimposed on chronic kidney disease stage III secondary to sepsis and pneumonia rule out postinfectious glomerulonephritis rule out prerenal etiology  Chronic kidney disease stage IIIb  Acute respiratory failure  Bilateral pneumonia  Diabetes mellitus type 2  Anemia of chronic kidney disease  Hypertension  Hyperlipidemia  Obesity    Recommendations:  Urine sodium  Urine osmolality  Urine protein to creatinine ratio  Ultrasound of the kidneys  Start hydration with normal saline at 100 mL an hour  Continue antibiotic coverage  Check iron stores  Vent support  No indication for dialysis therapy at this time we will continue to monitor urine output renal function and electrolytes very closely.  Discussed case with her son in details all questions were answered  I spent 60 minutes in the critical, professional and overall care of this patient.      Sixto Slater MDInpatient consult to Nephrology  Consult performed by: Sixto Slater MD  Consult ordered by: Charisse Chavez MD

## 2025-03-26 NOTE — CARE PLAN
Pt is intubated and has feeding tube; no extubation today. Per todays note by Dr. Slater, no need for dialysis at this time  List of SNF, AR, and HHC emailed to dtmin Moreno yesterday  Will need choices for SNF/HHC once pt is able to be assessed by PT OT    DISCHARGE PLAN: TBD--DO NOT DISCHARGE PATIENT BEFORE SPEAKING TO CARE COORDINATION; PT DOES NOT HAVE A DISCHARGE PLAN IN PLACE AT THIS TIME

## 2025-03-26 NOTE — NURSING NOTE
Wound Wednesday full head to toe skin assessment completed. No wounds observed. No photos to update. Patient on waffle mattress. Sacral and heel protectors in place. Q2 turns in place.

## 2025-03-26 NOTE — PROGRESS NOTES
Spiritual Care Visit  Spiritual Care Request    Reason for Visit:  Routine Visit: Introduction     Request Received From:       Focus of Care:  Visited With: Patient, Family         Refer to :          Spiritual Care Assessment    Spiritual Assessment:                      Care Provided:  Intended Effects: Establish rapport and connectedness, Build relationship of care and support, Convey a calming presence  Methods: Offer spiritual/Taoist support    Sense of Community and or Judaism Affiliation:  Pentecostalism         Addressed Needs/Concerns and/or Sergo Through:  Judaism Encounters  Judaism Needs: Prayer, Literature       Outcome:  Outcome of Spiritual Care Visit: Identifying spiritual/emotional issues, Comfort/healing presence     Advance Directives:         Spiritual Care Annotation        Annotation: Patient received a visit from the Spiritual care volunteer while admitted.  This patient was offered emotional and spiritual support no other needs were expressed. Spiritual care will remain available for support as requested.     Volunter Initial : Hira ALVARADO    Reviewed and Submitted by Chaplain Oconnor

## 2025-03-26 NOTE — PROGRESS NOTES
"Nutrition Follow up Note    Nutrition Assessment      Unable to extubate yesterday, not likely again today. Critical care team requests tube feed recommendations at this time.    Nutrition History:  Food and Nutrient History: NPO/vent     Anthropometrics:  Ht: 162.6 cm (5' 4.02\"), Wt: 137 kg (302 lb 0.5 oz), BMI: 51.82  IBW/kg (Dietitian Calculated): 54.54 kg  Percent of IBW: 246 %     Weight Change:  Daily Weight  03/26/25 : 137 kg (302 lb 0.5 oz)  03/16/25 : 132 kg (291 lb)  02/10/25 : 139 kg (305 lb 9.6 oz)  01/30/25 : 134 kg (296 lb)  01/27/25 : 135 kg (297 lb 1.6 oz)  01/15/25 : 132 kg (291 lb)  12/11/24 : 127 kg (281 lb)  11/26/24 : 132 kg (291 lb)  10/23/24 : 132 kg (291 lb)  09/29/24 : 127 kg (281 lb)     Nutrition Focused Physical Exam Findings: defer: severe obesity, vent     Nutrition Significant Labs:  Lab Results   Component Value Date    WBC 9.4 03/26/2025    HGB 9.9 (L) 03/26/2025    HCT 32.8 (L) 03/26/2025     03/26/2025    CHOL 128 06/21/2023    TRIG 72 06/21/2023    HDL 45.1 06/21/2023    ALT 7 03/25/2025    AST 10 03/25/2025     (L) 03/26/2025    K 5.1 03/26/2025     03/26/2025    CREATININE 3.47 (H) 03/26/2025    BUN 41 (H) 03/26/2025    CO2 23 03/26/2025    TSH 11.37 (H) 03/25/2025    HGBA1C 5.8 (H) 03/25/2025     Nutrition Specific Medications:  allopurinol, 50 mg, oral, Every other day  amLODIPine, 10 mg, oral, Daily  aspirin, 81 mg, oral, Daily  azithromycin, 500 mg, intravenous, q24h  pantoprazole, 40 mg, oral, Daily before breakfast   Or  esomeprazole, 40 mg, nasoduodenal tube, Daily before breakfast   Or  pantoprazole, 40 mg, intravenous, Daily before breakfast  ezetimibe, 10 mg, oral, Daily  fluticasone, 2 spray, Each Nostril, BID  gabapentin, 100 mg, oral, TID  heparin (porcine), 7,500 Units, subcutaneous, q8h JAYA  hydrALAZINE, 50 mg, oral, BID  insulin lispro, 0-10 Units, subcutaneous, q4h  ipratropium-albuteroL, 3 mL, nebulization, q6h  levothyroxine, 200 mcg, oral, " Daily  levothyroxine, 200 mcg, oral, Every Wednesday  methylPREDNISolone sodium succinate (PF), 40 mg, intravenous, Daily  [Held by provider] metoprolol succinate XL, 100 mg, oral, Daily  metoprolol tartrate, 50 mg, oral, BID  montelukast, 10 mg, oral, Nightly  nystatin, 1 Application, Topical, TID  oxygen, , inhalation, Continuous - Inhalation  polyethylene glycol, 17 g, oral, Daily  pravastatin, 40 mg, oral, Nightly  sertraline, 50 mg, oral, Daily      fentaNYL, 0-300 mcg/hr, Last Rate: 100 mcg/hr (03/26/25 0400)  propofol, 5-50 mcg/kg/min, Last Rate: 10 mcg/kg/min (03/26/25 0722)      Propofol @ 7.92 ml/hr provides: 209 kcals/day    Dietary Orders (From admission, onward)       Start     Ordered    03/25/25 0812  May Participate in Room Service  ( ROOM SERVICE MAY PARTICIPATE)  Once        Question:  .  Answer:  Yes    03/25/25 0811    03/25/25 0531  NPO Diet Except: Sips with meds; Effective now  Diet effective now        Question:  Except:  Answer:  Sips with meds    03/25/25 0530                   Estimated Needs:   Estimated Energy Needs  Total Energy Estimated Needs in 24 hours (kCal):  (7910-7801)  Energy Estimated Needs per kg Body Weight in 24 hours (kCal/kg):  (11-14)  Method for Estimating Needs: Actual Wt    Estimated Protein Needs  Total Protein Estimated Needs in 24 Hours (g):  ()  Protein Estimated Needs per kg Body Weight in 24 Hours (g/kg):  (1.5-2.5)  Method for Estimating 24 Hour Protein Needs: IBW    Estimated Fluid Needs  Total Fluid Estimated Needs in 24 Hours (mL):  (5102-3481)  Method for Estimating 24 Hour Fluid Needs: 1 ml/kcal or per MD      Nutrition Diagnosis   Nutrition Diagnosis:  Malnutrition Diagnosis  Patient has Malnutrition Diagnosis: No    Nutrition Diagnosis  Patient has Nutrition Diagnosis: Yes  Diagnosis Status (1): Active  Nutrition Diagnosis 1: Inadequate oral intake  Related to (1): decreased ability to consume sufficient energy  As Evidenced by (1):  NPO/Mechanical Ventilation     Nutrition Interventions/Recommendations   Nutrition Interventions and Recommendations:  Nutrition Prescription: Nutrition prescription for enteral nutrition    Nutrition Recommendations:  Individualized Nutrition Prescription Provided for : 6404-5222 calories,  gm protein    Nutrition Interventions/Goals:   Food and/or Nutrient Delivery Interventions  Interventions: Enteral intake  Enteral Intake: Management of composition of enteral nutrition  Goal: If tube feed is initiated, recommend: Vital High Protein goal rate @55 mL/Hr to provide 1320 calories (1529 kcals with current rate of sedation), 115 gm protein, 1104 mL free water. Suggest start @20 mL/Hr and increase by 10 mL Q4H until goal. Recommend additional 100 mL water flushes Q4H to meet estimated hydration needs    Education Documentation  No documentation found.         Nutrition Monitoring and Evaluation   Monitoring/Evaluation:   Food/Nutrient Related History Monitoring  Monitoring and Evaluation Plan: Enteral and parenteral nutrition intake determination  Estimated Energy Intake: Other criteria  Criteria: monitoring for tube feed initiation    Goal Status: Some progress toward goal(s)    Follow Up  Time Spent (min): 25 minutes  Last Date of Nutrition Visit: 03/26/25  Nutrition Follow-Up Needed?: 3-5 days  Follow up Comment: 3/28/25

## 2025-03-26 NOTE — CARE PLAN
Problem: Respiratory  Goal: Tolerate mechanical ventilation evidenced by VS/agitation level this shift  Outcome: Progressing

## 2025-03-26 NOTE — CARE PLAN
The patient's goals for the shift include      The clinical goals for the shift include pt to remain hemodynamocally stable    Over the shift, the patient did not make progress toward the following goals. Barriers to progression include . Recommendations to address these barriers include .

## 2025-03-27 ENCOUNTER — APPOINTMENT (OUTPATIENT)
Dept: RADIOLOGY | Facility: HOSPITAL | Age: 75
DRG: 871 | End: 2025-03-27
Payer: MEDICARE

## 2025-03-27 LAB
ALBUMIN SERPL BCP-MCNC: 3.1 G/DL (ref 3.4–5)
ALBUMIN SERPL BCP-MCNC: 3.4 G/DL (ref 3.4–5)
ANION GAP SERPL CALCULATED.3IONS-SCNC: 15 MMOL/L (ref 10–20)
ANION GAP SERPL CALCULATED.3IONS-SCNC: 16 MMOL/L (ref 10–20)
BASOPHILS # BLD AUTO: 0.02 X10*3/UL (ref 0–0.1)
BASOPHILS NFR BLD AUTO: 0.2 %
BUN SERPL-MCNC: 52 MG/DL (ref 6–23)
BUN SERPL-MCNC: 53 MG/DL (ref 6–23)
CALCIUM SERPL-MCNC: 7.9 MG/DL (ref 8.6–10.3)
CALCIUM SERPL-MCNC: 7.9 MG/DL (ref 8.6–10.3)
CHLORIDE SERPL-SCNC: 96 MMOL/L (ref 98–107)
CHLORIDE SERPL-SCNC: 98 MMOL/L (ref 98–107)
CO2 SERPL-SCNC: 22 MMOL/L (ref 21–32)
CO2 SERPL-SCNC: 23 MMOL/L (ref 21–32)
CREAT SERPL-MCNC: 4.29 MG/DL (ref 0.5–1.05)
CREAT SERPL-MCNC: 4.43 MG/DL (ref 0.5–1.05)
EGFRCR SERPLBLD CKD-EPI 2021: 10 ML/MIN/1.73M*2
EGFRCR SERPLBLD CKD-EPI 2021: 10 ML/MIN/1.73M*2
EOSINOPHIL # BLD AUTO: 0.01 X10*3/UL (ref 0–0.4)
EOSINOPHIL NFR BLD AUTO: 0.1 %
ERYTHROCYTE [DISTWIDTH] IN BLOOD BY AUTOMATED COUNT: 19.9 % (ref 11.5–14.5)
GLUCOSE BLD MANUAL STRIP-MCNC: 127 MG/DL (ref 74–99)
GLUCOSE BLD MANUAL STRIP-MCNC: 140 MG/DL (ref 74–99)
GLUCOSE BLD MANUAL STRIP-MCNC: 166 MG/DL (ref 74–99)
GLUCOSE BLD MANUAL STRIP-MCNC: 187 MG/DL (ref 74–99)
GLUCOSE BLD MANUAL STRIP-MCNC: 96 MG/DL (ref 74–99)
GLUCOSE SERPL-MCNC: 145 MG/DL (ref 74–99)
GLUCOSE SERPL-MCNC: 99 MG/DL (ref 74–99)
HCT VFR BLD AUTO: 30.7 % (ref 36–46)
HGB BLD-MCNC: 9.4 G/DL (ref 12–16)
IMM GRANULOCYTES # BLD AUTO: 0.05 X10*3/UL (ref 0–0.5)
IMM GRANULOCYTES NFR BLD AUTO: 0.5 % (ref 0–0.9)
LYMPHOCYTES # BLD AUTO: 1.55 X10*3/UL (ref 0.8–3)
LYMPHOCYTES NFR BLD AUTO: 15.7 %
MAGNESIUM SERPL-MCNC: 2.18 MG/DL (ref 1.6–2.4)
MAGNESIUM SERPL-MCNC: 2.19 MG/DL (ref 1.6–2.4)
MCH RBC QN AUTO: 22.8 PG (ref 26–34)
MCHC RBC AUTO-ENTMCNC: 30.6 G/DL (ref 32–36)
MCV RBC AUTO: 75 FL (ref 80–100)
MONOCYTES # BLD AUTO: 0.95 X10*3/UL (ref 0.05–0.8)
MONOCYTES NFR BLD AUTO: 9.6 %
NEUTROPHILS # BLD AUTO: 7.32 X10*3/UL (ref 1.6–5.5)
NEUTROPHILS NFR BLD AUTO: 73.9 %
NRBC BLD-RTO: 0 /100 WBCS (ref 0–0)
PHOSPHATE SERPL-MCNC: 5.3 MG/DL (ref 2.5–4.9)
PHOSPHATE SERPL-MCNC: 6.3 MG/DL (ref 2.5–4.9)
PLATELET # BLD AUTO: 196 X10*3/UL (ref 150–450)
POTASSIUM SERPL-SCNC: 5.2 MMOL/L (ref 3.5–5.3)
POTASSIUM SERPL-SCNC: 5.4 MMOL/L (ref 3.5–5.3)
POTASSIUM SERPL-SCNC: 6 MMOL/L (ref 3.5–5.3)
RBC # BLD AUTO: 4.12 X10*6/UL (ref 4–5.2)
SODIUM SERPL-SCNC: 129 MMOL/L (ref 136–145)
SODIUM SERPL-SCNC: 131 MMOL/L (ref 136–145)
WBC # BLD AUTO: 9.9 X10*3/UL (ref 4.4–11.3)

## 2025-03-27 PROCEDURE — 84132 ASSAY OF SERUM POTASSIUM: CPT | Performed by: INTERNAL MEDICINE

## 2025-03-27 PROCEDURE — 99291 CRITICAL CARE FIRST HOUR: CPT | Performed by: INTERNAL MEDICINE

## 2025-03-27 PROCEDURE — 82947 ASSAY GLUCOSE BLOOD QUANT: CPT

## 2025-03-27 PROCEDURE — 97166 OT EVAL MOD COMPLEX 45 MIN: CPT | Mod: GO

## 2025-03-27 PROCEDURE — 36415 COLL VENOUS BLD VENIPUNCTURE: CPT

## 2025-03-27 PROCEDURE — 71045 X-RAY EXAM CHEST 1 VIEW: CPT

## 2025-03-27 PROCEDURE — 83735 ASSAY OF MAGNESIUM: CPT

## 2025-03-27 PROCEDURE — 94003 VENT MGMT INPAT SUBQ DAY: CPT

## 2025-03-27 PROCEDURE — 71045 X-RAY EXAM CHEST 1 VIEW: CPT | Performed by: STUDENT IN AN ORGANIZED HEALTH CARE EDUCATION/TRAINING PROGRAM

## 2025-03-27 PROCEDURE — 2500000001 HC RX 250 WO HCPCS SELF ADMINISTERED DRUGS (ALT 637 FOR MEDICARE OP)

## 2025-03-27 PROCEDURE — 85025 COMPLETE CBC W/AUTO DIFF WBC: CPT

## 2025-03-27 PROCEDURE — 2500000005 HC RX 250 GENERAL PHARMACY W/O HCPCS: Performed by: INTERNAL MEDICINE

## 2025-03-27 PROCEDURE — 2500000002 HC RX 250 W HCPCS SELF ADMINISTERED DRUGS (ALT 637 FOR MEDICARE OP, ALT 636 FOR OP/ED)

## 2025-03-27 PROCEDURE — 9420000001 HC RT PATIENT EDUCATION 5 MIN

## 2025-03-27 PROCEDURE — 84100 ASSAY OF PHOSPHORUS: CPT

## 2025-03-27 PROCEDURE — 2500000005 HC RX 250 GENERAL PHARMACY W/O HCPCS

## 2025-03-27 PROCEDURE — 2500000004 HC RX 250 GENERAL PHARMACY W/ HCPCS (ALT 636 FOR OP/ED)

## 2025-03-27 PROCEDURE — 2020000001 HC ICU ROOM DAILY

## 2025-03-27 PROCEDURE — 80069 RENAL FUNCTION PANEL: CPT

## 2025-03-27 PROCEDURE — 97161 PT EVAL LOW COMPLEX 20 MIN: CPT | Mod: GP | Performed by: PHYSICAL THERAPIST

## 2025-03-27 PROCEDURE — 94640 AIRWAY INHALATION TREATMENT: CPT

## 2025-03-27 PROCEDURE — 2500000004 HC RX 250 GENERAL PHARMACY W/ HCPCS (ALT 636 FOR OP/ED): Performed by: INTERNAL MEDICINE

## 2025-03-27 RX ORDER — LABETALOL HYDROCHLORIDE 5 MG/ML
10 INJECTION, SOLUTION INTRAVENOUS ONCE
Status: COMPLETED | OUTPATIENT
Start: 2025-03-27 | End: 2025-03-27

## 2025-03-27 RX ORDER — DEXTROSE 50 % IN WATER (D50W) INTRAVENOUS SYRINGE
25 ONCE
Status: COMPLETED | OUTPATIENT
Start: 2025-03-27 | End: 2025-03-27

## 2025-03-27 RX ORDER — FUROSEMIDE 10 MG/ML
80 INJECTION INTRAMUSCULAR; INTRAVENOUS ONCE
Status: COMPLETED | OUTPATIENT
Start: 2025-03-27 | End: 2025-03-27

## 2025-03-27 RX ORDER — CALCIUM GLUCONATE 20 MG/ML
2 INJECTION, SOLUTION INTRAVENOUS ONCE
Status: COMPLETED | OUTPATIENT
Start: 2025-03-27 | End: 2025-03-27

## 2025-03-27 RX ADMIN — DEXTROSE MONOHYDRATE 500 MG: 50 INJECTION, SOLUTION INTRAVENOUS at 05:00

## 2025-03-27 RX ADMIN — IPRATROPIUM BROMIDE AND ALBUTEROL SULFATE 3 ML: 2.5; .5 SOLUTION RESPIRATORY (INHALATION) at 20:29

## 2025-03-27 RX ADMIN — Medication 40 PERCENT: at 08:16

## 2025-03-27 RX ADMIN — CALCIUM GLUCONATE 2 G: 20 INJECTION, SOLUTION INTRAVENOUS at 20:31

## 2025-03-27 RX ADMIN — HYDRALAZINE HYDROCHLORIDE 50 MG: 50 TABLET ORAL at 20:31

## 2025-03-27 RX ADMIN — IPRATROPIUM BROMIDE AND ALBUTEROL SULFATE 3 ML: 2.5; .5 SOLUTION RESPIRATORY (INHALATION) at 08:16

## 2025-03-27 RX ADMIN — SODIUM CHLORIDE 100 ML/HR: 450 INJECTION, SOLUTION INTRAVENOUS at 07:27

## 2025-03-27 RX ADMIN — FLUTICASONE PROPIONATE 2 SPRAY: 50 SPRAY, METERED NASAL at 21:24

## 2025-03-27 RX ADMIN — Medication 4 L/MIN: at 20:29

## 2025-03-27 RX ADMIN — DEXTROSE MONOHYDRATE 25 G: 25 INJECTION, SOLUTION INTRAVENOUS at 20:30

## 2025-03-27 RX ADMIN — GABAPENTIN 100 MG: 100 CAPSULE ORAL at 20:31

## 2025-03-27 RX ADMIN — PROPOFOL 10 MCG/KG/MIN: 10 INJECTION, EMULSION INTRAVENOUS at 09:23

## 2025-03-27 RX ADMIN — METHYLPREDNISOLONE SODIUM SUCCINATE 40 MG: 40 INJECTION, POWDER, FOR SOLUTION INTRAMUSCULAR; INTRAVENOUS at 09:04

## 2025-03-27 RX ADMIN — SODIUM ZIRCONIUM CYCLOSILICATE 10 G: 10 POWDER, FOR SUSPENSION ORAL at 20:31

## 2025-03-27 RX ADMIN — ALLOPURINOL 50 MG: 100 TABLET ORAL at 09:00

## 2025-03-27 RX ADMIN — LEVOTHYROXINE SODIUM 200 MCG: 0.1 TABLET ORAL at 05:28

## 2025-03-27 RX ADMIN — FUROSEMIDE 80 MG: 10 INJECTION, SOLUTION INTRAMUSCULAR; INTRAVENOUS at 10:10

## 2025-03-27 RX ADMIN — IPRATROPIUM BROMIDE AND ALBUTEROL SULFATE 3 ML: 2.5; .5 SOLUTION RESPIRATORY (INHALATION) at 12:01

## 2025-03-27 RX ADMIN — FLUTICASONE PROPIONATE 2 SPRAY: 50 SPRAY, METERED NASAL at 09:00

## 2025-03-27 RX ADMIN — NYSTATIN 1 APPLICATION: 100000 POWDER TOPICAL at 21:24

## 2025-03-27 RX ADMIN — NYSTATIN 1 APPLICATION: 100000 POWDER TOPICAL at 09:04

## 2025-03-27 RX ADMIN — Medication 75 MCG/HR: at 07:28

## 2025-03-27 RX ADMIN — HEPARIN SODIUM 7500 UNITS: 5000 INJECTION INTRAVENOUS; SUBCUTANEOUS at 21:16

## 2025-03-27 RX ADMIN — GABAPENTIN 100 MG: 100 CAPSULE ORAL at 09:04

## 2025-03-27 RX ADMIN — MONTELUKAST 10 MG: 10 TABLET, FILM COATED ORAL at 20:31

## 2025-03-27 RX ADMIN — METOPROLOL TARTRATE 50 MG: 50 TABLET, FILM COATED ORAL at 20:31

## 2025-03-27 RX ADMIN — PROPOFOL 10 MCG/KG/MIN: 10 INJECTION, EMULSION INTRAVENOUS at 02:55

## 2025-03-27 RX ADMIN — PANTOPRAZOLE SODIUM 40 MG: 40 INJECTION, POWDER, FOR SOLUTION INTRAVENOUS at 07:27

## 2025-03-27 RX ADMIN — LABETALOL HYDROCHLORIDE 10 MG: 5 INJECTION INTRAVENOUS at 14:57

## 2025-03-27 RX ADMIN — IPRATROPIUM BROMIDE AND ALBUTEROL SULFATE 3 ML: 2.5; .5 SOLUTION RESPIRATORY (INHALATION) at 00:27

## 2025-03-27 RX ADMIN — HEPARIN SODIUM 7500 UNITS: 5000 INJECTION INTRAVENOUS; SUBCUTANEOUS at 13:55

## 2025-03-27 RX ADMIN — FUROSEMIDE 80 MG: 10 INJECTION, SOLUTION INTRAMUSCULAR; INTRAVENOUS at 21:16

## 2025-03-27 RX ADMIN — INSULIN LISPRO 2 UNITS: 100 INJECTION, SOLUTION INTRAVENOUS; SUBCUTANEOUS at 20:36

## 2025-03-27 RX ADMIN — HEPARIN SODIUM 7500 UNITS: 5000 INJECTION INTRAVENOUS; SUBCUTANEOUS at 05:28

## 2025-03-27 RX ADMIN — INSULIN HUMAN 10 UNITS: 100 INJECTION, SOLUTION PARENTERAL at 20:30

## 2025-03-27 RX ADMIN — SERTRALINE HYDROCHLORIDE 50 MG: 50 TABLET ORAL at 09:04

## 2025-03-27 RX ADMIN — EZETIMIBE 10 MG: 10 TABLET ORAL at 09:00

## 2025-03-27 RX ADMIN — PRAVASTATIN SODIUM 40 MG: 20 TABLET ORAL at 20:31

## 2025-03-27 RX ADMIN — POLYETHYLENE GLYCOL 3350 17 G: 17 POWDER, FOR SOLUTION ORAL at 09:00

## 2025-03-27 ASSESSMENT — ACTIVITIES OF DAILY LIVING (ADL)
BATHING_ASSISTANCE: MAXIMAL
ADL_ASSISTANCE: NEEDS ASSISTANCE

## 2025-03-27 ASSESSMENT — COGNITIVE AND FUNCTIONAL STATUS - GENERAL
DRESSING REGULAR UPPER BODY CLOTHING: TOTAL
DRESSING REGULAR LOWER BODY CLOTHING: TOTAL
MOVING TO AND FROM BED TO CHAIR: TOTAL
MOBILITY SCORE: 7
MOVING FROM LYING ON BACK TO SITTING ON SIDE OF FLAT BED WITH BEDRAILS: A LOT
DAILY ACTIVITIY SCORE: 7
CLIMB 3 TO 5 STEPS WITH RAILING: TOTAL
EATING MEALS: A LOT
TOILETING: TOTAL
STANDING UP FROM CHAIR USING ARMS: TOTAL
WALKING IN HOSPITAL ROOM: TOTAL
TURNING FROM BACK TO SIDE WHILE IN FLAT BAD: TOTAL
PERSONAL GROOMING: TOTAL
HELP NEEDED FOR BATHING: TOTAL

## 2025-03-27 ASSESSMENT — PAIN SCALES - GENERAL
PAINLEVEL_OUTOF10: 0 - NO PAIN

## 2025-03-27 ASSESSMENT — PAIN - FUNCTIONAL ASSESSMENT
PAIN_FUNCTIONAL_ASSESSMENT: CPOT (CRITICAL CARE PAIN OBSERVATION TOOL)
PAIN_FUNCTIONAL_ASSESSMENT: 0-10
PAIN_FUNCTIONAL_ASSESSMENT: 0-10

## 2025-03-27 ASSESSMENT — PAIN SCALES - WONG BAKER: WONGBAKER_NUMERICALRESPONSE: NO HURT

## 2025-03-27 NOTE — PROGRESS NOTES
"Analia Murray is a 74 y.o. female on day 2 of admission presenting with Acute respiratory failure with hypoxia (Multi).    Subjective    Patient seen for acute renal failure on top of chronic kidney disease stage 3 admitted with pneumonia acute respiratory failure currently on the ventilator is awake and responsive and follows verbal commands she is not on any pressors patient has an external catheter urine output is not accurate according to the nursing staff there is urine in the bed so there is no accurate I's and O's at this time    Objective     Physical Exam  Constitutional:       General: She is not in acute distress.     Appearance: Normal appearance. She is not toxic-appearing.      Comments: Patient is intubated on the ventilator and awake   Neck:      Vascular: No carotid bruit.   Cardiovascular:      Heart sounds: No murmur heard.     No friction rub. No gallop.   Pulmonary:      Breath sounds: No wheezing, rhonchi or rales.   Abdominal:      Tenderness: There is no abdominal tenderness. There is no right CVA tenderness, left CVA tenderness or rebound.   Musculoskeletal:         General: No tenderness.      Cervical back: Neck supple.      Right lower leg: No edema.      Left lower leg: No edema.   Lymphadenopathy:      Cervical: No cervical adenopathy.         Last Recorded Vitals  Blood pressure 94/51, pulse 60, temperature 35.8 °C (96.4 °F), temperature source Temporal, resp. rate 20, height 1.626 m (5' 4.02\"), weight 137 kg (302 lb 0.5 oz), SpO2 98%.    Intake/Output last 3 Shifts:  I/O last 3 completed shifts:  In: 3647.7 (26.6 mL/kg) [I.V.:2033.5 (14.8 mL/kg); NG/GT:610; IV Piggyback:1004.2]  Out: 0 (0 mL/kg)   Weight: 137 kg     Current Facility-Administered Medications:     allopurinol (Zyloprim) tablet 50 mg, 50 mg, oral, Every other day, Sanjiv E Hipps, PA-C, 50 mg at 03/27/25 0900    amLODIPine (Norvasc) tablet 10 mg, 10 mg, oral, Daily, Sanjiv E Hipps, PA-C, 10 mg at 03/25/25 0827    " aspirin EC tablet 81 mg, 81 mg, oral, Daily, Sanjiv E Hipps, PA-C, 81 mg at 03/26/25 0855    dextrose 50 % injection 12.5 g, 12.5 g, intravenous, q15 min PRN, Sanjiv E Hipps, PA-C    dextrose 50 % injection 25 g, 25 g, intravenous, q15 min PRN, Sanjiv E Hipps, PA-C    pantoprazole (ProtoNix) EC tablet 40 mg, 40 mg, oral, Daily before breakfast **OR** esomeprazole (NexIUM) suspension 40 mg, 40 mg, nasoduodenal tube, Daily before breakfast **OR** pantoprazole (ProtoNix) injection 40 mg, 40 mg, intravenous, Daily before breakfast, Sanjiv E Hipps, PA-C, 40 mg at 03/27/25 0727    ezetimibe (Zetia) tablet 10 mg, 10 mg, oral, Daily, Sanjiv E Hipps, PA-C, 10 mg at 03/27/25 0900    fentanyl (Sublimaze) 10 mcg/mL in sodium chloride 0.9% infusion, 0-300 mcg/hr, intravenous, Continuous, Sanjiv E Hipps, PA-C, Last Rate: 7.5 mL/hr at 03/27/25 0728, 75 mcg/hr at 03/27/25 0728    fentaNYL bolus from bag 25 mcg, 25 mcg, intravenous, q10 min PRN, Sanjiv E Hipps, PA-C    fluticasone (Flonase) nasal spray 2 spray, 2 spray, Each Nostril, BID, Sanijv E Hipps, PA-C, 2 spray at 03/27/25 0900    gabapentin (Neurontin) capsule 100 mg, 100 mg, oral, TID, Sanjiv E Hipps, PA-C, 100 mg at 03/27/25 0904    glucagon (Glucagen) injection 1 mg, 1 mg, intramuscular, q15 min PRN, Sanjiv E Hipps, PA-C    glucagon (Glucagen) injection 1 mg, 1 mg, intramuscular, q15 min PRN, Sanjiv E Hipps, PA-C    heparin (porcine) injection 7,500 Units, 7,500 Units, subcutaneous, q8h JAYA, Sanjiv E Hipps, PA-C, 7,500 Units at 03/27/25 0528    hydrALAZINE (Apresoline) tablet 50 mg, 50 mg, oral, BID, Sanjiv E Hipps, PA-C, 50 mg at 03/26/25 0854    insulin lispro injection 0-10 Units, 0-10 Units, subcutaneous, q4h, Sanjiv E Marcoss, PA-C, 2 Units at 03/26/25 1705    ipratropium-albuteroL (Duo-Neb) 0.5-2.5 mg/3 mL nebulizer solution 3 mL, 3 mL, nebulization, q6h, Sanjiv E Marcoss, PA-C, 3 mL at 03/27/25 0816    levothyroxine (Synthroid, Levoxyl) tablet 200 mcg, 200 mcg, oral, Daily, Sanjiv Neil, PA-C, 200  mcg at 03/27/25 0528    levothyroxine (Synthroid, Levoxyl) tablet 200 mcg, 200 mcg, oral, Every Wednesday, Charisse Chavez MD    methylPREDNISolone sod succinate (SOLU-Medrol) 40 mg/mL injection 40 mg, 40 mg, intravenous, Daily, Sanjiv Neil, PA-C, 40 mg at 03/27/25 0904    [Held by provider] metoprolol succinate XL (Toprol-XL) 24 hr tablet 100 mg, 100 mg, oral, Daily, Sanjiv Neil, PA-C    metoprolol tartrate (Lopressor) tablet 50 mg, 50 mg, oral, BID, Sanjiv Neil, PA-C, 50 mg at 03/25/25 2108    montelukast (Singulair) tablet 10 mg, 10 mg, oral, Nightly, Sanjiv Neil, PA-C, 10 mg at 03/26/25 2003    [Held by provider] nitroglycerin (Nitrostat) SL tablet 0.4 mg, 0.4 mg, sublingual, q5 min PRN, Sanjiv Neil PA-C    nystatin (Mycostatin) 100,000 unit/gram powder 1 Application, 1 Application, Topical, TID, Sanjiv Neil PA-C, 1 Application at 03/27/25 0904    oxygen (O2) therapy, , inhalation, Continuous - Inhalation, Charisse Chavez MD, 40 percent at 03/27/25 0816    polyethylene glycol (Glycolax, Miralax) packet 17 g, 17 g, oral, Daily, Sanjiv Neil, PA-C, 17 g at 03/27/25 0900    pravastatin (Pravachol) tablet 40 mg, 40 mg, oral, Nightly, Sanjiv Neil, PA-C, 40 mg at 03/26/25 2002    propofol (Diprivan) infusion, 5-50 mcg/kg/min, intravenous, Continuous, Sanjiv Neil PA-C, Last Rate: 11.88 mL/hr at 03/27/25 0541, 15 mcg/kg/min at 03/27/25 0541    sertraline (Zoloft) tablet 50 mg, 50 mg, oral, Daily, Sanjiv Neil, PA-C, 50 mg at 03/27/25 0904    sodium chloride 0.45 % infusion, 100 mL/hr, intravenous, Continuous, Sixto Slater MD, Last Rate: 100 mL/hr at 03/27/25 0727, 100 mL/hr at 03/27/25 0727    sulfur hexafluoride microsphr (Lumason) injection 24.28 mg, 2 mL, intravenous, Once in imaging, Sanjiv Neil PA-C   Relevant Results    Results for orders placed or performed during the hospital encounter of 03/25/25 (from the past 96 hours)   CBC and Auto Differential   Result Value Ref Range    WBC 10.5 4.4 -  11.3 x10*3/uL    nRBC 0.0 0.0 - 0.0 /100 WBCs    RBC 4.45 4.00 - 5.20 x10*6/uL    Hemoglobin 10.4 (L) 12.0 - 16.0 g/dL    Hematocrit 35.8 (L) 36.0 - 46.0 %    MCV 80 80 - 100 fL    MCH 23.4 (L) 26.0 - 34.0 pg    MCHC 29.1 (L) 32.0 - 36.0 g/dL    RDW 20.0 (H) 11.5 - 14.5 %    Platelets 170 150 - 450 x10*3/uL    Neutrophils % 71.3 40.0 - 80.0 %    Immature Granulocytes %, Automated 0.5 0.0 - 0.9 %    Lymphocytes % 11.1 13.0 - 44.0 %    Monocytes % 9.8 2.0 - 10.0 %    Eosinophils % 7.0 0.0 - 6.0 %    Basophils % 0.3 0.0 - 2.0 %    Neutrophils Absolute 7.49 (H) 1.60 - 5.50 x10*3/uL    Immature Granulocytes Absolute, Automated 0.05 0.00 - 0.50 x10*3/uL    Lymphocytes Absolute 1.17 0.80 - 3.00 x10*3/uL    Monocytes Absolute 1.03 (H) 0.05 - 0.80 x10*3/uL    Eosinophils Absolute 0.73 (H) 0.00 - 0.40 x10*3/uL    Basophils Absolute 0.03 0.00 - 0.10 x10*3/uL   Comprehensive metabolic panel   Result Value Ref Range    Glucose 141 (H) 74 - 99 mg/dL    Sodium 138 136 - 145 mmol/L    Potassium 4.5 3.5 - 5.3 mmol/L    Chloride 103 98 - 107 mmol/L    Bicarbonate 27 21 - 32 mmol/L    Anion Gap 13 10 - 20 mmol/L    Urea Nitrogen 30 (H) 6 - 23 mg/dL    Creatinine 2.32 (H) 0.50 - 1.05 mg/dL    eGFR 21 (L) >60 mL/min/1.73m*2    Calcium 8.6 8.6 - 10.3 mg/dL    Albumin 3.6 3.4 - 5.0 g/dL    Alkaline Phosphatase 90 33 - 136 U/L    Total Protein 7.7 6.4 - 8.2 g/dL    AST 10 9 - 39 U/L    Bilirubin, Total 0.5 0.0 - 1.2 mg/dL    ALT 7 7 - 45 U/L   B-type natriuretic peptide   Result Value Ref Range     (H) 0 - 99 pg/mL   Sars-CoV-2, Influenza A/B and RSV PCR   Result Value Ref Range    Coronavirus 2019, PCR Not Detected Not Detected    Flu A Result Not Detected Not Detected    Flu B Result Not Detected Not Detected    RSV PCR Not Detected Not Detected   Troponin I, High Sensitivity, Initial   Result Value Ref Range    Troponin I, High Sensitivity 14 (H) 0 - 13 ng/L   TSH with reflex to Free T4 if abnormal   Result Value Ref Range     Thyroid Stimulating Hormone 11.37 (H) 0.44 - 3.98 mIU/L   Thyroxine, Free   Result Value Ref Range    Thyroxine, Free 0.86 0.61 - 1.12 ng/dL   Troponin, High Sensitivity, 1 Hour   Result Value Ref Range    Troponin I, High Sensitivity 13 0 - 13 ng/L   BLOOD GAS ARTERIAL FULL PANEL   Result Value Ref Range    POCT pH, Arterial 7.21 (LL) 7.38 - 7.42 pH    POCT pCO2, Arterial 78 (HH) 38 - 42 mm Hg    POCT pO2, Arterial 140 (H) 85 - 95 mm Hg    POCT SO2, Arterial 99 94 - 100 %    POCT Oxy Hemoglobin, Arterial 97.0 94.0 - 98.0 %    POCT Hematocrit Calculated, Arterial 32.0 (L) 36.0 - 46.0 %    POCT Sodium, Arterial 139 136 - 145 mmol/L    POCT Potassium, Arterial 5.0 3.5 - 5.3 mmol/L    POCT Chloride, Arterial 104 98 - 107 mmol/L    POCT Ionized Calcium, Arterial 1.21 1.10 - 1.33 mmol/L    POCT Glucose, Arterial 155 (H) 74 - 99 mg/dL    POCT Lactate, Arterial 0.5 0.4 - 2.0 mmol/L    POCT Base Excess, Arterial 1.8 -2.0 - 3.0 mmol/L    POCT HCO3 Calculated, Arterial 31.2 (H) 22.0 - 26.0 mmol/L    POCT Hemoglobin, Arterial 10.5 (L) 12.0 - 16.0 g/dL    POCT Anion Gap, Arterial 9 (L) 10 - 25 mmo/L    Patient Temperature 37.0 degrees Celsius    FiO2 36 %    Apparatus CANNULA     Site of Arterial Puncture Radial Right     Wes's Test Positive    BLOOD GAS ARTERIAL FULL PANEL   Result Value Ref Range    POCT pH, Arterial 7.22 (LL) 7.38 - 7.42 pH    POCT pCO2, Arterial 72 (HH) 38 - 42 mm Hg    POCT pO2, Arterial 90 85 - 95 mm Hg    POCT SO2, Arterial 97 94 - 100 %    POCT Oxy Hemoglobin, Arterial 94.3 94.0 - 98.0 %    POCT Hematocrit Calculated, Arterial 32.0 (L) 36.0 - 46.0 %    POCT Sodium, Arterial 137 136 - 145 mmol/L    POCT Potassium, Arterial 5.1 3.5 - 5.3 mmol/L    POCT Chloride, Arterial 105 98 - 107 mmol/L    POCT Ionized Calcium, Arterial 1.13 1.10 - 1.33 mmol/L    POCT Glucose, Arterial 160 (H) 74 - 99 mg/dL    POCT Lactate, Arterial 0.5 0.4 - 2.0 mmol/L    POCT Base Excess, Arterial 0.5 -2.0 - 3.0 mmol/L    POCT  HCO3 Calculated, Arterial 29.5 (H) 22.0 - 26.0 mmol/L    POCT Hemoglobin, Arterial 10.6 (L) 12.0 - 16.0 g/dL    POCT Anion Gap, Arterial 8 (L) 10 - 25 mmo/L    Patient Temperature 37.0 degrees Celsius    FiO2 40 %    Apparatus      Ventilator Mode BiPAP     Ventilator Rate 22 bpm    Ipap CMH2O 18.0 cm H2O    Epap CMH2O 5.0 cm H2O    Site of Arterial Puncture Brachial Right     Wes's Test Positive    Hemoglobin A1c   Result Value Ref Range    Hemoglobin A1C 5.8 (H) See comment %    Estimated Average Glucose 120 Not Established mg/dL   CBC and Auto Differential   Result Value Ref Range    WBC 8.4 4.4 - 11.3 x10*3/uL    nRBC 0.0 0.0 - 0.0 /100 WBCs    RBC 4.30 4.00 - 5.20 x10*6/uL    Hemoglobin 9.9 (L) 12.0 - 16.0 g/dL    Hematocrit 34.5 (L) 36.0 - 46.0 %    MCV 80 80 - 100 fL    MCH 23.0 (L) 26.0 - 34.0 pg    MCHC 28.7 (L) 32.0 - 36.0 g/dL    RDW 19.9 (H) 11.5 - 14.5 %    Platelets 147 (L) 150 - 450 x10*3/uL    Neutrophils % 93.9 40.0 - 80.0 %    Immature Granulocytes %, Automated 0.5 0.0 - 0.9 %    Lymphocytes % 3.2 13.0 - 44.0 %    Monocytes % 1.9 2.0 - 10.0 %    Eosinophils % 0.4 0.0 - 6.0 %    Basophils % 0.1 0.0 - 2.0 %    Neutrophils Absolute 7.88 (H) 1.60 - 5.50 x10*3/uL    Immature Granulocytes Absolute, Automated 0.04 0.00 - 0.50 x10*3/uL    Lymphocytes Absolute 0.27 (L) 0.80 - 3.00 x10*3/uL    Monocytes Absolute 0.16 0.05 - 0.80 x10*3/uL    Eosinophils Absolute 0.03 0.00 - 0.40 x10*3/uL    Basophils Absolute 0.01 0.00 - 0.10 x10*3/uL   Renal Function Panel   Result Value Ref Range    Glucose 167 (H) 74 - 99 mg/dL    Sodium 138 136 - 145 mmol/L    Potassium 5.3 3.5 - 5.3 mmol/L    Chloride 104 98 - 107 mmol/L    Bicarbonate 27 21 - 32 mmol/L    Anion Gap 12 10 - 20 mmol/L    Urea Nitrogen 32 (H) 6 - 23 mg/dL    Creatinine 2.42 (H) 0.50 - 1.05 mg/dL    eGFR 20 (L) >60 mL/min/1.73m*2    Calcium 8.5 (L) 8.6 - 10.3 mg/dL    Phosphorus 5.3 (H) 2.5 - 4.9 mg/dL    Albumin 3.4 3.4 - 5.0 g/dL   Magnesium   Result  Value Ref Range    Magnesium 1.91 1.60 - 2.40 mg/dL   BLOOD GAS ARTERIAL FULL PANEL   Result Value Ref Range    POCT pH, Arterial 7.35 (L) 7.38 - 7.42 pH    POCT pCO2, Arterial 49 (H) 38 - 42 mm Hg    POCT pO2, Arterial 352 (H) 85 - 95 mm Hg    POCT SO2, Arterial 100 94 - 100 %    POCT Oxy Hemoglobin, Arterial 97.5 94.0 - 98.0 %    POCT Hematocrit Calculated, Arterial 29.0 (L) 36.0 - 46.0 %    POCT Sodium, Arterial 134 (L) 136 - 145 mmol/L    POCT Potassium, Arterial 5.7 (H) 3.5 - 5.3 mmol/L    POCT Chloride, Arterial 105 98 - 107 mmol/L    POCT Ionized Calcium, Arterial 1.10 1.10 - 1.33 mmol/L    POCT Glucose, Arterial 210 (H) 74 - 99 mg/dL    POCT Lactate, Arterial 0.7 0.4 - 2.0 mmol/L    POCT Base Excess, Arterial 1.1 -2.0 - 3.0 mmol/L    POCT HCO3 Calculated, Arterial 27.1 (H) 22.0 - 26.0 mmol/L    POCT Hemoglobin, Arterial 9.7 (L) 12.0 - 16.0 g/dL    POCT Anion Gap, Arterial 8 (L) 10 - 25 mmo/L    Patient Temperature 37.0 degrees Celsius    FiO2 100 %    Ventilator Mode A/C     Ventilator Rate 18 bpm    Tidal Volume 420 mL    Spontaneous Tidal Volume 421 mL    Peep CHM2O 5.0 cm H2O    Site of Arterial Puncture Radial Right     Wes's Test Positive    POCT GLUCOSE   Result Value Ref Range    POCT Glucose 200 (H) 74 - 99 mg/dL   POCT GLUCOSE   Result Value Ref Range    POCT Glucose 169 (H) 74 - 99 mg/dL   Renal function panel   Result Value Ref Range    Glucose 171 (H) 74 - 99 mg/dL    Sodium 136 136 - 145 mmol/L    Potassium 5.0 3.5 - 5.3 mmol/L    Chloride 103 98 - 107 mmol/L    Bicarbonate 24 21 - 32 mmol/L    Anion Gap 14 10 - 20 mmol/L    Urea Nitrogen 38 (H) 6 - 23 mg/dL    Creatinine 2.76 (H) 0.50 - 1.05 mg/dL    eGFR 17 (L) >60 mL/min/1.73m*2    Calcium 8.0 (L) 8.6 - 10.3 mg/dL    Phosphorus 4.1 2.5 - 4.9 mg/dL    Albumin 3.1 (L) 3.4 - 5.0 g/dL   Magnesium   Result Value Ref Range    Magnesium 2.16 1.60 - 2.40 mg/dL   POCT GLUCOSE   Result Value Ref Range    POCT Glucose 165 (H) 74 - 99 mg/dL   POCT  GLUCOSE   Result Value Ref Range    POCT Glucose 128 (H) 74 - 99 mg/dL   POCT GLUCOSE   Result Value Ref Range    POCT Glucose 142 (H) 74 - 99 mg/dL   BLOOD GAS ARTERIAL FULL PANEL   Result Value Ref Range    POCT pH, Arterial 7.45 (H) 7.38 - 7.42 pH    POCT pCO2, Arterial 36 (L) 38 - 42 mm Hg    POCT pO2, Arterial 102 (H) 85 - 95 mm Hg    POCT SO2, Arterial 100 94 - 100 %    POCT Oxy Hemoglobin, Arterial 98.3 (H) 94.0 - 98.0 %    POCT Hematocrit Calculated, Arterial 27.0 (L) 36.0 - 46.0 %    POCT Sodium, Arterial 132 (L) 136 - 145 mmol/L    POCT Potassium, Arterial 5.9 (H) 3.5 - 5.3 mmol/L    POCT Chloride, Arterial 104 98 - 107 mmol/L    POCT Ionized Calcium, Arterial 1.13 1.10 - 1.33 mmol/L    POCT Glucose, Arterial 146 (H) 74 - 99 mg/dL    POCT Lactate, Arterial 0.9 0.4 - 2.0 mmol/L    POCT Base Excess, Arterial 1.1 -2.0 - 3.0 mmol/L    POCT HCO3 Calculated, Arterial 25.0 22.0 - 26.0 mmol/L    POCT Hemoglobin, Arterial 9.0 (L) 12.0 - 16.0 g/dL    POCT Anion Gap, Arterial 9 (L) 10 - 25 mmo/L    Patient Temperature 37.0 degrees Celsius    FiO2 50 %    Apparatus      Ventilator Mode      Ventilator Rate 20 bpm    Tidal Volume 420 mL    Peep CHM2O 5.0 cm H2O    Site of Arterial Puncture Arterial Line    POCT GLUCOSE   Result Value Ref Range    POCT Glucose 138 (H) 74 - 99 mg/dL   CBC and Auto Differential   Result Value Ref Range    WBC 9.4 4.4 - 11.3 x10*3/uL    nRBC 0.0 0.0 - 0.0 /100 WBCs    RBC 4.29 4.00 - 5.20 x10*6/uL    Hemoglobin 9.9 (L) 12.0 - 16.0 g/dL    Hematocrit 32.8 (L) 36.0 - 46.0 %    MCV 77 (L) 80 - 100 fL    MCH 23.1 (L) 26.0 - 34.0 pg    MCHC 30.2 (L) 32.0 - 36.0 g/dL    RDW 20.0 (H) 11.5 - 14.5 %    Platelets 171 150 - 450 x10*3/uL    Neutrophils % 72.4 40.0 - 80.0 %    Immature Granulocytes %, Automated 0.4 0.0 - 0.9 %    Lymphocytes % 18.0 13.0 - 44.0 %    Monocytes % 9.1 2.0 - 10.0 %    Eosinophils % 0.0 0.0 - 6.0 %    Basophils % 0.1 0.0 - 2.0 %    Neutrophils Absolute 6.80 (H) 1.60 - 5.50  x10*3/uL    Immature Granulocytes Absolute, Automated 0.04 0.00 - 0.50 x10*3/uL    Lymphocytes Absolute 1.69 0.80 - 3.00 x10*3/uL    Monocytes Absolute 0.85 (H) 0.05 - 0.80 x10*3/uL    Eosinophils Absolute 0.00 0.00 - 0.40 x10*3/uL    Basophils Absolute 0.01 0.00 - 0.10 x10*3/uL   Renal Function Panel   Result Value Ref Range    Glucose 158 (H) 74 - 99 mg/dL    Sodium 134 (L) 136 - 145 mmol/L    Potassium 5.1 3.5 - 5.3 mmol/L    Chloride 100 98 - 107 mmol/L    Bicarbonate 23 21 - 32 mmol/L    Anion Gap 16 10 - 20 mmol/L    Urea Nitrogen 41 (H) 6 - 23 mg/dL    Creatinine 3.47 (H) 0.50 - 1.05 mg/dL    eGFR 13 (L) >60 mL/min/1.73m*2    Calcium 8.4 (L) 8.6 - 10.3 mg/dL    Phosphorus 4.4 2.5 - 4.9 mg/dL    Albumin 3.2 (L) 3.4 - 5.0 g/dL   Magnesium   Result Value Ref Range    Magnesium 2.48 (H) 1.60 - 2.40 mg/dL   POCT GLUCOSE   Result Value Ref Range    POCT Glucose 121 (H) 74 - 99 mg/dL   Transthoracic Echo (TTE) Complete   Result Value Ref Range    AV pk clem 1.38 m/s    LVOT diam 2.00 cm    AV mn grad 4 mmHg    MV E/A ratio 1.50     Tricuspid annular plane systolic excursion 2.1 cm    LV Biplane EF 47 %    LV EF 48 %    RV free wall pk S' 9.36 cm/s    RVSP 56.0 mmHg    LVIDd 6.61 cm    AV pk grad 8 mmHg    Aortic Valve Area by Continuity of VTI 1.47 cm2    Aortic Valve Area by Continuity of Peak Velocity 1.35 cm2    LV A4C EF 41.6    POCT GLUCOSE   Result Value Ref Range    POCT Glucose 120 (H) 74 - 99 mg/dL   Renal function panel   Result Value Ref Range    Glucose 176 (H) 74 - 99 mg/dL    Sodium 133 (L) 136 - 145 mmol/L    Potassium 5.1 3.5 - 5.3 mmol/L    Chloride 99 98 - 107 mmol/L    Bicarbonate 23 21 - 32 mmol/L    Anion Gap 16 10 - 20 mmol/L    Urea Nitrogen 48 (H) 6 - 23 mg/dL    Creatinine 4.03 (H) 0.50 - 1.05 mg/dL    eGFR 11 (L) >60 mL/min/1.73m*2    Calcium 8.3 (L) 8.6 - 10.3 mg/dL    Phosphorus 4.8 2.5 - 4.9 mg/dL    Albumin 3.3 (L) 3.4 - 5.0 g/dL   Magnesium   Result Value Ref Range    Magnesium 2.24  1.60 - 2.40 mg/dL   POCT GLUCOSE   Result Value Ref Range    POCT Glucose 193 (H) 74 - 99 mg/dL   POCT GLUCOSE   Result Value Ref Range    POCT Glucose 146 (H) 74 - 99 mg/dL   POCT GLUCOSE   Result Value Ref Range    POCT Glucose 126 (H) 74 - 99 mg/dL   POCT GLUCOSE   Result Value Ref Range    POCT Glucose 96 74 - 99 mg/dL   CBC and Auto Differential   Result Value Ref Range    WBC 9.9 4.4 - 11.3 x10*3/uL    nRBC 0.0 0.0 - 0.0 /100 WBCs    RBC 4.12 4.00 - 5.20 x10*6/uL    Hemoglobin 9.4 (L) 12.0 - 16.0 g/dL    Hematocrit 30.7 (L) 36.0 - 46.0 %    MCV 75 (L) 80 - 100 fL    MCH 22.8 (L) 26.0 - 34.0 pg    MCHC 30.6 (L) 32.0 - 36.0 g/dL    RDW 19.9 (H) 11.5 - 14.5 %    Platelets 196 150 - 450 x10*3/uL    Neutrophils % 73.9 40.0 - 80.0 %    Immature Granulocytes %, Automated 0.5 0.0 - 0.9 %    Lymphocytes % 15.7 13.0 - 44.0 %    Monocytes % 9.6 2.0 - 10.0 %    Eosinophils % 0.1 0.0 - 6.0 %    Basophils % 0.2 0.0 - 2.0 %    Neutrophils Absolute 7.32 (H) 1.60 - 5.50 x10*3/uL    Immature Granulocytes Absolute, Automated 0.05 0.00 - 0.50 x10*3/uL    Lymphocytes Absolute 1.55 0.80 - 3.00 x10*3/uL    Monocytes Absolute 0.95 (H) 0.05 - 0.80 x10*3/uL    Eosinophils Absolute 0.01 0.00 - 0.40 x10*3/uL    Basophils Absolute 0.02 0.00 - 0.10 x10*3/uL   Renal Function Panel   Result Value Ref Range    Glucose 99 74 - 99 mg/dL    Sodium 131 (L) 136 - 145 mmol/L    Potassium 5.2 3.5 - 5.3 mmol/L    Chloride 98 98 - 107 mmol/L    Bicarbonate 23 21 - 32 mmol/L    Anion Gap 15 10 - 20 mmol/L    Urea Nitrogen 53 (H) 6 - 23 mg/dL    Creatinine 4.29 (H) 0.50 - 1.05 mg/dL    eGFR 10 (L) >60 mL/min/1.73m*2    Calcium 7.9 (L) 8.6 - 10.3 mg/dL    Phosphorus 5.3 (H) 2.5 - 4.9 mg/dL    Albumin 3.1 (L) 3.4 - 5.0 g/dL   Magnesium   Result Value Ref Range    Magnesium 2.19 1.60 - 2.40 mg/dL   POCT GLUCOSE   Result Value Ref Range    POCT Glucose 127 (H) 74 - 99 mg/dL       Assessment/Plan   Acute kidney injury superimposed on chronic kidney disease  stage 3   secondary to sepsis and pneumonia rule out postinfectious glomerulonephritis rule out prerenal etiology renal function is worse today she remains oliguric however we do not know exactly how much urine output she is making my plan is to reduce her IV fluids give her 1 dose of IV furosemide 80 mg and straight catheter if urine greater than 250 will leave the catheter and  Chronic kidney disease stage IIIb  Acute respiratory failure plan to continue with the vent and try to wean off the vent slowly  Bilateral pneumonia with IV antibiotics  Diabetes mellitus type 2  Anemia of chronic kidney disease  Hypertension  Hyperlipidemia  Obesity      Sixto Slater MD

## 2025-03-27 NOTE — CARE PLAN
The patient's goals for the shift include      The clinical goals for the shift include pt will remain hemodynamically stable      Problem: Mechanical Ventilation  Goal: Patient Will Maintain Patent Airway  Outcome: Progressing  Goal: Oral health is maintained or improved  Outcome: Progressing  Goal: Tracheostomy will be managed safely  Outcome: Progressing  Goal: ET tube will be managed safely  Outcome: Progressing     Problem: Skin  Goal: Decreased wound size/increased tissue granulation at next dressing change  Outcome: Progressing  Flowsheets (Taken 3/27/2025 0047)  Decreased wound size/increased tissue granulation at next dressing change:   Utilize specialty bed per algorithm   Promote sleep for wound healing   Protective dressings over bony prominences  Goal: Participates in plan/prevention/treatment measures  Outcome: Progressing  Flowsheets (Taken 3/27/2025 0047)  Participates in plan/prevention/treatment measures:   Increase activity/out of bed for meals   Discuss with provider PT/OT consult   Elevate heels  Goal: Prevent/manage excess moisture  Outcome: Progressing  Flowsheets (Taken 3/27/2025 0047)  Prevent/manage excess moisture:   Use wicking fabric (obtain order)   Moisturize dry skin   Cleanse incontinence/protect with barrier cream   Follow provider orders for dressing changes   Monitor for/manage infection if present  Goal: Prevent/minimize sheer/friction injuries  Outcome: Progressing  Flowsheets (Taken 3/27/2025 0047)  Prevent/minimize sheer/friction injuries:   Utilize specialty bed per algorithm   Use pull sheet   Turn/reposition every 2 hours/use positioning/transfer devices   Increase activity/out of bed for meals   Complete micro-shifts as needed if patient unable. Adjust patient position to relieve pressure points, not a full turn   HOB 30 degrees or less  Goal: Promote/optimize nutrition  Outcome: Progressing  Flowsheets (Taken 3/27/2025 0047)  Promote/optimize nutrition:   Offer  water/supplements/favorite foods   Discuss with provider if NPO > 2 days   Assist with feeding   Monitor/record intake including meals   Reassess MST if dietician not consulted   Consume > 50% meals/supplements  Goal: Promote skin healing  Outcome: Progressing  Flowsheets (Taken 3/27/2025 0047)  Promote skin healing:   Turn/reposition every 2 hours/use positioning/transfer devices   Rotate device position/do not position patient on device   Protective dressings over bony prominences   Ensure correct size (line/device) and apply per  instructions   Assess skin/pad under line(s)/device(s)     Problem: Fall/Injury  Goal: Not fall by end of shift  Outcome: Progressing  Goal: Be free from injury by end of the shift  Outcome: Progressing  Goal: Verbalize understanding of personal risk factors for fall in the hospital  Outcome: Progressing  Goal: Verbalize understanding of risk factor reduction measures to prevent injury from fall in the home  Outcome: Progressing  Goal: Use assistive devices by end of the shift  Outcome: Progressing  Goal: Pace activities to prevent fatigue by end of the shift  Outcome: Progressing     Problem: Safety - Medical Restraint  Goal: Remains free of injury from restraints (Restraint for Interference with Medical Device)  Outcome: Progressing  Goal: Free from restraint(s) (Restraint for Interference with Medical Device)  Outcome: Progressing     Problem: Respiratory  Goal: Tolerate mechanical ventilation evidenced by VS/agitation level this shift  Outcome: Progressing  Goal: Wean oxygen to maintain O2 saturation per order/standard this shift  Outcome: Progressing     Problem: Pain - Adult  Goal: Verbalizes/displays adequate comfort level or baseline comfort level  Outcome: Progressing     Problem: Safety - Adult  Goal: Free from fall injury  Outcome: Progressing     Problem: Discharge Planning  Goal: Discharge to home or other facility with appropriate resources  Outcome:  Progressing     Problem: Chronic Conditions and Co-morbidities  Goal: Patient's chronic conditions and co-morbidity symptoms are monitored and maintained or improved  Outcome: Progressing     Problem: Nutrition  Goal: Nutrient intake appropriate for maintaining nutritional needs  Outcome: Progressing     Problem: Diabetes  Goal: Achieve decreasing blood glucose levels by end of shift  Outcome: Progressing  Goal: Increase stability of blood glucose readings by end of shift  Outcome: Progressing  Goal: Decrease in ketones present in urine by end of shift  Outcome: Progressing  Goal: Maintain electrolyte levels within acceptable range throughout shift  Outcome: Progressing  Goal: Maintain glucose levels >70mg/dl to <250mg/dl throughout shift  Outcome: Progressing  Goal: No changes in neurological exam by end of shift  Outcome: Progressing  Goal: Learn about and adhere to nutrition recommendations by end of shift  Outcome: Progressing  Goal: Vital signs within normal range for age by end of shift  Outcome: Progressing  Goal: Increase self care and/or family involovement by end of shift  Outcome: Progressing  Goal: Receive DSME education by end of shift  Outcome: Progressing

## 2025-03-27 NOTE — CARE PLAN
Problem: Mechanical Ventilation  Goal: Patient Will Maintain Patent Airway  Outcome: Progressing  Goal: Oral health is maintained or improved  Outcome: Progressing  Goal: Tracheostomy will be managed safely  Outcome: Progressing  Goal: ET tube will be managed safely  Outcome: Progressing     Problem: Skin  Goal: Decreased wound size/increased tissue granulation at next dressing change  Outcome: Progressing  Flowsheets (Taken 3/27/2025 1301)  Decreased wound size/increased tissue granulation at next dressing change:   Promote sleep for wound healing   Utilize specialty bed per algorithm   Protective dressings over bony prominences  Goal: Participates in plan/prevention/treatment measures  Outcome: Progressing  Flowsheets (Taken 3/27/2025 1301)  Participates in plan/prevention/treatment measures:   Discuss with provider PT/OT consult   Elevate heels   Increase activity/out of bed for meals  Goal: Prevent/manage excess moisture  Outcome: Progressing  Flowsheets (Taken 3/27/2025 1301)  Prevent/manage excess moisture:   Cleanse incontinence/protect with barrier cream   Moisturize dry skin   Follow provider orders for dressing changes   Monitor for/manage infection if present  Goal: Prevent/minimize sheer/friction injuries  Outcome: Progressing  Flowsheets (Taken 3/27/2025 1301)  Prevent/minimize sheer/friction injuries:   Complete micro-shifts as needed if patient unable. Adjust patient position to relieve pressure points, not a full turn   Increase activity/out of bed for meals   Use pull sheet   HOB 30 degrees or less   Turn/reposition every 2 hours/use positioning/transfer devices   Utilize specialty bed per algorithm  Goal: Promote/optimize nutrition  Outcome: Progressing  Flowsheets (Taken 3/27/2025 1301)  Promote/optimize nutrition:   Monitor/record intake including meals   Discuss with provider if NPO > 2 days   Offer water/supplements/favorite foods  Goal: Promote skin healing  Outcome: Progressing  Flowsheets  (Taken 3/27/2025 1301)  Promote skin healing:   Protective dressings over bony prominences   Turn/reposition every 2 hours/use positioning/transfer devices   Assess skin/pad under line(s)/device(s)   Ensure correct size (line/device) and apply per  instructions   Rotate device position/do not position patient on device     Problem: Fall/Injury  Goal: Not fall by end of shift  Outcome: Progressing  Goal: Be free from injury by end of the shift  Outcome: Progressing  Goal: Verbalize understanding of personal risk factors for fall in the hospital  Outcome: Progressing  Goal: Verbalize understanding of risk factor reduction measures to prevent injury from fall in the home  Outcome: Progressing  Goal: Use assistive devices by end of the shift  Outcome: Progressing  Goal: Pace activities to prevent fatigue by end of the shift  Outcome: Progressing     Problem: Safety - Medical Restraint  Goal: Remains free of injury from restraints (Restraint for Interference with Medical Device)  Outcome: Progressing  Flowsheets (Taken 3/27/2025 1301)  Remains free of injury from restraints (restraint for interference with medical device):   Determine that other, less restrictive measures have been tried or would not be effective before applying the restraint   Evaluate the patient's condition at the time of restraint application   Inform patient/family regarding the reason for restraint   Every 2 hours: Monitor safety, psychosocial status, comfort, nutrition and hydration  Goal: Free from restraint(s) (Restraint for Interference with Medical Device)  Outcome: Progressing  Flowsheets (Taken 3/27/2025 1301)  Free from restraint(s) (restraint for interference with medical device):   ONCE/SHIFT or MINIMUM Every 12 hours: Assess and document the continuing need for restraints   Every 24 hours: Continued use of restraint requires Licensed Independent Practitioner to perform face to face examination and written order   Identify and  implement measures to help patient regain control     Problem: Respiratory  Goal: Tolerate mechanical ventilation evidenced by VS/agitation level this shift  Outcome: Progressing  Goal: Wean oxygen to maintain O2 saturation per order/standard this shift  Outcome: Progressing     Problem: Pain - Adult  Goal: Verbalizes/displays adequate comfort level or baseline comfort level  Outcome: Progressing     Problem: Safety - Adult  Goal: Free from fall injury  Outcome: Progressing     Problem: Discharge Planning  Goal: Discharge to home or other facility with appropriate resources  Outcome: Progressing     Problem: Chronic Conditions and Co-morbidities  Goal: Patient's chronic conditions and co-morbidity symptoms are monitored and maintained or improved  Outcome: Progressing     Problem: Nutrition  Goal: Nutrient intake appropriate for maintaining nutritional needs  Outcome: Progressing     Problem: Diabetes  Goal: Achieve decreasing blood glucose levels by end of shift  Outcome: Progressing  Goal: Increase stability of blood glucose readings by end of shift  Outcome: Progressing  Goal: Decrease in ketones present in urine by end of shift  Outcome: Progressing  Goal: Maintain electrolyte levels within acceptable range throughout shift  Outcome: Progressing  Goal: Maintain glucose levels >70mg/dl to <250mg/dl throughout shift  Outcome: Progressing  Goal: No changes in neurological exam by end of shift  Outcome: Progressing  Goal: Learn about and adhere to nutrition recommendations by end of shift  Outcome: Progressing  Goal: Vital signs within normal range for age by end of shift  Outcome: Progressing  Goal: Increase self care and/or family involovement by end of shift  Outcome: Progressing  Goal: Receive DSME education by end of shift  Outcome: Progressing   The patient's goals for the shift include      The clinical goals for the shift include pt will remain hemodynamically stable

## 2025-03-27 NOTE — CARE PLAN
Pt was extubated today at approximately 1:15 PM; pt to have swallow eval tomorrow.  List of AR, SNF and HHC emailed to dtr Tiffanie  Will need PT OT eval to assist with discharge planning.  Pts son is a P.T.    DISCHARGE PLAN: TBD--DO NOT DISCHARGE PATIENT BEFORE SPEAKING WITH CARE COORDINATION; PT DOES NOT YET HAVE A DISCHARGE PLAN IN PLACE AT THIS TIME

## 2025-03-27 NOTE — CARE PLAN
Problem: Respiratory  Goal: Tolerate mechanical ventilation evidenced by VS/agitation level this shift  Outcome: Progressing  Goal: Wean oxygen to maintain O2 saturation per order/standard this shift  Outcome: Progressing

## 2025-03-27 NOTE — DOCUMENTATION CLARIFICATION NOTE
"    PATIENT:               BOB SANTILLAN  ACCT #:                  [id number]  MRN:                       [id number]  :                       1950  ADMIT DATE:       3/25/2025 12:20 AM  DISCH DATE:  RESPONDING PROVIDER #:        [id number]          PROVIDER RESPONSE TEXT:    Acute on Chronic Systolic Congestive Heart Failure    CDI QUERY TEXT:    Clarification    Instruction:    Based on your assessment of the patient and the clinical information, please provide the requested documentation by clicking on the appropriate radio button and enter any additional information if prompted.    Question: Please further clarify the type and acuity of congestive heart failure    When answering this query, please exercise your independent professional judgment. The fact that a question is being asked, does not imply that any particular answer is desired or expected.    The patient's clinical indicators include:  Clinical Information: 74 y.o. year old female patient with Past Medical History of CHF, COPD, asthma, HTN, HLD, CKD stage IV, T2DM, presenting with SOB, cough productive of some yellow sputum and chest tightness tonight as well. Admitted to the ICU for hypoxemic respiratory failure requiring intubation.    Clinical Indicators:    3/25 ED Provider note:  \"Workup shows elevated BNP and evidence of pulmonary edema on the chest x-ray. No leukocytosis, no evidence of pneumonia, viral testing all negative as well.  I suspect a mix of COPD exacerbation and CHF exacerbation with pulmonary edema causing her acute respiratory failure with hypoxia. She is persistently requiring supplemental oxygen by nasal cannula which she does not wear at home\"    3/25 H&P: \"Acute on chronic hypercapnic/hypoxic respiratory failure 2/2 COPD exacerbation vs CHF exacerbation\"    3/26 Echo: CONCLUSIONS:  1. Left ventricular ejection fraction is mildly decreased, by visual estimate at 45-50%.  2. There is global hypokinesis of the left ventricle " with minor regional variations.  3. Spectral Doppler shows a Grade I (impaired relaxation pattern) of left ventricular diastolic filling with normal left atrial filling pressure.  4. Left ventricular cavity size is mildly dilated.  5. There is normal right ventricular global systolic function.  6. Moderately elevated right ventricular systolic pressure.    3/25     Treatment:  -Lasix IV X 3 doses to date  -Mechanical Ventilation/O2    Risk Factors:74 y.o female with HX CHF, COPD, HTN, DM, SOB x 2 weeks  Options provided:  -- Acute on Chronic Systolic Congestive Heart Failure  -- Chronic Systolic Congestive Heart Failure  -- Acute on Chronic Diastolic Congestive Heart Failure  -- Chronic Diastolic Congestive Heart Failure  -- Acute on Chronic combined systolic/diastolic Congestive Heart Failure  -- Chronic combined systolic/diastolic Congestive Heart Failure  -- Other - I will add my own diagnosis  -- Refer to Clinical Documentation Reviewer    Query created by: Marialuisa Farrar on 3/27/2025 11:24 AM      Electronically signed by:  NANNETTE ASKEW MD 3/27/2025 1:49 PM

## 2025-03-27 NOTE — PROGRESS NOTES
Occupational Therapy    OT Evaluation    Patient Name: Analia Murray  MRN: [id number]  Department: [address] ICU  Room: 18/18-A  Today's Date: 3/27/2025  Time Calculation  Start Time: 1511  Stop Time: 1529  Time Calculation (min): 18 min        Assessment:  OT Assessment: OT order received, chart reviewed, evaluation complete. Pt demonstrated decreased ADL status and functional mobility due to generalized weakness and decreased aerobic capacity. Pt completed long sit this date with min assist and able to hold position for ~2-3 minutes. Pt stated she feels very weak and fatigued. Pt would benefit from acute OT services to facilitate PLOF.  Prognosis: Fair  Barriers to Discharge Home: Physical needs, Caregiver assistance  Caregiver Assistance: Caregiver assistance needed per identified barriers - however, level of patient's required assistance exceeds assistance available at home  Physical Needs: 24hr mobility assistance needed, 24hr ADL assistance needed, High falls risk due to function or environment, Ambulating household distances limited by function/safety  Evaluation/Treatment Tolerance: Patient limited by fatigue  Medical Staff Made Aware: Yes  End of Session Communication: Bedside nurse  End of Session Patient Position: Bed, 3 rail up, Alarm on (call light in reach)  OT Assessment Results: Decreased ADL status, Decreased upper extremity range of motion, Decreased upper extremity strength, Decreased endurance, Decreased functional mobility, Decreased IADLs  Prognosis: Fair  Barriers to Discharge: Decreased caregiver support  Evaluation/Treatment Tolerance: Patient limited by fatigue  Medical Staff Made Aware: Yes  Strengths: Attitude of self  Barriers to Participation: Comorbidities  Plan:  Treatment Interventions: ADL retraining, Functional transfer training, UE strengthening/ROM, Endurance training, Patient/family training, Equipment evaluation/education, Compensatory technique education  OT Frequency: 4  times per week  OT Discharge Recommendations: Moderate intensity level of continued care  Equipment Recommended upon Discharge: Wheeled walker  OT Recommended Transfer Status: Maximum assist, Assist of 2  Treatment Interventions: ADL retraining, Functional transfer training, UE strengthening/ROM, Endurance training, Patient/family training, Equipment evaluation/education, Compensatory technique education    Subjective   Previous Visit Info:  OT Last Visit  OT Received On: 03/27/25  General:  General  Reason for Referral: Activities of daily living  Past Medical History Relevant to Rehab: ANX, CKD, GERD, HTN, DM, Osteoporosis, HTN, Depression  Family/Caregiver Present: Yes  Caregiver Feedback: son and spouse  Co-Treatment: PT  Co-Treatment Reason: maximization of pt safety and functional mobility  Prior to Session Communication: Bedside nurse  General Comment: Pt is a 74 year old female admit with acute respiratory failure. Pt presented to the ED for ongoing SOB and increased worsening over the past 2-3 days. Pt had the flu ~ 2 weeks ago and has been feeling weak since.Pt intubated from 3/25-3/27  Precautions:  Hearing/Visual Limitations: reading glasses, hearing WFL  Medical Precautions: Fall precautions, Oxygen therapy device and L/min (4 L/min O2 via NC)      Vital Signs Comment: HR 83, 91% SpO2, 18RR< 173/84 (109)     Pain:  Pain Assessment  Pain Assessment: 0-10  0-10 (Numeric) Pain Score: 0 - No pain    Objective    Cognition:  Cognition  Overall Cognitive Status: Impaired  Arousal/Alertness: Delayed responses to stimuli (pt appeared to have delayed responce to auditory stimuli due to limited arousal and being recently extubated)  Orientation Level: Oriented X4  Following Commands: Follows one step commands with increased time  Processing Speed: Delayed  Coordination:  Movements are Fluid and Coordinated: No  Upper Body Coordination: pt requires increased time and effort to perform movements     Bed  Mobility/Transfers: Bed Mobility  Bed Mobility: Yes  Bed Mobility 1  Bed Mobility 1: Long sit  Level of Assistance 1: +2, Minimum assistance  Bed Mobility Comments 1: Pt completed long sit with use of glide sheet. Pt required hand over hand assistance for hand placement onto bed rails but able to initiate long sit with min assist and hold position for ~2-3 minutes. Pt O2 remained above 90% during long sit.    Transfers  Transfer: No (transfer and functional mobility not safe to attempt this date to limited limited arousal following being recently extubated.)    Functional Mobility:  Functional Mobility  Functional Mobility Performed: No    Other Activity:     RUE   RUE : Exceptions to WFL (pt ROM limited due to decreased UE strength) and LUE   LUE: Exceptions to WFL      Outcome Measures:[facility] Daily Activity  Putting on and taking off regular lower body clothing: Total  Bathing (including washing, rinsing, drying): Total  Putting on and taking off regular upper body clothing: Total  Toileting, which includes using toilet, bedpan or urinal: Total  Taking care of personal grooming such as brushing teeth: Total  Eating Meals: A lot  Daily Activity - Total Score: 7      Education Documentation  ADL Training, taught by FABIANO Irvin at 3/27/2025  3:53 PM.  Learner: Patient  Readiness: Acceptance  Method: Explanation  Response: Verbalizes Understanding  Comment: Pt educated on call light use and plan for therapy.    Education Comments  No comments found.        OP EDUCATION:       Goals:  Encounter Problems       Encounter Problems (Active)       OT Goals       ADLs       Start:  03/27/25    Expected End:  04/11/25       Patient will complete ADLs with minimal assistance.          Functional Transfer       Start:  03/27/25    Expected End:  04/11/25       Patient will complete functional transfer with min assistance utilizing least restrictive device.          Therapeutic Exercise        Start:  03/27/25    Expected  End:  04/11/25       Patient will tolerate ~8-10 minutes of therapeutic exercise to increase aerobic capacity and improve activity tolerance.

## 2025-03-27 NOTE — PROGRESS NOTES
Physical Therapy    Physical Therapy Evaluation    Patient Name: Analia Murray  MRN: [id number]  Department: [address]  Room: 18/18A  Today's Date: 3/27/2025   Time Calculation  Start Time: 1512  Stop Time: 1528  Time Calculation (min): 16 min    Assessment/Plan   PT Assessment  PT Assessment Results: Decreased strength, Decreased mobility, Decreased endurance, Impaired balance  Barriers to Discharge Home: Physical needs  Physical Needs: Stair navigation into home limited by function/safety, Ambulating household distances limited by function/safety, High falls risk due to function or environment  Strengths: Support of extended family/friends, Support and attitude of living partners, Premorbid level of function, Living arrangement secure, Access to adaptive/assistive products, Attitude of self  Barriers to Participation: Comorbidities  End of Session Communication: Bedside nurse  Assessment Comment: She presented with the above listed impairments (see Assessment Results). Pt required minimally increased assist during very limited functional mobility compared to her reported baseline of indep. Pt would benefit from continued skilled PT services for maximizing independence and safety prior to & after discharge (MODERATE intensity).  End of Session Patient Position: Bed, 3 rail up, Alarm on (call light within reach; spouse and son in room)  IP OR SWING BED PT PLAN  Inpatient or Swing Bed: Inpatient  PT Plan  Treatment/Interventions: Bed mobility, Transfer training, Gait training, Strengthening, Therapeutic exercise, Therapeutic activity, Balance training  PT Plan: Ongoing PT  PT Frequency: 5 times per week  PT Discharge Recommendations: Moderate intensity level of continued care  PT Recommended Transfer Status: Assist x2, Assistive device  PT - OK to Discharge: Yes    Subjective   General Visit Information:  General  Reason for Referral: Impaired functional mobility due to decreased muscular strength. This 74  "year old was admitted for acute hypoxic respiratory failure and acute metabolic encephalopathy. Pt intubated 3/25-3/27/25.  Past Medical History Relevant to Rehab: Anxiety/depression, asthma, chronic back pain, CAD, CHF, CKD-IV, DM-II, GERD, hemianopia R eye, MGUS, HOLDEN on CPAP, pulmonary HTN, hypothryoid, osteoporosis  Family/Caregiver Present: Yes  Caregiver Feedback: spouse & son  Co-Treatment: OT  Co-Treatment Reason: co-eval for maximal safety with functional mobility  Prior to Session Communication: Bedside nurse  Patient Position Received: Bed, 3 rail up, Alarm on  General Comment: Cleared by RN for participation. Pt agreed to session and was fully engaged throughout.    Home Living:  Home Living  Type of Home: House  Lives With: Spouse  Home Adaptive Equipment: Walker rolling or standard, Cane  Home Layout: One level  Home Access: Stairs to enter with rails  Entrance Stairs-Rails: Both (grab bar)  Entrance Stairs-Number of Steps: 4  Bathroom Shower/Tub: Tub/shower unit  Bathroom Toilet: Standard  Bathroom Equipment: Grab bars in shower, Shower chair with back    Prior Level of Function:  Prior Function Per Pt/Caregiver Report  Receives Help From: Family  ADL Assistance: Needs assistance  Homemaking Assistance: Needs assistance  Ambulatory Assistance: Independent (Mod I with cane. Denied any recent falls.)  Prior Function Comments: (-) driving    Precautions:  Precautions  Hearing/Visual Limitations: reading glasses  Medical Precautions: Fall precautions, Oxygen therapy device and L/min  Precautions Comment: 4 L/min O2 via NC    Vital Signs  Pre PT in supine with HOB elevated: HR 83, SpO2 91% on supplemental O2, RR 18, /84, . BP during session: 147/84 (102)     Objective   Pain:  Pain Assessment  0-10 (Numeric) Pain Score: 0 - No pain    Cognition:  Cognition  Overall Cognitive Status: Impaired  Orientation Level:  (Oriented to self, \"hospital and year. Stated month as \"April.\")    General " Assessments:  Activity Tolerance  Endurance: Decreased tolerance for upright activites (pt on supplemental O2; pt c/o dizziness while in bed)    Sensation  Sensation Comment: Not tested; pt denied paresthesias    ROM  BLE AROM: grossly WFL    Strength  Strength Comments: BLE: grossly >/=3+/5    Functional Assessments:  Bed Mobility  Bed Mobility: Yes  Bed Mobility 1  Bed Mobility 1: Long sit  Level of Assistance 1: Contact guard  Bed Mobility Comments 1: Tolerated 2 minutes in long sitting using bilateral bed rails.    Transfers  Transfer: No (Deferred as pt c/o dizziness while in supine.)     Outcome Measures:  [facility] Basic Mobility  Turning from your back to your side while in a flat bed without using bedrails: A lot  Moving from lying on your back to sitting on the side of a flat bed without using bedrails: Total  Moving to and from bed to chair (including a wheelchair): Total  Standing up from a chair using your arms (e.g. wheelchair or bedside chair): Total  To walk in hospital room: Total  Climbing 3-5 steps with railing: Total  Basic Mobility - Total Score: 7    FSS-ICU  Ambulation: Unable to attempt due to weakness  Rolling: Maximal assistance (performs 25% - 49% of task)  Sitting: Unable to perform  Transfer Sit-to-Stand: Unable to perform  Transfer Supine-to-Sit: Total assistance (performs 25% or requires another person)  Total Score: 3    Encounter Problems       Encounter Problems (Active)       PT Problem       Pt will transition supine<>sit with close S.       Start:  03/27/25    Expected End:  04/19/25            Pt will transfer sit<>stand with FWW & close S.       Start:  03/27/25    Expected End:  04/19/25            Pt will ambulate >/=25 ft with FWW & close S.       Start:  03/27/25    Expected End:  04/19/25                   Education Documentation  Precautions, taught by Xiomara Jacobson PT at 3/27/2025  3:45 PM.  Learner: Patient  Readiness: Acceptance  Method: Explanation  Response: Needs  Reinforcement  Comment: Fall precautions, PT role & POC    Mobility Training, taught by Xiomara Jacobson PT at 3/27/2025  3:45 PM.  Learner: Patient  Readiness: Acceptance  Method: Explanation  Response: Needs Reinforcement  Comment: Fall precautions, PT role & POC    Education Comments  No comments found.             No

## 2025-03-27 NOTE — PROGRESS NOTES
Analia Murray is a 74 y.o. female on day 2 of admission presenting with Acute respiratory failure with hypoxia (Multi).  Patient with h/o asthma/COPD, HOLDEN, HTN, DLP, CHF, CKD IV, T2DM, hypothyroidism, anemia, MGUS, who p/w SOB x 2 weeks associated with cough productive of yellow sputum and chest tightness. In the ED work up revealed tachypnea, hypoxia, MARY ANN, BNP 700s, 7/21/78/140 on ABG and mild pulmonary edema on chest imaging. Received Solu-Medrol, Lasix, placed on BiPAP and admitted to ICU with the diagnosis of acute on chronic hypercarbic respiratory failure. Repeat ABG showed persistent hypercarbia while on BiPAP and  was consequently intubated after arrival to the ICU.   Subjective   No acute overnight events. Urine output remained low. Increased secretions via ET tube. Tolerating CPAP 10/5 this morning, MV 6.5 L.      Currently is intubated and sedated, but can respond to questions. Denies any pains.   Objective   Scheduled medications  allopurinol, 50 mg, oral, Every other day  amLODIPine, 10 mg, oral, Daily  aspirin, 81 mg, oral, Daily  pantoprazole, 40 mg, oral, Daily before breakfast   Or  esomeprazole, 40 mg, nasoduodenal tube, Daily before breakfast   Or  pantoprazole, 40 mg, intravenous, Daily before breakfast  ezetimibe, 10 mg, oral, Daily  fluticasone, 2 spray, Each Nostril, BID  gabapentin, 100 mg, oral, TID  heparin (porcine), 7,500 Units, subcutaneous, q8h JAYA  hydrALAZINE, 50 mg, oral, BID  insulin lispro, 0-10 Units, subcutaneous, q4h  ipratropium-albuteroL, 3 mL, nebulization, q6h  levothyroxine, 200 mcg, oral, Daily  levothyroxine, 200 mcg, oral, Every Wednesday  methylPREDNISolone sodium succinate (PF), 40 mg, intravenous, Daily  [Held by provider] metoprolol succinate XL, 100 mg, oral, Daily  metoprolol tartrate, 50 mg, oral, BID  montelukast, 10 mg, oral, Nightly  nystatin, 1 Application, Topical, TID  oxygen, , inhalation, Continuous - Inhalation  polyethylene glycol, 17 g, oral,  Daily  pravastatin, 40 mg, oral, Nightly  sertraline, 50 mg, oral, Daily  sulfur hexafluoride microsphr, 2 mL, intravenous, Once in imaging    Continuous medications  fentaNYL, 0-300 mcg/hr, Last Rate: 75 mcg/hr (03/27/25 0735)  propofol, 5-50 mcg/kg/min, Last Rate: 15 mcg/kg/min (03/27/25 1960)  sodium chloride, 100 mL/hr, Last Rate: 100 mL/hr (03/27/25 5190)    PRN medications  PRN medications: dextrose, dextrose, fentaNYL, glucagon, glucagon, [Held by provider] nitroglycerin   Physical Exam  Constitutional:       General: She is not in acute distress.     Appearance: She is obese. She is not ill-appearing or toxic-appearing.   HENT:      Head: Normocephalic and atraumatic.      Mouth/Throat:      Comments: ET and G tube in place.   Eyes:      General: No scleral icterus.     Pupils: Pupils are equal, round, and reactive to light.   Neck:      Vascular: No carotid bruit.   Cardiovascular:      Rate and Rhythm: Normal rate and regular rhythm.      Heart sounds: No murmur heard.     No friction rub. No gallop.   Pulmonary:      Effort: Pulmonary effort is normal. No respiratory distress.      Breath sounds: No wheezing or rales.      Comments: Clear lung fields b/l with fair air entry on MV. No wheezing.   Abdominal:      General: Bowel sounds are normal. There is no distension.      Palpations: Abdomen is soft.      Tenderness: There is no abdominal tenderness.   Musculoskeletal:         General: No tenderness.      Cervical back: Neck supple. No rigidity.      Right lower leg: No edema.      Left lower leg: No edema.   Lymphadenopathy:      Cervical: No cervical adenopathy.   Skin:     General: Skin is warm and dry.      Coloration: Skin is not jaundiced.   Neurological:      Mental Status: She is alert.      Comments: Awake and alert, follows commands, moves all extremities.    Psychiatric:      Comments: RASS 0, calm.      Last Recorded Vitals  Blood pressure 94/51, pulse 60, temperature 35.8 °C (96.4 °F),  "temperature source Temporal, resp. rate 20, height 1.626 m (5' 4.02\"), weight 137 kg (302 lb 0.5 oz), SpO2 98%.  Intake/Output last 3 Shifts:  I/O last 3 completed shifts:  In: 3647.7 (26.6 mL/kg) [I.V.:2033.5 (14.8 mL/kg); NG/GT:610; IV Piggyback:1004.2]  Out: 0 (0 mL/kg)   Weight: 137 kg   Relevant Results  Results for orders placed or performed during the hospital encounter of 03/25/25 (from the past 24 hours)   Transthoracic Echo (TTE) Complete   Result Value Ref Range    AV pk clem 1.38 m/s    LVOT diam 2.00 cm    AV mn grad 4 mmHg    MV E/A ratio 1.50     Tricuspid annular plane systolic excursion 2.1 cm    LV Biplane EF 47 %    LV EF 48 %    RV free wall pk S' 9.36 cm/s    RVSP 56.0 mmHg    LVIDd 6.61 cm    AV pk grad 8 mmHg    Aortic Valve Area by Continuity of VTI 1.47 cm2    Aortic Valve Area by Continuity of Peak Velocity 1.35 cm2    LV A4C EF 41.6    POCT GLUCOSE   Result Value Ref Range    POCT Glucose 120 (H) 74 - 99 mg/dL   Renal function panel   Result Value Ref Range    Glucose 176 (H) 74 - 99 mg/dL    Sodium 133 (L) 136 - 145 mmol/L    Potassium 5.1 3.5 - 5.3 mmol/L    Chloride 99 98 - 107 mmol/L    Bicarbonate 23 21 - 32 mmol/L    Anion Gap 16 10 - 20 mmol/L    Urea Nitrogen 48 (H) 6 - 23 mg/dL    Creatinine 4.03 (H) 0.50 - 1.05 mg/dL    eGFR 11 (L) >60 mL/min/1.73m*2    Calcium 8.3 (L) 8.6 - 10.3 mg/dL    Phosphorus 4.8 2.5 - 4.9 mg/dL    Albumin 3.3 (L) 3.4 - 5.0 g/dL   Magnesium   Result Value Ref Range    Magnesium 2.24 1.60 - 2.40 mg/dL   POCT GLUCOSE   Result Value Ref Range    POCT Glucose 193 (H) 74 - 99 mg/dL   POCT GLUCOSE   Result Value Ref Range    POCT Glucose 146 (H) 74 - 99 mg/dL   POCT GLUCOSE   Result Value Ref Range    POCT Glucose 126 (H) 74 - 99 mg/dL   POCT GLUCOSE   Result Value Ref Range    POCT Glucose 96 74 - 99 mg/dL   CBC and Auto Differential   Result Value Ref Range    WBC 9.9 4.4 - 11.3 x10*3/uL    nRBC 0.0 0.0 - 0.0 /100 WBCs    RBC 4.12 4.00 - 5.20 x10*6/uL    " Hemoglobin 9.4 (L) 12.0 - 16.0 g/dL    Hematocrit 30.7 (L) 36.0 - 46.0 %    MCV 75 (L) 80 - 100 fL    MCH 22.8 (L) 26.0 - 34.0 pg    MCHC 30.6 (L) 32.0 - 36.0 g/dL    RDW 19.9 (H) 11.5 - 14.5 %    Platelets 196 150 - 450 x10*3/uL    Neutrophils % 73.9 40.0 - 80.0 %    Immature Granulocytes %, Automated 0.5 0.0 - 0.9 %    Lymphocytes % 15.7 13.0 - 44.0 %    Monocytes % 9.6 2.0 - 10.0 %    Eosinophils % 0.1 0.0 - 6.0 %    Basophils % 0.2 0.0 - 2.0 %    Neutrophils Absolute 7.32 (H) 1.60 - 5.50 x10*3/uL    Immature Granulocytes Absolute, Automated 0.05 0.00 - 0.50 x10*3/uL    Lymphocytes Absolute 1.55 0.80 - 3.00 x10*3/uL    Monocytes Absolute 0.95 (H) 0.05 - 0.80 x10*3/uL    Eosinophils Absolute 0.01 0.00 - 0.40 x10*3/uL    Basophils Absolute 0.02 0.00 - 0.10 x10*3/uL   Renal Function Panel   Result Value Ref Range    Glucose 99 74 - 99 mg/dL    Sodium 131 (L) 136 - 145 mmol/L    Potassium 5.2 3.5 - 5.3 mmol/L    Chloride 98 98 - 107 mmol/L    Bicarbonate 23 21 - 32 mmol/L    Anion Gap 15 10 - 20 mmol/L    Urea Nitrogen 53 (H) 6 - 23 mg/dL    Creatinine 4.29 (H) 0.50 - 1.05 mg/dL    eGFR 10 (L) >60 mL/min/1.73m*2    Calcium 7.9 (L) 8.6 - 10.3 mg/dL    Phosphorus 5.3 (H) 2.5 - 4.9 mg/dL    Albumin 3.1 (L) 3.4 - 5.0 g/dL   Magnesium   Result Value Ref Range    Magnesium 2.19 1.60 - 2.40 mg/dL   POCT GLUCOSE   Result Value Ref Range    POCT Glucose 127 (H) 74 - 99 mg/dL     *Note: Due to a large number of results and/or encounters for the requested time period, some results have not been displayed. A complete set of results can be found in Results Review.   Transthoracic Echo (TTE) Complete    Result Date: 3/26/2025           [place] [id number]            Phone [phone] TRANSTHORACIC ECHOCARDIOGRAM REPORT Patient Name:       BOB Aldridge Physician:    [id number] Randolph Guadarrama DO Study Date:          3/26/2025            Ordering Provider:    [id number] MICHAEL STRONG MRN/PID:            [id number]             Fellow: Accession#:         [id number]         Nurse: Date of Birth/Age:  1950 / 74 years Sonographer:          Theresa Paul RDCS Gender Assigned at  F                    Additional Staff: Birth: Height:             162.56 cm            Admit Date: Weight:             136.99 kg            Admission Status:     Inpatient -                                                                Routine BSA / BMI:          2.33 m2 / 51.84      Department Location:  [facility]                     kg/m2 Blood Pressure: 88 /53 mmHg Study Type:    TRANSTHORACIC ECHO (TTE) COMPLETE Diagnosis/ICD: Shortness of breath-R06.02 Indication:    sob, chf CPT Codes:     Echo Complete w Full Doppler-[id number] Patient History: Diabetes:          Yes BMI:               Obese >30 Pertinent History: CHF, HTN and COPD. bmi 51, vent, resp failure w hypoxia, sob,                    anemia, pain, ckd, cough, enceph., pulm ht. Study Detail: The following Echo studies were performed: 2D, M-Mode, Doppler and               color flow. Technically challenging study due to poor acoustic               windows, body habitus, patient lying in supine position and               prominent lung artifact. The patient is intubated. Definity used               as a contrast agent for endocardial border definition. Total               contrast used for this procedure was 3 mL via IV push.  PHYSICIAN INTERPRETATION: Left Ventricle: Left ventricular ejection fraction is mildly decreased, by visual estimate at 45-50%. There is moderate eccentric left ventricular hypertrophy. There is global hypokinesis of the left ventricle with minor regional variations. The left ventricular cavity size is mildly dilated. There is normal septal  thickness. Spectral Doppler shows a Grade I (impaired relaxation pattern) of left ventricular diastolic filling with normal left atrial filling pressure. Left Atrium: The left atrial size is normal. Right Ventricle: The right ventricle is normal in size. There is normal right ventricular global systolic function. Right Atrium: The right atrial size is normal. Aortic Valve: The aortic valve appears structurally normal. The aortic valve dimensionless index is 0.47. There is no evidence of aortic valve regurgitation. The peak instantaneous gradient of the aortic valve is 8 mmHg. The mean gradient of the aortic valve is 4 mmHg. Mitral Valve: The mitral valve is normal in structure. There is no evidence of mitral valve regurgitation. Tricuspid Valve: The tricuspid valve is structurally normal. There is mild tricuspid regurgitation. The Doppler estimated RVSP is moderately elevated right ventricular systolic pressure at 56.0 mmHg. Pulmonic Valve: The pulmonic valve is structurally normal. There is no indication of pulmonic valve regurgitation. Pericardium: No pericardial effusion noted. Aorta: The aortic root is normal.  CONCLUSIONS:  1. Left ventricular ejection fraction is mildly decreased, by visual estimate at 45-50%.  2. There is global hypokinesis of the left ventricle with minor regional variations.  3. Spectral Doppler shows a Grade I (impaired relaxation pattern) of left ventricular diastolic filling with normal left atrial filling pressure.  4. Left ventricular cavity size is mildly dilated.  5. There is normal right ventricular global systolic function.  6. Moderately elevated right ventricular systolic pressure. QUANTITATIVE DATA SUMMARY:  2D MEASUREMENTS:             Normal Ranges: LAs:             3.30 cm     (2.7-4.0cm) IVSd:            0.85 cm     (0.6-1.1cm) LVPWd:           0.95 cm     (0.6-1.1cm) LVIDd:           6.61 cm     (3.9-5.9cm) LV Mass Index:   109.2 g/m2 LVEDV Index:     71.91 ml/m2  LV  SYSTOLIC FUNCTION:                      Normal Ranges: EF-A4C View:    42 % (>=55%) EF-A2C View:    47 % EF-Biplane:     47 % EF-Visual:      48 % LV EF Reported: 48 %  LV DIASTOLIC FUNCTION:           Normal Ranges: MV Peak E:             0.67 m/s  (0.7-1.2 m/s) MV Peak A:             0.45 m/s  (0.42-0.7 m/s) E/A Ratio:             1.50      (1.0-2.2) MV e'                  0.060 m/s (>8.0) MV lateral e'          0.06 m/s MV medial e'           0.06 m/s E/e' Ratio:            11.06     (<8.0) a'                     0.06 m/s  MITRAL VALVE:          Normal Ranges: MV DT:        223 msec (150-240msec)  AORTIC VALVE:                     Normal Ranges: AoV Vmax:                1.38 m/s (<=1.7m/s) AoV Peak P.6 mmHg (<20mmHg) AoV Mean P.0 mmHg (1.7-11.5mmHg) LVOT Max Eber:            0.59 m/s (<=1.1m/s) AoV VTI:                 27.50 cm (18-25cm) LVOT VTI:                12.90 cm LVOT Diameter:           2.00 cm  (1.8-2.4cm) AoV Area, VTI:           1.47 cm2 (2.5-5.5cm2) AoV Area,Vmax:           1.35 cm2 (2.5-4.5cm2) AoV Dimensionless Index: 0.47  RIGHT VENTRICLE: TAPSE: 21.4 mm RV s'  0.09 m/s  TRICUSPID VALVE/RVSP:          Normal Ranges: Peak TR Velocity:     3.20 m/s RV Syst Pressure:     56 mmHg  (< 30mmHg) IVC Diam:             3.65 cm  PULMONIC VALVE:          Normal Ranges: PV Accel Time:  87 msec  (>120ms) PV Max Eber:     0.9 m/s  (0.6-0.9m/s) PV Max PG:      3.5 mmHg  [id number] Randolph Guadarrama DO Electronically signed on 3/26/2025 at 2:46:04 PM  ** Final **     US renal complete    Result Date: 3/26/2025  Interpreted By:  Bob Lugo, STUDY: US RENAL COMPLETE;  3/26/2025 10:49 am   INDICATION: Signs/Symptoms:Acute kidney injury.     COMPARISON: None.   ACCESSION NUMBER(S): [id number]   ORDERING CLINICIAN: SHAE CHOI   TECHNIQUE: Real-time sonographic evaluation of the kidneys and urinary bladder was performed.   FINDINGS: RIGHT KIDNEY: Right kidney measures  9.2 cm.  No shadowing  calculus or right hydronephrosis is visualized.  No discrete renal lesion is visualized.   LEFT KIDNEY: Left kidney measures  8.4 cm.  No shadowing calculus or left hydronephrosis is visualized.  No discrete renal lesion is visualized.   BLADDER: Urinary bladder is decompressed and could not be evaluated at this time.       No hydronephrosis bilaterally.   Urinary bladder decompressed and could not be evaluated at this time.   MACRO: None.   Signed by: Bob Lugo 3/26/2025 11:05 AM Dictation workstation:   [id number]    Assessment/Plan   Assessment & Plan  Acute respiratory failure with hypoxia (Multi)    74 YOF with h/o asthma/COPD, HOLDEN, HTN, DLP, CHF, CKD IV, T2DM, hypothyroidism, anemia, MGUS, who p/w SOB x 2 weeks associated with cough productive of yellow sputum and chest tightness. In the ED work up revealed tachypnea, hypoxia, MARY ANN, BNP 700s, 7/21/78/140 on ABG and mild pulmonary edema on chest imaging. Received Solu-Medrol, Lasix, placed on BiPAP and admitted to ICU with the diagnosis of acute on chronic hypercarbic respiratory failure. Repeat ABG showed persistent hypercarbia while on BiPAP and  was consequently intubated after arrival to the ICU.     Neuro: acute metabolic encephalopathy likely due to hypercarbia, anxiety and depression. Currently is intubated and sedated        Continue home Zoloft and Gabapentin       Continue sedation with propofol, analgesics with Fentanyl        Daily SAT       Supportive measures     CV: h/o CHF, HTN, DLP, now probably with CHF exacerbation, given . Currently hemodynamically stable.       Home Lasix on hold       Continue home Norvasc and hydralazine       Continue metoprolol       Continue home Atorvastatin, Zetia and ASA      Pulmonary: acute on chronic hypoxic hypercarbic respiratory failure 2/2 CHF vs COPD exacerbation. Now is requiring intubation and MV after failing NIV. H/o COPD, HOLDEN on CPAP and asthma. Vent requirements minimal. However low TV  during SBT on 3/26.        Continue MV: AC//18/5/50%       Continue home Flonase, Singulair.        Continue Duo-Neb       Continue Solu-medrol 40 mg daily       BPH       Daily SBT    : MARY ANN on CKD IV, baseline Cr 2.2, now up to 4.3. Pre-renal vs cardiorenal. Low urine output.       Is/Os       Daily RFP       Treat electrolyte abnormalities as indicated       Nephrology consult is done, input appreciated    GI: no acute issues       Started on tube feeds, will continue.        GI prophylaxis with PPI       Bowel regimen    Endo: h/o T2DM, hypothyroidism. Currently no acute issues       Continue home Synthroid       Hold home dulaglutide and semaglutide       POCT every 4 hours       SSI       Hypoglycemic protocol    Hem/Onc: h/o anemia of chronic diseases likely due to MGUS.        Continue to monitor with daily CBC    ID: no acute issues.        Continue Azithromycin       FU cultures      DVT prophylaxis with subcutaneous heparin.     This was a critical care visit due to respiratory failure. Total time 40 min.     Charisse Chavez MD

## 2025-03-27 NOTE — PROGRESS NOTES
Speech-Language Pathology                 Therapy Communication Note    Patient Name: Analia Murray  MRN: [id number]  Department: [facility] 3 E ICU  Room: 18/18-A  Today's Date: 3/27/2025     Discipline: Speech Language Pathology          Missed Visit Reason:  per discussion with RN, pt just extubated @ approx 1:15 hold swallow evaluation until tomorrow 3/27    Missed Time: Attempt    Comment:

## 2025-03-28 LAB
ALBUMIN SERPL BCP-MCNC: 3.1 G/DL (ref 3.4–5)
ALBUMIN SERPL BCP-MCNC: 3.2 G/DL (ref 3.4–5)
ALBUMIN SERPL BCP-MCNC: 3.3 G/DL (ref 3.4–5)
ANION GAP SERPL CALCULATED.3IONS-SCNC: 13 MMOL/L (ref 10–20)
ANION GAP SERPL CALCULATED.3IONS-SCNC: 15 MMOL/L (ref 10–20)
ANION GAP SERPL CALCULATED.3IONS-SCNC: 15 MMOL/L (ref 10–20)
BASOPHILS # BLD AUTO: 0.01 X10*3/UL (ref 0–0.1)
BASOPHILS NFR BLD AUTO: 0.1 %
BUN SERPL-MCNC: 57 MG/DL (ref 6–23)
BUN SERPL-MCNC: 57 MG/DL (ref 6–23)
BUN SERPL-MCNC: 58 MG/DL (ref 6–23)
CALCIUM SERPL-MCNC: 7.7 MG/DL (ref 8.6–10.3)
CALCIUM SERPL-MCNC: 7.9 MG/DL (ref 8.6–10.3)
CALCIUM SERPL-MCNC: 8.1 MG/DL (ref 8.6–10.3)
CHLORIDE SERPL-SCNC: 95 MMOL/L (ref 98–107)
CHLORIDE SERPL-SCNC: 95 MMOL/L (ref 98–107)
CHLORIDE SERPL-SCNC: 96 MMOL/L (ref 98–107)
CO2 SERPL-SCNC: 24 MMOL/L (ref 21–32)
CREAT SERPL-MCNC: 4.31 MG/DL (ref 0.5–1.05)
CREAT SERPL-MCNC: 4.43 MG/DL (ref 0.5–1.05)
CREAT SERPL-MCNC: 4.5 MG/DL (ref 0.5–1.05)
EGFRCR SERPLBLD CKD-EPI 2021: 10 ML/MIN/1.73M*2
EOSINOPHIL # BLD AUTO: 0.01 X10*3/UL (ref 0–0.4)
EOSINOPHIL NFR BLD AUTO: 0.1 %
ERYTHROCYTE [DISTWIDTH] IN BLOOD BY AUTOMATED COUNT: 19.9 % (ref 11.5–14.5)
GLUCOSE BLD MANUAL STRIP-MCNC: 123 MG/DL (ref 74–99)
GLUCOSE BLD MANUAL STRIP-MCNC: 152 MG/DL (ref 74–99)
GLUCOSE BLD MANUAL STRIP-MCNC: 179 MG/DL (ref 74–99)
GLUCOSE BLD MANUAL STRIP-MCNC: 218 MG/DL (ref 74–99)
GLUCOSE BLD MANUAL STRIP-MCNC: 75 MG/DL (ref 74–99)
GLUCOSE BLD MANUAL STRIP-MCNC: 99 MG/DL (ref 74–99)
GLUCOSE SERPL-MCNC: 119 MG/DL (ref 74–99)
GLUCOSE SERPL-MCNC: 73 MG/DL (ref 74–99)
GLUCOSE SERPL-MCNC: 75 MG/DL (ref 74–99)
HCT VFR BLD AUTO: 32.1 % (ref 36–46)
HGB BLD-MCNC: 9.6 G/DL (ref 12–16)
IMM GRANULOCYTES # BLD AUTO: 0.04 X10*3/UL (ref 0–0.5)
IMM GRANULOCYTES NFR BLD AUTO: 0.5 % (ref 0–0.9)
LYMPHOCYTES # BLD AUTO: 1.12 X10*3/UL (ref 0.8–3)
LYMPHOCYTES NFR BLD AUTO: 13.5 %
MAGNESIUM SERPL-MCNC: 2.12 MG/DL (ref 1.6–2.4)
MAGNESIUM SERPL-MCNC: 2.15 MG/DL (ref 1.6–2.4)
MAGNESIUM SERPL-MCNC: 2.17 MG/DL (ref 1.6–2.4)
MCH RBC QN AUTO: 23.2 PG (ref 26–34)
MCHC RBC AUTO-ENTMCNC: 29.9 G/DL (ref 32–36)
MCV RBC AUTO: 78 FL (ref 80–100)
MONOCYTES # BLD AUTO: 1.21 X10*3/UL (ref 0.05–0.8)
MONOCYTES NFR BLD AUTO: 14.6 %
NEUTROPHILS # BLD AUTO: 5.89 X10*3/UL (ref 1.6–5.5)
NEUTROPHILS NFR BLD AUTO: 71.2 %
NRBC BLD-RTO: 0 /100 WBCS (ref 0–0)
PHOSPHATE SERPL-MCNC: 7.8 MG/DL (ref 2.5–4.9)
PHOSPHATE SERPL-MCNC: 7.8 MG/DL (ref 2.5–4.9)
PHOSPHATE SERPL-MCNC: 7.9 MG/DL (ref 2.5–4.9)
PLATELET # BLD AUTO: 177 X10*3/UL (ref 150–450)
POTASSIUM SERPL-SCNC: 4.8 MMOL/L (ref 3.5–5.3)
POTASSIUM SERPL-SCNC: 5.4 MMOL/L (ref 3.5–5.3)
POTASSIUM SERPL-SCNC: 5.6 MMOL/L (ref 3.5–5.3)
RBC # BLD AUTO: 4.14 X10*6/UL (ref 4–5.2)
SODIUM SERPL-SCNC: 128 MMOL/L (ref 136–145)
SODIUM SERPL-SCNC: 128 MMOL/L (ref 136–145)
SODIUM SERPL-SCNC: 129 MMOL/L (ref 136–145)
WBC # BLD AUTO: 8.3 X10*3/UL (ref 4.4–11.3)

## 2025-03-28 PROCEDURE — 36415 COLL VENOUS BLD VENIPUNCTURE: CPT

## 2025-03-28 PROCEDURE — 80069 RENAL FUNCTION PANEL: CPT

## 2025-03-28 PROCEDURE — 94640 AIRWAY INHALATION TREATMENT: CPT

## 2025-03-28 PROCEDURE — 97530 THERAPEUTIC ACTIVITIES: CPT | Mod: GP | Performed by: PHYSICAL THERAPIST

## 2025-03-28 PROCEDURE — 2500000005 HC RX 250 GENERAL PHARMACY W/O HCPCS

## 2025-03-28 PROCEDURE — 83735 ASSAY OF MAGNESIUM: CPT

## 2025-03-28 PROCEDURE — 82374 ASSAY BLOOD CARBON DIOXIDE: CPT

## 2025-03-28 PROCEDURE — 2500000005 HC RX 250 GENERAL PHARMACY W/O HCPCS: Performed by: INTERNAL MEDICINE

## 2025-03-28 PROCEDURE — 9420000001 HC RT PATIENT EDUCATION 5 MIN

## 2025-03-28 PROCEDURE — 2500000004 HC RX 250 GENERAL PHARMACY W/ HCPCS (ALT 636 FOR OP/ED): Performed by: INTERNAL MEDICINE

## 2025-03-28 PROCEDURE — 97535 SELF CARE MNGMENT TRAINING: CPT | Mod: GO

## 2025-03-28 PROCEDURE — 85025 COMPLETE CBC W/AUTO DIFF WBC: CPT

## 2025-03-28 PROCEDURE — 94660 CPAP INITIATION&MGMT: CPT

## 2025-03-28 PROCEDURE — 99291 CRITICAL CARE FIRST HOUR: CPT | Performed by: INTERNAL MEDICINE

## 2025-03-28 PROCEDURE — 2500000002 HC RX 250 W HCPCS SELF ADMINISTERED DRUGS (ALT 637 FOR MEDICARE OP, ALT 636 FOR OP/ED)

## 2025-03-28 PROCEDURE — 2500000001 HC RX 250 WO HCPCS SELF ADMINISTERED DRUGS (ALT 637 FOR MEDICARE OP): Performed by: INTERNAL MEDICINE

## 2025-03-28 PROCEDURE — 97530 THERAPEUTIC ACTIVITIES: CPT | Mod: GO

## 2025-03-28 PROCEDURE — 2020000001 HC ICU ROOM DAILY

## 2025-03-28 PROCEDURE — 2500000004 HC RX 250 GENERAL PHARMACY W/ HCPCS (ALT 636 FOR OP/ED)

## 2025-03-28 PROCEDURE — 2500000001 HC RX 250 WO HCPCS SELF ADMINISTERED DRUGS (ALT 637 FOR MEDICARE OP)

## 2025-03-28 PROCEDURE — 82947 ASSAY GLUCOSE BLOOD QUANT: CPT

## 2025-03-28 PROCEDURE — 92610 EVALUATE SWALLOWING FUNCTION: CPT | Mod: GN | Performed by: SPEECH-LANGUAGE PATHOLOGIST

## 2025-03-28 RX ORDER — DEXTROSE MONOHYDRATE 100 MG/ML
50 INJECTION, SOLUTION INTRAVENOUS CONTINUOUS
Status: DISPENSED | OUTPATIENT
Start: 2025-03-28 | End: 2025-03-28

## 2025-03-28 RX ORDER — FUROSEMIDE 10 MG/ML
80 INJECTION INTRAMUSCULAR; INTRAVENOUS ONCE
Status: COMPLETED | OUTPATIENT
Start: 2025-03-28 | End: 2025-03-28

## 2025-03-28 RX ORDER — SODIUM POLYSTYRENE SULFONATE 15 G/60ML
30 SUSPENSION ORAL; RECTAL ONCE
Status: COMPLETED | OUTPATIENT
Start: 2025-03-28 | End: 2025-03-28

## 2025-03-28 RX ORDER — DEXTROSE 50 % IN WATER (D50W) INTRAVENOUS SYRINGE
25 ONCE
Status: COMPLETED | OUTPATIENT
Start: 2025-03-28 | End: 2025-03-28

## 2025-03-28 RX ORDER — NAPROXEN SODIUM 220 MG/1
81 TABLET, FILM COATED ORAL DAILY
Status: DISCONTINUED | OUTPATIENT
Start: 2025-03-28 | End: 2025-04-17 | Stop reason: HOSPADM

## 2025-03-28 RX ADMIN — DEXTROSE MONOHYDRATE 25 G: 25 INJECTION, SOLUTION INTRAVENOUS at 01:01

## 2025-03-28 RX ADMIN — DEXTROSE MONOHYDRATE 25 G: 25 INJECTION, SOLUTION INTRAVENOUS at 05:48

## 2025-03-28 RX ADMIN — INSULIN LISPRO 2 UNITS: 100 INJECTION, SOLUTION INTRAVENOUS; SUBCUTANEOUS at 16:10

## 2025-03-28 RX ADMIN — IPRATROPIUM BROMIDE AND ALBUTEROL SULFATE 3 ML: 2.5; .5 SOLUTION RESPIRATORY (INHALATION) at 00:23

## 2025-03-28 RX ADMIN — INSULIN LISPRO 2 UNITS: 100 INJECTION, SOLUTION INTRAVENOUS; SUBCUTANEOUS at 20:00

## 2025-03-28 RX ADMIN — GABAPENTIN 100 MG: 100 CAPSULE ORAL at 20:16

## 2025-03-28 RX ADMIN — INSULIN HUMAN 10 UNITS: 100 INJECTION, SOLUTION PARENTERAL at 01:02

## 2025-03-28 RX ADMIN — METOPROLOL TARTRATE 50 MG: 50 TABLET, FILM COATED ORAL at 09:53

## 2025-03-28 RX ADMIN — PRAVASTATIN SODIUM 40 MG: 20 TABLET ORAL at 20:16

## 2025-03-28 RX ADMIN — HEPARIN SODIUM 7500 UNITS: 5000 INJECTION INTRAVENOUS; SUBCUTANEOUS at 21:18

## 2025-03-28 RX ADMIN — SERTRALINE HYDROCHLORIDE 50 MG: 50 TABLET ORAL at 09:53

## 2025-03-28 RX ADMIN — HEPARIN SODIUM 7500 UNITS: 5000 INJECTION INTRAVENOUS; SUBCUTANEOUS at 05:45

## 2025-03-28 RX ADMIN — ESOMEPRAZOLE MAGNESIUM 40 MG: 40 GRANULE, DELAYED RELEASE ORAL at 06:00

## 2025-03-28 RX ADMIN — POLYETHYLENE GLYCOL 3350 17 G: 17 POWDER, FOR SOLUTION ORAL at 09:54

## 2025-03-28 RX ADMIN — GABAPENTIN 100 MG: 100 CAPSULE ORAL at 09:51

## 2025-03-28 RX ADMIN — IPRATROPIUM BROMIDE AND ALBUTEROL SULFATE 3 ML: 2.5; .5 SOLUTION RESPIRATORY (INHALATION) at 11:53

## 2025-03-28 RX ADMIN — IPRATROPIUM BROMIDE AND ALBUTEROL SULFATE 3 ML: 2.5; .5 SOLUTION RESPIRATORY (INHALATION) at 07:49

## 2025-03-28 RX ADMIN — FLUTICASONE PROPIONATE 2 SPRAY: 50 SPRAY, METERED NASAL at 20:16

## 2025-03-28 RX ADMIN — SODIUM ZIRCONIUM CYCLOSILICATE 10 G: 10 POWDER, FOR SUSPENSION ORAL at 04:01

## 2025-03-28 RX ADMIN — SODIUM POLYSTYRENE SULFONATE 30 G: 15 SUSPENSION ORAL; RECTAL at 05:44

## 2025-03-28 RX ADMIN — HEPARIN SODIUM 7500 UNITS: 5000 INJECTION INTRAVENOUS; SUBCUTANEOUS at 14:45

## 2025-03-28 RX ADMIN — SODIUM ZIRCONIUM CYCLOSILICATE 10 G: 10 POWDER, FOR SUSPENSION ORAL at 12:57

## 2025-03-28 RX ADMIN — DEXTROSE MONOHYDRATE 25 G: 25 INJECTION, SOLUTION INTRAVENOUS at 01:04

## 2025-03-28 RX ADMIN — SODIUM ZIRCONIUM CYCLOSILICATE 10 G: 10 POWDER, FOR SUSPENSION ORAL at 20:16

## 2025-03-28 RX ADMIN — INSULIN HUMAN 10 UNITS: 100 INJECTION, SOLUTION PARENTERAL at 05:44

## 2025-03-28 RX ADMIN — METOPROLOL TARTRATE 50 MG: 50 TABLET, FILM COATED ORAL at 20:16

## 2025-03-28 RX ADMIN — FUROSEMIDE 80 MG: 10 INJECTION, SOLUTION INTRAMUSCULAR; INTRAVENOUS at 22:23

## 2025-03-28 RX ADMIN — MONTELUKAST 10 MG: 10 TABLET, FILM COATED ORAL at 20:16

## 2025-03-28 RX ADMIN — HYDRALAZINE HYDROCHLORIDE 50 MG: 50 TABLET ORAL at 09:52

## 2025-03-28 RX ADMIN — GABAPENTIN 100 MG: 100 CAPSULE ORAL at 14:42

## 2025-03-28 RX ADMIN — NYSTATIN 1 APPLICATION: 100000 POWDER TOPICAL at 14:45

## 2025-03-28 RX ADMIN — DEXTROSE MONOHYDRATE 50 ML/HR: 10 INJECTION, SOLUTION INTRAVENOUS at 06:00

## 2025-03-28 RX ADMIN — NYSTATIN 1 APPLICATION: 100000 POWDER TOPICAL at 09:48

## 2025-03-28 RX ADMIN — FLUTICASONE PROPIONATE 2 SPRAY: 50 SPRAY, METERED NASAL at 10:47

## 2025-03-28 RX ADMIN — SODIUM CHLORIDE 50 ML/HR: 450 INJECTION, SOLUTION INTRAVENOUS at 01:01

## 2025-03-28 RX ADMIN — HYDRALAZINE HYDROCHLORIDE 50 MG: 50 TABLET ORAL at 20:16

## 2025-03-28 RX ADMIN — Medication 40 PERCENT: at 07:50

## 2025-03-28 RX ADMIN — LEVOTHYROXINE SODIUM 200 MCG: 0.1 TABLET ORAL at 05:45

## 2025-03-28 RX ADMIN — METHYLPREDNISOLONE SODIUM SUCCINATE 40 MG: 40 INJECTION, POWDER, FOR SOLUTION INTRAMUSCULAR; INTRAVENOUS at 09:46

## 2025-03-28 RX ADMIN — NYSTATIN 1 APPLICATION: 100000 POWDER TOPICAL at 20:16

## 2025-03-28 RX ADMIN — ASPIRIN 81 MG: 81 TABLET, CHEWABLE ORAL at 09:50

## 2025-03-28 RX ADMIN — AMLODIPINE BESYLATE 10 MG: 10 TABLET ORAL at 09:48

## 2025-03-28 RX ADMIN — IPRATROPIUM BROMIDE AND ALBUTEROL SULFATE 3 ML: 2.5; .5 SOLUTION RESPIRATORY (INHALATION) at 20:44

## 2025-03-28 RX ADMIN — EZETIMIBE 10 MG: 10 TABLET ORAL at 09:50

## 2025-03-28 RX ADMIN — Medication 5 L/MIN: at 20:43

## 2025-03-28 ASSESSMENT — PAIN - FUNCTIONAL ASSESSMENT
PAIN_FUNCTIONAL_ASSESSMENT: 0-10
PAIN_FUNCTIONAL_ASSESSMENT: CPOT (CRITICAL CARE PAIN OBSERVATION TOOL)
PAIN_FUNCTIONAL_ASSESSMENT: CPOT (CRITICAL CARE PAIN OBSERVATION TOOL)
PAIN_FUNCTIONAL_ASSESSMENT: 0-10
PAIN_FUNCTIONAL_ASSESSMENT: CPOT (CRITICAL CARE PAIN OBSERVATION TOOL)
PAIN_FUNCTIONAL_ASSESSMENT: 0-10
PAIN_FUNCTIONAL_ASSESSMENT: 0-10

## 2025-03-28 ASSESSMENT — COGNITIVE AND FUNCTIONAL STATUS - GENERAL
HELP NEEDED FOR BATHING: TOTAL
TURNING FROM BACK TO SIDE WHILE IN FLAT BAD: TOTAL
MOVING TO AND FROM BED TO CHAIR: TOTAL
CLIMB 3 TO 5 STEPS WITH RAILING: TOTAL
MOBILITY SCORE: 7
DAILY ACTIVITIY SCORE: 7
STANDING UP FROM CHAIR USING ARMS: TOTAL
EATING MEALS: A LOT
DRESSING REGULAR UPPER BODY CLOTHING: TOTAL
DRESSING REGULAR LOWER BODY CLOTHING: TOTAL
PERSONAL GROOMING: TOTAL
WALKING IN HOSPITAL ROOM: TOTAL
TOILETING: TOTAL
MOVING FROM LYING ON BACK TO SITTING ON SIDE OF FLAT BED WITH BEDRAILS: A LOT

## 2025-03-28 ASSESSMENT — ACTIVITIES OF DAILY LIVING (ADL): HOME_MANAGEMENT_TIME_ENTRY: 8

## 2025-03-28 ASSESSMENT — PAIN SCALES - GENERAL
PAINLEVEL_OUTOF10: 0 - NO PAIN

## 2025-03-28 ASSESSMENT — PAIN SCALES - WONG BAKER: WONGBAKER_NUMERICALRESPONSE: NO HURT

## 2025-03-28 NOTE — PROGRESS NOTES
"Nutrition Follow up Note    Nutrition Assessment      Extubated 3/27. Awaiting SLP swallow eval to advance diet.    Nutrition History:  Food and Nutrient History: NPO     Anthropometrics:  Ht: 162.6 cm (5' 4.02\"), Wt: 138 kg (305 lb 1.9 oz), BMI: 52.35  IBW/kg (Dietitian Calculated): 54.54 kg  Percent of IBW: 246 %  Adjusted Body Weight (kg): 79 kg    Weight Change:  Daily Weight  03/28/25 : 138 kg (305 lb 1.9 oz)  03/16/25 : 132 kg (291 lb)  02/10/25 : 139 kg (305 lb 9.6 oz)  01/30/25 : 134 kg (296 lb)  01/27/25 : 135 kg (297 lb 1.6 oz)  01/15/25 : 132 kg (291 lb)  12/11/24 : 127 kg (281 lb)  11/26/24 : 132 kg (291 lb)  10/23/24 : 132 kg (291 lb)  09/29/24 : 127 kg (281 lb)     Nutrition Focused Physical Exam Findings:      Nutrition Significant Labs:  Lab Results   Component Value Date    WBC 8.3 03/28/2025    HGB 9.6 (L) 03/28/2025    HCT 32.1 (L) 03/28/2025     03/28/2025    CHOL 128 06/21/2023    TRIG 72 06/21/2023    HDL 45.1 06/21/2023    ALT 7 03/25/2025    AST 10 03/25/2025     (L) 03/28/2025    K 4.8 03/28/2025    CL 95 (L) 03/28/2025    CREATININE 4.31 (H) 03/28/2025    BUN 58 (H) 03/28/2025    CO2 24 03/28/2025    TSH 11.37 (H) 03/25/2025    HGBA1C 5.8 (H) 03/25/2025     Nutrition Specific Medications:  allopurinol, 50 mg, oral, Every other day  amLODIPine, 10 mg, oral, Daily  aspirin, 81 mg, oral, Daily  ezetimibe, 10 mg, oral, Daily  fluticasone, 2 spray, Each Nostril, BID  gabapentin, 100 mg, oral, TID  heparin (porcine), 7,500 Units, subcutaneous, q8h JAYA  hydrALAZINE, 50 mg, oral, BID  insulin lispro, 0-10 Units, subcutaneous, q4h  ipratropium-albuteroL, 3 mL, nebulization, q6h  levothyroxine, 200 mcg, oral, Daily  levothyroxine, 200 mcg, oral, Every Wednesday  methylPREDNISolone sodium succinate (PF), 40 mg, intravenous, Daily  [Held by provider] metoprolol succinate XL, 100 mg, oral, Daily  metoprolol tartrate, 50 mg, oral, BID  montelukast, 10 mg, oral, Nightly  nystatin, 1 " Application, Topical, TID  oxygen, , inhalation, Continuous - Inhalation  polyethylene glycol, 17 g, oral, Daily  pravastatin, 40 mg, oral, Nightly  sertraline, 50 mg, oral, Daily  sodium zirconium cyclosilicate, 10 g, oral, q8h  sulfur hexafluoride microsphr, 2 mL, intravenous, Once in imaging         Dietary Orders (From admission, onward)       Start     Ordered    03/25/25 0812  May Participate in Room Service  ( ROOM SERVICE MAY PARTICIPATE)  Once        Question:  .  Answer:  Yes    03/25/25 0811                   Estimated Needs:   Estimated Energy Needs  Total Energy Estimated Needs in 24 hours (kCal): 1975 kCal  Energy Estimated Needs per kg Body Weight in 24 hours (kCal/kg): 25 kCal/kg  Method for Estimating Needs: Adj Wt    Estimated Protein Needs  Total Protein Estimated Needs in 24 Hours (g): 95 g  Protein Estimated Needs per kg Body Weight in 24 Hours (g/kg): 1.2 g/kg  Method for Estimating 24 Hour Protein Needs: Adj Wt    Estimated Fluid Needs  Total Fluid Estimated Needs in 24 Hours (mL): 1975 mL  Method for Estimating 24 Hour Fluid Needs: 1 ml/kcal or per MD      Nutrition Diagnosis   Nutrition Diagnosis:  Malnutrition Diagnosis  Patient has Malnutrition Diagnosis: No    Nutrition Diagnosis  Patient has Nutrition Diagnosis: Yes  Diagnosis Status (1): Active  Nutrition Diagnosis 1: Inadequate oral intake  Related to (1): decreased ability to consume sufficient energy  As Evidenced by (1): NPO     Nutrition Interventions/Recommendations   Nutrition Interventions and Recommendations:  Nutrition Prescription: Nutrition prescription for oral nutrition    Nutrition Recommendations:  Individualized Nutrition Prescription Provided for : 1975 calories, 95 gm protein when diet advances    Nutrition Interventions/Goals:   Food and/or Nutrient Delivery Interventions  Interventions: Meals and snacks  Meals and Snacks: Carbohydrate-modified diet  Goal: Advance per SLP recs    Education Documentation  No  documentation found.         Nutrition Monitoring and Evaluation   Monitoring/Evaluation:   Food/Nutrient Related History Monitoring  Monitoring and Evaluation Plan: Estimated Energy Intake  Estimated Energy Intake: Other criteria  Criteria: monitoring for diet order    Goal Status: Some progress toward goal(s)    Follow Up  Time Spent (min): 25 minutes  Last Date of Nutrition Visit: 03/28/25  Nutrition Follow-Up Needed?: 5-7 days  Follow up Comment: 4/2/25

## 2025-03-28 NOTE — PROGRESS NOTES
Speech-Language Pathology    Speech-Language Pathology Clinical Swallow Evaluation    Patient Name: Analia Murray  MRN: [id number]  : 1950  Today's Date: 25  Start Time: 1350  Stop Time: 1408  Time Calculation (min): 18 min      ASSESSMENT  Impressions:  Pt lethargic, slow oral phase d/t lethargy, NGT present as well, no suspected pharyngeal phase dysphagia based on clinical swallow evaluation. Pt would benefit from skilled ST to minimize aspiration risk and ensure ongoing safety with the least restrictive diet.  Prognosis: Good    PLAN    RECOMMENDATIONS:  Is MBSS recommended? SLP will continue to monitor to determine if MBSS is clinically warranted.  Solid consistency: Pureed (IDDSI level 4)  Liquid consistency: Thin (IDDSI 0)  Medication administration: Crushed in puree, Via NG tube    Compensatory swallow strategies:  - Upright positioning for all PO intake  - Slow rate of intake  - Small bites  - Small sips  - Total assist for feeding    Recommended frequency/duration:  Skilled SLP services recommended: Yes  Frequency: 2x/week  Duration: 3 weeks  Discharge recommendation: Recommend MODERATE intensity ST upon DC in order to ensure safety with least restrictive diet.  Treatment/Interventions: Assess diet tolerance, Patient/family education  Strengths: Family/caregiver support  Barriers to participation in tx: Cognition and Comorbidities    Goals (start date 3/28/2025):    - Pt will consume prescribed diet (current diet is puree, thin liquids) without overt s/sx aspiration/penetration in 95% of observed trials.  - Pt will consume trials of upgraded textures without overt s/sx aspiration in order for consideration of diet texture upgrade.  - Pt will demonstrate follow-through of trained compensatory strategies during a meal/snack with 90% acc independently.   Status: Goal initiated this date   Progress this date: TBD      SUBJECTIVE    PMHx relevant to rehab:  Past Medical History of anxiety,  depression, lumbar radiculopathy, osteoporosis, HOLDEN, COPD, asthma, CHF, HTN, HLD, CKD stage IV, T2DM, hypothyroidism, anemia of chronic disease, and MGUS (monoclonal gammopathy of unknown significant)     Chief complaint: Pt was admitted on 3/25/25 presented to the ED for concern of shortness of breath. Pt has been notable short of breath over the past couple weeks,  Chief Complaint   Patient presents with    Shortness of Breath   . She was found to have Acute respiratory failure with hypoxia.  3/25: Pt admitted to the ICU on continuous BIPAP for noted respiratory acidosis with elevated pCO2.  Pt becoming more lethargic and not responding, requiring intubation.  Will continue the pt on solumedrol.  Start pt on azithromycin x3 days.  Continue diuresis as tolerated with IV lasix, and hold pt home lasix.        Relevant imaging results:  CXR 3/27/25  FINDINGS:  NG/OG tube courses below the diaphragm terminating over the gastric  body.      The heart is enlarged. There is bibasilar airspace disease and  pleural effusion. The pulmonary vasculature is congested and there is  peribronchovascular thickening likely relate to interstitial edema.  Right pleural effusion.    General Visit Information:     Patient Class: Inpatient  Living Environment: Nursing home (skilled/long-term)     Reason for Referral: assess swallow d/t recent extubation and concern for aspiration intubated 3/25-3/27  Prior to Session Communication: Bedside nurse    RN cleared pt to participate in session and reported more alert and clearing secretions better, NGT     Pt reported no h/ dysphagia    Date of Onset: 03/25/25  Date of Order: 03/27/25  [employer] Diet: regular and thin liquids  Current Diet : NPO, NGT    Status at time of evaluation:  Pain Assessment  Pain Assessment: 0-10  0-10 (Numeric) Pain Score: 0 - No pain    Pt was drowsy and cooperative for session. Eyes closing t/o session but responsive with min v/c  Orientation: Oriented to self,  Oriented to hospital but not name of facility, Oriented to month, Oriented to year, and Partially oriented to situation  Ability to follow functional commands: WFL  Nutritional status: Appears well-nourished/no concerns    Respiratory status: Supplemental oxygen via NC  Baseline Vocal Quality: Normal, Weak  Volitional Cough: Weak  Volitional Swallow: Within Functional Limits  Patient positioning: Upright in bed      OBJECTIVE  Clinical swallow evaluation completed and consisted of interview, oral motor assessment, and PO trials (ice chips x 2, 3 tsp thin liquids, approx 4 oz thin liquids via straw, 2 oz applesauce).    ORAL PHASE: Upper edentulous (reports does not wear dentures) , lower natural dentition in adequate condition. Oral mucosa were pink, moist, and free of obvious lesions. Lingual strength and ROM were WFL. Labial strength/ROM were WFL. Labial seal was adequate. Slow oral manipulation, with adequate oral clearance  PHARYNGEAL PHASE: Laryngeal elevation was visualized or palpated with all trials, however adequacy of hyolaryngeal elevation/excursion cannot be determined at bedside. No immediate or delayed s/sx aspiration/penetration were observed with any consistencies.    Was 3oz challenge administered: Yes; pt unable to successfully complete due to taking breaks. No overt s/sx aspiration were observed.     Treatment/Education:  Results and recommendations were relayed to: Patient and Bedside nurse  Education provided: Yes   Learner: Patient   Barriers to learning: Acuteness of illness barrier and Cognitive limitations barrier   Method of teaching: Verbal   Topic: role of ST, results of assessment, recommended diet modifications, recommended safe swallow strategies, and recommendation for dysphagia follow-up   Outcome of teaching: Pt demonstrated partial understanding and Needs reinforcement  Treatment provided: No  Next Treatment Priority: diet eric, solid trials, strat, educ

## 2025-03-28 NOTE — PROGRESS NOTES
Occupational Therapy    OT Treatment    Patient Name: Analia Murray  MRN: [id number]  Department: [address] ICU  Room: 18/18-A  Today's Date: 3/28/2025  Time Calculation  Start Time: 1047  Stop Time: 1110  Time Calculation (min): 23 min        Assessment:  OT Assessment: Pt is making gradual progress towards goals. Pt completed transfer with max assist but able to follow commands for hand placement to assist with safe transfer technique. Pt tolerated sitting EOB ~10 minutes with cueing for breathing technique. Pt would benefit from continued acute OT services to facilitate PLOF.  Prognosis: Fair  Barriers to Discharge Home: Physical needs, Caregiver assistance  Caregiver Assistance: Caregiver assistance needed per identified barriers - however, level of patient's required assistance exceeds assistance available at home  Physical Needs: 24hr mobility assistance needed, 24hr ADL assistance needed, High falls risk due to function or environment, Ambulating household distances limited by function/safety  Evaluation/Treatment Tolerance: Patient limited by fatigue  Medical Staff Made Aware: Yes  End of Session Communication: Bedside nurse  End of Session Patient Position: Bed, 3 rail up, Alarm on (call light in reach)  OT Assessment Results: Decreased ADL status, Decreased upper extremity range of motion, Decreased upper extremity strength, Decreased endurance, Decreased functional mobility, Decreased IADLs  Prognosis: Fair  Barriers to Discharge: Decreased caregiver support  Evaluation/Treatment Tolerance: Patient limited by fatigue  Medical Staff Made Aware: Yes  Strengths: Attitude of self  Barriers to Participation: Comorbidities  Plan:  Treatment Interventions: ADL retraining, Functional transfer training, UE strengthening/ROM, Endurance training, Cognitive reorientation, Patient/family training, Equipment evaluation/education, Compensatory technique education  OT Frequency: 4 times per week  OT Discharge  Recommendations: Moderate intensity level of continued care  Equipment Recommended upon Discharge: Wheeled walker  OT Recommended Transfer Status: Maximum assist, Assist of 2  OT - OK to Discharge: Yes  Treatment Interventions: ADL retraining, Functional transfer training, UE strengthening/ROM, Endurance training, Cognitive reorientation, Patient/family training, Equipment evaluation/education, Compensatory technique education    Subjective   Previous Visit Info:  OT Last Visit  OT Received On: 03/28/25  General:  General  Reason for Referral: Activities of daily living  Past Medical History Relevant to Rehab: ANX, CKD, GERD, HTN, DM, Osteoporosis, HTN, Depression  Family/Caregiver Present: Yes  Caregiver Feedback: spouse  Co-Treatment: PT  Co-Treatment Reason: maximization of pt safety and functional mobility  Prior to Session Communication: Bedside nurse  Patient Position Received: Bed, 3 rail up, Alarm on  General Comment: Pt cleared for therapy by nursing and agreeable to treatment  Precautions:  Hearing/Visual Limitations: reading glasses, hearing WFL  Medical Precautions: Fall precautions, Oxygen therapy device and L/min (6L O2 via NC)     Date/Time Vitals Session Patient Position Pulse Resp SpO2 BP MAP (mmHg)    03/28/25 1100 --  --  76  24  95 %  113/91  98     03/28/25 1200 --  --  70  14  91 %  130/73  92           Vital Signs Comment: 74HR, 93% SpO2, 16 RR,142/69 (92)     Pain:  Pain Assessment  Pain Assessment: 0-10  0-10 (Numeric) Pain Score: 0 - No pain    Objective    Cognition:  Cognition  Overall Cognitive Status: Within Functional Limits  Arousal/Alertness: Delayed responses to stimuli (pt appeared to have delayed responce to auditory stimuli due to limited arousal and being recently extubated)  Orientation Level: Oriented X4  Following Commands: Follows one step commands with increased time  Processing Speed: Within funtional limits  Coordination:  Movements are Fluid and Coordinated: No  Upper  Body Coordination: pt requires increased time and effort to perform movements  Activities of Daily Living:      Toileting  Toileting Level of Assistance: Maximum assistance  Where Assessed: Bed level  Toileting Comments: Pt incontinent of BM and required total assist to complete hygiene.     Bed Mobility/Transfers: Bed Mobility  Bed Mobility: Yes  Bed Mobility 1  Bed Mobility 1: Supine to sitting, Sitting to supine  Level of Assistance 1: Dependent, +2  Bed Mobility Comments 1: Glide seat utilized to assist with bed mobility. Pt dependent for trunk elevation and elevating LE over EOB. Pt required cueing for hand placement onto bed rails to assist with bed mobility  Bed Mobility 3  Bed Mobility 3: Rolling right, Rolling left  Level of Assistance 3: Maximum assistance, +2  Bed Mobility Comments 3: pt required max assist to complete bed mobility and verbal cues to utilize bed rails    Transfers  Transfer: No    Functional Mobility:  Functional Mobility  Functional Mobility Performed: No  Sitting Balance:  Static Sitting Balance  Static Sitting-Balance Support: Right upper extremity supported  Static Sitting-Level of Assistance: Minimum assistance  Static Sitting-Comment/Number of Minutes: Pt tolerated sitting EOB ~10 minutes with intermittent assistance for trunk support and verbal cueing for pursed lip breathing. Pt able to utilize bed rails and UE strength to maintain posture while sitting EOB. Pt appeared to demonstrated jerking motions while in sitting EOB.       Other Activity:     RUE   RUE : Exceptions to WFL (Pt UE ROM limited due to weakness) and LUE   LUE: Exceptions to WFL    Outcome Measures:[facility] [address] [facility] Daily Activity  Putting on and taking off regular lower body clothing: Total  Bathing (including washing, rinsing, drying): Total  Putting on and taking off regular upper body clothing: Total  Toileting, which includes using toilet, bedpan or urinal: Total  Taking care of personal grooming such as brushing teeth:  Total  Eating Meals: A lot  Daily Activity - Total Score: 7        Education Documentation  ADL Training, taught by FABIANO Irvin at 3/28/2025 12:35 PM.  Learner: Patient  Readiness: Acceptance  Method: Explanation  Response: Verbalizes Understanding  Comment: Pt educated on plan for therapy.    ADL Training, taught by FABIANO Irvin at 3/27/2025  3:53 PM.  Learner: Patient  Readiness: Acceptance  Method: Explanation  Response: Verbalizes Understanding  Comment: Pt educated on call light use and plan for therapy.    Education Comments  No comments found.        OP EDUCATION:       Goals:  Encounter Problems       Encounter Problems (Active)       OT Goals       ADLs (Progressing)       Start:  03/27/25    Expected End:  04/11/25       Patient will complete ADLs with minimal assistance.          Functional Transfer (Progressing)       Start:  03/27/25    Expected End:  04/11/25       Patient will complete functional transfer with min assistance utilizing least restrictive device.          Therapeutic Exercise  (Progressing)       Start:  03/27/25    Expected End:  04/11/25       Patient will tolerate ~8-10 minutes of therapeutic exercise to increase aerobic capacity and improve activity tolerance.

## 2025-03-28 NOTE — PROGRESS NOTES
Speech-Language Pathology                 Therapy Communication Note    Patient Name: Analia Murray  MRN: [id number]  Department: [facility] 3 E ICU  Room: 18/18-A  Today's Date: 3/28/2025     Discipline: Speech Language Pathology          Missed Visit Reason:  per discussion with RRT, pt just removed from BIPAP, recommended holding swallow evaluation until later d/t poor secretion management    Missed Time: Attempt    Comment:

## 2025-03-28 NOTE — CARE PLAN
The patient's goals for the shift include  n/a    The clinical goals for the shift include       Problem: Mechanical Ventilation  Goal: Patient Will Maintain Patent Airway  Outcome: Progressing  Goal: Oral health is maintained or improved  Outcome: Progressing  Goal: Tracheostomy will be managed safely  Outcome: Progressing  Goal: ET tube will be managed safely  Outcome: Progressing     Problem: Skin  Goal: Decreased wound size/increased tissue granulation at next dressing change  Outcome: Progressing  Flowsheets (Taken 3/27/2025 2316)  Decreased wound size/increased tissue granulation at next dressing change:   Promote sleep for wound healing   Protective dressings over bony prominences   Utilize specialty bed per algorithm  Goal: Participates in plan/prevention/treatment measures  Outcome: Progressing  Flowsheets (Taken 3/27/2025 2316)  Participates in plan/prevention/treatment measures:   Discuss with provider PT/OT consult   Elevate heels   Increase activity/out of bed for meals  Goal: Prevent/manage excess moisture  Outcome: Progressing  Flowsheets (Taken 3/27/2025 2316)  Prevent/manage excess moisture:   Monitor for/manage infection if present   Cleanse incontinence/protect with barrier cream   Use wicking fabric (obtain order)   Moisturize dry skin   Follow provider orders for dressing changes  Goal: Prevent/minimize sheer/friction injuries  Outcome: Progressing  Flowsheets (Taken 3/27/2025 2316)  Prevent/minimize sheer/friction injuries:   Use pull sheet   HOB 30 degrees or less   Turn/reposition every 2 hours/use positioning/transfer devices   Utilize specialty bed per algorithm   Increase activity/out of bed for meals  Goal: Promote/optimize nutrition  Outcome: Progressing  Flowsheets (Taken 3/27/2025 2316)  Promote/optimize nutrition:   Assist with feeding   Monitor/record intake including meals   Offer water/supplements/favorite foods   Consume > 50% meals/supplements   Discuss with provider if NPO > 2  days   Reassess MST if dietician not consulted  Goal: Promote skin healing  Outcome: Progressing  Flowsheets (Taken 3/27/2025 2316)  Promote skin healing:   Turn/reposition every 2 hours/use positioning/transfer devices   Assess skin/pad under line(s)/device(s)   Ensure correct size (line/device) and apply per  instructions   Rotate device position/do not position patient on device   Protective dressings over bony prominences     Problem: Fall/Injury  Goal: Not fall by end of shift  Outcome: Progressing  Goal: Be free from injury by end of the shift  Outcome: Progressing  Goal: Verbalize understanding of personal risk factors for fall in the hospital  Outcome: Progressing  Goal: Verbalize understanding of risk factor reduction measures to prevent injury from fall in the home  Outcome: Progressing  Goal: Use assistive devices by end of the shift  Outcome: Progressing  Goal: Pace activities to prevent fatigue by end of the shift  Outcome: Progressing     Problem: Safety - Medical Restraint  Goal: Remains free of injury from restraints (Restraint for Interference with Medical Device)  Outcome: Progressing  Goal: Free from restraint(s) (Restraint for Interference with Medical Device)  Outcome: Progressing     Problem: Respiratory  Goal: Tolerate mechanical ventilation evidenced by VS/agitation level this shift  Outcome: Progressing  Goal: Wean oxygen to maintain O2 saturation per order/standard this shift  Outcome: Progressing     Problem: Safety - Adult  Goal: Free from fall injury  Outcome: Progressing     Problem: Discharge Planning  Goal: Discharge to home or other facility with appropriate resources  Outcome: Progressing     Problem: Chronic Conditions and Co-morbidities  Goal: Patient's chronic conditions and co-morbidity symptoms are monitored and maintained or improved  Outcome: Progressing     Problem: Nutrition  Goal: Nutrient intake appropriate for maintaining nutritional needs  Outcome:  Progressing     Problem: Diabetes  Goal: Achieve decreasing blood glucose levels by end of shift  Outcome: Progressing  Goal: Increase stability of blood glucose readings by end of shift  Outcome: Progressing  Goal: Decrease in ketones present in urine by end of shift  Outcome: Progressing  Goal: Maintain electrolyte levels within acceptable range throughout shift  Outcome: Progressing  Goal: Maintain glucose levels >70mg/dl to <250mg/dl throughout shift  Outcome: Progressing  Goal: No changes in neurological exam by end of shift  Outcome: Progressing  Goal: Learn about and adhere to nutrition recommendations by end of shift  Outcome: Progressing  Goal: Vital signs within normal range for age by end of shift  Outcome: Progressing  Goal: Increase self care and/or family involovement by end of shift  Outcome: Progressing  Goal: Receive DSME education by end of shift  Outcome: Progressing

## 2025-03-28 NOTE — PROGRESS NOTES
Analia Murray is a 74 y.o. female on day 3 of admission presenting with Acute respiratory failure with hypoxia.  Patient with h/o asthma/COPD, HOLDEN, HTN, DLP, CHF, CKD IV, T2DM, hypothyroidism, anemia, MGUS, who p/w SOB x 2 weeks associated with cough productive of yellow sputum and chest tightness. In the ED work up revealed tachypnea, hypoxia, MARY ANN, BNP 700s, 7/21/78/140 on ABG and mild pulmonary edema on chest imaging. Received Solu-Medrol, Lasix, placed on BiPAP and admitted to ICU with the diagnosis of acute on chronic hypercarbic respiratory failure. Repeat ABG showed persistent hypercarbia while on BiPAP and  was consequently intubated after arrival to the ICU.   Subjective   Passed SBT and was extubated yesterday. NG placed until SLP evaluation. Also was placed on BiPAP.      Currently slightly drowsy, but denies SOB, cough, sputum, CP or any other complaints.   Objective   Scheduled medications  allopurinol, 50 mg, oral, Every other day  amLODIPine, 10 mg, oral, Daily  aspirin, 81 mg, oral, Daily  pantoprazole, 40 mg, oral, Daily before breakfast   Or  esomeprazole, 40 mg, nasoduodenal tube, Daily before breakfast   Or  pantoprazole, 40 mg, intravenous, Daily before breakfast  ezetimibe, 10 mg, oral, Daily  fluticasone, 2 spray, Each Nostril, BID  gabapentin, 100 mg, oral, TID  heparin (porcine), 7,500 Units, subcutaneous, q8h JAYA  hydrALAZINE, 50 mg, oral, BID  insulin lispro, 0-10 Units, subcutaneous, q4h  ipratropium-albuteroL, 3 mL, nebulization, q6h  levothyroxine, 200 mcg, oral, Daily  levothyroxine, 200 mcg, oral, Every Wednesday  methylPREDNISolone sodium succinate (PF), 40 mg, intravenous, Daily  [Held by provider] metoprolol succinate XL, 100 mg, oral, Daily  metoprolol tartrate, 50 mg, oral, BID  montelukast, 10 mg, oral, Nightly  nystatin, 1 Application, Topical, TID  oxygen, , inhalation, Continuous - Inhalation  polyethylene glycol, 17 g, oral, Daily  pravastatin, 40 mg, oral,  Nightly  sertraline, 50 mg, oral, Daily  sodium zirconium cyclosilicate, 10 g, oral, q8h  sulfur hexafluoride microsphr, 2 mL, intravenous, Once in imaging    Continuous medications  dextrose, 50 mL/hr, Last Rate: 50 mL/hr (03/28/25 0700)  sodium chloride, 50 mL/hr, Last Rate: 50 mL/hr (03/28/25 0700)    PRN medications  PRN medications: dextrose, dextrose, glucagon, glucagon, [Held by provider] nitroglycerin   Physical Exam  Constitutional:       General: She is not in acute distress.     Appearance: She is obese. She is not ill-appearing or toxic-appearing.   HENT:      Head: Normocephalic and atraumatic.      Mouth/Throat:      Comments: On BiPAP.   Eyes:      General: No scleral icterus.     Pupils: Pupils are equal, round, and reactive to light.   Neck:      Vascular: No carotid bruit.   Cardiovascular:      Rate and Rhythm: Normal rate and regular rhythm.      Heart sounds: No murmur heard.     No friction rub. No gallop.   Pulmonary:      Effort: Pulmonary effort is normal. No respiratory distress.      Breath sounds: No wheezing or rales.      Comments: Clear lung fields b/l with fair air entry on BiPAP. No wheezing.   Abdominal:      General: Bowel sounds are normal. There is no distension.      Palpations: Abdomen is soft.      Tenderness: There is no abdominal tenderness.   Musculoskeletal:         General: No tenderness.      Cervical back: Neck supple. No rigidity.      Right lower leg: No edema.      Left lower leg: No edema.   Lymphadenopathy:      Cervical: No cervical adenopathy.   Skin:     General: Skin is warm and dry.      Coloration: Skin is not jaundiced.   Neurological:      General: No focal deficit present.      Mental Status: She is alert.      Cranial Nerves: No cranial nerve deficit.      Motor: No weakness.   Psychiatric:         Mood and Affect: Mood normal.         Behavior: Behavior normal.      Comments: Calm.      Last Recorded Vitals  Blood pressure 138/63, pulse 72, temperature  "35.9 °C (96.6 °F), temperature source Temporal, resp. rate 18, height 1.626 m (5' 4.02\"), weight 138 kg (305 lb 1.9 oz), SpO2 92%.  Intake/Output last 3 Shifts:  I/O last 3 completed shifts:  In: 3651.7 (26.4 mL/kg) [I.V.:3048.4 (22 mL/kg); NG/GT:240; IV Piggyback:363.3]  Out: 1680 (12.1 mL/kg) [Urine:1680 (0.3 mL/kg/hr)]  Weight: 138.4 kg   Relevant Results  Results for orders placed or performed during the hospital encounter of 03/25/25 (from the past 24 hours)   POCT GLUCOSE   Result Value Ref Range    POCT Glucose 140 (H) 74 - 99 mg/dL   Renal function panel   Result Value Ref Range    Glucose 145 (H) 74 - 99 mg/dL    Sodium 129 (L) 136 - 145 mmol/L    Potassium 5.4 (H) 3.5 - 5.3 mmol/L    Chloride 96 (L) 98 - 107 mmol/L    Bicarbonate 22 21 - 32 mmol/L    Anion Gap 16 10 - 20 mmol/L    Urea Nitrogen 52 (H) 6 - 23 mg/dL    Creatinine 4.43 (H) 0.50 - 1.05 mg/dL    eGFR 10 (L) >60 mL/min/1.73m*2    Calcium 7.9 (L) 8.6 - 10.3 mg/dL    Phosphorus 6.3 (H) 2.5 - 4.9 mg/dL    Albumin 3.4 3.4 - 5.0 g/dL   Magnesium   Result Value Ref Range    Magnesium 2.18 1.60 - 2.40 mg/dL   POCT GLUCOSE   Result Value Ref Range    POCT Glucose 187 (H) 74 - 99 mg/dL   Potassium   Result Value Ref Range    Potassium 6.0 (H) 3.5 - 5.3 mmol/L   POCT GLUCOSE   Result Value Ref Range    POCT Glucose 166 (H) 74 - 99 mg/dL   Renal function panel   Result Value Ref Range    Glucose 75 74 - 99 mg/dL    Sodium 128 (L) 136 - 145 mmol/L    Potassium 5.6 (H) 3.5 - 5.3 mmol/L    Chloride 95 (L) 98 - 107 mmol/L    Bicarbonate 24 21 - 32 mmol/L    Anion Gap 15 10 - 20 mmol/L    Urea Nitrogen 57 (H) 6 - 23 mg/dL    Creatinine 4.50 (H) 0.50 - 1.05 mg/dL    eGFR 10 (L) >60 mL/min/1.73m*2    Calcium 8.1 (L) 8.6 - 10.3 mg/dL    Phosphorus 7.8 (H) 2.5 - 4.9 mg/dL    Albumin 3.3 (L) 3.4 - 5.0 g/dL   Magnesium   Result Value Ref Range    Magnesium 2.17 1.60 - 2.40 mg/dL   POCT GLUCOSE   Result Value Ref Range    POCT Glucose 75 74 - 99 mg/dL   CBC and Auto " Differential   Result Value Ref Range    WBC 8.3 4.4 - 11.3 x10*3/uL    nRBC 0.0 0.0 - 0.0 /100 WBCs    RBC 4.14 4.00 - 5.20 x10*6/uL    Hemoglobin 9.6 (L) 12.0 - 16.0 g/dL    Hematocrit 32.1 (L) 36.0 - 46.0 %    MCV 78 (L) 80 - 100 fL    MCH 23.2 (L) 26.0 - 34.0 pg    MCHC 29.9 (L) 32.0 - 36.0 g/dL    RDW 19.9 (H) 11.5 - 14.5 %    Platelets 177 150 - 450 x10*3/uL    Neutrophils % 71.2 40.0 - 80.0 %    Immature Granulocytes %, Automated 0.5 0.0 - 0.9 %    Lymphocytes % 13.5 13.0 - 44.0 %    Monocytes % 14.6 2.0 - 10.0 %    Eosinophils % 0.1 0.0 - 6.0 %    Basophils % 0.1 0.0 - 2.0 %    Neutrophils Absolute 5.89 (H) 1.60 - 5.50 x10*3/uL    Immature Granulocytes Absolute, Automated 0.04 0.00 - 0.50 x10*3/uL    Lymphocytes Absolute 1.12 0.80 - 3.00 x10*3/uL    Monocytes Absolute 1.21 (H) 0.05 - 0.80 x10*3/uL    Eosinophils Absolute 0.01 0.00 - 0.40 x10*3/uL    Basophils Absolute 0.01 0.00 - 0.10 x10*3/uL   Renal Function Panel   Result Value Ref Range    Glucose 73 (L) 74 - 99 mg/dL    Sodium 128 (L) 136 - 145 mmol/L    Potassium 5.4 (H) 3.5 - 5.3 mmol/L    Chloride 96 (L) 98 - 107 mmol/L    Bicarbonate 24 21 - 32 mmol/L    Anion Gap 13 10 - 20 mmol/L    Urea Nitrogen 57 (H) 6 - 23 mg/dL    Creatinine 4.43 (H) 0.50 - 1.05 mg/dL    eGFR 10 (L) >60 mL/min/1.73m*2    Calcium 7.7 (L) 8.6 - 10.3 mg/dL    Phosphorus 7.8 (H) 2.5 - 4.9 mg/dL    Albumin 3.1 (L) 3.4 - 5.0 g/dL   Magnesium   Result Value Ref Range    Magnesium 2.12 1.60 - 2.40 mg/dL     *Note: Due to a large number of results and/or encounters for the requested time period, some results have not been displayed. A complete set of results can be found in Results Review.   XR chest 1 view    Result Date: 3/27/2025  Interpreted By:  Roddy Sánchez, STUDY: XR CHEST 1 VIEW;  3/27/2025 8:34 pm   INDICATION: Signs/Symptoms:NG tube placement.     COMPARISON: 03/25/2025   ACCESSION NUMBER(S): [id number]   ORDERING CLINICIAN: MICHAEL STRONG   FINDINGS: NG/OG tube courses  below the diaphragm terminating over the gastric body.   The heart is enlarged. There is bibasilar airspace disease and pleural effusion. The pulmonary vasculature is congested and there is peribronchovascular thickening likely relate to interstitial edema. Right pleural effusion.   No pneumothorax.       Please see above.     MACRO: None.   Signed by: Roddy Sánchez 3/27/2025 9:27 PM Dictation workstation:   [id number]    Transthoracic Echo (TTE) Complete    Result Date: 3/26/2025           [place] [id number]            Phone [phone] TRANSTHORACIC ECHOCARDIOGRAM REPORT Patient Name:       BOB VALENZUELA JACQUE  Reading Physician:    [id number] Randolph Guadarrama DO Study Date:         3/26/2025            Ordering Provider:    [id number] STRONG MRN/PID:            [id number]             Fellow: Accession#:         [id number]         Nurse: Date of Birth/Age:  1950 / 74 years Sonographer:          Theresa Paul RDCS Gender Assigned at  F                    Additional Staff: Birth: Height:             162.56 cm            Admit Date: Weight:             136.99 kg            Admission Status:     Inpatient -                                                                Routine BSA / BMI:          2.33 m2 / 51.84      Department Location:  [facility]                     kg/m2 Blood Pressure: 88 /53 mmHg Study Type:    TRANSTHORACIC ECHO (TTE) COMPLETE Diagnosis/ICD: Shortness of breath-R06.02 Indication:    sob, chf CPT Codes:     Echo Complete w Full Doppler-[id number] Patient History: Diabetes:          Yes BMI:               Obese >30 Pertinent History: CHF, HTN and COPD. bmi 51, vent, resp failure w hypoxia, sob,                    anemia, pain, ckd, cough, enceph., pulm ht. Study  Detail: The following Echo studies were performed: 2D, M-Mode, Doppler and               color flow. Technically challenging study due to poor acoustic               windows, body habitus, patient lying in supine position and               prominent lung artifact. The patient is intubated. Definity used               as a contrast agent for endocardial border definition. Total               contrast used for this procedure was 3 mL via IV push.  PHYSICIAN INTERPRETATION: Left Ventricle: Left ventricular ejection fraction is mildly decreased, by visual estimate at 45-50%. There is moderate eccentric left ventricular hypertrophy. There is global hypokinesis of the left ventricle with minor regional variations. The left ventricular cavity size is mildly dilated. There is normal septal thickness. Spectral Doppler shows a Grade I (impaired relaxation pattern) of left ventricular diastolic filling with normal left atrial filling pressure. Left Atrium: The left atrial size is normal. Right Ventricle: The right ventricle is normal in size. There is normal right ventricular global systolic function. Right Atrium: The right atrial size is normal. Aortic Valve: The aortic valve appears structurally normal. The aortic valve dimensionless index is 0.47. There is no evidence of aortic valve regurgitation. The peak instantaneous gradient of the aortic valve is 8 mmHg. The mean gradient of the aortic valve is 4 mmHg. Mitral Valve: The mitral valve is normal in structure. There is no evidence of mitral valve regurgitation. Tricuspid Valve: The tricuspid valve is structurally normal. There is mild tricuspid regurgitation. The Doppler estimated RVSP is moderately elevated right ventricular systolic pressure at 56.0 mmHg. Pulmonic Valve: The pulmonic valve is structurally normal. There is no indication of pulmonic valve regurgitation. Pericardium: No pericardial effusion noted. Aorta: The aortic root is normal.  CONCLUSIONS:  1. Left  ventricular ejection fraction is mildly decreased, by visual estimate at 45-50%.  2. There is global hypokinesis of the left ventricle with minor regional variations.  3. Spectral Doppler shows a Grade I (impaired relaxation pattern) of left ventricular diastolic filling with normal left atrial filling pressure.  4. Left ventricular cavity size is mildly dilated.  5. There is normal right ventricular global systolic function.  6. Moderately elevated right ventricular systolic pressure. QUANTITATIVE DATA SUMMARY:  2D MEASUREMENTS:             Normal Ranges: LAs:             3.30 cm     (2.7-4.0cm) IVSd:            0.85 cm     (0.6-1.1cm) LVPWd:           0.95 cm     (0.6-1.1cm) LVIDd:           6.61 cm     (3.9-5.9cm) LV Mass Index:   109.2 g/m2 LVEDV Index:     71.91 ml/m2  LV SYSTOLIC FUNCTION:                      Normal Ranges: EF-A4C View:    42 % (>=55%) EF-A2C View:    47 % EF-Biplane:     47 % EF-Visual:      48 % LV EF Reported: 48 %  LV DIASTOLIC FUNCTION:           Normal Ranges: MV Peak E:             0.67 m/s  (0.7-1.2 m/s) MV Peak A:             0.45 m/s  (0.42-0.7 m/s) E/A Ratio:             1.50      (1.0-2.2) MV e'                  0.060 m/s (>8.0) MV lateral e'          0.06 m/s MV medial e'           0.06 m/s E/e' Ratio:            11.06     (<8.0) a'                     0.06 m/s  MITRAL VALVE:          Normal Ranges: MV DT:        223 msec (150-240msec)  AORTIC VALVE:                     Normal Ranges: AoV Vmax:                1.38 m/s (<=1.7m/s) AoV Peak P.6 mmHg (<20mmHg) AoV Mean P.0 mmHg (1.7-11.5mmHg) LVOT Max Eber:            0.59 m/s (<=1.1m/s) AoV VTI:                 27.50 cm (18-25cm) LVOT VTI:                12.90 cm LVOT Diameter:           2.00 cm  (1.8-2.4cm) AoV Area, VTI:           1.47 cm2 (2.5-5.5cm2) AoV Area,Vmax:           1.35 cm2 (2.5-4.5cm2) AoV Dimensionless Index: 0.47  RIGHT VENTRICLE: TAPSE: 21.4 mm RV s'  0.09 m/s  TRICUSPID VALVE/RVSP:           Normal Ranges: Peak TR Velocity:     3.20 m/s RV Syst Pressure:     56 mmHg  (< 30mmHg) IVC Diam:             3.65 cm  PULMONIC VALVE:          Normal Ranges: PV Accel Time:  87 msec  (>120ms) PV Max Eber:     0.9 m/s  (0.6-0.9m/s) PV Max PG:      3.5 mmHg  [id number] Randolph Guadarrama DO Electronically signed on 3/26/2025 at 2:46:04 PM  ** Final **     US renal complete    Result Date: 3/26/2025  Interpreted By:  Bob Lugo, STUDY: US RENAL COMPLETE;  3/26/2025 10:49 am   INDICATION: Signs/Symptoms:Acute kidney injury.     COMPARISON: None.   ACCESSION NUMBER(S): [id number]   ORDERING CLINICIAN: SHAE CHOI   TECHNIQUE: Real-time sonographic evaluation of the kidneys and urinary bladder was performed.   FINDINGS: RIGHT KIDNEY: Right kidney measures  9.2 cm.  No shadowing calculus or right hydronephrosis is visualized.  No discrete renal lesion is visualized.   LEFT KIDNEY: Left kidney measures  8.4 cm.  No shadowing calculus or left hydronephrosis is visualized.  No discrete renal lesion is visualized.   BLADDER: Urinary bladder is decompressed and could not be evaluated at this time.       No hydronephrosis bilaterally.   Urinary bladder decompressed and could not be evaluated at this time.   MACRO: None.   Signed by: Bob Lugo 3/26/2025 11:05 AM Dictation workstation:   [id number]    Assessment/Plan   Assessment & Plan  Acute respiratory failure with hypoxia    74 YOF with h/o asthma/COPD, HOLDEN, HTN, DLP, CHF, CKD IV, T2DM, hypothyroidism, anemia, MGUS, who p/w SOB x 2 weeks associated with cough productive of yellow sputum and chest tightness. In the ED work up revealed tachypnea, hypoxia, MARY ANN, BNP 700s, 7/21/78/140 on ABG and mild pulmonary edema on chest imaging. Received Solu-Medrol, Lasix, placed on BiPAP and admitted to ICU with the diagnosis of acute on chronic hypercarbic respiratory failure. Repeat ABG showed persistent hypercarbia while on BiPAP and  was consequently intubated after arrival  to the ICU.     Neuro: acute metabolic encephalopathy likely due to hypercarbia, anxiety and depression.        Continue home Zoloft and Gabapentin       Supportive measures     CV: h/o CHF, HTN, DLP, now probably with CHF exacerbation, given . Currently hemodynamically stable but BP slightly elevated       Home Lasix on hold, doses are being given PRN       Continue home Norvasc and hydralazine       Continue metoprolol       Continue home Atorvastatin, Zetia and ASA      Pulmonary: acute on chronic hypoxic hypercarbic respiratory failure 2/2 CHF vs COPD exacerbation. Initially required intubation and MV after failing NIV. H/o COPD, HOLDEN on CPAP and asthma. Successfully extubated to NC on 3/27.        Continue supplemental O2, wean off as tolerates       NIPPV if needed       Continue home Flonase, Singulair.        Continue Duo-Neb       Continue Solu-medrol 40 mg daily       BPH       Daily SBT    : MARY ANN on CKD IV, baseline Cr 2.2, now up to 4.43. Pre-renal vs cardiorenal. Urine output now is improved. Cr stable since yesterday. K elevated, required multiple doses.        Is/Os       Daily RFP       Treat electrolyte abnormalities as indicated       Nephrology consult is done, input appreciated       Lasix PRN to help with UO.     GI: no acute issues       Will start on a diet, after SLP evaluation is done,        Otherwise can continue the TF       GI prophylaxis with PPI while intubated, can DC now       Bowel regimen    Endo: h/o T2DM, hypothyroidism. Currently no acute issues       Continue home Synthroid       Hold home dulaglutide and semaglutide       POCT every 4 hours       SSI       Hypoglycemic protocol    Hem/Onc: h/o anemia of chronic diseases likely due to MGUS.        Continue to monitor with daily CBC    ID: no acute issues.        Completed a course of Azithromycin       Continue to monitor for S&S infection.       DVT prophylaxis with subcutaneous heparin.     This was a critical care  visit due to respiratory failure. Total time 38 min.     Charisse Chavez MD

## 2025-03-28 NOTE — CARE PLAN
The patient's goals for the shift include      The clinical goals for the shift include patient will maintain oxygen sat >90% this shift.    O  Problem: Skin  Goal: Decreased wound size/increased tissue granulation at next dressing change  Flowsheets (Taken 3/28/2025 1959)  Decreased wound size/increased tissue granulation at next dressing change:   Promote sleep for wound healing   Protective dressings over bony prominences   Utilize specialty bed per algorithm  Goal: Participates in plan/prevention/treatment measures  Flowsheets (Taken 3/28/2025 1959)  Participates in plan/prevention/treatment measures:   Discuss with provider PT/OT consult   Elevate heels   Increase activity/out of bed for meals  Goal: Prevent/manage excess moisture  Flowsheets (Taken 3/28/2025 1959)  Prevent/manage excess moisture:   Monitor for/manage infection if present   Cleanse incontinence/protect with barrier cream   Follow provider orders for dressing changes   Use wicking fabric (obtain order)   Moisturize dry skin  Goal: Prevent/minimize sheer/friction injuries  Flowsheets (Taken 3/28/2025 1959)  Prevent/minimize sheer/friction injuries:   Use pull sheet   Increase activity/out of bed for meals   Complete micro-shifts as needed if patient unable. Adjust patient position to relieve pressure points, not a full turn   Utilize specialty bed per algorithm   Turn/reposition every 2 hours/use positioning/transfer devices   HOB 30 degrees or less  Goal: Promote/optimize nutrition  Flowsheets (Taken 3/28/2025 1959)  Promote/optimize nutrition:   Monitor/record intake including meals   Assist with feeding   Consume > 50% meals/supplements   Reassess MST if dietician not consulted   Discuss with provider if NPO > 2 days  Goal: Promote skin healing  Flowsheets (Taken 3/28/2025 1959)  Promote skin healing:   Turn/reposition every 2 hours/use positioning/transfer devices   Protective dressings over bony prominences   Rotate device position/do not  position patient on device   Assess skin/pad under line(s)/device(s)   Ensure correct size (line/device) and apply per  instructions     Problem: Skin  Goal: Participates in plan/prevention/treatment measures  Flowsheets (Taken 3/28/2025 1959)  Participates in plan/prevention/treatment measures:   Discuss with provider PT/OT consult   Elevate heels   Increase activity/out of bed for meals

## 2025-03-28 NOTE — PROGRESS NOTES
"Analia Murray is a 74 y.o. female on day 3 of admission presenting with Acute respiratory failure with hypoxia.    Subjective    Patient seen for acute renal failure on top of chronic kidney disease stage 3 admitted with pneumonia acute respiratory failure was extubated she is currently on 4 L of oxygen by nasal cannula awake and responsive family by the bedside patient was given 1 dose of IV furosemide last night she responded very well she made this morning or 300 cc of urine over 3 hours  Objective     Physical Exam  Constitutional:       General: She is not in acute distress.     Appearance: Normal appearance. She is not toxic-appearing.      Comments: Is extubated of the vent on nasal cannula   Neck:      Vascular: No carotid bruit.   Cardiovascular:      Heart sounds: No murmur heard.     No friction rub. No gallop.   Pulmonary:      Breath sounds: No wheezing, rhonchi or rales.   Abdominal:      Tenderness: There is no abdominal tenderness. There is no right CVA tenderness, left CVA tenderness or rebound.   Musculoskeletal:         General: No tenderness.      Cervical back: Neck supple.      Right lower leg: No edema.      Left lower leg: No edema.   Lymphadenopathy:      Cervical: No cervical adenopathy.         Last Recorded Vitals  Blood pressure 142/69, pulse 71, temperature 35.9 °C (96.6 °F), temperature source Temporal, resp. rate 15, height 1.626 m (5' 4.02\"), weight 138 kg (305 lb 1.9 oz), SpO2 92%.    Intake/Output last 3 Shifts:  I/O last 3 completed shifts:  In: 3651.7 (26.4 mL/kg) [I.V.:3048.4 (22 mL/kg); NG/GT:240; IV Piggyback:363.3]  Out: 1680 (12.1 mL/kg) [Urine:1680 (0.3 mL/kg/hr)]  Weight: 138.4 kg     Current Facility-Administered Medications:     allopurinol (Zyloprim) tablet 50 mg, 50 mg, oral, Every other day, Sanjiv E Hipps, PA-C, 50 mg at 03/27/25 0900    amLODIPine (Norvasc) tablet 10 mg, 10 mg, oral, Daily, Sanjiv E Hipps, PA-C, 10 mg at 03/28/25 0948    aspirin chewable tablet 81 " mg, 81 mg, oral, Daily, Charisse Chavez MD, 81 mg at 03/28/25 0950    [COMPLETED] insulin regular (HumuLIN R,NovoLIN R) injection 10 Units, 10 Units, intravenous, Once, 10 Units at 03/28/25 0544 **FOLLOWED BY** [COMPLETED] dextrose 50 % injection 25 g, 25 g, intravenous, Once, 25 g at 03/28/25 0548 **FOLLOWED BY** dextrose 10 % in water (D10W) bolus, 50 mL/hr, intravenous, Continuous, Sanjiv E Hipps, PA-C, Last Rate: 50 mL/hr at 03/28/25 0700, 50 mL/hr at 03/28/25 0700    dextrose 50 % injection 12.5 g, 12.5 g, intravenous, q15 min PRN, Sanjiv E Hipps, PA-C    dextrose 50 % injection 25 g, 25 g, intravenous, q15 min PRN, Sanjiv E Hipps, PA-C, 25 g at 03/28/25 0101    ezetimibe (Zetia) tablet 10 mg, 10 mg, oral, Daily, Sanjiv E Hipps, PA-C, 10 mg at 03/28/25 0950    fluticasone (Flonase) nasal spray 2 spray, 2 spray, Each Nostril, BID, Sanjiv E Hipps, PA-C, 2 spray at 03/28/25 1047    gabapentin (Neurontin) capsule 100 mg, 100 mg, oral, TID, Sanjiv E Hipps, PA-C, 100 mg at 03/28/25 0951    glucagon (Glucagen) injection 1 mg, 1 mg, intramuscular, q15 min PRN, Sanjiv E Hipps, PA-C    glucagon (Glucagen) injection 1 mg, 1 mg, intramuscular, q15 min PRN, Sanjiv E Hipps, PA-C    heparin (porcine) injection 7,500 Units, 7,500 Units, subcutaneous, q8h JAYA, Sanjiv E Hipps, PA-C, 7,500 Units at 03/28/25 0545    hydrALAZINE (Apresoline) tablet 50 mg, 50 mg, oral, BID, Sanjiv E Hipps, PA-C, 50 mg at 03/28/25 0952    insulin lispro injection 0-10 Units, 0-10 Units, subcutaneous, q4h, Sanjiv Neil PA-C, 2 Units at 03/27/25 2036    ipratropium-albuteroL (Duo-Neb) 0.5-2.5 mg/3 mL nebulizer solution 3 mL, 3 mL, nebulization, q6h, Sanjiv Neil PA-C, 3 mL at 03/28/25 0749    levothyroxine (Synthroid, Levoxyl) tablet 200 mcg, 200 mcg, oral, Daily, Sanjiv Neil PA-C, 200 mcg at 03/28/25 0545    levothyroxine (Synthroid, Levoxyl) tablet 200 mcg, 200 mcg, oral, Every Wednesday, Charisse Chavez MD    methylPREDNISolone sod succinate (SOLU-Medrol) 40  mg/mL injection 40 mg, 40 mg, intravenous, Daily, Sanjiv E Hipps, PA-C, 40 mg at 03/28/25 0946    [Held by provider] metoprolol succinate XL (Toprol-XL) 24 hr tablet 100 mg, 100 mg, oral, Daily, Sanjiv E Hipps, PA-C    metoprolol tartrate (Lopressor) tablet 50 mg, 50 mg, oral, BID, Sanjiv E Marcoss, PA-C, 50 mg at 03/28/25 0953    montelukast (Singulair) tablet 10 mg, 10 mg, oral, Nightly, Sanjiv E Hipps, PA-C, 10 mg at 03/27/25 2031    [Held by provider] nitroglycerin (Nitrostat) SL tablet 0.4 mg, 0.4 mg, sublingual, q5 min PRN, Sanjiv VAZQUEZ Hipps, PA-C    nystatin (Mycostatin) 100,000 unit/gram powder 1 Application, 1 Application, Topical, TID, Sanjiv Neil, PA-C, 1 Application at 03/28/25 0948    oxygen (O2) therapy, , inhalation, Continuous - Inhalation, Charisse Chavez MD, Last Rate: 240,000 mL/hr at 03/27/25 2029, 40 percent at 03/28/25 0750    polyethylene glycol (Glycolax, Miralax) packet 17 g, 17 g, oral, Daily, Sanjiv E Hipps, PA-C, 17 g at 03/28/25 0954    pravastatin (Pravachol) tablet 40 mg, 40 mg, oral, Nightly, Sanjiv E Hipps, PA-C, 40 mg at 03/27/25 2031    sertraline (Zoloft) tablet 50 mg, 50 mg, oral, Daily, Sanjiv E Hipps, PA-C, 50 mg at 03/28/25 0953    sodium zirconium cyclosilicate (Lokelma) packet 10 g, 10 g, oral, q8h, Sanjiv E Hipps, PA-C, 10 g at 03/28/25 0401    sulfur hexafluoride microsphr (Lumason) injection 24.28 mg, 2 mL, intravenous, Once in imaging, Sanjiv Rodríguezs PA-C   Relevant Results    Results for orders placed or performed during the hospital encounter of 03/25/25 (from the past 96 hours)   CBC and Auto Differential   Result Value Ref Range    WBC 10.5 4.4 - 11.3 x10*3/uL    nRBC 0.0 0.0 - 0.0 /100 WBCs    RBC 4.45 4.00 - 5.20 x10*6/uL    Hemoglobin 10.4 (L) 12.0 - 16.0 g/dL    Hematocrit 35.8 (L) 36.0 - 46.0 %    MCV 80 80 - 100 fL    MCH 23.4 (L) 26.0 - 34.0 pg    MCHC 29.1 (L) 32.0 - 36.0 g/dL    RDW 20.0 (H) 11.5 - 14.5 %    Platelets 170 150 - 450 x10*3/uL    Neutrophils % 71.3 40.0 - 80.0 %     Immature Granulocytes %, Automated 0.5 0.0 - 0.9 %    Lymphocytes % 11.1 13.0 - 44.0 %    Monocytes % 9.8 2.0 - 10.0 %    Eosinophils % 7.0 0.0 - 6.0 %    Basophils % 0.3 0.0 - 2.0 %    Neutrophils Absolute 7.49 (H) 1.60 - 5.50 x10*3/uL    Immature Granulocytes Absolute, Automated 0.05 0.00 - 0.50 x10*3/uL    Lymphocytes Absolute 1.17 0.80 - 3.00 x10*3/uL    Monocytes Absolute 1.03 (H) 0.05 - 0.80 x10*3/uL    Eosinophils Absolute 0.73 (H) 0.00 - 0.40 x10*3/uL    Basophils Absolute 0.03 0.00 - 0.10 x10*3/uL   Comprehensive metabolic panel   Result Value Ref Range    Glucose 141 (H) 74 - 99 mg/dL    Sodium 138 136 - 145 mmol/L    Potassium 4.5 3.5 - 5.3 mmol/L    Chloride 103 98 - 107 mmol/L    Bicarbonate 27 21 - 32 mmol/L    Anion Gap 13 10 - 20 mmol/L    Urea Nitrogen 30 (H) 6 - 23 mg/dL    Creatinine 2.32 (H) 0.50 - 1.05 mg/dL    eGFR 21 (L) >60 mL/min/1.73m*2    Calcium 8.6 8.6 - 10.3 mg/dL    Albumin 3.6 3.4 - 5.0 g/dL    Alkaline Phosphatase 90 33 - 136 U/L    Total Protein 7.7 6.4 - 8.2 g/dL    AST 10 9 - 39 U/L    Bilirubin, Total 0.5 0.0 - 1.2 mg/dL    ALT 7 7 - 45 U/L   B-type natriuretic peptide   Result Value Ref Range     (H) 0 - 99 pg/mL   Sars-CoV-2, Influenza A/B and RSV PCR   Result Value Ref Range    Coronavirus 2019, PCR Not Detected Not Detected    Flu A Result Not Detected Not Detected    Flu B Result Not Detected Not Detected    RSV PCR Not Detected Not Detected   Troponin I, High Sensitivity, Initial   Result Value Ref Range    Troponin I, High Sensitivity 14 (H) 0 - 13 ng/L   TSH with reflex to Free T4 if abnormal   Result Value Ref Range    Thyroid Stimulating Hormone 11.37 (H) 0.44 - 3.98 mIU/L   Thyroxine, Free   Result Value Ref Range    Thyroxine, Free 0.86 0.61 - 1.12 ng/dL   Troponin, High Sensitivity, 1 Hour   Result Value Ref Range    Troponin I, High Sensitivity 13 0 - 13 ng/L   BLOOD GAS ARTERIAL FULL PANEL   Result Value Ref Range    POCT pH, Arterial 7.21 (LL) 7.38 -  7.42 pH    POCT pCO2, Arterial 78 (HH) 38 - 42 mm Hg    POCT pO2, Arterial 140 (H) 85 - 95 mm Hg    POCT SO2, Arterial 99 94 - 100 %    POCT Oxy Hemoglobin, Arterial 97.0 94.0 - 98.0 %    POCT Hematocrit Calculated, Arterial 32.0 (L) 36.0 - 46.0 %    POCT Sodium, Arterial 139 136 - 145 mmol/L    POCT Potassium, Arterial 5.0 3.5 - 5.3 mmol/L    POCT Chloride, Arterial 104 98 - 107 mmol/L    POCT Ionized Calcium, Arterial 1.21 1.10 - 1.33 mmol/L    POCT Glucose, Arterial 155 (H) 74 - 99 mg/dL    POCT Lactate, Arterial 0.5 0.4 - 2.0 mmol/L    POCT Base Excess, Arterial 1.8 -2.0 - 3.0 mmol/L    POCT HCO3 Calculated, Arterial 31.2 (H) 22.0 - 26.0 mmol/L    POCT Hemoglobin, Arterial 10.5 (L) 12.0 - 16.0 g/dL    POCT Anion Gap, Arterial 9 (L) 10 - 25 mmo/L    Patient Temperature 37.0 degrees Celsius    FiO2 36 %    Apparatus CANNULA     Site of Arterial Puncture Radial Right     Wes's Test Positive    BLOOD GAS ARTERIAL FULL PANEL   Result Value Ref Range    POCT pH, Arterial 7.22 (LL) 7.38 - 7.42 pH    POCT pCO2, Arterial 72 (HH) 38 - 42 mm Hg    POCT pO2, Arterial 90 85 - 95 mm Hg    POCT SO2, Arterial 97 94 - 100 %    POCT Oxy Hemoglobin, Arterial 94.3 94.0 - 98.0 %    POCT Hematocrit Calculated, Arterial 32.0 (L) 36.0 - 46.0 %    POCT Sodium, Arterial 137 136 - 145 mmol/L    POCT Potassium, Arterial 5.1 3.5 - 5.3 mmol/L    POCT Chloride, Arterial 105 98 - 107 mmol/L    POCT Ionized Calcium, Arterial 1.13 1.10 - 1.33 mmol/L    POCT Glucose, Arterial 160 (H) 74 - 99 mg/dL    POCT Lactate, Arterial 0.5 0.4 - 2.0 mmol/L    POCT Base Excess, Arterial 0.5 -2.0 - 3.0 mmol/L    POCT HCO3 Calculated, Arterial 29.5 (H) 22.0 - 26.0 mmol/L    POCT Hemoglobin, Arterial 10.6 (L) 12.0 - 16.0 g/dL    POCT Anion Gap, Arterial 8 (L) 10 - 25 mmo/L    Patient Temperature 37.0 degrees Celsius    FiO2 40 %    Apparatus      Ventilator Mode BiPAP     Ventilator Rate 22 bpm    Ipap CMH2O 18.0 cm H2O    Epap CMH2O 5.0 cm H2O    Site of  Arterial Puncture Brachial Right     Wes's Test Positive    Hemoglobin A1c   Result Value Ref Range    Hemoglobin A1C 5.8 (H) See comment %    Estimated Average Glucose 120 Not Established mg/dL   CBC and Auto Differential   Result Value Ref Range    WBC 8.4 4.4 - 11.3 x10*3/uL    nRBC 0.0 0.0 - 0.0 /100 WBCs    RBC 4.30 4.00 - 5.20 x10*6/uL    Hemoglobin 9.9 (L) 12.0 - 16.0 g/dL    Hematocrit 34.5 (L) 36.0 - 46.0 %    MCV 80 80 - 100 fL    MCH 23.0 (L) 26.0 - 34.0 pg    MCHC 28.7 (L) 32.0 - 36.0 g/dL    RDW 19.9 (H) 11.5 - 14.5 %    Platelets 147 (L) 150 - 450 x10*3/uL    Neutrophils % 93.9 40.0 - 80.0 %    Immature Granulocytes %, Automated 0.5 0.0 - 0.9 %    Lymphocytes % 3.2 13.0 - 44.0 %    Monocytes % 1.9 2.0 - 10.0 %    Eosinophils % 0.4 0.0 - 6.0 %    Basophils % 0.1 0.0 - 2.0 %    Neutrophils Absolute 7.88 (H) 1.60 - 5.50 x10*3/uL    Immature Granulocytes Absolute, Automated 0.04 0.00 - 0.50 x10*3/uL    Lymphocytes Absolute 0.27 (L) 0.80 - 3.00 x10*3/uL    Monocytes Absolute 0.16 0.05 - 0.80 x10*3/uL    Eosinophils Absolute 0.03 0.00 - 0.40 x10*3/uL    Basophils Absolute 0.01 0.00 - 0.10 x10*3/uL   Renal Function Panel   Result Value Ref Range    Glucose 167 (H) 74 - 99 mg/dL    Sodium 138 136 - 145 mmol/L    Potassium 5.3 3.5 - 5.3 mmol/L    Chloride 104 98 - 107 mmol/L    Bicarbonate 27 21 - 32 mmol/L    Anion Gap 12 10 - 20 mmol/L    Urea Nitrogen 32 (H) 6 - 23 mg/dL    Creatinine 2.42 (H) 0.50 - 1.05 mg/dL    eGFR 20 (L) >60 mL/min/1.73m*2    Calcium 8.5 (L) 8.6 - 10.3 mg/dL    Phosphorus 5.3 (H) 2.5 - 4.9 mg/dL    Albumin 3.4 3.4 - 5.0 g/dL   Magnesium   Result Value Ref Range    Magnesium 1.91 1.60 - 2.40 mg/dL   BLOOD GAS ARTERIAL FULL PANEL   Result Value Ref Range    POCT pH, Arterial 7.35 (L) 7.38 - 7.42 pH    POCT pCO2, Arterial 49 (H) 38 - 42 mm Hg    POCT pO2, Arterial 352 (H) 85 - 95 mm Hg    POCT SO2, Arterial 100 94 - 100 %    POCT Oxy Hemoglobin, Arterial 97.5 94.0 - 98.0 %    POCT  Hematocrit Calculated, Arterial 29.0 (L) 36.0 - 46.0 %    POCT Sodium, Arterial 134 (L) 136 - 145 mmol/L    POCT Potassium, Arterial 5.7 (H) 3.5 - 5.3 mmol/L    POCT Chloride, Arterial 105 98 - 107 mmol/L    POCT Ionized Calcium, Arterial 1.10 1.10 - 1.33 mmol/L    POCT Glucose, Arterial 210 (H) 74 - 99 mg/dL    POCT Lactate, Arterial 0.7 0.4 - 2.0 mmol/L    POCT Base Excess, Arterial 1.1 -2.0 - 3.0 mmol/L    POCT HCO3 Calculated, Arterial 27.1 (H) 22.0 - 26.0 mmol/L    POCT Hemoglobin, Arterial 9.7 (L) 12.0 - 16.0 g/dL    POCT Anion Gap, Arterial 8 (L) 10 - 25 mmo/L    Patient Temperature 37.0 degrees Celsius    FiO2 100 %    Ventilator Mode A/C     Ventilator Rate 18 bpm    Tidal Volume 420 mL    Spontaneous Tidal Volume 421 mL    Peep CHM2O 5.0 cm H2O    Site of Arterial Puncture Radial Right     Wes's Test Positive    POCT GLUCOSE   Result Value Ref Range    POCT Glucose 200 (H) 74 - 99 mg/dL   POCT GLUCOSE   Result Value Ref Range    POCT Glucose 169 (H) 74 - 99 mg/dL   Renal function panel   Result Value Ref Range    Glucose 171 (H) 74 - 99 mg/dL    Sodium 136 136 - 145 mmol/L    Potassium 5.0 3.5 - 5.3 mmol/L    Chloride 103 98 - 107 mmol/L    Bicarbonate 24 21 - 32 mmol/L    Anion Gap 14 10 - 20 mmol/L    Urea Nitrogen 38 (H) 6 - 23 mg/dL    Creatinine 2.76 (H) 0.50 - 1.05 mg/dL    eGFR 17 (L) >60 mL/min/1.73m*2    Calcium 8.0 (L) 8.6 - 10.3 mg/dL    Phosphorus 4.1 2.5 - 4.9 mg/dL    Albumin 3.1 (L) 3.4 - 5.0 g/dL   Magnesium   Result Value Ref Range    Magnesium 2.16 1.60 - 2.40 mg/dL   POCT GLUCOSE   Result Value Ref Range    POCT Glucose 165 (H) 74 - 99 mg/dL   POCT GLUCOSE   Result Value Ref Range    POCT Glucose 128 (H) 74 - 99 mg/dL   POCT GLUCOSE   Result Value Ref Range    POCT Glucose 142 (H) 74 - 99 mg/dL   BLOOD GAS ARTERIAL FULL PANEL   Result Value Ref Range    POCT pH, Arterial 7.45 (H) 7.38 - 7.42 pH    POCT pCO2, Arterial 36 (L) 38 - 42 mm Hg    POCT pO2, Arterial 102 (H) 85 - 95 mm Hg     POCT SO2, Arterial 100 94 - 100 %    POCT Oxy Hemoglobin, Arterial 98.3 (H) 94.0 - 98.0 %    POCT Hematocrit Calculated, Arterial 27.0 (L) 36.0 - 46.0 %    POCT Sodium, Arterial 132 (L) 136 - 145 mmol/L    POCT Potassium, Arterial 5.9 (H) 3.5 - 5.3 mmol/L    POCT Chloride, Arterial 104 98 - 107 mmol/L    POCT Ionized Calcium, Arterial 1.13 1.10 - 1.33 mmol/L    POCT Glucose, Arterial 146 (H) 74 - 99 mg/dL    POCT Lactate, Arterial 0.9 0.4 - 2.0 mmol/L    POCT Base Excess, Arterial 1.1 -2.0 - 3.0 mmol/L    POCT HCO3 Calculated, Arterial 25.0 22.0 - 26.0 mmol/L    POCT Hemoglobin, Arterial 9.0 (L) 12.0 - 16.0 g/dL    POCT Anion Gap, Arterial 9 (L) 10 - 25 mmo/L    Patient Temperature 37.0 degrees Celsius    FiO2 50 %    Apparatus      Ventilator Mode      Ventilator Rate 20 bpm    Tidal Volume 420 mL    Peep CHM2O 5.0 cm H2O    Site of Arterial Puncture Arterial Line    POCT GLUCOSE   Result Value Ref Range    POCT Glucose 138 (H) 74 - 99 mg/dL   CBC and Auto Differential   Result Value Ref Range    WBC 9.4 4.4 - 11.3 x10*3/uL    nRBC 0.0 0.0 - 0.0 /100 WBCs    RBC 4.29 4.00 - 5.20 x10*6/uL    Hemoglobin 9.9 (L) 12.0 - 16.0 g/dL    Hematocrit 32.8 (L) 36.0 - 46.0 %    MCV 77 (L) 80 - 100 fL    MCH 23.1 (L) 26.0 - 34.0 pg    MCHC 30.2 (L) 32.0 - 36.0 g/dL    RDW 20.0 (H) 11.5 - 14.5 %    Platelets 171 150 - 450 x10*3/uL    Neutrophils % 72.4 40.0 - 80.0 %    Immature Granulocytes %, Automated 0.4 0.0 - 0.9 %    Lymphocytes % 18.0 13.0 - 44.0 %    Monocytes % 9.1 2.0 - 10.0 %    Eosinophils % 0.0 0.0 - 6.0 %    Basophils % 0.1 0.0 - 2.0 %    Neutrophils Absolute 6.80 (H) 1.60 - 5.50 x10*3/uL    Immature Granulocytes Absolute, Automated 0.04 0.00 - 0.50 x10*3/uL    Lymphocytes Absolute 1.69 0.80 - 3.00 x10*3/uL    Monocytes Absolute 0.85 (H) 0.05 - 0.80 x10*3/uL    Eosinophils Absolute 0.00 0.00 - 0.40 x10*3/uL    Basophils Absolute 0.01 0.00 - 0.10 x10*3/uL   Renal Function Panel   Result Value Ref Range    Glucose 158  (H) 74 - 99 mg/dL    Sodium 134 (L) 136 - 145 mmol/L    Potassium 5.1 3.5 - 5.3 mmol/L    Chloride 100 98 - 107 mmol/L    Bicarbonate 23 21 - 32 mmol/L    Anion Gap 16 10 - 20 mmol/L    Urea Nitrogen 41 (H) 6 - 23 mg/dL    Creatinine 3.47 (H) 0.50 - 1.05 mg/dL    eGFR 13 (L) >60 mL/min/1.73m*2    Calcium 8.4 (L) 8.6 - 10.3 mg/dL    Phosphorus 4.4 2.5 - 4.9 mg/dL    Albumin 3.2 (L) 3.4 - 5.0 g/dL   Magnesium   Result Value Ref Range    Magnesium 2.48 (H) 1.60 - 2.40 mg/dL   POCT GLUCOSE   Result Value Ref Range    POCT Glucose 121 (H) 74 - 99 mg/dL   Transthoracic Echo (TTE) Complete   Result Value Ref Range    AV pk clem 1.38 m/s    LVOT diam 2.00 cm    AV mn grad 4 mmHg    MV E/A ratio 1.50     Tricuspid annular plane systolic excursion 2.1 cm    LV Biplane EF 47 %    LV EF 48 %    RV free wall pk S' 9.36 cm/s    RVSP 56.0 mmHg    LVIDd 6.61 cm    AV pk grad 8 mmHg    Aortic Valve Area by Continuity of VTI 1.47 cm2    Aortic Valve Area by Continuity of Peak Velocity 1.35 cm2    LV A4C EF 41.6    POCT GLUCOSE   Result Value Ref Range    POCT Glucose 120 (H) 74 - 99 mg/dL   Renal function panel   Result Value Ref Range    Glucose 176 (H) 74 - 99 mg/dL    Sodium 133 (L) 136 - 145 mmol/L    Potassium 5.1 3.5 - 5.3 mmol/L    Chloride 99 98 - 107 mmol/L    Bicarbonate 23 21 - 32 mmol/L    Anion Gap 16 10 - 20 mmol/L    Urea Nitrogen 48 (H) 6 - 23 mg/dL    Creatinine 4.03 (H) 0.50 - 1.05 mg/dL    eGFR 11 (L) >60 mL/min/1.73m*2    Calcium 8.3 (L) 8.6 - 10.3 mg/dL    Phosphorus 4.8 2.5 - 4.9 mg/dL    Albumin 3.3 (L) 3.4 - 5.0 g/dL   Magnesium   Result Value Ref Range    Magnesium 2.24 1.60 - 2.40 mg/dL   POCT GLUCOSE   Result Value Ref Range    POCT Glucose 193 (H) 74 - 99 mg/dL   POCT GLUCOSE   Result Value Ref Range    POCT Glucose 146 (H) 74 - 99 mg/dL   POCT GLUCOSE   Result Value Ref Range    POCT Glucose 126 (H) 74 - 99 mg/dL   POCT GLUCOSE   Result Value Ref Range    POCT Glucose 96 74 - 99 mg/dL   CBC and Auto  Differential   Result Value Ref Range    WBC 9.9 4.4 - 11.3 x10*3/uL    nRBC 0.0 0.0 - 0.0 /100 WBCs    RBC 4.12 4.00 - 5.20 x10*6/uL    Hemoglobin 9.4 (L) 12.0 - 16.0 g/dL    Hematocrit 30.7 (L) 36.0 - 46.0 %    MCV 75 (L) 80 - 100 fL    MCH 22.8 (L) 26.0 - 34.0 pg    MCHC 30.6 (L) 32.0 - 36.0 g/dL    RDW 19.9 (H) 11.5 - 14.5 %    Platelets 196 150 - 450 x10*3/uL    Neutrophils % 73.9 40.0 - 80.0 %    Immature Granulocytes %, Automated 0.5 0.0 - 0.9 %    Lymphocytes % 15.7 13.0 - 44.0 %    Monocytes % 9.6 2.0 - 10.0 %    Eosinophils % 0.1 0.0 - 6.0 %    Basophils % 0.2 0.0 - 2.0 %    Neutrophils Absolute 7.32 (H) 1.60 - 5.50 x10*3/uL    Immature Granulocytes Absolute, Automated 0.05 0.00 - 0.50 x10*3/uL    Lymphocytes Absolute 1.55 0.80 - 3.00 x10*3/uL    Monocytes Absolute 0.95 (H) 0.05 - 0.80 x10*3/uL    Eosinophils Absolute 0.01 0.00 - 0.40 x10*3/uL    Basophils Absolute 0.02 0.00 - 0.10 x10*3/uL   Renal Function Panel   Result Value Ref Range    Glucose 99 74 - 99 mg/dL    Sodium 131 (L) 136 - 145 mmol/L    Potassium 5.2 3.5 - 5.3 mmol/L    Chloride 98 98 - 107 mmol/L    Bicarbonate 23 21 - 32 mmol/L    Anion Gap 15 10 - 20 mmol/L    Urea Nitrogen 53 (H) 6 - 23 mg/dL    Creatinine 4.29 (H) 0.50 - 1.05 mg/dL    eGFR 10 (L) >60 mL/min/1.73m*2    Calcium 7.9 (L) 8.6 - 10.3 mg/dL    Phosphorus 5.3 (H) 2.5 - 4.9 mg/dL    Albumin 3.1 (L) 3.4 - 5.0 g/dL   Magnesium   Result Value Ref Range    Magnesium 2.19 1.60 - 2.40 mg/dL   POCT GLUCOSE   Result Value Ref Range    POCT Glucose 127 (H) 74 - 99 mg/dL   POCT GLUCOSE   Result Value Ref Range    POCT Glucose 140 (H) 74 - 99 mg/dL   Renal function panel   Result Value Ref Range    Glucose 145 (H) 74 - 99 mg/dL    Sodium 129 (L) 136 - 145 mmol/L    Potassium 5.4 (H) 3.5 - 5.3 mmol/L    Chloride 96 (L) 98 - 107 mmol/L    Bicarbonate 22 21 - 32 mmol/L    Anion Gap 16 10 - 20 mmol/L    Urea Nitrogen 52 (H) 6 - 23 mg/dL    Creatinine 4.43 (H) 0.50 - 1.05 mg/dL    eGFR 10 (L)  >60 mL/min/1.73m*2    Calcium 7.9 (L) 8.6 - 10.3 mg/dL    Phosphorus 6.3 (H) 2.5 - 4.9 mg/dL    Albumin 3.4 3.4 - 5.0 g/dL   Magnesium   Result Value Ref Range    Magnesium 2.18 1.60 - 2.40 mg/dL   POCT GLUCOSE   Result Value Ref Range    POCT Glucose 187 (H) 74 - 99 mg/dL   Potassium   Result Value Ref Range    Potassium 6.0 (H) 3.5 - 5.3 mmol/L   POCT GLUCOSE   Result Value Ref Range    POCT Glucose 166 (H) 74 - 99 mg/dL   Renal function panel   Result Value Ref Range    Glucose 75 74 - 99 mg/dL    Sodium 128 (L) 136 - 145 mmol/L    Potassium 5.6 (H) 3.5 - 5.3 mmol/L    Chloride 95 (L) 98 - 107 mmol/L    Bicarbonate 24 21 - 32 mmol/L    Anion Gap 15 10 - 20 mmol/L    Urea Nitrogen 57 (H) 6 - 23 mg/dL    Creatinine 4.50 (H) 0.50 - 1.05 mg/dL    eGFR 10 (L) >60 mL/min/1.73m*2    Calcium 8.1 (L) 8.6 - 10.3 mg/dL    Phosphorus 7.8 (H) 2.5 - 4.9 mg/dL    Albumin 3.3 (L) 3.4 - 5.0 g/dL   Magnesium   Result Value Ref Range    Magnesium 2.17 1.60 - 2.40 mg/dL   POCT GLUCOSE   Result Value Ref Range    POCT Glucose 75 74 - 99 mg/dL   CBC and Auto Differential   Result Value Ref Range    WBC 8.3 4.4 - 11.3 x10*3/uL    nRBC 0.0 0.0 - 0.0 /100 WBCs    RBC 4.14 4.00 - 5.20 x10*6/uL    Hemoglobin 9.6 (L) 12.0 - 16.0 g/dL    Hematocrit 32.1 (L) 36.0 - 46.0 %    MCV 78 (L) 80 - 100 fL    MCH 23.2 (L) 26.0 - 34.0 pg    MCHC 29.9 (L) 32.0 - 36.0 g/dL    RDW 19.9 (H) 11.5 - 14.5 %    Platelets 177 150 - 450 x10*3/uL    Neutrophils % 71.2 40.0 - 80.0 %    Immature Granulocytes %, Automated 0.5 0.0 - 0.9 %    Lymphocytes % 13.5 13.0 - 44.0 %    Monocytes % 14.6 2.0 - 10.0 %    Eosinophils % 0.1 0.0 - 6.0 %    Basophils % 0.1 0.0 - 2.0 %    Neutrophils Absolute 5.89 (H) 1.60 - 5.50 x10*3/uL    Immature Granulocytes Absolute, Automated 0.04 0.00 - 0.50 x10*3/uL    Lymphocytes Absolute 1.12 0.80 - 3.00 x10*3/uL    Monocytes Absolute 1.21 (H) 0.05 - 0.80 x10*3/uL    Eosinophils Absolute 0.01 0.00 - 0.40 x10*3/uL    Basophils Absolute 0.01  0.00 - 0.10 x10*3/uL   Renal Function Panel   Result Value Ref Range    Glucose 73 (L) 74 - 99 mg/dL    Sodium 128 (L) 136 - 145 mmol/L    Potassium 5.4 (H) 3.5 - 5.3 mmol/L    Chloride 96 (L) 98 - 107 mmol/L    Bicarbonate 24 21 - 32 mmol/L    Anion Gap 13 10 - 20 mmol/L    Urea Nitrogen 57 (H) 6 - 23 mg/dL    Creatinine 4.43 (H) 0.50 - 1.05 mg/dL    eGFR 10 (L) >60 mL/min/1.73m*2    Calcium 7.7 (L) 8.6 - 10.3 mg/dL    Phosphorus 7.8 (H) 2.5 - 4.9 mg/dL    Albumin 3.1 (L) 3.4 - 5.0 g/dL   Magnesium   Result Value Ref Range    Magnesium 2.12 1.60 - 2.40 mg/dL   POCT GLUCOSE   Result Value Ref Range    POCT Glucose 99 74 - 99 mg/dL     *Note: Due to a large number of results and/or encounters for the requested time period, some results have not been displayed. A complete set of results can be found in Results Review.       Assessment/Plan   Acute kidney injury superimposed on chronic kidney disease stage 3  Urine output had improved after diuresis my plan is to continue to monitor no indication for dialysis therapy at this point discussed the case in details with the family by the bedside  Chronic kidney disease stage IIIb  Acute respiratory failure plan to continue with the vent and try to wean off the vent slowly  Bilateral pneumonia with IV antibiotics  Diabetes mellitus type 2  Anemia of chronic kidney disease  Hypertension  Hyperlipidemia  Obesity      Sixto Slater MD

## 2025-03-28 NOTE — CARE PLAN
The patient's goals for the shift include  get off bipap    The clinical goals for the shift include patient will maintain oxygen sat >90% this shift.      Problem: Skin  Goal: Decreased wound size/increased tissue granulation at next dressing change  Outcome: Progressing  Flowsheets (Taken 3/28/2025 0733)  Decreased wound size/increased tissue granulation at next dressing change: Protective dressings over bony prominences  Goal: Participates in plan/prevention/treatment measures  Outcome: Progressing  Flowsheets (Taken 3/28/2025 0733)  Participates in plan/prevention/treatment measures:   Elevate heels   Increase activity/out of bed for meals  Goal: Prevent/manage excess moisture  Outcome: Progressing  Flowsheets (Taken 3/28/2025 0733)  Prevent/manage excess moisture:   Cleanse incontinence/protect with barrier cream   Moisturize dry skin   Monitor for/manage infection if present  Goal: Prevent/minimize sheer/friction injuries  Outcome: Progressing  Flowsheets (Taken 3/28/2025 0733)  Prevent/minimize sheer/friction injuries:   Complete micro-shifts as needed if patient unable. Adjust patient position to relieve pressure points, not a full turn   Increase activity/out of bed for meals   Use pull sheet  Goal: Promote/optimize nutrition  Outcome: Progressing  Flowsheets (Taken 3/28/2025 0733)  Promote/optimize nutrition:   Assist with feeding   Monitor/record intake including meals   Discuss with provider if NPO > 2 days   Consume > 50% meals/supplements  Goal: Promote skin healing  Outcome: Progressing     Problem: Fall/Injury  Goal: Not fall by end of shift  Outcome: Progressing  Goal: Be free from injury by end of the shift  Outcome: Progressing  Goal: Verbalize understanding of personal risk factors for fall in the hospital  Outcome: Progressing  Goal: Verbalize understanding of risk factor reduction measures to prevent injury from fall in the home  Outcome: Progressing  Goal: Use assistive devices by end of the  shift  Outcome: Progressing  Goal: Pace activities to prevent fatigue by end of the shift  Outcome: Progressing

## 2025-03-28 NOTE — PROGRESS NOTES
Physical Therapy    Physical Therapy Treatment    Patient Name: Analia Murray  MRN: [id number]  Department: [address] ICU  Room: 18/18A  Today's Date: 3/28/2025  Time Calculation  Start Time: 1045  Stop Time: 1103  Time Calculation (min): 18 min    Assessment/Plan   PT Assessment  PT Assessment Results: Decreased strength, Decreased mobility, Decreased endurance, Impaired balance  Barriers to Discharge Home: Physical needs  Physical Needs: Stair navigation into home limited by function/safety, Ambulating household distances limited by function/safety, High falls risk due to function or environment  Strengths: Support of extended family/friends, Support and attitude of living partners, Premorbid level of function, Living arrangement secure, Access to adaptive/assistive products, Attitude of self  Barriers to Participation: Comorbidities  End of Session Communication: Bedside nurse  Assessment Comment: Pt made adequate progress toward her functional mobility goals. Pt required significantly increased assist during functional mobility compared to her reported baseline. Pt would benefit from continued skilled PT services for maximizing independence and safety prior to & after discharge (MODERATE intensity).  End of Session Patient Position: Bed, 3 rail up, Alarm on (call light and needs within reach)     PT Plan  Treatment/Interventions: Bed mobility, Transfer training, Gait training, Strengthening, Therapeutic exercise, Therapeutic activity, Balance training  PT Plan: Ongoing PT  PT Frequency: 5 times per week  PT Discharge Recommendations: Moderate intensity level of continued care  PT Recommended Transfer Status: Assist x2, Assistive device  PT - OK to Discharge: Yes      General Visit Information:   PT  Visit  PT Received On: 03/28/25  General  Reason for Referral: Impaired functional mobility due to decreased muscular strength. This 74 year old was admitted for acute hypoxic respiratory failure and acute  metabolic encephalopathy. Pt intubated 3/25-3/27/25.  Past Medical History Relevant to Rehab: Anxiety/depression, asthma, chronic back pain, CAD, CHF, CKD-IV, DM-II, GERD, hemianopia R eye, MGUS, HOLDEN on CPAP, pulmonary HTN, hypothryoid, osteoporosis  Family/Caregiver Present: Yes  Caregiver Feedback:  (spouse and dtr)  Co-Treatment: OT  Co-Treatment Reason: to maximize safety with functional mobility  Prior to Session Communication: Bedside nurse  Patient Position Received: Bed, 3 rail up, Alarm on  General Comment: Cleared by RN for participation. Pt agreed to tx and was fully engaged throughout.    Subjective   Precautions:  Precautions  Hearing/Visual Limitations: reading glasses, hearing WFL  Medical Precautions: Fall precautions, Oxygen therapy device and L/min  Precautions Comment: 6 L/min O2 via NC     Date/Time Vitals Session Patient Position Pulse Resp SpO2 BP MAP (mmHg)    03/28/25 1100 --  --  76  24  95 %  113/91  98    Vital Signs Comment: Pre PT in supine with HOB elevated: HR 73, SpO2 94%, RR 15, /69, MAP 92     Objective   Pain:  Pain Assessment  0-10 (Numeric) Pain Score: 0 - No pain    Cognition:  Cognition  Overall Cognitive Status: Within Functional Limits to participate, not formally assessed    Postural Control:  Postural Control  Postural Control: Within Functional Limits  Static Sitting Balance  Static Sitting-Balance Support: Feet supported, Bilateral upper extremity supported  Static Sitting-Level of Assistance: Close supervision, Contact guard  Static Sitting-Comment/Number of Minutes: </=5 minutes     Activity Tolerance:  Activity Tolerance  Endurance: Decreased tolerance for upright activites (pt on supplemental O2)    Treatments:  Bed Mobility  Bed Mobility: Yes  Bed Mobility 1  Bed Mobility 1: Supine to sitting  Level of Assistance 1: Dependent, x 3 (HOB elevated, toward L side)  Bed Mobility Comments 1: Tolerated 2 minutes in long sitting using bilateral bed rails.  Bed Mobility  2  Bed Mobility  2: Sitting to supine  Level of Assistance 2: Dependent, +2  Bed Mobility 3  Bed Mobility 3: Rolling right, Rolling left  Level of Assistance 3: Maximum assistance (used bed rail)    Outcome Measures:  [facility] Basic Mobility  Turning from your back to your side while in a flat bed without using bedrails: A lot  Moving from lying on your back to sitting on the side of a flat bed without using bedrails: Total  Moving to and from bed to chair (including a wheelchair): Total  Standing up from a chair using your arms (e.g. wheelchair or bedside chair): Total  To walk in hospital room: Total  Climbing 3-5 steps with railing: Total  Basic Mobility - Total Score: 7    FSS-ICU  Ambulation: Unable to attempt due to weakness  Rolling: Maximal assistance (performs 25% - 49% of task)  Sitting: Supervision or set-up only  Transfer Sit-to-Stand: Unable to perform  Transfer Supine-to-Sit: Total assistance (performs 25% or requires another person)  Total Score: 8    Education Documentation  Precautions, taught by Xiomara Jacobson PT at 3/28/2025 12:07 PM.  Learner: Patient  Readiness: Acceptance  Method: Explanation  Response: Needs Reinforcement  Comment: Fall precautions, pursed lip breathing, progress toward goals    Mobility Training, taught by Xiomara Jacobson PT at 3/28/2025 12:07 PM.  Learner: Patient  Readiness: Acceptance  Method: Explanation  Response: Needs Reinforcement  Comment: Fall precautions, pursed lip breathing, progress toward goals    Education Comments  No comments found.        OP EDUCATION:       Encounter Problems       Encounter Problems (Active)       PT Problem       Pt will transition supine<>sit with close S. (Progressing)       Start:  03/27/25    Expected End:  04/19/25            Pt will transfer sit<>stand with FWW & close S. (Not Progressing)       Start:  03/27/25    Expected End:  04/19/25            Pt will ambulate >/=25 ft with FWW & close S. (Not Progressing)       Start:  03/27/25     Expected End:  04/19/25

## 2025-03-28 NOTE — CARE PLAN
"PT assessed pt as Mod, assist x 2 and AMPAC score of \"7\" and OT assessed pt as Mod assist x 2 and AMPAC of\"8\"  Phoned pts str Tiffanie at [phone] and updated her on PT eval  She did receive the SNF list and will discuss with her brother and family this evening.  Care Coordination to follow up with Tiffanie this weekend for SNF choices.    DISCHARGE PLAN: SNF--DO NOT DISCHARGE PATIENT BEFORE SPEAKING WITH CARE COORDINATION; NEED SNF CHOICES, NEED ACCEPTING SNF, NEED PRECERT  "

## 2025-03-29 ENCOUNTER — APPOINTMENT (OUTPATIENT)
Dept: RADIOLOGY | Facility: HOSPITAL | Age: 75
DRG: 871 | End: 2025-03-29
Payer: MEDICARE

## 2025-03-29 ENCOUNTER — APPOINTMENT (OUTPATIENT)
Dept: RADIOLOGY | Facility: HOSPITAL | Age: 75
End: 2025-03-29
Payer: MEDICARE

## 2025-03-29 LAB
ALBUMIN SERPL BCP-MCNC: 3.2 G/DL (ref 3.4–5)
ANION GAP BLDA CALCULATED.4IONS-SCNC: 11 MMO/L (ref 10–25)
ANION GAP BLDA CALCULATED.4IONS-SCNC: 13 MMO/L (ref 10–25)
ANION GAP BLDA CALCULATED.4IONS-SCNC: 14 MMO/L (ref 10–25)
ANION GAP BLDA CALCULATED.4IONS-SCNC: 15 MMO/L (ref 10–25)
ANION GAP SERPL CALCULATED.3IONS-SCNC: 15 MMOL/L (ref 10–20)
APPARATUS: ABNORMAL
APPARATUS: ABNORMAL
ARTERIAL PATENCY WRIST A: NEGATIVE
ARTERIAL PATENCY WRIST A: NEGATIVE
ARTERIAL PATENCY WRIST A: POSITIVE
ARTERIAL PATENCY WRIST A: POSITIVE
BASE EXCESS BLDA CALC-SCNC: -2.1 MMOL/L (ref -2–3)
BASE EXCESS BLDA CALC-SCNC: -2.2 MMOL/L (ref -2–3)
BASE EXCESS BLDA CALC-SCNC: -2.7 MMOL/L (ref -2–3)
BASE EXCESS BLDA CALC-SCNC: -2.9 MMOL/L (ref -2–3)
BASOPHILS # BLD AUTO: 0.01 X10*3/UL (ref 0–0.1)
BASOPHILS NFR BLD AUTO: 0.1 %
BODY TEMPERATURE: 37 DEGREES CELSIUS
BUN SERPL-MCNC: 64 MG/DL (ref 6–23)
CA-I BLDA-SCNC: 0.94 MMOL/L (ref 1.1–1.33)
CA-I BLDA-SCNC: 0.96 MMOL/L (ref 1.1–1.33)
CA-I BLDA-SCNC: 0.98 MMOL/L (ref 1.1–1.33)
CA-I BLDA-SCNC: 0.99 MMOL/L (ref 1.1–1.33)
CALCIUM SERPL-MCNC: 7.6 MG/DL (ref 8.6–10.3)
CHLORIDE BLDA-SCNC: 93 MMOL/L (ref 98–107)
CHLORIDE BLDA-SCNC: 94 MMOL/L (ref 98–107)
CHLORIDE BLDA-SCNC: 94 MMOL/L (ref 98–107)
CHLORIDE BLDA-SCNC: 95 MMOL/L (ref 98–107)
CHLORIDE SERPL-SCNC: 94 MMOL/L (ref 98–107)
CK SERPL-CCNC: 134 U/L (ref 0–215)
CO2 SERPL-SCNC: 26 MMOL/L (ref 21–32)
CREAT SERPL-MCNC: 4.64 MG/DL (ref 0.5–1.05)
CRITICAL CALL TIME: 1255
CRITICAL CALL TIME: 844
CRITICAL CALLED BY: ABNORMAL
CRITICAL CALLED BY: ABNORMAL
CRITICAL CALLED TO: ABNORMAL
CRITICAL CALLED TO: ABNORMAL
CRITICAL READ BACK: ABNORMAL
CRITICAL READ BACK: ABNORMAL
EGFRCR SERPLBLD CKD-EPI 2021: 9 ML/MIN/1.73M*2
EOSINOPHIL # BLD AUTO: 0.02 X10*3/UL (ref 0–0.4)
EOSINOPHIL NFR BLD AUTO: 0.1 %
EPAP CMH2O: 5 CM H2O
ERYTHROCYTE [DISTWIDTH] IN BLOOD BY AUTOMATED COUNT: 20 % (ref 11.5–14.5)
FREQUENCY (BPM): 14 BPM
FREQUENCY (BPM): 14 BPM
GLUCOSE BLD MANUAL STRIP-MCNC: 100 MG/DL (ref 74–99)
GLUCOSE BLD MANUAL STRIP-MCNC: 120 MG/DL (ref 74–99)
GLUCOSE BLD MANUAL STRIP-MCNC: 121 MG/DL (ref 74–99)
GLUCOSE BLD MANUAL STRIP-MCNC: 133 MG/DL (ref 74–99)
GLUCOSE BLD MANUAL STRIP-MCNC: 135 MG/DL (ref 74–99)
GLUCOSE BLD MANUAL STRIP-MCNC: 139 MG/DL (ref 74–99)
GLUCOSE BLDA-MCNC: 115 MG/DL (ref 74–99)
GLUCOSE BLDA-MCNC: 118 MG/DL (ref 74–99)
GLUCOSE BLDA-MCNC: 133 MG/DL (ref 74–99)
GLUCOSE BLDA-MCNC: 141 MG/DL (ref 74–99)
GLUCOSE SERPL-MCNC: 126 MG/DL (ref 74–99)
HCO3 BLDA-SCNC: 24 MMOL/L (ref 22–26)
HCO3 BLDA-SCNC: 24.7 MMOL/L (ref 22–26)
HCO3 BLDA-SCNC: 26 MMOL/L (ref 22–26)
HCO3 BLDA-SCNC: 26.2 MMOL/L (ref 22–26)
HCT VFR BLD AUTO: 33.2 % (ref 36–46)
HCT VFR BLD EST: 29 % (ref 36–46)
HCT VFR BLD EST: 30 % (ref 36–46)
HGB BLD-MCNC: 9.4 G/DL (ref 12–16)
HGB BLDA-MCNC: 10 G/DL (ref 12–16)
HGB BLDA-MCNC: 9.8 G/DL (ref 12–16)
IMM GRANULOCYTES # BLD AUTO: 0.05 X10*3/UL (ref 0–0.5)
IMM GRANULOCYTES NFR BLD AUTO: 0.4 % (ref 0–0.9)
INHALED O2 CONCENTRATION: 30 %
INHALED O2 CONCENTRATION: 30 %
INHALED O2 CONCENTRATION: 70 %
INHALED O2 CONCENTRATION: 70 %
IPAP CMH2O: 20 CM H2O
IPAP CMH2O: 25 CM H2O
LACTATE BLDA-SCNC: 0.5 MMOL/L (ref 0.4–2)
LACTATE BLDA-SCNC: 0.6 MMOL/L (ref 0.4–2)
LACTATE BLDA-SCNC: 0.6 MMOL/L (ref 0.4–2)
LACTATE BLDA-SCNC: 0.8 MMOL/L (ref 0.4–2)
LYMPHOCYTES # BLD AUTO: 0.35 X10*3/UL (ref 0.8–3)
LYMPHOCYTES NFR BLD AUTO: 2.5 %
MAGNESIUM SERPL-MCNC: 2.17 MG/DL (ref 1.6–2.4)
MCH RBC QN AUTO: 22.5 PG (ref 26–34)
MCHC RBC AUTO-ENTMCNC: 28.3 G/DL (ref 32–36)
MCV RBC AUTO: 80 FL (ref 80–100)
MONOCYTES # BLD AUTO: 1.02 X10*3/UL (ref 0.05–0.8)
MONOCYTES NFR BLD AUTO: 7.3 %
NEUTROPHILS # BLD AUTO: 12.51 X10*3/UL (ref 1.6–5.5)
NEUTROPHILS NFR BLD AUTO: 89.6 %
NRBC BLD-RTO: 0 /100 WBCS (ref 0–0)
OXYHGB MFR BLDA: 94.3 % (ref 94–98)
OXYHGB MFR BLDA: 96.1 % (ref 94–98)
OXYHGB MFR BLDA: 97.5 % (ref 94–98)
OXYHGB MFR BLDA: 97.6 % (ref 94–98)
PCO2 BLDA: 51 MM HG (ref 38–42)
PCO2 BLDA: 55 MM HG (ref 38–42)
PCO2 BLDA: 62 MM HG (ref 38–42)
PCO2 BLDA: 64 MM HG (ref 38–42)
PH BLDA: 7.22 PH (ref 7.38–7.42)
PH BLDA: 7.23 PH (ref 7.38–7.42)
PH BLDA: 7.26 PH (ref 7.38–7.42)
PH BLDA: 7.28 PH (ref 7.38–7.42)
PHOSPHATE SERPL-MCNC: 9.7 MG/DL (ref 2.5–4.9)
PLATELET # BLD AUTO: 176 X10*3/UL (ref 150–450)
PO2 BLDA: 114 MM HG (ref 85–95)
PO2 BLDA: 118 MM HG (ref 85–95)
PO2 BLDA: 69 MM HG (ref 85–95)
PO2 BLDA: 83 MM HG (ref 85–95)
POTASSIUM BLDA-SCNC: 4.7 MMOL/L (ref 3.5–5.3)
POTASSIUM BLDA-SCNC: 4.8 MMOL/L (ref 3.5–5.3)
POTASSIUM BLDA-SCNC: 4.8 MMOL/L (ref 3.5–5.3)
POTASSIUM BLDA-SCNC: 4.9 MMOL/L (ref 3.5–5.3)
POTASSIUM SERPL-SCNC: 4.8 MMOL/L (ref 3.5–5.3)
RBC # BLD AUTO: 4.17 X10*6/UL (ref 4–5.2)
SAO2 % BLDA: 100 % (ref 94–100)
SAO2 % BLDA: 97 % (ref 94–100)
SAO2 % BLDA: 98 % (ref 94–100)
SAO2 % BLDA: 99 % (ref 94–100)
SODIUM BLDA-SCNC: 127 MMOL/L (ref 136–145)
SODIUM BLDA-SCNC: 127 MMOL/L (ref 136–145)
SODIUM BLDA-SCNC: 128 MMOL/L (ref 136–145)
SODIUM BLDA-SCNC: 128 MMOL/L (ref 136–145)
SODIUM SERPL-SCNC: 130 MMOL/L (ref 136–145)
SPECIMEN DRAWN FROM PATIENT: ABNORMAL
VENTILATOR MODE: ABNORMAL
VENTILATOR RATE: 14 BPM
WBC # BLD AUTO: 14 X10*3/UL (ref 4.4–11.3)

## 2025-03-29 PROCEDURE — 2500000002 HC RX 250 W HCPCS SELF ADMINISTERED DRUGS (ALT 637 FOR MEDICARE OP, ALT 636 FOR OP/ED)

## 2025-03-29 PROCEDURE — 80069 RENAL FUNCTION PANEL: CPT

## 2025-03-29 PROCEDURE — 99291 CRITICAL CARE FIRST HOUR: CPT | Performed by: INTERNAL MEDICINE

## 2025-03-29 PROCEDURE — 84132 ASSAY OF SERUM POTASSIUM: CPT | Performed by: INTERNAL MEDICINE

## 2025-03-29 PROCEDURE — 85025 COMPLETE CBC W/AUTO DIFF WBC: CPT

## 2025-03-29 PROCEDURE — 2500000004 HC RX 250 GENERAL PHARMACY W/ HCPCS (ALT 636 FOR OP/ED): Performed by: INTERNAL MEDICINE

## 2025-03-29 PROCEDURE — 71045 X-RAY EXAM CHEST 1 VIEW: CPT | Performed by: RADIOLOGY

## 2025-03-29 PROCEDURE — 36600 WITHDRAWAL OF ARTERIAL BLOOD: CPT

## 2025-03-29 PROCEDURE — 82947 ASSAY GLUCOSE BLOOD QUANT: CPT

## 2025-03-29 PROCEDURE — 94660 CPAP INITIATION&MGMT: CPT

## 2025-03-29 PROCEDURE — 2500000001 HC RX 250 WO HCPCS SELF ADMINISTERED DRUGS (ALT 637 FOR MEDICARE OP): Performed by: INTERNAL MEDICINE

## 2025-03-29 PROCEDURE — 83735 ASSAY OF MAGNESIUM: CPT

## 2025-03-29 PROCEDURE — 2500000004 HC RX 250 GENERAL PHARMACY W/ HCPCS (ALT 636 FOR OP/ED): Mod: JZ | Performed by: INTERNAL MEDICINE

## 2025-03-29 PROCEDURE — 71045 X-RAY EXAM CHEST 1 VIEW: CPT

## 2025-03-29 PROCEDURE — 2500000005 HC RX 250 GENERAL PHARMACY W/O HCPCS: Performed by: INTERNAL MEDICINE

## 2025-03-29 PROCEDURE — 94640 AIRWAY INHALATION TREATMENT: CPT

## 2025-03-29 PROCEDURE — 2500000001 HC RX 250 WO HCPCS SELF ADMINISTERED DRUGS (ALT 637 FOR MEDICARE OP)

## 2025-03-29 PROCEDURE — 2500000004 HC RX 250 GENERAL PHARMACY W/ HCPCS (ALT 636 FOR OP/ED)

## 2025-03-29 PROCEDURE — 36415 COLL VENOUS BLD VENIPUNCTURE: CPT

## 2025-03-29 PROCEDURE — 82550 ASSAY OF CK (CPK): CPT | Performed by: INTERNAL MEDICINE

## 2025-03-29 PROCEDURE — 9420000001 HC RT PATIENT EDUCATION 5 MIN

## 2025-03-29 PROCEDURE — 2020000001 HC ICU ROOM DAILY

## 2025-03-29 RX ORDER — GABAPENTIN 300 MG/1
300 CAPSULE ORAL DAILY
Status: DISCONTINUED | OUTPATIENT
Start: 2025-03-30 | End: 2025-04-17 | Stop reason: HOSPADM

## 2025-03-29 RX ORDER — FUROSEMIDE 10 MG/ML
80 INJECTION INTRAMUSCULAR; INTRAVENOUS ONCE
Status: COMPLETED | OUTPATIENT
Start: 2025-03-29 | End: 2025-03-29

## 2025-03-29 RX ADMIN — IPRATROPIUM BROMIDE AND ALBUTEROL SULFATE 3 ML: 2.5; .5 SOLUTION RESPIRATORY (INHALATION) at 02:01

## 2025-03-29 RX ADMIN — Medication 30 PERCENT: at 20:04

## 2025-03-29 RX ADMIN — NYSTATIN 1 APPLICATION: 100000 POWDER TOPICAL at 09:34

## 2025-03-29 RX ADMIN — INSULIN LISPRO 2 UNITS: 100 INJECTION, SOLUTION INTRAVENOUS; SUBCUTANEOUS at 00:29

## 2025-03-29 RX ADMIN — METOPROLOL TARTRATE 50 MG: 50 TABLET, FILM COATED ORAL at 21:53

## 2025-03-29 RX ADMIN — FUROSEMIDE 80 MG: 10 INJECTION, SOLUTION INTRAMUSCULAR; INTRAVENOUS at 16:17

## 2025-03-29 RX ADMIN — SODIUM ZIRCONIUM CYCLOSILICATE 10 G: 10 POWDER, FOR SUSPENSION ORAL at 05:00

## 2025-03-29 RX ADMIN — GABAPENTIN 100 MG: 100 CAPSULE ORAL at 09:34

## 2025-03-29 RX ADMIN — SERTRALINE HYDROCHLORIDE 50 MG: 50 TABLET ORAL at 09:34

## 2025-03-29 RX ADMIN — HEPARIN SODIUM 7500 UNITS: 5000 INJECTION INTRAVENOUS; SUBCUTANEOUS at 21:53

## 2025-03-29 RX ADMIN — ALLOPURINOL 50 MG: 100 TABLET ORAL at 09:34

## 2025-03-29 RX ADMIN — HEPARIN SODIUM 7500 UNITS: 5000 INJECTION INTRAVENOUS; SUBCUTANEOUS at 05:03

## 2025-03-29 RX ADMIN — IPRATROPIUM BROMIDE AND ALBUTEROL SULFATE 3 ML: 2.5; .5 SOLUTION RESPIRATORY (INHALATION) at 20:04

## 2025-03-29 RX ADMIN — MONTELUKAST 10 MG: 10 TABLET, FILM COATED ORAL at 21:53

## 2025-03-29 RX ADMIN — IPRATROPIUM BROMIDE AND ALBUTEROL SULFATE 3 ML: 2.5; .5 SOLUTION RESPIRATORY (INHALATION) at 11:50

## 2025-03-29 RX ADMIN — FLUTICASONE PROPIONATE 2 SPRAY: 50 SPRAY, METERED NASAL at 09:34

## 2025-03-29 RX ADMIN — NYSTATIN 1 APPLICATION: 100000 POWDER TOPICAL at 21:53

## 2025-03-29 RX ADMIN — ASPIRIN 81 MG: 81 TABLET, CHEWABLE ORAL at 09:34

## 2025-03-29 RX ADMIN — HEPARIN SODIUM 7500 UNITS: 5000 INJECTION INTRAVENOUS; SUBCUTANEOUS at 13:36

## 2025-03-29 RX ADMIN — Medication 70 PERCENT: at 11:50

## 2025-03-29 RX ADMIN — NYSTATIN 1 APPLICATION: 100000 POWDER TOPICAL at 16:18

## 2025-03-29 RX ADMIN — EZETIMIBE 10 MG: 10 TABLET ORAL at 09:34

## 2025-03-29 RX ADMIN — LEVOTHYROXINE SODIUM 200 MCG: 0.1 TABLET ORAL at 05:03

## 2025-03-29 RX ADMIN — PRAVASTATIN SODIUM 40 MG: 20 TABLET ORAL at 21:53

## 2025-03-29 RX ADMIN — POLYETHYLENE GLYCOL 3350 17 G: 17 POWDER, FOR SOLUTION ORAL at 09:34

## 2025-03-29 RX ADMIN — METHYLPREDNISOLONE SODIUM SUCCINATE 40 MG: 40 INJECTION, POWDER, FOR SOLUTION INTRAMUSCULAR; INTRAVENOUS at 13:36

## 2025-03-29 RX ADMIN — Medication 4 L/MIN: at 07:40

## 2025-03-29 RX ADMIN — IPRATROPIUM BROMIDE AND ALBUTEROL SULFATE 3 ML: 2.5; .5 SOLUTION RESPIRATORY (INHALATION) at 07:39

## 2025-03-29 RX ADMIN — METHYLPREDNISOLONE SODIUM SUCCINATE 40 MG: 40 INJECTION, POWDER, FOR SOLUTION INTRAMUSCULAR; INTRAVENOUS at 09:34

## 2025-03-29 ASSESSMENT — PAIN - FUNCTIONAL ASSESSMENT
PAIN_FUNCTIONAL_ASSESSMENT: 0-10
PAIN_FUNCTIONAL_ASSESSMENT: CPOT (CRITICAL CARE PAIN OBSERVATION TOOL)

## 2025-03-29 ASSESSMENT — PAIN SCALES - GENERAL
PAINLEVEL_OUTOF10: 0 - NO PAIN
PAINLEVEL_OUTOF10: 0 - NO PAIN

## 2025-03-29 NOTE — NURSING NOTE
RN went to assess patient. Pt very hard to arouse, satting 87-89% on 4L NC. Increased to 5L NC, RT called to place back on bipap. Dr. DAWSON notified, ABG drawn and CXR ordered.

## 2025-03-29 NOTE — PROGRESS NOTES
Analia Murray is a 74 y.o. female on day 4 of admission presenting with Acute respiratory failure with hypoxia.  Patient with h/o asthma/COPD, HOLDEN, HTN, DLP, CHF, CKD IV, T2DM, hypothyroidism, anemia, MGUS, who p/w SOB x 2 weeks associated with cough productive of yellow sputum and chest tightness. In the ED work up revealed tachypnea, hypoxia, MARY ANN, BNP 700s, 7/21/78/140 on ABG and mild pulmonary edema on chest imaging. Received Solu-Medrol, Lasix, placed on BiPAP and admitted to ICU with the diagnosis of acute on chronic hypercarbic respiratory failure. Repeat ABG showed persistent hypercarbia while on BiPAP and  was consequently intubated after arrival to the ICU.   Subjective   Wore CPAP/BiPAP last night, however, drowsy this morning, placed back on BiPAP. Urine output reduced last night, received a dose of Lasix. Cr. Increased.      More drowsy this morning, hardly can be aroused. Cannot provide history.   Objective   Scheduled medications  allopurinol, 50 mg, oral, Every other day  amLODIPine, 10 mg, oral, Daily  aspirin, 81 mg, oral, Daily  ezetimibe, 10 mg, oral, Daily  fluticasone, 2 spray, Each Nostril, BID  gabapentin, 100 mg, oral, TID  heparin (porcine), 7,500 Units, subcutaneous, q8h JAYA  hydrALAZINE, 50 mg, oral, BID  insulin lispro, 0-10 Units, subcutaneous, q4h  ipratropium-albuteroL, 3 mL, nebulization, q6h  levothyroxine, 200 mcg, oral, Daily  levothyroxine, 200 mcg, oral, Every Wednesday  methylPREDNISolone sodium succinate (PF), 40 mg, intravenous, Daily  [Held by provider] metoprolol succinate XL, 100 mg, oral, Daily  metoprolol tartrate, 50 mg, oral, BID  montelukast, 10 mg, oral, Nightly  nystatin, 1 Application, Topical, TID  oxygen, , inhalation, Continuous - Inhalation  polyethylene glycol, 17 g, oral, Daily  pravastatin, 40 mg, oral, Nightly  sertraline, 50 mg, oral, Daily  sodium zirconium cyclosilicate, 10 g, oral, q8h  sulfur hexafluoride microsphr, 2 mL, intravenous, Once in  "imaging    Continuous medications     PRN medications  PRN medications: dextrose, dextrose, glucagon, glucagon, [Held by provider] nitroglycerin   Physical Exam  Constitutional:       General: She is not in acute distress.     Appearance: She is obese. She is not ill-appearing or toxic-appearing.   HENT:      Head: Normocephalic and atraumatic.      Nose:      Comments: NG in place.      Mouth/Throat:      Comments: On BiPAP.   Eyes:      General: No scleral icterus.     Pupils: Pupils are equal, round, and reactive to light.   Neck:      Vascular: No carotid bruit.   Cardiovascular:      Rate and Rhythm: Normal rate and regular rhythm.      Heart sounds: No murmur heard.     No friction rub. No gallop.   Pulmonary:      Effort: Pulmonary effort is normal. No respiratory distress.      Breath sounds: No wheezing or rales.      Comments: Clear lung fields b/l with fair air entry on BiPAP. No wheezing.   Abdominal:      General: Bowel sounds are normal. There is no distension.      Palpations: Abdomen is soft.      Tenderness: There is no abdominal tenderness.   Musculoskeletal:         General: No tenderness.      Cervical back: Neck supple. No rigidity.      Right lower leg: No edema.      Left lower leg: No edema.   Lymphadenopathy:      Cervical: No cervical adenopathy.   Skin:     General: Skin is warm and dry.      Coloration: Skin is not jaundiced.   Neurological:      General: No focal deficit present.      Comments: Very drowsy today, cannot easily be aroused.    Psychiatric:      Comments: Calm.      Last Recorded Vitals  Blood pressure 105/71, pulse 65, temperature 35.8 °C (96.4 °F), temperature source Axillary, resp. rate 13, height 1.626 m (5' 4.02\"), weight 140 kg (308 lb 13.8 oz), SpO2 96%.  Intake/Output last 3 Shifts:  I/O last 3 completed shifts:  In: 3333.6 (23.8 mL/kg) [I.V.:2403.6 (17.2 mL/kg); NG/GT:520; IV Piggyback:410]  Out: 1955 (14 mL/kg) [Urine:1955 (0.4 mL/kg/hr)]  Weight: 140.1 kg "   Relevant Results  Results for orders placed or performed during the hospital encounter of 03/25/25 (from the past 24 hours)   POCT GLUCOSE   Result Value Ref Range    POCT Glucose 99 74 - 99 mg/dL   POCT GLUCOSE   Result Value Ref Range    POCT Glucose 123 (H) 74 - 99 mg/dL   Renal function panel   Result Value Ref Range    Glucose 119 (H) 74 - 99 mg/dL    Sodium 129 (L) 136 - 145 mmol/L    Potassium 4.8 3.5 - 5.3 mmol/L    Chloride 95 (L) 98 - 107 mmol/L    Bicarbonate 24 21 - 32 mmol/L    Anion Gap 15 10 - 20 mmol/L    Urea Nitrogen 58 (H) 6 - 23 mg/dL    Creatinine 4.31 (H) 0.50 - 1.05 mg/dL    eGFR 10 (L) >60 mL/min/1.73m*2    Calcium 7.9 (L) 8.6 - 10.3 mg/dL    Phosphorus 7.9 (H) 2.5 - 4.9 mg/dL    Albumin 3.2 (L) 3.4 - 5.0 g/dL   Magnesium   Result Value Ref Range    Magnesium 2.15 1.60 - 2.40 mg/dL   POCT GLUCOSE   Result Value Ref Range    POCT Glucose 152 (H) 74 - 99 mg/dL   POCT GLUCOSE   Result Value Ref Range    POCT Glucose 218 (H) 74 - 99 mg/dL   POCT GLUCOSE   Result Value Ref Range    POCT Glucose 179 (H) 74 - 99 mg/dL   POCT GLUCOSE   Result Value Ref Range    POCT Glucose 139 (H) 74 - 99 mg/dL   CBC and Auto Differential   Result Value Ref Range    WBC 14.0 (H) 4.4 - 11.3 x10*3/uL    nRBC 0.0 0.0 - 0.0 /100 WBCs    RBC 4.17 4.00 - 5.20 x10*6/uL    Hemoglobin 9.4 (L) 12.0 - 16.0 g/dL    Hematocrit 33.2 (L) 36.0 - 46.0 %    MCV 80 80 - 100 fL    MCH 22.5 (L) 26.0 - 34.0 pg    MCHC 28.3 (L) 32.0 - 36.0 g/dL    RDW 20.0 (H) 11.5 - 14.5 %    Platelets 176 150 - 450 x10*3/uL    Neutrophils % 89.6 40.0 - 80.0 %    Immature Granulocytes %, Automated 0.4 0.0 - 0.9 %    Lymphocytes % 2.5 13.0 - 44.0 %    Monocytes % 7.3 2.0 - 10.0 %    Eosinophils % 0.1 0.0 - 6.0 %    Basophils % 0.1 0.0 - 2.0 %    Neutrophils Absolute 12.51 (H) 1.60 - 5.50 x10*3/uL    Immature Granulocytes Absolute, Automated 0.05 0.00 - 0.50 x10*3/uL    Lymphocytes Absolute 0.35 (L) 0.80 - 3.00 x10*3/uL    Monocytes Absolute 1.02 (H)  0.05 - 0.80 x10*3/uL    Eosinophils Absolute 0.02 0.00 - 0.40 x10*3/uL    Basophils Absolute 0.01 0.00 - 0.10 x10*3/uL   Renal Function Panel   Result Value Ref Range    Glucose 126 (H) 74 - 99 mg/dL    Sodium 130 (L) 136 - 145 mmol/L    Potassium 4.8 3.5 - 5.3 mmol/L    Chloride 94 (L) 98 - 107 mmol/L    Bicarbonate 26 21 - 32 mmol/L    Anion Gap 15 10 - 20 mmol/L    Urea Nitrogen 64 (H) 6 - 23 mg/dL    Creatinine 4.64 (H) 0.50 - 1.05 mg/dL    eGFR 9 (L) >60 mL/min/1.73m*2    Calcium 7.6 (L) 8.6 - 10.3 mg/dL    Phosphorus 9.7 (H) 2.5 - 4.9 mg/dL    Albumin 3.2 (L) 3.4 - 5.0 g/dL   Magnesium   Result Value Ref Range    Magnesium 2.17 1.60 - 2.40 mg/dL   POCT GLUCOSE   Result Value Ref Range    POCT Glucose 120 (H) 74 - 99 mg/dL     *Note: Due to a large number of results and/or encounters for the requested time period, some results have not been displayed. A complete set of results can be found in Results Review.   XR chest 1 view    Result Date: 3/27/2025  Interpreted By:  Roddy Sánchez, STUDY: XR CHEST 1 VIEW;  3/27/2025 8:34 pm   INDICATION: Signs/Symptoms:NG tube placement.     COMPARISON: 03/25/2025   ACCESSION NUMBER(S): [id number]   ORDERING CLINICIAN: MICHAEL STRONG   FINDINGS: NG/OG tube courses below the diaphragm terminating over the gastric body.   The heart is enlarged. There is bibasilar airspace disease and pleural effusion. The pulmonary vasculature is congested and there is peribronchovascular thickening likely relate to interstitial edema. Right pleural effusion.   No pneumothorax.       Please see above.     MACRO: None.   Signed by: Roddy Sánchez 3/27/2025 9:27 PM Dictation workstation:   [id number]    Assessment/Plan   Assessment & Plan  Acute respiratory failure with hypoxia    74 YOF with h/o asthma/COPD, HOLDEN, HTN, DLP, CHF, CKD IV, T2DM, hypothyroidism, anemia, MGUS, who p/w SOB x 2 weeks associated with cough productive of yellow sputum and chest tightness. In the ED work up revealed  tachypnea, hypoxia, MARY ANN, BNP 700s, 7/21/78/140 on ABG and mild pulmonary edema on chest imaging. Received Solu-Medrol, Lasix, placed on BiPAP and admitted to ICU with the diagnosis of acute on chronic hypercarbic respiratory failure. Repeat ABG showed persistent hypercarbia while on BiPAP and  was consequently intubated after arrival to the ICU.     Neuro: acute metabolic encephalopathy likely due to hypercarbia, anxiety and depression. Worsened today due to hypercarbia.;        Continue home Zoloft and Gabapentin       Supportive measures       Treat the underlying causes     CV: h/o CHF, HTN, DLP, now probably with CHF exacerbation, given . Currently hemodynamically stable but BP slightly elevated       Home Lasix on hold, doses are being given PRN       Continue home Norvasc and hydralazine       Continue metoprolol       Continue home Atorvastatin, Zetia and ASA      Pulmonary: acute on chronic hypoxic hypercarbic respiratory failure 2/2 CHF vs COPD exacerbation. Initially required intubation and MV after failing NIV. H/o COPD, HOLDEN on CPAP and asthma. Successfully extubated to NC on 3/27. More drowsy this morning, ABG showed respiratory acidosis.        Continue supplemental O2, wean off as tolerates       NIPPV as needed       Continue home Flonase, Singulair.        Continue Duo-Neb       Continue Solu-medrol 40 mg daily       BPH       Daily SBT    : MARY ANN on CKD IV, baseline Cr 2.2, now up to 4.43. Pre-renal vs cardiorenal. Urine output now is improved. Cr slightly worse than yesterday. Hyperkalemia now is improved/resolved.           Is/Os       Daily RFP       Treat electrolyte abnormalities as indicated       Nephrology consult is done, input appreciated       Lasix PRN to help with UO.     GI: no acute issues       SLP evaluation is done, started on a diet.        No indication for GI prophylaxis since now is extubated.        Bowel regimen    Endo: h/o T2DM, hypothyroidism. Currently no acute  issues. FS within acceptable range.        Continue home Synthroid       Hold home dulaglutide and semaglutide       POCT every 4 hours       SSI       Hypoglycemic protocol    Hem/Onc: h/o anemia of chronic diseases likely due to MGUS.        Continue to monitor with daily CBC    ID: worsening leukocytosis today       CXR        Completed a course of Azithromycin       Continue to monitor for S&S infection.       DVT prophylaxis with subcutaneous heparin.     This was a critical care visit due to respiratory failure. Total time 40 min.     Charisse Chavez MD

## 2025-03-29 NOTE — CARE PLAN
Problem: Skin  Goal: Decreased wound size/increased tissue granulation at next dressing change  Outcome: Progressing  Flowsheets (Taken 3/29/2025 1808)  Decreased wound size/increased tissue granulation at next dressing change: Protective dressings over bony prominences  Goal: Participates in plan/prevention/treatment measures  Outcome: Progressing  Flowsheets (Taken 3/29/2025 1808)  Participates in plan/prevention/treatment measures: Elevate heels  Goal: Promote skin healing  Outcome: Progressing  Flowsheets (Taken 3/29/2025 1808)  Promote skin healing: Turn/reposition every 2 hours/use positioning/transfer devices     Problem: Fall/Injury  Goal: Be free from injury by end of the shift  Outcome: Progressing  Goal: Verbalize understanding of personal risk factors for fall in the hospital  Outcome: Progressing

## 2025-03-29 NOTE — SIGNIFICANT EVENT
Called to pt bedside for low Spo2 on N/C. Pt was very lethargic. Dr. DAWSON came to the bedside  to see the pt as well. A plan was put into place and an ABG was obtained.

## 2025-03-29 NOTE — PROGRESS NOTES
Analia Murray is a 74 y.o. female on day 4 of admission presenting with Acute respiratory failure with hypoxia.      Subjective   Patient is on BiPAP, somnolent, her creatinine is fairly unchanged at 4.6, urine output today is declining but she did respond to Lasix yesterday and made about a liter of urine yesterday.       Objective          Vitals 24HR  Heart Rate:  [63-82]   Temp:  [35.8 °C (96.4 °F)-36.6 °C (97.9 °F)]   Resp:  [13-21]   BP: ()/(52-79)   Weight:  [140 kg (308 lb 13.8 oz)]   SpO2:  [91 %-99 %]       Intake/Output last 3 Shifts:    Intake/Output Summary (Last 24 hours) at 3/29/2025 1520  Last data filed at 3/29/2025 1300  Gross per 24 hour   Intake 160 ml   Output 410 ml   Net -250 ml       Physical Exam  Constitutional:       Comments: On BiPAP   Cardiovascular:      Comments: Distant heart sounds  Pulmonary:      Comments: On BiPAP, diminished breath sounds  Abdominal:      General: Bowel sounds are normal.      Palpations: Abdomen is soft.   Musculoskeletal:      Comments: Mild peripheral edema   Neurological:      Mental Status: She is lethargic.         Relevant Results  Results for orders placed or performed during the hospital encounter of 03/25/25 (from the past 24 hours)   POCT GLUCOSE   Result Value Ref Range    POCT Glucose 152 (H) 74 - 99 mg/dL   POCT GLUCOSE   Result Value Ref Range    POCT Glucose 218 (H) 74 - 99 mg/dL   POCT GLUCOSE   Result Value Ref Range    POCT Glucose 179 (H) 74 - 99 mg/dL   POCT GLUCOSE   Result Value Ref Range    POCT Glucose 139 (H) 74 - 99 mg/dL   CBC and Auto Differential   Result Value Ref Range    WBC 14.0 (H) 4.4 - 11.3 x10*3/uL    nRBC 0.0 0.0 - 0.0 /100 WBCs    RBC 4.17 4.00 - 5.20 x10*6/uL    Hemoglobin 9.4 (L) 12.0 - 16.0 g/dL    Hematocrit 33.2 (L) 36.0 - 46.0 %    MCV 80 80 - 100 fL    MCH 22.5 (L) 26.0 - 34.0 pg    MCHC 28.3 (L) 32.0 - 36.0 g/dL    RDW 20.0 (H) 11.5 - 14.5 %    Platelets 176 150 - 450 x10*3/uL    Neutrophils % 89.6 40.0  - 80.0 %    Immature Granulocytes %, Automated 0.4 0.0 - 0.9 %    Lymphocytes % 2.5 13.0 - 44.0 %    Monocytes % 7.3 2.0 - 10.0 %    Eosinophils % 0.1 0.0 - 6.0 %    Basophils % 0.1 0.0 - 2.0 %    Neutrophils Absolute 12.51 (H) 1.60 - 5.50 x10*3/uL    Immature Granulocytes Absolute, Automated 0.05 0.00 - 0.50 x10*3/uL    Lymphocytes Absolute 0.35 (L) 0.80 - 3.00 x10*3/uL    Monocytes Absolute 1.02 (H) 0.05 - 0.80 x10*3/uL    Eosinophils Absolute 0.02 0.00 - 0.40 x10*3/uL    Basophils Absolute 0.01 0.00 - 0.10 x10*3/uL   Renal Function Panel   Result Value Ref Range    Glucose 126 (H) 74 - 99 mg/dL    Sodium 130 (L) 136 - 145 mmol/L    Potassium 4.8 3.5 - 5.3 mmol/L    Chloride 94 (L) 98 - 107 mmol/L    Bicarbonate 26 21 - 32 mmol/L    Anion Gap 15 10 - 20 mmol/L    Urea Nitrogen 64 (H) 6 - 23 mg/dL    Creatinine 4.64 (H) 0.50 - 1.05 mg/dL    eGFR 9 (L) >60 mL/min/1.73m*2    Calcium 7.6 (L) 8.6 - 10.3 mg/dL    Phosphorus 9.7 (H) 2.5 - 4.9 mg/dL    Albumin 3.2 (L) 3.4 - 5.0 g/dL   Magnesium   Result Value Ref Range    Magnesium 2.17 1.60 - 2.40 mg/dL   Creatine Kinase   Result Value Ref Range    Creatine Kinase 134 0 - 215 U/L   POCT GLUCOSE   Result Value Ref Range    POCT Glucose 120 (H) 74 - 99 mg/dL   Blood Gas Arterial Full Panel   Result Value Ref Range    POCT pH, Arterial 7.22 (LL) 7.38 - 7.42 pH    POCT pCO2, Arterial 64 (H) 38 - 42 mm Hg    POCT pO2, Arterial 114 (H) 85 - 95 mm Hg    POCT SO2, Arterial 99 94 - 100 %    POCT Oxy Hemoglobin, Arterial 97.5 94.0 - 98.0 %    POCT Hematocrit Calculated, Arterial 29.0 (L) 36.0 - 46.0 %    POCT Sodium, Arterial 127 (L) 136 - 145 mmol/L    POCT Potassium, Arterial 4.8 3.5 - 5.3 mmol/L    POCT Chloride, Arterial 95 (L) 98 - 107 mmol/L    POCT Ionized Calcium, Arterial 0.99 (L) 1.10 - 1.33 mmol/L    POCT Glucose, Arterial 115 (H) 74 - 99 mg/dL    POCT Lactate, Arterial 0.6 0.4 - 2.0 mmol/L    POCT Base Excess, Arterial -2.2 (L) -2.0 - 3.0 mmol/L    POCT HCO3  Calculated, Arterial 26.2 (H) 22.0 - 26.0 mmol/L    POCT Hemoglobin, Arterial 9.8 (L) 12.0 - 16.0 g/dL    POCT Anion Gap, Arterial 11 10 - 25 mmo/L    Patient Temperature 37.0 degrees Celsius    FiO2 70 %    Frequency (BPM) 14 bpm    Ipap CMH2O 20.0 cm H2O    Epap CMH2O 5.0 cm H2O    Critical Called By HORACIO HAZEL RRT     Critical Called To DR. ASKEW     Critical Call Time 844     Critical Read Back Y     Site of Arterial Puncture Brachial Right     Wes's Test Negative    POCT GLUCOSE   Result Value Ref Range    POCT Glucose 100 (H) 74 - 99 mg/dL   BLOOD GAS ARTERIAL FULL PANEL   Result Value Ref Range    POCT pH, Arterial 7.23 (LL) 7.38 - 7.42 pH    POCT pCO2, Arterial 62 (H) 38 - 42 mm Hg    POCT pO2, Arterial 118 (H) 85 - 95 mm Hg    POCT SO2, Arterial 100 94 - 100 %    POCT Oxy Hemoglobin, Arterial 97.6 94.0 - 98.0 %    POCT Hematocrit Calculated, Arterial 29.0 (L) 36.0 - 46.0 %    POCT Sodium, Arterial 128 (L) 136 - 145 mmol/L    POCT Potassium, Arterial 4.8 3.5 - 5.3 mmol/L    POCT Chloride, Arterial 94 (L) 98 - 107 mmol/L    POCT Ionized Calcium, Arterial 0.98 (L) 1.10 - 1.33 mmol/L    POCT Glucose, Arterial 118 (H) 74 - 99 mg/dL    POCT Lactate, Arterial 0.5 0.4 - 2.0 mmol/L    POCT Base Excess, Arterial -2.1 (L) -2.0 - 3.0 mmol/L    POCT HCO3 Calculated, Arterial 26.0 22.0 - 26.0 mmol/L    POCT Hemoglobin, Arterial 9.8 (L) 12.0 - 16.0 g/dL    POCT Anion Gap, Arterial 13 10 - 25 mmo/L    Patient Temperature 37.0 degrees Celsius    FiO2 70 %    Apparatus      Ipap CMH2O 20.0 cm H2O    Epap CMH2O 5.0 cm H2O    Critical Called By DANIELA RRT-ACCS     Critical Called To      Critical Call Time 1255     Critical Read Back Y     Site of Arterial Puncture Radial Right     Wes's Test Positive    Blood Gas Arterial Full Panel   Result Value Ref Range    POCT pH, Arterial 7.26 (L) 7.38 - 7.42 pH    POCT pCO2, Arterial 55 (H) 38 - 42 mm Hg    POCT pO2, Arterial 69 (L) 85 - 95 mm Hg    POCT SO2,  Arterial 97 94 - 100 %    POCT Oxy Hemoglobin, Arterial 94.3 94.0 - 98.0 %    POCT Hematocrit Calculated, Arterial 30.0 (L) 36.0 - 46.0 %    POCT Sodium, Arterial 128 (L) 136 - 145 mmol/L    POCT Potassium, Arterial 4.9 3.5 - 5.3 mmol/L    POCT Chloride, Arterial 93 (L) 98 - 107 mmol/L    POCT Ionized Calcium, Arterial 0.96 (L) 1.10 - 1.33 mmol/L    POCT Glucose, Arterial 133 (H) 74 - 99 mg/dL    POCT Lactate, Arterial 0.6 0.4 - 2.0 mmol/L    POCT Base Excess, Arterial -2.7 (L) -2.0 - 3.0 mmol/L    POCT HCO3 Calculated, Arterial 24.7 22.0 - 26.0 mmol/L    POCT Hemoglobin, Arterial 10.0 (L) 12.0 - 16.0 g/dL    POCT Anion Gap, Arterial 15 10 - 25 mmo/L    Patient Temperature 37.0 degrees Celsius    FiO2 30 %    Frequency (BPM) 14 bpm    Ipap CMH2O 25.0 cm H2O    Epap CMH2O 5.0 cm H2O    Site of Arterial Puncture Radial Right     Wes's Test Negative      *Note: Due to a large number of results and/or encounters for the requested time period, some results have not been displayed. A complete set of results can be found in Results Review.            Assessment/Plan   Acute kidney injury superimposed on chronic kidney disease stage 3- will give another 80 mg of IV Lasix to assist with volume management, I believe she is close to needing dialysis, there are no urgent indications today but will reassess tomorrow.  I discussed the possibility of dialysis with her daughter by the bedside.  Hold amlodipine to allow for higher blood pressure in the setting of acute kidney injury.  Dose medications such as gabapentin for GFR.  Renal diet.  Chronic kidney disease stage IIIb  Acute respiratory failure - on BiPAP  Bilateral pneumonia with IV antibiotics  Diabetes mellitus type 2  Anemia of chronic kidney disease  Hypertension  Hyperlipidemia  Obesity    Hoda Slater MD

## 2025-03-30 ENCOUNTER — APPOINTMENT (OUTPATIENT)
Dept: RADIOLOGY | Facility: HOSPITAL | Age: 75
End: 2025-03-30
Payer: MEDICARE

## 2025-03-30 VITALS
HEIGHT: 64 IN | WEIGHT: 293 LBS | OXYGEN SATURATION: 96 % | TEMPERATURE: 98.2 F | DIASTOLIC BLOOD PRESSURE: 82 MMHG | RESPIRATION RATE: 15 BRPM | SYSTOLIC BLOOD PRESSURE: 144 MMHG | HEART RATE: 71 BPM | BODY MASS INDEX: 50.02 KG/M2

## 2025-03-30 LAB
ALBUMIN SERPL BCP-MCNC: 2.9 G/DL (ref 3.4–5)
ALBUMIN SERPL BCP-MCNC: 2.9 G/DL (ref 3.4–5)
ANION GAP BLDA CALCULATED.4IONS-SCNC: 14 MMO/L (ref 10–25)
ANION GAP BLDA CALCULATED.4IONS-SCNC: 15 MMO/L (ref 10–25)
ANION GAP BLDV CALCULATED.4IONS-SCNC: 14 MMOL/L (ref 10–25)
ANION GAP BLDV CALCULATED.4IONS-SCNC: 17 MMOL/L (ref 10–25)
ANION GAP SERPL CALCULATED.3IONS-SCNC: 19 MMOL/L (ref 10–20)
ANION GAP SERPL CALCULATED.3IONS-SCNC: 20 MMOL/L (ref 10–20)
APPARATUS: ABNORMAL
ARTERIAL PATENCY WRIST A: NEGATIVE
ARTERIAL PATENCY WRIST A: POSITIVE
BASE EXCESS BLDA CALC-SCNC: -1.4 MMOL/L (ref -2–3)
BASE EXCESS BLDA CALC-SCNC: -2.6 MMOL/L (ref -2–3)
BASE EXCESS BLDV CALC-SCNC: -2.9 MMOL/L (ref -2–3)
BASE EXCESS BLDV CALC-SCNC: -7.5 MMOL/L (ref -2–3)
BASOPHILS # BLD AUTO: 0.01 X10*3/UL (ref 0–0.1)
BASOPHILS NFR BLD AUTO: 0.1 %
BODY TEMPERATURE: 37 DEGREES CELSIUS
BUN SERPL-MCNC: 76 MG/DL (ref 6–23)
BUN SERPL-MCNC: 85 MG/DL (ref 6–23)
CA-I BLDA-SCNC: 0.93 MMOL/L (ref 1.1–1.33)
CA-I BLDA-SCNC: 0.99 MMOL/L (ref 1.1–1.33)
CA-I BLDV-SCNC: 0.93 MMOL/L (ref 1.1–1.33)
CA-I BLDV-SCNC: 1.54 MMOL/L (ref 1.1–1.33)
CALCIUM SERPL-MCNC: 7 MG/DL (ref 8.6–10.3)
CALCIUM SERPL-MCNC: 7.3 MG/DL (ref 8.6–10.3)
CHLORIDE BLDA-SCNC: 93 MMOL/L (ref 98–107)
CHLORIDE BLDA-SCNC: 94 MMOL/L (ref 98–107)
CHLORIDE BLDV-SCNC: 102 MMOL/L (ref 98–107)
CHLORIDE BLDV-SCNC: 93 MMOL/L (ref 98–107)
CHLORIDE SERPL-SCNC: 93 MMOL/L (ref 98–107)
CHLORIDE SERPL-SCNC: 93 MMOL/L (ref 98–107)
CO2 SERPL-SCNC: 22 MMOL/L (ref 21–32)
CO2 SERPL-SCNC: 23 MMOL/L (ref 21–32)
CREAT SERPL-MCNC: 5.07 MG/DL (ref 0.5–1.05)
CREAT SERPL-MCNC: 5.25 MG/DL (ref 0.5–1.05)
EGFRCR SERPLBLD CKD-EPI 2021: 8 ML/MIN/1.73M*2
EGFRCR SERPLBLD CKD-EPI 2021: 8 ML/MIN/1.73M*2
EOSINOPHIL # BLD AUTO: 0 X10*3/UL (ref 0–0.4)
EOSINOPHIL NFR BLD AUTO: 0 %
EPAP CMH2O: 10 CM H2O
EPAP CMH2O: 5 CM H2O
ERYTHROCYTE [DISTWIDTH] IN BLOOD BY AUTOMATED COUNT: 19.8 % (ref 11.5–14.5)
GLUCOSE BLD MANUAL STRIP-MCNC: 101 MG/DL (ref 74–99)
GLUCOSE BLD MANUAL STRIP-MCNC: 106 MG/DL (ref 74–99)
GLUCOSE BLD MANUAL STRIP-MCNC: 164 MG/DL (ref 74–99)
GLUCOSE BLD MANUAL STRIP-MCNC: 206 MG/DL (ref 74–99)
GLUCOSE BLD MANUAL STRIP-MCNC: 207 MG/DL (ref 74–99)
GLUCOSE BLD MANUAL STRIP-MCNC: 90 MG/DL (ref 74–99)
GLUCOSE BLDA-MCNC: 135 MG/DL (ref 74–99)
GLUCOSE BLDA-MCNC: 91 MG/DL (ref 74–99)
GLUCOSE BLDV-MCNC: 101 MG/DL (ref 74–99)
GLUCOSE BLDV-MCNC: 71 MG/DL (ref 74–99)
GLUCOSE SERPL-MCNC: 104 MG/DL (ref 74–99)
GLUCOSE SERPL-MCNC: 209 MG/DL (ref 74–99)
HCO3 BLDA-SCNC: 24.3 MMOL/L (ref 22–26)
HCO3 BLDA-SCNC: 24.4 MMOL/L (ref 22–26)
HCO3 BLDV-SCNC: 18.8 MMOL/L (ref 22–26)
HCO3 BLDV-SCNC: 24.6 MMOL/L (ref 22–26)
HCT VFR BLD AUTO: 29.3 % (ref 36–46)
HCT VFR BLD EST: 24 % (ref 36–46)
HCT VFR BLD EST: 27 % (ref 36–46)
HCT VFR BLD EST: 29 % (ref 36–46)
HCT VFR BLD EST: 29 % (ref 36–46)
HGB BLD-MCNC: 8.9 G/DL (ref 12–16)
HGB BLDA-MCNC: 9.5 G/DL (ref 12–16)
HGB BLDA-MCNC: 9.8 G/DL (ref 12–16)
HGB BLDV-MCNC: 7.9 G/DL (ref 12–16)
HGB BLDV-MCNC: 9.1 G/DL (ref 12–16)
IMM GRANULOCYTES # BLD AUTO: 0.02 X10*3/UL (ref 0–0.5)
IMM GRANULOCYTES NFR BLD AUTO: 0.3 % (ref 0–0.9)
INHALED O2 CONCENTRATION: 30 %
IPAP CMH2O: 20 CM H2O
IPAP CMH2O: 24 CM H2O
LACTATE BLDA-SCNC: 0.8 MMOL/L (ref 0.4–2)
LACTATE BLDA-SCNC: 0.9 MMOL/L (ref 0.4–2)
LACTATE BLDV-SCNC: 0.9 MMOL/L (ref 0.4–2)
LACTATE BLDV-SCNC: 1.3 MMOL/L (ref 0.4–2)
LYMPHOCYTES # BLD AUTO: 0.43 X10*3/UL (ref 0.8–3)
LYMPHOCYTES NFR BLD AUTO: 6.1 %
MAGNESIUM SERPL-MCNC: 2.2 MG/DL (ref 1.6–2.4)
MCH RBC QN AUTO: 23.2 PG (ref 26–34)
MCHC RBC AUTO-ENTMCNC: 30.4 G/DL (ref 32–36)
MCV RBC AUTO: 76 FL (ref 80–100)
MONOCYTES # BLD AUTO: 0.37 X10*3/UL (ref 0.05–0.8)
MONOCYTES NFR BLD AUTO: 5.2 %
NEUTROPHILS # BLD AUTO: 6.23 X10*3/UL (ref 1.6–5.5)
NEUTROPHILS NFR BLD AUTO: 88.3 %
NRBC BLD-RTO: 0 /100 WBCS (ref 0–0)
OXYHGB MFR BLDA: 93.9 % (ref 94–98)
OXYHGB MFR BLDA: 95.8 % (ref 94–98)
OXYHGB MFR BLDV: 58.8 % (ref 45–75)
OXYHGB MFR BLDV: 81.6 % (ref 45–75)
PCO2 BLDA: 44 MM HG (ref 38–42)
PCO2 BLDA: 52 MM HG (ref 38–42)
PCO2 BLDV: 41 MM HG (ref 41–51)
PCO2 BLDV: 56 MM HG (ref 41–51)
PH BLDA: 7.28 PH (ref 7.38–7.42)
PH BLDA: 7.35 PH (ref 7.38–7.42)
PH BLDV: 7.25 PH (ref 7.33–7.43)
PH BLDV: 7.27 PH (ref 7.33–7.43)
PHOSPHATE SERPL-MCNC: 10 MG/DL (ref 2.5–4.9)
PHOSPHATE SERPL-MCNC: 10.5 MG/DL (ref 2.5–4.9)
PLATELET # BLD AUTO: 157 X10*3/UL (ref 150–450)
PO2 BLDA: 77 MM HG (ref 85–95)
PO2 BLDA: 97 MM HG (ref 85–95)
PO2 BLDV: 40 MM HG (ref 35–45)
PO2 BLDV: 55 MM HG (ref 35–45)
POTASSIUM BLDA-SCNC: 4.5 MMOL/L (ref 3.5–5.3)
POTASSIUM BLDA-SCNC: 4.7 MMOL/L (ref 3.5–5.3)
POTASSIUM BLDV-SCNC: 3.4 MMOL/L (ref 3.5–5.3)
POTASSIUM BLDV-SCNC: 4.4 MMOL/L (ref 3.5–5.3)
POTASSIUM SERPL-SCNC: 4.4 MMOL/L (ref 3.5–5.3)
POTASSIUM SERPL-SCNC: 4.6 MMOL/L (ref 3.5–5.3)
RBC # BLD AUTO: 3.84 X10*6/UL (ref 4–5.2)
SAO2 % BLDA: 96 % (ref 94–100)
SAO2 % BLDA: 98 % (ref 94–100)
SAO2 % BLDV: 60 % (ref 45–75)
SAO2 % BLDV: 83 % (ref 45–75)
SODIUM BLDA-SCNC: 128 MMOL/L (ref 136–145)
SODIUM BLDA-SCNC: 128 MMOL/L (ref 136–145)
SODIUM BLDV-SCNC: 130 MMOL/L (ref 136–145)
SODIUM BLDV-SCNC: 131 MMOL/L (ref 136–145)
SODIUM SERPL-SCNC: 130 MMOL/L (ref 136–145)
SODIUM SERPL-SCNC: 131 MMOL/L (ref 136–145)
SPECIMEN DRAWN FROM PATIENT: ABNORMAL
SPECIMEN DRAWN FROM PATIENT: ABNORMAL
VENTILATOR MODE: ABNORMAL
VENTILATOR RATE: 16 BPM
WBC # BLD AUTO: 7.1 X10*3/UL (ref 4.4–11.3)

## 2025-03-30 PROCEDURE — 85025 COMPLETE CBC W/AUTO DIFF WBC: CPT

## 2025-03-30 PROCEDURE — 84132 ASSAY OF SERUM POTASSIUM: CPT

## 2025-03-30 PROCEDURE — 83735 ASSAY OF MAGNESIUM: CPT

## 2025-03-30 PROCEDURE — 71045 X-RAY EXAM CHEST 1 VIEW: CPT | Performed by: STUDENT IN AN ORGANIZED HEALTH CARE EDUCATION/TRAINING PROGRAM

## 2025-03-30 PROCEDURE — 36415 COLL VENOUS BLD VENIPUNCTURE: CPT

## 2025-03-30 PROCEDURE — 71045 X-RAY EXAM CHEST 1 VIEW: CPT

## 2025-03-30 PROCEDURE — 94660 CPAP INITIATION&MGMT: CPT

## 2025-03-30 PROCEDURE — 9420000001 HC RT PATIENT EDUCATION 5 MIN

## 2025-03-30 PROCEDURE — 84132 ASSAY OF SERUM POTASSIUM: CPT | Performed by: INTERNAL MEDICINE

## 2025-03-30 PROCEDURE — 94640 AIRWAY INHALATION TREATMENT: CPT

## 2025-03-30 PROCEDURE — 82435 ASSAY OF BLOOD CHLORIDE: CPT

## 2025-03-30 PROCEDURE — 2500000001 HC RX 250 WO HCPCS SELF ADMINISTERED DRUGS (ALT 637 FOR MEDICARE OP): Performed by: INTERNAL MEDICINE

## 2025-03-30 PROCEDURE — 99291 CRITICAL CARE FIRST HOUR: CPT | Performed by: INTERNAL MEDICINE

## 2025-03-30 PROCEDURE — 2500000002 HC RX 250 W HCPCS SELF ADMINISTERED DRUGS (ALT 637 FOR MEDICARE OP, ALT 636 FOR OP/ED)

## 2025-03-30 PROCEDURE — 2500000001 HC RX 250 WO HCPCS SELF ADMINISTERED DRUGS (ALT 637 FOR MEDICARE OP)

## 2025-03-30 PROCEDURE — 36600 WITHDRAWAL OF ARTERIAL BLOOD: CPT

## 2025-03-30 PROCEDURE — 2500000005 HC RX 250 GENERAL PHARMACY W/O HCPCS: Performed by: INTERNAL MEDICINE

## 2025-03-30 PROCEDURE — 2500000004 HC RX 250 GENERAL PHARMACY W/ HCPCS (ALT 636 FOR OP/ED)

## 2025-03-30 PROCEDURE — 82947 ASSAY GLUCOSE BLOOD QUANT: CPT

## 2025-03-30 PROCEDURE — 2020000001 HC ICU ROOM DAILY

## 2025-03-30 PROCEDURE — 2500000004 HC RX 250 GENERAL PHARMACY W/ HCPCS (ALT 636 FOR OP/ED): Mod: JZ | Performed by: INTERNAL MEDICINE

## 2025-03-30 RX ORDER — CALCIUM GLUCONATE 20 MG/ML
2 INJECTION, SOLUTION INTRAVENOUS ONCE
Status: COMPLETED | OUTPATIENT
Start: 2025-03-30 | End: 2025-03-30

## 2025-03-30 RX ORDER — DEXTROSE, SODIUM CHLORIDE, SODIUM LACTATE, POTASSIUM CHLORIDE, AND CALCIUM CHLORIDE 5; .6; .31; .03; .02 G/100ML; G/100ML; G/100ML; G/100ML; G/100ML
100 INJECTION, SOLUTION INTRAVENOUS CONTINUOUS
Status: DISCONTINUED | OUTPATIENT
Start: 2025-03-30 | End: 2025-03-30

## 2025-03-30 RX ORDER — CALCIUM ACETATE 667 MG/1
667 CAPSULE ORAL
Status: DISCONTINUED | OUTPATIENT
Start: 2025-03-30 | End: 2025-04-17 | Stop reason: HOSPADM

## 2025-03-30 RX ORDER — DEXTROSE MONOHYDRATE AND SODIUM CHLORIDE 5; .9 G/100ML; G/100ML
50 INJECTION, SOLUTION INTRAVENOUS CONTINUOUS
Status: DISCONTINUED | OUTPATIENT
Start: 2025-03-30 | End: 2025-03-31

## 2025-03-30 RX ADMIN — SERTRALINE HYDROCHLORIDE 50 MG: 50 TABLET ORAL at 11:18

## 2025-03-30 RX ADMIN — IPRATROPIUM BROMIDE AND ALBUTEROL SULFATE 3 ML: 2.5; .5 SOLUTION RESPIRATORY (INHALATION) at 20:05

## 2025-03-30 RX ADMIN — IPRATROPIUM BROMIDE AND ALBUTEROL SULFATE 3 ML: 2.5; .5 SOLUTION RESPIRATORY (INHALATION) at 12:11

## 2025-03-30 RX ADMIN — METHYLPREDNISOLONE SODIUM SUCCINATE 40 MG: 40 INJECTION, POWDER, FOR SOLUTION INTRAMUSCULAR; INTRAVENOUS at 21:25

## 2025-03-30 RX ADMIN — HYDRALAZINE HYDROCHLORIDE 50 MG: 50 TABLET ORAL at 21:25

## 2025-03-30 RX ADMIN — Medication 30 PERCENT: at 23:45

## 2025-03-30 RX ADMIN — Medication 40 L/MIN: at 12:12

## 2025-03-30 RX ADMIN — FLUTICASONE PROPIONATE 2 SPRAY: 50 SPRAY, METERED NASAL at 21:24

## 2025-03-30 RX ADMIN — HYDRALAZINE HYDROCHLORIDE 50 MG: 50 TABLET ORAL at 11:17

## 2025-03-30 RX ADMIN — NYSTATIN 1 APPLICATION: 100000 POWDER TOPICAL at 14:24

## 2025-03-30 RX ADMIN — Medication 30 L/MIN: at 20:06

## 2025-03-30 RX ADMIN — CALCIUM GLUCONATE 2 G: 20 INJECTION, SOLUTION INTRAVENOUS at 06:58

## 2025-03-30 RX ADMIN — SODIUM CHLORIDE, SODIUM LACTATE, POTASSIUM CHLORIDE, AND CALCIUM CHLORIDE 500 ML: 600; 310; 30; 20 INJECTION, SOLUTION INTRAVENOUS at 08:13

## 2025-03-30 RX ADMIN — CALCIUM ACETATE 667 MG: 667 CAPSULE ORAL at 18:12

## 2025-03-30 RX ADMIN — LEVOTHYROXINE SODIUM 200 MCG: 0.1 TABLET ORAL at 06:50

## 2025-03-30 RX ADMIN — METHYLPREDNISOLONE SODIUM SUCCINATE 40 MG: 40 INJECTION, POWDER, FOR SOLUTION INTRAMUSCULAR; INTRAVENOUS at 09:34

## 2025-03-30 RX ADMIN — EZETIMIBE 10 MG: 10 TABLET ORAL at 11:16

## 2025-03-30 RX ADMIN — NYSTATIN 1 APPLICATION: 100000 POWDER TOPICAL at 21:24

## 2025-03-30 RX ADMIN — MONTELUKAST 10 MG: 10 TABLET, FILM COATED ORAL at 21:25

## 2025-03-30 RX ADMIN — IPRATROPIUM BROMIDE AND ALBUTEROL SULFATE 3 ML: 2.5; .5 SOLUTION RESPIRATORY (INHALATION) at 00:08

## 2025-03-30 RX ADMIN — NYSTATIN 1 APPLICATION: 100000 POWDER TOPICAL at 09:35

## 2025-03-30 RX ADMIN — Medication 30 PERCENT: at 08:05

## 2025-03-30 RX ADMIN — METOPROLOL TARTRATE 50 MG: 50 TABLET, FILM COATED ORAL at 11:18

## 2025-03-30 RX ADMIN — Medication 30 L/MIN: at 20:05

## 2025-03-30 RX ADMIN — INSULIN LISPRO 4 UNITS: 100 INJECTION, SOLUTION INTRAVENOUS; SUBCUTANEOUS at 17:26

## 2025-03-30 RX ADMIN — METOPROLOL TARTRATE 50 MG: 50 TABLET, FILM COATED ORAL at 21:25

## 2025-03-30 RX ADMIN — HEPARIN SODIUM 7500 UNITS: 5000 INJECTION INTRAVENOUS; SUBCUTANEOUS at 06:49

## 2025-03-30 RX ADMIN — IPRATROPIUM BROMIDE AND ALBUTEROL SULFATE 3 ML: 2.5; .5 SOLUTION RESPIRATORY (INHALATION) at 08:03

## 2025-03-30 RX ADMIN — Medication 40 L/MIN: at 15:33

## 2025-03-30 RX ADMIN — ASPIRIN 81 MG: 81 TABLET, CHEWABLE ORAL at 09:18

## 2025-03-30 RX ADMIN — INSULIN LISPRO 4 UNITS: 100 INJECTION, SOLUTION INTRAVENOUS; SUBCUTANEOUS at 21:25

## 2025-03-30 RX ADMIN — HEPARIN SODIUM 7500 UNITS: 5000 INJECTION INTRAVENOUS; SUBCUTANEOUS at 21:24

## 2025-03-30 RX ADMIN — SODIUM CHLORIDE, SODIUM LACTATE, POTASSIUM CHLORIDE, CALCIUM CHLORIDE, AND DEXTROSE MONOHYDRATE 100 ML/HR: 600; 310; 30; 20; 5 INJECTION, SOLUTION INTRAVENOUS at 12:53

## 2025-03-30 RX ADMIN — PRAVASTATIN SODIUM 40 MG: 20 TABLET ORAL at 21:25

## 2025-03-30 RX ADMIN — HEPARIN SODIUM 7500 UNITS: 5000 INJECTION INTRAVENOUS; SUBCUTANEOUS at 14:21

## 2025-03-30 ASSESSMENT — PAIN - FUNCTIONAL ASSESSMENT
PAIN_FUNCTIONAL_ASSESSMENT: 0-10
PAIN_FUNCTIONAL_ASSESSMENT: CPOT (CRITICAL CARE PAIN OBSERVATION TOOL)
PAIN_FUNCTIONAL_ASSESSMENT: 0-10
PAIN_FUNCTIONAL_ASSESSMENT: CPOT (CRITICAL CARE PAIN OBSERVATION TOOL)

## 2025-03-30 ASSESSMENT — PAIN SCALES - GENERAL
PAINLEVEL_OUTOF10: 0 - NO PAIN

## 2025-03-30 NOTE — CARE PLAN
Problem: Mechanical Ventilation  Goal: Patient Will Maintain Patent Airway  Outcome: Progressing  Goal: Oral health is maintained or improved  Outcome: Progressing  Goal: Tracheostomy will be managed safely  Outcome: Progressing  Goal: ET tube will be managed safely  Outcome: Progressing     Problem: Skin  Goal: Decreased wound size/increased tissue granulation at next dressing change  Outcome: Progressing  Flowsheets (Taken 3/30/2025 1150)  Decreased wound size/increased tissue granulation at next dressing change: Protective dressings over bony prominences  Goal: Participates in plan/prevention/treatment measures  Outcome: Progressing  Flowsheets (Taken 3/30/2025 1150)  Participates in plan/prevention/treatment measures:   Elevate heels   Increase activity/out of bed for meals  Goal: Prevent/manage excess moisture  Outcome: Progressing  Flowsheets (Taken 3/30/2025 1150)  Prevent/manage excess moisture:   Moisturize dry skin   Cleanse incontinence/protect with barrier cream  Goal: Prevent/minimize sheer/friction injuries  Outcome: Progressing  Flowsheets (Taken 3/30/2025 1150)  Prevent/minimize sheer/friction injuries:   Use pull sheet   Increase activity/out of bed for meals   HOB 30 degrees or less   Turn/reposition every 2 hours/use positioning/transfer devices  Goal: Promote/optimize nutrition  Outcome: Progressing  Flowsheets (Taken 3/30/2025 1150)  Promote/optimize nutrition: Discuss with provider if NPO > 2 days  Goal: Promote skin healing  Outcome: Progressing  Flowsheets (Taken 3/30/2025 1150)  Promote skin healing:   Turn/reposition every 2 hours/use positioning/transfer devices   Protective dressings over bony prominences     Problem: Fall/Injury  Goal: Not fall by end of shift  Outcome: Progressing  Goal: Be free from injury by end of the shift  Outcome: Progressing  Goal: Verbalize understanding of personal risk factors for fall in the hospital  Outcome: Progressing  Goal: Verbalize understanding of  risk factor reduction measures to prevent injury from fall in the home  Outcome: Progressing  Goal: Use assistive devices by end of the shift  Outcome: Progressing  Goal: Pace activities to prevent fatigue by end of the shift  Outcome: Progressing     Problem: Respiratory  Goal: Tolerate mechanical ventilation evidenced by VS/agitation level this shift  Outcome: Progressing  Goal: Wean oxygen to maintain O2 saturation per order/standard this shift  Outcome: Progressing     Problem: Safety - Adult  Goal: Free from fall injury  Outcome: Progressing     Problem: Discharge Planning  Goal: Discharge to home or other facility with appropriate resources  Outcome: Progressing     Problem: Chronic Conditions and Co-morbidities  Goal: Patient's chronic conditions and co-morbidity symptoms are monitored and maintained or improved  Outcome: Progressing     Problem: Nutrition  Goal: Nutrient intake appropriate for maintaining nutritional needs  Outcome: Progressing     Problem: Diabetes  Goal: Achieve decreasing blood glucose levels by end of shift  Outcome: Progressing  Goal: Increase stability of blood glucose readings by end of shift  Outcome: Progressing  Goal: Decrease in ketones present in urine by end of shift  Outcome: Progressing  Goal: Maintain electrolyte levels within acceptable range throughout shift  Outcome: Progressing  Goal: Maintain glucose levels >70mg/dl to <250mg/dl throughout shift  Outcome: Progressing  Goal: No changes in neurological exam by end of shift  Outcome: Progressing  Goal: Learn about and adhere to nutrition recommendations by end of shift  Outcome: Progressing  Goal: Vital signs within normal range for age by end of shift  Outcome: Progressing  Goal: Increase self care and/or family involovement by end of shift  Outcome: Progressing  Goal: Receive DSME education by end of shift  Outcome: Progressing   The patient's goals for the shift include      The clinical goals for the shift include pt to  remain hemodynamically stable    Over the shift, the patient did not make progress toward the following goals. Barriers to progression include pt on high flow oxygen. Recommendations to address these barriers include monitor vitals and labs.

## 2025-03-30 NOTE — PROGRESS NOTES
Analia Murray is a 74 y.o. female on day 5 of admission presenting with Acute respiratory failure with hypoxia.  Patient with h/o asthma/COPD, HOLDEN, HTN, DLP, CHF, CKD IV, T2DM, hypothyroidism, anemia, MGUS, who p/w SOB x 2 weeks associated with cough productive of yellow sputum and chest tightness. In the ED work up revealed tachypnea, hypoxia, MARY ANN, BNP 700s, 7/21/78/140 on ABG and mild pulmonary edema on chest imaging. Received Solu-Medrol, Lasix, placed on BiPAP and admitted to ICU with the diagnosis of acute on chronic hypercarbic respiratory failure. Repeat ABG showed persistent hypercarbia while on BiPAP and  was consequently intubated after arrival to the ICU.   Subjective   Worsening hypercarbic respiratory failure yesterday, remained on BiPAP throughout the day, with need to increase the setting. Cr continues to rise.      More awake this morning, on BiPAP, nods to questions. Says yes to being OK. Denies any pains or Sob.   Objective   Scheduled medications  allopurinol, 50 mg, oral, Every other day  [Held by provider] amLODIPine, 10 mg, oral, Daily  aspirin, 81 mg, oral, Daily  calcium gluconate, 2 g, intravenous, Once  ezetimibe, 10 mg, oral, Daily  fluticasone, 2 spray, Each Nostril, BID  [Held by provider] gabapentin, 300 mg, oral, Daily  heparin (porcine), 7,500 Units, subcutaneous, q8h JAYA  hydrALAZINE, 50 mg, oral, BID  insulin lispro, 0-10 Units, subcutaneous, q4h  ipratropium-albuteroL, 3 mL, nebulization, q6h  levothyroxine, 200 mcg, oral, Daily  levothyroxine, 200 mcg, oral, Every Wednesday  methylPREDNISolone sodium succinate (PF), 40 mg, intravenous, q12h  [Held by provider] metoprolol succinate XL, 100 mg, oral, Daily  metoprolol tartrate, 50 mg, oral, BID  montelukast, 10 mg, oral, Nightly  nystatin, 1 Application, Topical, TID  oxygen, , inhalation, Continuous - Inhalation  polyethylene glycol, 17 g, oral, Daily  pravastatin, 40 mg, oral, Nightly  sertraline, 50 mg, oral, Daily  sulfur  "hexafluoride microsphr, 2 mL, intravenous, Once in imaging    Continuous medications     PRN medications  PRN medications: dextrose, dextrose, glucagon, glucagon, [Held by provider] nitroglycerin   Physical Exam  Constitutional:       General: She is not in acute distress.     Appearance: She is obese. She is not ill-appearing or toxic-appearing.   HENT:      Head: Normocephalic and atraumatic.      Nose:      Comments: NG in place.      Mouth/Throat:      Comments: On BiPAP.   Eyes:      General: No scleral icterus.     Pupils: Pupils are equal, round, and reactive to light.   Neck:      Vascular: No carotid bruit.   Cardiovascular:      Rate and Rhythm: Normal rate and regular rhythm.      Heart sounds: No murmur heard.     No friction rub. No gallop.   Pulmonary:      Effort: Pulmonary effort is normal. No respiratory distress.      Breath sounds: No wheezing or rales.      Comments: Clear lung fields b/l with fair/poor air entry on BiPAP. No wheezing.   Abdominal:      General: Bowel sounds are normal. There is no distension.      Palpations: Abdomen is soft.      Tenderness: There is no abdominal tenderness.   Musculoskeletal:         General: No tenderness.      Cervical back: Neck supple. No rigidity.      Right lower leg: No edema.      Left lower leg: No edema.   Lymphadenopathy:      Cervical: No cervical adenopathy.   Skin:     General: Skin is warm and dry.      Coloration: Skin is not jaundiced.   Neurological:      General: No focal deficit present.      Cranial Nerves: No cranial nerve deficit.      Motor: No weakness.      Comments: Sleepy, but easily can be aroused. Follows commands, moves all extremities.    Psychiatric:         Mood and Affect: Mood normal.         Behavior: Behavior normal.      Comments: Calm.      Last Recorded Vitals  Blood pressure 107/66, pulse 71, temperature 36.4 °C (97.5 °F), temperature source Axillary, resp. rate 15, height 1.626 m (5' 4.02\"), weight 140 kg (308 lb 13.8 " oz), SpO2 97%.  Intake/Output last 3 Shifts:  I/O last 3 completed shifts:  In: 160 (1.1 mL/kg) [NG/GT:160]  Out: 280 (2 mL/kg) [Urine:280 (0.1 mL/kg/hr)]  Weight: 140.1 kg   Relevant Results  Results for orders placed or performed during the hospital encounter of 03/25/25 (from the past 24 hours)   POCT GLUCOSE   Result Value Ref Range    POCT Glucose 120 (H) 74 - 99 mg/dL   Blood Gas Arterial Full Panel   Result Value Ref Range    POCT pH, Arterial 7.22 (LL) 7.38 - 7.42 pH    POCT pCO2, Arterial 64 (H) 38 - 42 mm Hg    POCT pO2, Arterial 114 (H) 85 - 95 mm Hg    POCT SO2, Arterial 99 94 - 100 %    POCT Oxy Hemoglobin, Arterial 97.5 94.0 - 98.0 %    POCT Hematocrit Calculated, Arterial 29.0 (L) 36.0 - 46.0 %    POCT Sodium, Arterial 127 (L) 136 - 145 mmol/L    POCT Potassium, Arterial 4.8 3.5 - 5.3 mmol/L    POCT Chloride, Arterial 95 (L) 98 - 107 mmol/L    POCT Ionized Calcium, Arterial 0.99 (L) 1.10 - 1.33 mmol/L    POCT Glucose, Arterial 115 (H) 74 - 99 mg/dL    POCT Lactate, Arterial 0.6 0.4 - 2.0 mmol/L    POCT Base Excess, Arterial -2.2 (L) -2.0 - 3.0 mmol/L    POCT HCO3 Calculated, Arterial 26.2 (H) 22.0 - 26.0 mmol/L    POCT Hemoglobin, Arterial 9.8 (L) 12.0 - 16.0 g/dL    POCT Anion Gap, Arterial 11 10 - 25 mmo/L    Patient Temperature 37.0 degrees Celsius    FiO2 70 %    Frequency (BPM) 14 bpm    Ipap CMH2O 20.0 cm H2O    Epap CMH2O 5.0 cm H2O    Critical Called By HORACIO HAZEL RRT     Critical Called To DR. ASKEW     Critical Call Time 844     Critical Read Back Y     Site of Arterial Puncture Brachial Right     Wes's Test Negative    POCT GLUCOSE   Result Value Ref Range    POCT Glucose 100 (H) 74 - 99 mg/dL   BLOOD GAS ARTERIAL FULL PANEL   Result Value Ref Range    POCT pH, Arterial 7.23 (LL) 7.38 - 7.42 pH    POCT pCO2, Arterial 62 (H) 38 - 42 mm Hg    POCT pO2, Arterial 118 (H) 85 - 95 mm Hg    POCT SO2, Arterial 100 94 - 100 %    POCT Oxy Hemoglobin, Arterial 97.6 94.0 - 98.0 %    POCT  Hematocrit Calculated, Arterial 29.0 (L) 36.0 - 46.0 %    POCT Sodium, Arterial 128 (L) 136 - 145 mmol/L    POCT Potassium, Arterial 4.8 3.5 - 5.3 mmol/L    POCT Chloride, Arterial 94 (L) 98 - 107 mmol/L    POCT Ionized Calcium, Arterial 0.98 (L) 1.10 - 1.33 mmol/L    POCT Glucose, Arterial 118 (H) 74 - 99 mg/dL    POCT Lactate, Arterial 0.5 0.4 - 2.0 mmol/L    POCT Base Excess, Arterial -2.1 (L) -2.0 - 3.0 mmol/L    POCT HCO3 Calculated, Arterial 26.0 22.0 - 26.0 mmol/L    POCT Hemoglobin, Arterial 9.8 (L) 12.0 - 16.0 g/dL    POCT Anion Gap, Arterial 13 10 - 25 mmo/L    Patient Temperature 37.0 degrees Celsius    FiO2 70 %    Apparatus      Ipap CMH2O 20.0 cm H2O    Epap CMH2O 5.0 cm H2O    Critical Called By DANIELA [facility]     Critical Called To      Critical Call Time 1255     Critical Read Back Y     Site of Arterial Puncture Radial Right     Wes's Test Positive    Blood Gas Arterial Full Panel   Result Value Ref Range    POCT pH, Arterial 7.26 (L) 7.38 - 7.42 pH    POCT pCO2, Arterial 55 (H) 38 - 42 mm Hg    POCT pO2, Arterial 69 (L) 85 - 95 mm Hg    POCT SO2, Arterial 97 94 - 100 %    POCT Oxy Hemoglobin, Arterial 94.3 94.0 - 98.0 %    POCT Hematocrit Calculated, Arterial 30.0 (L) 36.0 - 46.0 %    POCT Sodium, Arterial 128 (L) 136 - 145 mmol/L    POCT Potassium, Arterial 4.9 3.5 - 5.3 mmol/L    POCT Chloride, Arterial 93 (L) 98 - 107 mmol/L    POCT Ionized Calcium, Arterial 0.96 (L) 1.10 - 1.33 mmol/L    POCT Glucose, Arterial 133 (H) 74 - 99 mg/dL    POCT Lactate, Arterial 0.6 0.4 - 2.0 mmol/L    POCT Base Excess, Arterial -2.7 (L) -2.0 - 3.0 mmol/L    POCT HCO3 Calculated, Arterial 24.7 22.0 - 26.0 mmol/L    POCT Hemoglobin, Arterial 10.0 (L) 12.0 - 16.0 g/dL    POCT Anion Gap, Arterial 15 10 - 25 mmo/L    Patient Temperature 37.0 degrees Celsius    FiO2 30 %    Frequency (BPM) 14 bpm    Ipap CMH2O 25.0 cm H2O    Epap CMH2O 5.0 cm H2O    Site of Arterial Puncture Radial Right     Wes's Test  Negative    POCT GLUCOSE   Result Value Ref Range    POCT Glucose 121 (H) 74 - 99 mg/dL   POCT GLUCOSE   Result Value Ref Range    POCT Glucose 133 (H) 74 - 99 mg/dL   Blood Gas Arterial Full Panel   Result Value Ref Range    POCT pH, Arterial 7.28 (L) 7.38 - 7.42 pH    POCT pCO2, Arterial 51 (H) 38 - 42 mm Hg    POCT pO2, Arterial 83 (L) 85 - 95 mm Hg    POCT SO2, Arterial 98 94 - 100 %    POCT Oxy Hemoglobin, Arterial 96.1 94.0 - 98.0 %    POCT Hematocrit Calculated, Arterial 29.0 (L) 36.0 - 46.0 %    POCT Sodium, Arterial 127 (L) 136 - 145 mmol/L    POCT Potassium, Arterial 4.7 3.5 - 5.3 mmol/L    POCT Chloride, Arterial 94 (L) 98 - 107 mmol/L    POCT Ionized Calcium, Arterial 0.94 (L) 1.10 - 1.33 mmol/L    POCT Glucose, Arterial 141 (H) 74 - 99 mg/dL    POCT Lactate, Arterial 0.8 0.4 - 2.0 mmol/L    POCT Base Excess, Arterial -2.9 (L) -2.0 - 3.0 mmol/L    POCT HCO3 Calculated, Arterial 24.0 22.0 - 26.0 mmol/L    POCT Hemoglobin, Arterial 9.8 (L) 12.0 - 16.0 g/dL    POCT Anion Gap, Arterial 14 10 - 25 mmo/L    Patient Temperature 37.0 degrees Celsius    FiO2 30 %    Apparatus FACE MASK     Ventilator Mode BiPAP     Ventilator Rate 14 bpm    Ipap CMH2O 20.0 cm H2O    Epap CMH2O 5.0 cm H2O    Site of Arterial Puncture Radial Right     Wes's Test Positive    POCT GLUCOSE   Result Value Ref Range    POCT Glucose 135 (H) 74 - 99 mg/dL   Blood Gas Arterial Full Panel   Result Value Ref Range    POCT pH, Arterial 7.28 (L) 7.38 - 7.42 pH    POCT pCO2, Arterial 52 (H) 38 - 42 mm Hg    POCT pO2, Arterial 77 (L) 85 - 95 mm Hg    POCT SO2, Arterial 96 94 - 100 %    POCT Oxy Hemoglobin, Arterial 93.9 (L) 94.0 - 98.0 %    POCT Hematocrit Calculated, Arterial 29.0 (L) 36.0 - 46.0 %    POCT Sodium, Arterial 128 (L) 136 - 145 mmol/L    POCT Potassium, Arterial 4.7 3.5 - 5.3 mmol/L    POCT Chloride, Arterial 93 (L) 98 - 107 mmol/L    POCT Ionized Calcium, Arterial 0.93 (L) 1.10 - 1.33 mmol/L    POCT Glucose, Arterial 135 (H) 74  - 99 mg/dL    POCT Lactate, Arterial 0.8 0.4 - 2.0 mmol/L    POCT Base Excess, Arterial -2.6 (L) -2.0 - 3.0 mmol/L    POCT HCO3 Calculated, Arterial 24.4 22.0 - 26.0 mmol/L    POCT Hemoglobin, Arterial 9.8 (L) 12.0 - 16.0 g/dL    POCT Anion Gap, Arterial 15 10 - 25 mmo/L    Patient Temperature 37.0 degrees Celsius    FiO2 30 %    Apparatus FACE MASK     Ventilator Mode BiPAP     Ventilator Rate 16 bpm    Ipap CMH2O 20.0 cm H2O    Epap CMH2O 5.0 cm H2O    Site of Arterial Puncture Radial Right     Wes's Test Positive    CBC and Auto Differential   Result Value Ref Range    WBC 7.1 4.4 - 11.3 x10*3/uL    nRBC 0.0 0.0 - 0.0 /100 WBCs    RBC 3.84 (L) 4.00 - 5.20 x10*6/uL    Hemoglobin 8.9 (L) 12.0 - 16.0 g/dL    Hematocrit 29.3 (L) 36.0 - 46.0 %    MCV 76 (L) 80 - 100 fL    MCH 23.2 (L) 26.0 - 34.0 pg    MCHC 30.4 (L) 32.0 - 36.0 g/dL    RDW 19.8 (H) 11.5 - 14.5 %    Platelets 157 150 - 450 x10*3/uL    Neutrophils % 88.3 40.0 - 80.0 %    Immature Granulocytes %, Automated 0.3 0.0 - 0.9 %    Lymphocytes % 6.1 13.0 - 44.0 %    Monocytes % 5.2 2.0 - 10.0 %    Eosinophils % 0.0 0.0 - 6.0 %    Basophils % 0.1 0.0 - 2.0 %    Neutrophils Absolute 6.23 (H) 1.60 - 5.50 x10*3/uL    Immature Granulocytes Absolute, Automated 0.02 0.00 - 0.50 x10*3/uL    Lymphocytes Absolute 0.43 (L) 0.80 - 3.00 x10*3/uL    Monocytes Absolute 0.37 0.05 - 0.80 x10*3/uL    Eosinophils Absolute 0.00 0.00 - 0.40 x10*3/uL    Basophils Absolute 0.01 0.00 - 0.10 x10*3/uL   Renal Function Panel   Result Value Ref Range    Glucose 104 (H) 74 - 99 mg/dL    Sodium 131 (L) 136 - 145 mmol/L    Potassium 4.4 3.5 - 5.3 mmol/L    Chloride 93 (L) 98 - 107 mmol/L    Bicarbonate 23 21 - 32 mmol/L    Anion Gap 19 10 - 20 mmol/L    Urea Nitrogen 76 (H) 6 - 23 mg/dL    Creatinine 5.07 (H) 0.50 - 1.05 mg/dL    eGFR 8 (L) >60 mL/min/1.73m*2    Calcium 7.0 (L) 8.6 - 10.3 mg/dL    Phosphorus 10.5 (H) 2.5 - 4.9 mg/dL    Albumin 2.9 (L) 3.4 - 5.0 g/dL   Magnesium   Result  Value Ref Range    Magnesium 2.20 1.60 - 2.40 mg/dL   Blood Gas Venous Full Panel   Result Value Ref Range    POCT pH, Venous 7.25 (LL) 7.33 - 7.43 pH    POCT pCO2, Venous 56 (H) 41 - 51 mm Hg    POCT pO2, Venous 40 35 - 45 mm Hg    POCT SO2, Venous 60 45 - 75 %    POCT Oxy Hemoglobin, Venous 58.8 45.0 - 75.0 %    POCT Hematocrit Calculated, Venous 27.0 (L) 36.0 - 46.0 %    POCT Sodium, Venous 130 (L) 136 - 145 mmol/L    POCT Potassium, Venous 4.4 3.5 - 5.3 mmol/L    POCT Chloride, Venous 93 (L) 98 - 107 mmol/L    POCT Ionized Calicum, Venous 0.93 (L) 1.10 - 1.33 mmol/L    POCT Glucose, Venous 101 (H) 74 - 99 mg/dL    POCT Lactate, Venous 1.3 0.4 - 2.0 mmol/L    POCT Base Excess, Venous -2.9 (L) -2.0 - 3.0 mmol/L    POCT HCO3 Calculated, Venous 24.6 22.0 - 26.0 mmol/L    POCT Hemoglobin, Venous 9.1 (L) 12.0 - 16.0 g/dL    POCT Anion Gap, Venous 17.0 10.0 - 25.0 mmol/L    Patient Temperature 37.0 degrees Celsius    FiO2 30 %   POCT GLUCOSE   Result Value Ref Range    POCT Glucose 101 (H) 74 - 99 mg/dL     *Note: Due to a large number of results and/or encounters for the requested time period, some results have not been displayed. A complete set of results can be found in Results Review.   XR chest 1 view    Result Date: 3/30/2025  Interpreted By:  Quinn Abarca, STUDY: XR CHEST 1 VIEW; 3/30/2025 5:40 am   INDICATION: Signs/Symptoms:acute respiratory failure.   COMPARISON: Chest x-ray 03/29/2025   ACCESSION NUMBER(S): [id number]   ORDERING CLINICIAN: LORNA SOLORZANO   TECHNIQUE: 1 view of the chest was performed.   FINDINGS: Persistent layering bilateral pleural effusion with surrounding atelectasis. Perihilar vascular congestion. No pneumothorax.  The cardiomediastinal silhouette is stable. Distal end of nasogastric tube is not visualized.       1. Cardiomegaly with persistent layering bilateral pleural effusion with surrounding atelectasis, unchanged from prior.   Signed by: Quinn Abarca 3/30/2025 7:18 AM Dictation  workstation:   [id number]    XR chest 1 view    Result Date: 3/29/2025  Interpreted By:  Lopez Ruiz, STUDY: XR CHEST 1 VIEW  3/29/2025 8:57 am   INDICATION: Signs/Symptoms:Worsening respiratory status. Tnx.   COMPARISON: 03/27/2025   ACCESSION NUMBER(S): [id number]   ORDERING CLINICIAN: NANNETTE ASKEW   TECHNIQUE: A single AP portable radiograph of the chest was obtained.   FINDINGS: The study is limited by the patient's large body habitus. Multiple cardiac monitoring leads are seen over the chest.  An enteric tube is present, with the tip overlying the mid stomach. Bilateral basilar airspace consolidations are seen and may represent small to moderate-sized pleural effusions, atelectasis and/or pneumonia. No pneumothorax is identified. The cardiac silhouette is prominent, similar to prior studies.       Bibasilar airspace consolidations, as above. Clinical correlation and continued follow-up until clearing is recommended.   MACRO: None.   Signed by: Lopez Ruiz 3/29/2025 10:29 AM Dictation workstation:   [id number]    Assessment/Plan   Assessment & Plan  Acute respiratory failure with hypoxia    74 YOF with h/o asthma/COPD, HOLDEN, HTN, DLP, CHF, CKD IV, T2DM, hypothyroidism, anemia, MGUS, who p/w SOB x 2 weeks associated with cough productive of yellow sputum and chest tightness. In the ED work up revealed tachypnea, hypoxia, MARY ANN, BNP 700s, 7/21/78/140 on ABG and mild pulmonary edema on chest imaging. Received Solu-Medrol, Lasix, placed on BiPAP and admitted to ICU with the diagnosis of acute on chronic hypercarbic respiratory failure. Repeat ABG showed persistent hypercarbia while on BiPAP and  was consequently intubated after arrival to the ICU.     Neuro: acute metabolic encephalopathy likely due to hypercarbia, anxiety and depression. Worsened 3/29 due to hypercarbia, but now is improving.        Continue home Zoloft and Gabapentin       Supportive measures       Treat the underlying causes     CV:  h/o CHF, HTN, DLP, now probably with CHF exacerbation, given . Currently hemodynamically stable.       Home Lasix on hold, doses are being given PRN       Continue home Norvasc and hydralazine       Continue metoprolol       Continue home Atorvastatin, Zetia and ASA      Pulmonary: acute on chronic hypoxic hypercarbic respiratory failure 2/2 CHF vs COPD exacerbation. Initially required intubation and MV after failing NIV. H/o COPD, HOLDEN on CPAP and asthma. Successfully extubated to NC on 3/27. More drowsy 3/29, ABG showed respiratory acidosis.        Continue BiPAP current setting 24/10/30%, will try to wean off       Continue home Flonase, Singulair.        Continue Duo-Neb       Continue Solu-medrol, will increase to 40 mg BID       BPH       Daily SBT    : MARY ANN on CKD IV, baseline Cr 2.2, now up to 4.43. Pre-renal vs cardiorenal. Urine output now is improved. Cr continues to rise. Low urine output yesterday,  but also low intake. Hyperkalemia now is improved/resolved.           Is/Os       Daily RFP       Treat electrolyte abnormalities as indicated       Nephrology consult is done, input appreciated       Will give 500 ml of LR bolus. Might need to be placed in maintenance IVF given low PO intake.     GI: no acute issues       SLP evaluation is done, started on a diet.        No indication for GI prophylaxis since now is extubated.        Bowel regimen       Will keep NG in place.     Endo: h/o T2DM, hypothyroidism. Currently no acute issues. FS within acceptable range.        Continue home Synthroid       Hold home dulaglutide and semaglutide       POCT every 4 hours       SSI       Hypoglycemic protocol    Hem/Onc: h/o anemia of chronic diseases likely due to MGUS. H/H slowly down-trending.        Continue to monitor with daily CBC    ID: worsening leukocytosis on 3/29, but now WBC back to normal.       CXR done, no infiltrates       Completed a course of Azithromycin       Continue to monitor for S&S  infection.       DVT prophylaxis with subcutaneous heparin.     This was a critical care visit due to respiratory failure. Total time 42 min.     Charisse Chavez MD

## 2025-03-30 NOTE — PROGRESS NOTES
03/30/25 1130   Discharge Planning   Living Arrangements Spouse/significant other   Support Systems Spouse/significant other;Children   Type of Residence Private residence   Home or Post Acute Services Post acute facilities (Rehab/SNF/etc)   Type of Post Acute Facility Services Skilled nursing   Expected Discharge Disposition SNF  (have not made any choices yet.)     Spoke with daughterTiffanie and has not made choices yet. TCC will follow up on Monday.     DISCHARGE PLAN: SNF--DO NOT DISCHARGE PATIENT BEFORE SPEAKING WITH CARE COORDINATION; NEED SNF CHOICES, NEED ACCEPTING SNF, NEED PRECERT

## 2025-03-30 NOTE — PROGRESS NOTES
Analia Murray is a 74 y.o. female on day 5 of admission presenting with Acute respiratory failure with hypoxia.      Subjective   The patient is much more awake and alert and oriented today.  She has no complaints.  Her creatinine is still trending up and she is oliguric.  Did not respond much to the IV Lasix yesterday.  So far today made 245 cc of urine.       Objective          Vitals 24HR  Heart Rate:  [70-89]   Temp:  [36.3 °C (97.3 °F)-36.8 °C (98.2 °F)]   Resp:  [14-24]   BP: (107-158)/()   Weight:  [140 kg (308 lb 13.8 oz)]   SpO2:  [89 %-97 %]       Intake/Output last 3 Shifts:    Intake/Output Summary (Last 24 hours) at 3/30/2025 1422  Last data filed at 3/30/2025 1312  Gross per 24 hour   Intake 644.17 ml   Output 385 ml   Net 259.17 ml       Physical Exam  Constitutional:       General: She is awake. She is not in acute distress.  Cardiovascular:      Comments: Distant heart sounds  Pulmonary:      Effort: Pulmonary effort is normal.      Comments: Diminished breath sounds  Abdominal:      General: Bowel sounds are normal.      Palpations: Abdomen is soft.      Tenderness: There is no guarding or rebound.   Musculoskeletal:      Comments: Trace edema   Neurological:      Mental Status: She is alert.      Comments: And oriented         Relevant Results  Results for orders placed or performed during the hospital encounter of 03/25/25 (from the past 24 hours)   POCT GLUCOSE   Result Value Ref Range    POCT Glucose 121 (H) 74 - 99 mg/dL   POCT GLUCOSE   Result Value Ref Range    POCT Glucose 133 (H) 74 - 99 mg/dL   Blood Gas Arterial Full Panel   Result Value Ref Range    POCT pH, Arterial 7.28 (L) 7.38 - 7.42 pH    POCT pCO2, Arterial 51 (H) 38 - 42 mm Hg    POCT pO2, Arterial 83 (L) 85 - 95 mm Hg    POCT SO2, Arterial 98 94 - 100 %    POCT Oxy Hemoglobin, Arterial 96.1 94.0 - 98.0 %    POCT Hematocrit Calculated, Arterial 29.0 (L) 36.0 - 46.0 %    POCT Sodium, Arterial 127 (L) 136 - 145 mmol/L     POCT Potassium, Arterial 4.7 3.5 - 5.3 mmol/L    POCT Chloride, Arterial 94 (L) 98 - 107 mmol/L    POCT Ionized Calcium, Arterial 0.94 (L) 1.10 - 1.33 mmol/L    POCT Glucose, Arterial 141 (H) 74 - 99 mg/dL    POCT Lactate, Arterial 0.8 0.4 - 2.0 mmol/L    POCT Base Excess, Arterial -2.9 (L) -2.0 - 3.0 mmol/L    POCT HCO3 Calculated, Arterial 24.0 22.0 - 26.0 mmol/L    POCT Hemoglobin, Arterial 9.8 (L) 12.0 - 16.0 g/dL    POCT Anion Gap, Arterial 14 10 - 25 mmo/L    Patient Temperature 37.0 degrees Celsius    FiO2 30 %    Apparatus FACE MASK     Ventilator Mode BiPAP     Ventilator Rate 14 bpm    Ipap CMH2O 20.0 cm H2O    Epap CMH2O 5.0 cm H2O    Site of Arterial Puncture Radial Right     Wes's Test Positive    POCT GLUCOSE   Result Value Ref Range    POCT Glucose 135 (H) 74 - 99 mg/dL   Blood Gas Arterial Full Panel   Result Value Ref Range    POCT pH, Arterial 7.28 (L) 7.38 - 7.42 pH    POCT pCO2, Arterial 52 (H) 38 - 42 mm Hg    POCT pO2, Arterial 77 (L) 85 - 95 mm Hg    POCT SO2, Arterial 96 94 - 100 %    POCT Oxy Hemoglobin, Arterial 93.9 (L) 94.0 - 98.0 %    POCT Hematocrit Calculated, Arterial 29.0 (L) 36.0 - 46.0 %    POCT Sodium, Arterial 128 (L) 136 - 145 mmol/L    POCT Potassium, Arterial 4.7 3.5 - 5.3 mmol/L    POCT Chloride, Arterial 93 (L) 98 - 107 mmol/L    POCT Ionized Calcium, Arterial 0.93 (L) 1.10 - 1.33 mmol/L    POCT Glucose, Arterial 135 (H) 74 - 99 mg/dL    POCT Lactate, Arterial 0.8 0.4 - 2.0 mmol/L    POCT Base Excess, Arterial -2.6 (L) -2.0 - 3.0 mmol/L    POCT HCO3 Calculated, Arterial 24.4 22.0 - 26.0 mmol/L    POCT Hemoglobin, Arterial 9.8 (L) 12.0 - 16.0 g/dL    POCT Anion Gap, Arterial 15 10 - 25 mmo/L    Patient Temperature 37.0 degrees Celsius    FiO2 30 %    Apparatus FACE MASK     Ventilator Mode BiPAP     Ventilator Rate 16 bpm    Ipap CMH2O 20.0 cm H2O    Epap CMH2O 5.0 cm H2O    Site of Arterial Puncture Radial Right     Wes's Test Positive    CBC and Auto Differential    Result Value Ref Range    WBC 7.1 4.4 - 11.3 x10*3/uL    nRBC 0.0 0.0 - 0.0 /100 WBCs    RBC 3.84 (L) 4.00 - 5.20 x10*6/uL    Hemoglobin 8.9 (L) 12.0 - 16.0 g/dL    Hematocrit 29.3 (L) 36.0 - 46.0 %    MCV 76 (L) 80 - 100 fL    MCH 23.2 (L) 26.0 - 34.0 pg    MCHC 30.4 (L) 32.0 - 36.0 g/dL    RDW 19.8 (H) 11.5 - 14.5 %    Platelets 157 150 - 450 x10*3/uL    Neutrophils % 88.3 40.0 - 80.0 %    Immature Granulocytes %, Automated 0.3 0.0 - 0.9 %    Lymphocytes % 6.1 13.0 - 44.0 %    Monocytes % 5.2 2.0 - 10.0 %    Eosinophils % 0.0 0.0 - 6.0 %    Basophils % 0.1 0.0 - 2.0 %    Neutrophils Absolute 6.23 (H) 1.60 - 5.50 x10*3/uL    Immature Granulocytes Absolute, Automated 0.02 0.00 - 0.50 x10*3/uL    Lymphocytes Absolute 0.43 (L) 0.80 - 3.00 x10*3/uL    Monocytes Absolute 0.37 0.05 - 0.80 x10*3/uL    Eosinophils Absolute 0.00 0.00 - 0.40 x10*3/uL    Basophils Absolute 0.01 0.00 - 0.10 x10*3/uL   Renal Function Panel   Result Value Ref Range    Glucose 104 (H) 74 - 99 mg/dL    Sodium 131 (L) 136 - 145 mmol/L    Potassium 4.4 3.5 - 5.3 mmol/L    Chloride 93 (L) 98 - 107 mmol/L    Bicarbonate 23 21 - 32 mmol/L    Anion Gap 19 10 - 20 mmol/L    Urea Nitrogen 76 (H) 6 - 23 mg/dL    Creatinine 5.07 (H) 0.50 - 1.05 mg/dL    eGFR 8 (L) >60 mL/min/1.73m*2    Calcium 7.0 (L) 8.6 - 10.3 mg/dL    Phosphorus 10.5 (H) 2.5 - 4.9 mg/dL    Albumin 2.9 (L) 3.4 - 5.0 g/dL   Magnesium   Result Value Ref Range    Magnesium 2.20 1.60 - 2.40 mg/dL   Blood Gas Venous Full Panel   Result Value Ref Range    POCT pH, Venous 7.25 (LL) 7.33 - 7.43 pH    POCT pCO2, Venous 56 (H) 41 - 51 mm Hg    POCT pO2, Venous 40 35 - 45 mm Hg    POCT SO2, Venous 60 45 - 75 %    POCT Oxy Hemoglobin, Venous 58.8 45.0 - 75.0 %    POCT Hematocrit Calculated, Venous 27.0 (L) 36.0 - 46.0 %    POCT Sodium, Venous 130 (L) 136 - 145 mmol/L    POCT Potassium, Venous 4.4 3.5 - 5.3 mmol/L    POCT Chloride, Venous 93 (L) 98 - 107 mmol/L    POCT Ionized Calicum, Venous 0.93  (L) 1.10 - 1.33 mmol/L    POCT Glucose, Venous 101 (H) 74 - 99 mg/dL    POCT Lactate, Venous 1.3 0.4 - 2.0 mmol/L    POCT Base Excess, Venous -2.9 (L) -2.0 - 3.0 mmol/L    POCT HCO3 Calculated, Venous 24.6 22.0 - 26.0 mmol/L    POCT Hemoglobin, Venous 9.1 (L) 12.0 - 16.0 g/dL    POCT Anion Gap, Venous 17.0 10.0 - 25.0 mmol/L    Patient Temperature 37.0 degrees Celsius    FiO2 30 %   POCT GLUCOSE   Result Value Ref Range    POCT Glucose 101 (H) 74 - 99 mg/dL   POCT GLUCOSE   Result Value Ref Range    POCT Glucose 90 74 - 99 mg/dL   Blood Gas Venous Full Panel   Result Value Ref Range    POCT pH, Venous 7.27 (L) 7.33 - 7.43 pH    POCT pCO2, Venous 41 41 - 51 mm Hg    POCT pO2, Venous 55 (H) 35 - 45 mm Hg    POCT SO2, Venous 83 (H) 45 - 75 %    POCT Oxy Hemoglobin, Venous 81.6 (H) 45.0 - 75.0 %    POCT Hematocrit Calculated, Venous 24.0 (L) 36.0 - 46.0 %    POCT Sodium, Venous 131 (L) 136 - 145 mmol/L    POCT Potassium, Venous 3.4 (L) 3.5 - 5.3 mmol/L    POCT Chloride, Venous 102 98 - 107 mmol/L    POCT Ionized Calicum, Venous 1.54 (H) 1.10 - 1.33 mmol/L    POCT Glucose, Venous 71 (L) 74 - 99 mg/dL    POCT Lactate, Venous 0.9 0.4 - 2.0 mmol/L    POCT Base Excess, Venous -7.5 (L) -2.0 - 3.0 mmol/L    POCT HCO3 Calculated, Venous 18.8 (L) 22.0 - 26.0 mmol/L    POCT Hemoglobin, Venous 7.9 (L) 12.0 - 16.0 g/dL    POCT Anion Gap, Venous 14.0 10.0 - 25.0 mmol/L    Patient Temperature 37.0 degrees Celsius    FiO2 30 %   Blood Gas Arterial Full Panel   Result Value Ref Range    POCT pH, Arterial 7.35 (L) 7.38 - 7.42 pH    POCT pCO2, Arterial 44 (H) 38 - 42 mm Hg    POCT pO2, Arterial 97 (H) 85 - 95 mm Hg    POCT SO2, Arterial 98 94 - 100 %    POCT Oxy Hemoglobin, Arterial 95.8 94.0 - 98.0 %    POCT Hematocrit Calculated, Arterial 29.0 (L) 36.0 - 46.0 %    POCT Sodium, Arterial 128 (L) 136 - 145 mmol/L    POCT Potassium, Arterial 4.5 3.5 - 5.3 mmol/L    POCT Chloride, Arterial 94 (L) 98 - 107 mmol/L    POCT Ionized Calcium,  Arterial 0.99 (L) 1.10 - 1.33 mmol/L    POCT Glucose, Arterial 91 74 - 99 mg/dL    POCT Lactate, Arterial 0.9 0.4 - 2.0 mmol/L    POCT Base Excess, Arterial -1.4 -2.0 - 3.0 mmol/L    POCT HCO3 Calculated, Arterial 24.3 22.0 - 26.0 mmol/L    POCT Hemoglobin, Arterial 9.5 (L) 12.0 - 16.0 g/dL    POCT Anion Gap, Arterial 14 10 - 25 mmo/L    Patient Temperature 37.0 degrees Celsius    FiO2 30 %    Ipap CMH2O 24.0 cm H2O    Epap CMH2O 10.0 cm H2O    Site of Arterial Puncture Brachial Right     Wes's Test Negative    POCT GLUCOSE   Result Value Ref Range    POCT Glucose 106 (H) 74 - 99 mg/dL     *Note: Due to a large number of results and/or encounters for the requested time period, some results have not been displayed. A complete set of results can be found in Results Review.            Assessment/Plan   Acute kidney injury superimposed on chronic kidney disease stage 3 likely secondary to acute tubular necrosis.  Although her renal function is not improving yet, her mental status is much improved, there are no indications for dialysis today therefore will monitor daily for dialysis needs and for any signs of renal recovery.  Hold diuretics today.  Although PO intake not yet adequate, decrease the rate of IV fluids to 50 cc an hour to avoid volume overload especially as she is oliguric and has pleural effusions.  Hold amlodipine to allow for higher blood pressure in the setting of acute kidney injury.  Dose medications such as gabapentin for GFR.  Renal diet.  Start PhosLo for hyperphosphatemia now that she is getting a diet  Chronic kidney disease stage IIIb  Acute respiratory failure - improving  Bilateral pneumonia on antibiotics  Diabetes mellitus type 2  Anemia of chronic kidney disease  Hypertension  Hyperlipidemia  Obesity    Hoda Slater MD

## 2025-03-31 ENCOUNTER — APPOINTMENT (OUTPATIENT)
Dept: RADIOLOGY | Facility: HOSPITAL | Age: 75
End: 2025-03-31
Payer: MEDICARE

## 2025-03-31 LAB
ALBUMIN SERPL BCP-MCNC: 3.1 G/DL (ref 3.4–5)
ANION GAP BLDV CALCULATED.4IONS-SCNC: 14 MMOL/L (ref 10–25)
ANION GAP SERPL CALCULATED.3IONS-SCNC: 19 MMOL/L (ref 10–20)
BASE EXCESS BLDV CALC-SCNC: -1.8 MMOL/L (ref -2–3)
BASOPHILS # BLD AUTO: 0 X10*3/UL (ref 0–0.1)
BASOPHILS NFR BLD AUTO: 0 %
BODY TEMPERATURE: 37 DEGREES CELSIUS
BUN SERPL-MCNC: 88 MG/DL (ref 6–23)
CA-I BLDV-SCNC: 0.99 MMOL/L (ref 1.1–1.33)
CALCIUM SERPL-MCNC: 7.3 MG/DL (ref 8.6–10.3)
CHLORIDE BLDV-SCNC: 93 MMOL/L (ref 98–107)
CHLORIDE SERPL-SCNC: 93 MMOL/L (ref 98–107)
CO2 SERPL-SCNC: 23 MMOL/L (ref 21–32)
CREAT SERPL-MCNC: 5.14 MG/DL (ref 0.5–1.05)
EGFRCR SERPLBLD CKD-EPI 2021: 8 ML/MIN/1.73M*2
EOSINOPHIL # BLD AUTO: 0 X10*3/UL (ref 0–0.4)
EOSINOPHIL NFR BLD AUTO: 0 %
ERYTHROCYTE [DISTWIDTH] IN BLOOD BY AUTOMATED COUNT: 19.5 % (ref 11.5–14.5)
GLUCOSE BLD MANUAL STRIP-MCNC: 130 MG/DL (ref 74–99)
GLUCOSE BLD MANUAL STRIP-MCNC: 139 MG/DL (ref 74–99)
GLUCOSE BLD MANUAL STRIP-MCNC: 142 MG/DL (ref 74–99)
GLUCOSE BLD MANUAL STRIP-MCNC: 177 MG/DL (ref 74–99)
GLUCOSE BLD MANUAL STRIP-MCNC: 180 MG/DL (ref 74–99)
GLUCOSE BLD MANUAL STRIP-MCNC: 246 MG/DL (ref 74–99)
GLUCOSE BLDV-MCNC: 132 MG/DL (ref 74–99)
GLUCOSE SERPL-MCNC: 134 MG/DL (ref 74–99)
HCO3 BLDV-SCNC: 26.1 MMOL/L (ref 22–26)
HCT VFR BLD AUTO: 32 % (ref 36–46)
HCT VFR BLD EST: 29 % (ref 36–46)
HGB BLD-MCNC: 9.6 G/DL (ref 12–16)
HGB BLDV-MCNC: 9.6 G/DL (ref 12–16)
IMM GRANULOCYTES # BLD AUTO: 0.03 X10*3/UL (ref 0–0.5)
IMM GRANULOCYTES NFR BLD AUTO: 0.4 % (ref 0–0.9)
INHALED O2 CONCENTRATION: 30 %
LACTATE BLDV-SCNC: 1.2 MMOL/L (ref 0.4–2)
LYMPHOCYTES # BLD AUTO: 0.44 X10*3/UL (ref 0.8–3)
LYMPHOCYTES NFR BLD AUTO: 6.3 %
MAGNESIUM SERPL-MCNC: 2.37 MG/DL (ref 1.6–2.4)
MCH RBC QN AUTO: 22.9 PG (ref 26–34)
MCHC RBC AUTO-ENTMCNC: 30 G/DL (ref 32–36)
MCV RBC AUTO: 76 FL (ref 80–100)
MONOCYTES # BLD AUTO: 0.38 X10*3/UL (ref 0.05–0.8)
MONOCYTES NFR BLD AUTO: 5.5 %
NEUTROPHILS # BLD AUTO: 6.11 X10*3/UL (ref 1.6–5.5)
NEUTROPHILS NFR BLD AUTO: 87.8 %
NRBC BLD-RTO: 0 /100 WBCS (ref 0–0)
OXYHGB MFR BLDV: 50.1 % (ref 45–75)
PCO2 BLDV: 61 MM HG (ref 41–51)
PH BLDV: 7.24 PH (ref 7.33–7.43)
PHOSPHATE SERPL-MCNC: 9.9 MG/DL (ref 2.5–4.9)
PLATELET # BLD AUTO: 186 X10*3/UL (ref 150–450)
PO2 BLDV: 35 MM HG (ref 35–45)
POTASSIUM BLDV-SCNC: 4.7 MMOL/L (ref 3.5–5.3)
POTASSIUM SERPL-SCNC: 4.7 MMOL/L (ref 3.5–5.3)
RBC # BLD AUTO: 4.19 X10*6/UL (ref 4–5.2)
SAO2 % BLDV: 51 % (ref 45–75)
SODIUM BLDV-SCNC: 128 MMOL/L (ref 136–145)
SODIUM SERPL-SCNC: 130 MMOL/L (ref 136–145)
WBC # BLD AUTO: 7 X10*3/UL (ref 4.4–11.3)

## 2025-03-31 PROCEDURE — 83735 ASSAY OF MAGNESIUM: CPT

## 2025-03-31 PROCEDURE — 82947 ASSAY GLUCOSE BLOOD QUANT: CPT

## 2025-03-31 PROCEDURE — 99291 CRITICAL CARE FIRST HOUR: CPT | Performed by: INTERNAL MEDICINE

## 2025-03-31 PROCEDURE — 71045 X-RAY EXAM CHEST 1 VIEW: CPT

## 2025-03-31 PROCEDURE — 2500000004 HC RX 250 GENERAL PHARMACY W/ HCPCS (ALT 636 FOR OP/ED)

## 2025-03-31 PROCEDURE — 97110 THERAPEUTIC EXERCISES: CPT | Mod: GP

## 2025-03-31 PROCEDURE — 84132 ASSAY OF SERUM POTASSIUM: CPT

## 2025-03-31 PROCEDURE — 97530 THERAPEUTIC ACTIVITIES: CPT | Mod: GO

## 2025-03-31 PROCEDURE — 97110 THERAPEUTIC EXERCISES: CPT | Mod: GO

## 2025-03-31 PROCEDURE — 2500000002 HC RX 250 W HCPCS SELF ADMINISTERED DRUGS (ALT 637 FOR MEDICARE OP, ALT 636 FOR OP/ED)

## 2025-03-31 PROCEDURE — 94640 AIRWAY INHALATION TREATMENT: CPT

## 2025-03-31 PROCEDURE — 36415 COLL VENOUS BLD VENIPUNCTURE: CPT

## 2025-03-31 PROCEDURE — 2500000001 HC RX 250 WO HCPCS SELF ADMINISTERED DRUGS (ALT 637 FOR MEDICARE OP)

## 2025-03-31 PROCEDURE — 2500000004 HC RX 250 GENERAL PHARMACY W/ HCPCS (ALT 636 FOR OP/ED): Performed by: INTERNAL MEDICINE

## 2025-03-31 PROCEDURE — 85025 COMPLETE CBC W/AUTO DIFF WBC: CPT

## 2025-03-31 PROCEDURE — 97530 THERAPEUTIC ACTIVITIES: CPT | Mod: GP

## 2025-03-31 PROCEDURE — 2020000001 HC ICU ROOM DAILY

## 2025-03-31 PROCEDURE — 2500000004 HC RX 250 GENERAL PHARMACY W/ HCPCS (ALT 636 FOR OP/ED): Mod: JZ | Performed by: INTERNAL MEDICINE

## 2025-03-31 PROCEDURE — 2500000001 HC RX 250 WO HCPCS SELF ADMINISTERED DRUGS (ALT 637 FOR MEDICARE OP): Performed by: INTERNAL MEDICINE

## 2025-03-31 PROCEDURE — 2500000005 HC RX 250 GENERAL PHARMACY W/O HCPCS: Performed by: INTERNAL MEDICINE

## 2025-03-31 PROCEDURE — 71045 X-RAY EXAM CHEST 1 VIEW: CPT | Performed by: RADIOLOGY

## 2025-03-31 RX ORDER — CALCIUM GLUCONATE 20 MG/ML
2 INJECTION, SOLUTION INTRAVENOUS ONCE
Status: COMPLETED | OUTPATIENT
Start: 2025-03-31 | End: 2025-03-31

## 2025-03-31 RX ORDER — TRAZODONE HYDROCHLORIDE 50 MG/1
25 TABLET ORAL NIGHTLY PRN
Status: DISCONTINUED | OUTPATIENT
Start: 2025-03-31 | End: 2025-04-17 | Stop reason: HOSPADM

## 2025-03-31 RX ADMIN — INSULIN LISPRO 2 UNITS: 100 INJECTION, SOLUTION INTRAVENOUS; SUBCUTANEOUS at 00:09

## 2025-03-31 RX ADMIN — METHYLPREDNISOLONE SODIUM SUCCINATE 40 MG: 40 INJECTION, POWDER, FOR SOLUTION INTRAMUSCULAR; INTRAVENOUS at 09:19

## 2025-03-31 RX ADMIN — IPRATROPIUM BROMIDE AND ALBUTEROL SULFATE 3 ML: 2.5; .5 SOLUTION RESPIRATORY (INHALATION) at 08:07

## 2025-03-31 RX ADMIN — METOPROLOL TARTRATE 50 MG: 50 TABLET, FILM COATED ORAL at 20:48

## 2025-03-31 RX ADMIN — FLUTICASONE PROPIONATE 2 SPRAY: 50 SPRAY, METERED NASAL at 21:51

## 2025-03-31 RX ADMIN — SERTRALINE HYDROCHLORIDE 50 MG: 50 TABLET ORAL at 09:19

## 2025-03-31 RX ADMIN — PRAVASTATIN SODIUM 40 MG: 20 TABLET ORAL at 20:48

## 2025-03-31 RX ADMIN — HEPARIN SODIUM 7500 UNITS: 5000 INJECTION INTRAVENOUS; SUBCUTANEOUS at 06:16

## 2025-03-31 RX ADMIN — METHYLPREDNISOLONE SODIUM SUCCINATE 40 MG: 40 INJECTION, POWDER, FOR SOLUTION INTRAMUSCULAR; INTRAVENOUS at 20:47

## 2025-03-31 RX ADMIN — CALCIUM GLUCONATE 2 G: 20 INJECTION, SOLUTION INTRAVENOUS at 09:19

## 2025-03-31 RX ADMIN — HEPARIN SODIUM 7500 UNITS: 5000 INJECTION INTRAVENOUS; SUBCUTANEOUS at 21:48

## 2025-03-31 RX ADMIN — CALCIUM ACETATE 667 MG: 667 CAPSULE ORAL at 09:19

## 2025-03-31 RX ADMIN — DEXTROSE MONOHYDRATE AND SODIUM CHLORIDE 50 ML/HR: 5; .9 INJECTION, SOLUTION INTRAVENOUS at 06:17

## 2025-03-31 RX ADMIN — IPRATROPIUM BROMIDE AND ALBUTEROL SULFATE 3 ML: 2.5; .5 SOLUTION RESPIRATORY (INHALATION) at 12:45

## 2025-03-31 RX ADMIN — CALCIUM ACETATE 667 MG: 667 CAPSULE ORAL at 18:06

## 2025-03-31 RX ADMIN — INSULIN LISPRO 2 UNITS: 100 INJECTION, SOLUTION INTRAVENOUS; SUBCUTANEOUS at 12:40

## 2025-03-31 RX ADMIN — Medication 2 L/MIN: at 15:07

## 2025-03-31 RX ADMIN — HEPARIN SODIUM 7500 UNITS: 5000 INJECTION INTRAVENOUS; SUBCUTANEOUS at 15:05

## 2025-03-31 RX ADMIN — EZETIMIBE 10 MG: 10 TABLET ORAL at 09:18

## 2025-03-31 RX ADMIN — MONTELUKAST 10 MG: 10 TABLET, FILM COATED ORAL at 20:48

## 2025-03-31 RX ADMIN — NYSTATIN 1 APPLICATION: 100000 POWDER TOPICAL at 15:06

## 2025-03-31 RX ADMIN — FLUTICASONE PROPIONATE 2 SPRAY: 50 SPRAY, METERED NASAL at 09:19

## 2025-03-31 RX ADMIN — HYDRALAZINE HYDROCHLORIDE 50 MG: 50 TABLET ORAL at 20:47

## 2025-03-31 RX ADMIN — INSULIN LISPRO 2 UNITS: 100 INJECTION, SOLUTION INTRAVENOUS; SUBCUTANEOUS at 20:47

## 2025-03-31 RX ADMIN — Medication 30 PERCENT: at 03:47

## 2025-03-31 RX ADMIN — IPRATROPIUM BROMIDE AND ALBUTEROL SULFATE 3 ML: 2.5; .5 SOLUTION RESPIRATORY (INHALATION) at 18:57

## 2025-03-31 RX ADMIN — IPRATROPIUM BROMIDE AND ALBUTEROL SULFATE 3 ML: 2.5; .5 SOLUTION RESPIRATORY (INHALATION) at 00:02

## 2025-03-31 RX ADMIN — LEVOTHYROXINE SODIUM 200 MCG: 0.1 TABLET ORAL at 06:16

## 2025-03-31 RX ADMIN — METOPROLOL TARTRATE 50 MG: 50 TABLET, FILM COATED ORAL at 09:19

## 2025-03-31 RX ADMIN — HYDRALAZINE HYDROCHLORIDE 50 MG: 50 TABLET ORAL at 09:19

## 2025-03-31 RX ADMIN — Medication 2 L/MIN: at 19:03

## 2025-03-31 RX ADMIN — Medication 2 L/MIN: at 19:05

## 2025-03-31 RX ADMIN — NYSTATIN 1 APPLICATION: 100000 POWDER TOPICAL at 21:51

## 2025-03-31 RX ADMIN — CALCIUM ACETATE 667 MG: 667 CAPSULE ORAL at 15:05

## 2025-03-31 RX ADMIN — ASPIRIN 81 MG: 81 TABLET, CHEWABLE ORAL at 09:19

## 2025-03-31 RX ADMIN — Medication 30 PERCENT: at 23:30

## 2025-03-31 RX ADMIN — ALLOPURINOL 50 MG: 100 TABLET ORAL at 09:18

## 2025-03-31 RX ADMIN — NYSTATIN 1 APPLICATION: 100000 POWDER TOPICAL at 09:19

## 2025-03-31 RX ADMIN — INSULIN LISPRO 6 UNITS: 100 INJECTION, SOLUTION INTRAVENOUS; SUBCUTANEOUS at 15:59

## 2025-03-31 RX ADMIN — Medication 30 L/MIN: at 08:05

## 2025-03-31 ASSESSMENT — COGNITIVE AND FUNCTIONAL STATUS - GENERAL
TOILETING: TOTAL
MOBILITY SCORE: 7
DAILY ACTIVITIY SCORE: 8
TURNING FROM BACK TO SIDE WHILE IN FLAT BAD: TOTAL
HELP NEEDED FOR BATHING: TOTAL
MOVING TO AND FROM BED TO CHAIR: TOTAL
CLIMB 3 TO 5 STEPS WITH RAILING: TOTAL
MOVING FROM LYING ON BACK TO SITTING ON SIDE OF FLAT BED WITH BEDRAILS: A LOT
PERSONAL GROOMING: A LOT
DRESSING REGULAR UPPER BODY CLOTHING: TOTAL
WALKING IN HOSPITAL ROOM: TOTAL
EATING MEALS: A LOT
DRESSING REGULAR LOWER BODY CLOTHING: TOTAL
STANDING UP FROM CHAIR USING ARMS: TOTAL

## 2025-03-31 ASSESSMENT — PAIN SCALES - GENERAL
PAINLEVEL_OUTOF10: 0 - NO PAIN

## 2025-03-31 ASSESSMENT — PAIN - FUNCTIONAL ASSESSMENT
PAIN_FUNCTIONAL_ASSESSMENT: 0-10

## 2025-03-31 ASSESSMENT — ACTIVITIES OF DAILY LIVING (ADL): HOME_MANAGEMENT_TIME_ENTRY: 16

## 2025-03-31 NOTE — CARE PLAN
Problem: Respiratory  Goal: Wean oxygen to maintain O2 saturation per order/standard this shift  Outcome: Progressing  Goal: Clear secretions with interventions this shift  Outcome: Progressing  Goal: Minimize anxiety/maximize coping throughout shift  Outcome: Progressing  Goal: Minimal/no exertional discomfort or dyspnea this shift  Outcome: Progressing  Goal: Verbalize decreased shortness of breath this shift  Outcome: Progressing

## 2025-03-31 NOTE — CARE PLAN
Problem: Mechanical Ventilation  Goal: Patient Will Maintain Patent Airway  Outcome: Progressing  Flowsheets (Taken 3/31/2025 0825)  Patient Will Maintain Patent Airway:   Suctioning secretions as indicated to maintain patent airway   Assess and monitor airway secretions and suction as needed per patient's condition and according to policy  Goal: Oral health is maintained or improved  Outcome: Progressing  Flowsheets (Taken 3/31/2025 0825)  Oral Health is Maintained or Improved: Perform oral care with an oral swab     Problem: Skin  Goal: Decreased wound size/increased tissue granulation at next dressing change  Outcome: Progressing  Flowsheets (Taken 3/31/2025 0825)  Decreased wound size/increased tissue granulation at next dressing change: Protective dressings over bony prominences  Goal: Participates in plan/prevention/treatment measures  Outcome: Progressing  Flowsheets (Taken 3/31/2025 0825)  Participates in plan/prevention/treatment measures: Elevate heels  Goal: Prevent/manage excess moisture  Outcome: Progressing  Flowsheets (Taken 3/31/2025 0825)  Prevent/manage excess moisture: Moisturize dry skin     Problem: Safety - Adult  Goal: Free from fall injury  Outcome: Progressing  Flowsheets (Taken 3/31/2025 0825)  Free from fall injury: Instruct family/caregiver on patient safety

## 2025-03-31 NOTE — PROGRESS NOTES
"Analia Murray is a 74 y.o. female on day 6 of admission presenting with Acute respiratory failure with hypoxia.    Subjective    Patient seen for acute renal failure on top of chronic kidney disease stage 3 admitted with pneumonia acute respiratory failure clinically she said she feels much better she is awake and oriented x 3 she is on oxygen by nasal cannula she also has feeding tube no shortness of breath  Objective     Physical Exam  Constitutional:       General: She is not in acute distress.     Appearance: Normal appearance. She is not toxic-appearing.   Neck:      Vascular: No carotid bruit.   Cardiovascular:      Heart sounds: No murmur heard.     No friction rub. No gallop.   Pulmonary:      Breath sounds: No wheezing, rhonchi or rales.   Abdominal:      Tenderness: There is no abdominal tenderness. There is no right CVA tenderness, left CVA tenderness or rebound.   Musculoskeletal:         General: No tenderness.      Cervical back: Neck supple.      Right lower leg: No edema.      Left lower leg: No edema.   Lymphadenopathy:      Cervical: No cervical adenopathy.         Last Recorded Vitals  Blood pressure 137/77, pulse 64, temperature 36.4 °C (97.5 °F), temperature source Oral, resp. rate 12, height 1.626 m (5' 4.02\"), weight 142 kg (312 lb 9.8 oz), SpO2 98%.    Intake/Output last 3 Shifts:  I/O last 3 completed shifts:  In: 1142.5 (8.1 mL/kg) [P.O.:240; I.V.:290 (2 mL/kg); IV Piggyback:612.5]  Out: 730 (5.1 mL/kg) [Urine:730 (0.1 mL/kg/hr)]  Weight: 141.8 kg     Current Facility-Administered Medications:     allopurinol (Zyloprim) tablet 50 mg, 50 mg, oral, Every other day, Sanjiv Neil PA-C, 50 mg at 03/31/25 0918    [Held by provider] amLODIPine (Norvasc) tablet 10 mg, 10 mg, oral, Daily, Sanjiv GEORGE Neil PA-C, 10 mg at 03/28/25 0948    aspirin chewable tablet 81 mg, 81 mg, oral, Daily, Charisse Chavez MD, 81 mg at 03/31/25 0919    calcium acetate (Phoslo) capsule 667 mg, 667 mg, oral, TID " after meals, Hoda Slater MD, 667 mg at 03/31/25 0919    D5 % and 0.9 % sodium chloride infusion, 50 mL/hr, intravenous, Continuous, Hoda Slater MD, Last Rate: 50 mL/hr at 03/31/25 0617, 50 mL/hr at 03/31/25 0617    dextrose 50 % injection 12.5 g, 12.5 g, intravenous, q15 min PRN, Sanjiv Neil, PA-C    dextrose 50 % injection 25 g, 25 g, intravenous, q15 min PRN, Sanjiv Rodríguezs, PA-C, 25 g at 03/28/25 0101    ezetimibe (Zetia) tablet 10 mg, 10 mg, oral, Daily, ADAMA Delgado-C, 10 mg at 03/31/25 0918    fluticasone (Flonase) nasal spray 2 spray, 2 spray, Each Nostril, BID, Sanjiv Neil PA-C, 2 spray at 03/31/25 0919    [Held by provider] gabapentin (Neurontin) capsule 300 mg, 300 mg, oral, Daily, Hoda Slater MD    glucagon (Glucagen) injection 1 mg, 1 mg, intramuscular, q15 min PRN, Sanjiv Neil PA-C    glucagon (Glucagen) injection 1 mg, 1 mg, intramuscular, q15 min PRN, Sanjiv Neil, PA-C    heparin (porcine) injection 7,500 Units, 7,500 Units, subcutaneous, q8h JAYA, Sanjiv Neil, PA-C, 7,500 Units at 03/31/25 0616    hydrALAZINE (Apresoline) tablet 50 mg, 50 mg, oral, BID, Hoda Slater MD, 50 mg at 03/31/25 0919    insulin lispro injection 0-10 Units, 0-10 Units, subcutaneous, q4h, Sanjiv Neil PA-C, 2 Units at 03/31/25 0009    ipratropium-albuteroL (Duo-Neb) 0.5-2.5 mg/3 mL nebulizer solution 3 mL, 3 mL, nebulization, q6h, Sanjiv Neil PA-C, 3 mL at 03/31/25 0807    levothyroxine (Synthroid, Levoxyl) tablet 200 mcg, 200 mcg, oral, Daily, Sanjiv Neil PA-C, 200 mcg at 03/31/25 0616    levothyroxine (Synthroid, Levoxyl) tablet 200 mcg, 200 mcg, oral, Every Wednesday, Charisse Chavez MD    methylPREDNISolone sod succinate (SOLU-Medrol) 40 mg/mL injection 40 mg, 40 mg, intravenous, q12h, Charisse Chavez MD, 40 mg at 03/31/25 0919    [Held by provider] metoprolol succinate XL (Toprol-XL) 24 hr tablet 100 mg, 100 mg, oral, Daily, Sanjiv Neil PA-C    metoprolol tartrate (Lopressor) tablet 50 mg, 50 mg,  oral, BID, Hoda Slater MD, 50 mg at 03/31/25 0919    montelukast (Singulair) tablet 10 mg, 10 mg, oral, Nightly, ADAMA Delgado-C, 10 mg at 03/30/25 2125    [Held by provider] nitroglycerin (Nitrostat) SL tablet 0.4 mg, 0.4 mg, sublingual, q5 min PRN, Sanjiv Neil PA-C    nystatin (Mycostatin) 100,000 unit/gram powder 1 Application, 1 Application, Topical, TID, ADAMA Delgado-C, 1 Application at 03/31/25 0919    oxygen (O2) therapy, , inhalation, Continuous - Inhalation, Charisse Chavez MD, Last Rate: 1,800,000 mL/hr at 03/30/25 2006, 30 L/min at 03/30/25 2006    oxygen (O2) therapy, , inhalation, q4h, Robbie Olivarez MD    polyethylene glycol (Glycolax, Miralax) packet 17 g, 17 g, oral, Daily, ADAMA Delgado-C, 17 g at 03/29/25 0934    pravastatin (Pravachol) tablet 40 mg, 40 mg, oral, Nightly, ADAMA Delgado-C, 40 mg at 03/30/25 2125    sertraline (Zoloft) tablet 50 mg, 50 mg, oral, Daily, ADAMA Delgado-C, 50 mg at 03/31/25 0919    sulfur hexafluoride microsphr (Lumason) injection 24.28 mg, 2 mL, intravenous, Once in imaging, Sanjiv Neil PA-C   Relevant Results    Results for orders placed or performed during the hospital encounter of 03/25/25 (from the past 96 hours)   POCT GLUCOSE   Result Value Ref Range    POCT Glucose 140 (H) 74 - 99 mg/dL   Renal function panel   Result Value Ref Range    Glucose 145 (H) 74 - 99 mg/dL    Sodium 129 (L) 136 - 145 mmol/L    Potassium 5.4 (H) 3.5 - 5.3 mmol/L    Chloride 96 (L) 98 - 107 mmol/L    Bicarbonate 22 21 - 32 mmol/L    Anion Gap 16 10 - 20 mmol/L    Urea Nitrogen 52 (H) 6 - 23 mg/dL    Creatinine 4.43 (H) 0.50 - 1.05 mg/dL    eGFR 10 (L) >60 mL/min/1.73m*2    Calcium 7.9 (L) 8.6 - 10.3 mg/dL    Phosphorus 6.3 (H) 2.5 - 4.9 mg/dL    Albumin 3.4 3.4 - 5.0 g/dL   Magnesium   Result Value Ref Range    Magnesium 2.18 1.60 - 2.40 mg/dL   POCT GLUCOSE   Result Value Ref Range    POCT Glucose 187 (H) 74 - 99 mg/dL   Potassium   Result Value Ref Range     Potassium 6.0 (H) 3.5 - 5.3 mmol/L   POCT GLUCOSE   Result Value Ref Range    POCT Glucose 166 (H) 74 - 99 mg/dL   Renal function panel   Result Value Ref Range    Glucose 75 74 - 99 mg/dL    Sodium 128 (L) 136 - 145 mmol/L    Potassium 5.6 (H) 3.5 - 5.3 mmol/L    Chloride 95 (L) 98 - 107 mmol/L    Bicarbonate 24 21 - 32 mmol/L    Anion Gap 15 10 - 20 mmol/L    Urea Nitrogen 57 (H) 6 - 23 mg/dL    Creatinine 4.50 (H) 0.50 - 1.05 mg/dL    eGFR 10 (L) >60 mL/min/1.73m*2    Calcium 8.1 (L) 8.6 - 10.3 mg/dL    Phosphorus 7.8 (H) 2.5 - 4.9 mg/dL    Albumin 3.3 (L) 3.4 - 5.0 g/dL   Magnesium   Result Value Ref Range    Magnesium 2.17 1.60 - 2.40 mg/dL   POCT GLUCOSE   Result Value Ref Range    POCT Glucose 75 74 - 99 mg/dL   CBC and Auto Differential   Result Value Ref Range    WBC 8.3 4.4 - 11.3 x10*3/uL    nRBC 0.0 0.0 - 0.0 /100 WBCs    RBC 4.14 4.00 - 5.20 x10*6/uL    Hemoglobin 9.6 (L) 12.0 - 16.0 g/dL    Hematocrit 32.1 (L) 36.0 - 46.0 %    MCV 78 (L) 80 - 100 fL    MCH 23.2 (L) 26.0 - 34.0 pg    MCHC 29.9 (L) 32.0 - 36.0 g/dL    RDW 19.9 (H) 11.5 - 14.5 %    Platelets 177 150 - 450 x10*3/uL    Neutrophils % 71.2 40.0 - 80.0 %    Immature Granulocytes %, Automated 0.5 0.0 - 0.9 %    Lymphocytes % 13.5 13.0 - 44.0 %    Monocytes % 14.6 2.0 - 10.0 %    Eosinophils % 0.1 0.0 - 6.0 %    Basophils % 0.1 0.0 - 2.0 %    Neutrophils Absolute 5.89 (H) 1.60 - 5.50 x10*3/uL    Immature Granulocytes Absolute, Automated 0.04 0.00 - 0.50 x10*3/uL    Lymphocytes Absolute 1.12 0.80 - 3.00 x10*3/uL    Monocytes Absolute 1.21 (H) 0.05 - 0.80 x10*3/uL    Eosinophils Absolute 0.01 0.00 - 0.40 x10*3/uL    Basophils Absolute 0.01 0.00 - 0.10 x10*3/uL   Renal Function Panel   Result Value Ref Range    Glucose 73 (L) 74 - 99 mg/dL    Sodium 128 (L) 136 - 145 mmol/L    Potassium 5.4 (H) 3.5 - 5.3 mmol/L    Chloride 96 (L) 98 - 107 mmol/L    Bicarbonate 24 21 - 32 mmol/L    Anion Gap 13 10 - 20 mmol/L    Urea Nitrogen 57 (H) 6 - 23 mg/dL     Creatinine 4.43 (H) 0.50 - 1.05 mg/dL    eGFR 10 (L) >60 mL/min/1.73m*2    Calcium 7.7 (L) 8.6 - 10.3 mg/dL    Phosphorus 7.8 (H) 2.5 - 4.9 mg/dL    Albumin 3.1 (L) 3.4 - 5.0 g/dL   Magnesium   Result Value Ref Range    Magnesium 2.12 1.60 - 2.40 mg/dL   POCT GLUCOSE   Result Value Ref Range    POCT Glucose 99 74 - 99 mg/dL   POCT GLUCOSE   Result Value Ref Range    POCT Glucose 123 (H) 74 - 99 mg/dL   Renal function panel   Result Value Ref Range    Glucose 119 (H) 74 - 99 mg/dL    Sodium 129 (L) 136 - 145 mmol/L    Potassium 4.8 3.5 - 5.3 mmol/L    Chloride 95 (L) 98 - 107 mmol/L    Bicarbonate 24 21 - 32 mmol/L    Anion Gap 15 10 - 20 mmol/L    Urea Nitrogen 58 (H) 6 - 23 mg/dL    Creatinine 4.31 (H) 0.50 - 1.05 mg/dL    eGFR 10 (L) >60 mL/min/1.73m*2    Calcium 7.9 (L) 8.6 - 10.3 mg/dL    Phosphorus 7.9 (H) 2.5 - 4.9 mg/dL    Albumin 3.2 (L) 3.4 - 5.0 g/dL   Magnesium   Result Value Ref Range    Magnesium 2.15 1.60 - 2.40 mg/dL   POCT GLUCOSE   Result Value Ref Range    POCT Glucose 152 (H) 74 - 99 mg/dL   POCT GLUCOSE   Result Value Ref Range    POCT Glucose 218 (H) 74 - 99 mg/dL   POCT GLUCOSE   Result Value Ref Range    POCT Glucose 179 (H) 74 - 99 mg/dL   POCT GLUCOSE   Result Value Ref Range    POCT Glucose 139 (H) 74 - 99 mg/dL   CBC and Auto Differential   Result Value Ref Range    WBC 14.0 (H) 4.4 - 11.3 x10*3/uL    nRBC 0.0 0.0 - 0.0 /100 WBCs    RBC 4.17 4.00 - 5.20 x10*6/uL    Hemoglobin 9.4 (L) 12.0 - 16.0 g/dL    Hematocrit 33.2 (L) 36.0 - 46.0 %    MCV 80 80 - 100 fL    MCH 22.5 (L) 26.0 - 34.0 pg    MCHC 28.3 (L) 32.0 - 36.0 g/dL    RDW 20.0 (H) 11.5 - 14.5 %    Platelets 176 150 - 450 x10*3/uL    Neutrophils % 89.6 40.0 - 80.0 %    Immature Granulocytes %, Automated 0.4 0.0 - 0.9 %    Lymphocytes % 2.5 13.0 - 44.0 %    Monocytes % 7.3 2.0 - 10.0 %    Eosinophils % 0.1 0.0 - 6.0 %    Basophils % 0.1 0.0 - 2.0 %    Neutrophils Absolute 12.51 (H) 1.60 - 5.50 x10*3/uL    Immature Granulocytes  Absolute, Automated 0.05 0.00 - 0.50 x10*3/uL    Lymphocytes Absolute 0.35 (L) 0.80 - 3.00 x10*3/uL    Monocytes Absolute 1.02 (H) 0.05 - 0.80 x10*3/uL    Eosinophils Absolute 0.02 0.00 - 0.40 x10*3/uL    Basophils Absolute 0.01 0.00 - 0.10 x10*3/uL   Renal Function Panel   Result Value Ref Range    Glucose 126 (H) 74 - 99 mg/dL    Sodium 130 (L) 136 - 145 mmol/L    Potassium 4.8 3.5 - 5.3 mmol/L    Chloride 94 (L) 98 - 107 mmol/L    Bicarbonate 26 21 - 32 mmol/L    Anion Gap 15 10 - 20 mmol/L    Urea Nitrogen 64 (H) 6 - 23 mg/dL    Creatinine 4.64 (H) 0.50 - 1.05 mg/dL    eGFR 9 (L) >60 mL/min/1.73m*2    Calcium 7.6 (L) 8.6 - 10.3 mg/dL    Phosphorus 9.7 (H) 2.5 - 4.9 mg/dL    Albumin 3.2 (L) 3.4 - 5.0 g/dL   Magnesium   Result Value Ref Range    Magnesium 2.17 1.60 - 2.40 mg/dL   Creatine Kinase   Result Value Ref Range    Creatine Kinase 134 0 - 215 U/L   POCT GLUCOSE   Result Value Ref Range    POCT Glucose 120 (H) 74 - 99 mg/dL   Blood Gas Arterial Full Panel   Result Value Ref Range    POCT pH, Arterial 7.22 (LL) 7.38 - 7.42 pH    POCT pCO2, Arterial 64 (H) 38 - 42 mm Hg    POCT pO2, Arterial 114 (H) 85 - 95 mm Hg    POCT SO2, Arterial 99 94 - 100 %    POCT Oxy Hemoglobin, Arterial 97.5 94.0 - 98.0 %    POCT Hematocrit Calculated, Arterial 29.0 (L) 36.0 - 46.0 %    POCT Sodium, Arterial 127 (L) 136 - 145 mmol/L    POCT Potassium, Arterial 4.8 3.5 - 5.3 mmol/L    POCT Chloride, Arterial 95 (L) 98 - 107 mmol/L    POCT Ionized Calcium, Arterial 0.99 (L) 1.10 - 1.33 mmol/L    POCT Glucose, Arterial 115 (H) 74 - 99 mg/dL    POCT Lactate, Arterial 0.6 0.4 - 2.0 mmol/L    POCT Base Excess, Arterial -2.2 (L) -2.0 - 3.0 mmol/L    POCT HCO3 Calculated, Arterial 26.2 (H) 22.0 - 26.0 mmol/L    POCT Hemoglobin, Arterial 9.8 (L) 12.0 - 16.0 g/dL    POCT Anion Gap, Arterial 11 10 - 25 mmo/L    Patient Temperature 37.0 degrees Celsius    FiO2 70 %    Frequency (BPM) 14 bpm    Ipap CMH2O 20.0 cm H2O    Epap CMH2O 5.0 cm H2O     Critical Called By HORACIO HAZEL RRT     Critical Called To DR. ASKEW     Critical Call Time 844     Critical Read Back Y     Site of Arterial Puncture Brachial Right     Wes's Test Negative    POCT GLUCOSE   Result Value Ref Range    POCT Glucose 100 (H) 74 - 99 mg/dL   BLOOD GAS ARTERIAL FULL PANEL   Result Value Ref Range    POCT pH, Arterial 7.23 (LL) 7.38 - 7.42 pH    POCT pCO2, Arterial 62 (H) 38 - 42 mm Hg    POCT pO2, Arterial 118 (H) 85 - 95 mm Hg    POCT SO2, Arterial 100 94 - 100 %    POCT Oxy Hemoglobin, Arterial 97.6 94.0 - 98.0 %    POCT Hematocrit Calculated, Arterial 29.0 (L) 36.0 - 46.0 %    POCT Sodium, Arterial 128 (L) 136 - 145 mmol/L    POCT Potassium, Arterial 4.8 3.5 - 5.3 mmol/L    POCT Chloride, Arterial 94 (L) 98 - 107 mmol/L    POCT Ionized Calcium, Arterial 0.98 (L) 1.10 - 1.33 mmol/L    POCT Glucose, Arterial 118 (H) 74 - 99 mg/dL    POCT Lactate, Arterial 0.5 0.4 - 2.0 mmol/L    POCT Base Excess, Arterial -2.1 (L) -2.0 - 3.0 mmol/L    POCT HCO3 Calculated, Arterial 26.0 22.0 - 26.0 mmol/L    POCT Hemoglobin, Arterial 9.8 (L) 12.0 - 16.0 g/dL    POCT Anion Gap, Arterial 13 10 - 25 mmo/L    Patient Temperature 37.0 degrees Celsius    FiO2 70 %    Apparatus      Ipap CMH2O 20.0 cm H2O    Epap CMH2O 5.0 cm H2O    Critical Called By DANIELA RRT-ACCS     Critical Called To      Critical Call Time 1255     Critical Read Back Y     Site of Arterial Puncture Radial Right     Wes's Test Positive    Blood Gas Arterial Full Panel   Result Value Ref Range    POCT pH, Arterial 7.26 (L) 7.38 - 7.42 pH    POCT pCO2, Arterial 55 (H) 38 - 42 mm Hg    POCT pO2, Arterial 69 (L) 85 - 95 mm Hg    POCT SO2, Arterial 97 94 - 100 %    POCT Oxy Hemoglobin, Arterial 94.3 94.0 - 98.0 %    POCT Hematocrit Calculated, Arterial 30.0 (L) 36.0 - 46.0 %    POCT Sodium, Arterial 128 (L) 136 - 145 mmol/L    POCT Potassium, Arterial 4.9 3.5 - 5.3 mmol/L    POCT Chloride, Arterial 93 (L) 98 - 107  mmol/L    POCT Ionized Calcium, Arterial 0.96 (L) 1.10 - 1.33 mmol/L    POCT Glucose, Arterial 133 (H) 74 - 99 mg/dL    POCT Lactate, Arterial 0.6 0.4 - 2.0 mmol/L    POCT Base Excess, Arterial -2.7 (L) -2.0 - 3.0 mmol/L    POCT HCO3 Calculated, Arterial 24.7 22.0 - 26.0 mmol/L    POCT Hemoglobin, Arterial 10.0 (L) 12.0 - 16.0 g/dL    POCT Anion Gap, Arterial 15 10 - 25 mmo/L    Patient Temperature 37.0 degrees Celsius    FiO2 30 %    Frequency (BPM) 14 bpm    Ipap CMH2O 25.0 cm H2O    Epap CMH2O 5.0 cm H2O    Site of Arterial Puncture Radial Right     Wes's Test Negative    POCT GLUCOSE   Result Value Ref Range    POCT Glucose 121 (H) 74 - 99 mg/dL   POCT GLUCOSE   Result Value Ref Range    POCT Glucose 133 (H) 74 - 99 mg/dL   Blood Gas Arterial Full Panel   Result Value Ref Range    POCT pH, Arterial 7.28 (L) 7.38 - 7.42 pH    POCT pCO2, Arterial 51 (H) 38 - 42 mm Hg    POCT pO2, Arterial 83 (L) 85 - 95 mm Hg    POCT SO2, Arterial 98 94 - 100 %    POCT Oxy Hemoglobin, Arterial 96.1 94.0 - 98.0 %    POCT Hematocrit Calculated, Arterial 29.0 (L) 36.0 - 46.0 %    POCT Sodium, Arterial 127 (L) 136 - 145 mmol/L    POCT Potassium, Arterial 4.7 3.5 - 5.3 mmol/L    POCT Chloride, Arterial 94 (L) 98 - 107 mmol/L    POCT Ionized Calcium, Arterial 0.94 (L) 1.10 - 1.33 mmol/L    POCT Glucose, Arterial 141 (H) 74 - 99 mg/dL    POCT Lactate, Arterial 0.8 0.4 - 2.0 mmol/L    POCT Base Excess, Arterial -2.9 (L) -2.0 - 3.0 mmol/L    POCT HCO3 Calculated, Arterial 24.0 22.0 - 26.0 mmol/L    POCT Hemoglobin, Arterial 9.8 (L) 12.0 - 16.0 g/dL    POCT Anion Gap, Arterial 14 10 - 25 mmo/L    Patient Temperature 37.0 degrees Celsius    FiO2 30 %    Apparatus FACE MASK     Ventilator Mode BiPAP     Ventilator Rate 14 bpm    Ipap CMH2O 20.0 cm H2O    Epap CMH2O 5.0 cm H2O    Site of Arterial Puncture Radial Right     Wes's Test Positive    POCT GLUCOSE   Result Value Ref Range    POCT Glucose 135 (H) 74 - 99 mg/dL   Blood Gas Arterial  Full Panel   Result Value Ref Range    POCT pH, Arterial 7.28 (L) 7.38 - 7.42 pH    POCT pCO2, Arterial 52 (H) 38 - 42 mm Hg    POCT pO2, Arterial 77 (L) 85 - 95 mm Hg    POCT SO2, Arterial 96 94 - 100 %    POCT Oxy Hemoglobin, Arterial 93.9 (L) 94.0 - 98.0 %    POCT Hematocrit Calculated, Arterial 29.0 (L) 36.0 - 46.0 %    POCT Sodium, Arterial 128 (L) 136 - 145 mmol/L    POCT Potassium, Arterial 4.7 3.5 - 5.3 mmol/L    POCT Chloride, Arterial 93 (L) 98 - 107 mmol/L    POCT Ionized Calcium, Arterial 0.93 (L) 1.10 - 1.33 mmol/L    POCT Glucose, Arterial 135 (H) 74 - 99 mg/dL    POCT Lactate, Arterial 0.8 0.4 - 2.0 mmol/L    POCT Base Excess, Arterial -2.6 (L) -2.0 - 3.0 mmol/L    POCT HCO3 Calculated, Arterial 24.4 22.0 - 26.0 mmol/L    POCT Hemoglobin, Arterial 9.8 (L) 12.0 - 16.0 g/dL    POCT Anion Gap, Arterial 15 10 - 25 mmo/L    Patient Temperature 37.0 degrees Celsius    FiO2 30 %    Apparatus FACE MASK     Ventilator Mode BiPAP     Ventilator Rate 16 bpm    Ipap CMH2O 20.0 cm H2O    Epap CMH2O 5.0 cm H2O    Site of Arterial Puncture Radial Right     Wes's Test Positive    CBC and Auto Differential   Result Value Ref Range    WBC 7.1 4.4 - 11.3 x10*3/uL    nRBC 0.0 0.0 - 0.0 /100 WBCs    RBC 3.84 (L) 4.00 - 5.20 x10*6/uL    Hemoglobin 8.9 (L) 12.0 - 16.0 g/dL    Hematocrit 29.3 (L) 36.0 - 46.0 %    MCV 76 (L) 80 - 100 fL    MCH 23.2 (L) 26.0 - 34.0 pg    MCHC 30.4 (L) 32.0 - 36.0 g/dL    RDW 19.8 (H) 11.5 - 14.5 %    Platelets 157 150 - 450 x10*3/uL    Neutrophils % 88.3 40.0 - 80.0 %    Immature Granulocytes %, Automated 0.3 0.0 - 0.9 %    Lymphocytes % 6.1 13.0 - 44.0 %    Monocytes % 5.2 2.0 - 10.0 %    Eosinophils % 0.0 0.0 - 6.0 %    Basophils % 0.1 0.0 - 2.0 %    Neutrophils Absolute 6.23 (H) 1.60 - 5.50 x10*3/uL    Immature Granulocytes Absolute, Automated 0.02 0.00 - 0.50 x10*3/uL    Lymphocytes Absolute 0.43 (L) 0.80 - 3.00 x10*3/uL    Monocytes Absolute 0.37 0.05 - 0.80 x10*3/uL    Eosinophils  Absolute 0.00 0.00 - 0.40 x10*3/uL    Basophils Absolute 0.01 0.00 - 0.10 x10*3/uL   Renal Function Panel   Result Value Ref Range    Glucose 104 (H) 74 - 99 mg/dL    Sodium 131 (L) 136 - 145 mmol/L    Potassium 4.4 3.5 - 5.3 mmol/L    Chloride 93 (L) 98 - 107 mmol/L    Bicarbonate 23 21 - 32 mmol/L    Anion Gap 19 10 - 20 mmol/L    Urea Nitrogen 76 (H) 6 - 23 mg/dL    Creatinine 5.07 (H) 0.50 - 1.05 mg/dL    eGFR 8 (L) >60 mL/min/1.73m*2    Calcium 7.0 (L) 8.6 - 10.3 mg/dL    Phosphorus 10.5 (H) 2.5 - 4.9 mg/dL    Albumin 2.9 (L) 3.4 - 5.0 g/dL   Magnesium   Result Value Ref Range    Magnesium 2.20 1.60 - 2.40 mg/dL   Blood Gas Venous Full Panel   Result Value Ref Range    POCT pH, Venous 7.25 (LL) 7.33 - 7.43 pH    POCT pCO2, Venous 56 (H) 41 - 51 mm Hg    POCT pO2, Venous 40 35 - 45 mm Hg    POCT SO2, Venous 60 45 - 75 %    POCT Oxy Hemoglobin, Venous 58.8 45.0 - 75.0 %    POCT Hematocrit Calculated, Venous 27.0 (L) 36.0 - 46.0 %    POCT Sodium, Venous 130 (L) 136 - 145 mmol/L    POCT Potassium, Venous 4.4 3.5 - 5.3 mmol/L    POCT Chloride, Venous 93 (L) 98 - 107 mmol/L    POCT Ionized Calicum, Venous 0.93 (L) 1.10 - 1.33 mmol/L    POCT Glucose, Venous 101 (H) 74 - 99 mg/dL    POCT Lactate, Venous 1.3 0.4 - 2.0 mmol/L    POCT Base Excess, Venous -2.9 (L) -2.0 - 3.0 mmol/L    POCT HCO3 Calculated, Venous 24.6 22.0 - 26.0 mmol/L    POCT Hemoglobin, Venous 9.1 (L) 12.0 - 16.0 g/dL    POCT Anion Gap, Venous 17.0 10.0 - 25.0 mmol/L    Patient Temperature 37.0 degrees Celsius    FiO2 30 %   POCT GLUCOSE   Result Value Ref Range    POCT Glucose 101 (H) 74 - 99 mg/dL   POCT GLUCOSE   Result Value Ref Range    POCT Glucose 90 74 - 99 mg/dL   Blood Gas Venous Full Panel   Result Value Ref Range    POCT pH, Venous 7.27 (L) 7.33 - 7.43 pH    POCT pCO2, Venous 41 41 - 51 mm Hg    POCT pO2, Venous 55 (H) 35 - 45 mm Hg    POCT SO2, Venous 83 (H) 45 - 75 %    POCT Oxy Hemoglobin, Venous 81.6 (H) 45.0 - 75.0 %    POCT Hematocrit  Calculated, Venous 24.0 (L) 36.0 - 46.0 %    POCT Sodium, Venous 131 (L) 136 - 145 mmol/L    POCT Potassium, Venous 3.4 (L) 3.5 - 5.3 mmol/L    POCT Chloride, Venous 102 98 - 107 mmol/L    POCT Ionized Calicum, Venous 1.54 (H) 1.10 - 1.33 mmol/L    POCT Glucose, Venous 71 (L) 74 - 99 mg/dL    POCT Lactate, Venous 0.9 0.4 - 2.0 mmol/L    POCT Base Excess, Venous -7.5 (L) -2.0 - 3.0 mmol/L    POCT HCO3 Calculated, Venous 18.8 (L) 22.0 - 26.0 mmol/L    POCT Hemoglobin, Venous 7.9 (L) 12.0 - 16.0 g/dL    POCT Anion Gap, Venous 14.0 10.0 - 25.0 mmol/L    Patient Temperature 37.0 degrees Celsius    FiO2 30 %   Blood Gas Arterial Full Panel   Result Value Ref Range    POCT pH, Arterial 7.35 (L) 7.38 - 7.42 pH    POCT pCO2, Arterial 44 (H) 38 - 42 mm Hg    POCT pO2, Arterial 97 (H) 85 - 95 mm Hg    POCT SO2, Arterial 98 94 - 100 %    POCT Oxy Hemoglobin, Arterial 95.8 94.0 - 98.0 %    POCT Hematocrit Calculated, Arterial 29.0 (L) 36.0 - 46.0 %    POCT Sodium, Arterial 128 (L) 136 - 145 mmol/L    POCT Potassium, Arterial 4.5 3.5 - 5.3 mmol/L    POCT Chloride, Arterial 94 (L) 98 - 107 mmol/L    POCT Ionized Calcium, Arterial 0.99 (L) 1.10 - 1.33 mmol/L    POCT Glucose, Arterial 91 74 - 99 mg/dL    POCT Lactate, Arterial 0.9 0.4 - 2.0 mmol/L    POCT Base Excess, Arterial -1.4 -2.0 - 3.0 mmol/L    POCT HCO3 Calculated, Arterial 24.3 22.0 - 26.0 mmol/L    POCT Hemoglobin, Arterial 9.5 (L) 12.0 - 16.0 g/dL    POCT Anion Gap, Arterial 14 10 - 25 mmo/L    Patient Temperature 37.0 degrees Celsius    FiO2 30 %    Ipap CMH2O 24.0 cm H2O    Epap CMH2O 10.0 cm H2O    Site of Arterial Puncture Brachial Right     Wes's Test Negative    POCT GLUCOSE   Result Value Ref Range    POCT Glucose 106 (H) 74 - 99 mg/dL   Renal function panel   Result Value Ref Range    Glucose 209 (H) 74 - 99 mg/dL    Sodium 130 (L) 136 - 145 mmol/L    Potassium 4.6 3.5 - 5.3 mmol/L    Chloride 93 (L) 98 - 107 mmol/L    Bicarbonate 22 21 - 32 mmol/L    Anion Gap  20 10 - 20 mmol/L    Urea Nitrogen 85 (H) 6 - 23 mg/dL    Creatinine 5.25 (H) 0.50 - 1.05 mg/dL    eGFR 8 (L) >60 mL/min/1.73m*2    Calcium 7.3 (L) 8.6 - 10.3 mg/dL    Phosphorus 10.0 (H) 2.5 - 4.9 mg/dL    Albumin 2.9 (L) 3.4 - 5.0 g/dL   POCT GLUCOSE   Result Value Ref Range    POCT Glucose 206 (H) 74 - 99 mg/dL   POCT GLUCOSE   Result Value Ref Range    POCT Glucose 207 (H) 74 - 99 mg/dL   POCT GLUCOSE   Result Value Ref Range    POCT Glucose 164 (H) 74 - 99 mg/dL   POCT GLUCOSE   Result Value Ref Range    POCT Glucose 142 (H) 74 - 99 mg/dL   CBC and Auto Differential   Result Value Ref Range    WBC 7.0 4.4 - 11.3 x10*3/uL    nRBC 0.0 0.0 - 0.0 /100 WBCs    RBC 4.19 4.00 - 5.20 x10*6/uL    Hemoglobin 9.6 (L) 12.0 - 16.0 g/dL    Hematocrit 32.0 (L) 36.0 - 46.0 %    MCV 76 (L) 80 - 100 fL    MCH 22.9 (L) 26.0 - 34.0 pg    MCHC 30.0 (L) 32.0 - 36.0 g/dL    RDW 19.5 (H) 11.5 - 14.5 %    Platelets 186 150 - 450 x10*3/uL    Neutrophils % 87.8 40.0 - 80.0 %    Immature Granulocytes %, Automated 0.4 0.0 - 0.9 %    Lymphocytes % 6.3 13.0 - 44.0 %    Monocytes % 5.5 2.0 - 10.0 %    Eosinophils % 0.0 0.0 - 6.0 %    Basophils % 0.0 0.0 - 2.0 %    Neutrophils Absolute 6.11 (H) 1.60 - 5.50 x10*3/uL    Immature Granulocytes Absolute, Automated 0.03 0.00 - 0.50 x10*3/uL    Lymphocytes Absolute 0.44 (L) 0.80 - 3.00 x10*3/uL    Monocytes Absolute 0.38 0.05 - 0.80 x10*3/uL    Eosinophils Absolute 0.00 0.00 - 0.40 x10*3/uL    Basophils Absolute 0.00 0.00 - 0.10 x10*3/uL   Renal Function Panel   Result Value Ref Range    Glucose 134 (H) 74 - 99 mg/dL    Sodium 130 (L) 136 - 145 mmol/L    Potassium 4.7 3.5 - 5.3 mmol/L    Chloride 93 (L) 98 - 107 mmol/L    Bicarbonate 23 21 - 32 mmol/L    Anion Gap 19 10 - 20 mmol/L    Urea Nitrogen 88 (H) 6 - 23 mg/dL    Creatinine 5.14 (H) 0.50 - 1.05 mg/dL    eGFR 8 (L) >60 mL/min/1.73m*2    Calcium 7.3 (L) 8.6 - 10.3 mg/dL    Phosphorus 9.9 (H) 2.5 - 4.9 mg/dL    Albumin 3.1 (L) 3.4 - 5.0 g/dL    Magnesium   Result Value Ref Range    Magnesium 2.37 1.60 - 2.40 mg/dL   Blood Gas Venous Full Panel   Result Value Ref Range    POCT pH, Venous 7.24 (LL) 7.33 - 7.43 pH    POCT pCO2, Venous 61 (H) 41 - 51 mm Hg    POCT pO2, Venous 35 35 - 45 mm Hg    POCT SO2, Venous 51 45 - 75 %    POCT Oxy Hemoglobin, Venous 50.1 45.0 - 75.0 %    POCT Hematocrit Calculated, Venous 29.0 (L) 36.0 - 46.0 %    POCT Sodium, Venous 128 (L) 136 - 145 mmol/L    POCT Potassium, Venous 4.7 3.5 - 5.3 mmol/L    POCT Chloride, Venous 93 (L) 98 - 107 mmol/L    POCT Ionized Calicum, Venous 0.99 (L) 1.10 - 1.33 mmol/L    POCT Glucose, Venous 132 (H) 74 - 99 mg/dL    POCT Lactate, Venous 1.2 0.4 - 2.0 mmol/L    POCT Base Excess, Venous -1.8 -2.0 - 3.0 mmol/L    POCT HCO3 Calculated, Venous 26.1 (H) 22.0 - 26.0 mmol/L    POCT Hemoglobin, Venous 9.6 (L) 12.0 - 16.0 g/dL    POCT Anion Gap, Venous 14.0 10.0 - 25.0 mmol/L    Patient Temperature 37.0 degrees Celsius    FiO2 30 %   POCT GLUCOSE   Result Value Ref Range    POCT Glucose 130 (H) 74 - 99 mg/dL   POCT GLUCOSE   Result Value Ref Range    POCT Glucose 180 (H) 74 - 99 mg/dL     *Note: Due to a large number of results and/or encounters for the requested time period, some results have not been displayed. A complete set of results can be found in Results Review.       Assessment/Plan   Acute kidney injury superimposed on chronic kidney disease stage 3  Still has good urine output, her creatinine is plateauing at this time, no hyperkalemia or acidosis, my plan to continue to monitor no indication for renal replacement therapy at this point discussed with the family by the bedside  Chronic kidney disease stage IIIb  Acute respiratory failure continue with oxygen by nasal cannula  Bilateral pneumonia with IV antibiotics  Diabetes mellitus type 2  Anemia of chronic kidney disease  Hypertension  Hyperlipidemia  Obesity      Sixto Slater MD

## 2025-03-31 NOTE — PROGRESS NOTES
Physical Therapy    Physical Therapy Treatment    Patient Name: Analia Murray  MRN: [id number]  Department: [address] ICU  Room: 18/18-A  Today's Date: 3/31/2025  Time Calculation  Start Time: 0807  Stop Time: 0833  Time Calculation (min): 26 min    Assessment/Plan   PT Assessment  PT Assessment Results: Decreased strength, Decreased range of motion, Decreased endurance, Impaired balance, Decreased mobility, Decreased coordination, Decreased safety awareness, Obesity  Rehab Prognosis: Good  Barriers to Discharge Home: Caregiver assistance, Physical needs  Caregiver Assistance: Caregiver assistance needed per identified barriers - however, level of patient's required assistance exceeds assistance available at home  Physical Needs: Stair navigation into home limited by function/safety, Ambulating household distances limited by function/safety, High falls risk due to function or environment  Evaluation/Treatment Tolerance: Patient tolerated treatment well  Medical Staff Made Aware: Yes  Strengths: Ability to acquire knowledge, Attitude of self, Coping skills, Support of extended family/friends  Barriers to Participation: Comorbidities  End of Session Communication: Bedside nurse  Assessment Comment: Slow progress towards therapy goals, improved tolerance to upright activity and able to perform sit <> stand x 1 trial with max assist x 2 today; motivated to participate; remains appropriate for mod intensity therapy needs upon d/c.  End of Session Patient Position: Bed, 3 rail up, Alarm on  PT Plan  Inpatient/Swing Bed or Outpatient: Inpatient  PT Plan  Treatment/Interventions: Bed mobility, Transfer training, Gait training, Balance training, Neuromuscular re-education, Strengthening, Endurance training, Range of motion, Therapeutic exercise, Therapeutic activity  PT Plan: Ongoing PT  PT Frequency: 5 times per week  PT Discharge Recommendations: Moderate intensity level of continued care  Equipment Recommended upon  Discharge: Wheeled walker  PT Recommended Transfer Status: Assist x2, Assistive device  PT - OK to Discharge: Yes    General Visit Information:   PT  Visit  PT Received On: 03/31/25  Response to Previous Treatment: Patient with no complaints from previous session., Patient reporting fatigue but able to participate.  General  Reason for Referral: impaired functional mobility d/t acute respiratory failure  Referred By: Dr. Sher MD  Past Medical History Relevant to Rehab: ANX, CKD, GERD, HTN, DM, Osteoporosis, HTN, Depression  Family/Caregiver Present: No  Co-Treatment: OT  Co-Treatment Reason: maximization of pt safety and functional mobility for ICU pt  Prior to Session Communication: Bedside nurse  Patient Position Received: Bed, 3 rail up, Alarm off, not on at start of session  Preferred Learning Style: verbal  General Comment: Pt cleared for therapy via RN, received in supine, NAD, agreeable to participate in therapy. (+) HFNC 30% @ 30 LPM, telemetry, IV, Panchal, NG tube    Subjective   Precautions:  Precautions  Hearing/Visual Limitations: reading glasses, hearing WFL  Medical Precautions: Fall precautions, Oxygen therapy device and L/min (HFNC 30% @ 30 LPM)  Precautions Comment: + NG tube     Date/Time Vitals Session Patient Position Pulse Resp SpO2 BP MAP (mmHg)    03/31/25 0807 --  --  63  --  98 %  138/67  89           Objective   Pain:  Pain Assessment  Pain Assessment: 0-10  0-10 (Numeric) Pain Score: 0 - No pain  Cognition:  Cognition  Overall Cognitive Status: Within Functional Limits  Arousal/Alertness: Appropriate responses to stimuli  Orientation Level: Oriented X4  Following Commands: Follows one step commands with increased time  Coordination:  Movements are Fluid and Coordinated: No  Coordination Comment: decreased rate/accuracy of movements  Postural Control:  Postural Control  Postural Control: Impaired  Posture Comment: forward head, obesity  Static Sitting Balance  Static Sitting-Balance  Support: Feet supported, Bilateral upper extremity supported  Static Sitting-Level of Assistance: Minimum assistance, Close supervision  Static Sitting-Comment/Number of Minutes: initial min assist x 1 d/t mild trunk retropulsion; able to progress to supervision for balance/safety  Dynamic Sitting Balance  Dynamic Sitting-Balance Support: Feet supported, Bilateral upper extremity supported  Dynamic Sitting-Level of Assistance: Close supervision  Dynamic Sitting-Balance:  (BLE therex)  Static Standing Balance  Static Standing-Balance Support: Bilateral upper extremity supported  Static Standing-Level of Assistance: Maximum assistance (x2)  Static Standing-Comment/Number of Minutes: use of BUE on RW, blocking of B knees/feet, able to maintain static standing for ~1 minute  Extremity/Trunk Assessments:  RLE   RLE :  (AROM limited d/t weakness/soft tissue approximation; hip externally rotated at rest)  LLE   LLE :  (AROM limited d/t weakness/soft tissue approximation)  Activity Tolerance:  Activity Tolerance  Endurance: Decreased tolerance for upright activites  Activity Tolerance Comments: fair-  Treatments:  Therapeutic Exercise  Therapeutic Exercise Performed: Yes  Therapeutic Exercise Activity 1: B ankle pumps x 10  Therapeutic Exercise Activity 2: B AAROM supine heel slides x 10  Therapeutic Exercise Activity 3: B seated ankle pumps x 10  Therapeutic Exercise Activity 4: B seated knee ext x 10    Bed Mobility  Bed Mobility: Yes  Bed Mobility 1  Bed Mobility 1: Supine to sitting, Sitting to supine  Level of Assistance 1: Maximum assistance, +2  Bed Mobility Comments 1: supine > sit with max assist x2 for trunk elevation, advancement of BLE off EOB, and to scoot hips towards EOB; sit > supine with max assist x 2 for controlled trunk descent and lifting BLE onto bed; dep x 2 boost towards HOB  Bed Mobility 2  Bed Mobility  2: Rolling right, Rolling left  Level of Assistance 2: Moderate assistance, +2  Bed Mobility  Comments 2: assist to roll L/R for dependent vanessa-area cleanse and bedding change; pt able to maintain L/R sidelying with use of BUE on bedrails    Ambulation/Gait Training  Ambulation/Gait Training Performed: No  Transfers  Transfer: Yes  Transfer 1  Transfer From 1: Sit to, Stand to  Transfer to 1: Sit, Stand  Technique 1: Sit to stand, Stand to sit  Transfer Device 1: Walker  Transfer Level of Assistance 1: Maximum assistance, +2  Trials/Comments 1: assist for forward weight shift; pt pulling up on braced RW with BUE; B knees/feet blocked    Stairs  Stairs: No    Outcome Measures:  [facility] Basic Mobility  Turning from your back to your side while in a flat bed without using bedrails: A lot  Moving from lying on your back to sitting on the side of a flat bed without using bedrails: Total  Moving to and from bed to chair (including a wheelchair): Total  Standing up from a chair using your arms (e.g. wheelchair or bedside chair): Total  To walk in hospital room: Total  Climbing 3-5 steps with railing: Total  Basic Mobility - Total Score: 7    FSS-ICU  Ambulation: Unable to attempt due to weakness  Rolling: Maximal assistance (performs 25% - 49% of task)  Sitting: Minimal assistance (performs 75% or more of task)  Transfer Sit-to-Stand: Total assistance (performs 25% or requires another person)  Transfer Supine-to-Sit: Total assistance (performs 25% or requires another person)  Total Score: 8  Education Documentation  Precautions, taught by Yareli Flores PT at 3/31/2025 10:04 AM.  Learner: Patient  Readiness: Acceptance  Method: Explanation, Demonstration  Response: Demonstrated Understanding, Needs Reinforcement  Comment: educated on PT POC and safety/sequencing during functional mobility training    Mobility Training, taught by Yareli Flores PT at 3/31/2025 10:04 AM.  Learner: Patient  Readiness: Acceptance  Method: Explanation, Demonstration  Response: Demonstrated Understanding, Needs Reinforcement  Comment:  educated on PT POC and safety/sequencing during functional mobility training    Education Comments  No comments found.      Encounter Problems       Encounter Problems (Active)       PT Problem       Pt will transition supine<>sit with close S. (Progressing)       Start:  03/27/25    Expected End:  04/19/25            Pt will transfer sit<>stand with FWW & close S. (Progressing)       Start:  03/27/25    Expected End:  04/19/25            Pt will ambulate >/=25 ft with FWW & close S. (Not Progressing)       Start:  03/27/25    Expected End:  04/19/25

## 2025-03-31 NOTE — PROGRESS NOTES
[facility] Pulmonary and Critical Care Medicine   Progress Note        Subjective   marii Murray is a 74 y.o. female on day 6 of admission presenting with Acute respiratory failure with hypoxia.  Patient presented with SOB x 2 weeks associated with cough productive of yellow sputum and chest tightness. In the ED work up revealed tachypnea, hypoxia, MARY ANN, BNP 700s, 7/21/78/140 on ABG and mild pulmonary edema on chest imaging. Received Solu-Medrol, Lasix, placed on BiPAP and admitted to ICU with the diagnosis of acute on chronic hypercarbic respiratory failure. Repeat ABG showed persistent hypercarbia while on BiPAP and  was consequently intubated after arrival to the ICU.       Scheduled Medications:   allopurinol, 50 mg, oral, Every other day  [Held by provider] amLODIPine, 10 mg, oral, Daily  aspirin, 81 mg, oral, Daily  calcium acetate, 667 mg, oral, TID after meals  ezetimibe, 10 mg, oral, Daily  fluticasone, 2 spray, Each Nostril, BID  [Held by provider] gabapentin, 300 mg, oral, Daily  heparin (porcine), 7,500 Units, subcutaneous, q8h JAYA  hydrALAZINE, 50 mg, oral, BID  insulin lispro, 0-10 Units, subcutaneous, q4h  ipratropium-albuteroL, 3 mL, nebulization, q6h  levothyroxine, 200 mcg, oral, Daily  levothyroxine, 200 mcg, oral, Every Wednesday  methylPREDNISolone sodium succinate (PF), 40 mg, intravenous, q12h  [Held by provider] metoprolol succinate XL, 100 mg, oral, Daily  metoprolol tartrate, 50 mg, oral, BID  montelukast, 10 mg, oral, Nightly  nystatin, 1 Application, Topical, TID  oxygen, , inhalation, Continuous - Inhalation  oxygen, , inhalation, q4h  polyethylene glycol, 17 g, oral, Daily  pravastatin, 40 mg, oral, Nightly  sertraline, 50 mg, oral, Daily  sulfur hexafluoride microsphr, 2 mL, intravenous, Once in imaging         Continuous Medications:   D5 % and 0.9 % sodium chloride, 50 mL/hr, Last Rate: 50 mL/hr (03/31/25 0617)         PRN Medications:   PRN medications: dextrose,  dextrose, glucagon, glucagon, [Held by provider] nitroglycerin      Objective   Vitals:  Most Recent:  Vitals:    03/31/25 1100   BP:    Pulse:    Resp:    Temp: 36.4 °C (97.5 °F)   SpO2:        24hr Min/Max:  Temp  Min: 36.4 °C (97.5 °F)  Max: 36.8 °C (98.2 °F)  Pulse  Min: 60  Max: 77  BP  Min: 128/64  Max: 149/78  Resp  Min: 12  Max: 19  SpO2  Min: 93 %  Max: 100 %    LDA:   Urethral Catheter 16 Fr. (Active)   Placement Date/Time: 03/27/25 1100   Placed by: SONIA Easton RN  Hand Hygiene Completed: Yes  Tube Size (Fr.): 16 Fr.  Catheter Balloon Size: 10 mL  Urine Returned: Yes   Number of days: 4       NG/OG/Feeding Tube NG - [place] sump 14 Fr Right nostril (Active)   Placement Date/Time: 03/27/25 2020   Hand Hygiene Completed: Yes  Type of Tube: NG/OG Tube  Tube Type: NG - [place] sump  NG/OG Tube Size: 14 Fr  Tube Location: Right nostril   Number of days: 3         Vent settings:  FiO2 (%):  [30 %-50 %] 40 %  S RR:  [14] 14  MAP (cm H2O):  [9] 9    Hemodynamic parameters for last 24 hours:         Intake/Output Summary (Last 24 hours) at 3/31/2025 1339  Last data filed at 3/31/2025 1000  Gross per 24 hour   Intake 498.33 ml   Output 595 ml   Net -96.67 ml         Physical exam:    Physical Exam  Vitals reviewed.   Constitutional:       Appearance: Normal appearance.   HENT:      Head: Normocephalic and atraumatic.      Mouth/Throat:      Mouth: Mucous membranes are moist.   Eyes:      Extraocular Movements: Extraocular movements intact.      Pupils: Pupils are equal, round, and reactive to light.   Cardiovascular:      Rate and Rhythm: Normal rate and regular rhythm.   Pulmonary:      Effort: Pulmonary effort is normal.      Breath sounds: Normal breath sounds and air entry.   Abdominal:      General: Abdomen is flat. Bowel sounds are normal.      Palpations: Abdomen is soft.   Musculoskeletal:         General: Normal range of motion.      Cervical back: Normal range of motion and neck supple.   Skin:      General: Skin is warm and dry.   Neurological:      General: No focal deficit present.      Mental Status: She is alert and oriented to person, place, and time. Mental status is at baseline.        Lab/Radiology/Diagnostic Review:    All labs and Imaging have been personally reviewed.       Assessment/Plan       Extubated 3/27. Continues to require intermittent bipap    Neuro:  Acute metabolic encephalopathy likely due to hypercarbia, anxiety and depression. Worsened 3/29 due to hypercarbia, but now is improving.        Continue home Zoloft and Gabapentin       Supportive measures       Treat the underlying causes    Cardiovascular  h/o CHF, HTN, DLP, now probably with CHF exacerbation, given . Currently hemodynamically stable.        Home Lasix on hold, doses are being given PRN        Continue home Norvasc and hydralazine        Continue metoprolol        Continue home Atorvastatin, Zetia and ASA    Pulmonary:  Acute on chronic hypoxic hypercarbic respiratory failure 2/2 CHF vs COPD exacerbation. Initially required intubation and MV after failing NIV. H/o COPD, HOLDEN on CPAP and asthma. Successfully extubated to NC on 3/27. More drowsy 3/29, ABG showed respiratory acidosis.         Continue BiPAP current setting 24/10/30%, will try to wean off        Continue home Flonase, Singulair.        Continue Duo-Neb        Continue Solu-medrol, 40 mg BID    GI:  No acute issues        SLP evaluation is done, started on a diet.         No indication for GI prophylaxis since now is extubated.         Bowel regimen        Will keep NG in place.     Renal:   MARY ANN on CKD IV, baseline Cr 2.2, now up to 4.43. Pre-renal vs cardiorenal. Urine output now is improved. Cr continues to rise. Low urine output yesterday,  but also low intake. Hyperkalemia now is improved/resolved.            Is/Os        Daily RFP        Treat electrolyte abnormalities as indicated        Nephrology following    Endocrine:  h/o T2DM, hypothyroidism.  Currently no acute issues. FS within acceptable range.         Continue home Synthroid        Hold home dulaglutide and semaglutide        POCT every 4 hours       SSI        Hypoglycemic protocol    Heme/Onc:  h/o anemia of chronic diseases likely due to MGUS. H/H slowly down-trending.         Continue to monitor with daily CBC    ID:  Worsening leukocytosis on 3/29, but now WBC back to normal.        CXR done, no infiltrates        Completed a course of Azithromycin        Continue to monitor for S&S infection.       I have personally spent 45 minutes of critical care time, exclusive of time spent on any procedures, in evaluation and management of this critically ill patient’s condition mentioned above in the assessment and plan.     Robbie Olivarez MD  Pulmonary & Critical Care Attending   [phone]    Please excuse any typographical or unwanted errors within this documentation as voice recognition software was used to dictate this note.

## 2025-03-31 NOTE — PROGRESS NOTES
Occupational Therapy    OT Treatment    Patient Name: Analia Murray  MRN: [id number]  Department: [address] ICU  Room: 18/18-A  Today's Date: 3/31/2025  Time Calculation  Start Time: 0806  Stop Time: 0832  Time Calculation (min): 26 min        Assessment:  OT Assessment: Pt is making gradual progress towards goal. Pt able to follow commands to assist with bed mobility and transfers but continues to require maximal assistance. Pt appeared upset and tearful throughout duration of therapy session due to current health status. Pt did remain motivated to participate in therapy session and would benefit from continued acute OT services.  Prognosis: Fair  Barriers to Discharge Home: Physical needs, Caregiver assistance  Caregiver Assistance: Caregiver assistance needed per identified barriers - however, level of patient's required assistance exceeds assistance available at home  Physical Needs: 24hr mobility assistance needed, 24hr ADL assistance needed, High falls risk due to function or environment, Ambulating household distances limited by function/safety  Evaluation/Treatment Tolerance: Patient limited by fatigue  Medical Staff Made Aware: Yes  End of Session Communication: Bedside nurse  End of Session Patient Position: Bed, 3 rail up, Alarm on  OT Assessment Results: Decreased ADL status, Decreased upper extremity range of motion, Decreased upper extremity strength, Decreased endurance, Decreased functional mobility, Decreased IADLs  Prognosis: Fair  Barriers to Discharge: Decreased caregiver support  Evaluation/Treatment Tolerance: Patient limited by fatigue  Medical Staff Made Aware: Yes  Strengths: Attitude of self  Barriers to Participation: Comorbidities  Plan:  Treatment Interventions: ADL retraining, UE strengthening/ROM, Endurance training, Cognitive reorientation, Patient/family training, Compensatory technique education, Equipment evaluation/education  OT Frequency: 4 times per week  OT Discharge  Recommendations: Moderate intensity level of continued care  Equipment Recommended upon Discharge: Wheeled walker  OT Recommended Transfer Status: Maximum assist, Assist of 2  OT - OK to Discharge: Yes  Treatment Interventions: ADL retraining, UE strengthening/ROM, Endurance training, Cognitive reorientation, Patient/family training, Compensatory technique education, Equipment evaluation/education    Subjective   Previous Visit Info:  OT Last Visit  OT Received On: 03/31/25  General:  General  Reason for Referral: Activities of daily living  Past Medical History Relevant to Rehab: ANX, CKD, GERD, HTN, DM, Osteoporosis, HTN, Depression  Co-Treatment: PT  Co-Treatment Reason: maximization of pt safety and functional mobility for ICU pt  Prior to Session Communication: Bedside nurse  Patient Position Received: Bed, 3 rail up, Alarm off, not on at start of session  General Comment: Pt cleared for therapy by nursing and agreeable to treatment  Precautions:  Hearing/Visual Limitations: reading glasses, hearing WFL  Medical Precautions: Fall precautions, Oxygen therapy device and L/min (30L O2)     Date/Time Vitals Session Patient Position Pulse Resp SpO2 BP MAP (mmHg)    03/31/25 0805 --  --  --  --  100 %  --  --           Vital Signs Comment: 61HR, 100% SpO2, 13 RR, 138/67 (89)     Pain:  Pain Assessment  Pain Assessment: 0-10  0-10 (Numeric) Pain Score: 0 - No pain    Objective    Cognition:  Cognition  Overall Cognitive Status: Within Functional Limits  Arousal/Alertness: Appropriate responses to stimuli  Orientation Level: Oriented X4  Following Commands: Follows one step commands with increased time  Coordination:  Movements are Fluid and Coordinated: No  Upper Body Coordination: pt requires increased time and effort to perform movements  Activities of Daily Living: Feeding  Feeding Level of Assistance: Setup  Feeding Where Assessed: Bed level  Feeding Comments: Pt breakfast arrived upon completion of therapy  session and pt required set up assistance for opening of lids and containers and  limited ability to reach for items    Toileting  Toileting Level of Assistance: Maximum assistance  Where Assessed: Bed level  Toileting Comments: Pt incontinent of BM and required max Ax2 for hygiene  Functional Standing Tolerance:     Bed Mobility/Transfers: Bed Mobility  Bed Mobility: Yes  Bed Mobility 1  Bed Mobility 1: Supine to sitting, Sitting to supine  Level of Assistance 1: Maximum assistance, +2  Bed Mobility Comments 1: Pt required max assist x2 for movement of LE over EOB and positioning trunk into upright position. Pt required cueing for use of bed rails to assist with trunk elevation. Pt able to follow commands to assist with transfer but requires increased time and effort to complete movements  Bed Mobility 2  Bed Mobility  2: Rolling right, Rolling left  Level of Assistance 2: Moderate assistance, +2  Bed Mobility Comments 2: Pt required mod Ax2 for bed mobility as pt able to initiate  rolling and use UE to grab ahold of bed rails to maintain side lying position    Transfers  Transfer: Yes  Transfer 1  Transfer From 1: Bed to, Stand to  Transfer to 1: Stand, Bed  Technique 1: Sit to stand, Stand to sit  Transfer Level of Assistance 1: Maximum assistance, +2  Trials/Comments 1: Pt required max Ax2 for elevation from surface and controlled descent into bed. Pt also required blocking of B knees during sit to stand to prevent knee flexion during transfer.Cueing provided for alignment of body once as pt demonstrated hip flexion once in standing position      Functional Mobility:  Functional Mobility  Functional Mobility Performed: No  Sitting Balance:  Static Sitting Balance  Static Sitting-Balance Support: Right upper extremity supported, Left upper extremity supported  Static Sitting-Level of Assistance: Minimum assistance  Static Sitting-Comment/Number of Minutes: Pt tolerated sitting EOB ~10 minutes with intermittent  assistance for trunk control. Pt demonstrated posterior jerking motions and stated she does not know why her body is jerking. Pt did report dizziness while seated EOB. Pt returned to supine due to elevated BP.  Standing Balance:     Therapy/Activity: Therapeutic Exercise  Therapeutic Exercise Performed: Yes  Therapeutic Exercise Activity 1: Elbow flexion/extension (1x10) performed to facilitate maintenance of joint range of motion and ability to reach for items during completion of ADLs.    Other Activity:    RUE   RUE : Exceptions to WFL and LUE   LUE: Exceptions to WFL (pt demonstrated limited UE ROM due to generalized weakness)    Outcome Measures:[facility] Daily Activity  Putting on and taking off regular lower body clothing: Total  Bathing (including washing, rinsing, drying): Total  Putting on and taking off regular upper body clothing: Total  Toileting, which includes using toilet, bedpan or urinal: Total  Taking care of personal grooming such as brushing teeth: A lot  Eating Meals: A lot  Daily Activity - Total Score: 8      Education Documentation  ADL Training, taught by FABIANO Irvin at 3/31/2025  9:39 AM.  Learner: Patient  Readiness: Acceptance  Method: Explanation  Response: Verbalizes Understanding  Comment: Pt educated on call light use and plan for therapy.    Education Comments  No comments found.        OP EDUCATION:       Goals:  Encounter Problems       Encounter Problems (Active)       OT Goals       ADLs (Progressing)       Start:  03/27/25    Expected End:  04/11/25       Patient will complete ADLs with minimal assistance.          Functional Transfer (Progressing)       Start:  03/27/25    Expected End:  04/11/25       Patient will complete functional transfer with min assistance utilizing least restrictive device.          Therapeutic Exercise  (Progressing)       Start:  03/27/25    Expected End:  04/11/25       Patient will tolerate ~8-10 minutes of therapeutic exercise to increase  aerobic capacity and improve activity tolerance.

## 2025-03-31 NOTE — PROGRESS NOTES
03/31/25 1354   Discharge Planning   Expected Discharge Disposition SNF     Patient is a possible dc in 1-2 days, possible downgrade to [place] tomorrow. RNCC called patient's daughter Tiffanie for SNF choices. Left voicemail with call back number.   [facility] scores: PT- 7, OT- 8, She is a 2 person assist to stand.   Patient still on HFNC as of this morning.     Roxanna Banegas RN

## 2025-03-31 NOTE — CARE PLAN
The patient's goals for the shift include      The clinical goals for the shift include Patient will remain hemodynamically stable and wean of oxygen      Problem: Mechanical Ventilation  Goal: Patient Will Maintain Patent Airway  Outcome: Progressing  Flowsheets (Taken 3/31/2025 0500)  Patient Will Maintain Patent Airway:   Assess and monitor airway secretions and suction as needed per patient's condition and according to policy   Hyper oxygenate with 100% FiO2 prior to suctioning   Suctioning secretions as indicated to maintain patent airway   Elevate head of bed 30 degrees if patient has tube feeding  Goal: Oral health is maintained or improved  Outcome: Progressing  Flowsheets (Taken 3/31/2025 0500)  Oral Health is Maintained or Improved: Perform oral care with an oral swab  Goal: Tracheostomy will be managed safely  Outcome: Progressing  Flowsheets (Taken 3/31/2025 0500)  Tracheostomy Will Be Managed Safely:   Utilize tracheostomy securing device and change as necessary to ensure tracheostomy is secured to patient   Keep ambu bag, mask, oxygen connection tubing, and extra tracheostomy at bedside and accompanying patient at all times   Support ventilator tubing to avoid pressure from drag of tubing   Utilize trach securing device   Cleanse site with hydrogen peroxide   Protect skin with moisture barrier cream  Goal: ET tube will be managed safely  Outcome: Progressing  Flowsheets (Taken 3/31/2025 0500)  Endotracheal Tube Will Be Managed Safely:   Assess and monitor insertion depth at lip/nare line   Utilize ETT securing device and change as necessary to ensure ETT is properly secured to patient   Support ventilator tubing to avoid pressure from drag of tubing   Keep ambu bag, mask, oxygen connection tubing, and extra ETT at bedside and accompanying patient at all times   Utilize endotracheal tube securing device   Reposition endotracheal tube to opposite side of mouth     Problem: Skin  Goal: Decreased wound  size/increased tissue granulation at next dressing change  Outcome: Progressing  Flowsheets (Taken 3/31/2025 0500)  Decreased wound size/increased tissue granulation at next dressing change: Promote sleep for wound healing  Goal: Participates in plan/prevention/treatment measures  Outcome: Progressing  Flowsheets (Taken 3/31/2025 0500)  Participates in plan/prevention/treatment measures: Elevate heels  Goal: Prevent/manage excess moisture  Outcome: Progressing  Flowsheets (Taken 3/31/2025 0500)  Prevent/manage excess moisture: Moisturize dry skin  Goal: Prevent/minimize sheer/friction injuries  Outcome: Progressing  Flowsheets (Taken 3/31/2025 0500)  Prevent/minimize sheer/friction injuries:   Complete micro-shifts as needed if patient unable. Adjust patient position to relieve pressure points, not a full turn   Increase activity/out of bed for meals   Use pull sheet   HOB 30 degrees or less   Turn/reposition every 2 hours/use positioning/transfer devices  Goal: Promote/optimize nutrition  Outcome: Progressing  Flowsheets (Taken 3/31/2025 0500)  Promote/optimize nutrition:   Consume > 50% meals/supplements   Offer water/supplements/favorite foods  Goal: Promote skin healing  Outcome: Progressing  Flowsheets (Taken 3/31/2025 0500)  Promote skin healing:   Assess skin/pad under line(s)/device(s)   Protective dressings over bony prominences   Turn/reposition every 2 hours/use positioning/transfer devices     Problem: Fall/Injury  Goal: Not fall by end of shift  Outcome: Progressing  Goal: Be free from injury by end of the shift  Outcome: Progressing  Goal: Verbalize understanding of personal risk factors for fall in the hospital  Outcome: Progressing  Goal: Verbalize understanding of risk factor reduction measures to prevent injury from fall in the home  Outcome: Progressing  Goal: Use assistive devices by end of the shift  Outcome: Progressing  Goal: Pace activities to prevent fatigue by end of the shift  Outcome:  Progressing     Problem: Respiratory  Goal: Tolerate mechanical ventilation evidenced by VS/agitation level this shift  Outcome: Progressing  Goal: Wean oxygen to maintain O2 saturation per order/standard this shift  Outcome: Progressing     Problem: Safety - Adult  Goal: Free from fall injury  Outcome: Progressing     Problem: Discharge Planning  Goal: Discharge to home or other facility with appropriate resources  Outcome: Progressing     Problem: Chronic Conditions and Co-morbidities  Goal: Patient's chronic conditions and co-morbidity symptoms are monitored and maintained or improved  Outcome: Progressing     Problem: Nutrition  Goal: Nutrient intake appropriate for maintaining nutritional needs  Outcome: Progressing     Problem: Diabetes  Goal: Achieve decreasing blood glucose levels by end of shift  Outcome: Progressing  Goal: Increase stability of blood glucose readings by end of shift  Outcome: Progressing  Goal: Decrease in ketones present in urine by end of shift  Outcome: Progressing  Goal: Maintain electrolyte levels within acceptable range throughout shift  Outcome: Progressing  Goal: Maintain glucose levels >70mg/dl to <250mg/dl throughout shift  Outcome: Progressing  Goal: No changes in neurological exam by end of shift  Outcome: Progressing  Goal: Learn about and adhere to nutrition recommendations by end of shift  Outcome: Progressing  Goal: Vital signs within normal range for age by end of shift  Outcome: Progressing  Goal: Increase self care and/or family involovement by end of shift  Outcome: Progressing  Goal: Receive DSME education by end of shift  Outcome: Progressing

## 2025-04-01 ENCOUNTER — APPOINTMENT (OUTPATIENT)
Dept: RADIOLOGY | Facility: HOSPITAL | Age: 75
DRG: 871 | End: 2025-04-01
Payer: MEDICARE

## 2025-04-01 LAB
ALBUMIN SERPL BCP-MCNC: 3 G/DL (ref 3.4–5)
ANION GAP BLDV CALCULATED.4IONS-SCNC: 15 MMOL/L (ref 10–25)
ANION GAP SERPL CALCULATED.3IONS-SCNC: 17 MMOL/L (ref 10–20)
BASE EXCESS BLDV CALC-SCNC: -2.7 MMOL/L (ref -2–3)
BASOPHILS # BLD AUTO: 0 X10*3/UL (ref 0–0.1)
BASOPHILS NFR BLD AUTO: 0 %
BODY TEMPERATURE: 37 DEGREES CELSIUS
BUN SERPL-MCNC: 103 MG/DL (ref 6–23)
CA-I BLDV-SCNC: 1.03 MMOL/L (ref 1.1–1.33)
CALCIUM SERPL-MCNC: 7.5 MG/DL (ref 8.6–10.3)
CHLORIDE BLDV-SCNC: 94 MMOL/L (ref 98–107)
CHLORIDE SERPL-SCNC: 93 MMOL/L (ref 98–107)
CO2 SERPL-SCNC: 22 MMOL/L (ref 21–32)
CREAT SERPL-MCNC: 4.68 MG/DL (ref 0.5–1.05)
EGFRCR SERPLBLD CKD-EPI 2021: 9 ML/MIN/1.73M*2
EOSINOPHIL # BLD AUTO: 0 X10*3/UL (ref 0–0.4)
EOSINOPHIL NFR BLD AUTO: 0 %
ERYTHROCYTE [DISTWIDTH] IN BLOOD BY AUTOMATED COUNT: 19.6 % (ref 11.5–14.5)
GLUCOSE BLD MANUAL STRIP-MCNC: 135 MG/DL (ref 74–99)
GLUCOSE BLD MANUAL STRIP-MCNC: 137 MG/DL (ref 74–99)
GLUCOSE BLD MANUAL STRIP-MCNC: 194 MG/DL (ref 74–99)
GLUCOSE BLD MANUAL STRIP-MCNC: 263 MG/DL (ref 74–99)
GLUCOSE BLD MANUAL STRIP-MCNC: 279 MG/DL (ref 74–99)
GLUCOSE BLDV-MCNC: 151 MG/DL (ref 74–99)
GLUCOSE SERPL-MCNC: 151 MG/DL (ref 74–99)
HCO3 BLDV-SCNC: 24 MMOL/L (ref 22–26)
HCT VFR BLD AUTO: 28.7 % (ref 36–46)
HCT VFR BLD EST: 29 % (ref 36–46)
HGB BLD-MCNC: 9.2 G/DL (ref 12–16)
HGB BLDV-MCNC: 9.5 G/DL (ref 12–16)
IMM GRANULOCYTES # BLD AUTO: 0.03 X10*3/UL (ref 0–0.5)
IMM GRANULOCYTES NFR BLD AUTO: 0.4 % (ref 0–0.9)
INHALED O2 CONCENTRATION: 28 %
LACTATE BLDV-SCNC: 0.6 MMOL/L (ref 0.4–2)
LYMPHOCYTES # BLD AUTO: 0.43 X10*3/UL (ref 0.8–3)
LYMPHOCYTES NFR BLD AUTO: 6.3 %
MAGNESIUM SERPL-MCNC: 2.39 MG/DL (ref 1.6–2.4)
MCH RBC QN AUTO: 24.1 PG (ref 26–34)
MCHC RBC AUTO-ENTMCNC: 32.1 G/DL (ref 32–36)
MCV RBC AUTO: 75 FL (ref 80–100)
MONOCYTES # BLD AUTO: 0.47 X10*3/UL (ref 0.05–0.8)
MONOCYTES NFR BLD AUTO: 6.9 %
NEUTROPHILS # BLD AUTO: 5.91 X10*3/UL (ref 1.6–5.5)
NEUTROPHILS NFR BLD AUTO: 86.4 %
NRBC BLD-RTO: 0 /100 WBCS (ref 0–0)
OXYHGB MFR BLDV: 96.1 % (ref 45–75)
PCO2 BLDV: 50 MM HG (ref 41–51)
PH BLDV: 7.29 PH (ref 7.33–7.43)
PHOSPHATE SERPL-MCNC: 8.3 MG/DL (ref 2.5–4.9)
PLATELET # BLD AUTO: 190 X10*3/UL (ref 150–450)
PO2 BLDV: 89 MM HG (ref 35–45)
POTASSIUM BLDV-SCNC: 4.8 MMOL/L (ref 3.5–5.3)
POTASSIUM SERPL-SCNC: 5.1 MMOL/L (ref 3.5–5.3)
RBC # BLD AUTO: 3.82 X10*6/UL (ref 4–5.2)
SAO2 % BLDV: 98 % (ref 45–75)
SODIUM BLDV-SCNC: 128 MMOL/L (ref 136–145)
SODIUM SERPL-SCNC: 127 MMOL/L (ref 136–145)
WBC # BLD AUTO: 6.8 X10*3/UL (ref 4.4–11.3)

## 2025-04-01 PROCEDURE — 2500000002 HC RX 250 W HCPCS SELF ADMINISTERED DRUGS (ALT 637 FOR MEDICARE OP, ALT 636 FOR OP/ED): Performed by: INTERNAL MEDICINE

## 2025-04-01 PROCEDURE — 36415 COLL VENOUS BLD VENIPUNCTURE: CPT

## 2025-04-01 PROCEDURE — 2060000001 HC INTERMEDIATE ICU ROOM DAILY

## 2025-04-01 PROCEDURE — 2500000002 HC RX 250 W HCPCS SELF ADMINISTERED DRUGS (ALT 637 FOR MEDICARE OP, ALT 636 FOR OP/ED)

## 2025-04-01 PROCEDURE — 2500000004 HC RX 250 GENERAL PHARMACY W/ HCPCS (ALT 636 FOR OP/ED): Performed by: INTERNAL MEDICINE

## 2025-04-01 PROCEDURE — 2500000001 HC RX 250 WO HCPCS SELF ADMINISTERED DRUGS (ALT 637 FOR MEDICARE OP): Performed by: INTERNAL MEDICINE

## 2025-04-01 PROCEDURE — 94640 AIRWAY INHALATION TREATMENT: CPT

## 2025-04-01 PROCEDURE — 71045 X-RAY EXAM CHEST 1 VIEW: CPT

## 2025-04-01 PROCEDURE — 2500000004 HC RX 250 GENERAL PHARMACY W/ HCPCS (ALT 636 FOR OP/ED)

## 2025-04-01 PROCEDURE — 84132 ASSAY OF SERUM POTASSIUM: CPT

## 2025-04-01 PROCEDURE — 97530 THERAPEUTIC ACTIVITIES: CPT | Mod: GP

## 2025-04-01 PROCEDURE — 97535 SELF CARE MNGMENT TRAINING: CPT | Mod: GO

## 2025-04-01 PROCEDURE — 2500000005 HC RX 250 GENERAL PHARMACY W/O HCPCS: Performed by: INTERNAL MEDICINE

## 2025-04-01 PROCEDURE — 71045 X-RAY EXAM CHEST 1 VIEW: CPT | Performed by: RADIOLOGY

## 2025-04-01 PROCEDURE — 85025 COMPLETE CBC W/AUTO DIFF WBC: CPT

## 2025-04-01 PROCEDURE — 83735 ASSAY OF MAGNESIUM: CPT

## 2025-04-01 PROCEDURE — 99291 CRITICAL CARE FIRST HOUR: CPT | Performed by: INTERNAL MEDICINE

## 2025-04-01 PROCEDURE — 82947 ASSAY GLUCOSE BLOOD QUANT: CPT

## 2025-04-01 PROCEDURE — 2500000004 HC RX 250 GENERAL PHARMACY W/ HCPCS (ALT 636 FOR OP/ED): Mod: JZ | Performed by: INTERNAL MEDICINE

## 2025-04-01 PROCEDURE — 97530 THERAPEUTIC ACTIVITIES: CPT | Mod: GO

## 2025-04-01 PROCEDURE — 80069 RENAL FUNCTION PANEL: CPT

## 2025-04-01 RX ADMIN — Medication 2 L/MIN: at 07:46

## 2025-04-01 RX ADMIN — PRAVASTATIN SODIUM 40 MG: 20 TABLET ORAL at 20:45

## 2025-04-01 RX ADMIN — LEVOTHYROXINE SODIUM 200 MCG: 0.1 TABLET ORAL at 06:44

## 2025-04-01 RX ADMIN — HYDRALAZINE HYDROCHLORIDE 50 MG: 50 TABLET ORAL at 20:37

## 2025-04-01 RX ADMIN — IPRATROPIUM BROMIDE AND ALBUTEROL SULFATE 3 ML: 2.5; .5 SOLUTION RESPIRATORY (INHALATION) at 20:09

## 2025-04-01 RX ADMIN — METOPROLOL TARTRATE 50 MG: 50 TABLET, FILM COATED ORAL at 20:37

## 2025-04-01 RX ADMIN — CALCIUM ACETATE 667 MG: 667 CAPSULE ORAL at 08:47

## 2025-04-01 RX ADMIN — HYDRALAZINE HYDROCHLORIDE 50 MG: 50 TABLET ORAL at 08:56

## 2025-04-01 RX ADMIN — HEPARIN SODIUM 7500 UNITS: 5000 INJECTION INTRAVENOUS; SUBCUTANEOUS at 20:51

## 2025-04-01 RX ADMIN — IPRATROPIUM BROMIDE AND ALBUTEROL SULFATE 3 ML: 2.5; .5 SOLUTION RESPIRATORY (INHALATION) at 01:12

## 2025-04-01 RX ADMIN — IPRATROPIUM BROMIDE AND ALBUTEROL SULFATE 3 ML: 2.5; .5 SOLUTION RESPIRATORY (INHALATION) at 11:12

## 2025-04-01 RX ADMIN — NYSTATIN 1 APPLICATION: 100000 POWDER TOPICAL at 08:56

## 2025-04-01 RX ADMIN — Medication 2 L/MIN: at 20:08

## 2025-04-01 RX ADMIN — IPRATROPIUM BROMIDE AND ALBUTEROL SULFATE 3 ML: 2.5; .5 SOLUTION RESPIRATORY (INHALATION) at 07:44

## 2025-04-01 RX ADMIN — FLUTICASONE PROPIONATE 2 SPRAY: 50 SPRAY, METERED NASAL at 08:56

## 2025-04-01 RX ADMIN — Medication 2 L/MIN: at 20:12

## 2025-04-01 RX ADMIN — Medication 30 PERCENT: at 04:02

## 2025-04-01 RX ADMIN — METHYLPREDNISOLONE SODIUM SUCCINATE 40 MG: 40 INJECTION, POWDER, FOR SOLUTION INTRAMUSCULAR; INTRAVENOUS at 08:45

## 2025-04-01 RX ADMIN — EZETIMIBE 10 MG: 10 TABLET ORAL at 08:48

## 2025-04-01 RX ADMIN — MONTELUKAST 10 MG: 10 TABLET, FILM COATED ORAL at 20:37

## 2025-04-01 RX ADMIN — NYSTATIN 1 APPLICATION: 100000 POWDER TOPICAL at 20:39

## 2025-04-01 RX ADMIN — INSULIN LISPRO 6 UNITS: 100 INJECTION, SOLUTION INTRAVENOUS; SUBCUTANEOUS at 20:38

## 2025-04-01 RX ADMIN — HEPARIN SODIUM 7500 UNITS: 5000 INJECTION INTRAVENOUS; SUBCUTANEOUS at 06:44

## 2025-04-01 RX ADMIN — SERTRALINE HYDROCHLORIDE 50 MG: 50 TABLET ORAL at 08:48

## 2025-04-01 RX ADMIN — POLYETHYLENE GLYCOL 3350 17 G: 17 POWDER, FOR SOLUTION ORAL at 08:45

## 2025-04-01 RX ADMIN — NYSTATIN 1 APPLICATION: 100000 POWDER TOPICAL at 15:28

## 2025-04-01 RX ADMIN — ASPIRIN 81 MG: 81 TABLET, CHEWABLE ORAL at 08:48

## 2025-04-01 ASSESSMENT — COGNITIVE AND FUNCTIONAL STATUS - GENERAL
TOILETING: TOTAL
EATING MEALS: A LOT
DRESSING REGULAR UPPER BODY CLOTHING: A LOT
DAILY ACTIVITIY SCORE: 13
PERSONAL GROOMING: A LOT
HELP NEEDED FOR BATHING: A LOT
MOVING FROM LYING ON BACK TO SITTING ON SIDE OF FLAT BED WITH BEDRAILS: A LOT
MOBILITY SCORE: 7
STANDING UP FROM CHAIR USING ARMS: A LOT
DRESSING REGULAR UPPER BODY CLOTHING: A LOT
TOILETING: A LOT
WALKING IN HOSPITAL ROOM: TOTAL
CLIMB 3 TO 5 STEPS WITH RAILING: TOTAL
WALKING IN HOSPITAL ROOM: A LOT
HELP NEEDED FOR BATHING: TOTAL
TOILETING: A LOT
MOVING TO AND FROM BED TO CHAIR: TOTAL
STANDING UP FROM CHAIR USING ARMS: A LOT
DRESSING REGULAR LOWER BODY CLOTHING: TOTAL
MOVING FROM LYING ON BACK TO SITTING ON SIDE OF FLAT BED WITH BEDRAILS: A LITTLE
HELP NEEDED FOR BATHING: A LOT
DRESSING REGULAR LOWER BODY CLOTHING: TOTAL
MOVING TO AND FROM BED TO CHAIR: A LOT
TURNING FROM BACK TO SIDE WHILE IN FLAT BAD: TOTAL
EATING MEALS: A LITTLE
TURNING FROM BACK TO SIDE WHILE IN FLAT BAD: A LITTLE
PERSONAL GROOMING: A LITTLE
CLIMB 3 TO 5 STEPS WITH RAILING: TOTAL
STANDING UP FROM CHAIR USING ARMS: TOTAL
CLIMB 3 TO 5 STEPS WITH RAILING: TOTAL
TURNING FROM BACK TO SIDE WHILE IN FLAT BAD: A LITTLE
PERSONAL GROOMING: A LITTLE
DRESSING REGULAR LOWER BODY CLOTHING: TOTAL
DRESSING REGULAR UPPER BODY CLOTHING: TOTAL
WALKING IN HOSPITAL ROOM: A LOT
DAILY ACTIVITIY SCORE: 8
MOVING TO AND FROM BED TO CHAIR: A LOT

## 2025-04-01 ASSESSMENT — PAIN SCALES - GENERAL
PAINLEVEL_OUTOF10: 0 - NO PAIN

## 2025-04-01 ASSESSMENT — PAIN - FUNCTIONAL ASSESSMENT
PAIN_FUNCTIONAL_ASSESSMENT: 0-10

## 2025-04-01 ASSESSMENT — ACTIVITIES OF DAILY LIVING (ADL): HOME_MANAGEMENT_TIME_ENTRY: 16

## 2025-04-01 NOTE — PROGRESS NOTES
[facility] Pulmonary and Critical Care Medicine   Progress Note        Subjective   Analia Murray is a 74 y.o. female on day 6 of admission presenting with Acute respiratory failure with hypoxia.  Patient presented with SOB x 2 weeks associated with cough productive of yellow sputum and chest tightness. In the ED work up revealed tachypnea, hypoxia, MARY ANN, BNP 700s, 7/21/78/140 on ABG and mild pulmonary edema on chest imaging. Received Solu-Medrol, Lasix, placed on BiPAP and admitted to ICU with the diagnosis of acute on chronic hypercarbic respiratory failure. Repeat ABG showed persistent hypercarbia while on BiPAP and  was consequently intubated after arrival to the ICU.       Scheduled Medications:   allopurinol, 50 mg, oral, Every other day  [Held by provider] amLODIPine, 10 mg, oral, Daily  aspirin, 81 mg, oral, Daily  calcium acetate, 667 mg, oral, TID after meals  ezetimibe, 10 mg, oral, Daily  fluticasone, 2 spray, Each Nostril, BID  [Held by provider] gabapentin, 300 mg, oral, Daily  heparin (porcine), 7,500 Units, subcutaneous, q8h JAYA  hydrALAZINE, 50 mg, oral, BID  insulin lispro, 0-10 Units, subcutaneous, q4h  ipratropium-albuteroL, 3 mL, nebulization, q6h  levothyroxine, 200 mcg, oral, Daily  levothyroxine, 200 mcg, oral, Every Wednesday  [Held by provider] metoprolol succinate XL, 100 mg, oral, Daily  metoprolol tartrate, 50 mg, oral, BID  montelukast, 10 mg, oral, Nightly  nystatin, 1 Application, Topical, TID  oxygen, , inhalation, Nightly  oxygen, , inhalation, BID  polyethylene glycol, 17 g, oral, Daily  pravastatin, 40 mg, oral, Nightly  sertraline, 50 mg, oral, Daily  sulfur hexafluoride microsphr, 2 mL, intravenous, Once in imaging         Continuous Medications:           PRN Medications:   PRN medications: dextrose, dextrose, glucagon, glucagon, [Held by provider] nitroglycerin, traZODone      Objective   Vitals:  Most Recent:  Vitals:    04/01/25 0746   BP:    Pulse:    Resp:     Temp:    SpO2: 98%       24hr Min/Max:  Temp  Min: 36.3 °C (97.3 °F)  Max: 36.6 °C (97.9 °F)  Pulse  Min: 54  Max: 71  BP  Min: 124/69  Max: 172/91  Resp  Min: 13  Max: 23  SpO2  Min: 89 %  Max: 98 %    LDA:   Urethral Catheter 16 Fr. (Active)   Placement Date/Time: 03/27/25 1100   Placed by: SONIA Easton RN  Hand Hygiene Completed: Yes  Tube Size (Fr.): 16 Fr.  Catheter Balloon Size: 10 mL  Urine Returned: Yes   Number of days: 4       NG/OG/Feeding Tube NG - [place] sump 14 Fr Right nostril (Active)   Placement Date/Time: 03/27/25 2020   Hand Hygiene Completed: Yes  Type of Tube: NG/OG Tube  Tube Type: NG - [place] sump  NG/OG Tube Size: 14 Fr  Tube Location: Right nostril   Number of days: 3         Vent settings:  FiO2 (%):  [28 %-30 %] 28 %  S RR:  [14] 14    Hemodynamic parameters for last 24 hours:         Intake/Output Summary (Last 24 hours) at 4/1/2025 1102  Last data filed at 4/1/2025 0600  Gross per 24 hour   Intake 1521.67 ml   Output 795 ml   Net 726.67 ml         Physical exam:    Physical Exam  Vitals reviewed.   Constitutional:       Appearance: Normal appearance.   HENT:      Head: Normocephalic and atraumatic.      Mouth/Throat:      Mouth: Mucous membranes are moist.   Eyes:      Extraocular Movements: Extraocular movements intact.      Pupils: Pupils are equal, round, and reactive to light.   Cardiovascular:      Rate and Rhythm: Normal rate and regular rhythm.   Pulmonary:      Effort: Pulmonary effort is normal.      Breath sounds: Normal breath sounds and air entry.   Abdominal:      General: Abdomen is flat. Bowel sounds are normal.      Palpations: Abdomen is soft.   Musculoskeletal:         General: Normal range of motion.      Cervical back: Normal range of motion and neck supple.   Skin:     General: Skin is warm and dry.   Neurological:      General: No focal deficit present.      Mental Status: She is alert and oriented to person, place, and time. Mental status is at baseline.           Lab/Radiology/Diagnostic Review:    All labs and Imaging have been personally reviewed.       Assessment/Plan       Extubated 3/27. Continues to require intermittent bipap    Neuro:  Acute metabolic encephalopathy likely due to hypercarbia, anxiety and depression. Worsened 3/29 due to hypercarbia, but now is improving.        Continue home Zoloft and Gabapentin       Supportive measures       Treat the underlying causes    Cardiovascular  h/o CHF, HTN, DLP, now probably with CHF exacerbation, given . Currently hemodynamically stable.        Home Lasix on hold, doses are being given PRN        Continue home hydralazine        Continue metoprolol        Continue home Atorvastatin, Zetia and ASA    Pulmonary:  Acute on chronic hypoxic hypercarbic respiratory failure 2/2 CHF vs COPD exacerbation. Initially required intubation and MV after failing NIV. H/o COPD, HOLDEN on CPAP and asthma. Successfully extubated to NC on 3/27. More drowsy 3/29, ABG showed respiratory acidosis.         Continue BiPAP current setting 24/10/30%, will try to wean off   Stable on 2 lpm nasal canula daily        Continue home Flonase, Singulair.        Continue Duo-Neb        Continue Solu-medrol, 40 mg daily    GI:  No acute issues        SLP evaluation is done, started on a diet.         No indication for GI prophylaxis since now is extubated.         Bowel regimen        Will keep NG in place.     Renal:   MARY ANN on CKD IV, baseline Cr 2.2, now up to 4.43. Pre-renal vs cardiorenal. Urine output now is improved. Cr continues to rise. Low urine output yesterday,  but also low intake. Hyperkalemia now is improved/resolved.            Is/Os        Daily RFP        Treat electrolyte abnormalities as indicated        Nephrology following    Endocrine:  h/o T2DM, hypothyroidism. Currently no acute issues. FS within acceptable range.         Continue home Synthroid        Hold home dulaglutide and semaglutide        POCT every 4 hours        SSI        Hypoglycemic protocol    Heme/Onc:  h/o anemia of chronic diseases likely due to MGUS. H/H slowly down-trending.         Continue to monitor with daily CBC    ID:  Worsening leukocytosis on 3/29, but now WBC back to normal.        CXR done, no infiltrates        Completed a course of Azithromycin        Continue to monitor for S&S infection.       I have personally spent 45 minutes of critical care time, exclusive of time spent on any procedures, in evaluation and management of this critically ill patient’s condition mentioned above in the assessment and plan.     Robbie Olivarez MD  Pulmonary & Critical Care Attending   [phone]    Please excuse any typographical or unwanted errors within this documentation as voice recognition software was used to dictate this note.

## 2025-04-01 NOTE — CARE PLAN
The patient's goals for the shift include      The clinical goals for the shift include Patient will have decreased oxygen needs      Problem: Skin  Goal: Decreased wound size/increased tissue granulation at next dressing change  Flowsheets (Taken 3/31/2025 2252)  Decreased wound size/increased tissue granulation at next dressing change: Protective dressings over bony prominences  Goal: Participates in plan/prevention/treatment measures  Flowsheets (Taken 3/31/2025 2252)  Participates in plan/prevention/treatment measures:   Discuss with provider PT/OT consult   Elevate heels  Goal: Prevent/manage excess moisture  Flowsheets (Taken 3/31/2025 2252)  Prevent/manage excess moisture:   Moisturize dry skin   Cleanse incontinence/protect with barrier cream  Goal: Prevent/minimize sheer/friction injuries  Flowsheets (Taken 3/31/2025 2252)  Prevent/minimize sheer/friction injuries:   Complete micro-shifts as needed if patient unable. Adjust patient position to relieve pressure points, not a full turn   HOB 30 degrees or less   Increase activity/out of bed for meals   Turn/reposition every 2 hours/use positioning/transfer devices   Use pull sheet  Goal: Promote/optimize nutrition  Flowsheets (Taken 3/31/2025 2252)  Promote/optimize nutrition:   Consume > 50% meals/supplements   Assist with feeding   Monitor/record intake including meals   Offer water/supplements/favorite foods  Goal: Promote skin healing  Flowsheets (Taken 3/31/2025 2252)  Promote skin healing:   Assess skin/pad under line(s)/device(s)   Protective dressings over bony prominences   Turn/reposition every 2 hours/use positioning/transfer devices

## 2025-04-01 NOTE — PROGRESS NOTES
Physical Therapy    Physical Therapy Treatment    Patient Name: Analia Murray  MRN: [id number]  Department: [address]  Room: [address] [place] [address]  Today's Date: 4/1/2025  Time Calculation  Start Time: 1313  Stop Time: 1333  Time Calculation (min): 20 min  Assessment/Plan   PT Assessment  Barriers to Discharge Home: Caregiver assistance, Physical needs  Caregiver Assistance: Caregiver assistance needed per identified barriers - however, level of patient's required assistance exceeds assistance available at home  Physical Needs: Stair navigation into home limited by function/safety, Ambulating household distances limited by function/safety, High falls risk due to function or environment  End of Session Communication: Bedside nurse  Assessment Comment: pt making slow progress towards goals, does fatigue quickly but is highly motivated to return to her functional baseline. pt will continue to benefit from skilled PT services to improve her functional performance and maximize safety.  End of Session Patient Position: Bed, 3 rail up, Alarm on (needs in reach)  PT Plan  Inpatient/Swing Bed or Outpatient: Inpatient  PT Plan  Treatment/Interventions: Bed mobility, Transfer training, Gait training, Balance training, Neuromuscular re-education, Strengthening, Endurance training, Range of motion, Therapeutic exercise, Therapeutic activity  PT Plan: Ongoing PT  PT Frequency: 5 times per week  PT Discharge Recommendations: Moderate intensity level of continued care  Equipment Recommended upon Discharge: Wheeled walker  PT Recommended Transfer Status: Assist x2  PT - OK to Discharge: Yes      General Visit Information:   PT  Visit  PT Received On: 04/01/25  Response to Previous Treatment: Patient with no complaints from previous session.  General  Co-Treatment: OT  Co-Treatment Reason: maximization of pt safety and functional mobility for ICU pt  Prior to Session Communication: Bedside nurse  Patient Position Received: Bed, 3 rail up,  Alarm on  Preferred Learning Style: verbal  General Comment: pt agreeable to therapy this date    Subjective   Precautions:  Precautions  Hearing/Visual Limitations: reading glasses, hearing WFL  Medical Precautions: Fall precautions, Oxygen therapy device and L/min (2L O2 via NC)    Vital Signs Comment: 74 bpm, 95%, 123/54 (76) mmHg, 16 breaths/min     Objective   Pain:  Pain Assessment  Pain Assessment: 0-10  0-10 (Numeric) Pain Score: 0 - No pain  Cognition:  Cognition  Overall Cognitive Status: Within Functional Limits  Orientation Level: Oriented X4  Coordination:  Coordination Comment: increased time and effort to complete tasks  Postural Control:  Postural Control  Posture Comment: rounded shoulders, forward head, increased cervical flexion, morbid obesity    Activity Tolerance:  Activity Tolerance  Endurance: Decreased tolerance for upright activites  Activity Tolerance Comments: fair-  Rate of Perceived Exertion (RPE): 4  Treatments:  Balance/Neuromuscular Re-Education  Balance/Neuromuscular Re-Education Activity Performed: Yes  Balance/Neuromuscular Re-Education Activity 1: seated EOB for several minutes, CGA progressing to close superivsion for balance, difficulty keeping head up  Balance/Neuromuscular Re-Education Activity 2: stood in place with B hands on walker, ~45 seconds, poor B knee control, +buckling    Bed Mobility  Bed Mobility: Yes  Bed Mobility 1  Bed Mobility 1: Supine to sitting  Level of Assistance 1: Maximum assistance, +2  Bed Mobility Comments 1: mod A to guide LEs off EOB, max A x 2 and use of glide sheet to elevate trunk, HOB elevated, manual guidance of hands to grab bar for pt to assist with lifting trunk.  Bed Mobility 2  Bed Mobility  2: Sitting to supine  Level of Assistance 2: Maximum assistance, +2  Bed Mobility Comments 2: max A x 2 for trunk and B LE management with HOB flat.  Bed Mobility 3  Bed Mobility 3: Rolling right, Rolling left, Scooting  Level of Assistance 3:  Moderate assistance, +2  Bed Mobility Comments 3: verbal cues for technique and hand placement, pt able to initiate and hold onto bed rail to remain in sidelying. manual placement of hands on bed rails, verbal cues to flex knees in order to assist scooting to HOB using the glide sheet.    Ambulation/Gait Training  Ambulation/Gait Training Performed: No (unable to take steps this date, + knee buckling when attempted weight shifting.)    Transfers  Transfer: Yes  Transfer 1  Transfer From 1: Bed to  Transfer to 1: Stand  Technique 1: Sit to stand  Transfer Device 1: Walker  Transfer Level of Assistance 1: Maximum assistance, x 3  Trials/Comments 1: max A x 2 to guide trunk up, third person blocking knees, max A x 2 to establish COG over SOC[place].  Transfers 2  Transfer From 2: Stand to  Transfer to 2: Bed  Technique 2: Stand to sit  Transfer Device 2: Walker  Transfer Level of Assistance 2: Maximum assistance, +2  Trials/Comments 2: very poor eccentric control in sitting    Outcome Measures:  [facility] Basic Mobility  Turning from your back to your side while in a flat bed without using bedrails: A lot  Moving from lying on your back to sitting on the side of a flat bed without using bedrails: Total  Moving to and from bed to chair (including a wheelchair): Total  Standing up from a chair using your arms (e.g. wheelchair or bedside chair): Total  To walk in hospital room: Total  Climbing 3-5 steps with railing: Total  Basic Mobility - Total Score: 7    FSS-ICU  Ambulation: Unable to attempt due to weakness  Rolling: Maximal assistance (performs 25% - 49% of task)  Sitting: Maximal assistance (performs 25% - 49% of task)  Transfer Sit-to-Stand: Maximal assistance (performs 25% - 49% of task)  Transfer Supine-to-Sit: Maximal assistance (performs 25% - 49% of task)  Total Score: 8    Education Documentation  Mobility Training, taught by Ade Munguia PT at 4/1/2025  4:57 PM.  Learner: Patient  Readiness:  Acceptance  Method: Explanation  Response: Verbalizes Understanding    Education Comments  04/01/25 3498 Ade Munguia, PT  Educated on PT POC      Encounter Problems       Encounter Problems (Active)       PT Problem       Pt will transition supine<>sit with close S. (Progressing)       Start:  03/27/25    Expected End:  04/19/25            Pt will transfer sit<>stand with FWW & close S. (Progressing)       Start:  03/27/25    Expected End:  04/19/25            Pt will ambulate >/=25 ft with FWW & close S. (Not Progressing)       Start:  03/27/25    Expected End:  04/19/25

## 2025-04-01 NOTE — PROGRESS NOTES
Occupational Therapy    OT Treatment    Patient Name: Analia Murray  MRN: [id number]  Department: [address] ICU  Room: 18/18-A  Today's Date: 4/1/2025  Time Calculation  Start Time: 1309  Stop Time: 1335  Time Calculation (min): 26 min        Assessment:  OT Assessment: Pt is making gradual progress towards goal but continues to demonstrate decreased functional mobility and ADL status due to generalized weakness. Pt able to follow commands to assist with bed mobility and transfers but requires max assist due to limited strength. Pt would benefit from continued acute OT services to facilitate PLOF.  Prognosis: Fair  Barriers to Discharge Home: Physical needs, Caregiver assistance  Caregiver Assistance: Caregiver assistance needed per identified barriers - however, level of patient's required assistance exceeds assistance available at home  Physical Needs: 24hr mobility assistance needed, 24hr ADL assistance needed, High falls risk due to function or environment, Ambulating household distances limited by function/safety  Evaluation/Treatment Tolerance: Patient limited by fatigue  Medical Staff Made Aware: Yes  End of Session Communication: Bedside nurse  End of Session Patient Position: Bed, 3 rail up, Alarm on  OT Assessment Results: Decreased ADL status, Decreased upper extremity range of motion, Decreased upper extremity strength, Decreased endurance, Decreased functional mobility, Decreased IADLs  Prognosis: Fair  Barriers to Discharge: Decreased caregiver support  Evaluation/Treatment Tolerance: Patient limited by fatigue  Medical Staff Made Aware: Yes  Strengths: Attitude of self  Barriers to Participation: Comorbidities  Plan:  Treatment Interventions: ADL retraining, Functional transfer training, UE strengthening/ROM, Endurance training, Patient/family training, Equipment evaluation/education, Compensatory technique education  OT Frequency: 4 times per week  OT Discharge Recommendations: Moderate intensity  level of continued care  Equipment Recommended upon Discharge: Wheeled walker  OT Recommended Transfer Status: Maximum assist, Assist of 2  OT - OK to Discharge: Yes  Treatment Interventions: ADL retraining, Functional transfer training, UE strengthening/ROM, Endurance training, Patient/family training, Equipment evaluation/education, Compensatory technique education    Subjective   Previous Visit Info:  OT Last Visit  OT Received On: 04/01/25  General:  General  Reason for Referral: Activities of daily living  Referred By: Dr. Sher MD  Past Medical History Relevant to Rehab: ANX, CKD, GERD, HTN, DM, Osteoporosis, HTN, Depression  Family/Caregiver Present: No  Co-Treatment: PT  Co-Treatment Reason: maximization of pt safety and functional mobility for ICU pt  Prior to Session Communication: Bedside nurse  Patient Position Received: Bed, 3 rail up, Alarm off, not on at start of session  General Comment: pt cleared for therapy by nursing, pt agreeable to treatment  Precautions:  Hearing/Visual Limitations: reading glasses, hearing WFL  Medical Precautions: Fall precautions, Oxygen therapy device and L/min (2L O2)      Vital Signs Comment: 69HR, 94% SpO2, 16 RR, 123/54 (76)     Pain:  Pain Assessment  Pain Assessment: 0-10  0-10 (Numeric) Pain Score: 0 - No pain    Objective    Cognition:  Cognition  Overall Cognitive Status: Within Functional Limits  Orientation Level: Oriented X4  Following Commands: Follows one step commands with increased time  Processing Speed: Within funtional limits  Coordination:  Movements are Fluid and Coordinated: No  Upper Body Coordination: pt demonstrates slow and delayed movements  Activities of Daily Living: Feeding  Feeding Level of Assistance: Setup  Feeding Where Assessed: Bed level  Feeding Comments: pt provided with set up assist for lunch    Toileting  Toileting Level of Assistance: Maximum assistance  Where Assessed: Bed level  Toileting Comments: Pt incontinent of BM and  required maximal assistance for hygiene. Pt able to assist with positioning of LE to perform hygiene  Functional Standing Tolerance:     Bed Mobility/Transfers: Bed Mobility  Bed Mobility: Yes  Bed Mobility 1  Bed Mobility 1: Supine to sitting, Sitting to supine  Level of Assistance 1: Maximum assistance, +2  Bed Mobility Comments 1: Pt able to initiate movement of LE but required max assist for continuation of movement and elevating LE over EOB. Pt also provided with max assist for trunk elevation and required hand over hand assistance to grab ahold of bed rails. Glide sheet utilized to scoot hips forward.  Bed Mobility 2  Bed Mobility  2: Rolling right, Rolling left  Level of Assistance 2: Moderate assistance, +2  Bed Mobility Comments 2: Pt able to assist with rolling by bending knees and utilizing arms to grab ahold of bed rails    Transfers  Transfer: Yes  Transfer 1  Transfer From 1: Bed to, Stand to  Transfer to 1: Stand, Bed  Technique 1: Sit to stand, Stand to sit  Transfer Device 1: Walker  Transfer Level of Assistance 1: Maximum assistance, +2  Trials/Comments 1: Pt required max Ax2 for elevation from bed and controlled descent. Pt also required blocking of B knees to prevent knee flexion. Pt required cueing for posture and alignment during stand. Pt continues to demonstrate posterior jerking motions    Functional Mobility:  Functional Mobility  Functional Mobility Performed: No     Strength:  Strength Comments: BUE 3/5  Other Activity:       RUE   RUE : Exceptions to WFL and LUE   LUE: Exceptions to WFL (Pt ROM limited due to weakness)    Outcome Measures:[facility] Daily Activity  Putting on and taking off regular lower body clothing: Total  Bathing (including washing, rinsing, drying): Total  Putting on and taking off regular upper body clothing: Total  Toileting, which includes using toilet, bedpan or urinal: Total  Taking care of personal grooming such as brushing teeth: A lot  Eating Meals: A lot  Daily  Activity - Total Score: 8        Education Documentation  ADL Training, taught by FABIANO Irvin at 4/1/2025  2:31 PM.  Learner: Patient  Readiness: Acceptance  Method: Explanation  Response: Verbalizes Understanding  Comment: Pt educated on therapeutic exercise.    Education Comments  No comments found.        OP EDUCATION:       Goals:  Encounter Problems       Encounter Problems (Active)       OT Goals       ADLs (Progressing)       Start:  03/27/25    Expected End:  04/11/25       Patient will complete ADLs with minimal assistance.          Functional Transfer (Progressing)       Start:  03/27/25    Expected End:  04/11/25       Patient will complete functional transfer with min assistance utilizing least restrictive device.          Therapeutic Exercise  (Progressing)       Start:  03/27/25    Expected End:  04/11/25       Patient will tolerate ~8-10 minutes of therapeutic exercise to increase aerobic capacity and improve activity tolerance.

## 2025-04-01 NOTE — PROGRESS NOTES
"Analia Murray is a 74 y.o. female on day 7 of admission presenting with Acute respiratory failure with hypoxia.    Subjective    Patient seen for acute renal failure on top of chronic kidney disease stage 3 admitted with pneumonia acute respiratory failure patient states she is feeling better she does not appear in any respiratory distress currently she is on oxygen by nasal cannula on no pressors she has excellent urine output also she started taking oral intake at this time  Objective     Physical Exam  Constitutional:       General: She is not in acute distress.     Appearance: Normal appearance. She is not toxic-appearing.   Neck:      Vascular: No carotid bruit.   Cardiovascular:      Heart sounds: No murmur heard.     No friction rub. No gallop.   Pulmonary:      Breath sounds: No wheezing, rhonchi or rales.   Abdominal:      Tenderness: There is no abdominal tenderness. There is no right CVA tenderness, left CVA tenderness or rebound.   Musculoskeletal:         General: No tenderness.      Cervical back: Neck supple.      Right lower leg: No edema.      Left lower leg: No edema.   Lymphadenopathy:      Cervical: No cervical adenopathy.         Last Recorded Vitals  Blood pressure (!) 172/91, pulse 62, temperature 36.6 °C (97.9 °F), temperature source Oral, resp. rate 14, height 1.626 m (5' 4.02\"), weight 143 kg (314 lb 13.1 oz), SpO2 98%.    Intake/Output last 3 Shifts:  I/O last 3 completed shifts:  In: 2561.7 (17.9 mL/kg) [P.O.:1200; I.V.:1141.7 (8 mL/kg); NG/GT:120; IV Piggyback:100]  Out: 1205 (8.4 mL/kg) [Urine:1205 (0.2 mL/kg/hr)]  Weight: 142.8 kg     Current Facility-Administered Medications:     allopurinol (Zyloprim) tablet 50 mg, 50 mg, oral, Every other day, Sanjiv E Hipps, PA-C, 50 mg at 03/31/25 0918    [Held by provider] amLODIPine (Norvasc) tablet 10 mg, 10 mg, oral, Daily, Sanjiv E Hipps, PA-C, 10 mg at 03/28/25 0948    aspirin chewable tablet 81 mg, 81 mg, oral, Daily, Charisse Chavez, " MD, 81 mg at 04/01/25 0848    calcium acetate (Phoslo) capsule 667 mg, 667 mg, oral, TID after meals, Hoda Slater MD, 667 mg at 04/01/25 0847    dextrose 50 % injection 12.5 g, 12.5 g, intravenous, q15 min PRN, Sanjiv Rodríguezs, PA-C    dextrose 50 % injection 25 g, 25 g, intravenous, q15 min PRN, Sanjiv Rodríguezs, PA-C, 25 g at 03/28/25 0101    ezetimibe (Zetia) tablet 10 mg, 10 mg, oral, Daily, Sanjiv Neil, PA-C, 10 mg at 04/01/25 0848    fluticasone (Flonase) nasal spray 2 spray, 2 spray, Each Nostril, BID, Sanjiv Rodríguezs, PA-C, 2 spray at 04/01/25 0856    [Held by provider] gabapentin (Neurontin) capsule 300 mg, 300 mg, oral, Daily, Hoda Slater MD    glucagon (Glucagen) injection 1 mg, 1 mg, intramuscular, q15 min PRN, Sanjiv Neil, PA-C    glucagon (Glucagen) injection 1 mg, 1 mg, intramuscular, q15 min PRN, Sanjiv Neil, PA-C    heparin (porcine) injection 7,500 Units, 7,500 Units, subcutaneous, q8h JAYA, Sanjiv VAZQUEZ Hipps, PA-C, 7,500 Units at 04/01/25 0644    hydrALAZINE (Apresoline) tablet 50 mg, 50 mg, oral, BID, Hoda Slater MD, 50 mg at 04/01/25 0856    insulin lispro injection 0-10 Units, 0-10 Units, subcutaneous, q4h, Sanjiv Neil, PA-C, 2 Units at 03/31/25 2047    ipratropium-albuteroL (Duo-Neb) 0.5-2.5 mg/3 mL nebulizer solution 3 mL, 3 mL, nebulization, q6h, Sanjiv Neil, PA-C, 3 mL at 04/01/25 1112    levothyroxine (Synthroid, Levoxyl) tablet 200 mcg, 200 mcg, oral, Daily, Sanjiv E Hipps, PA-C, 200 mcg at 04/01/25 0644    levothyroxine (Synthroid, Levoxyl) tablet 200 mcg, 200 mcg, oral, Every Wednesday, Charisse Chavez MD    [START ON 4/2/2025] methylPREDNISolone sod succinate (SOLU-Medrol) 40 mg/mL injection 40 mg, 40 mg, intravenous, q24h, Robbie Olivarez MD    [Held by provider] metoprolol succinate XL (Toprol-XL) 24 hr tablet 100 mg, 100 mg, oral, Daily, Sanjiv Neil PA-C    metoprolol tartrate (Lopressor) tablet 50 mg, 50 mg, oral, BID, Hoda Slater MD, 50 mg at 03/31/25 2048    montelukast (Singulair)  tablet 10 mg, 10 mg, oral, Nightly, Sanjiv E Hipps, PA-C, 10 mg at 03/31/25 2048    [Held by provider] nitroglycerin (Nitrostat) SL tablet 0.4 mg, 0.4 mg, sublingual, q5 min PRN, Sanjiv VAZQUEZ Hipps, PA-C    nystatin (Mycostatin) 100,000 unit/gram powder 1 Application, 1 Application, Topical, TID, Sanjiv Rodríguezs, PA-C, 1 Application at 04/01/25 0856    oxygen (O2) therapy, , inhalation, Nightly, Robbie Olivarez MD    oxygen (O2) therapy, , inhalation, BID, Robbie Olivarez MD    polyethylene glycol (Glycolax, Miralax) packet 17 g, 17 g, oral, Daily, Sanjiv E Hipps, PA-C, 17 g at 04/01/25 0845    pravastatin (Pravachol) tablet 40 mg, 40 mg, oral, Nightly, Sanjiv Rodríguezs, PA-C, 40 mg at 03/31/25 2048    sertraline (Zoloft) tablet 50 mg, 50 mg, oral, Daily, Sanjiv E Hipps, PA-C, 50 mg at 04/01/25 0848    sulfur hexafluoride microsphr (Lumason) injection 24.28 mg, 2 mL, intravenous, Once in imaging, Sanjiv E Hipps, PA-C    traZODone (Desyrel) tablet 25 mg, 25 mg, oral, Nightly PRN, Joseph Wadsworth, APRN-CNP   Relevant Results    Results for orders placed or performed during the hospital encounter of 03/25/25 (from the past 96 hours)   POCT GLUCOSE   Result Value Ref Range    POCT Glucose 152 (H) 74 - 99 mg/dL   POCT GLUCOSE   Result Value Ref Range    POCT Glucose 218 (H) 74 - 99 mg/dL   POCT GLUCOSE   Result Value Ref Range    POCT Glucose 179 (H) 74 - 99 mg/dL   POCT GLUCOSE   Result Value Ref Range    POCT Glucose 139 (H) 74 - 99 mg/dL   CBC and Auto Differential   Result Value Ref Range    WBC 14.0 (H) 4.4 - 11.3 x10*3/uL    nRBC 0.0 0.0 - 0.0 /100 WBCs    RBC 4.17 4.00 - 5.20 x10*6/uL    Hemoglobin 9.4 (L) 12.0 - 16.0 g/dL    Hematocrit 33.2 (L) 36.0 - 46.0 %    MCV 80 80 - 100 fL    MCH 22.5 (L) 26.0 - 34.0 pg    MCHC 28.3 (L) 32.0 - 36.0 g/dL    RDW 20.0 (H) 11.5 - 14.5 %    Platelets 176 150 - 450 x10*3/uL    Neutrophils % 89.6 40.0 - 80.0 %    Immature Granulocytes %, Automated 0.4 0.0 - 0.9 %    Lymphocytes % 2.5 13.0 - 44.0 %     Monocytes % 7.3 2.0 - 10.0 %    Eosinophils % 0.1 0.0 - 6.0 %    Basophils % 0.1 0.0 - 2.0 %    Neutrophils Absolute 12.51 (H) 1.60 - 5.50 x10*3/uL    Immature Granulocytes Absolute, Automated 0.05 0.00 - 0.50 x10*3/uL    Lymphocytes Absolute 0.35 (L) 0.80 - 3.00 x10*3/uL    Monocytes Absolute 1.02 (H) 0.05 - 0.80 x10*3/uL    Eosinophils Absolute 0.02 0.00 - 0.40 x10*3/uL    Basophils Absolute 0.01 0.00 - 0.10 x10*3/uL   Renal Function Panel   Result Value Ref Range    Glucose 126 (H) 74 - 99 mg/dL    Sodium 130 (L) 136 - 145 mmol/L    Potassium 4.8 3.5 - 5.3 mmol/L    Chloride 94 (L) 98 - 107 mmol/L    Bicarbonate 26 21 - 32 mmol/L    Anion Gap 15 10 - 20 mmol/L    Urea Nitrogen 64 (H) 6 - 23 mg/dL    Creatinine 4.64 (H) 0.50 - 1.05 mg/dL    eGFR 9 (L) >60 mL/min/1.73m*2    Calcium 7.6 (L) 8.6 - 10.3 mg/dL    Phosphorus 9.7 (H) 2.5 - 4.9 mg/dL    Albumin 3.2 (L) 3.4 - 5.0 g/dL   Magnesium   Result Value Ref Range    Magnesium 2.17 1.60 - 2.40 mg/dL   Creatine Kinase   Result Value Ref Range    Creatine Kinase 134 0 - 215 U/L   POCT GLUCOSE   Result Value Ref Range    POCT Glucose 120 (H) 74 - 99 mg/dL   Blood Gas Arterial Full Panel   Result Value Ref Range    POCT pH, Arterial 7.22 (LL) 7.38 - 7.42 pH    POCT pCO2, Arterial 64 (H) 38 - 42 mm Hg    POCT pO2, Arterial 114 (H) 85 - 95 mm Hg    POCT SO2, Arterial 99 94 - 100 %    POCT Oxy Hemoglobin, Arterial 97.5 94.0 - 98.0 %    POCT Hematocrit Calculated, Arterial 29.0 (L) 36.0 - 46.0 %    POCT Sodium, Arterial 127 (L) 136 - 145 mmol/L    POCT Potassium, Arterial 4.8 3.5 - 5.3 mmol/L    POCT Chloride, Arterial 95 (L) 98 - 107 mmol/L    POCT Ionized Calcium, Arterial 0.99 (L) 1.10 - 1.33 mmol/L    POCT Glucose, Arterial 115 (H) 74 - 99 mg/dL    POCT Lactate, Arterial 0.6 0.4 - 2.0 mmol/L    POCT Base Excess, Arterial -2.2 (L) -2.0 - 3.0 mmol/L    POCT HCO3 Calculated, Arterial 26.2 (H) 22.0 - 26.0 mmol/L    POCT Hemoglobin, Arterial 9.8 (L) 12.0 - 16.0 g/dL     POCT Anion Gap, Arterial 11 10 - 25 mmo/L    Patient Temperature 37.0 degrees Celsius    FiO2 70 %    Frequency (BPM) 14 bpm    Ipap CMH2O 20.0 cm H2O    Epap CMH2O 5.0 cm H2O    Critical Called By HORACIO HAZEL RRT     Critical Called To DR. ASKEW     Critical Call Time 844     Critical Read Back Y     Site of Arterial Puncture Brachial Right     Wes's Test Negative    POCT GLUCOSE   Result Value Ref Range    POCT Glucose 100 (H) 74 - 99 mg/dL   BLOOD GAS ARTERIAL FULL PANEL   Result Value Ref Range    POCT pH, Arterial 7.23 (LL) 7.38 - 7.42 pH    POCT pCO2, Arterial 62 (H) 38 - 42 mm Hg    POCT pO2, Arterial 118 (H) 85 - 95 mm Hg    POCT SO2, Arterial 100 94 - 100 %    POCT Oxy Hemoglobin, Arterial 97.6 94.0 - 98.0 %    POCT Hematocrit Calculated, Arterial 29.0 (L) 36.0 - 46.0 %    POCT Sodium, Arterial 128 (L) 136 - 145 mmol/L    POCT Potassium, Arterial 4.8 3.5 - 5.3 mmol/L    POCT Chloride, Arterial 94 (L) 98 - 107 mmol/L    POCT Ionized Calcium, Arterial 0.98 (L) 1.10 - 1.33 mmol/L    POCT Glucose, Arterial 118 (H) 74 - 99 mg/dL    POCT Lactate, Arterial 0.5 0.4 - 2.0 mmol/L    POCT Base Excess, Arterial -2.1 (L) -2.0 - 3.0 mmol/L    POCT HCO3 Calculated, Arterial 26.0 22.0 - 26.0 mmol/L    POCT Hemoglobin, Arterial 9.8 (L) 12.0 - 16.0 g/dL    POCT Anion Gap, Arterial 13 10 - 25 mmo/L    Patient Temperature 37.0 degrees Celsius    FiO2 70 %    Apparatus      Ipap CMH2O 20.0 cm H2O    Epap CMH2O 5.0 cm H2O    Critical Called By DANIELA RRT-ACCS     Critical Called To      Critical Call Time 1255     Critical Read Back Y     Site of Arterial Puncture Radial Right     Wes's Test Positive    Blood Gas Arterial Full Panel   Result Value Ref Range    POCT pH, Arterial 7.26 (L) 7.38 - 7.42 pH    POCT pCO2, Arterial 55 (H) 38 - 42 mm Hg    POCT pO2, Arterial 69 (L) 85 - 95 mm Hg    POCT SO2, Arterial 97 94 - 100 %    POCT Oxy Hemoglobin, Arterial 94.3 94.0 - 98.0 %    POCT Hematocrit Calculated,  Arterial 30.0 (L) 36.0 - 46.0 %    POCT Sodium, Arterial 128 (L) 136 - 145 mmol/L    POCT Potassium, Arterial 4.9 3.5 - 5.3 mmol/L    POCT Chloride, Arterial 93 (L) 98 - 107 mmol/L    POCT Ionized Calcium, Arterial 0.96 (L) 1.10 - 1.33 mmol/L    POCT Glucose, Arterial 133 (H) 74 - 99 mg/dL    POCT Lactate, Arterial 0.6 0.4 - 2.0 mmol/L    POCT Base Excess, Arterial -2.7 (L) -2.0 - 3.0 mmol/L    POCT HCO3 Calculated, Arterial 24.7 22.0 - 26.0 mmol/L    POCT Hemoglobin, Arterial 10.0 (L) 12.0 - 16.0 g/dL    POCT Anion Gap, Arterial 15 10 - 25 mmo/L    Patient Temperature 37.0 degrees Celsius    FiO2 30 %    Frequency (BPM) 14 bpm    Ipap CMH2O 25.0 cm H2O    Epap CMH2O 5.0 cm H2O    Site of Arterial Puncture Radial Right     Wes's Test Negative    POCT GLUCOSE   Result Value Ref Range    POCT Glucose 121 (H) 74 - 99 mg/dL   POCT GLUCOSE   Result Value Ref Range    POCT Glucose 133 (H) 74 - 99 mg/dL   Blood Gas Arterial Full Panel   Result Value Ref Range    POCT pH, Arterial 7.28 (L) 7.38 - 7.42 pH    POCT pCO2, Arterial 51 (H) 38 - 42 mm Hg    POCT pO2, Arterial 83 (L) 85 - 95 mm Hg    POCT SO2, Arterial 98 94 - 100 %    POCT Oxy Hemoglobin, Arterial 96.1 94.0 - 98.0 %    POCT Hematocrit Calculated, Arterial 29.0 (L) 36.0 - 46.0 %    POCT Sodium, Arterial 127 (L) 136 - 145 mmol/L    POCT Potassium, Arterial 4.7 3.5 - 5.3 mmol/L    POCT Chloride, Arterial 94 (L) 98 - 107 mmol/L    POCT Ionized Calcium, Arterial 0.94 (L) 1.10 - 1.33 mmol/L    POCT Glucose, Arterial 141 (H) 74 - 99 mg/dL    POCT Lactate, Arterial 0.8 0.4 - 2.0 mmol/L    POCT Base Excess, Arterial -2.9 (L) -2.0 - 3.0 mmol/L    POCT HCO3 Calculated, Arterial 24.0 22.0 - 26.0 mmol/L    POCT Hemoglobin, Arterial 9.8 (L) 12.0 - 16.0 g/dL    POCT Anion Gap, Arterial 14 10 - 25 mmo/L    Patient Temperature 37.0 degrees Celsius    FiO2 30 %    Apparatus FACE MASK     Ventilator Mode BiPAP     Ventilator Rate 14 bpm    Ipap CMH2O 20.0 cm H2O    Epap CMH2O 5.0  cm H2O    Site of Arterial Puncture Radial Right     Wes's Test Positive    POCT GLUCOSE   Result Value Ref Range    POCT Glucose 135 (H) 74 - 99 mg/dL   Blood Gas Arterial Full Panel   Result Value Ref Range    POCT pH, Arterial 7.28 (L) 7.38 - 7.42 pH    POCT pCO2, Arterial 52 (H) 38 - 42 mm Hg    POCT pO2, Arterial 77 (L) 85 - 95 mm Hg    POCT SO2, Arterial 96 94 - 100 %    POCT Oxy Hemoglobin, Arterial 93.9 (L) 94.0 - 98.0 %    POCT Hematocrit Calculated, Arterial 29.0 (L) 36.0 - 46.0 %    POCT Sodium, Arterial 128 (L) 136 - 145 mmol/L    POCT Potassium, Arterial 4.7 3.5 - 5.3 mmol/L    POCT Chloride, Arterial 93 (L) 98 - 107 mmol/L    POCT Ionized Calcium, Arterial 0.93 (L) 1.10 - 1.33 mmol/L    POCT Glucose, Arterial 135 (H) 74 - 99 mg/dL    POCT Lactate, Arterial 0.8 0.4 - 2.0 mmol/L    POCT Base Excess, Arterial -2.6 (L) -2.0 - 3.0 mmol/L    POCT HCO3 Calculated, Arterial 24.4 22.0 - 26.0 mmol/L    POCT Hemoglobin, Arterial 9.8 (L) 12.0 - 16.0 g/dL    POCT Anion Gap, Arterial 15 10 - 25 mmo/L    Patient Temperature 37.0 degrees Celsius    FiO2 30 %    Apparatus FACE MASK     Ventilator Mode BiPAP     Ventilator Rate 16 bpm    Ipap CMH2O 20.0 cm H2O    Epap CMH2O 5.0 cm H2O    Site of Arterial Puncture Radial Right     Wes's Test Positive    CBC and Auto Differential   Result Value Ref Range    WBC 7.1 4.4 - 11.3 x10*3/uL    nRBC 0.0 0.0 - 0.0 /100 WBCs    RBC 3.84 (L) 4.00 - 5.20 x10*6/uL    Hemoglobin 8.9 (L) 12.0 - 16.0 g/dL    Hematocrit 29.3 (L) 36.0 - 46.0 %    MCV 76 (L) 80 - 100 fL    MCH 23.2 (L) 26.0 - 34.0 pg    MCHC 30.4 (L) 32.0 - 36.0 g/dL    RDW 19.8 (H) 11.5 - 14.5 %    Platelets 157 150 - 450 x10*3/uL    Neutrophils % 88.3 40.0 - 80.0 %    Immature Granulocytes %, Automated 0.3 0.0 - 0.9 %    Lymphocytes % 6.1 13.0 - 44.0 %    Monocytes % 5.2 2.0 - 10.0 %    Eosinophils % 0.0 0.0 - 6.0 %    Basophils % 0.1 0.0 - 2.0 %    Neutrophils Absolute 6.23 (H) 1.60 - 5.50 x10*3/uL    Immature  Granulocytes Absolute, Automated 0.02 0.00 - 0.50 x10*3/uL    Lymphocytes Absolute 0.43 (L) 0.80 - 3.00 x10*3/uL    Monocytes Absolute 0.37 0.05 - 0.80 x10*3/uL    Eosinophils Absolute 0.00 0.00 - 0.40 x10*3/uL    Basophils Absolute 0.01 0.00 - 0.10 x10*3/uL   Renal Function Panel   Result Value Ref Range    Glucose 104 (H) 74 - 99 mg/dL    Sodium 131 (L) 136 - 145 mmol/L    Potassium 4.4 3.5 - 5.3 mmol/L    Chloride 93 (L) 98 - 107 mmol/L    Bicarbonate 23 21 - 32 mmol/L    Anion Gap 19 10 - 20 mmol/L    Urea Nitrogen 76 (H) 6 - 23 mg/dL    Creatinine 5.07 (H) 0.50 - 1.05 mg/dL    eGFR 8 (L) >60 mL/min/1.73m*2    Calcium 7.0 (L) 8.6 - 10.3 mg/dL    Phosphorus 10.5 (H) 2.5 - 4.9 mg/dL    Albumin 2.9 (L) 3.4 - 5.0 g/dL   Magnesium   Result Value Ref Range    Magnesium 2.20 1.60 - 2.40 mg/dL   Blood Gas Venous Full Panel   Result Value Ref Range    POCT pH, Venous 7.25 (LL) 7.33 - 7.43 pH    POCT pCO2, Venous 56 (H) 41 - 51 mm Hg    POCT pO2, Venous 40 35 - 45 mm Hg    POCT SO2, Venous 60 45 - 75 %    POCT Oxy Hemoglobin, Venous 58.8 45.0 - 75.0 %    POCT Hematocrit Calculated, Venous 27.0 (L) 36.0 - 46.0 %    POCT Sodium, Venous 130 (L) 136 - 145 mmol/L    POCT Potassium, Venous 4.4 3.5 - 5.3 mmol/L    POCT Chloride, Venous 93 (L) 98 - 107 mmol/L    POCT Ionized Calicum, Venous 0.93 (L) 1.10 - 1.33 mmol/L    POCT Glucose, Venous 101 (H) 74 - 99 mg/dL    POCT Lactate, Venous 1.3 0.4 - 2.0 mmol/L    POCT Base Excess, Venous -2.9 (L) -2.0 - 3.0 mmol/L    POCT HCO3 Calculated, Venous 24.6 22.0 - 26.0 mmol/L    POCT Hemoglobin, Venous 9.1 (L) 12.0 - 16.0 g/dL    POCT Anion Gap, Venous 17.0 10.0 - 25.0 mmol/L    Patient Temperature 37.0 degrees Celsius    FiO2 30 %   POCT GLUCOSE   Result Value Ref Range    POCT Glucose 101 (H) 74 - 99 mg/dL   POCT GLUCOSE   Result Value Ref Range    POCT Glucose 90 74 - 99 mg/dL   Blood Gas Venous Full Panel   Result Value Ref Range    POCT pH, Venous 7.27 (L) 7.33 - 7.43 pH    POCT pCO2,  Venous 41 41 - 51 mm Hg    POCT pO2, Venous 55 (H) 35 - 45 mm Hg    POCT SO2, Venous 83 (H) 45 - 75 %    POCT Oxy Hemoglobin, Venous 81.6 (H) 45.0 - 75.0 %    POCT Hematocrit Calculated, Venous 24.0 (L) 36.0 - 46.0 %    POCT Sodium, Venous 131 (L) 136 - 145 mmol/L    POCT Potassium, Venous 3.4 (L) 3.5 - 5.3 mmol/L    POCT Chloride, Venous 102 98 - 107 mmol/L    POCT Ionized Calicum, Venous 1.54 (H) 1.10 - 1.33 mmol/L    POCT Glucose, Venous 71 (L) 74 - 99 mg/dL    POCT Lactate, Venous 0.9 0.4 - 2.0 mmol/L    POCT Base Excess, Venous -7.5 (L) -2.0 - 3.0 mmol/L    POCT HCO3 Calculated, Venous 18.8 (L) 22.0 - 26.0 mmol/L    POCT Hemoglobin, Venous 7.9 (L) 12.0 - 16.0 g/dL    POCT Anion Gap, Venous 14.0 10.0 - 25.0 mmol/L    Patient Temperature 37.0 degrees Celsius    FiO2 30 %   Blood Gas Arterial Full Panel   Result Value Ref Range    POCT pH, Arterial 7.35 (L) 7.38 - 7.42 pH    POCT pCO2, Arterial 44 (H) 38 - 42 mm Hg    POCT pO2, Arterial 97 (H) 85 - 95 mm Hg    POCT SO2, Arterial 98 94 - 100 %    POCT Oxy Hemoglobin, Arterial 95.8 94.0 - 98.0 %    POCT Hematocrit Calculated, Arterial 29.0 (L) 36.0 - 46.0 %    POCT Sodium, Arterial 128 (L) 136 - 145 mmol/L    POCT Potassium, Arterial 4.5 3.5 - 5.3 mmol/L    POCT Chloride, Arterial 94 (L) 98 - 107 mmol/L    POCT Ionized Calcium, Arterial 0.99 (L) 1.10 - 1.33 mmol/L    POCT Glucose, Arterial 91 74 - 99 mg/dL    POCT Lactate, Arterial 0.9 0.4 - 2.0 mmol/L    POCT Base Excess, Arterial -1.4 -2.0 - 3.0 mmol/L    POCT HCO3 Calculated, Arterial 24.3 22.0 - 26.0 mmol/L    POCT Hemoglobin, Arterial 9.5 (L) 12.0 - 16.0 g/dL    POCT Anion Gap, Arterial 14 10 - 25 mmo/L    Patient Temperature 37.0 degrees Celsius    FiO2 30 %    Ipap CMH2O 24.0 cm H2O    Epap CMH2O 10.0 cm H2O    Site of Arterial Puncture Brachial Right     Wes's Test Negative    POCT GLUCOSE   Result Value Ref Range    POCT Glucose 106 (H) 74 - 99 mg/dL   Renal function panel   Result Value Ref Range     Glucose 209 (H) 74 - 99 mg/dL    Sodium 130 (L) 136 - 145 mmol/L    Potassium 4.6 3.5 - 5.3 mmol/L    Chloride 93 (L) 98 - 107 mmol/L    Bicarbonate 22 21 - 32 mmol/L    Anion Gap 20 10 - 20 mmol/L    Urea Nitrogen 85 (H) 6 - 23 mg/dL    Creatinine 5.25 (H) 0.50 - 1.05 mg/dL    eGFR 8 (L) >60 mL/min/1.73m*2    Calcium 7.3 (L) 8.6 - 10.3 mg/dL    Phosphorus 10.0 (H) 2.5 - 4.9 mg/dL    Albumin 2.9 (L) 3.4 - 5.0 g/dL   POCT GLUCOSE   Result Value Ref Range    POCT Glucose 206 (H) 74 - 99 mg/dL   POCT GLUCOSE   Result Value Ref Range    POCT Glucose 207 (H) 74 - 99 mg/dL   POCT GLUCOSE   Result Value Ref Range    POCT Glucose 164 (H) 74 - 99 mg/dL   POCT GLUCOSE   Result Value Ref Range    POCT Glucose 142 (H) 74 - 99 mg/dL   CBC and Auto Differential   Result Value Ref Range    WBC 7.0 4.4 - 11.3 x10*3/uL    nRBC 0.0 0.0 - 0.0 /100 WBCs    RBC 4.19 4.00 - 5.20 x10*6/uL    Hemoglobin 9.6 (L) 12.0 - 16.0 g/dL    Hematocrit 32.0 (L) 36.0 - 46.0 %    MCV 76 (L) 80 - 100 fL    MCH 22.9 (L) 26.0 - 34.0 pg    MCHC 30.0 (L) 32.0 - 36.0 g/dL    RDW 19.5 (H) 11.5 - 14.5 %    Platelets 186 150 - 450 x10*3/uL    Neutrophils % 87.8 40.0 - 80.0 %    Immature Granulocytes %, Automated 0.4 0.0 - 0.9 %    Lymphocytes % 6.3 13.0 - 44.0 %    Monocytes % 5.5 2.0 - 10.0 %    Eosinophils % 0.0 0.0 - 6.0 %    Basophils % 0.0 0.0 - 2.0 %    Neutrophils Absolute 6.11 (H) 1.60 - 5.50 x10*3/uL    Immature Granulocytes Absolute, Automated 0.03 0.00 - 0.50 x10*3/uL    Lymphocytes Absolute 0.44 (L) 0.80 - 3.00 x10*3/uL    Monocytes Absolute 0.38 0.05 - 0.80 x10*3/uL    Eosinophils Absolute 0.00 0.00 - 0.40 x10*3/uL    Basophils Absolute 0.00 0.00 - 0.10 x10*3/uL   Renal Function Panel   Result Value Ref Range    Glucose 134 (H) 74 - 99 mg/dL    Sodium 130 (L) 136 - 145 mmol/L    Potassium 4.7 3.5 - 5.3 mmol/L    Chloride 93 (L) 98 - 107 mmol/L    Bicarbonate 23 21 - 32 mmol/L    Anion Gap 19 10 - 20 mmol/L    Urea Nitrogen 88 (H) 6 - 23 mg/dL     Creatinine 5.14 (H) 0.50 - 1.05 mg/dL    eGFR 8 (L) >60 mL/min/1.73m*2    Calcium 7.3 (L) 8.6 - 10.3 mg/dL    Phosphorus 9.9 (H) 2.5 - 4.9 mg/dL    Albumin 3.1 (L) 3.4 - 5.0 g/dL   Magnesium   Result Value Ref Range    Magnesium 2.37 1.60 - 2.40 mg/dL   Blood Gas Venous Full Panel   Result Value Ref Range    POCT pH, Venous 7.24 (LL) 7.33 - 7.43 pH    POCT pCO2, Venous 61 (H) 41 - 51 mm Hg    POCT pO2, Venous 35 35 - 45 mm Hg    POCT SO2, Venous 51 45 - 75 %    POCT Oxy Hemoglobin, Venous 50.1 45.0 - 75.0 %    POCT Hematocrit Calculated, Venous 29.0 (L) 36.0 - 46.0 %    POCT Sodium, Venous 128 (L) 136 - 145 mmol/L    POCT Potassium, Venous 4.7 3.5 - 5.3 mmol/L    POCT Chloride, Venous 93 (L) 98 - 107 mmol/L    POCT Ionized Calicum, Venous 0.99 (L) 1.10 - 1.33 mmol/L    POCT Glucose, Venous 132 (H) 74 - 99 mg/dL    POCT Lactate, Venous 1.2 0.4 - 2.0 mmol/L    POCT Base Excess, Venous -1.8 -2.0 - 3.0 mmol/L    POCT HCO3 Calculated, Venous 26.1 (H) 22.0 - 26.0 mmol/L    POCT Hemoglobin, Venous 9.6 (L) 12.0 - 16.0 g/dL    POCT Anion Gap, Venous 14.0 10.0 - 25.0 mmol/L    Patient Temperature 37.0 degrees Celsius    FiO2 30 %   POCT GLUCOSE   Result Value Ref Range    POCT Glucose 130 (H) 74 - 99 mg/dL   POCT GLUCOSE   Result Value Ref Range    POCT Glucose 180 (H) 74 - 99 mg/dL   POCT GLUCOSE   Result Value Ref Range    POCT Glucose 246 (H) 74 - 99 mg/dL   POCT GLUCOSE   Result Value Ref Range    POCT Glucose 177 (H) 74 - 99 mg/dL   POCT GLUCOSE   Result Value Ref Range    POCT Glucose 139 (H) 74 - 99 mg/dL   POCT GLUCOSE   Result Value Ref Range    POCT Glucose 135 (H) 74 - 99 mg/dL   Renal Function Panel   Result Value Ref Range    Glucose 151 (H) 74 - 99 mg/dL    Sodium 127 (L) 136 - 145 mmol/L    Potassium 5.1 3.5 - 5.3 mmol/L    Chloride 93 (L) 98 - 107 mmol/L    Bicarbonate 22 21 - 32 mmol/L    Anion Gap 17 10 - 20 mmol/L    Urea Nitrogen 103 (HH) 6 - 23 mg/dL    Creatinine 4.68 (H) 0.50 - 1.05 mg/dL    eGFR 9 (L)  >60 mL/min/1.73m*2    Calcium 7.5 (L) 8.6 - 10.3 mg/dL    Phosphorus 8.3 (H) 2.5 - 4.9 mg/dL    Albumin 3.0 (L) 3.4 - 5.0 g/dL   Magnesium   Result Value Ref Range    Magnesium 2.39 1.60 - 2.40 mg/dL   Blood Gas Venous Full Panel   Result Value Ref Range    POCT pH, Venous 7.29 (L) 7.33 - 7.43 pH    POCT pCO2, Venous 50 41 - 51 mm Hg    POCT pO2, Venous 89 (H) 35 - 45 mm Hg    POCT SO2, Venous 98 (H) 45 - 75 %    POCT Oxy Hemoglobin, Venous 96.1 (H) 45.0 - 75.0 %    POCT Hematocrit Calculated, Venous 29.0 (L) 36.0 - 46.0 %    POCT Sodium, Venous 128 (L) 136 - 145 mmol/L    POCT Potassium, Venous 4.8 3.5 - 5.3 mmol/L    POCT Chloride, Venous 94 (L) 98 - 107 mmol/L    POCT Ionized Calicum, Venous 1.03 (L) 1.10 - 1.33 mmol/L    POCT Glucose, Venous 151 (H) 74 - 99 mg/dL    POCT Lactate, Venous 0.6 0.4 - 2.0 mmol/L    POCT Base Excess, Venous -2.7 (L) -2.0 - 3.0 mmol/L    POCT HCO3 Calculated, Venous 24.0 22.0 - 26.0 mmol/L    POCT Hemoglobin, Venous 9.5 (L) 12.0 - 16.0 g/dL    POCT Anion Gap, Venous 15.0 10.0 - 25.0 mmol/L    Patient Temperature 37.0 degrees Celsius    FiO2 28 %   POCT GLUCOSE   Result Value Ref Range    POCT Glucose 137 (H) 74 - 99 mg/dL   CBC and Auto Differential   Result Value Ref Range    WBC 6.8 4.4 - 11.3 x10*3/uL    nRBC 0.0 0.0 - 0.0 /100 WBCs    RBC 3.82 (L) 4.00 - 5.20 x10*6/uL    Hemoglobin 9.2 (L) 12.0 - 16.0 g/dL    Hematocrit 28.7 (L) 36.0 - 46.0 %    MCV 75 (L) 80 - 100 fL    MCH 24.1 (L) 26.0 - 34.0 pg    MCHC 32.1 32.0 - 36.0 g/dL    RDW 19.6 (H) 11.5 - 14.5 %    Platelets 190 150 - 450 x10*3/uL    Neutrophils % 86.4 40.0 - 80.0 %    Immature Granulocytes %, Automated 0.4 0.0 - 0.9 %    Lymphocytes % 6.3 13.0 - 44.0 %    Monocytes % 6.9 2.0 - 10.0 %    Eosinophils % 0.0 0.0 - 6.0 %    Basophils % 0.0 0.0 - 2.0 %    Neutrophils Absolute 5.91 (H) 1.60 - 5.50 x10*3/uL    Immature Granulocytes Absolute, Automated 0.03 0.00 - 0.50 x10*3/uL    Lymphocytes Absolute 0.43 (L) 0.80 - 3.00  x10*3/uL    Monocytes Absolute 0.47 0.05 - 0.80 x10*3/uL    Eosinophils Absolute 0.00 0.00 - 0.40 x10*3/uL    Basophils Absolute 0.00 0.00 - 0.10 x10*3/uL   POCT GLUCOSE   Result Value Ref Range    POCT Glucose 194 (H) 74 - 99 mg/dL     *Note: Due to a large number of results and/or encounters for the requested time period, some results have not been displayed. A complete set of results can be found in Results Review.       Assessment/Plan   Acute kidney injury superimposed on chronic kidney disease stage 3 her renal function started to improve creatinine is trending down from 5.14-4.68 today I do not see any indication for renal replacement therapy continue to monitor renal function very closely okay to transfer patient to stepdown if bed needed in the ICU  Chronic kidney disease stage IIIb  Acute respiratory failure continue with oxygen by nasal cannula  Bilateral pneumonia with IV antibiotics  Diabetes mellitus type 2  Anemia of chronic kidney disease  Hypertension  Hyperlipidemia  Obesity      Sixto Slater MD

## 2025-04-01 NOTE — PROGRESS NOTES
Patient not medically clear. Patient to transfer out of the ICU. Patient's family has not responded to calls regarding placement. [facility] left a message for daughter, Tiffanie. [facility] then called patient's son, To, and spoke to him. To will call back in the morning with facility choices for rehab. Will follow.      04/01/25 8804   Discharge Planning   Home or Post Acute Services Post acute facilities (Rehab/SNF/etc)   Type of Post Acute Facility Services Rehab   Expected Discharge Disposition SNF  (TBD)   Does the patient need discharge transport arranged? Yes   RoundTrip coordination needed? Yes

## 2025-04-01 NOTE — NURSING NOTE
Assumed care of pt. Pt resting in bed, tired but responds to voice, no needs at this time. Bed alarm on and call light in reach.

## 2025-04-01 NOTE — CARE PLAN
Problem: Skin  Goal: Decreased wound size/increased tissue granulation at next dressing change  Outcome: Progressing  Flowsheets (Taken 4/1/2025 0754)  Decreased wound size/increased tissue granulation at next dressing change:   Promote sleep for wound healing   Protective dressings over bony prominences   Utilize specialty bed per algorithm  Goal: Participates in plan/prevention/treatment measures  Outcome: Progressing  Flowsheets (Taken 4/1/2025 0754)  Participates in plan/prevention/treatment measures:   Discuss with provider PT/OT consult   Elevate heels   Increase activity/out of bed for meals  Goal: Prevent/manage excess moisture  Outcome: Progressing  Flowsheets (Taken 4/1/2025 0754)  Prevent/manage excess moisture:   Cleanse incontinence/protect with barrier cream   Monitor for/manage infection if present   Follow provider orders for dressing changes   Moisturize dry skin  Goal: Prevent/minimize sheer/friction injuries  Outcome: Progressing  Flowsheets (Taken 4/1/2025 0754)  Prevent/minimize sheer/friction injuries:   Complete micro-shifts as needed if patient unable. Adjust patient position to relieve pressure points, not a full turn   Increase activity/out of bed for meals   Use pull sheet   HOB 30 degrees or less   Turn/reposition every 2 hours/use positioning/transfer devices   Utilize specialty bed per algorithm  Goal: Promote/optimize nutrition  Outcome: Progressing  Flowsheets (Taken 4/1/2025 0754)  Promote/optimize nutrition:   Offer water/supplements/favorite foods   Consume > 50% meals/supplements   Monitor/record intake including meals  Goal: Promote skin healing  Outcome: Progressing  Flowsheets (Taken 4/1/2025 0754)  Promote skin healing:   Assess skin/pad under line(s)/device(s)   Protective dressings over bony prominences   Turn/reposition every 2 hours/use positioning/transfer devices   Ensure correct size (line/device) and apply per  instructions   Rotate device position/do not  position patient on device     Problem: Fall/Injury  Goal: Not fall by end of shift  Outcome: Progressing  Goal: Be free from injury by end of the shift  Outcome: Progressing  Goal: Verbalize understanding of personal risk factors for fall in the hospital  Outcome: Progressing  Goal: Verbalize understanding of risk factor reduction measures to prevent injury from fall in the home  Outcome: Progressing  Goal: Use assistive devices by end of the shift  Outcome: Progressing  Goal: Pace activities to prevent fatigue by end of the shift  Outcome: Progressing     Problem: Respiratory  Goal: Tolerate mechanical ventilation evidenced by VS/agitation level this shift  Outcome: Progressing  Goal: Wean oxygen to maintain O2 saturation per order/standard this shift  Outcome: Progressing  Goal: Clear secretions with interventions this shift  Outcome: Progressing  Goal: Minimize anxiety/maximize coping throughout shift  Outcome: Progressing  Goal: Minimal/no exertional discomfort or dyspnea this shift  Outcome: Progressing  Goal: Verbalize decreased shortness of breath this shift  Outcome: Progressing     Problem: Safety - Adult  Goal: Free from fall injury  Outcome: Progressing     Problem: Discharge Planning  Goal: Discharge to home or other facility with appropriate resources  Outcome: Progressing     Problem: Chronic Conditions and Co-morbidities  Goal: Patient's chronic conditions and co-morbidity symptoms are monitored and maintained or improved  Outcome: Progressing     Problem: Nutrition  Goal: Nutrient intake appropriate for maintaining nutritional needs  Outcome: Progressing     Problem: Diabetes  Goal: Achieve decreasing blood glucose levels by end of shift  Outcome: Progressing  Goal: Increase stability of blood glucose readings by end of shift  Outcome: Progressing  Goal: Decrease in ketones present in urine by end of shift  Outcome: Progressing  Goal: Maintain electrolyte levels within acceptable range throughout  shift  Outcome: Progressing  Goal: Maintain glucose levels >70mg/dl to <250mg/dl throughout shift  Outcome: Progressing  Goal: No changes in neurological exam by end of shift  Outcome: Progressing  Goal: Learn about and adhere to nutrition recommendations by end of shift  Outcome: Progressing  Goal: Vital signs within normal range for age by end of shift  Outcome: Progressing  Goal: Increase self care and/or family involovement by end of shift  Outcome: Progressing  Goal: Receive DSME education by end of shift  Outcome: Progressing   The patient's goals for the shift include      The clinical goals for the shift include Patient will have decreased oxygen needs

## 2025-04-01 NOTE — NURSING NOTE
Pt now in room 462. Alert & oriented. Family at bedside. Panchal in place. Nc in place 2l. Respirations even and unlabored. Denies sob or pain. Call light within reach.

## 2025-04-01 NOTE — CARE PLAN
Problem: Respiratory  Goal: Wean oxygen to maintain O2 saturation per order/standard this shift  Outcome: Progressing  Goal: Minimize anxiety/maximize coping throughout shift  Outcome: Progressing  Goal: Minimal/no exertional discomfort or dyspnea this shift  Outcome: Progressing  Goal: Verbalize decreased shortness of breath this shift  Outcome: Progressing

## 2025-04-02 ENCOUNTER — APPOINTMENT (OUTPATIENT)
Dept: RADIOLOGY | Facility: HOSPITAL | Age: 75
DRG: 208 | End: 2025-04-02
Payer: MEDICARE

## 2025-04-02 LAB
ALBUMIN SERPL BCP-MCNC: 2.7 G/DL (ref 3.4–5)
ANION GAP SERPL CALCULATED.3IONS-SCNC: 15 MMOL/L (ref 10–20)
BASOPHILS # BLD AUTO: 0.01 X10*3/UL (ref 0–0.1)
BASOPHILS NFR BLD AUTO: 0.1 %
BUN SERPL-MCNC: 110 MG/DL (ref 6–23)
CALCIUM SERPL-MCNC: 7.3 MG/DL (ref 8.6–10.3)
CHLORIDE SERPL-SCNC: 97 MMOL/L (ref 98–107)
CO2 SERPL-SCNC: 24 MMOL/L (ref 21–32)
CREAT SERPL-MCNC: 4.28 MG/DL (ref 0.5–1.05)
EGFRCR SERPLBLD CKD-EPI 2021: 10 ML/MIN/1.73M*2
EOSINOPHIL # BLD AUTO: 0.03 X10*3/UL (ref 0–0.4)
EOSINOPHIL NFR BLD AUTO: 0.4 %
ERYTHROCYTE [DISTWIDTH] IN BLOOD BY AUTOMATED COUNT: 19.2 % (ref 11.5–14.5)
GLUCOSE BLD MANUAL STRIP-MCNC: 138 MG/DL (ref 74–99)
GLUCOSE BLD MANUAL STRIP-MCNC: 183 MG/DL (ref 74–99)
GLUCOSE BLD MANUAL STRIP-MCNC: 219 MG/DL (ref 74–99)
GLUCOSE BLD MANUAL STRIP-MCNC: 219 MG/DL (ref 74–99)
GLUCOSE BLD MANUAL STRIP-MCNC: 221 MG/DL (ref 74–99)
GLUCOSE BLD MANUAL STRIP-MCNC: 91 MG/DL (ref 74–99)
GLUCOSE BLD MANUAL STRIP-MCNC: 99 MG/DL (ref 74–99)
GLUCOSE SERPL-MCNC: 105 MG/DL (ref 74–99)
HCT VFR BLD AUTO: 28.6 % (ref 36–46)
HGB BLD-MCNC: 8.8 G/DL (ref 12–16)
HOLD SPECIMEN: NORMAL
IMM GRANULOCYTES # BLD AUTO: 0.03 X10*3/UL (ref 0–0.5)
IMM GRANULOCYTES NFR BLD AUTO: 0.4 % (ref 0–0.9)
LYMPHOCYTES # BLD AUTO: 0.87 X10*3/UL (ref 0.8–3)
LYMPHOCYTES NFR BLD AUTO: 11.3 %
MAGNESIUM SERPL-MCNC: 2.27 MG/DL (ref 1.6–2.4)
MCH RBC QN AUTO: 23.2 PG (ref 26–34)
MCHC RBC AUTO-ENTMCNC: 30.8 G/DL (ref 32–36)
MCV RBC AUTO: 75 FL (ref 80–100)
MONOCYTES # BLD AUTO: 0.95 X10*3/UL (ref 0.05–0.8)
MONOCYTES NFR BLD AUTO: 12.3 %
NEUTROPHILS # BLD AUTO: 5.81 X10*3/UL (ref 1.6–5.5)
NEUTROPHILS NFR BLD AUTO: 75.5 %
NRBC BLD-RTO: 0 /100 WBCS (ref 0–0)
PHOSPHATE SERPL-MCNC: 6.7 MG/DL (ref 2.5–4.9)
PLATELET # BLD AUTO: 182 X10*3/UL (ref 150–450)
POTASSIUM SERPL-SCNC: 4.2 MMOL/L (ref 3.5–5.3)
RBC # BLD AUTO: 3.8 X10*6/UL (ref 4–5.2)
SODIUM SERPL-SCNC: 132 MMOL/L (ref 136–145)
WBC # BLD AUTO: 7.7 X10*3/UL (ref 4.4–11.3)

## 2025-04-02 PROCEDURE — 2060000001 HC INTERMEDIATE ICU ROOM DAILY

## 2025-04-02 PROCEDURE — 97110 THERAPEUTIC EXERCISES: CPT | Mod: GO,CO

## 2025-04-02 PROCEDURE — 2500000001 HC RX 250 WO HCPCS SELF ADMINISTERED DRUGS (ALT 637 FOR MEDICARE OP): Performed by: INTERNAL MEDICINE

## 2025-04-02 PROCEDURE — 99233 SBSQ HOSP IP/OBS HIGH 50: CPT | Performed by: INTERNAL MEDICINE

## 2025-04-02 PROCEDURE — 83036 HEMOGLOBIN GLYCOSYLATED A1C: CPT | Mod: WESLAB | Performed by: INTERNAL MEDICINE

## 2025-04-02 PROCEDURE — 99223 1ST HOSP IP/OBS HIGH 75: CPT | Performed by: INTERNAL MEDICINE

## 2025-04-02 PROCEDURE — 71045 X-RAY EXAM CHEST 1 VIEW: CPT

## 2025-04-02 PROCEDURE — 2500000002 HC RX 250 W HCPCS SELF ADMINISTERED DRUGS (ALT 637 FOR MEDICARE OP, ALT 636 FOR OP/ED): Performed by: INTERNAL MEDICINE

## 2025-04-02 PROCEDURE — 9420000001 HC RT PATIENT EDUCATION 5 MIN

## 2025-04-02 PROCEDURE — 97530 THERAPEUTIC ACTIVITIES: CPT | Mod: GO,CO

## 2025-04-02 PROCEDURE — 2500000004 HC RX 250 GENERAL PHARMACY W/ HCPCS (ALT 636 FOR OP/ED): Mod: JZ | Performed by: INTERNAL MEDICINE

## 2025-04-02 PROCEDURE — 80069 RENAL FUNCTION PANEL: CPT | Performed by: INTERNAL MEDICINE

## 2025-04-02 PROCEDURE — 97110 THERAPEUTIC EXERCISES: CPT | Mod: GP,CQ

## 2025-04-02 PROCEDURE — 82947 ASSAY GLUCOSE BLOOD QUANT: CPT

## 2025-04-02 PROCEDURE — 36415 COLL VENOUS BLD VENIPUNCTURE: CPT | Performed by: INTERNAL MEDICINE

## 2025-04-02 PROCEDURE — 83735 ASSAY OF MAGNESIUM: CPT | Performed by: INTERNAL MEDICINE

## 2025-04-02 PROCEDURE — 92526 ORAL FUNCTION THERAPY: CPT | Mod: GN | Performed by: SPEECH-LANGUAGE PATHOLOGIST

## 2025-04-02 PROCEDURE — 94640 AIRWAY INHALATION TREATMENT: CPT

## 2025-04-02 PROCEDURE — 2500000004 HC RX 250 GENERAL PHARMACY W/ HCPCS (ALT 636 FOR OP/ED): Performed by: INTERNAL MEDICINE

## 2025-04-02 PROCEDURE — 85025 COMPLETE CBC W/AUTO DIFF WBC: CPT | Performed by: INTERNAL MEDICINE

## 2025-04-02 PROCEDURE — 2500000005 HC RX 250 GENERAL PHARMACY W/O HCPCS: Performed by: INTERNAL MEDICINE

## 2025-04-02 PROCEDURE — 97530 THERAPEUTIC ACTIVITIES: CPT | Mod: GP,CQ

## 2025-04-02 PROCEDURE — 71045 X-RAY EXAM CHEST 1 VIEW: CPT | Performed by: RADIOLOGY

## 2025-04-02 RX ORDER — DEXTROSE MONOHYDRATE AND SODIUM CHLORIDE 5; .9 G/100ML; G/100ML
75 INJECTION, SOLUTION INTRAVENOUS CONTINUOUS
Status: DISPENSED | OUTPATIENT
Start: 2025-04-02 | End: 2025-04-03

## 2025-04-02 RX ORDER — INSULIN LISPRO 100 [IU]/ML
0-10 INJECTION, SOLUTION INTRAVENOUS; SUBCUTANEOUS
Status: DISCONTINUED | OUTPATIENT
Start: 2025-04-02 | End: 2025-04-17 | Stop reason: HOSPADM

## 2025-04-02 RX ADMIN — HYDRALAZINE HYDROCHLORIDE 50 MG: 50 TABLET ORAL at 09:22

## 2025-04-02 RX ADMIN — NYSTATIN 1 APPLICATION: 100000 POWDER TOPICAL at 22:06

## 2025-04-02 RX ADMIN — IPRATROPIUM BROMIDE AND ALBUTEROL SULFATE 3 ML: 2.5; .5 SOLUTION RESPIRATORY (INHALATION) at 03:09

## 2025-04-02 RX ADMIN — POLYETHYLENE GLYCOL 3350 17 G: 17 POWDER, FOR SOLUTION ORAL at 09:23

## 2025-04-02 RX ADMIN — METHYLPREDNISOLONE SODIUM SUCCINATE 40 MG: 40 INJECTION, POWDER, FOR SOLUTION INTRAMUSCULAR; INTRAVENOUS at 09:23

## 2025-04-02 RX ADMIN — HEPARIN SODIUM 7500 UNITS: 5000 INJECTION INTRAVENOUS; SUBCUTANEOUS at 05:48

## 2025-04-02 RX ADMIN — ASPIRIN 81 MG: 81 TABLET, CHEWABLE ORAL at 09:23

## 2025-04-02 RX ADMIN — CALCIUM ACETATE 667 MG: 667 CAPSULE ORAL at 13:01

## 2025-04-02 RX ADMIN — PRAVASTATIN SODIUM 40 MG: 20 TABLET ORAL at 21:56

## 2025-04-02 RX ADMIN — Medication 2 L/MIN: at 20:10

## 2025-04-02 RX ADMIN — DEXTROSE MONOHYDRATE AND SODIUM CHLORIDE 75 ML/HR: 5; .9 INJECTION, SOLUTION INTRAVENOUS at 14:40

## 2025-04-02 RX ADMIN — SERTRALINE HYDROCHLORIDE 50 MG: 50 TABLET ORAL at 09:22

## 2025-04-02 RX ADMIN — IPRATROPIUM BROMIDE AND ALBUTEROL SULFATE 3 ML: 2.5; .5 SOLUTION RESPIRATORY (INHALATION) at 06:11

## 2025-04-02 RX ADMIN — INSULIN LISPRO 4 UNITS: 100 INJECTION, SOLUTION INTRAVENOUS; SUBCUTANEOUS at 16:45

## 2025-04-02 RX ADMIN — Medication 2 L/MIN: at 06:35

## 2025-04-02 RX ADMIN — HEPARIN SODIUM 7500 UNITS: 5000 INJECTION INTRAVENOUS; SUBCUTANEOUS at 21:56

## 2025-04-02 RX ADMIN — HYDRALAZINE HYDROCHLORIDE 50 MG: 50 TABLET ORAL at 21:56

## 2025-04-02 RX ADMIN — EZETIMIBE 10 MG: 10 TABLET ORAL at 09:22

## 2025-04-02 RX ADMIN — METOPROLOL TARTRATE 50 MG: 50 TABLET, FILM COATED ORAL at 21:56

## 2025-04-02 RX ADMIN — LEVOTHYROXINE SODIUM 200 MCG: 0.1 TABLET ORAL at 05:48

## 2025-04-02 RX ADMIN — INSULIN LISPRO 2 UNITS: 100 INJECTION, SOLUTION INTRAVENOUS; SUBCUTANEOUS at 00:48

## 2025-04-02 RX ADMIN — IPRATROPIUM BROMIDE AND ALBUTEROL SULFATE 3 ML: 2.5; .5 SOLUTION RESPIRATORY (INHALATION) at 20:08

## 2025-04-02 RX ADMIN — MONTELUKAST 10 MG: 10 TABLET, FILM COATED ORAL at 21:56

## 2025-04-02 RX ADMIN — ALLOPURINOL 50 MG: 100 TABLET ORAL at 09:22

## 2025-04-02 RX ADMIN — HEPARIN SODIUM 7500 UNITS: 5000 INJECTION INTRAVENOUS; SUBCUTANEOUS at 14:40

## 2025-04-02 RX ADMIN — CALCIUM ACETATE 667 MG: 667 CAPSULE ORAL at 09:22

## 2025-04-02 RX ADMIN — IPRATROPIUM BROMIDE AND ALBUTEROL SULFATE 3 ML: 2.5; .5 SOLUTION RESPIRATORY (INHALATION) at 12:09

## 2025-04-02 RX ADMIN — METOPROLOL TARTRATE 50 MG: 50 TABLET, FILM COATED ORAL at 09:22

## 2025-04-02 ASSESSMENT — COGNITIVE AND FUNCTIONAL STATUS - GENERAL
TOILETING: TOTAL
MOVING FROM LYING ON BACK TO SITTING ON SIDE OF FLAT BED WITH BEDRAILS: A LOT
PERSONAL GROOMING: A LITTLE
DRESSING REGULAR LOWER BODY CLOTHING: TOTAL
DRESSING REGULAR UPPER BODY CLOTHING: A LOT
STANDING UP FROM CHAIR USING ARMS: A LOT
HELP NEEDED FOR BATHING: A LOT
PERSONAL GROOMING: A LITTLE
DRESSING REGULAR UPPER BODY CLOTHING: A LOT
DAILY ACTIVITIY SCORE: 13
EATING MEALS: A LITTLE
EATING MEALS: A LITTLE
MOVING TO AND FROM BED TO CHAIR: A LOT
MOBILITY SCORE: 13
STANDING UP FROM CHAIR USING ARMS: A LOT
HELP NEEDED FOR BATHING: A LOT
MOVING FROM LYING ON BACK TO SITTING ON SIDE OF FLAT BED WITH BEDRAILS: A LITTLE
DRESSING REGULAR LOWER BODY CLOTHING: TOTAL
HELP NEEDED FOR BATHING: A LOT
STANDING UP FROM CHAIR USING ARMS: A LOT
DAILY ACTIVITIY SCORE: 13
EATING MEALS: A LITTLE
CLIMB 3 TO 5 STEPS WITH RAILING: TOTAL
TURNING FROM BACK TO SIDE WHILE IN FLAT BAD: A LOT
MOBILITY SCORE: 11
MOBILITY SCORE: 13
WALKING IN HOSPITAL ROOM: A LOT
WALKING IN HOSPITAL ROOM: A LOT
TOILETING: A LOT
MOVING FROM LYING ON BACK TO SITTING ON SIDE OF FLAT BED WITH BEDRAILS: A LITTLE
TOILETING: A LOT
CLIMB 3 TO 5 STEPS WITH RAILING: TOTAL
PERSONAL GROOMING: A LITTLE
CLIMB 3 TO 5 STEPS WITH RAILING: TOTAL
MOVING TO AND FROM BED TO CHAIR: A LOT
DRESSING REGULAR LOWER BODY CLOTHING: TOTAL
DAILY ACTIVITIY SCORE: 12
TURNING FROM BACK TO SIDE WHILE IN FLAT BAD: A LITTLE
DRESSING REGULAR UPPER BODY CLOTHING: A LOT
MOVING TO AND FROM BED TO CHAIR: A LOT
WALKING IN HOSPITAL ROOM: A LOT
TURNING FROM BACK TO SIDE WHILE IN FLAT BAD: A LITTLE

## 2025-04-02 ASSESSMENT — ENCOUNTER SYMPTOMS
CONFUSION: 0
BACK PAIN: 0
VOMITING: 0
HEADACHES: 0
DIZZINESS: 0
RHINORRHEA: 0
COUGH: 0
NAUSEA: 0
WHEEZING: 0
CHILLS: 0
CONSTIPATION: 0
DIARRHEA: 0
ARTHRALGIAS: 0
SHORTNESS OF BREATH: 0
EYE PAIN: 0
SORE THROAT: 0
FEVER: 0
SINUS PRESSURE: 0

## 2025-04-02 ASSESSMENT — PAIN - FUNCTIONAL ASSESSMENT
PAIN_FUNCTIONAL_ASSESSMENT: 0-10

## 2025-04-02 ASSESSMENT — PAIN SCALES - GENERAL
PAINLEVEL_OUTOF10: 0 - NO PAIN

## 2025-04-02 NOTE — PROGRESS NOTES
Speech-Language Pathology    Speech-Language Pathology Clinical Swallow Treatment    Patient Name: Analia Murray  MRN: [id number]  : 1950  Today's Date: 25  Start Time: 1330  Stop Time: 1350  Time Calculation (min): 20 min    ASSESSMENT  SLP TX Intervention Outcome: Making Progress Towards Goals  Treatment Tolerance: Patient tolerated treatment well    Impressions: Pt reassessed for diet upgrade,  Functional oral stage and no suspected pharyngeal stage dysphagia . No furhter skilled dysphagia treatemnt recommended.  PLAN  Is MBSS recommended? No; no pharyngeal dysphagia suspected.  Recommended solid consistency: Regular (IDDSI level 7)  Recommended liquid consistency: Thin (IDDSI 0)  Recommended medication administration: Whole in thin liquid  Safe swallow strategies:  - Upright positioning for all PO intake    Discharge recommendation: Home with no further SLP  Inpatient/Swing Bed or Outpatient: Inpatient  SLP TX Plan: Discharge from Speech Therapy  SLP Plan: No skilled SLP        SUBJECTIVE  Prior to Session Communication: Bedside nurse  RN cleared pt to participate in session and reported pt requesting diet be upgraded to regular solids  Respiratory status: Supplemental oxygen via NC  Positioning: Upright in bed  Pt was alert, pleasant, and cooperative for session.    Pain Assessment  Pain Assessment: 0-10  0-10 (Numeric) Pain Score: 0 - No pain    Orientation: Oriented to situation and Ox4  Ability to follow functional commands: WFL    OBJECTIVE  Therapeutic Swallow Intervention : PO Trials, Compensatory Strategies, Caregiver Education    Goals (start date 3/28/2025):    - Pt will consume prescribed diet (current diet is puree, thin liquids) without overt s/sx aspiration/penetration in 95% of observed trials.   GOAL START: 2025    GOAL END:25   Status: Goal met   Progress this date: Pt is tolerating current diet per pt, family and RN report, she tolerated 4 oz pudding and 4 oz thin  liquids, single and consecutive sips without s/s aspiration in 100% of therapeutic trials    - Pt will consume trials of upgraded textures without overt s/sx aspiration in order for consideration of diet texture upgrade.  GOAL START: 4/2/2025    GOAL END:4/23/25   Status: Goal met   Progress this date: Pt was reassessed with 2 brian crackers ,mastication WFL, adequate oral clearance and no s/s aspiration, REC upgrade to regular and cont with thin liqudis  - Pt will demonstrate follow-through of trained compensatory strategies during a meal/snack with 90% acc independently.  GOAL START: 4/2/2025    GOAL END:4/23/25   Status: Goal met   Progress this date: Pt demonstrates independent use of safe swallow strategies, during snack with 90% acc  Treatment/Education:  Results and recommendations were relayed to: Patient, Family, and Bedside nurse  Education provided: Yes   Learner: Patient and Child   Barriers to learning: Acuteness of illness barrier   Method of teaching: Verbal   Topic: role of ST, results of assessment, and recommended safe swallow strategies   Outcome of teaching: Pt/family demonstrated good understanding

## 2025-04-02 NOTE — PROGRESS NOTES
Occupational Therapy    OT Treatment    Patient Name: Analia Murray  MRN: [id number]  Department: [address]  Room: [address] [place] [address]  Today's Date: 4/2/2025  Time Calculation  Start Time: 1048  Stop Time: 1112  Time Calculation (min): 24 min        Assessment:  OT Assessment: Tolerated session well, demonstrating progression towards POC with decreased functional assist required this date. Increased encouragement + education on safety and energy conservation techniques required this date. HARRINGTON having to return to room to assist pt back to bed from 13:03-13:15. Pt would benefit from continued skilled OT services to improve strength, balance, and functional tolerance to increase independence with ADL tasks  Barriers to Discharge Home: Physical needs, Caregiver assistance  End of Session Communication: Bedside nurse  End of Session Patient Position: Up in chair, Alarm on  OT Assessment Results: Decreased ADL status, Decreased upper extremity range of motion, Decreased upper extremity strength, Decreased endurance, Decreased functional mobility, Decreased IADLs  Plan:  Treatment Interventions: ADL retraining, Functional transfer training, UE strengthening/ROM, Endurance training, Patient/family training, Equipment evaluation/education, Compensatory technique education  OT Frequency: 4 times per week  OT Discharge Recommendations: Moderate intensity level of continued care  Equipment Recommended upon Discharge: Wheeled walker  OT Recommended Transfer Status: Maximum assist, Assist of 2  OT - OK to Discharge: Yes  Treatment Interventions: ADL retraining, Functional transfer training, UE strengthening/ROM, Endurance training, Patient/family training, Equipment evaluation/education, Compensatory technique education    Subjective   Previous Visit Info:  OT Last Visit  OT Received On: 04/02/25  General:  General  Reason for Referral: Activities of daily living  Prior to Session Communication: Bedside nurse  Patient Position  Received: Bed, 3 rail up, Alarm on  General Comment: Cleared for therapy per RN. Pt supine in bed upon arrival and agreeable to tx  Precautions:  Hearing/Visual Limitations: reading glasses, hearing WFL  Medical Precautions: Fall precautions, Oxygen therapy device and L/min (2L O2 nc)     Date/Time Vitals Session Patient Position Pulse Resp SpO2 BP MAP (mmHg)    04/02/25 1215 --  --  52  17  98 %  117/74  85                 Pain:  Pain Assessment  Pain Assessment: 0-10  0-10 (Numeric) Pain Score: 0 - No pain    Objective    Cognition:  Cognition  Overall Cognitive Status: Within Functional Limits  Cognition Comments: pt very emotional this date with increased enouragement + motivation required  Insight: Mild  Coordination:  Movements are Fluid and Coordinated: Yes  Upper Body Coordination: pt demonstrates slow and delayed movements  Activities of Daily Living:    Toileting  Toileting Level of Assistance: Dependent  Where Assessed: Other (Comment)  Toileting Comments: demonstrating bowel incontinencex2 upon stands, requiring dependent pericare hygiene    Bed Mobility/Transfers: Bed Mobility  Bed Mobility: Yes  Bed Mobility 1  Bed Mobility 1: Supine to sitting  Level of Assistance 1: Minimum assistance  Bed Mobility Comments 1: assist with BLE advancement off EOB with cues to scoot hips to EOB, requiring min A with use of drawsheet  Bed Mobility 2  Bed Mobility  2: Sitting to supine  Level of Assistance 2: Moderate assistance  Bed Mobility Comments 2: assist to bring BLE on bed and for trunk control, cues to bring self to midline in supine    Transfers  Transfer: Yes  Transfer 1  Technique 1: Sit to stand, Stand to sit  Transfer Device 1: Walker  Transfer Level of Assistance 1: Moderate assistance, +2, Minimal verbal cues  Trials/Comments 1: sit<>stand x2 assist for trunk elevation and forward weightshifting with pt able to pull up from FWW with cues for postural alignment once standing, cues for proper hand placement  in sitting with poor carrying and decreased eccentric lowering to chair    Functional Mobility:  Functional Mobility  Functional Mobility Performed: Yes  Functional Mobility 1  Device 1: Rolling walker  Assistance 1: Moderate assistance (x2)  Comments 1: tolerated taking steps to/from chair at FWW with cues for step/walker sequencing and postural alignment with increased time + effort required, demonstrating fair- balance    Standing Balance:  Dynamic Standing Balance  Dynamic Standing-Level of Assistance: Moderate assistance (x2)  Dynamic Standing-Comments: fair- balance during funcitonal mobility task    Therapy/Activity: Therapeutic Exercise  Therapeutic Exercise Performed: Yes  Therapeutic Exercise Activity 1: participated in AROM BUE exercises 1x10 in all planes to improve strength and functional tolerance to increase independence with transfer tasks with cues provided for proper muscle recruitement. Decreased BUE shoulder ROM with intermittent rest breaks required d/t SOB with cues for use of pursed lip breathing    Therapeutic Activity  Therapeutic Activity Performed: Yes  Therapeutic Activity 1: tolerated sitting EOB ~10 min with cues for use of pursed lip breathing  Other Activity:  Other Activity Performed: Yes  Other Activity 1: HARRINGTON utilizing theraputic use of self throughout treatment as pt was very emotional this date with encouragement provided, with improved overall attitude improving by end of session    Outcome Measures:[facility] Daily Activity  Putting on and taking off regular lower body clothing: Total  Bathing (including washing, rinsing, drying): A lot  Putting on and taking off regular upper body clothing: A lot  Toileting, which includes using toilet, bedpan or urinal: Total  Taking care of personal grooming such as brushing teeth: A little  Eating Meals: A little  Daily Activity - Total Score: 12    Education Documentation  ADL Training, taught by ARLEEN Kim at 4/2/2025 11:55  ROXI  Learner: Patient  Readiness: Acceptance  Method: Explanation  Response: Verbalizes Understanding, Needs Reinforcement  Comment: educated on safety + energy conservation    Education Comments  No comments found.      Problem: OT Goals  Goal: ADLs  Description: Patient will complete ADLs with minimal assistance.   Outcome: Progressing  Goal: Functional Transfer  Description: Patient will complete functional transfer with min assistance utilizing least restrictive device.   Outcome: Progressing  Goal: Therapeutic Exercise   Description: Patient will tolerate ~8-10 minutes of therapeutic exercise to increase aerobic capacity and improve activity tolerance.  Outcome: Progressing

## 2025-04-02 NOTE — CARE PLAN
The patient's goals for the shift include      The clinical goals for the shift include Pt will remain HDS and show no signsof resp distress    Over the shift, the patient did not make progress toward the following goals. Barriers to progression include resp status  stable. Recommendations to address these barriers include resp.

## 2025-04-02 NOTE — PROGRESS NOTES
Analia Murray is a 74 y.o. female on day 8 of admission presenting with Acute respiratory failure with hypoxia.      Subjective   She was transferred from ICU to stepdown unit.  Patient was monitored for in ICU for many days.  She was admitted with acute respiratory failure with hypoxia and hypercapnia.  Initially required high amount of the oxygen required intubation.  Patient improved.  She remained on few liters of oxygen.  Patient had complained of feeling weak.  She has been afebrile during the night.  Patient was on CPAP during the night.       Objective     Last Recorded Vitals  /54 (BP Location: Left arm, Patient Position: Sitting)   Pulse 60   Temp 36.7 °C (98.1 °F) (Oral)   Resp 17   Wt 140 kg (308 lb 11.2 oz)   SpO2 100%   Intake/Output last 3 Shifts:    Intake/Output Summary (Last 24 hours) at 4/2/2025 1043  Last data filed at 4/2/2025 0559  Gross per 24 hour   Intake 540 ml   Output 700 ml   Net -160 ml       Admission Weight  Weight: 132 kg (290 lb) (03/25/25 0041)    Daily Weight  04/02/25 : 140 kg (308 lb 11.2 oz)    Image Results  XR chest 1 view  Narrative: Interpreted By:  Jil Washington,   STUDY:  XR CHEST 1 VIEW 4/2/2025 9:44 am      INDICATION:  Signs/Symptoms:acute respiratory failure      COMPARISON:  04/01/2025      ACCESSION NUMBER(S):  [id number]      ORDERING CLINICIAN:  HEATHER STEPHEN      TECHNIQUE:  Semi-erect view of the chest at bedside      FINDINGS:  There is stable enlargement of the cardiomediastinal silhouette with  cardiomegaly and unfolding of the thoracic aorta. There is also right  hilar prominence noted.      When compared with yesterday's study, there is now platelike  atelectasis at the right lung base with left basilar infiltrate  observed. It would be difficult to exclude the presence of small  bilateral pleural effusions.      Impression: Enlarged cardiomediastinal silhouette with right hilar prominence.      There is right basilar platelike atelectasis  now seen with left  basilar infiltrate appearing unchanged.      Small bilateral pleural effusions are not excluded.      Signed by: Jil Washington 4/2/2025 10:13 AM  Dictation workstation:   [id number]      Physical Exam    General: In moderate distress, cooperating during physical exam, morbidly obese  HEENT: Pupils are equal and reactive to light and commendation , oral mucosa moist, no JVD   Cardiovascular: PMI nondisplaced  Lungs: Diminished breath sound at both lung bases, scattered wheezing   abdomen: No hepatosplenomegaly appreciated, soft , not tender, positive bowel sounds, positive bowel movement.  Neuro: Alert and oriented x2, strength in upper and lower extremities , sensation intact.  Psych: Patient had great insight was going on  Musculoskeletal: No swelling in lower extremities, no limitation in range of motion.  Vascular: Pulses are intact in upper and lower extremities  Skin: No petechiae, ecchymosis or other stigmata for dermatology disease.     Assessment/Plan      Acute respiratory failure with hypoxia and hypercapnia.  Clinically improved.  Patient required intubation on admission.  Continue with oxygen therapy to maintain pulse ox more than 92%.  Consult pulmonary to evaluate her today.    Acute kidney injury  Dr. Slater on the case.  Monitor close  Check RFP in AM.  Lasix on hold.      COPD exacerbation  Patient is on Solu-Medrol  Continue with aerosol treatment  Consult pulmonary    Obstructive sleep apnea  On CPAP.  Patient is on oxygen at home.    Morbid obesity  Hyperlipidemia  Advised again diet  On statin and Zetia    Diabetes mellitus type 2  Cover with insulin sliding scale.    Hypothyroidism  TSH was 11.  Continue with levothyroxine 200 mg daily    Anxiety  Depression  Now continue sertraline    Gout  Patient is on allopurinol.    Neuropathic pain   on gabapentin    Chronic CHF with diastolic dysfunction  Lasix on hold because of acute kidney injury  Monitor close   from last 2D echo  patient had ejection fraction 45- 50%    Check CBC and RFP in AM.  Waiting for pulmonary and     Time spent with the patient 52 minutes.    Krysta Ramirez MD

## 2025-04-02 NOTE — PROGRESS NOTES
"Nutrition Follow up Note    Nutrition Assessment      Transferred from ICU to SDU 4/2. Diet advanced to pureed/renal per SLP recs 3/29. Patient complains of pureed foods. Spoke with RN, SLP consult for updated swallow eval pending. Offered nutritional supplements and patient agrees to Ensure High Protein TID.    Nutrition History:  Energy Intake: Fair 50-75 %     Anthropometrics:  Ht: 162.6 cm (5' 4.02\"), Wt: 140 kg (308 lb 11.2 oz), BMI: 52.96  IBW/kg (Dietitian Calculated): 54.54 kg  Percent of IBW: 246 %  Adjusted Body Weight (kg): 79 kg    Weight Change:  Daily Weight  04/02/25 : 140 kg (308 lb 11.2 oz)  03/16/25 : 132 kg (291 lb)  02/10/25 : 139 kg (305 lb 9.6 oz)  01/30/25 : 134 kg (296 lb)  01/27/25 : 135 kg (297 lb 1.6 oz)  01/15/25 : 132 kg (291 lb)  12/11/24 : 127 kg (281 lb)  11/26/24 : 132 kg (291 lb)  10/23/24 : 132 kg (291 lb)  09/29/24 : 127 kg (281 lb)     Nutrition Focused Physical Exam Findings:      Nutrition Significant Labs:  Lab Results   Component Value Date    WBC 7.7 04/02/2025    HGB 8.8 (L) 04/02/2025    HCT 28.6 (L) 04/02/2025     04/02/2025    CHOL 128 06/21/2023    TRIG 72 06/21/2023    HDL 45.1 06/21/2023    ALT 7 03/25/2025    AST 10 03/25/2025     (L) 04/02/2025    K 4.2 04/02/2025    CL 97 (L) 04/02/2025    CREATININE 4.28 (H) 04/02/2025     (HH) 04/02/2025    CO2 24 04/02/2025    TSH 11.37 (H) 03/25/2025    HGBA1C 5.8 (H) 03/25/2025     Nutrition Specific Medications:  allopurinol, 50 mg, oral, Every other day  [Held by provider] amLODIPine, 10 mg, oral, Daily  aspirin, 81 mg, oral, Daily  calcium acetate, 667 mg, oral, TID after meals  ezetimibe, 10 mg, oral, Daily  fluticasone, 2 spray, Each Nostril, BID  gabapentin, 300 mg, oral, Daily  heparin (porcine), 7,500 Units, subcutaneous, q8h JAYA  hydrALAZINE, 50 mg, oral, BID  insulin lispro, 0-10 Units, subcutaneous, q4h  ipratropium-albuteroL, 3 mL, nebulization, q6h  levothyroxine, 200 mcg, oral, " Daily  methylPREDNISolone sodium succinate (PF), 40 mg, intravenous, q24h  [Held by provider] metoprolol succinate XL, 100 mg, oral, Daily  metoprolol tartrate, 50 mg, oral, BID  montelukast, 10 mg, oral, Nightly  nystatin, 1 Application, Topical, TID  oxygen, , inhalation, Nightly  oxygen, , inhalation, BID  polyethylene glycol, 17 g, oral, Daily  pravastatin, 40 mg, oral, Nightly  sertraline, 50 mg, oral, Daily  sulfur hexafluoride microsphr, 2 mL, intravenous, Once in imaging      D5 % and 0.9 % sodium chloride, 75 mL/hr      Dietary Orders (From admission, onward)       Start     Ordered    04/02/25 1353  Adult diet Regular, Renal; Potassium Restricted 2 gm (50mEq); 2 - 3 grams Sodium; Thin 0; 1:1 Feeding  Diet effective now        Comments: Compensatory Swallowing Strategies:                                                                                                                                                                                                                                                           Upright 90 degrees as possible for all oral intake   Question Answer Comment   Diet type Regular    Diet type Renal    Potassium restriction: Potassium Restricted 2 gm (50mEq)    Sodium restriction: 2 - 3 grams Sodium    Fluid consistency Thin 0    Select tray type: 1:1 Feeding        04/02/25 1353    03/25/25 0812  May Participate in Room Service  ( ROOM SERVICE MAY PARTICIPATE)  Once        Question:  .  Answer:  Yes    03/25/25 0811                   Estimated Needs:   Estimated Energy Needs  Total Energy Estimated Needs in 24 hours (kCal): 1975 kCal  Energy Estimated Needs per kg Body Weight in 24 hours (kCal/kg): 25 kCal/kg  Method for Estimating Needs: Adj Wt    Estimated Protein Needs  Total Protein Estimated Needs in 24 Hours (g): 95 g  Protein Estimated Needs per kg Body Weight in 24 Hours (g/kg): 1.2 g/kg  Method for Estimating 24 Hour Protein Needs: Adj Wt    Estimated Fluid  Needs  Total Fluid Estimated Needs in 24 Hours (mL): 1975 mL  Method for Estimating 24 Hour Fluid Needs: 1 ml/kcal or per MD      Nutrition Diagnosis   Nutrition Diagnosis:  Malnutrition Diagnosis  Patient has Malnutrition Diagnosis: No    Nutrition Diagnosis  Patient has Nutrition Diagnosis: Yes  Diagnosis Status (1): Resolved  Nutrition Diagnosis 1: Inadequate oral intake  Related to (1): decreased ability to consume sufficient energy  As Evidenced by (1): NPO     Nutrition Interventions/Recommendations   Nutrition Interventions and Recommendations:  Nutrition Prescription: Nutrition prescription for oral nutrition    Nutrition Recommendations:  Individualized Nutrition Prescription Provided for : 1975 calories, 95 gm protein per SLP recs    Nutrition Interventions/Goals:   Food and/or Nutrient Delivery Interventions  Interventions: Meals and snacks, Medical food supplement  Meals and Snacks: Mineral-modified diet  Goal: per SLP recs  Medical Food Supplement: Commercial beverage medical food supplement therapy  Goal: ensure high protein TID to provide 160 kcals and 16g protein each    Education Documentation  No documentation found.         Nutrition Monitoring and Evaluation   Monitoring/Evaluation:   Food/Nutrient Related History Monitoring  Monitoring and Evaluation Plan: Estimated Energy Intake  Estimated Energy Intake: Energy intake greater or equal to 75% of estimated energy needs    Goal Status: Some progress toward goal(s)    Follow Up  Time Spent (min): 25 minutes  Last Date of Nutrition Visit: 04/02/25  Nutrition Follow-Up Needed?: 5-7 days  Follow up Comment: 4/8/25

## 2025-04-02 NOTE — PROGRESS NOTES
[facility] met with pt and her daughter at bedside to discuss SNF options, they state facilities other family members had mentioned as possibilities included [facility] and [facility], received a call from pts other daughter Tiffanie who confirmed these choices. [facility] made Tiffanie aware that [facility] had closed, she then asked if  Acute Rehab in [place] would be an option and this worker clarified the pt may not qualify, asked Tiffanie if she thought the pt could participate in 4 hours of therapy per day, she expresses doubt that she can. Tiffanie requested a new list of SNF's to be sent to her, states the initial list sent was acute rehab facilities, new list emailed to Tiffanie at [email or web address]     04/02/25 5329   Discharge Planning   Expected Discharge Disposition SNF

## 2025-04-02 NOTE — CONSULTS
Inpatient consult to Pulmonology  Consult performed by: Yareli Solitario, DILCIA-CNP  Consult ordered by: Krysta Ramirez MD      Reason For Consult  Acute respiratory failure with hypoxia/hypercapnia, COPD, obstructive sleep apnea   History Of Present Illness  Analia Murray is a 74 y.o. female with h/o asthma, HOLDEN, HTN, DLP, CHF, CKD IV, T2DM, hypothyroidism, anemia, MGUS, who p/w SOB x 2 weeks associated with cough productive of yellow sputum and chest tightness.     Prior to admission, patient was experiencing shortness of breath for 2 weeks with associated cough productive for yellow sputum and chest tightness. On arrival to ED, she was tachypnic, hypoxic, hypercapnic, in MARY ANN, BNP elevated in 700s, and mild pulmonary edema on chest imaging. She received IV steroids, Lasix, and was placed on BiPAP. She was admitted to the ICU for acute on chronic hypercarbic respiratory failure. Repeat ABG showed persistent hypercarbia while on BiPAP and she was subsequently intubated. She was extubated to nasal cannula on 3/27/2025. Noted to be drowsy on 3/29 and ABG showed respiratory acidosis and BiPAP was continued. She has been followed by Nephrology for MARY ANN on CKD. She was transferred from ICU to med-surg yesterday 4/1. She continues to receive IV Solu-Medrol 40 mg/day and Duo-Nebs. She is on supplemental oxygen via nasal cannula at 2 L.    She follows with outpatient Pulmonary for her history of asthma, most recently seen by Dr. Gupta on 1/15/2025. Her asthma is managed with Advair and as needed albuterol. She is also taking Singulair and Flonase for allergic rhinitis. Per chart review, patient has not had recent follow-up with Sleep Medicine for management of her HOLDEN on CPAP.    Today, she reports she is feeling good and reports she is almost feeling back to her normal self. She denies any shortness of breath. She denies any coughing or sputum production. She denies wheezing or chest pain.    She reports she has  supplemental oxygen at home that she uses as needed and reports it is set at 10 L. She reports she was prescribed the oxygen about 1 month ago through . She reports that she did receive a new CPAP machine 1 month ago and was using it prior to admission.    She reports having had asthma as a child and also had pneumonia. She has never smoked. She states this is her first hospitalization for asthma-related complications. She reports this was the first time she has needed to be intubated.      Past Medical History  She has a past medical history of CHF (congestive heart failure), Chronic kidney disease, Coronary artery disease, Cough, unspecified (2017), Diverticulitis of large intestine with perforation and abscess without bleeding (10/21/2016), Fistula of intestine (2017), Hyperlipidemia, Hypertension, Morbid (severe) obesity due to excess calories (Multi), Obstructive sleep apnea (adult) (pediatric) (2018), Other conditions influencing health status, Personal history of other diseases of the circulatory system, Personal history of other diseases of the digestive system (2018), Personal history of other diseases of the digestive system (2017), Personal history of other endocrine, nutritional and metabolic disease (2020), Personal history of other endocrine, nutritional and metabolic disease, Personal history of other endocrine, nutritional and metabolic disease, Personal history of other mental and behavioral disorders (2018), and Unspecified asthma, uncomplicated ([facility]) (10/05/2022).  Surgical History  She has a past surgical history that includes Colonoscopy (2013);  section, classic (2014); Small intestine surgery (2016); and Cardiac catheterization.  Social History  She reports that she has never smoked. She has never been exposed to tobacco smoke. She has never used smokeless tobacco. She reports that she does not drink alcohol and does not  use drugs.  Never smoker  Family History  Family History   Problem Relation Name Age of Onset    Coronary artery disease Mother      Heart attack Mother       Current Outpatient Medications   Medication Instructions    allopurinol (ZYLOPRIM) 50 mg, oral, Every other day    amLODIPine (NORVASC) 10 mg, oral, Daily    aspirin 81 mg EC tablet 1 tablet, Daily    cholecalciferol (Vitamin D-3) 25 MCG (1000 UT) capsule TAKE AS DIRECTED.    ezetimibe (ZETIA) 10 mg, oral, Daily    ferrous sulfate  mg ER tablet 1 tablet, Daily    fluticasone (Flonase) 50 mcg/actuation nasal spray 2 sprays, Each Nostril, 2 times daily    fluticasone propion-salmeteroL (Advair Diskus) 250-50 mcg/dose diskus inhaler 1 puff, inhalation, 2 times daily RT, Rinse mouth with water after use to reduce aftertaste and incidence of candidiasis. Do not swallow.    furosemide (LASIX) 10 mg, oral, Daily    gabapentin (NEURONTIN) 100 mg, oral, 3 times daily    hydrALAZINE (APRESOLINE) 50 mg, oral, 2 times daily    inhalational spacing device (Aerochamber MV) inhaler Use as instructed    melatonin 5 mg, oral, Nightly    metoprolol succinate XL (TOPROL-XL) 100 mg, oral, Daily    montelukast (Singulair) 10 mg tablet 1 tablet, Nightly    nitroglycerin (NITROSTAT) 0.4 mg, sublingual, Every 5 min PRN    pravastatin (PRAVACHOL) 40 mg, oral, Nightly    semaglutide 0.5 mg, subcutaneous, Weekly    sertraline (ZOLOFT) 25 mg, oral, Daily    sertraline (ZOLOFT) 50 mg, oral, Daily    Synthroid 200 mcg tablet TAKE 8 TABLETS BY MOUTH WEEKLY  AS DIRECTED    Ventolin HFA 90 mcg/actuation inhaler USE 2 INHALATIONS BY MOUTH EVERY 4 HOURS AS NEEDED FOR WHEEZING   Allergies  Ciprofloxacin, Morphine, and Penicillins  Review of Systems   Constitutional:  Negative for chills and fever.   HENT:  Negative for congestion, rhinorrhea, sinus pressure and sore throat.    Eyes:  Negative for pain and visual disturbance.   Respiratory:  Negative for cough, shortness of breath and  wheezing.    Cardiovascular:  Negative for chest pain.   Gastrointestinal:  Negative for constipation, diarrhea, nausea and vomiting.   Genitourinary:  Negative for pelvic pain.   Musculoskeletal:  Negative for arthralgias and back pain.   Skin:  Negative for rash.   Neurological:  Negative for dizziness and headaches.   Psychiatric/Behavioral:  Negative for confusion.    Scheduled medications  allopurinol, 50 mg, oral, Every other day  [Held by provider] amLODIPine, 10 mg, oral, Daily  aspirin, 81 mg, oral, Daily  calcium acetate, 667 mg, oral, TID after meals  ezetimibe, 10 mg, oral, Daily  fluticasone, 2 spray, Each Nostril, BID  gabapentin, 300 mg, oral, Daily  heparin (porcine), 7,500 Units, subcutaneous, q8h JAYA  hydrALAZINE, 50 mg, oral, BID  insulin lispro, 0-10 Units, subcutaneous, q4h  ipratropium-albuteroL, 3 mL, nebulization, q6h  levothyroxine, 200 mcg, oral, Daily  methylPREDNISolone sodium succinate (PF), 40 mg, intravenous, q24h  [Held by provider] metoprolol succinate XL, 100 mg, oral, Daily  metoprolol tartrate, 50 mg, oral, BID  montelukast, 10 mg, oral, Nightly  nystatin, 1 Application, Topical, TID  oxygen, , inhalation, Nightly  oxygen, , inhalation, BID  polyethylene glycol, 17 g, oral, Daily  pravastatin, 40 mg, oral, Nightly  sertraline, 50 mg, oral, Daily  sulfur hexafluoride microsphr, 2 mL, intravenous, Once in imaging    Continuous medications  D5 % and 0.9 % sodium chloride, 75 mL/hr    PRN medications  PRN medications: dextrose, dextrose, glucagon, glucagon, [Held by provider] nitroglycerin, traZODone   Physical Exam  Physical Exam  Vitals reviewed.   Constitutional:       General: She is not in acute distress.     Appearance: She is obese. She is not ill-appearing or toxic-appearing.   HENT:      Head: Normocephalic and atraumatic.      Nose: No rhinorrhea.      Comments: On 2L NC     Mouth/Throat:      Mouth: Mucous membranes are moist.      Comments: Mallampati 3  Eyes:       "General: No scleral icterus.     Extraocular Movements: Extraocular movements intact.      Pupils: Pupils are equal, round, and reactive to light.   Cardiovascular:      Rate and Rhythm: Normal rate and regular rhythm.      Heart sounds: No murmur heard.     No friction rub. No gallop.   Pulmonary:      Effort: Pulmonary effort is normal. No respiratory distress.      Breath sounds: No stridor. No wheezing, rhonchi or rales.   Abdominal:      General: Abdomen is flat. Bowel sounds are normal. There is no distension.      Palpations: Abdomen is soft.      Tenderness: There is no abdominal tenderness.   Musculoskeletal:         General: Normal range of motion.      Cervical back: Normal range of motion and neck supple. No rigidity or tenderness.      Right lower leg: No edema.      Left lower leg: No edema.   Lymphadenopathy:      Cervical: No cervical adenopathy.   Skin:     General: Skin is warm and dry.      Coloration: Skin is not jaundiced.      Nails: There is no clubbing.   Neurological:      General: No focal deficit present.      Mental Status: She is alert and oriented to person, place, and time.      Cranial Nerves: No cranial nerve deficit.      Motor: No weakness.   Psychiatric:         Mood and Affect: Mood normal.         Behavior: Behavior normal.         Judgment: Judgment normal.     Last Recorded Vitals  Blood pressure 132/54, pulse 60, temperature 36.7 °C (98.1 °F), temperature source Oral, resp. rate 17, height 1.626 m (5' 4.02\"), weight 140 kg (308 lb 11.2 oz), SpO2 100%.    Relevant Results  PFT 7/2020  FEV1/FVC 78%, FEV1 79-->73%, TLC 86%, DLCO 68-->104%    PFTs 2018  No obstruction  No bronchodilator response  No gas trapping, DLCO wnl    XR CHEST 1 VIEW 4/2/2025 9:44 am   IMPRESSION:  Enlarged cardiomediastinal silhouette with right hilar prominence.      There is right basilar platelike atelectasis now seen with left  basilar infiltrate appearing unchanged.      Small bilateral pleural " effusions are not excluded.    XR CHEST 1 VIEW; 3/25/2025 12:49 am   IMPRESSION:  1. Mild prominence of the interstitium is nonspecific and may  represent mild pulmonary edema or viral infectious process. No focal  consolidations detected although the retrocardiac space is not well  evaluated.  2. Marked cardiomegaly, unchanged.    TTE 3/25/2025  CONCLUSIONS:   1. Left ventricular ejection fraction is mildly decreased, by visual estimate at 45-50%.   2. There is global hypokinesis of the left ventricle with minor regional variations.   3. Spectral Doppler shows a Grade I (impaired relaxation pattern) of left ventricular diastolic filling with normal left atrial filling pressure.   4. Left ventricular cavity size is mildly dilated.   5. There is normal right ventricular global systolic function.   6. Moderately elevated right ventricular systolic pressure.  - The Doppler estimated RVSP is moderately elevated right ventricular systolic pressure at 56.0 mmHg.     TTE 6/12/2024  CONCLUSIONS:   1. Left ventricular systolic function is mildly decreased with a 45-50% estimated ejection fraction.   2. The left atrium is moderate to severely dilated.   3. Moderate mitral valve regurgitation.   4. Moderate to severe tricuspid regurgitation visualized.   5. Severely elevated right ventricular systolic pressure.   6. There is moderate pulmonic valve regurgitation.   7. Severely elevated pulmonary artery pressure.   8. The estimated PASP is 70 mmHg.     Latest Reference Range & Units 03/31/25 06:13 04/01/25 04:27 04/02/25 04:27   GLUCOSE 74 - 99 mg/dL 134 (H) 151 (H) 105 (H)   SODIUM 136 - 145 mmol/L 130 (L) 127 (L) 132 (L)   POTASSIUM 3.5 - 5.3 mmol/L 4.7 5.1 4.2   CHLORIDE 98 - 107 mmol/L 93 (L) 93 (L) 97 (L)   Bicarbonate 21 - 32 mmol/L 23 22 24   Anion Gap 10 - 20 mmol/L 19 17 15   Blood Urea Nitrogen 6 - 23 mg/dL 88 (H) 103 (HH) 110 (HH)   Creatinine 0.50 - 1.05 mg/dL 5.14 (H) 4.68 (H) 4.28 (H)   EGFR >60 mL/min/1.73m*2 8  (L) 9 (L) 10 (L)   Calcium 8.6 - 10.3 mg/dL 7.3 (L) 7.5 (L) 7.3 (L)   PHOSPHORUS 2.5 - 4.9 mg/dL 9.9 (H) 8.3 (H) 6.7 (H)   Albumin 3.4 - 5.0 g/dL 3.1 (L) 3.0 (L) 2.7 (L)   MAGNESIUM 1.60 - 2.40 mg/dL 2.37 2.39 2.27   (HH): Data is critically high  (H): Data is abnormally high  (L): Data is abnormally low     Latest Reference Range & Units 03/31/25 06:13 04/01/25 11:04 04/02/25 04:27   WBC 4.4 - 11.3 x10*3/uL 7.0 6.8 7.7   nRBC 0.0 - 0.0 /100 WBCs 0.0 0.0 0.0   RBC 4.00 - 5.20 x10*6/uL 4.19 3.82 (L) 3.80 (L)   HEMOGLOBIN 12.0 - 16.0 g/dL 9.6 (L) 9.2 (L) 8.8 (L)   HEMATOCRIT 36.0 - 46.0 % 32.0 (L) 28.7 (L) 28.6 (L)   MCV 80 - 100 fL 76 (L) 75 (L) 75 (L)   MCH 26.0 - 34.0 pg 22.9 (L) 24.1 (L) 23.2 (L)   MCHC 32.0 - 36.0 g/dL 30.0 (L) 32.1 30.8 (L)   RED CELL DISTRIBUTION WIDTH 11.5 - 14.5 % 19.5 (H) 19.6 (H) 19.2 (H)   Platelets 150 - 450 x10*3/uL 186 190 182   Neutrophils % 40.0 - 80.0 % 87.8 86.4 75.5   Immature Granulocytes %, Automated 0.0 - 0.9 % 0.4 0.4 0.4   Lymphocytes % 13.0 - 44.0 % 6.3 6.3 11.3   Monocytes % 2.0 - 10.0 % 5.5 6.9 12.3   Eosinophils % 0.0 - 6.0 % 0.0 0.0 0.4   Basophils % 0.0 - 2.0 % 0.0 0.0 0.1   Neutrophils Absolute 1.60 - 5.50 x10*3/uL 6.11 (H) 5.91 (H) 5.81 (H)   Immature Granulocytes Absolute, Automated 0.00 - 0.50 x10*3/uL 0.03 0.03 0.03   Lymphocytes Absolute 0.80 - 3.00 x10*3/uL 0.44 (L) 0.43 (L) 0.87   Monocytes Absolute 0.05 - 0.80 x10*3/uL 0.38 0.47 0.95 (H)   Eosinophils Absolute 0.00 - 0.40 x10*3/uL 0.00 0.00 0.03   Basophils Absolute 0.00 - 0.10 x10*3/uL 0.00 0.00 0.01   (L): Data is abnormally low  (H): Data is abnormally high     Latest Reference Range & Units 03/30/25 08:13 03/31/25 06:13 04/01/25 06:23   POCT pH, Arterial 7.38 - 7.42 pH 7.35 (L)     POCT pCO2, Arterial 38 - 42 mm Hg 44 (H)     POCT pO2, Arterial 85 - 95 mm Hg 97 (H)     POCT SO2, Arterial 94 - 100 % 98     POCT Oxy Hemoglobin, Arterial 94.0 - 98.0 % 95.8     POCT Hematocrit Calculated, Arterial 36.0 - 46.0 %  29.0 (L)     POCT Sodium, Arterial 136 - 145 mmol/L 128 (L)     POCT Potassium, Arterial 3.5 - 5.3 mmol/L 4.5     POCT Chloride, Arterial 98 - 107 mmol/L 94 (L)     POCT Ionized Calcium, Arterial 1.10 - 1.33 mmol/L 0.99 (L)     POCT Glucose, Arterial 74 - 99 mg/dL 91     POCT Lactate, Arterial 0.4 - 2.0 mmol/L 0.9     POCT Base Excess, Arterial -2.0 - 3.0 mmol/L -1.4     POCT HCO3 Calculated, Arterial 22.0 - 26.0 mmol/L 24.3     POCT Hemoglobin, Arterial 12.0 - 16.0 g/dL 9.5 (L)     POCT Anion Gap, Arterial 10 - 25 mmo/L 14     POCT pH, Venous 7.33 - 7.43 pH 7.27 (L) 7.24 (LL) 7.29 (L)   POCT pCO2, Venous 41 - 51 mm Hg 41 61 (H) 50   POCT pO2, Venous 35 - 45 mm Hg 55 (H) 35 89 (H)   POCT SO2, Venous 45 - 75 % 83 (H) 51 98 (H)   POCT Oxy Hemoglobin, Venous 45.0 - 75.0 % 81.6 (H) 50.1 96.1 (H)   POCT Hematocrit Calculated, Venous 36.0 - 46.0 % 24.0 (L) 29.0 (L) 29.0 (L)   POCT Sodium, Venous 136 - 145 mmol/L 131 (L) 128 (L) 128 (L)   POCT Potassium, Venous 3.5 - 5.3 mmol/L 3.4 (L) 4.7 4.8   POCT Chloride, Venous 98 - 107 mmol/L 102 93 (L) 94 (L)   POCT Ionized Calicum, Venous 1.10 - 1.33 mmol/L 1.54 (H) 0.99 (L) 1.03 (L)   POCT Glucose, Venous 74 - 99 mg/dL 71 (L) 132 (H) 151 (H)   POCT Lactate, Venous 0.4 - 2.0 mmol/L 0.9 1.2 0.6   POCT Base Excess, Venous -2.0 - 3.0 mmol/L -7.5 (L) -1.8 -2.7 (L)   POCT HCO3 Calculated, Venous 22.0 - 26.0 mmol/L 18.8 (L) 26.1 (H) 24.0   POCT Hemoglobin, Venous 12.0 - 16.0 g/dL 7.9 (L) 9.6 (L) 9.5 (L)   POCT Anion Gap, Venous 10.0 - 25.0 mmol/L 14.0 14.0 15.0   FiO2 % 30  30 30 28   Site of Arterial Puncture  Brachial Right     Wes's Test  Negative     Ipap CMH2O cm H2O 24.0     Epap CMH2O cm H2O 10.0     Patient Temperature degrees Celsius 37.0  37.0 37.0 37.0   (LL): Data is critically low  (L): Data is abnormally low  (H): Data is abnormally high     Assessment/Plan   74 YOF with h/o asthma, HOLDEN, HTN, DLP, CHF, CKD IV, T2DM, hypothyroidism, anemia, MGUS, who p/w SOB x 2 weeks  associated with cough productive of yellow sputum and chest tightness. In the ED work up revealed tachypnea, hypoxia, MARY ANN, BNP 700s, 7/21/78/140 on ABG and mild pulmonary edema on chest imaging. Received Solu-Medrol, Lasix, placed on BiPAP and admitted to ICU with the diagnosis of acute on chronic hypercarbic respiratory failure. Repeat ABG showed persistent hypercarbia while on BiPAP and was consequently intubated after arrival to the ICU. Patient improved and was subsequently extubated on 3/27. After that has worsening hypercarbia, which improved with intermittent NIPPV and Vapo-therm. Now overall improved and transferred to SDU.     Respiratory failure: acute on chronic with hypoxic and hypercarbia, due to asthma and CHF exacerbation. Now markedly improved. Home O2 unknown.      Continue supplemental O2 wean off as tolerates      Home O2 evaluation before discharge      BPH with Is/Acapella      Management of the individual causes as below    Asthma: PFT from 7/2020 showed FEV1/FVC 78%, FEV1 79-->73%, TLC 86%, DLCO 68-->104%. Previous PFTs were also reviewed, all normal FEV1/FVC ratio. No small airway disease. Currently with acute exacerbation that is improving.        Continue Solu-medrol 40 mg daily, would do a 2 week taper on discharge       Continue Singulair       Continue Duo-Neb        Resume home Advair, Singulair and albuterol on DC        FU with pulm after DC. Patient to call my office at [phone] to make an appointment        Will need repeat PFT when back to baseline    CHF: with acute exacerbation given  on presentation. Now improved.         Volume status optimization as per primary team and nephrology    HOLDEN: on CPAP at home. Currently on BiPAP while in house         Continue nocturnal BiPAP 24/10         Resume CPAP on discharge    PND:          Continue Flonase    DVT prophylaxis: weight based subcutaneous heparin.     Pulmonary will FU while in house.   Yareli Solitario, APRN-CNP

## 2025-04-02 NOTE — PROGRESS NOTES
Physical Therapy    Physical Therapy Treatment    Patient Name: Analia Murray  MRN: [id number]  Department: [address]  Room: [address] [place] [address]  Today's Date: 4/2/2025  Time Calculation  Start Time: 1047  Stop Time: 1110  Time Calculation (min): 23 min         Assessment/Plan   PT Assessment  Barriers to Discharge Home: Caregiver assistance, Physical needs  Caregiver Assistance: Caregiver assistance needed per identified barriers - however, level of patient's required assistance exceeds assistance available at home  End of Session Communication: Bedside nurse  Assessment Comment: Pt continues to present with mobility and strength deficits, pt would benefit from moderate intensity to increase strength and improve functional mobility.  End of Session Patient Position: Up in chair, Alarm on  PT Plan  Inpatient/Swing Bed or Outpatient: Inpatient  PT Plan  Treatment/Interventions: Bed mobility, Transfer training, Gait training, Balance training, Neuromuscular re-education, Strengthening, Endurance training, Range of motion, Therapeutic exercise, Therapeutic activity  PT Plan: Ongoing PT  PT Frequency: 5 times per week  PT Discharge Recommendations: Moderate intensity level of continued care  Equipment Recommended upon Discharge: Wheeled walker  PT Recommended Transfer Status: Assist x2  PT - OK to Discharge: Yes      General Visit Information:   PT  Visit  PT Received On: 04/02/25  General  Prior to Session Communication: Bedside nurse  Patient Position Received: Bed, 3 rail up, Alarm on  General Comment: Cleared by nursing to be seen for therapy, pt agreeable with tx, supine in bed upon arrival.    Subjective   Precautions:  Precautions  Medical Precautions: Fall precautions, Oxygen therapy device and L/min (2L oxygen)    Objective   Pain:  Pain Assessment  Pain Assessment: 0-10  0-10 (Numeric) Pain Score: 0 - No pain    Cognition:  Cognition  Overall Cognitive Status: Within Functional Limits     Postural Control:  Static  Sitting Balance  Static Sitting-Balance Support: Feet supported, Bilateral upper extremity supported  Static Sitting-Level of Assistance: Close supervision  Static Sitting-Comment/Number of Minutes: Good seated static balance.  Static Standing Balance  Static Standing-Balance Support: Bilateral upper extremity supported  Static Standing-Level of Assistance: Moderate assistance  Static Standing-Comment/Number of Minutes: Poor static standing balance with bilateral UE support on walker.    Treatments:  Therapeutic Exercise  Therapeutic Exercise Performed: Yes  Therapeutic Exercise Activity 1: Bilateral ankle pumps x15  Therapeutic Exercise Activity 2: Bilateral hip flexion x15  Therapeutic Exercise Activity 3: Bilateral knee extension x15    Therapeutic Activity  Therapeutic Activity Performed: Yes  Therapeutic Activity 1: EOB x10 minutes    Bed Mobility  Bed Mobility: Yes  Bed Mobility 1  Bed Mobility 1: Supine to sitting  Level of Assistance 1: Minimum assistance  Bed Mobility Comments 1: Min assist for trunk during supine to sit, pt able to bring LE's off EOB without assist from therapist. Min assist to scoot EOB with use of draw sheet.    Ambulation/Gait Training  Ambulation/Gait Training Performed: Yes  Ambulation/Gait Training 1  Surface 1: Level tile  Device 1: Rolling walker  Assistance 1: Moderate assistance  Quality of Gait 1: Wide base of support, Diminished heel strike, Decreased step length, Forward flexed posture  Comments/Distance (ft) 1: 4-5 steps (bed to chair) with wheeled walker, requires assist to maneuver walker, significant flexed posture, bilateral soft knees, mod assist x2 for balance.    Transfers  Transfer: Yes  Transfer 1  Transfer From 1: Sit to  Transfer to 1: Stand  Technique 1: Sit to stand, Stand to sit  Transfer Device 1: Walker  Transfer Level of Assistance 1: Moderate assistance, Minimal verbal cues, +2  Trials/Comments 1: Mod assist x2 for trunk/bilateral LE's during sit to stand,  cues for proper hand placement, decreased eccentric control in sitting.    Outcome Measures:  [facility] Basic Mobility  Turning from your back to your side while in a flat bed without using bedrails: A lot  Moving from lying on your back to sitting on the side of a flat bed without using bedrails: A lot  Moving to and from bed to chair (including a wheelchair): A lot  Standing up from a chair using your arms (e.g. wheelchair or bedside chair): A lot  To walk in hospital room: A lot  Climbing 3-5 steps with railing: Total  Basic Mobility - Total Score: 11    Education Documentation  Precautions, taught by Thao Daniel PTA at 4/2/2025 11:38 AM.  Learner: Patient  Readiness: Acceptance  Method: Explanation  Response: Verbalizes Understanding    Mobility Training, taught by Thao Daniel PTA at 4/2/2025 11:38 AM.  Learner: Patient  Readiness: Acceptance  Method: Explanation  Response: Verbalizes Understanding    Education Comments  04/02/25 1138 Thao Daniel PTA  Patient educated on the importance of mobility and participation with therapy. Educated on safety and use of call light for assistance.           Encounter Problems       Encounter Problems (Active)       PT Problem       Pt will transition supine<>sit with close S. (Progressing)       Start:  03/27/25    Expected End:  04/19/25            Pt will transfer sit<>stand with FWW & close S. (Progressing)       Start:  03/27/25    Expected End:  04/19/25            Pt will ambulate >/=25 ft with FWW & close S. (Progressing)       Start:  03/27/25    Expected End:  04/19/25                 Returned at 13:03 for assistance with transfer chair to bed. Mod assist x 2 for sit to stand. During static standing pt incontinent of BM requiring dependent cleanse. Pt able to take 3-4 steps (chair to bed) with wheeled walker, requires assist to maneuver walker. Mod assist x 2 for trunk/bilateral LE's during sit to supine.

## 2025-04-02 NOTE — PROGRESS NOTES
"Analia Murray is a 74 y.o. female on day 8 of admission presenting with Acute respiratory failure with hypoxia.    Subjective    Patient seen for acute renal failure on top of chronic kidney disease stage 3 admitted with pneumonia acute respiratory failure the patient was transferred out of the intensive care unit to a regular floor she is up in chair she feels better does not appear to have respiratory distress started to eat no overnight events noted.  Objective     Physical Exam  Constitutional:       General: She is not in acute distress.     Appearance: Normal appearance. She is not toxic-appearing.   Neck:      Vascular: No carotid bruit.   Cardiovascular:      Heart sounds: No murmur heard.     No friction rub. No gallop.   Pulmonary:      Breath sounds: No wheezing, rhonchi or rales.   Abdominal:      Tenderness: There is no abdominal tenderness. There is no right CVA tenderness, left CVA tenderness or rebound.   Musculoskeletal:         General: No tenderness.      Cervical back: Neck supple.      Right lower leg: No edema.      Left lower leg: No edema.   Lymphadenopathy:      Cervical: No cervical adenopathy.         Last Recorded Vitals  Blood pressure 117/74, pulse 52, temperature 36.7 °C (98.1 °F), temperature source Oral, resp. rate 17, height 1.626 m (5' 4.02\"), weight 140 kg (308 lb 11.2 oz), SpO2 98%.    Intake/Output last 3 Shifts:  I/O last 3 completed shifts:  In: 540 (3.9 mL/kg) [P.O.:540]  Out: 1210 (8.6 mL/kg) [Urine:1210 (0.2 mL/kg/hr)]  Weight: 140 kg     Current Facility-Administered Medications:     allopurinol (Zyloprim) tablet 50 mg, 50 mg, oral, Every other day, Robbie Olivarez MD, 50 mg at 04/02/25 0922    [Held by provider] amLODIPine (Norvasc) tablet 10 mg, 10 mg, oral, Daily, Sanjiv Neil PA-C, 10 mg at 03/28/25 0948    aspirin chewable tablet 81 mg, 81 mg, oral, Daily, Robbie Olivarez MD, 81 mg at 04/02/25 0923    calcium acetate (Phoslo) capsule 667 mg, 667 mg, oral, TID " after meals, Robbie Olivarez MD, 667 mg at 04/02/25 1301    dextrose 50 % injection 12.5 g, 12.5 g, intravenous, q15 min PRN, Robbie Olivarez MD    dextrose 50 % injection 25 g, 25 g, intravenous, q15 min PRN, Robbie Olivarez MD, 25 g at 03/28/25 0101    ezetimibe (Zetia) tablet 10 mg, 10 mg, oral, Daily, Robbie Olivarez MD, 10 mg at 04/02/25 0922    fluticasone (Flonase) nasal spray 2 spray, 2 spray, Each Nostril, BID, Robbie Olivarez MD, 2 spray at 04/01/25 0856    gabapentin (Neurontin) capsule 300 mg, 300 mg, oral, Daily, Krysta Ramirez MD    glucagon (Glucagen) injection 1 mg, 1 mg, intramuscular, q15 min PRN, Robbie Olivarez MD    glucagon (Glucagen) injection 1 mg, 1 mg, intramuscular, q15 min PRN, Robbie Olivarez MD    heparin (porcine) injection 7,500 Units, 7,500 Units, subcutaneous, q8h JAYA, Robbie Olivarez MD, 7,500 Units at 04/02/25 0548    hydrALAZINE (Apresoline) tablet 50 mg, 50 mg, oral, BID, Robbie Olivarez MD, 50 mg at 04/02/25 0922    insulin lispro injection 0-10 Units, 0-10 Units, subcutaneous, q4h, Robbie Olivarez MD, 2 Units at 04/02/25 0048    ipratropium-albuteroL (Duo-Neb) 0.5-2.5 mg/3 mL nebulizer solution 3 mL, 3 mL, nebulization, q6h, Robbie Olivarez MD, 3 mL at 04/02/25 1209    levothyroxine (Synthroid, Levoxyl) tablet 200 mcg, 200 mcg, oral, Daily, Robbie Olivarez MD, 200 mcg at 04/01/25 0644    methylPREDNISolone sod succinate (SOLU-Medrol) 40 mg/mL injection 40 mg, 40 mg, intravenous, q24h, Robbie Olivarez MD, 40 mg at 04/02/25 0923    [Held by provider] metoprolol succinate XL (Toprol-XL) 24 hr tablet 100 mg, 100 mg, oral, Daily, Sanjiv Neil PA-C    metoprolol tartrate (Lopressor) tablet 50 mg, 50 mg, oral, BID, Robbie Olivarez MD, 50 mg at 04/02/25 0922    montelukast (Singulair) tablet 10 mg, 10 mg, oral, Nightly, Robbie Olivarez MD, 10 mg at 04/01/25 2037    [Held by provider] nitroglycerin (Nitrostat) SL tablet 0.4 mg, 0.4 mg, sublingual, q5 min PRN, Sanjiv VAZQUEZ  BETTE Neil    nystatin (Mycostatin) 100,000 unit/gram powder 1 Application, 1 Application, Topical, TID, Robbie Olivarez MD, 1 Application at 04/01/25 2039    oxygen (O2) therapy, , inhalation, Nightly, Robbie Olivarez MD, 2 L/min at 04/01/25 2012    oxygen (O2) therapy, , inhalation, BID, Robbie Olivarez MD, 2 L/min at 04/02/25 0635    polyethylene glycol (Glycolax, Miralax) packet 17 g, 17 g, oral, Daily, Robbie Olivarez MD, 17 g at 04/02/25 0923    pravastatin (Pravachol) tablet 40 mg, 40 mg, oral, Nightly, Robbie Olivarez MD, 40 mg at 04/01/25 2045    sertraline (Zoloft) tablet 50 mg, 50 mg, oral, Daily, Robbie Olivarez MD, 50 mg at 04/02/25 0922    sulfur hexafluoride microsphr (Lumason) injection 24.28 mg, 2 mL, intravenous, Once in imaging, Robbie Olivarez MD    traZODone (Desyrel) tablet 25 mg, 25 mg, oral, Nightly PRN, Robbie Olivarez MD   Relevant Results    Results for orders placed or performed during the hospital encounter of 03/25/25 (from the past 96 hours)   Blood Gas Arterial Full Panel   Result Value Ref Range    POCT pH, Arterial 7.26 (L) 7.38 - 7.42 pH    POCT pCO2, Arterial 55 (H) 38 - 42 mm Hg    POCT pO2, Arterial 69 (L) 85 - 95 mm Hg    POCT SO2, Arterial 97 94 - 100 %    POCT Oxy Hemoglobin, Arterial 94.3 94.0 - 98.0 %    POCT Hematocrit Calculated, Arterial 30.0 (L) 36.0 - 46.0 %    POCT Sodium, Arterial 128 (L) 136 - 145 mmol/L    POCT Potassium, Arterial 4.9 3.5 - 5.3 mmol/L    POCT Chloride, Arterial 93 (L) 98 - 107 mmol/L    POCT Ionized Calcium, Arterial 0.96 (L) 1.10 - 1.33 mmol/L    POCT Glucose, Arterial 133 (H) 74 - 99 mg/dL    POCT Lactate, Arterial 0.6 0.4 - 2.0 mmol/L    POCT Base Excess, Arterial -2.7 (L) -2.0 - 3.0 mmol/L    POCT HCO3 Calculated, Arterial 24.7 22.0 - 26.0 mmol/L    POCT Hemoglobin, Arterial 10.0 (L) 12.0 - 16.0 g/dL    POCT Anion Gap, Arterial 15 10 - 25 mmo/L    Patient Temperature 37.0 degrees Celsius    FiO2 30 %    Frequency (BPM) 14 bpm    Ipap  CMH2O 25.0 cm H2O    Epap CMH2O 5.0 cm H2O    Site of Arterial Puncture Radial Right     Wes's Test Negative    POCT GLUCOSE   Result Value Ref Range    POCT Glucose 121 (H) 74 - 99 mg/dL   POCT GLUCOSE   Result Value Ref Range    POCT Glucose 133 (H) 74 - 99 mg/dL   Blood Gas Arterial Full Panel   Result Value Ref Range    POCT pH, Arterial 7.28 (L) 7.38 - 7.42 pH    POCT pCO2, Arterial 51 (H) 38 - 42 mm Hg    POCT pO2, Arterial 83 (L) 85 - 95 mm Hg    POCT SO2, Arterial 98 94 - 100 %    POCT Oxy Hemoglobin, Arterial 96.1 94.0 - 98.0 %    POCT Hematocrit Calculated, Arterial 29.0 (L) 36.0 - 46.0 %    POCT Sodium, Arterial 127 (L) 136 - 145 mmol/L    POCT Potassium, Arterial 4.7 3.5 - 5.3 mmol/L    POCT Chloride, Arterial 94 (L) 98 - 107 mmol/L    POCT Ionized Calcium, Arterial 0.94 (L) 1.10 - 1.33 mmol/L    POCT Glucose, Arterial 141 (H) 74 - 99 mg/dL    POCT Lactate, Arterial 0.8 0.4 - 2.0 mmol/L    POCT Base Excess, Arterial -2.9 (L) -2.0 - 3.0 mmol/L    POCT HCO3 Calculated, Arterial 24.0 22.0 - 26.0 mmol/L    POCT Hemoglobin, Arterial 9.8 (L) 12.0 - 16.0 g/dL    POCT Anion Gap, Arterial 14 10 - 25 mmo/L    Patient Temperature 37.0 degrees Celsius    FiO2 30 %    Apparatus FACE MASK     Ventilator Mode BiPAP     Ventilator Rate 14 bpm    Ipap CMH2O 20.0 cm H2O    Epap CMH2O 5.0 cm H2O    Site of Arterial Puncture Radial Right     Wes's Test Positive    POCT GLUCOSE   Result Value Ref Range    POCT Glucose 135 (H) 74 - 99 mg/dL   Blood Gas Arterial Full Panel   Result Value Ref Range    POCT pH, Arterial 7.28 (L) 7.38 - 7.42 pH    POCT pCO2, Arterial 52 (H) 38 - 42 mm Hg    POCT pO2, Arterial 77 (L) 85 - 95 mm Hg    POCT SO2, Arterial 96 94 - 100 %    POCT Oxy Hemoglobin, Arterial 93.9 (L) 94.0 - 98.0 %    POCT Hematocrit Calculated, Arterial 29.0 (L) 36.0 - 46.0 %    POCT Sodium, Arterial 128 (L) 136 - 145 mmol/L    POCT Potassium, Arterial 4.7 3.5 - 5.3 mmol/L    POCT Chloride, Arterial 93 (L) 98 - 107  mmol/L    POCT Ionized Calcium, Arterial 0.93 (L) 1.10 - 1.33 mmol/L    POCT Glucose, Arterial 135 (H) 74 - 99 mg/dL    POCT Lactate, Arterial 0.8 0.4 - 2.0 mmol/L    POCT Base Excess, Arterial -2.6 (L) -2.0 - 3.0 mmol/L    POCT HCO3 Calculated, Arterial 24.4 22.0 - 26.0 mmol/L    POCT Hemoglobin, Arterial 9.8 (L) 12.0 - 16.0 g/dL    POCT Anion Gap, Arterial 15 10 - 25 mmo/L    Patient Temperature 37.0 degrees Celsius    FiO2 30 %    [employer]ar[employer] FACE MASK     Ventilator Mode BiPAP     Ventilator Rate 16 bpm    Ipap CMH2O 20.0 cm H2O    Epap CMH2O 5.0 cm H2O    Site of Arterial Puncture Radial Right     Wes's Test Positive    CBC and Auto Differential   Result Value Ref Range    WBC 7.1 4.4 - 11.3 x10*3/uL    nRBC 0.0 0.0 - 0.0 /100 WBCs    RBC 3.84 (L) 4.00 - 5.20 x10*6/uL    Hemoglobin 8.9 (L) 12.0 - 16.0 g/dL    Hematocrit 29.3 (L) 36.0 - 46.0 %    MCV 76 (L) 80 - 100 fL    MCH 23.2 (L) 26.0 - 34.0 pg    MCHC 30.4 (L) 32.0 - 36.0 g/dL    RDW 19.8 (H) 11.5 - 14.5 %    Platelets 157 150 - 450 x10*3/uL    Neutrophils % 88.3 40.0 - 80.0 %    Immature Granulocytes %, Automated 0.3 0.0 - 0.9 %    Lymphocytes % 6.1 13.0 - 44.0 %    Monocytes % 5.2 2.0 - 10.0 %    Eosinophils % 0.0 0.0 - 6.0 %    Basophils % 0.1 0.0 - 2.0 %    Neutrophils Absolute 6.23 (H) 1.60 - 5.50 x10*3/uL    Immature Granulocytes Absolute, Automated 0.02 0.00 - 0.50 x10*3/uL    Lymphocytes Absolute 0.43 (L) 0.80 - 3.00 x10*3/uL    Monocytes Absolute 0.37 0.05 - 0.80 x10*3/uL    Eosinophils Absolute 0.00 0.00 - 0.40 x10*3/uL    Basophils Absolute 0.01 0.00 - 0.10 x10*3/uL   Renal Function Panel   Result Value Ref Range    Glucose 104 (H) 74 - 99 mg/dL    Sodium 131 (L) 136 - 145 mmol/L    Potassium 4.4 3.5 - 5.3 mmol/L    Chloride 93 (L) 98 - 107 mmol/L    Bicarbonate 23 21 - 32 mmol/L    Anion Gap 19 10 - 20 mmol/L    Urea Nitrogen 76 (H) 6 - 23 mg/dL    Creatinine 5.07 (H) 0.50 - 1.05 mg/dL    eGFR 8 (L) >60 mL/min/1.73m*2    Calcium 7.0 (L) 8.6 -  10.3 mg/dL    Phosphorus 10.5 (H) 2.5 - 4.9 mg/dL    Albumin 2.9 (L) 3.4 - 5.0 g/dL   Magnesium   Result Value Ref Range    Magnesium 2.20 1.60 - 2.40 mg/dL   Blood Gas Venous Full Panel   Result Value Ref Range    POCT pH, Venous 7.25 (LL) 7.33 - 7.43 pH    POCT pCO2, Venous 56 (H) 41 - 51 mm Hg    POCT pO2, Venous 40 35 - 45 mm Hg    POCT SO2, Venous 60 45 - 75 %    POCT Oxy Hemoglobin, Venous 58.8 45.0 - 75.0 %    POCT Hematocrit Calculated, Venous 27.0 (L) 36.0 - 46.0 %    POCT Sodium, Venous 130 (L) 136 - 145 mmol/L    POCT Potassium, Venous 4.4 3.5 - 5.3 mmol/L    POCT Chloride, Venous 93 (L) 98 - 107 mmol/L    POCT Ionized Calicum, Venous 0.93 (L) 1.10 - 1.33 mmol/L    POCT Glucose, Venous 101 (H) 74 - 99 mg/dL    POCT Lactate, Venous 1.3 0.4 - 2.0 mmol/L    POCT Base Excess, Venous -2.9 (L) -2.0 - 3.0 mmol/L    POCT HCO3 Calculated, Venous 24.6 22.0 - 26.0 mmol/L    POCT Hemoglobin, Venous 9.1 (L) 12.0 - 16.0 g/dL    POCT Anion Gap, Venous 17.0 10.0 - 25.0 mmol/L    Patient Temperature 37.0 degrees Celsius    FiO2 30 %   POCT GLUCOSE   Result Value Ref Range    POCT Glucose 101 (H) 74 - 99 mg/dL   POCT GLUCOSE   Result Value Ref Range    POCT Glucose 90 74 - 99 mg/dL   Blood Gas Venous Full Panel   Result Value Ref Range    POCT pH, Venous 7.27 (L) 7.33 - 7.43 pH    POCT pCO2, Venous 41 41 - 51 mm Hg    POCT pO2, Venous 55 (H) 35 - 45 mm Hg    POCT SO2, Venous 83 (H) 45 - 75 %    POCT Oxy Hemoglobin, Venous 81.6 (H) 45.0 - 75.0 %    POCT Hematocrit Calculated, Venous 24.0 (L) 36.0 - 46.0 %    POCT Sodium, Venous 131 (L) 136 - 145 mmol/L    POCT Potassium, Venous 3.4 (L) 3.5 - 5.3 mmol/L    POCT Chloride, Venous 102 98 - 107 mmol/L    POCT Ionized Calicum, Venous 1.54 (H) 1.10 - 1.33 mmol/L    POCT Glucose, Venous 71 (L) 74 - 99 mg/dL    POCT Lactate, Venous 0.9 0.4 - 2.0 mmol/L    POCT Base Excess, Venous -7.5 (L) -2.0 - 3.0 mmol/L    POCT HCO3 Calculated, Venous 18.8 (L) 22.0 - 26.0 mmol/L    POCT  Hemoglobin, Venous 7.9 (L) 12.0 - 16.0 g/dL    POCT Anion Gap, Venous 14.0 10.0 - 25.0 mmol/L    Patient Temperature 37.0 degrees Celsius    FiO2 30 %   Blood Gas Arterial Full Panel   Result Value Ref Range    POCT pH, Arterial 7.35 (L) 7.38 - 7.42 pH    POCT pCO2, Arterial 44 (H) 38 - 42 mm Hg    POCT pO2, Arterial 97 (H) 85 - 95 mm Hg    POCT SO2, Arterial 98 94 - 100 %    POCT Oxy Hemoglobin, Arterial 95.8 94.0 - 98.0 %    POCT Hematocrit Calculated, Arterial 29.0 (L) 36.0 - 46.0 %    POCT Sodium, Arterial 128 (L) 136 - 145 mmol/L    POCT Potassium, Arterial 4.5 3.5 - 5.3 mmol/L    POCT Chloride, Arterial 94 (L) 98 - 107 mmol/L    POCT Ionized Calcium, Arterial 0.99 (L) 1.10 - 1.33 mmol/L    POCT Glucose, Arterial 91 74 - 99 mg/dL    POCT Lactate, Arterial 0.9 0.4 - 2.0 mmol/L    POCT Base Excess, Arterial -1.4 -2.0 - 3.0 mmol/L    POCT HCO3 Calculated, Arterial 24.3 22.0 - 26.0 mmol/L    POCT Hemoglobin, Arterial 9.5 (L) 12.0 - 16.0 g/dL    POCT Anion Gap, Arterial 14 10 - 25 mmo/L    Patient Temperature 37.0 degrees Celsius    FiO2 30 %    Ipap CMH2O 24.0 cm H2O    Epap CMH2O 10.0 cm H2O    Site of Arterial Puncture Brachial Right     Wes's Test Negative    POCT GLUCOSE   Result Value Ref Range    POCT Glucose 106 (H) 74 - 99 mg/dL   Renal function panel   Result Value Ref Range    Glucose 209 (H) 74 - 99 mg/dL    Sodium 130 (L) 136 - 145 mmol/L    Potassium 4.6 3.5 - 5.3 mmol/L    Chloride 93 (L) 98 - 107 mmol/L    Bicarbonate 22 21 - 32 mmol/L    Anion Gap 20 10 - 20 mmol/L    Urea Nitrogen 85 (H) 6 - 23 mg/dL    Creatinine 5.25 (H) 0.50 - 1.05 mg/dL    eGFR 8 (L) >60 mL/min/1.73m*2    Calcium 7.3 (L) 8.6 - 10.3 mg/dL    Phosphorus 10.0 (H) 2.5 - 4.9 mg/dL    Albumin 2.9 (L) 3.4 - 5.0 g/dL   POCT GLUCOSE   Result Value Ref Range    POCT Glucose 206 (H) 74 - 99 mg/dL   POCT GLUCOSE   Result Value Ref Range    POCT Glucose 207 (H) 74 - 99 mg/dL   POCT GLUCOSE   Result Value Ref Range    POCT Glucose 164 (H)  74 - 99 mg/dL   POCT GLUCOSE   Result Value Ref Range    POCT Glucose 142 (H) 74 - 99 mg/dL   CBC and Auto Differential   Result Value Ref Range    WBC 7.0 4.4 - 11.3 x10*3/uL    nRBC 0.0 0.0 - 0.0 /100 WBCs    RBC 4.19 4.00 - 5.20 x10*6/uL    Hemoglobin 9.6 (L) 12.0 - 16.0 g/dL    Hematocrit 32.0 (L) 36.0 - 46.0 %    MCV 76 (L) 80 - 100 fL    MCH 22.9 (L) 26.0 - 34.0 pg    MCHC 30.0 (L) 32.0 - 36.0 g/dL    RDW 19.5 (H) 11.5 - 14.5 %    Platelets 186 150 - 450 x10*3/uL    Neutrophils % 87.8 40.0 - 80.0 %    Immature Granulocytes %, Automated 0.4 0.0 - 0.9 %    Lymphocytes % 6.3 13.0 - 44.0 %    Monocytes % 5.5 2.0 - 10.0 %    Eosinophils % 0.0 0.0 - 6.0 %    Basophils % 0.0 0.0 - 2.0 %    Neutrophils Absolute 6.11 (H) 1.60 - 5.50 x10*3/uL    Immature Granulocytes Absolute, Automated 0.03 0.00 - 0.50 x10*3/uL    Lymphocytes Absolute 0.44 (L) 0.80 - 3.00 x10*3/uL    Monocytes Absolute 0.38 0.05 - 0.80 x10*3/uL    Eosinophils Absolute 0.00 0.00 - 0.40 x10*3/uL    Basophils Absolute 0.00 0.00 - 0.10 x10*3/uL   Renal Function Panel   Result Value Ref Range    Glucose 134 (H) 74 - 99 mg/dL    Sodium 130 (L) 136 - 145 mmol/L    Potassium 4.7 3.5 - 5.3 mmol/L    Chloride 93 (L) 98 - 107 mmol/L    Bicarbonate 23 21 - 32 mmol/L    Anion Gap 19 10 - 20 mmol/L    Urea Nitrogen 88 (H) 6 - 23 mg/dL    Creatinine 5.14 (H) 0.50 - 1.05 mg/dL    eGFR 8 (L) >60 mL/min/1.73m*2    Calcium 7.3 (L) 8.6 - 10.3 mg/dL    Phosphorus 9.9 (H) 2.5 - 4.9 mg/dL    Albumin 3.1 (L) 3.4 - 5.0 g/dL   Magnesium   Result Value Ref Range    Magnesium 2.37 1.60 - 2.40 mg/dL   Blood Gas Venous Full Panel   Result Value Ref Range    POCT pH, Venous 7.24 (LL) 7.33 - 7.43 pH    POCT pCO2, Venous 61 (H) 41 - 51 mm Hg    POCT pO2, Venous 35 35 - 45 mm Hg    POCT SO2, Venous 51 45 - 75 %    POCT Oxy Hemoglobin, Venous 50.1 45.0 - 75.0 %    POCT Hematocrit Calculated, Venous 29.0 (L) 36.0 - 46.0 %    POCT Sodium, Venous 128 (L) 136 - 145 mmol/L    POCT Potassium,  Venous 4.7 3.5 - 5.3 mmol/L    POCT Chloride, Venous 93 (L) 98 - 107 mmol/L    POCT Ionized Calicum, Venous 0.99 (L) 1.10 - 1.33 mmol/L    POCT Glucose, Venous 132 (H) 74 - 99 mg/dL    POCT Lactate, Venous 1.2 0.4 - 2.0 mmol/L    POCT Base Excess, Venous -1.8 -2.0 - 3.0 mmol/L    POCT HCO3 Calculated, Venous 26.1 (H) 22.0 - 26.0 mmol/L    POCT Hemoglobin, Venous 9.6 (L) 12.0 - 16.0 g/dL    POCT Anion Gap, Venous 14.0 10.0 - 25.0 mmol/L    Patient Temperature 37.0 degrees Celsius    FiO2 30 %   POCT GLUCOSE   Result Value Ref Range    POCT Glucose 130 (H) 74 - 99 mg/dL   POCT GLUCOSE   Result Value Ref Range    POCT Glucose 180 (H) 74 - 99 mg/dL   POCT GLUCOSE   Result Value Ref Range    POCT Glucose 246 (H) 74 - 99 mg/dL   POCT GLUCOSE   Result Value Ref Range    POCT Glucose 177 (H) 74 - 99 mg/dL   POCT GLUCOSE   Result Value Ref Range    POCT Glucose 139 (H) 74 - 99 mg/dL   POCT GLUCOSE   Result Value Ref Range    POCT Glucose 135 (H) 74 - 99 mg/dL   Renal Function Panel   Result Value Ref Range    Glucose 151 (H) 74 - 99 mg/dL    Sodium 127 (L) 136 - 145 mmol/L    Potassium 5.1 3.5 - 5.3 mmol/L    Chloride 93 (L) 98 - 107 mmol/L    Bicarbonate 22 21 - 32 mmol/L    Anion Gap 17 10 - 20 mmol/L    Urea Nitrogen 103 (HH) 6 - 23 mg/dL    Creatinine 4.68 (H) 0.50 - 1.05 mg/dL    eGFR 9 (L) >60 mL/min/1.73m*2    Calcium 7.5 (L) 8.6 - 10.3 mg/dL    Phosphorus 8.3 (H) 2.5 - 4.9 mg/dL    Albumin 3.0 (L) 3.4 - 5.0 g/dL   Magnesium   Result Value Ref Range    Magnesium 2.39 1.60 - 2.40 mg/dL   Blood Gas Venous Full Panel   Result Value Ref Range    POCT pH, Venous 7.29 (L) 7.33 - 7.43 pH    POCT pCO2, Venous 50 41 - 51 mm Hg    POCT pO2, Venous 89 (H) 35 - 45 mm Hg    POCT SO2, Venous 98 (H) 45 - 75 %    POCT Oxy Hemoglobin, Venous 96.1 (H) 45.0 - 75.0 %    POCT Hematocrit Calculated, Venous 29.0 (L) 36.0 - 46.0 %    POCT Sodium, Venous 128 (L) 136 - 145 mmol/L    POCT Potassium, Venous 4.8 3.5 - 5.3 mmol/L    POCT Chloride,  Venous 94 (L) 98 - 107 mmol/L    POCT Ionized Calicum, Venous 1.03 (L) 1.10 - 1.33 mmol/L    POCT Glucose, Venous 151 (H) 74 - 99 mg/dL    POCT Lactate, Venous 0.6 0.4 - 2.0 mmol/L    POCT Base Excess, Venous -2.7 (L) -2.0 - 3.0 mmol/L    POCT HCO3 Calculated, Venous 24.0 22.0 - 26.0 mmol/L    POCT Hemoglobin, Venous 9.5 (L) 12.0 - 16.0 g/dL    POCT Anion Gap, Venous 15.0 10.0 - 25.0 mmol/L    Patient Temperature 37.0 degrees Celsius    FiO2 28 %   POCT GLUCOSE   Result Value Ref Range    POCT Glucose 137 (H) 74 - 99 mg/dL   CBC and Auto Differential   Result Value Ref Range    WBC 6.8 4.4 - 11.3 x10*3/uL    nRBC 0.0 0.0 - 0.0 /100 WBCs    RBC 3.82 (L) 4.00 - 5.20 x10*6/uL    Hemoglobin 9.2 (L) 12.0 - 16.0 g/dL    Hematocrit 28.7 (L) 36.0 - 46.0 %    MCV 75 (L) 80 - 100 fL    MCH 24.1 (L) 26.0 - 34.0 pg    MCHC 32.1 32.0 - 36.0 g/dL    RDW 19.6 (H) 11.5 - 14.5 %    Platelets 190 150 - 450 x10*3/uL    Neutrophils % 86.4 40.0 - 80.0 %    Immature Granulocytes %, Automated 0.4 0.0 - 0.9 %    Lymphocytes % 6.3 13.0 - 44.0 %    Monocytes % 6.9 2.0 - 10.0 %    Eosinophils % 0.0 0.0 - 6.0 %    Basophils % 0.0 0.0 - 2.0 %    Neutrophils Absolute 5.91 (H) 1.60 - 5.50 x10*3/uL    Immature Granulocytes Absolute, Automated 0.03 0.00 - 0.50 x10*3/uL    Lymphocytes Absolute 0.43 (L) 0.80 - 3.00 x10*3/uL    Monocytes Absolute 0.47 0.05 - 0.80 x10*3/uL    Eosinophils Absolute 0.00 0.00 - 0.40 x10*3/uL    Basophils Absolute 0.00 0.00 - 0.10 x10*3/uL   POCT GLUCOSE   Result Value Ref Range    POCT Glucose 194 (H) 74 - 99 mg/dL   POCT GLUCOSE   Result Value Ref Range    POCT Glucose 263 (H) 74 - 99 mg/dL   POCT GLUCOSE   Result Value Ref Range    POCT Glucose 279 (H) 74 - 99 mg/dL   POCT GLUCOSE   Result Value Ref Range    POCT Glucose 183 (H) 74 - 99 mg/dL   POCT GLUCOSE   Result Value Ref Range    POCT Glucose 99 74 - 99 mg/dL   CBC and Auto Differential   Result Value Ref Range    WBC 7.7 4.4 - 11.3 x10*3/uL    nRBC 0.0 0.0 - 0.0  /100 WBCs    RBC 3.80 (L) 4.00 - 5.20 x10*6/uL    Hemoglobin 8.8 (L) 12.0 - 16.0 g/dL    Hematocrit 28.6 (L) 36.0 - 46.0 %    MCV 75 (L) 80 - 100 fL    MCH 23.2 (L) 26.0 - 34.0 pg    MCHC 30.8 (L) 32.0 - 36.0 g/dL    RDW 19.2 (H) 11.5 - 14.5 %    Platelets 182 150 - 450 x10*3/uL    Neutrophils % 75.5 40.0 - 80.0 %    Immature Granulocytes %, Automated 0.4 0.0 - 0.9 %    Lymphocytes % 11.3 13.0 - 44.0 %    Monocytes % 12.3 2.0 - 10.0 %    Eosinophils % 0.4 0.0 - 6.0 %    Basophils % 0.1 0.0 - 2.0 %    Neutrophils Absolute 5.81 (H) 1.60 - 5.50 x10*3/uL    Immature Granulocytes Absolute, Automated 0.03 0.00 - 0.50 x10*3/uL    Lymphocytes Absolute 0.87 0.80 - 3.00 x10*3/uL    Monocytes Absolute 0.95 (H) 0.05 - 0.80 x10*3/uL    Eosinophils Absolute 0.03 0.00 - 0.40 x10*3/uL    Basophils Absolute 0.01 0.00 - 0.10 x10*3/uL   Renal Function Panel   Result Value Ref Range    Glucose 105 (H) 74 - 99 mg/dL    Sodium 132 (L) 136 - 145 mmol/L    Potassium 4.2 3.5 - 5.3 mmol/L    Chloride 97 (L) 98 - 107 mmol/L    Bicarbonate 24 21 - 32 mmol/L    Anion Gap 15 10 - 20 mmol/L    Urea Nitrogen 110 (HH) 6 - 23 mg/dL    Creatinine 4.28 (H) 0.50 - 1.05 mg/dL    eGFR 10 (L) >60 mL/min/1.73m*2    Calcium 7.3 (L) 8.6 - 10.3 mg/dL    Phosphorus 6.7 (H) 2.5 - 4.9 mg/dL    Albumin 2.7 (L) 3.4 - 5.0 g/dL   Magnesium   Result Value Ref Range    Magnesium 2.27 1.60 - 2.40 mg/dL   Light Blue Top   Result Value Ref Range    Extra Tube Hold for add-ons.    POCT GLUCOSE   Result Value Ref Range    POCT Glucose 91 74 - 99 mg/dL   POCT GLUCOSE   Result Value Ref Range    POCT Glucose 138 (H) 74 - 99 mg/dL     *Note: Due to a large number of results and/or encounters for the requested time period, some results have not been displayed. A complete set of results can be found in Results Review.       Assessment/Plan   Acute kidney injury superimposed on chronic kidney disease stage 3 is trending down good urine output however BUN is still elevated my  plan is to give her 1 L of IV fluids slowly and remove Panchal catheter and I have no indication for dialysis at this time  Chronic kidney disease stage IIIb  Acute respiratory failure continue with oxygen by nasal cannula  Bilateral pneumonia with IV antibiotics  Diabetes mellitus type 2  Anemia of chronic kidney disease  Hypertension  Hyperlipidemia  Obesity      Sixto Slater MD

## 2025-04-03 ENCOUNTER — APPOINTMENT (OUTPATIENT)
Dept: RADIOLOGY | Facility: HOSPITAL | Age: 75
DRG: 871 | End: 2025-04-03
Payer: MEDICARE

## 2025-04-03 ENCOUNTER — APPOINTMENT (OUTPATIENT)
Dept: RADIOLOGY | Facility: HOSPITAL | Age: 75
DRG: 208 | End: 2025-04-03
Payer: MEDICARE

## 2025-04-03 DIAGNOSIS — E11.65 TYPE 2 DIABETES MELLITUS WITH HYPERGLYCEMIA, UNSPECIFIED WHETHER LONG TERM INSULIN USE (MULTI): ICD-10-CM

## 2025-04-03 LAB
ALBUMIN SERPL BCP-MCNC: 2.8 G/DL (ref 3.4–5)
ANION GAP SERPL CALCULATED.3IONS-SCNC: 14 MMOL/L (ref 10–20)
BASOPHILS # BLD AUTO: 0 X10*3/UL (ref 0–0.1)
BASOPHILS NFR BLD AUTO: 0 %
BUN SERPL-MCNC: 114 MG/DL (ref 6–23)
CALCIUM SERPL-MCNC: 7.1 MG/DL (ref 8.6–10.3)
CHLORIDE SERPL-SCNC: 99 MMOL/L (ref 98–107)
CO2 SERPL-SCNC: 23 MMOL/L (ref 21–32)
CREAT SERPL-MCNC: 4.05 MG/DL (ref 0.5–1.05)
EGFRCR SERPLBLD CKD-EPI 2021: 11 ML/MIN/1.73M*2
EOSINOPHIL # BLD AUTO: 0.05 X10*3/UL (ref 0–0.4)
EOSINOPHIL NFR BLD AUTO: 0.8 %
ERYTHROCYTE [DISTWIDTH] IN BLOOD BY AUTOMATED COUNT: 19.4 % (ref 11.5–14.5)
EST. AVERAGE GLUCOSE BLD GHB EST-MCNC: 131 MG/DL
GLUCOSE BLD MANUAL STRIP-MCNC: 160 MG/DL (ref 74–99)
GLUCOSE BLD MANUAL STRIP-MCNC: 203 MG/DL (ref 74–99)
GLUCOSE BLD MANUAL STRIP-MCNC: 228 MG/DL (ref 74–99)
GLUCOSE BLD MANUAL STRIP-MCNC: 233 MG/DL (ref 74–99)
GLUCOSE SERPL-MCNC: 176 MG/DL (ref 74–99)
HBA1C MFR BLD: 6.2 %
HCT VFR BLD AUTO: 30.2 % (ref 36–46)
HGB BLD-MCNC: 9.1 G/DL (ref 12–16)
IMM GRANULOCYTES # BLD AUTO: 0.03 X10*3/UL (ref 0–0.5)
IMM GRANULOCYTES NFR BLD AUTO: 0.5 % (ref 0–0.9)
LYMPHOCYTES # BLD AUTO: 0.89 X10*3/UL (ref 0.8–3)
LYMPHOCYTES NFR BLD AUTO: 13.8 %
MAGNESIUM SERPL-MCNC: 2.29 MG/DL (ref 1.6–2.4)
MCH RBC QN AUTO: 22.8 PG (ref 26–34)
MCHC RBC AUTO-ENTMCNC: 30.1 G/DL (ref 32–36)
MCV RBC AUTO: 76 FL (ref 80–100)
MONOCYTES # BLD AUTO: 0.84 X10*3/UL (ref 0.05–0.8)
MONOCYTES NFR BLD AUTO: 13 %
NEUTROPHILS # BLD AUTO: 4.66 X10*3/UL (ref 1.6–5.5)
NEUTROPHILS NFR BLD AUTO: 71.9 %
NRBC BLD-RTO: 0 /100 WBCS (ref 0–0)
PHOSPHATE SERPL-MCNC: 5.7 MG/DL (ref 2.5–4.9)
PLATELET # BLD AUTO: 187 X10*3/UL (ref 150–450)
POTASSIUM SERPL-SCNC: 4.4 MMOL/L (ref 3.5–5.3)
RBC # BLD AUTO: 3.99 X10*6/UL (ref 4–5.2)
SODIUM SERPL-SCNC: 132 MMOL/L (ref 136–145)
WBC # BLD AUTO: 6.5 X10*3/UL (ref 4.4–11.3)

## 2025-04-03 PROCEDURE — 2500000004 HC RX 250 GENERAL PHARMACY W/ HCPCS (ALT 636 FOR OP/ED): Performed by: INTERNAL MEDICINE

## 2025-04-03 PROCEDURE — 97110 THERAPEUTIC EXERCISES: CPT | Mod: GO,CO

## 2025-04-03 PROCEDURE — 85025 COMPLETE CBC W/AUTO DIFF WBC: CPT | Performed by: INTERNAL MEDICINE

## 2025-04-03 PROCEDURE — 84100 ASSAY OF PHOSPHORUS: CPT | Performed by: INTERNAL MEDICINE

## 2025-04-03 PROCEDURE — 99233 SBSQ HOSP IP/OBS HIGH 50: CPT | Performed by: INTERNAL MEDICINE

## 2025-04-03 PROCEDURE — 2500000002 HC RX 250 W HCPCS SELF ADMINISTERED DRUGS (ALT 637 FOR MEDICARE OP, ALT 636 FOR OP/ED): Performed by: INTERNAL MEDICINE

## 2025-04-03 PROCEDURE — 71045 X-RAY EXAM CHEST 1 VIEW: CPT | Performed by: RADIOLOGY

## 2025-04-03 PROCEDURE — 83735 ASSAY OF MAGNESIUM: CPT | Performed by: INTERNAL MEDICINE

## 2025-04-03 PROCEDURE — 2500000001 HC RX 250 WO HCPCS SELF ADMINISTERED DRUGS (ALT 637 FOR MEDICARE OP): Performed by: INTERNAL MEDICINE

## 2025-04-03 PROCEDURE — 2500000005 HC RX 250 GENERAL PHARMACY W/O HCPCS: Performed by: INTERNAL MEDICINE

## 2025-04-03 PROCEDURE — 82947 ASSAY GLUCOSE BLOOD QUANT: CPT

## 2025-04-03 PROCEDURE — 2500000004 HC RX 250 GENERAL PHARMACY W/ HCPCS (ALT 636 FOR OP/ED): Mod: JZ | Performed by: INTERNAL MEDICINE

## 2025-04-03 PROCEDURE — 36415 COLL VENOUS BLD VENIPUNCTURE: CPT | Performed by: INTERNAL MEDICINE

## 2025-04-03 PROCEDURE — 99232 SBSQ HOSP IP/OBS MODERATE 35: CPT | Performed by: INTERNAL MEDICINE

## 2025-04-03 PROCEDURE — 94660 CPAP INITIATION&MGMT: CPT

## 2025-04-03 PROCEDURE — 97530 THERAPEUTIC ACTIVITIES: CPT | Mod: GP

## 2025-04-03 PROCEDURE — 97530 THERAPEUTIC ACTIVITIES: CPT | Mod: GO,CO

## 2025-04-03 PROCEDURE — 94640 AIRWAY INHALATION TREATMENT: CPT

## 2025-04-03 PROCEDURE — 2060000001 HC INTERMEDIATE ICU ROOM DAILY

## 2025-04-03 PROCEDURE — 71045 X-RAY EXAM CHEST 1 VIEW: CPT

## 2025-04-03 PROCEDURE — 97110 THERAPEUTIC EXERCISES: CPT | Mod: GP

## 2025-04-03 RX ORDER — INSULIN LISPRO 100 [IU]/ML
6 INJECTION, SOLUTION INTRAVENOUS; SUBCUTANEOUS ONCE
Status: DISCONTINUED | OUTPATIENT
Start: 2025-04-03 | End: 2025-04-17 | Stop reason: HOSPADM

## 2025-04-03 RX ORDER — DEXTROSE 50 % IN WATER (D50W) INTRAVENOUS SYRINGE
25
Status: DISCONTINUED | OUTPATIENT
Start: 2025-04-03 | End: 2025-04-17 | Stop reason: HOSPADM

## 2025-04-03 RX ORDER — DEXTROSE 50 % IN WATER (D50W) INTRAVENOUS SYRINGE
12.5
Status: DISCONTINUED | OUTPATIENT
Start: 2025-04-03 | End: 2025-04-17 | Stop reason: HOSPADM

## 2025-04-03 RX ADMIN — IPRATROPIUM BROMIDE AND ALBUTEROL SULFATE 3 ML: 2.5; .5 SOLUTION RESPIRATORY (INHALATION) at 12:12

## 2025-04-03 RX ADMIN — METOPROLOL TARTRATE 50 MG: 50 TABLET, FILM COATED ORAL at 21:00

## 2025-04-03 RX ADMIN — HEPARIN SODIUM 7500 UNITS: 5000 INJECTION INTRAVENOUS; SUBCUTANEOUS at 21:02

## 2025-04-03 RX ADMIN — IPRATROPIUM BROMIDE AND ALBUTEROL SULFATE 3 ML: 2.5; .5 SOLUTION RESPIRATORY (INHALATION) at 01:41

## 2025-04-03 RX ADMIN — SERTRALINE HYDROCHLORIDE 50 MG: 50 TABLET ORAL at 09:32

## 2025-04-03 RX ADMIN — NYSTATIN 1 APPLICATION: 100000 POWDER TOPICAL at 12:47

## 2025-04-03 RX ADMIN — Medication 2 L/MIN: at 20:57

## 2025-04-03 RX ADMIN — EZETIMIBE 10 MG: 10 TABLET ORAL at 09:32

## 2025-04-03 RX ADMIN — ASPIRIN 81 MG: 81 TABLET, CHEWABLE ORAL at 09:33

## 2025-04-03 RX ADMIN — HYDRALAZINE HYDROCHLORIDE 50 MG: 50 TABLET ORAL at 20:44

## 2025-04-03 RX ADMIN — CALCIUM ACETATE 667 MG: 667 CAPSULE ORAL at 09:32

## 2025-04-03 RX ADMIN — METOPROLOL TARTRATE 50 MG: 50 TABLET, FILM COATED ORAL at 09:32

## 2025-04-03 RX ADMIN — IPRATROPIUM BROMIDE AND ALBUTEROL SULFATE 3 ML: 2.5; .5 SOLUTION RESPIRATORY (INHALATION) at 07:03

## 2025-04-03 RX ADMIN — INSULIN LISPRO 4 UNITS: 100 INJECTION, SOLUTION INTRAVENOUS; SUBCUTANEOUS at 12:34

## 2025-04-03 RX ADMIN — HYDRALAZINE HYDROCHLORIDE 50 MG: 50 TABLET ORAL at 09:33

## 2025-04-03 RX ADMIN — METHYLPREDNISOLONE SODIUM SUCCINATE 40 MG: 40 INJECTION, POWDER, FOR SOLUTION INTRAMUSCULAR; INTRAVENOUS at 09:33

## 2025-04-03 RX ADMIN — INSULIN LISPRO 2 UNITS: 100 INJECTION, SOLUTION INTRAVENOUS; SUBCUTANEOUS at 08:42

## 2025-04-03 RX ADMIN — PRAVASTATIN SODIUM 40 MG: 20 TABLET ORAL at 20:44

## 2025-04-03 RX ADMIN — FLUTICASONE PROPIONATE 2 SPRAY: 50 SPRAY, METERED NASAL at 09:34

## 2025-04-03 RX ADMIN — Medication 2 L/MIN: at 07:03

## 2025-04-03 RX ADMIN — NYSTATIN 1 APPLICATION: 100000 POWDER TOPICAL at 20:47

## 2025-04-03 RX ADMIN — POLYETHYLENE GLYCOL 3350 17 G: 17 POWDER, FOR SOLUTION ORAL at 09:40

## 2025-04-03 RX ADMIN — HEPARIN SODIUM 7500 UNITS: 5000 INJECTION INTRAVENOUS; SUBCUTANEOUS at 13:46

## 2025-04-03 RX ADMIN — INSULIN LISPRO 4 UNITS: 100 INJECTION, SOLUTION INTRAVENOUS; SUBCUTANEOUS at 17:42

## 2025-04-03 RX ADMIN — Medication 2 L/MIN: at 21:04

## 2025-04-03 RX ADMIN — LEVOTHYROXINE SODIUM 200 MCG: 0.1 TABLET ORAL at 05:55

## 2025-04-03 RX ADMIN — CALCIUM ACETATE 667 MG: 667 CAPSULE ORAL at 17:43

## 2025-04-03 RX ADMIN — HEPARIN SODIUM 7500 UNITS: 5000 INJECTION INTRAVENOUS; SUBCUTANEOUS at 05:55

## 2025-04-03 RX ADMIN — IPRATROPIUM BROMIDE AND ALBUTEROL SULFATE 3 ML: 2.5; .5 SOLUTION RESPIRATORY (INHALATION) at 20:59

## 2025-04-03 RX ADMIN — CALCIUM ACETATE 667 MG: 667 CAPSULE ORAL at 12:47

## 2025-04-03 RX ADMIN — DEXTROSE MONOHYDRATE AND SODIUM CHLORIDE 75 ML/HR: 5; .9 INJECTION, SOLUTION INTRAVENOUS at 04:25

## 2025-04-03 RX ADMIN — GABAPENTIN 300 MG: 300 CAPSULE ORAL at 09:33

## 2025-04-03 RX ADMIN — MONTELUKAST 10 MG: 10 TABLET, FILM COATED ORAL at 20:44

## 2025-04-03 ASSESSMENT — COGNITIVE AND FUNCTIONAL STATUS - GENERAL
MOBILITY SCORE: 13
MOVING FROM LYING ON BACK TO SITTING ON SIDE OF FLAT BED WITH BEDRAILS: A LITTLE
TOILETING: TOTAL
PERSONAL GROOMING: A LITTLE
DRESSING REGULAR UPPER BODY CLOTHING: A LITTLE
DAILY ACTIVITIY SCORE: 13
CLIMB 3 TO 5 STEPS WITH RAILING: TOTAL
EATING MEALS: A LITTLE
STANDING UP FROM CHAIR USING ARMS: A LOT
TURNING FROM BACK TO SIDE WHILE IN FLAT BAD: A LITTLE
MOVING TO AND FROM BED TO CHAIR: A LOT
HELP NEEDED FOR BATHING: A LOT
WALKING IN HOSPITAL ROOM: A LOT
DRESSING REGULAR LOWER BODY CLOTHING: TOTAL

## 2025-04-03 ASSESSMENT — PAIN - FUNCTIONAL ASSESSMENT
PAIN_FUNCTIONAL_ASSESSMENT: 0-10
PAIN_FUNCTIONAL_ASSESSMENT: 0-10

## 2025-04-03 ASSESSMENT — PAIN SCALES - GENERAL
PAINLEVEL_OUTOF10: 0 - NO PAIN
PAINLEVEL_OUTOF10: 0 - NO PAIN

## 2025-04-03 NOTE — PROGRESS NOTES
Analia Murray is a 74 y.o. female on day 9 of admission presenting with Acute respiratory failure with hypoxia.      Subjective   Patient feels better.  Patient was transferred from ICU to stepdown unit she was monitored in ICU for many days she required intubation.  Now on nasal cannula.  Patient is on CPAP during the night.  She stated she does not like to wear CPAP.  She denied nausea or vomiting.      Objective     Last Recorded Vitals  /70 (BP Location: Left arm, Patient Position: Lying)   Pulse 56   Temp 36.3 °C (97.3 °F) (Oral)   Resp 18   Wt 143 kg (316 lb 1.6 oz)   SpO2 100%   Intake/Output last 3 Shifts:    Intake/Output Summary (Last 24 hours) at 4/3/2025 1035  Last data filed at 4/3/2025 0940  Gross per 24 hour   Intake 2703.75 ml   Output 300 ml   Net 2403.75 ml       Admission Weight  Weight: 132 kg (290 lb) (03/25/25 0041)    Daily Weight  04/03/25 : 143 kg (316 lb 1.6 oz)    Image Results  XR chest 1 view  Narrative: Interpreted By:  Jil Washington,   STUDY:  XR CHEST 1 VIEW 4/2/2025 9:44 am      INDICATION:  Signs/Symptoms:acute respiratory failure      COMPARISON:  04/01/2025      ACCESSION NUMBER(S):  [id number]      ORDERING CLINICIAN:  HEATHER STEPHEN      TECHNIQUE:  Semi-erect view of the chest at bedside      FINDINGS:  There is stable enlargement of the cardiomediastinal silhouette with  cardiomegaly and unfolding of the thoracic aorta. There is also right  hilar prominence noted.      When compared with yesterday's study, there is now platelike  atelectasis at the right lung base with left basilar infiltrate  observed. It would be difficult to exclude the presence of small  bilateral pleural effusions.      Impression: Enlarged cardiomediastinal silhouette with right hilar prominence.      There is right basilar platelike atelectasis now seen with left  basilar infiltrate appearing unchanged.      Small bilateral pleural effusions are not excluded.      Signed by: Jil Washington  4/2/2025 10:13 AM  Dictation workstation:   [id number]      Physical Exam    General: cooperating during physical exam, morbidly obese  HEENT: Pupils are equal and reactive to light and commendation , oral mucosa moist, no JVD   Cardiovascular: PMI nondisplaced  Lungs: Diminished breath sound at both lung bases, scattered wheezing   abdomen: No hepatosplenomegaly appreciated, soft , not tender, positive bowel sounds, positive bowel movement.  Neuro: Alert and oriented x2, strength in upper and lower extremities , sensation intact.  Psych: Patient had great insight was going on  Musculoskeletal: No swelling in lower extremities, no limitation in range of motion.  Vascular: Pulses are intact in upper and lower extremities  Skin: No petechiae, ecchymosis or other stigmata for dermatology disease.     Assessment/Plan      Acute respiratory failure with hypoxia and hypercapnia.  Clinically improved.  Patient required intubation on admission.  Continue with oxygen therapy to maintain pulse ox more than 92%.  Pulmonary on the case.    Acute kidney injury  Dr. Slater on the case.  Monitor close  Check RFP in AM.      COPD exacerbation  Patient is on Solu-Medrol  Continue with aerosol treatment  Consult pulmonary    Obstructive sleep apnea  On CPAP.  Patient is on oxygen at home.    Morbid obesity  Hyperlipidemia  Advised again diet  On statin and Zetia    Diabetes mellitus type 2  Cover with insulin sliding scale.    Hypothyroidism  TSH was 11.  Continue with levothyroxine 200 mg daily    Anxiety  Depression  Now continue sertraline    Gout  Patient is on allopurinol.    Neuropathic pain   on gabapentin    Chronic CHF with diastolic dysfunction  Lasix on hold because of acute kidney injury  Monitor close   from last 2D echo patient had ejection fraction 45- 50%    Check CBC and RFP in AM.  Deconditioning  PT /OT evaluated her, moderate intensity level of care  Patient will need skilled nursing facility    DVT  prophylaxis.      Krysta Ramirez MD

## 2025-04-03 NOTE — PROGRESS NOTES
Physical Therapy    Physical Therapy Treatment    Patient Name: Analia Murray  MRN: [id number]  Department: [address]  Room: [address] [place] [address]  Today's Date: 4/3/2025  Time Calculation  Start Time: 1305  Stop Time: 1330  Time Calculation (min): 25 min         Assessment/Plan   PT Assessment  PT Assessment Results: Decreased strength, Decreased range of motion, Decreased endurance, Impaired balance, Decreased mobility, Decreased coordination, Decreased safety awareness, Obesity  Rehab Prognosis: Good  Barriers to Discharge Home: Caregiver assistance, Physical needs  Caregiver Assistance: Caregiver assistance needed per identified barriers - however, level of patient's required assistance exceeds assistance available at home  Physical Needs: Stair navigation into home limited by function/safety, Ambulating household distances limited by function/safety, High falls risk due to function or environment  Evaluation/Treatment Tolerance: Patient tolerated treatment well  Medical Staff Made Aware: Yes  Strengths: Ability to acquire knowledge, Support of extended family/friends  Barriers to Participation: Comorbidities  End of Session Communication: Bedside nurse  Assessment Comment: pt continues to req mod A x 2 for transfers and ambulation this date. pt demonstrates improvement in bed mobility req only min A x 1. pt would continue to benefit from skilled PT to address deficits in strength, balance and overall limited activity tolerance in order to maximize functional performance and safety.  End of Session Patient Position: Up in chair, Alarm on  PT Plan  Inpatient/Swing Bed or Outpatient: Inpatient  PT Plan  Treatment/Interventions: Bed mobility, Transfer training, Gait training, Stair training, Balance training, Neuromuscular re-education, Strengthening, Endurance training, Therapeutic exercise, Therapeutic activity, Home exercise program, Positioning, Postural re-education  PT Plan: Ongoing PT  PT Frequency: 5 times per  "week  PT Discharge Recommendations: Moderate intensity level of continued care  Equipment Recommended upon Discharge: Wheeled walker  PT Recommended Transfer Status: Assist x2, Assistive device (RW)  PT - OK to Discharge: Yes      General Visit Information:   PT  Visit  PT Received On: 04/03/25  Response to Previous Treatment: Patient with no complaints from previous session.  General  Reason for Referral: decline in functional mobility; pt is a 75 y/o female admitted to [facility] on 3/25 with acute respiratory failure with hypoxia  Referred By: Dr. Sher MD  Past Medical History Relevant to Rehab: ANX, CKD, GERD, HTN, DM, Osteoporosis, HTN, Depression  Family/Caregiver Present: Yes  Caregiver Feedback: spouse  Co-Treatment: OT  Co-Treatment Reason: to maximize participation, safety and mobility; pt is a 2 person assist requiring 2 skilled therapists to mobilize  Prior to Session Communication: Bedside nurse  Patient Position Received: Bed, 3 rail up, Alarm on  Preferred Learning Style: verbal, visual  General Comment: pt cleared for therapy via RN, agreeable to participate; received supine in bed; required encouragement and education to participate this date as pt reports she is \"tired\"    Subjective   Precautions:  Precautions  Hearing/Visual Limitations: reading glasses, hearing WFL  Medical Precautions: Fall precautions, Oxygen therapy device and L/min (1.5L O2 via NC)     Date/Time Vitals Session Patient Position Pulse Resp SpO2 BP MAP (mmHg)    04/03/25 1300 During PT  --  --  --  --  --  --           Vital Signs Comment: HR 60bpm, O2 sat 94-96%     Objective   Pain:  Pain Assessment  Pain Assessment: 0-10  0-10 (Numeric) Pain Score: 0 - No pain  Cognition:  Cognition  Overall Cognitive Status: Within Functional Limits  Orientation Level: Oriented X4  Coordination:  Movements are Fluid and Coordinated: No  Coordination Comment: decreased rate and accuracy of movement  Postural Control:  Postural Control  Postural " Control: Impaired  Posture Comment: fwd head, rounded shoulders  Static Sitting Balance  Static Sitting-Balance Support: Bilateral upper extremity supported, Feet supported  Static Sitting-Level of Assistance: Independent  Static Sitting-Comment/Number of Minutes: good+ sitting in chair at end of session  Dynamic Sitting Balance  Dynamic Sitting-Balance Support: Bilateral upper extremity supported, Feet supported  Dynamic Sitting-Level of Assistance: Close supervision  Dynamic Sitting-Comments: good+ as pt performs seated ther ex  Static Standing Balance  Static Standing-Balance Support: Bilateral upper extremity supported (on RW)  Static Standing-Level of Assistance: Moderate assistance  Static Standing-Comment/Number of Minutes: mod A x 2 for stability and safety; fair-  Dynamic Standing Balance  Dynamic Standing-Balance Support: Bilateral upper extremity supported (on RW)  Dynamic Standing-Level of Assistance: Moderate assistance (x2)  Dynamic Standing-Comments: fair-       Activity Tolerance:  Activity Tolerance  Endurance: Decreased tolerance for upright activites  Activity Tolerance Comments: limited by weakness and fatigue  Rate of Perceived Exertion (RPE): 4/10  Treatments:  Therapeutic Exercise  Therapeutic Exercise Performed: Yes  Therapeutic Exercise Activity 1: participated in B UE ther ex with OT  Therapeutic Exercise Activity 2: sitting in chair: keely APs x 20 reps, keely LAQs x20 reps, keely hip flexion x20 reps, iso hip add x10 reps x3 sec hold, resisted hip abd x10 reps x3 sec hold    Therapeutic Activity  Therapeutic Activity Performed: Yes  Therapeutic Activity 1: refer to bed mob, transfers, amb    Bed Mobility  Bed Mobility: Yes  Bed Mobility 1  Bed Mobility 1: Supine to sitting  Level of Assistance 1: Minimum assistance  Bed Mobility Comments 1: min A x 1 to assist with trunk elevation; HOB elevated ~30*; VCs for safe hand placement on bed rail to elevate trunk  Bed Mobility 2  Bed Mobility  2:  Scooting  Level of Assistance 2: Close supervision  Bed Mobility Comments 2: mod I with use of bed rail to scoot fwd on EOB to square keely hips prior to stand    Ambulation/Gait Training  Ambulation/Gait Training Performed: Yes  Ambulation/Gait Training 1  Surface 1: Level tile  Device 1: Rolling walker  Assistance 1: Moderate assistance (x2)  Quality of Gait 1: Wide base of support, Diminished heel strike, Decreased step length, Forward flexed posture  Comments/Distance (ft) 1: pt took ~5 steps with use of RW and mod A x 2 for stability and safety; VCs and intermittent assist with walker management  Transfers  Transfer: Yes  Transfer 1  Transfer From 1: Bed to  Transfer to 1: Stand  Technique 1: Sit to stand  Transfer Device 1: Walker  Transfer Level of Assistance 1: Moderate assistance, +2  Trials/Comments 1: mod A x 2 for fwd weight shift and elevation with use of drawsheet/jennifer pad; pt maintains B hands on walker to pull to stand due to L wrist pain  Transfers 2  Transfer From 2: Stand to  Transfer to 2: Chair with arms  Technique 2: Stand to sit  Transfer Device 2: Walker  Transfer Level of Assistance 2: Maximum assistance, +2  Trials/Comments 2: max A x 2 for controlled lowering into chair; pt impulsive and sits immediately despite VCs for safe B hand and LE placement    Stairs  Stairs: No         Outcome Measures:  [facility] Basic Mobility  Turning from your back to your side while in a flat bed without using bedrails: A little  Moving from lying on your back to sitting on the side of a flat bed without using bedrails: A little  Moving to and from bed to chair (including a wheelchair): A lot  Standing up from a chair using your arms (e.g. wheelchair or bedside chair): A lot  To walk in hospital room: A lot  Climbing 3-5 steps with railing: Total  Basic Mobility - Total Score: 13    Education Documentation  Precautions, taught by Ger James, PT at 4/3/2025  2:21 PM.  Learner: Patient  Readiness:  Acceptance  Method: Explanation, Demonstration  Response: Needs Reinforcement    Mobility Training, taught by Ger James, PT at 4/3/2025  2:21 PM.  Learner: Patient  Readiness: Acceptance  Method: Explanation, Demonstration  Response: Needs Reinforcement    Education Comments  No comments found.        OP EDUCATION:       Encounter Problems       Encounter Problems (Active)       PT Problem       Pt will transition supine<>sit with close S. (Progressing)       Start:  03/27/25    Expected End:  04/19/25            Pt will transfer sit<>stand with FWW & close S. (Progressing)       Start:  03/27/25    Expected End:  04/19/25            Pt will ambulate >/=25 ft with FWW & close S. (Progressing)       Start:  03/27/25    Expected End:  04/19/25

## 2025-04-03 NOTE — PROGRESS NOTES
[facility] spoke with pts daughter Tiffanie to discuss any SNF choices, she is looking into [facility]. [facility] advised referral can be sent to this facility however strongly encouraged her to provide some other options as well as first facility may not have bed availablity. [facility] will follow up with Tiffanie later on today.      04/03/25 0026   Discharge Planning   Expected Discharge Disposition SNF

## 2025-04-03 NOTE — PROGRESS NOTES
"  Pulmonary Daily Progress Note     SUBJECTIVE   Analia Murray is a 74 y.o. year old female patient with h/o asthma, HOLDEN, HTN, DLP, CHF, CKD IV, T2DM, hypothyroidism, anemia, MGUS, who p/w SOB x 2 weeks associated with cough productive of yellow sputum and chest tightness.     Interval History:  No acute overnight events. O2 requirements stable at 2 LPM. Wore BiPAP 24/10 last night.    Overall is feeling well. Denies SOB, cough, sputum, wheezing, CP, abdominal pain, N/V. No other complaints.   MEDICATIONS     Scheduled Medications:  allopurinol, 50 mg, oral, Every other day  [Held by provider] amLODIPine, 10 mg, oral, Daily  aspirin, 81 mg, oral, Daily  calcium acetate, 667 mg, oral, TID after meals  ezetimibe, 10 mg, oral, Daily  fluticasone, 2 spray, Each Nostril, BID  gabapentin, 300 mg, oral, Daily  heparin (porcine), 7,500 Units, subcutaneous, q8h JAYA  hydrALAZINE, 50 mg, oral, BID  insulin lispro, 0-10 Units, subcutaneous, TID AC  ipratropium-albuteroL, 3 mL, nebulization, q6h  levothyroxine, 200 mcg, oral, Daily  methylPREDNISolone sodium succinate (PF), 40 mg, intravenous, q24h  [Held by provider] metoprolol succinate XL, 100 mg, oral, Daily  metoprolol tartrate, 50 mg, oral, BID  montelukast, 10 mg, oral, Nightly  nystatin, 1 Application, Topical, TID  oxygen, , inhalation, Nightly  oxygen, , inhalation, BID  polyethylene glycol, 17 g, oral, Daily  pravastatin, 40 mg, oral, Nightly  sertraline, 50 mg, oral, Daily  sulfur hexafluoride microsphr, 2 mL, intravenous, Once in imaging    Continuous Medications:  D5 % and 0.9 % sodium chloride, 75 mL/hr, Last Rate: 75 mL/hr (04/03/25 0940)    PRN Medications:  PRN medications: dextrose, dextrose, glucagon, glucagon, [Held by provider] nitroglycerin, traZODone   OBJECTIVE      Vital Signs:  Blood pressure 139/70, pulse 56, temperature 36.3 °C (97.3 °F), temperature source Oral, resp. rate 18, height 1.626 m (5' 4.02\"), weight 143 kg (316 lb 1.6 oz), SpO2 " 100%.     Physical Exam   Physical Exam   GENERAL: normal appearance. well nourished. No respiratory distress  HEAD/SINUSES: no sinus tenderness  OROPHARYNX: Moist mucosa, no thrush or lesions. Mallampati 3. On 2L NC.   NECK: no JVD, midline trachea without stridor. Thyroid not enlarged  LYMPH NODES : none felt in the cervical, submandibular or supraclavicular regions  LUNGS: Symmetric chest. Good excursion. Clear lung fields b/l with fair air entry. no wheezing. no crackles or rhonchi  CARDIAC: normal S1 and S2; no gallops, rubs or murmurs. Regular rate and rhythm  EXTREMITIES: No edema, no varicose veins  NEURO: grossly normal mental status, CN reflexes and motor strength.   SKIN: Skin turgor normal. No rashes or lesions.   PSYCH: Normal affect  I/Os:    Intake/Output Summary (Last 24 hours) at 4/3/2025 1114  Last data filed at 4/3/2025 0940  Gross per 24 hour   Intake 2703.75 ml   Output 300 ml   Net 2403.75 ml   Labs:   Results from last 72 hours   Lab Units 04/03/25 0418 04/02/25  0427 04/01/25  0427   SODIUM mmol/L 132* 132* 127*   POTASSIUM mmol/L 4.4 4.2 5.1   CHLORIDE mmol/L 99 97* 93*   CO2 mmol/L 23 24 22   BUN mg/dL 114* 110* 103*   CREATININE mg/dL 4.05* 4.28* 4.68*   GLUCOSE mg/dL 176* 105* 151*   CALCIUM mg/dL 7.1* 7.3* 7.5*   ANION GAP mmol/L 14 15 17   EGFR mL/min/1.73m*2 11* 10* 9*   PHOSPHORUS mg/dL 5.7* 6.7* 8.3*      Results from last 72 hours   Lab Units 04/03/25  0418 04/02/25  0427 04/01/25  1104   WBC AUTO x10*3/uL 6.5 7.7 6.8   HEMOGLOBIN g/dL 9.1* 8.8* 9.2*   HEMATOCRIT % 30.2* 28.6* 28.7*   PLATELETS AUTO x10*3/uL 187 182 190   NEUTROS PCT AUTO % 71.9 75.5 86.4   LYMPHS PCT AUTO % 13.8 11.3 6.3   MONOS PCT AUTO % 13.0 12.3 6.9   EOS PCT AUTO % 0.8 0.4 0.0       Results from last 72 hours   Lab Units 04/01/25  0623   POCT PH, VENOUS pH 7.29*   POCT PCO2, VENOUS mm Hg 50   POCT PO2, VENOUS mm Hg 89*   Micro/ID:   Lab Results   Component Value Date    URINECULTURE  03/16/2025     Growth  indicates contamination with mixed bacterial michelle. Repeat culture if clinically indicated.    BLOODCULT No growth at 4 days -  FINAL REPORT 07/30/2024    BLOODCULT No growth at 4 days -  FINAL REPORT 07/30/2024     Summary of key imaging results from the last 24 hours:    PFT 7/2020  FEV1/FVC 78%, FEV1 79-->73%, TLC 86%, DLCO 68-->104%     PFTs 2018  No obstruction  No bronchodilator response  No gas trapping, DLCO wnl     XR CHEST 1 VIEW 4/2/2025 9:44 am   IMPRESSION:  Enlarged cardiomediastinal silhouette with right hilar prominence.      There is right basilar platelike atelectasis now seen with left  basilar infiltrate appearing unchanged.      Small bilateral pleural effusions are not excluded.     XR CHEST 1 VIEW; 3/25/2025 12:49 am   IMPRESSION:  1. Mild prominence of the interstitium is nonspecific and may  represent mild pulmonary edema or viral infectious process. No focal  consolidations detected although the retrocardiac space is not well  evaluated.  2. Marked cardiomegaly, unchanged.     TTE 3/25/2025  CONCLUSIONS:   1. Left ventricular ejection fraction is mildly decreased, by visual estimate at 45-50%.   2. There is global hypokinesis of the left ventricle with minor regional variations.   3. Spectral Doppler shows a Grade I (impaired relaxation pattern) of left ventricular diastolic filling with normal left atrial filling pressure.   4. Left ventricular cavity size is mildly dilated.   5. There is normal right ventricular global systolic function.   6. Moderately elevated right ventricular systolic pressure.  - The Doppler estimated RVSP is moderately elevated right ventricular systolic pressure at 56.0 mmHg.      TTE 6/12/2024  CONCLUSIONS:   1. Left ventricular systolic function is mildly decreased with a 45-50% estimated ejection fraction.   2. The left atrium is moderate to severely dilated.   3. Moderate mitral valve regurgitation.   4. Moderate to severe tricuspid regurgitation visualized.    5. Severely elevated right ventricular systolic pressure.   6. There is moderate pulmonic valve regurgitation.   7. Severely elevated pulmonary artery pressure.   8. The estimated PASP is 70 mmHg.    IMPRESSION   74 YOF with h/o asthma, HOLDEN, HTN, DLP, CHF, CKD IV, T2DM, hypothyroidism, anemia, MGUS, who p/w SOB x 2 weeks associated with cough productive of yellow sputum and chest tightness. In the ED work up revealed tachypnea, hypoxia, MARY ANN, BNP 700s, 7/21/78/140 on ABG and mild pulmonary edema on chest imaging. Received Solu-Medrol, Lasix, placed on BiPAP and admitted to ICU with the diagnosis of acute on chronic hypercarbic respiratory failure. Repeat ABG showed persistent hypercarbia while on BiPAP and was consequently intubated after arrival to the ICU. Patient improved and was subsequently extubated on 3/27. After that has worsening hypercarbia, which improved with intermittent NIPPV and Vapo-therm. Now overall improved and transferred to SDU.   RECOMMENDATIONS   Respiratory failure: acute on chronic with hypoxic and hypercarbia, due to asthma and CHF exacerbation. Now markedly improved and O2 requirements continues to improve. Home O2 unknown.      Continue supplemental O2 wean off as tolerates      Home O2 evaluation before discharge      BPH with Is/Acapella      Management of the individual causes as below     Asthma: PFT from 7/2020 showed FEV1/FVC 78%, FEV1 79-->73%, TLC 86%, DLCO 68-->104%. Previous PFTs were also reviewed, all normal FEV1/FVC ratio. No small airway disease. Currently with acute exacerbation that is improving.        Continue Solu-medrol 40 mg daily, would do a 2 week taper on discharge       Continue Singulair       Continue Duo-Neb        Resume home Advair, Singulair and albuterol on DC        FU with pulm after DC. Patient to call my office at [phone] to make an appointment        Will need repeat PFT when back to baseline     CHF: with acute exacerbation given  on  presentation. Now improved.         Volume status optimization as per primary team and nephrology     HOLDEN: on CPAP at home. Currently on BiPAP while in house         Continue nocturnal BiPAP 24/10         Resume CPAP on discharge     PND:          Continue Flonase     DVT prophylaxis: weight based subcutaneous heparin.      Pulmonary will FU while in house.     Yareli Solitario, DILCIA-CNP   04/03/25 at 11:14 AM

## 2025-04-03 NOTE — CARE PLAN
Problem: Respiratory  Goal: Tolerate mechanical ventilation evidenced by VS/agitation level this shift  Outcome: Progressing  Goal: Wean oxygen to maintain O2 saturation per order/standard this shift  Outcome: Progressing  Goal: Clear secretions with interventions this shift  Outcome: Progressing  Goal: Minimize anxiety/maximize coping throughout shift  Outcome: Progressing  Goal: Minimal/no exertional discomfort or dyspnea this shift  Outcome: Progressing

## 2025-04-03 NOTE — PROGRESS NOTES
Occupational Therapy    OT Treatment    Patient Name: Analia Murray  MRN: [id number]  Department: [address]  Room: [address] [place] [address]  Today's Date: 4/3/2025  Time Calculation  Start Time: 1301  Stop Time: 1330  Time Calculation (min): 29 min        Assessment:  OT Assessment: Tolerated session well, demonstrating continued progression towards POC with increased encouragement required for participation. Pt would benefit from continued skilled OT services to improve strength, balance, and functional tolerance to increase independence with ADL tasks  Barriers to Discharge Home: Physical needs, Caregiver assistance  End of Session Communication: Bedside nurse  End of Session Patient Position: Up in chair, Alarm on  OT Assessment Results: Decreased ADL status, Decreased upper extremity range of motion, Decreased upper extremity strength, Decreased endurance, Decreased functional mobility, Decreased IADLs  Plan:  Treatment Interventions: ADL retraining, Functional transfer training, UE strengthening/ROM, Endurance training, Patient/family training, Equipment evaluation/education, Compensatory technique education  OT Frequency: 4 times per week  OT Discharge Recommendations: Moderate intensity level of continued care  Equipment Recommended upon Discharge: Wheeled walker  OT Recommended Transfer Status: Maximum assist, Assist of 2  OT - OK to Discharge: Yes  Treatment Interventions: ADL retraining, Functional transfer training, UE strengthening/ROM, Endurance training, Patient/family training, Equipment evaluation/education, Compensatory technique education    Subjective   Previous Visit Info:  OT Last Visit  OT Received On: 04/03/25  General:  General  Reason for Referral: Activities of daily living  Family/Caregiver Present: Yes  Caregiver Feedback: spouse  Co-Treatment: PT  Co-Treatment Reason: to maximize participation, safety and mobility; pt is a 2 person assist requiring 2 skilled therapists to mobilize  Prior to Session  Communication: Bedside nurse  Patient Position Received: Bed, 3 rail up, Alarm on  General Comment: Cleared for therapy per RN. Pt supine in bed upon arrival and agreeable to tx wtih increased encouragement  Precautions:  Hearing/Visual Limitations: reading glasses, hearing WFL  Medical Precautions: Fall precautions, Oxygen therapy device and L/min (1.5 L O2 nc)     Date/Time Vitals Session Patient Position Pulse Resp SpO2 BP MAP (mmHg)    04/03/25 1300 During PT  --  --  --  --  --  --           Vital Signs Comment: O2 maintaining 94-96%     Pain:  Pain Assessment  Pain Assessment: 0-10  0-10 (Numeric) Pain Score: 0 - No pain    Objective    Cognition:  Cognition  Overall Cognitive Status: Within Functional Limits  Orientation Level: Oriented X4  Insight: Mild  Coordination:  Movements are Fluid and Coordinated: No  Upper Body Coordination: pt demonstrates slow and delayed movements  Activities of Daily Living:    LE Dressing  LE Dressing: Yes  Sock Level of Assistance: Dependent  LE Dressing Where Assessed: Edge of bed  LE Dressing Comments: dependent assist to don socks this date    Bed Mobility/Transfers: Bed Mobility  Bed Mobility: Yes  Bed Mobility 1  Bed Mobility 1: Supine to sitting  Level of Assistance 1: Minimum assistance  Bed Mobility Comments 1: assist with trunk elevation with pt able to bring BLE off EOB with increased time + effort with cues to scoot hips to EOB    Transfers  Transfer: Yes  Transfer 1  Technique 1: Sit to stand, Stand to sit  Transfer Device 1: Walker  Transfer Level of Assistance 1: Moderate assistance, +2  Trials/Comments 1: assist for trunk elevation and forward weightshifting with pt able to pull up from FWW with cues for proper hand placement and eccentric lowering, demonstrating poor carryover with decreased control to chair    Functional Mobility:  Functional Mobility  Functional Mobility Performed: Yes  Functional Mobility 1  Device 1: Rolling walker  Assistance 1: Moderate  assistance (x2)  Comments 1: tolerated taking steps to chair at FWW with cues for step/walker sequencing and postural alignment with increased time + effort required, demonstrating fair- balance    Standing Balance:  Dynamic Standing Balance  Dynamic Standing-Level of Assistance: Moderate assistance (x2)  Dynamic Standing-Comments: fair- balance at FWW during transfer tasks    Therapy/Activity: Therapeutic Exercise  Therapeutic Exercise Performed: Yes  Therapeutic Exercise Activity 1: participated in AROM BUE exercises 1x10 in all planes to improve strength and functional tolerance to increase independence with transfer tasks with cues provided for proper muscle recruitement. Decreased BUE shoulder ROM with intermittent rest breaks required d/t SOB with cues for use of pursed lip breathing    Outcome Measures:[facility] Daily Activity  Putting on and taking off regular lower body clothing: Total  Bathing (including washing, rinsing, drying): A lot  Putting on and taking off regular upper body clothing: A little  Toileting, which includes using toilet, bedpan or urinal: Total  Taking care of personal grooming such as brushing teeth: A little  Eating Meals: A little  Daily Activity - Total Score: 13    Education Documentation  ADL Training, taught by ARLEEN Kim at 4/3/2025  2:32 PM.  Learner: Patient  Readiness: Acceptance  Method: Explanation  Response: Verbalizes Understanding, Needs Reinforcement  Comment: educated on safety + benefits of OOB activity    Education Comments  No comments found.      Problem: OT Goals  Goal: ADLs  Description: Patient will complete ADLs with minimal assistance.   Outcome: Progressing  Goal: Functional Transfer  Description: Patient will complete functional transfer with min assistance utilizing least restrictive device.   Outcome: Progressing  Goal: Therapeutic Exercise   Description: Patient will tolerate ~8-10 minutes of therapeutic exercise to increase aerobic capacity  and improve activity tolerance.  Outcome: Progressing

## 2025-04-03 NOTE — PROGRESS NOTES
Seen for acute kidney injury creatinine slowly trending down BUN about the same normal electrolytes chest x-ray no fluid overload we will continue to monitor no indication for dialysis

## 2025-04-04 ENCOUNTER — APPOINTMENT (OUTPATIENT)
Dept: RADIOLOGY | Facility: HOSPITAL | Age: 75
DRG: 208 | End: 2025-04-04
Payer: MEDICARE

## 2025-04-04 LAB
ALBUMIN SERPL BCP-MCNC: 2.9 G/DL (ref 3.4–5)
ANION GAP SERPL CALCULATED.3IONS-SCNC: 13 MMOL/L (ref 10–20)
BASOPHILS # BLD AUTO: 0.01 X10*3/UL (ref 0–0.1)
BASOPHILS NFR BLD AUTO: 0.1 %
BUN SERPL-MCNC: 117 MG/DL (ref 6–23)
CALCIUM SERPL-MCNC: 7.6 MG/DL (ref 8.6–10.3)
CHLORIDE SERPL-SCNC: 102 MMOL/L (ref 98–107)
CO2 SERPL-SCNC: 25 MMOL/L (ref 21–32)
CREAT SERPL-MCNC: 3.73 MG/DL (ref 0.5–1.05)
EGFRCR SERPLBLD CKD-EPI 2021: 12 ML/MIN/1.73M*2
EOSINOPHIL # BLD AUTO: 0.06 X10*3/UL (ref 0–0.4)
EOSINOPHIL NFR BLD AUTO: 0.7 %
ERYTHROCYTE [DISTWIDTH] IN BLOOD BY AUTOMATED COUNT: 19.4 % (ref 11.5–14.5)
GLUCOSE BLD MANUAL STRIP-MCNC: 158 MG/DL (ref 74–99)
GLUCOSE BLD MANUAL STRIP-MCNC: 182 MG/DL (ref 74–99)
GLUCOSE BLD MANUAL STRIP-MCNC: 292 MG/DL (ref 74–99)
GLUCOSE BLD MANUAL STRIP-MCNC: 361 MG/DL (ref 74–99)
GLUCOSE BLD MANUAL STRIP-MCNC: 93 MG/DL (ref 74–99)
GLUCOSE SERPL-MCNC: 123 MG/DL (ref 74–99)
HCT VFR BLD AUTO: 31.7 % (ref 36–46)
HGB BLD-MCNC: 9.3 G/DL (ref 12–16)
IMM GRANULOCYTES # BLD AUTO: 0.04 X10*3/UL (ref 0–0.5)
IMM GRANULOCYTES NFR BLD AUTO: 0.5 % (ref 0–0.9)
LYMPHOCYTES # BLD AUTO: 1.03 X10*3/UL (ref 0.8–3)
LYMPHOCYTES NFR BLD AUTO: 12.6 %
MAGNESIUM SERPL-MCNC: 2.28 MG/DL (ref 1.6–2.4)
MCH RBC QN AUTO: 22.7 PG (ref 26–34)
MCHC RBC AUTO-ENTMCNC: 29.3 G/DL (ref 32–36)
MCV RBC AUTO: 78 FL (ref 80–100)
MONOCYTES # BLD AUTO: 0.93 X10*3/UL (ref 0.05–0.8)
MONOCYTES NFR BLD AUTO: 11.4 %
NEUTROPHILS # BLD AUTO: 6.08 X10*3/UL (ref 1.6–5.5)
NEUTROPHILS NFR BLD AUTO: 74.7 %
NRBC BLD-RTO: 0 /100 WBCS (ref 0–0)
PHOSPHATE SERPL-MCNC: 4.6 MG/DL (ref 2.5–4.9)
PLATELET # BLD AUTO: 180 X10*3/UL (ref 150–450)
POTASSIUM SERPL-SCNC: 4.3 MMOL/L (ref 3.5–5.3)
RBC # BLD AUTO: 4.09 X10*6/UL (ref 4–5.2)
SODIUM SERPL-SCNC: 136 MMOL/L (ref 136–145)
WBC # BLD AUTO: 8.2 X10*3/UL (ref 4.4–11.3)

## 2025-04-04 PROCEDURE — 2500000004 HC RX 250 GENERAL PHARMACY W/ HCPCS (ALT 636 FOR OP/ED): Performed by: INTERNAL MEDICINE

## 2025-04-04 PROCEDURE — 2500000002 HC RX 250 W HCPCS SELF ADMINISTERED DRUGS (ALT 637 FOR MEDICARE OP, ALT 636 FOR OP/ED): Performed by: INTERNAL MEDICINE

## 2025-04-04 PROCEDURE — 2500000005 HC RX 250 GENERAL PHARMACY W/O HCPCS: Performed by: INTERNAL MEDICINE

## 2025-04-04 PROCEDURE — 85025 COMPLETE CBC W/AUTO DIFF WBC: CPT | Performed by: INTERNAL MEDICINE

## 2025-04-04 PROCEDURE — 99232 SBSQ HOSP IP/OBS MODERATE 35: CPT | Performed by: INTERNAL MEDICINE

## 2025-04-04 PROCEDURE — 83735 ASSAY OF MAGNESIUM: CPT | Performed by: INTERNAL MEDICINE

## 2025-04-04 PROCEDURE — 2500000001 HC RX 250 WO HCPCS SELF ADMINISTERED DRUGS (ALT 637 FOR MEDICARE OP): Performed by: INTERNAL MEDICINE

## 2025-04-04 PROCEDURE — 97110 THERAPEUTIC EXERCISES: CPT | Mod: GO,CO

## 2025-04-04 PROCEDURE — 97530 THERAPEUTIC ACTIVITIES: CPT | Mod: GP,CQ

## 2025-04-04 PROCEDURE — 97110 THERAPEUTIC EXERCISES: CPT | Mod: GP,CQ

## 2025-04-04 PROCEDURE — 36415 COLL VENOUS BLD VENIPUNCTURE: CPT | Performed by: INTERNAL MEDICINE

## 2025-04-04 PROCEDURE — 82947 ASSAY GLUCOSE BLOOD QUANT: CPT

## 2025-04-04 PROCEDURE — 97530 THERAPEUTIC ACTIVITIES: CPT | Mod: GO,CO

## 2025-04-04 PROCEDURE — 2500000004 HC RX 250 GENERAL PHARMACY W/ HCPCS (ALT 636 FOR OP/ED): Mod: JZ | Performed by: INTERNAL MEDICINE

## 2025-04-04 PROCEDURE — 71045 X-RAY EXAM CHEST 1 VIEW: CPT

## 2025-04-04 PROCEDURE — 94640 AIRWAY INHALATION TREATMENT: CPT

## 2025-04-04 PROCEDURE — 80069 RENAL FUNCTION PANEL: CPT | Performed by: INTERNAL MEDICINE

## 2025-04-04 PROCEDURE — 71045 X-RAY EXAM CHEST 1 VIEW: CPT | Performed by: RADIOLOGY

## 2025-04-04 PROCEDURE — 2060000001 HC INTERMEDIATE ICU ROOM DAILY

## 2025-04-04 PROCEDURE — 99233 SBSQ HOSP IP/OBS HIGH 50: CPT | Performed by: INTERNAL MEDICINE

## 2025-04-04 RX ORDER — SODIUM CHLORIDE 9 MG/ML
70 INJECTION, SOLUTION INTRAVENOUS CONTINUOUS
Status: ACTIVE | OUTPATIENT
Start: 2025-04-04 | End: 2025-04-05

## 2025-04-04 RX ORDER — LEVOTHYROXINE SODIUM 200 UG/1
TABLET ORAL
Qty: 104 TABLET | Refills: 3 | Status: SHIPPED | OUTPATIENT
Start: 2025-04-04

## 2025-04-04 RX ADMIN — FLUTICASONE PROPIONATE 2 SPRAY: 50 SPRAY, METERED NASAL at 10:37

## 2025-04-04 RX ADMIN — MONTELUKAST 10 MG: 10 TABLET, FILM COATED ORAL at 20:35

## 2025-04-04 RX ADMIN — HEPARIN SODIUM 7500 UNITS: 5000 INJECTION INTRAVENOUS; SUBCUTANEOUS at 05:36

## 2025-04-04 RX ADMIN — PRAVASTATIN SODIUM 40 MG: 20 TABLET ORAL at 20:36

## 2025-04-04 RX ADMIN — HYDRALAZINE HYDROCHLORIDE 50 MG: 50 TABLET ORAL at 10:31

## 2025-04-04 RX ADMIN — INSULIN LISPRO 2 UNITS: 100 INJECTION, SOLUTION INTRAVENOUS; SUBCUTANEOUS at 12:02

## 2025-04-04 RX ADMIN — ASPIRIN 81 MG: 81 TABLET, CHEWABLE ORAL at 10:31

## 2025-04-04 RX ADMIN — IPRATROPIUM BROMIDE AND ALBUTEROL SULFATE 3 ML: 2.5; .5 SOLUTION RESPIRATORY (INHALATION) at 11:18

## 2025-04-04 RX ADMIN — NYSTATIN 1 APPLICATION: 100000 POWDER TOPICAL at 10:38

## 2025-04-04 RX ADMIN — NYSTATIN 1 APPLICATION: 100000 POWDER TOPICAL at 21:00

## 2025-04-04 RX ADMIN — CALCIUM ACETATE 667 MG: 667 CAPSULE ORAL at 13:28

## 2025-04-04 RX ADMIN — GABAPENTIN 300 MG: 300 CAPSULE ORAL at 10:31

## 2025-04-04 RX ADMIN — SODIUM CHLORIDE 70 ML/HR: 900 INJECTION, SOLUTION INTRAVENOUS at 13:28

## 2025-04-04 RX ADMIN — IPRATROPIUM BROMIDE AND ALBUTEROL SULFATE 3 ML: 2.5; .5 SOLUTION RESPIRATORY (INHALATION) at 01:12

## 2025-04-04 RX ADMIN — Medication 2 L/MIN: at 21:07

## 2025-04-04 RX ADMIN — HEPARIN SODIUM 7500 UNITS: 5000 INJECTION INTRAVENOUS; SUBCUTANEOUS at 21:00

## 2025-04-04 RX ADMIN — HYDRALAZINE HYDROCHLORIDE 50 MG: 50 TABLET ORAL at 20:35

## 2025-04-04 RX ADMIN — FLUTICASONE PROPIONATE 2 SPRAY: 50 SPRAY, METERED NASAL at 20:44

## 2025-04-04 RX ADMIN — INSULIN LISPRO 6 UNITS: 100 INJECTION, SOLUTION INTRAVENOUS; SUBCUTANEOUS at 17:10

## 2025-04-04 RX ADMIN — METHYLPREDNISOLONE SODIUM SUCCINATE 40 MG: 40 INJECTION, POWDER, FOR SOLUTION INTRAMUSCULAR; INTRAVENOUS at 10:34

## 2025-04-04 RX ADMIN — CALCIUM ACETATE 667 MG: 667 CAPSULE ORAL at 18:24

## 2025-04-04 RX ADMIN — Medication 21 PERCENT: at 07:22

## 2025-04-04 RX ADMIN — Medication 2 L/MIN: at 21:02

## 2025-04-04 RX ADMIN — CALCIUM ACETATE 667 MG: 667 CAPSULE ORAL at 10:31

## 2025-04-04 RX ADMIN — HEPARIN SODIUM 7500 UNITS: 5000 INJECTION INTRAVENOUS; SUBCUTANEOUS at 13:28

## 2025-04-04 RX ADMIN — EZETIMIBE 10 MG: 10 TABLET ORAL at 10:34

## 2025-04-04 RX ADMIN — IPRATROPIUM BROMIDE AND ALBUTEROL SULFATE 3 ML: 2.5; .5 SOLUTION RESPIRATORY (INHALATION) at 21:00

## 2025-04-04 RX ADMIN — IPRATROPIUM BROMIDE AND ALBUTEROL SULFATE 3 ML: 2.5; .5 SOLUTION RESPIRATORY (INHALATION) at 07:22

## 2025-04-04 RX ADMIN — ALLOPURINOL 50 MG: 100 TABLET ORAL at 10:34

## 2025-04-04 RX ADMIN — LEVOTHYROXINE SODIUM 200 MCG: 0.1 TABLET ORAL at 05:40

## 2025-04-04 RX ADMIN — METOPROLOL TARTRATE 50 MG: 50 TABLET, FILM COATED ORAL at 20:35

## 2025-04-04 RX ADMIN — POLYETHYLENE GLYCOL 3350 17 G: 17 POWDER, FOR SOLUTION ORAL at 10:30

## 2025-04-04 ASSESSMENT — COGNITIVE AND FUNCTIONAL STATUS - GENERAL
MOBILITY SCORE: 6
TURNING FROM BACK TO SIDE WHILE IN FLAT BAD: A LITTLE
STANDING UP FROM CHAIR USING ARMS: A LOT
TURNING FROM BACK TO SIDE WHILE IN FLAT BAD: TOTAL
EATING MEALS: TOTAL
STANDING UP FROM CHAIR USING ARMS: TOTAL
DAILY ACTIVITIY SCORE: 13
DRESSING REGULAR LOWER BODY CLOTHING: TOTAL
CLIMB 3 TO 5 STEPS WITH RAILING: TOTAL
MOVING FROM LYING ON BACK TO SITTING ON SIDE OF FLAT BED WITH BEDRAILS: TOTAL
MOVING TO AND FROM BED TO CHAIR: A LOT
DRESSING REGULAR UPPER BODY CLOTHING: A LITTLE
MOBILITY SCORE: 6
DRESSING REGULAR UPPER BODY CLOTHING: TOTAL
TOILETING: TOTAL
EATING MEALS: A LITTLE
EATING MEALS: TOTAL
PERSONAL GROOMING: TOTAL
MOVING TO AND FROM BED TO CHAIR: TOTAL
MOVING FROM LYING ON BACK TO SITTING ON SIDE OF FLAT BED WITH BEDRAILS: A LITTLE
HELP NEEDED FOR BATHING: TOTAL
DRESSING REGULAR UPPER BODY CLOTHING: TOTAL
WALKING IN HOSPITAL ROOM: TOTAL
TURNING FROM BACK TO SIDE WHILE IN FLAT BAD: TOTAL
WALKING IN HOSPITAL ROOM: A LOT
PERSONAL GROOMING: TOTAL
CLIMB 3 TO 5 STEPS WITH RAILING: TOTAL
DRESSING REGULAR LOWER BODY CLOTHING: TOTAL
HELP NEEDED FOR BATHING: A LOT
HELP NEEDED FOR BATHING: TOTAL
WALKING IN HOSPITAL ROOM: TOTAL
CLIMB 3 TO 5 STEPS WITH RAILING: TOTAL
MOVING FROM LYING ON BACK TO SITTING ON SIDE OF FLAT BED WITH BEDRAILS: TOTAL
TOILETING: TOTAL
DAILY ACTIVITIY SCORE: 6
TOILETING: TOTAL
MOVING TO AND FROM BED TO CHAIR: TOTAL
MOBILITY SCORE: 13
PERSONAL GROOMING: A LITTLE
DAILY ACTIVITIY SCORE: 6
DRESSING REGULAR LOWER BODY CLOTHING: TOTAL
STANDING UP FROM CHAIR USING ARMS: TOTAL

## 2025-04-04 ASSESSMENT — PAIN SCALES - GENERAL
PAINLEVEL_OUTOF10: 0 - NO PAIN

## 2025-04-04 ASSESSMENT — PAIN - FUNCTIONAL ASSESSMENT
PAIN_FUNCTIONAL_ASSESSMENT: 0-10
PAIN_FUNCTIONAL_ASSESSMENT: 0-10

## 2025-04-04 NOTE — NURSING NOTE
Bed side shift report received. Patient resting comfortably. Alert and oriented. Reports no current concerns at this time. Call light within reach. This nurse assumed care. Bed alarm active. Bed in lowest position.

## 2025-04-04 NOTE — PROGRESS NOTES
[facility] is unable to accept the pt as they do not have any beds available, call placed to pts daughter Tiffanie, message with this workers contact info left on VM.     Final foc and precert pending.      04/04/25 5090   Discharge Planning   Expected Discharge Disposition SNF

## 2025-04-04 NOTE — PROGRESS NOTES
Pulmonary Daily Progress Note     SUBJECTIVE   Analia Murray is a 74 y.o. year old female with h/o asthma, HOLDEN, HTN, DLP, CHF, CKD IV, T2DM, hypothyroidism, anemia, MGUS, who p/w SOB x 2 weeks associated with cough productive of yellow sputum and chest tightness. In the ED work up revealed tachypnea, hypoxia, MARY ANN, BNP 700s, 7/21/78/140 on ABG and mild pulmonary edema on chest imaging. Received Solu-Medrol, Lasix, placed on BiPAP and admitted to ICU with the diagnosis of acute on chronic hypercarbic respiratory failure. Repeat ABG showed persistent hypercarbia while on BiPAP and was consequently intubated after arrival to the ICU. Patient improved and was subsequently extubated on 3/27. After that has worsening hypercarbia, which improved with intermittent NIPPV and Vapo-therm. Now overall improved and transferred to SDU.     Interval History:  No acute overnight events. O2 requirements improved, now only requiring supplemental O2 intermittently. Wore BiPAP 24/10 last night.    Overall is feeling well. Denies SOB, wheezing, CP, abdominal pain, N/V. Complains of a cough productive of white sputum. No other complaints.   MEDICATIONS   Scheduled Medications:  allopurinol, 50 mg, oral, Every other day  [Held by provider] amLODIPine, 10 mg, oral, Daily  aspirin, 81 mg, oral, Daily  calcium acetate, 667 mg, oral, TID after meals  ezetimibe, 10 mg, oral, Daily  fluticasone, 2 spray, Each Nostril, BID  gabapentin, 300 mg, oral, Daily  heparin (porcine), 7,500 Units, subcutaneous, q8h JAYA  hydrALAZINE, 50 mg, oral, BID  insulin lispro, 0-10 Units, subcutaneous, TID AC  insulin lispro, 6 Units, subcutaneous, Once  ipratropium-albuteroL, 3 mL, nebulization, q6h  levothyroxine, 200 mcg, oral, Daily  methylPREDNISolone sodium succinate (PF), 40 mg, intravenous, q24h  [Held by provider] metoprolol succinate XL, 100 mg, oral, Daily  metoprolol tartrate, 50 mg, oral, BID  montelukast, 10 mg, oral, Nightly  nystatin, 1  "Application, Topical, TID  oxygen, , inhalation, Nightly  oxygen, , inhalation, BID  polyethylene glycol, 17 g, oral, Daily  pravastatin, 40 mg, oral, Nightly  sertraline, 50 mg, oral, Daily  sulfur hexafluoride microsphr, 2 mL, intravenous, Once in imaging    Continuous Medications:  sodium chloride 0.9%, 70 mL/hr, Last Rate: 70 mL/hr (04/04/25 1328)    PRN Medications:  PRN medications: dextrose, dextrose, glucagon, glucagon, [Held by provider] nitroglycerin, traZODone   OBJECTIVE    Vital Signs:  Blood pressure 123/63, pulse 62, temperature 36.3 °C (97.3 °F), temperature source Oral, resp. rate 20, height 1.626 m (5' 4.02\"), weight 143 kg (316 lb 1.6 oz), SpO2 93%.     Physical Exam   GENERAL: normal appearance. well nourished. No respiratory distress  HEAD/SINUSES: no sinus tenderness  OROPHARYNX: Moist mucosa, no thrush or lesions. Mallampati 3. RA.   NECK: no JVD, midline trachea without stridor. Thyroid not enlarged  LYMPH NODES : none felt in the cervical, submandibular or supraclavicular regions  LUNGS: Symmetric chest. Good excursion. Clear lung fields b/l with fair air entry. no wheezing. no crackles or rhonchi  CARDIAC: normal S1 and S2; no gallops, rubs or murmurs. Regular rate and rhythm  EXTREMITIES: No edema, no varicose veins  NEURO: grossly normal mental status, CN reflexes and motor strength.   SKIN: Skin turgor normal. No rashes or lesions.   PSYCH: Normal affect  I/Os:    Intake/Output Summary (Last 24 hours) at 4/4/2025 1734  Last data filed at 4/4/2025 1550  Gross per 24 hour   Intake 513.75 ml   Output 425 ml   Net 88.75 ml   Labs:   Results from last 72 hours   Lab Units 04/04/25  0639 04/03/25  0418 04/02/25  0427   SODIUM mmol/L 136 132* 132*   POTASSIUM mmol/L 4.3 4.4 4.2   CHLORIDE mmol/L 102 99 97*   CO2 mmol/L 25 23 24   BUN mg/dL 117* 114* 110*   CREATININE mg/dL 3.73* 4.05* 4.28*   GLUCOSE mg/dL 123* 176* 105*   CALCIUM mg/dL 7.6* 7.1* 7.3*   ANION GAP mmol/L 13 14 15   EGFR " mL/min/1.73m*2 12* 11* 10*   PHOSPHORUS mg/dL 4.6 5.7* 6.7*      Results from last 72 hours   Lab Units 04/04/25  0639 04/03/25  0418 04/02/25  0427   WBC AUTO x10*3/uL 8.2 6.5 7.7   HEMOGLOBIN g/dL 9.3* 9.1* 8.8*   HEMATOCRIT % 31.7* 30.2* 28.6*   PLATELETS AUTO x10*3/uL 180 187 182   NEUTROS PCT AUTO % 74.7 71.9 75.5   LYMPHS PCT AUTO % 12.6 13.8 11.3   MONOS PCT AUTO % 11.4 13.0 12.3   EOS PCT AUTO % 0.7 0.8 0.4           Micro/ID:   Lab Results   Component Value Date    URINECULTURE  03/16/2025     Growth indicates contamination with mixed bacterial michelle. Repeat culture if clinically indicated.    BLOODCULT No growth at 4 days -  FINAL REPORT 07/30/2024    BLOODCULT No growth at 4 days -  FINAL REPORT 07/30/2024     Summary of key imaging results from the last 24 hours:    PFT 7/2020  FEV1/FVC 78%, FEV1 79-->73%, TLC 86%, DLCO 68-->104%     PFTs 2018  No obstruction  No bronchodilator response  No gas trapping, DLCO wnl     XR CHEST 1 VIEW 4/2/2025 9:44 am   IMPRESSION:  Enlarged cardiomediastinal silhouette with right hilar prominence.      There is right basilar platelike atelectasis now seen with left  basilar infiltrate appearing unchanged.      Small bilateral pleural effusions are not excluded.     XR CHEST 1 VIEW; 3/25/2025 12:49 am   IMPRESSION:  1. Mild prominence of the interstitium is nonspecific and may  represent mild pulmonary edema or viral infectious process. No focal  consolidations detected although the retrocardiac space is not well  evaluated.  2. Marked cardiomegaly, unchanged.     TTE 3/25/2025  CONCLUSIONS:   1. Left ventricular ejection fraction is mildly decreased, by visual estimate at 45-50%.   2. There is global hypokinesis of the left ventricle with minor regional variations.   3. Spectral Doppler shows a Grade I (impaired relaxation pattern) of left ventricular diastolic filling with normal left atrial filling pressure.   4. Left ventricular cavity size is mildly dilated.   5.  There is normal right ventricular global systolic function.   6. Moderately elevated right ventricular systolic pressure.  - The Doppler estimated RVSP is moderately elevated right ventricular systolic pressure at 56.0 mmHg.      TTE 6/12/2024  CONCLUSIONS:   1. Left ventricular systolic function is mildly decreased with a 45-50% estimated ejection fraction.   2. The left atrium is moderate to severely dilated.   3. Moderate mitral valve regurgitation.   4. Moderate to severe tricuspid regurgitation visualized.   5. Severely elevated right ventricular systolic pressure.   6. There is moderate pulmonic valve regurgitation.   7. Severely elevated pulmonary artery pressure.   8. The estimated PASP is 70 mmHg.    IMPRESSION   74 YOF with h/o asthma, HOLDEN, HTN, DLP, CHF, CKD IV, T2DM, hypothyroidism, anemia, MGUS, who p/w SOB x 2 weeks associated with cough productive of yellow sputum and chest tightness. In the ED work up revealed tachypnea, hypoxia, MARY ANN, BNP 700s, 7/21/78/140 on ABG and mild pulmonary edema on chest imaging. Received Solu-Medrol, Lasix, placed on BiPAP and admitted to ICU with the diagnosis of acute on chronic hypercarbic respiratory failure. Repeat ABG showed persistent hypercarbia while on BiPAP and was consequently intubated after arrival to the ICU. Patient improved and was subsequently extubated on 3/27. After that has worsening hypercarbia, which improved with intermittent NIPPV and Vapo-therm. Now overall improved and transferred to SDU.   RECOMMENDATIONS   Respiratory failure: acute on chronic with hypoxic and hypercarbia, due to asthma and CHF exacerbation. Now markedly improved and O2 requirements continues to improve. Home O2 unknown.      Continue supplemental O2 as needed.       Home O2 evaluation before discharge      BPH with Is/Acapella      Management of the individual causes as below     Asthma: PFT from 7/2020 showed FEV1/FVC 78%, FEV1 79-->73%, TLC 86%, DLCO 68-->104%. Previous PFTs  were also reviewed, all normal FEV1/FVC ratio. No small airway disease. Currently with acute exacerbation that is improving.        Continue Solu-medrol 40 mg daily, would do a 2 week taper on discharge       Continue Singulair       Continue Duo-Neb        Resume home Advair, Singulair and albuterol on DC        FU with pulm after DC. Patient to call my office at [phone] to make an appointment        Will need repeat PFT when back to baseline     CHF: with acute exacerbation given  on presentation. Now improved.         Volume status optimization as per primary team and nephrology     HOLDEN: on CPAP at home. Currently on BiPAP while in house         Continue nocturnal BiPAP 24/10         Resume CPAP on discharge     PND:          Continue Flonase     DVT prophylaxis: weight based subcutaneous heparin.      Pulmonary will FU while in house.     Charisse Chavez MD   04/04/25 at 5:34 PM

## 2025-04-04 NOTE — PROGRESS NOTES
Physical Therapy    Physical Therapy Treatment    Patient Name: Analia Murray  MRN: [id number]  Department: [address]  Room: [address] [place] [address]  Today's Date: 4/4/2025  Time Calculation  Start Time: 0838  Stop Time: 0901  Time Calculation (min): 23 min         Assessment/Plan   PT Assessment  Barriers to Discharge Home: Caregiver assistance, Physical needs  Caregiver Assistance: Caregiver assistance needed per identified barriers - however, level of patient's required assistance exceeds assistance available at home  End of Session Communication: Bedside nurse  Assessment Comment: Pt continues to present with mobility and strength deficits, pt would benefit from moderate intensity to increase strength and improve functional mobility.  End of Session Patient Position: Up in chair, Alarm on  PT Plan  Inpatient/Swing Bed or Outpatient: Inpatient  PT Plan  Treatment/Interventions: Bed mobility, Transfer training, Gait training, Stair training, Balance training, Neuromuscular re-education, Strengthening, Endurance training, Therapeutic exercise, Therapeutic activity, Home exercise program, Positioning, Postural re-education  PT Plan: Ongoing PT  PT Frequency: 5 times per week  PT Discharge Recommendations: Moderate intensity level of continued care  Equipment Recommended upon Discharge: Wheeled walker  PT Recommended Transfer Status: Assist x2, Assistive device (RW)  PT - OK to Discharge: Yes      General Visit Information:   PT  Visit  PT Received On: 04/04/25  General  Prior to Session Communication: Bedside nurse  Patient Position Received: Bed, 3 rail up, Alarm on  General Comment: Cleared by nursing to be seen for therapy, pt agreeable with tx, supine in bed upon arrival.    Subjective   Precautions:  Precautions  Medical Precautions: Fall precautions, Oxygen therapy device and L/min (2L oxygen)    Objective   Pain:  Pain Assessment  Pain Assessment: 0-10  0-10 (Numeric) Pain Score: 0 - No  pain    Cognition:  Cognition  Overall Cognitive Status: Within Functional Limits  Orientation Level: Oriented X4     Postural Control:  Static Sitting Balance  Static Sitting-Balance Support: Bilateral upper extremity supported, Feet supported  Static Sitting-Level of Assistance: Modified independent  Static Sitting-Comment/Number of Minutes: Good seated static balance.  Static Standing Balance  Static Standing-Balance Support: Bilateral upper extremity supported  Static Standing-Level of Assistance: Moderate assistance  Static Standing-Comment/Number of Minutes: Fair- static standing balance with bilateral UE support on walker.    Treatments:  Therapeutic Exercise  Therapeutic Exercise Performed: Yes  Therapeutic Exercise Activity 1: Bilateral ankle pumps x15  Therapeutic Exercise Activity 2: Bilateral hip flexion x15  Therapeutic Exercise Activity 3: Bilateral knee extension x15    Bed Mobility  Bed Mobility: Yes  Bed Mobility 1  Bed Mobility 1: Supine to sitting  Level of Assistance 1: Minimum assistance  Bed Mobility Comments 1: Min assist for trunk during supine to sit, pt able to bring LE's off EOB without assist from therapist. Min assist to scoot EOB with use of draw sheet.    Ambulation/Gait Training  Ambulation/Gait Training Performed: Yes  Ambulation/Gait Training 1  Surface 1: Level tile  Device 1: Rolling walker  Assistance 1: Moderate assistance  Quality of Gait 1: Wide base of support, Diminished heel strike, Decreased step length, Forward flexed posture  Comments/Distance (ft) 1: 4-5 steps (bed to chair) with wheeled walker, requires assist to maneuver walker, flexed posture, bilateral soft knees, mod assist x2 for balance.    Transfers  Transfer: Yes  Transfer 1  Transfer From 1: Sit to  Transfer to 1: Stand  Technique 1: Sit to stand, Stand to sit  Transfer Device 1: Walker  Transfer Level of Assistance 1: Moderate assistance, +2  Trials/Comments 1: Mod assist x2 for trunk up during sit to stand,  cues for proper hand placement, decreased eccentric control in sitting.    Outcome Measures:  [facility] Basic Mobility  Turning from your back to your side while in a flat bed without using bedrails: A little  Moving from lying on your back to sitting on the side of a flat bed without using bedrails: A little  Moving to and from bed to chair (including a wheelchair): A lot  Standing up from a chair using your arms (e.g. wheelchair or bedside chair): A lot  To walk in hospital room: A lot  Climbing 3-5 steps with railing: Total  Basic Mobility - Total Score: 13    Education Documentation  Precautions, taught by Thao Daniel PTA at 4/4/2025 10:31 AM.  Learner: Patient  Readiness: Acceptance  Method: Explanation  Response: Needs Reinforcement    Mobility Training, taught by Thao Daniel PTA at 4/4/2025 10:31 AM.  Learner: Patient  Readiness: Acceptance  Method: Explanation  Response: Needs Reinforcement    Education Comments  04/04/25 1031 Thao Daniel PTA  Patient educated on the importance of mobility and participation with therapy. Educated on safety and use of call light for assistance.         Encounter Problems       Encounter Problems (Active)       PT Problem       Pt will transition supine<>sit with close S. (Progressing)       Start:  03/27/25    Expected End:  04/19/25            Pt will transfer sit<>stand with FWW & close S. (Progressing)       Start:  03/27/25    Expected End:  04/19/25            Pt will ambulate >/=25 ft with FWW & close S. (Progressing)       Start:  03/27/25    Expected End:  04/19/25

## 2025-04-04 NOTE — PROGRESS NOTES
Spiritual Care Visit  Spiritual Care Request    Reason for Visit:  Routine Visit: Introduction     Request Received From:       Focus of Care:  Visited With: Patient         Refer to :          Spiritual Care Assessment    Spiritual Assessment:                      Care Provided:  Intended Effects: Establish rapport and connectedness, Build relationship of care and support, Convey a calming presence, Demonstrate caring and concern, Lessen someone's feelings of isolation  Methods: Offer spiritual/Christian support  Interventions: Explain  role    Sense of Community and or Rastafarian Affiliation:  Roman Catholic         Addressed Needs/Concerns and/or [place]o Through:  Rastafarian Encounters  Rastafarian Needs: Prayer       Outcome:  Outcome of Spiritual Care Visit: Identifying spiritual/emotional issues, Comfort/healing presence     Advance Directives:         Spiritual Care Annotation        Annotation:  provided patient support while rounding the Unit.  introduced  services of [facility].   explained the role of the  in providing emotional and spiritual support for patient's and family while in admitted to the hospital.    Patient was in the seat next to her hospital bed, patient was having a breathing treatment. Patient appears comfortable.  Patient expressed appreciation for the spiritual care support. Patient states that the  can stop anytime. Patient's family have been active in her care.  Patient requested prayer which was given today.      No other spiritual or Christian needs were expressed. Spiritual care will remain available for support as requested.

## 2025-04-04 NOTE — PROGRESS NOTES
Analia Murray is a 74 y.o. female on day 10 of admission presenting with Acute respiratory failure with hypoxia.      Subjective   Patient is no complaints, creatinine is trending down to 3.73 and she is nonoliguric however her BUN continues to trend up to 117.       Objective          Vitals 24HR  Heart Rate:  [55-65]   Temp:  [36.3 °C (97.3 °F)-37 °C (98.6 °F)]   Resp:  [16-20]   BP: (121-151)/(59-99)   SpO2:  [93 %-100 %]       Intake/Output last 3 Shifts:    Intake/Output Summary (Last 24 hours) at 4/4/2025 1220  Last data filed at 4/4/2025 0900  Gross per 24 hour   Intake 848.75 ml   Output 800 ml   Net 48.75 ml       Physical Exam  Constitutional:       General: She is awake. She is not in acute distress.  Cardiovascular:      Heart sounds:      No friction rub.   Pulmonary:      Effort: Pulmonary effort is normal.      Comments: Diminished breath sounds  Abdominal:      General: Bowel sounds are normal.      Palpations: Abdomen is soft.      Tenderness: There is no guarding or rebound.   Musculoskeletal:      Comments: Mild peripheral edema   Neurological:      Mental Status: She is alert.      Comments: oriented         Relevant Results  Results for orders placed or performed during the hospital encounter of 03/25/25 (from the past 24 hours)   POCT GLUCOSE   Result Value Ref Range    POCT Glucose 233 (H) 74 - 99 mg/dL   POCT GLUCOSE   Result Value Ref Range    POCT Glucose 228 (H) 74 - 99 mg/dL   POCT GLUCOSE   Result Value Ref Range    POCT Glucose 182 (H) 74 - 99 mg/dL   Renal function panel   Result Value Ref Range    Glucose 123 (H) 74 - 99 mg/dL    Sodium 136 136 - 145 mmol/L    Potassium 4.3 3.5 - 5.3 mmol/L    Chloride 102 98 - 107 mmol/L    Bicarbonate 25 21 - 32 mmol/L    Anion Gap 13 10 - 20 mmol/L    Urea Nitrogen 117 (HH) 6 - 23 mg/dL    Creatinine 3.73 (H) 0.50 - 1.05 mg/dL    eGFR 12 (L) >60 mL/min/1.73m*2    Calcium 7.6 (L) 8.6 - 10.3 mg/dL    Phosphorus 4.6 2.5 - 4.9 mg/dL    Albumin  2.9 (L) 3.4 - 5.0 g/dL   CBC and Auto Differential   Result Value Ref Range    WBC 8.2 4.4 - 11.3 x10*3/uL    nRBC 0.0 0.0 - 0.0 /100 WBCs    RBC 4.09 4.00 - 5.20 x10*6/uL    Hemoglobin 9.3 (L) 12.0 - 16.0 g/dL    Hematocrit 31.7 (L) 36.0 - 46.0 %    MCV 78 (L) 80 - 100 fL    MCH 22.7 (L) 26.0 - 34.0 pg    MCHC 29.3 (L) 32.0 - 36.0 g/dL    RDW 19.4 (H) 11.5 - 14.5 %    Platelets 180 150 - 450 x10*3/uL    Neutrophils % 74.7 40.0 - 80.0 %    Immature Granulocytes %, Automated 0.5 0.0 - 0.9 %    Lymphocytes % 12.6 13.0 - 44.0 %    Monocytes % 11.4 2.0 - 10.0 %    Eosinophils % 0.7 0.0 - 6.0 %    Basophils % 0.1 0.0 - 2.0 %    Neutrophils Absolute 6.08 (H) 1.60 - 5.50 x10*3/uL    Immature Granulocytes Absolute, Automated 0.04 0.00 - 0.50 x10*3/uL    Lymphocytes Absolute 1.03 0.80 - 3.00 x10*3/uL    Monocytes Absolute 0.93 (H) 0.05 - 0.80 x10*3/uL    Eosinophils Absolute 0.06 0.00 - 0.40 x10*3/uL    Basophils Absolute 0.01 0.00 - 0.10 x10*3/uL   Magnesium   Result Value Ref Range    Magnesium 2.28 1.60 - 2.40 mg/dL   POCT GLUCOSE   Result Value Ref Range    POCT Glucose 93 74 - 99 mg/dL   POCT GLUCOSE   Result Value Ref Range    POCT Glucose 158 (H) 74 - 99 mg/dL     *Note: Due to a large number of results and/or encounters for the requested time period, some results have not been displayed. A complete set of results can be found in Results Review.            Assessment/Plan   Acute kidney injury superimposed on chronic kidney disease stage 3, her creatinine is trending down and she is nonoliguric however BUN continues to trend up to 117 which I believe is secondary to steroid use.  I have reached out to Pulmonology team to ask if it is possible to wean down her steroids.  No indications for dialysis.  Continue gentle IV fluid hydration   Chronic kidney disease stage IIIb  Acute respiratory failure improving  Bilateral pneumonia with IV antibiotics  Diabetes mellitus type 2  Anemia of chronic kidney  disease  Hypertension  Hyperlipidemia  Obesity    Hoda Slater MD

## 2025-04-04 NOTE — PROGRESS NOTES
Occupational Therapy    OT Treatment    Patient Name: Analia Murray  MRN: [id number]  Department: [address]  Room: [address] [place] [address]  Today's Date: 4/4/2025  Time Calculation  Start Time: 0839  Stop Time: 0859  Time Calculation (min): 20 min        Assessment:  OT Assessment: Pt tolerated session well, progressing toward POC, needed encouragement at start of session for task initiation, therapeutic use of self used throughout session. Pt would benefit from continued skilled OT services to improve strength, balance, and activity tolerance to increase independence with ADLs and task efficiency  Barriers to Discharge Home: Physical needs, Caregiver assistance  End of Session Communication: Bedside nurse  End of Session Patient Position: Up in chair, Alarm on  OT Assessment Results: Decreased ADL status, Decreased upper extremity range of motion, Decreased upper extremity strength, Decreased endurance, Decreased functional mobility, Decreased IADLs  Plan:  Treatment Interventions: ADL retraining, Functional transfer training, UE strengthening/ROM, Endurance training, Patient/family training, Equipment evaluation/education, Compensatory technique education  OT Frequency: 4 times per week  OT Discharge Recommendations: Moderate intensity level of continued care  Equipment Recommended upon Discharge: Wheeled walker  OT Recommended Transfer Status: Maximum assist, Assist of 2  OT - OK to Discharge: Yes  Treatment Interventions: ADL retraining, Functional transfer training, UE strengthening/ROM, Endurance training, Patient/family training, Equipment evaluation/education, Compensatory technique education    Subjective   Previous Visit Info:  OT Last Visit  OT Received On: 04/04/25  General:  General  Reason for Referral: Activities of daily living  Patient Position Received: Bed, 3 rail up, Alarm on  General Comment: Pt cleared for therapy by RN. Pt found supine in bed upon arrival and agreeable to tx with increased  encouragement.  Precautions:  Hearing/Visual Limitations: reading glasses, hearing WFL  Medical Precautions: Fall precautions, Oxygen therapy device and L/min (2L O2 NC)      Vital Signs Comment: O2 maintaining 94-96%     Pain:  Pain Assessment  0-10 (Numeric) Pain Score: 0 - No pain    Objective    Cognition:  Cognition  Orientation Level: Oriented X4  Insight: Mild  Impulsive: Mildly       Bed Mobility/Transfers: Bed Mobility  Bed Mobility: Yes  Bed Mobility 1  Bed Mobility 1: Supine to sitting  Level of Assistance 1: Minimum assistance  Bed Mobility Comments 1: assist for trunk elevation with cues to scoot hips to EOB, requiring increased assist with use of draw sheet, increased time and effort for task completion.    Transfers  Transfer: Yes  Transfer 1  Technique 1: Sit to stand, Stand to sit  Transfer Device 1: Walker  Transfer Level of Assistance 1: Moderate assistance, +2  Trials/Comments 1: mod x2 for trunk elevation, cues provided  for proper hand placement when eccentric lowering, demonstrated mild impulsiveness when safety cues were reinforced for stand>sit transfer task.      Functional Mobility:  Functional Mobility  Functional Mobility Performed: Yes  Functional Mobility 1  Device 1: Rolling walker  Assistance 1: Moderate assistance  Comments 1: pt tolerated taking 4-5 stpes with FWW to chair, assist provided for balance with cues for step sequencing with increased time and effort for task completion.       Therapy/Activity: Therapeutic Exercise  Therapeutic Exercise Performed: Yes  Therapeutic Exercise Activity 1: BUE exercises in shoulder flexion, chest presses and internal/external rotation 1x10 to improve strength and ROM to increase independence with functional activities and transfer tasks.           Outcome Measures:[facility] Daily Activity  Putting on and taking off regular lower body clothing: Total  Bathing (including washing, rinsing, drying): A lot  Putting on and taking off regular upper body  clothing: A little  Toileting, which includes using toilet, bedpan or urinal: Total  Taking care of personal grooming such as brushing teeth: A little  Eating Meals: A little  Daily Activity - Total Score: 13    Education Documentation  ADL Training, taught by CODI Garcia at 4/4/2025 10:23 AM.  Learner: Patient  Readiness: Acceptance  Method: Explanation  Response: Verbalizes Understanding, Needs Reinforcement  Comment: educated on proper hand placement for eccntric lowering.    Education Comments  No comments found.      Problem: OT Goals  Goal: ADLs  Description: Patient will complete ADLs with minimal assistance.   Outcome: Progressing  Goal: Functional Transfer  Description: Patient will complete functional transfer with min assistance utilizing least restrictive device.   Outcome: Progressing  Goal: Therapeutic Exercise   Description: Patient will tolerate ~8-10 minutes of therapeutic exercise to increase aerobic capacity and improve activity tolerance.  Outcome: Progressing

## 2025-04-04 NOTE — PROGRESS NOTES
Analia Murray is a 74 y.o. female on day 10 of admission presenting with Acute respiratory failure with hypoxia.      Subjective   Patient feels better.  Patient was transferred from ICU to stepdown unit she was monitored in ICU for many days she required intubation.Now on nasal cannula.  Patient is on CPAP during the night.  She stated she does not like to wear CPAP.  She denied nausea or vomiting.      Objective     Last Recorded Vitals  /63 (BP Location: Left arm, Patient Position: Sitting)   Pulse 62   Temp 36.3 °C (97.3 °F) (Oral)   Resp 20   Wt 143 kg (316 lb 1.6 oz)   SpO2 93%   Intake/Output last 3 Shifts:    Intake/Output Summary (Last 24 hours) at 4/4/2025 1259  Last data filed at 4/4/2025 0900  Gross per 24 hour   Intake 633.75 ml   Output 800 ml   Net -166.25 ml       Admission Weight  Weight: 132 kg (290 lb) (03/25/25 0041)    Daily Weight  04/03/25 : 143 kg (316 lb 1.6 oz)    Image Results  XR chest 1 view  Narrative: Interpreted By:  Jil Washington,   STUDY:  XR CHEST 1 VIEW 4/4/2025 11:56 am      INDICATION:  Signs/Symptoms:acute respiratory failure      COMPARISON:  04/03/2025      ACCESSION NUMBER(S):  [id number]      ORDERING CLINICIAN:  HEATHER STEPHEN      TECHNIQUE:  AP erect view of the chest      FINDINGS:  Cardiac enlargement is unchanged. There is less right basilar  atelectasis and infiltrate. There is persistent linear atelectasis at  the junction of the left mid and lower lung zones. No pleural  abnormality is observed.      Impression: Improved right basilar atelectasis and infiltrate without complete  resolution.      Persistent linear atelectasis at the junction of the left mid and  lower lung fields.      Stable cardiomegaly.      Signed by: Jil Washington 4/4/2025 12:12 PM  Dictation workstation:   [id number]      Physical Exam    General: cooperating during physical exam, morbidly obese  HEENT: Pupils are equal and reactive to light and commendation , oral mucosa moist,  no JVD   Cardiovascular: PMI nondisplaced  Lungs: Diminished breath sound at both lung bases, scattered wheezing   abdomen: No hepatosplenomegaly appreciated, soft , not tender, positive bowel sounds, positive bowel movement.  Neuro: Alert and oriented x2, strength in upper and lower extremities , sensation intact.  Psych: Patient had great insight was going on  Musculoskeletal: No swelling in lower extremities, no limitation in range of motion.  Vascular: Pulses are intact in upper and lower extremities  Skin: No petechiae, ecchymosis or other stigmata for dermatology disease.     Assessment/Plan      Acute respiratory failure with hypoxia and hypercapnia.  Clinically improved.  Patient required intubation on admission.  Continue with oxygen therapy to maintain pulse ox more than 92%.  Clinically improved    Acute kidney injury  Dr. Slater on the case.  Monitor close  Check RFP in AM.      COPD exacerbation  Patient is on Solu-Medrol  Continue with aerosol treatment  Consult pulmonary    Obstructive sleep apnea  On CPAP.  Patient is on oxygen at home.    Morbid obesity  Hyperlipidemia  Advised again diet  On statin and Zetia    Diabetes mellitus type 2  Cover with insulin sliding scale.    Hypothyroidism  TSH was 11.  Continue with levothyroxine 200 mg daily    Anxiety  Depression  Now continue sertraline    Gout  Patient is on allopurinol.    Neuropathic pain   on gabapentin    Chronic CHF with diastolic dysfunction  Lasix on hold because of acute kidney injury  Monitor close   from last 2D echo patient had ejection fraction 45- 50%    Check CBC and RFP in AM.  Deconditioning  PT /OT evaluated her, moderate intensity level of care  DVT prophylaxis  Waiting for pre-CERT      Krysta Ramirez MD

## 2025-04-04 NOTE — CARE PLAN
The patient's goals for the shift include      The clinical goals for the shift include pt will maintain adequate 02 saturation  No barriers identified that would impede plan of care.    Problem: Skin  Goal: Decreased wound size/increased tissue granulation at next dressing change  Outcome: Progressing  Goal: Participates in plan/prevention/treatment measures  Outcome: Progressing  Goal: Prevent/manage excess moisture  Outcome: Progressing  Goal: Prevent/minimize sheer/friction injuries  Outcome: Progressing  Goal: Promote/optimize nutrition  Outcome: Progressing  Goal: Promote skin healing  Outcome: Progressing

## 2025-04-05 ENCOUNTER — APPOINTMENT (OUTPATIENT)
Dept: RADIOLOGY | Facility: HOSPITAL | Age: 75
DRG: 208 | End: 2025-04-05
Payer: MEDICARE

## 2025-04-05 LAB
ALBUMIN SERPL BCP-MCNC: 2.9 G/DL (ref 3.4–5)
ANION GAP SERPL CALCULATED.3IONS-SCNC: 13 MMOL/L (ref 10–20)
BASOPHILS # BLD AUTO: 0 X10*3/UL (ref 0–0.1)
BASOPHILS NFR BLD AUTO: 0 %
BUN SERPL-MCNC: 113 MG/DL (ref 6–23)
CALCIUM SERPL-MCNC: 7.7 MG/DL (ref 8.6–10.3)
CHLORIDE SERPL-SCNC: 105 MMOL/L (ref 98–107)
CO2 SERPL-SCNC: 23 MMOL/L (ref 21–32)
CREAT SERPL-MCNC: 3.36 MG/DL (ref 0.5–1.05)
EGFRCR SERPLBLD CKD-EPI 2021: 14 ML/MIN/1.73M*2
EOSINOPHIL # BLD AUTO: 0.03 X10*3/UL (ref 0–0.4)
EOSINOPHIL NFR BLD AUTO: 0.4 %
ERYTHROCYTE [DISTWIDTH] IN BLOOD BY AUTOMATED COUNT: 19.5 % (ref 11.5–14.5)
GLUCOSE BLD MANUAL STRIP-MCNC: 171 MG/DL (ref 74–99)
GLUCOSE BLD MANUAL STRIP-MCNC: 211 MG/DL (ref 74–99)
GLUCOSE BLD MANUAL STRIP-MCNC: 314 MG/DL (ref 74–99)
GLUCOSE BLD MANUAL STRIP-MCNC: 360 MG/DL (ref 74–99)
GLUCOSE SERPL-MCNC: 203 MG/DL (ref 74–99)
HCT VFR BLD AUTO: 29.3 % (ref 36–46)
HGB BLD-MCNC: 8.9 G/DL (ref 12–16)
IMM GRANULOCYTES # BLD AUTO: 0.05 X10*3/UL (ref 0–0.5)
IMM GRANULOCYTES NFR BLD AUTO: 0.7 % (ref 0–0.9)
LYMPHOCYTES # BLD AUTO: 0.83 X10*3/UL (ref 0.8–3)
LYMPHOCYTES NFR BLD AUTO: 11.6 %
MCH RBC QN AUTO: 22.7 PG (ref 26–34)
MCHC RBC AUTO-ENTMCNC: 30.4 G/DL (ref 32–36)
MCV RBC AUTO: 75 FL (ref 80–100)
MONOCYTES # BLD AUTO: 0.7 X10*3/UL (ref 0.05–0.8)
MONOCYTES NFR BLD AUTO: 9.8 %
NEUTROPHILS # BLD AUTO: 5.56 X10*3/UL (ref 1.6–5.5)
NEUTROPHILS NFR BLD AUTO: 77.5 %
NRBC BLD-RTO: 0 /100 WBCS (ref 0–0)
PHOSPHATE SERPL-MCNC: 3.7 MG/DL (ref 2.5–4.9)
PLATELET # BLD AUTO: 179 X10*3/UL (ref 150–450)
POTASSIUM SERPL-SCNC: 4.3 MMOL/L (ref 3.5–5.3)
RBC # BLD AUTO: 3.92 X10*6/UL (ref 4–5.2)
SODIUM SERPL-SCNC: 137 MMOL/L (ref 136–145)
WBC # BLD AUTO: 7.2 X10*3/UL (ref 4.4–11.3)

## 2025-04-05 PROCEDURE — 80069 RENAL FUNCTION PANEL: CPT | Performed by: INTERNAL MEDICINE

## 2025-04-05 PROCEDURE — 85025 COMPLETE CBC W/AUTO DIFF WBC: CPT | Performed by: INTERNAL MEDICINE

## 2025-04-05 PROCEDURE — 2500000002 HC RX 250 W HCPCS SELF ADMINISTERED DRUGS (ALT 637 FOR MEDICARE OP, ALT 636 FOR OP/ED): Performed by: INTERNAL MEDICINE

## 2025-04-05 PROCEDURE — 2500000001 HC RX 250 WO HCPCS SELF ADMINISTERED DRUGS (ALT 637 FOR MEDICARE OP): Performed by: INTERNAL MEDICINE

## 2025-04-05 PROCEDURE — 2500000004 HC RX 250 GENERAL PHARMACY W/ HCPCS (ALT 636 FOR OP/ED): Performed by: INTERNAL MEDICINE

## 2025-04-05 PROCEDURE — 94640 AIRWAY INHALATION TREATMENT: CPT

## 2025-04-05 PROCEDURE — 99232 SBSQ HOSP IP/OBS MODERATE 35: CPT | Performed by: STUDENT IN AN ORGANIZED HEALTH CARE EDUCATION/TRAINING PROGRAM

## 2025-04-05 PROCEDURE — 2500000005 HC RX 250 GENERAL PHARMACY W/O HCPCS: Performed by: INTERNAL MEDICINE

## 2025-04-05 PROCEDURE — 2060000001 HC INTERMEDIATE ICU ROOM DAILY

## 2025-04-05 PROCEDURE — 71045 X-RAY EXAM CHEST 1 VIEW: CPT

## 2025-04-05 PROCEDURE — 36415 COLL VENOUS BLD VENIPUNCTURE: CPT | Performed by: INTERNAL MEDICINE

## 2025-04-05 PROCEDURE — 82947 ASSAY GLUCOSE BLOOD QUANT: CPT

## 2025-04-05 PROCEDURE — 99232 SBSQ HOSP IP/OBS MODERATE 35: CPT | Performed by: INTERNAL MEDICINE

## 2025-04-05 PROCEDURE — 2500000004 HC RX 250 GENERAL PHARMACY W/ HCPCS (ALT 636 FOR OP/ED): Mod: JZ | Performed by: INTERNAL MEDICINE

## 2025-04-05 PROCEDURE — 71045 X-RAY EXAM CHEST 1 VIEW: CPT | Performed by: RADIOLOGY

## 2025-04-05 RX ORDER — IPRATROPIUM BROMIDE AND ALBUTEROL SULFATE 2.5; .5 MG/3ML; MG/3ML
3 SOLUTION RESPIRATORY (INHALATION)
Status: DISCONTINUED | OUTPATIENT
Start: 2025-04-05 | End: 2025-04-17 | Stop reason: HOSPADM

## 2025-04-05 RX ORDER — ALBUTEROL SULFATE 0.83 MG/ML
2.5 SOLUTION RESPIRATORY (INHALATION) EVERY 2 HOUR PRN
Status: DISCONTINUED | OUTPATIENT
Start: 2025-04-05 | End: 2025-04-17 | Stop reason: HOSPADM

## 2025-04-05 RX ADMIN — HYDRALAZINE HYDROCHLORIDE 50 MG: 50 TABLET ORAL at 21:17

## 2025-04-05 RX ADMIN — Medication 2 L/MIN: at 22:03

## 2025-04-05 RX ADMIN — IPRATROPIUM BROMIDE AND ALBUTEROL SULFATE 3 ML: 2.5; .5 SOLUTION RESPIRATORY (INHALATION) at 07:35

## 2025-04-05 RX ADMIN — HEPARIN SODIUM 7500 UNITS: 5000 INJECTION INTRAVENOUS; SUBCUTANEOUS at 15:16

## 2025-04-05 RX ADMIN — METHYLPREDNISOLONE SODIUM SUCCINATE 40 MG: 40 INJECTION, POWDER, FOR SOLUTION INTRAMUSCULAR; INTRAVENOUS at 10:22

## 2025-04-05 RX ADMIN — SODIUM CHLORIDE 70 ML/HR: 900 INJECTION, SOLUTION INTRAVENOUS at 03:56

## 2025-04-05 RX ADMIN — Medication 2 L/MIN: at 21:10

## 2025-04-05 RX ADMIN — IPRATROPIUM BROMIDE AND ALBUTEROL SULFATE 3 ML: 2.5; .5 SOLUTION RESPIRATORY (INHALATION) at 12:05

## 2025-04-05 RX ADMIN — CALCIUM ACETATE 667 MG: 667 CAPSULE ORAL at 10:21

## 2025-04-05 RX ADMIN — NYSTATIN 1 APPLICATION: 100000 POWDER TOPICAL at 21:18

## 2025-04-05 RX ADMIN — METOPROLOL TARTRATE 50 MG: 50 TABLET, FILM COATED ORAL at 10:22

## 2025-04-05 RX ADMIN — IPRATROPIUM BROMIDE AND ALBUTEROL SULFATE 3 ML: 2.5; .5 SOLUTION RESPIRATORY (INHALATION) at 22:04

## 2025-04-05 RX ADMIN — PRAVASTATIN SODIUM 40 MG: 20 TABLET ORAL at 21:17

## 2025-04-05 RX ADMIN — HEPARIN SODIUM 7500 UNITS: 5000 INJECTION INTRAVENOUS; SUBCUTANEOUS at 21:19

## 2025-04-05 RX ADMIN — SERTRALINE HYDROCHLORIDE 50 MG: 50 TABLET ORAL at 10:21

## 2025-04-05 RX ADMIN — MONTELUKAST 10 MG: 10 TABLET, FILM COATED ORAL at 21:17

## 2025-04-05 RX ADMIN — IPRATROPIUM BROMIDE AND ALBUTEROL SULFATE 3 ML: 2.5; .5 SOLUTION RESPIRATORY (INHALATION) at 00:38

## 2025-04-05 RX ADMIN — GABAPENTIN 300 MG: 300 CAPSULE ORAL at 10:22

## 2025-04-05 RX ADMIN — LEVOTHYROXINE SODIUM 200 MCG: 0.1 TABLET ORAL at 06:32

## 2025-04-05 RX ADMIN — METOPROLOL TARTRATE 50 MG: 50 TABLET, FILM COATED ORAL at 21:17

## 2025-04-05 RX ADMIN — INSULIN LISPRO 8 UNITS: 100 INJECTION, SOLUTION INTRAVENOUS; SUBCUTANEOUS at 18:04

## 2025-04-05 RX ADMIN — Medication 2 L/MIN: at 07:37

## 2025-04-05 RX ADMIN — ASPIRIN 81 MG: 81 TABLET, CHEWABLE ORAL at 10:22

## 2025-04-05 RX ADMIN — HYDRALAZINE HYDROCHLORIDE 50 MG: 50 TABLET ORAL at 10:21

## 2025-04-05 RX ADMIN — CALCIUM ACETATE 667 MG: 667 CAPSULE ORAL at 15:16

## 2025-04-05 RX ADMIN — EZETIMIBE 10 MG: 10 TABLET ORAL at 10:21

## 2025-04-05 RX ADMIN — INSULIN LISPRO 4 UNITS: 100 INJECTION, SOLUTION INTRAVENOUS; SUBCUTANEOUS at 12:42

## 2025-04-05 RX ADMIN — HEPARIN SODIUM 7500 UNITS: 5000 INJECTION INTRAVENOUS; SUBCUTANEOUS at 06:32

## 2025-04-05 RX ADMIN — FLUTICASONE PROPIONATE 2 SPRAY: 50 SPRAY, METERED NASAL at 21:18

## 2025-04-05 ASSESSMENT — COGNITIVE AND FUNCTIONAL STATUS - GENERAL
PERSONAL GROOMING: A LOT
TOILETING: A LOT
PERSONAL GROOMING: A LOT
WALKING IN HOSPITAL ROOM: A LOT
MOVING TO AND FROM BED TO CHAIR: A LOT
MOVING FROM LYING ON BACK TO SITTING ON SIDE OF FLAT BED WITH BEDRAILS: A LOT
EATING MEALS: A LITTLE
MOBILITY SCORE: 11
CLIMB 3 TO 5 STEPS WITH RAILING: TOTAL
STANDING UP FROM CHAIR USING ARMS: A LOT
MOVING TO AND FROM BED TO CHAIR: A LOT
DRESSING REGULAR LOWER BODY CLOTHING: A LOT
DRESSING REGULAR LOWER BODY CLOTHING: A LOT
CLIMB 3 TO 5 STEPS WITH RAILING: TOTAL
EATING MEALS: A LITTLE
DRESSING REGULAR UPPER BODY CLOTHING: A LOT
TURNING FROM BACK TO SIDE WHILE IN FLAT BAD: A LOT
DRESSING REGULAR UPPER BODY CLOTHING: A LOT
MOVING FROM LYING ON BACK TO SITTING ON SIDE OF FLAT BED WITH BEDRAILS: A LOT
STANDING UP FROM CHAIR USING ARMS: A LOT
TOILETING: A LOT
HELP NEEDED FOR BATHING: A LOT
DAILY ACTIVITIY SCORE: 13
MOBILITY SCORE: 11
TURNING FROM BACK TO SIDE WHILE IN FLAT BAD: A LOT
WALKING IN HOSPITAL ROOM: A LOT
DAILY ACTIVITIY SCORE: 13
HELP NEEDED FOR BATHING: A LOT

## 2025-04-05 ASSESSMENT — PAIN SCALES - GENERAL
PAINLEVEL_OUTOF10: 0 - NO PAIN
PAINLEVEL_OUTOF10: 0 - NO PAIN

## 2025-04-05 ASSESSMENT — PAIN SCALES - WONG BAKER: WONGBAKER_NUMERICALRESPONSE: NO HURT

## 2025-04-05 ASSESSMENT — PAIN - FUNCTIONAL ASSESSMENT: PAIN_FUNCTIONAL_ASSESSMENT: 0-10

## 2025-04-05 NOTE — NURSING NOTE
Lab called fr critical Bun of 113 this morning. Dr. Slater secure messaged about critical value. Care ongoing.

## 2025-04-05 NOTE — NURSING NOTE
End of shift, bedside shift report given. Patient laying in bed resting comfortably, with call light within reach. Patient verbalizes no new concerns at this time. Patient does state she is feeling some anxiety at this time. Patient's family at bedside. Bed alarm active. Bed at lowest position.

## 2025-04-05 NOTE — NURSING NOTE
Assumed care of this pt. Pt sitting in bed, breathing even and unlabored, NC in place. NAD, family at bedside. BSSR complete. Continuing to monitor

## 2025-04-05 NOTE — CARE PLAN
The patient's goals for the shift include      The clinical goals for the shift include wear BiPAP    Pt has been compliant with BiPAP since placed by RT, negligible alarms for air leak throughout shift. Continue care plan

## 2025-04-05 NOTE — NURSING NOTE
Assumed care of pt. Pt lying in bed resting quietly this morning. Respirations even and unlabored on 2L NC. BSSR complete. Pt ambulation is key today. No further needs at this time. Care ongoing.

## 2025-04-05 NOTE — NURSING NOTE
Pt refuses to get up to chair today due to feeling tired and unwell. This RN will attempt to reposition pt frequently. Care ongoing.

## 2025-04-05 NOTE — NURSING NOTE
Pts bed changed, bathed, and pure wick replaced. Pt had large urine incontinence around purewick. Pt lying comfortably with son at bedside. Care ongoing.

## 2025-04-05 NOTE — PROGRESS NOTES
Pulmonary Daily Progress Note     SUBJECTIVE   Analia Murray is a 74 y.o. year old female with h/o asthma, HOLDEN, HTN, DLP, CHF, CKD IV, T2DM, hypothyroidism, anemia, MGUS, who p/w SOB x 2 weeks associated with cough productive of yellow sputum and chest tightness. In the ED work up revealed tachypnea, hypoxia, MARY ANN, BNP 700s, 7/21/78/140 on ABG and mild pulmonary edema on chest imaging. Received Solu-Medrol, Lasix, placed on BiPAP and admitted to ICU with the diagnosis of acute on chronic hypercarbic respiratory failure. Repeat ABG showed persistent hypercarbia while on BiPAP and was consequently intubated after arrival to the ICU. Patient improved and was subsequently extubated on 3/27. After that has worsening hypercarbia, which improved with intermittent NIPPV and Vapo-therm. Now overall improved and transferred to regular nursing floor.     Interval History:  No acute overnight events. On 2L NC lisbeth.   Tolerating BiPap at night. No shortness of breath today.    MEDICATIONS   Scheduled Medications:  allopurinol, 50 mg, oral, Every other day  [Held by provider] amLODIPine, 10 mg, oral, Daily  aspirin, 81 mg, oral, Daily  calcium acetate, 667 mg, oral, TID after meals  ezetimibe, 10 mg, oral, Daily  fluticasone, 2 spray, Each Nostril, BID  gabapentin, 300 mg, oral, Daily  heparin (porcine), 7,500 Units, subcutaneous, q8h JAYA  hydrALAZINE, 50 mg, oral, BID  insulin lispro, 0-10 Units, subcutaneous, TID AC  insulin lispro, 6 Units, subcutaneous, Once  ipratropium-albuteroL, 3 mL, nebulization, TID  levothyroxine, 200 mcg, oral, Daily  methylPREDNISolone sodium succinate (PF), 40 mg, intravenous, q24h  [Held by provider] metoprolol succinate XL, 100 mg, oral, Daily  metoprolol tartrate, 50 mg, oral, BID  montelukast, 10 mg, oral, Nightly  nystatin, 1 Application, Topical, TID  oxygen, , inhalation, Nightly  oxygen, , inhalation, BID  polyethylene glycol, 17 g, oral, Daily  pravastatin, 40 mg, oral,  "Nightly  sertraline, 50 mg, oral, Daily  sulfur hexafluoride microsphr, 2 mL, intravenous, Once in imaging    Continuous Medications:     PRN Medications:  PRN medications: albuterol, dextrose, dextrose, glucagon, glucagon, [Held by provider] nitroglycerin, traZODone   OBJECTIVE    Vital Signs:  Blood pressure 161/77, pulse 67, temperature 36.2 °C (97.2 °F), temperature source Temporal, resp. rate 17, height 1.626 m (5' 4.02\"), weight 142 kg (312 lb 12.8 oz), SpO2 95%.     Physical Exam   GENERAL: normal appearance. well nourished. No respiratory distress  HEAD/SINUSES: no sinus tenderness  OROPHARYNX: Moist mucosa, no thrush or lesions. Mallampati 3. RA.   NECK: no JVD, midline trachea without stridor. Thyroid not enlarged  LYMPH NODES : none felt in the cervical, submandibular or supraclavicular regions  LUNGS: Symmetric chest. Good excursion. Clear lung fields b/l with fair air entry. Faint wheeze in RUL.  CARDIAC: normal S1 and S2; no gallops, rubs or murmurs. Regular rate and rhythm  EXTREMITIES: No edema, no varicose veins  NEURO: grossly normal mental status, CN reflexes and motor strength.   SKIN: Skin turgor normal. No rashes or lesions.   PSYCH: Normal affect  I/Os:    Intake/Output Summary (Last 24 hours) at 4/5/2025 1738  Last data filed at 4/5/2025 0400  Gross per 24 hour   Intake 625.5 ml   Output 850 ml   Net -224.5 ml   Labs:   Results from last 72 hours   Lab Units 04/05/25  0609 04/04/25  0639 04/03/25  0418   SODIUM mmol/L 137 136 132*   POTASSIUM mmol/L 4.3 4.3 4.4   CHLORIDE mmol/L 105 102 99   CO2 mmol/L 23 25 23   BUN mg/dL 113* 117* 114*   CREATININE mg/dL 3.36* 3.73* 4.05*   GLUCOSE mg/dL 203* 123* 176*   CALCIUM mg/dL 7.7* 7.6* 7.1*   ANION GAP mmol/L 13 13 14   EGFR mL/min/1.73m*2 14* 12* 11*   PHOSPHORUS mg/dL 3.7 4.6 5.7*      Results from last 72 hours   Lab Units 04/05/25  0609 04/04/25  0639 04/03/25  0418   WBC AUTO x10*3/uL 7.2 8.2 6.5   HEMOGLOBIN g/dL 8.9* 9.3* 9.1*   HEMATOCRIT % " 29.3* 31.7* 30.2*   PLATELETS AUTO x10*3/uL 179 180 187   NEUTROS PCT AUTO % 77.5 74.7 71.9   LYMPHS PCT AUTO % 11.6 12.6 13.8   MONOS PCT AUTO % 9.8 11.4 13.0   EOS PCT AUTO % 0.4 0.7 0.8           Micro/ID:   Lab Results   Component Value Date    URINECULTURE  03/16/2025     Growth indicates contamination with mixed bacterial michelle. Repeat culture if clinically indicated.    BLOODCULT No growth at 4 days -  FINAL REPORT 07/30/2024    BLOODCULT No growth at 4 days -  FINAL REPORT 07/30/2024     Summary of key imaging results from the last 24 hours:    PFT 7/2020  FEV1/FVC 78%, FEV1 79-->73%, TLC 86%, DLCO 68-->104%     PFTs 2018  No obstruction  No bronchodilator response  No gas trapping, DLCO wnl     XR CHEST 1 VIEW 4/2/2025 9:44 am   IMPRESSION:  Enlarged cardiomediastinal silhouette with right hilar prominence.      There is right basilar platelike atelectasis now seen with left  basilar infiltrate appearing unchanged.      Small bilateral pleural effusions are not excluded.     XR CHEST 1 VIEW; 3/25/2025 12:49 am   IMPRESSION:  1. Mild prominence of the interstitium is nonspecific and may  represent mild pulmonary edema or viral infectious process. No focal  consolidations detected although the retrocardiac space is not well  evaluated.  2. Marked cardiomegaly, unchanged.     TTE 3/25/2025  CONCLUSIONS:   1. Left ventricular ejection fraction is mildly decreased, by visual estimate at 45-50%.   2. There is global hypokinesis of the left ventricle with minor regional variations.   3. Spectral Doppler shows a Grade I (impaired relaxation pattern) of left ventricular diastolic filling with normal left atrial filling pressure.   4. Left ventricular cavity size is mildly dilated.   5. There is normal right ventricular global systolic function.   6. Moderately elevated right ventricular systolic pressure.  - The Doppler estimated RVSP is moderately elevated right ventricular systolic pressure at 56.0 mmHg.      TTE  6/12/2024  CONCLUSIONS:   1. Left ventricular systolic function is mildly decreased with a 45-50% estimated ejection fraction.   2. The left atrium is moderate to severely dilated.   3. Moderate mitral valve regurgitation.   4. Moderate to severe tricuspid regurgitation visualized.   5. Severely elevated right ventricular systolic pressure.   6. There is moderate pulmonic valve regurgitation.   7. Severely elevated pulmonary artery pressure.   8. The estimated PASP is 70 mmHg.    IMPRESSION   74 YOF with h/o asthma, HOLDEN, HTN, DLP, CHF, CKD IV, T2DM, hypothyroidism, anemia, MGUS, who p/w SOB x 2 weeks associated with cough productive of yellow sputum and chest tightness. In the ED work up revealed tachypnea, hypoxia, MARY ANN, BNP 700s, 7/21/78/140 on ABG and mild pulmonary edema on chest imaging. Received Solu-Medrol, Lasix, placed on BiPAP and admitted to ICU with the diagnosis of acute on chronic hypercarbic respiratory failure. Repeat ABG showed persistent hypercarbia while on BiPAP and was consequently intubated after arrival to the ICU. Patient improved and was subsequently extubated on 3/27. After that has worsening hypercarbia, which improved with intermittent NIPPV and Vapo-therm. Now overall improved and transferred to regular nursing floor.   RECOMMENDATIONS   Respiratory failure: acute on chronic with hypoxic and hypercarbia, due to asthma and CHF exacerbation. Now markedly improved and O2 requirements continues to improve. Home O2 unknown.      Continue supplemental O2 as needed.       Home O2 evaluation before discharge      BPH with Is/Acapella      Management of the individual causes as below     Asthma: PFT from 7/2020 showed FEV1/FVC 78%, FEV1 79-->73%, TLC 86%, DLCO 68-->104%. Previous PFTs were also reviewed, all normal FEV1/FVC ratio. No small airway disease. Currently with acute exacerbation that is improving.        Continue Solu-medrol 40 mg daily, would do a 2 week taper on discharge        Continue Singulair       Continue Duo-Neb        Resume home Advair, Singulair and albuterol on DC        FU with pulm after DC. Patient to call Dr. Chavez's office at [phone] to make an appointment        Will need repeat PFT when back to baseline     CHF: with acute exacerbation given  on presentation. Now improved.         Volume status optimization as per primary team and nephrology     HOLDEN: on CPAP at home. Currently on BiPAP while in house         Continue nocturnal BiPAP 24/10         Resume CPAP on discharge     PND:          Continue Flonase     DVT prophylaxis: weight based subcutaneous heparin.      Pulmonary will FU while in house.     Kavita Almazan MD PhD   04/05/25 at 5:38 PM

## 2025-04-05 NOTE — NURSING NOTE
Pt in with family this afternoon. Daughter and great grand baby at bedside. Pt enjoying piece of hard candy. No further needs at this time. Care ongoing.

## 2025-04-05 NOTE — PROGRESS NOTES
Analia Murray is a 74 y.o. female on day 11 of admission presenting with Acute respiratory failure with hypoxia.      Subjective   Patient has been afebrile during the night.  She denied chest pain, abdominal pain.  Patient remain on nasal cannula.    Objective     Last Recorded Vitals  /69 (BP Location: Left arm, Patient Position: Lying)   Pulse 62   Temp 36.7 °C (98.1 °F) (Temporal)   Resp 16   Wt 142 kg (312 lb 12.8 oz)   SpO2 99%   Intake/Output last 3 Shifts:    Intake/Output Summary (Last 24 hours) at 4/5/2025 1042  Last data filed at 4/5/2025 0400  Gross per 24 hour   Intake 965.5 ml   Output 1275 ml   Net -309.5 ml       Admission Weight  Weight: 132 kg (290 lb) (03/25/25 0041)    Daily Weight  04/05/25 : 142 kg (312 lb 12.8 oz)    Image Results  XR chest 1 view  Narrative: Interpreted By:  Jil Washington,   STUDY:  XR CHEST 1 VIEW 4/4/2025 11:56 am      INDICATION:  Signs/Symptoms:acute respiratory failure      COMPARISON:  04/03/2025      ACCESSION NUMBER(S):  [id number]      ORDERING CLINICIAN:  HEATHER STEPHEN      TECHNIQUE:  AP erect view of the chest      FINDINGS:  Cardiac enlargement is unchanged. There is less right basilar  atelectasis and infiltrate. There is persistent linear atelectasis at  the junction of the left mid and lower lung zones. No pleural  abnormality is observed.      Impression: Improved right basilar atelectasis and infiltrate without complete  resolution.      Persistent linear atelectasis at the junction of the left mid and  lower lung fields.      Stable cardiomegaly.      Signed by: Jil Washington 4/4/2025 12:12 PM  Dictation workstation:   [id number]      Physical Exam    General: cooperating during physical exam, morbidly obese  HEENT: Pupils are equal and reactive to light and commendation , oral mucosa moist, no JVD   Cardiovascular: PMI nondisplaced  Lungs: Diminished breath sound at both lung bases, scattered wheezing   abdomen: No hepatosplenomegaly  appreciated, soft , not tender, positive bowel sounds, positive bowel movement.  Neuro: Alert and oriented x2, strength in upper and lower extremities , sensation intact.  Psych: Patient had great insight was going on  Musculoskeletal: No swelling in lower extremities, no limitation in range of motion.  Vascular: Pulses are intact in upper and lower extremities  Skin: No petechiae, ecchymosis or other stigmata for dermatology disease.     Assessment/Plan      Acute respiratory failure with hypoxia and hypercapnia.  Clinically improved.  Patient required intubation on admission.  Continue with oxygen therapy to maintain pulse ox more than 92%.  Clinically improved    Acute kidney injury  Dr. Slater on the case.  Monitor close  Check RFP in AM.      COPD exacerbation  Patient is on Solu-Medrol  Continue with aerosol treatment  Consult pulmonary    Obstructive sleep apnea  On CPAP.  Patient is on oxygen at home.    Morbid obesity  Hyperlipidemia  Advised again diet  On statin and Zetia    Diabetes mellitus type 2  Cover with insulin sliding scale.    Hypothyroidism  TSH was 11.  Continue with levothyroxine 200 mg daily    Anxiety  Depression  Now continue sertraline    Gout  Patient is on allopurinol.    Neuropathic pain   on gabapentin    Chronic CHF with diastolic dysfunction  Lasix on hold because of acute kidney injury  Monitor close   from last 2D echo patient had ejection fraction 45- 50%    Check CBC and RFP in AM.  Deconditioning  PT /OT evaluated her, moderate intensity level of care  DVT prophylaxis  Waiting for pre-CERT for skilled nursing facility      Krysta Ramirez MD

## 2025-04-05 NOTE — PROGRESS NOTES
"Analia Murray is a 74 y.o. female on day 11 of admission presenting with Acute respiratory failure with hypoxia.    Subjective    Patient seen for acute renal failure on top of chronic kidney disease stage 3 admitted with pneumonia acute respiratory failure patient is resting comfortably in bed daughter by the bedside denies any shortness of breath she denies any nausea vomiting diarrhea however she is still very weak  Objective     Physical Exam  Constitutional:       General: She is not in acute distress.     Appearance: Normal appearance. She is not toxic-appearing.   Neck:      Vascular: No carotid bruit.   Cardiovascular:      Heart sounds: No murmur heard.     No friction rub. No gallop.   Pulmonary:      Breath sounds: No wheezing, rhonchi or rales.   Abdominal:      Tenderness: There is no abdominal tenderness. There is no right CVA tenderness, left CVA tenderness or rebound.   Musculoskeletal:         General: No tenderness.      Cervical back: Neck supple.      Right lower leg: No edema.      Left lower leg: No edema.   Lymphadenopathy:      Cervical: No cervical adenopathy.         Last Recorded Vitals  Blood pressure 146/82, pulse 63, temperature 36.2 °C (97.2 °F), temperature source Temporal, resp. rate 16, height 1.626 m (5' 4.02\"), weight 142 kg (312 lb 12.8 oz), SpO2 97%.    Intake/Output last 3 Shifts:  I/O last 3 completed shifts:  In: 1085.5 (7.7 mL/kg) [P.O.:760; I.V.:325.5 (2.3 mL/kg)]  Out: 1275 (9 mL/kg) [Urine:1275 (0.2 mL/kg/hr)]  Weight: 141.9 kg     Current Facility-Administered Medications:     albuterol 2.5 mg /3 mL (0.083 %) nebulizer solution 2.5 mg, 2.5 mg, nebulization, q2h PRN, Krysta Ramirez MD    allopurinol (Zyloprim) tablet 50 mg, 50 mg, oral, Every other day, Robbie Olivarez MD, 50 mg at 04/04/25 1034    [Held by provider] amLODIPine (Norvasc) tablet 10 mg, 10 mg, oral, Daily, Sanjiv Neil PA-C, 10 mg at 03/28/25 0948    aspirin chewable tablet 81 mg, 81 mg, oral, " Daily, Robbie Olivarez MD, 81 mg at 04/05/25 1022    calcium acetate (Phoslo) capsule 667 mg, 667 mg, oral, TID after meals, Robbie Olivarez MD, 667 mg at 04/05/25 1021    dextrose 50 % injection 12.5 g, 12.5 g, intravenous, q15 min PRN, Barbara Patel MD    dextrose 50 % injection 25 g, 25 g, intravenous, q15 min PRN, Barbara Patel MD    ezetimibe (Zetia) tablet 10 mg, 10 mg, oral, Daily, Robbie Olivarez MD, 10 mg at 04/05/25 1021    fluticasone (Flonase) nasal spray 2 spray, 2 spray, Each Nostril, BID, Robbie Olivarez MD, 2 spray at 04/04/25 2044    gabapentin (Neurontin) capsule 300 mg, 300 mg, oral, Daily, Krysta Ramirez MD, 300 mg at 04/05/25 1022    glucagon (Glucagen) injection 1 mg, 1 mg, intramuscular, q15 min PRN, Barbara Patel MD    glucagon (Glucagen) injection 1 mg, 1 mg, intramuscular, q15 min PRN, Barbara Patel MD    heparin (porcine) injection 7,500 Units, 7,500 Units, subcutaneous, q8h JAYA, Robbie Olivarez MD, 7,500 Units at 04/05/25 0632    hydrALAZINE (Apresoline) tablet 50 mg, 50 mg, oral, BID, Robbie Olivarez MD, 50 mg at 04/05/25 1021    insulin lispro injection 0-10 Units, 0-10 Units, subcutaneous, TID AC, Krysta Ramirez MD, 4 Units at 04/05/25 1242    insulin lispro injection 6 Units, 6 Units, subcutaneous, Once, Barbara Patel MD    ipratropium-albuteroL (Duo-Neb) 0.5-2.5 mg/3 mL nebulizer solution 3 mL, 3 mL, nebulization, TID, Krysta Ramirez MD, 3 mL at 04/05/25 1205    levothyroxine (Synthroid, Levoxyl) tablet 200 mcg, 200 mcg, oral, Daily, Robbie Olivarez MD, 200 mcg at 04/05/25 0632    methylPREDNISolone sod succinate (SOLU-Medrol) 40 mg/mL injection 40 mg, 40 mg, intravenous, q24h, Robbie Olivarez MD, 40 mg at 04/05/25 1022    [Held by provider] metoprolol succinate XL (Toprol-XL) 24 hr tablet 100 mg, 100 mg, oral, Daily, Sanjiv Neil PA-C    metoprolol tartrate (Lopressor) tablet 50 mg, 50 mg, oral, BID, Robbie Olivarez MD, 50 mg at  04/05/25 1022    montelukast (Singulair) tablet 10 mg, 10 mg, oral, Nightly, Robbie Olivarez MD, 10 mg at 04/04/25 2035    [Held by provider] nitroglycerin (Nitrostat) SL tablet 0.4 mg, 0.4 mg, sublingual, q5 min PRN, Sanjiv Neil PA-C    nystatin (Mycostatin) 100,000 unit/gram powder 1 Application, 1 Application, Topical, TID, Robbie Olivarez MD, 1 Application at 04/04/25 2100    oxygen (O2) therapy, , inhalation, Nightly, Robbie Olivarez MD, 2 L/min at 04/04/25 2107    oxygen (O2) therapy, , inhalation, BID, Robbie Olivarez MD, 2 L/min at 04/05/25 0737    polyethylene glycol (Glycolax, Miralax) packet 17 g, 17 g, oral, Daily, Robbie Olivarez MD, 17 g at 04/04/25 1030    pravastatin (Pravachol) tablet 40 mg, 40 mg, oral, Nightly, Robbie Olivarez MD, 40 mg at 04/04/25 2036    sertraline (Zoloft) tablet 50 mg, 50 mg, oral, Daily, Robbie Olivarez MD, 50 mg at 04/05/25 1021    sulfur hexafluoride microsphr (Lumason) injection 24.28 mg, 2 mL, intravenous, Once in imaging, Robbie Olivarez MD    traZODone (Desyrel) tablet 25 mg, 25 mg, oral, Nightly PRN, Robbie Olivarez MD   Relevant Results    Results for orders placed or performed during the hospital encounter of 03/25/25 (from the past 96 hours)   POCT GLUCOSE   Result Value Ref Range    POCT Glucose 263 (H) 74 - 99 mg/dL   POCT GLUCOSE   Result Value Ref Range    POCT Glucose 279 (H) 74 - 99 mg/dL   POCT GLUCOSE   Result Value Ref Range    POCT Glucose 183 (H) 74 - 99 mg/dL   POCT GLUCOSE   Result Value Ref Range    POCT Glucose 99 74 - 99 mg/dL   CBC and Auto Differential   Result Value Ref Range    WBC 7.7 4.4 - 11.3 x10*3/uL    nRBC 0.0 0.0 - 0.0 /100 WBCs    RBC 3.80 (L) 4.00 - 5.20 x10*6/uL    Hemoglobin 8.8 (L) 12.0 - 16.0 g/dL    Hematocrit 28.6 (L) 36.0 - 46.0 %    MCV 75 (L) 80 - 100 fL    MCH 23.2 (L) 26.0 - 34.0 pg    MCHC 30.8 (L) 32.0 - 36.0 g/dL    RDW 19.2 (H) 11.5 - 14.5 %    Platelets 182 150 - 450 x10*3/uL    Neutrophils % 75.5 40.0 - 80.0  %    Immature Granulocytes %, Automated 0.4 0.0 - 0.9 %    Lymphocytes % 11.3 13.0 - 44.0 %    Monocytes % 12.3 2.0 - 10.0 %    Eosinophils % 0.4 0.0 - 6.0 %    Basophils % 0.1 0.0 - 2.0 %    Neutrophils Absolute 5.81 (H) 1.60 - 5.50 x10*3/uL    Immature Granulocytes Absolute, Automated 0.03 0.00 - 0.50 x10*3/uL    Lymphocytes Absolute 0.87 0.80 - 3.00 x10*3/uL    Monocytes Absolute 0.95 (H) 0.05 - 0.80 x10*3/uL    Eosinophils Absolute 0.03 0.00 - 0.40 x10*3/uL    Basophils Absolute 0.01 0.00 - 0.10 x10*3/uL   Renal Function Panel   Result Value Ref Range    Glucose 105 (H) 74 - 99 mg/dL    Sodium 132 (L) 136 - 145 mmol/L    Potassium 4.2 3.5 - 5.3 mmol/L    Chloride 97 (L) 98 - 107 mmol/L    Bicarbonate 24 21 - 32 mmol/L    Anion Gap 15 10 - 20 mmol/L    Urea Nitrogen 110 (HH) 6 - 23 mg/dL    Creatinine 4.28 (H) 0.50 - 1.05 mg/dL    eGFR 10 (L) >60 mL/min/1.73m*2    Calcium 7.3 (L) 8.6 - 10.3 mg/dL    Phosphorus 6.7 (H) 2.5 - 4.9 mg/dL    Albumin 2.7 (L) 3.4 - 5.0 g/dL   Magnesium   Result Value Ref Range    Magnesium 2.27 1.60 - 2.40 mg/dL   Light Blue Top   Result Value Ref Range    Extra Tube Hold for add-ons.    POCT GLUCOSE   Result Value Ref Range    POCT Glucose 91 74 - 99 mg/dL   POCT GLUCOSE   Result Value Ref Range    POCT Glucose 138 (H) 74 - 99 mg/dL   POCT GLUCOSE   Result Value Ref Range    POCT Glucose 219 (H) 74 - 99 mg/dL   Hemoglobin A1C   Result Value Ref Range    Hemoglobin A1C 6.2 (H) See comment %    Estimated Average Glucose 131 Not Established mg/dL   POCT GLUCOSE   Result Value Ref Range    POCT Glucose 221 (H) 74 - 99 mg/dL   POCT GLUCOSE   Result Value Ref Range    POCT Glucose 219 (H) 74 - 99 mg/dL   CBC and Auto Differential   Result Value Ref Range    WBC 6.5 4.4 - 11.3 x10*3/uL    nRBC 0.0 0.0 - 0.0 /100 WBCs    RBC 3.99 (L) 4.00 - 5.20 x10*6/uL    Hemoglobin 9.1 (L) 12.0 - 16.0 g/dL    Hematocrit 30.2 (L) 36.0 - 46.0 %    MCV 76 (L) 80 - 100 fL    MCH 22.8 (L) 26.0 - 34.0 pg    MCHC  30.1 (L) 32.0 - 36.0 g/dL    RDW 19.4 (H) 11.5 - 14.5 %    Platelets 187 150 - 450 x10*3/uL    Neutrophils % 71.9 40.0 - 80.0 %    Immature Granulocytes %, Automated 0.5 0.0 - 0.9 %    Lymphocytes % 13.8 13.0 - 44.0 %    Monocytes % 13.0 2.0 - 10.0 %    Eosinophils % 0.8 0.0 - 6.0 %    Basophils % 0.0 0.0 - 2.0 %    Neutrophils Absolute 4.66 1.60 - 5.50 x10*3/uL    Immature Granulocytes Absolute, Automated 0.03 0.00 - 0.50 x10*3/uL    Lymphocytes Absolute 0.89 0.80 - 3.00 x10*3/uL    Monocytes Absolute 0.84 (H) 0.05 - 0.80 x10*3/uL    Eosinophils Absolute 0.05 0.00 - 0.40 x10*3/uL    Basophils Absolute 0.00 0.00 - 0.10 x10*3/uL   Renal Function Panel   Result Value Ref Range    Glucose 176 (H) 74 - 99 mg/dL    Sodium 132 (L) 136 - 145 mmol/L    Potassium 4.4 3.5 - 5.3 mmol/L    Chloride 99 98 - 107 mmol/L    Bicarbonate 23 21 - 32 mmol/L    Anion Gap 14 10 - 20 mmol/L    Urea Nitrogen 114 (HH) 6 - 23 mg/dL    Creatinine 4.05 (H) 0.50 - 1.05 mg/dL    eGFR 11 (L) >60 mL/min/1.73m*2    Calcium 7.1 (L) 8.6 - 10.3 mg/dL    Phosphorus 5.7 (H) 2.5 - 4.9 mg/dL    Albumin 2.8 (L) 3.4 - 5.0 g/dL   Magnesium   Result Value Ref Range    Magnesium 2.29 1.60 - 2.40 mg/dL   POCT GLUCOSE   Result Value Ref Range    POCT Glucose 160 (H) 74 - 99 mg/dL   POCT GLUCOSE   Result Value Ref Range    POCT Glucose 203 (H) 74 - 99 mg/dL   POCT GLUCOSE   Result Value Ref Range    POCT Glucose 233 (H) 74 - 99 mg/dL   POCT GLUCOSE   Result Value Ref Range    POCT Glucose 228 (H) 74 - 99 mg/dL   POCT GLUCOSE   Result Value Ref Range    POCT Glucose 182 (H) 74 - 99 mg/dL   Renal function panel   Result Value Ref Range    Glucose 123 (H) 74 - 99 mg/dL    Sodium 136 136 - 145 mmol/L    Potassium 4.3 3.5 - 5.3 mmol/L    Chloride 102 98 - 107 mmol/L    Bicarbonate 25 21 - 32 mmol/L    Anion Gap 13 10 - 20 mmol/L    Urea Nitrogen 117 (HH) 6 - 23 mg/dL    Creatinine 3.73 (H) 0.50 - 1.05 mg/dL    eGFR 12 (L) >60 mL/min/1.73m*2    Calcium 7.6 (L) 8.6 -  10.3 mg/dL    Phosphorus 4.6 2.5 - 4.9 mg/dL    Albumin 2.9 (L) 3.4 - 5.0 g/dL   CBC and Auto Differential   Result Value Ref Range    WBC 8.2 4.4 - 11.3 x10*3/uL    nRBC 0.0 0.0 - 0.0 /100 WBCs    RBC 4.09 4.00 - 5.20 x10*6/uL    Hemoglobin 9.3 (L) 12.0 - 16.0 g/dL    Hematocrit 31.7 (L) 36.0 - 46.0 %    MCV 78 (L) 80 - 100 fL    MCH 22.7 (L) 26.0 - 34.0 pg    MCHC 29.3 (L) 32.0 - 36.0 g/dL    RDW 19.4 (H) 11.5 - 14.5 %    Platelets 180 150 - 450 x10*3/uL    Neutrophils % 74.7 40.0 - 80.0 %    Immature Granulocytes %, Automated 0.5 0.0 - 0.9 %    Lymphocytes % 12.6 13.0 - 44.0 %    Monocytes % 11.4 2.0 - 10.0 %    Eosinophils % 0.7 0.0 - 6.0 %    Basophils % 0.1 0.0 - 2.0 %    Neutrophils Absolute 6.08 (H) 1.60 - 5.50 x10*3/uL    Immature Granulocytes Absolute, Automated 0.04 0.00 - 0.50 x10*3/uL    Lymphocytes Absolute 1.03 0.80 - 3.00 x10*3/uL    Monocytes Absolute 0.93 (H) 0.05 - 0.80 x10*3/uL    Eosinophils Absolute 0.06 0.00 - 0.40 x10*3/uL    Basophils Absolute 0.01 0.00 - 0.10 x10*3/uL   Magnesium   Result Value Ref Range    Magnesium 2.28 1.60 - 2.40 mg/dL   POCT GLUCOSE   Result Value Ref Range    POCT Glucose 93 74 - 99 mg/dL   POCT GLUCOSE   Result Value Ref Range    POCT Glucose 158 (H) 74 - 99 mg/dL   POCT GLUCOSE   Result Value Ref Range    POCT Glucose 292 (H) 74 - 99 mg/dL   POCT GLUCOSE   Result Value Ref Range    POCT Glucose 361 (H) 74 - 99 mg/dL   CBC and Auto Differential   Result Value Ref Range    WBC 7.2 4.4 - 11.3 x10*3/uL    nRBC 0.0 0.0 - 0.0 /100 WBCs    RBC 3.92 (L) 4.00 - 5.20 x10*6/uL    Hemoglobin 8.9 (L) 12.0 - 16.0 g/dL    Hematocrit 29.3 (L) 36.0 - 46.0 %    MCV 75 (L) 80 - 100 fL    MCH 22.7 (L) 26.0 - 34.0 pg    MCHC 30.4 (L) 32.0 - 36.0 g/dL    RDW 19.5 (H) 11.5 - 14.5 %    Platelets 179 150 - 450 x10*3/uL    Neutrophils % 77.5 40.0 - 80.0 %    Immature Granulocytes %, Automated 0.7 0.0 - 0.9 %    Lymphocytes % 11.6 13.0 - 44.0 %    Monocytes % 9.8 2.0 - 10.0 %    Eosinophils %  0.4 0.0 - 6.0 %    Basophils % 0.0 0.0 - 2.0 %    Neutrophils Absolute 5.56 (H) 1.60 - 5.50 x10*3/uL    Immature Granulocytes Absolute, Automated 0.05 0.00 - 0.50 x10*3/uL    Lymphocytes Absolute 0.83 0.80 - 3.00 x10*3/uL    Monocytes Absolute 0.70 0.05 - 0.80 x10*3/uL    Eosinophils Absolute 0.03 0.00 - 0.40 x10*3/uL    Basophils Absolute 0.00 0.00 - 0.10 x10*3/uL   Renal Function Panel   Result Value Ref Range    Glucose 203 (H) 74 - 99 mg/dL    Sodium 137 136 - 145 mmol/L    Potassium 4.3 3.5 - 5.3 mmol/L    Chloride 105 98 - 107 mmol/L    Bicarbonate 23 21 - 32 mmol/L    Anion Gap 13 10 - 20 mmol/L    Urea Nitrogen 113 (HH) 6 - 23 mg/dL    Creatinine 3.36 (H) 0.50 - 1.05 mg/dL    eGFR 14 (L) >60 mL/min/1.73m*2    Calcium 7.7 (L) 8.6 - 10.3 mg/dL    Phosphorus 3.7 2.5 - 4.9 mg/dL    Albumin 2.9 (L) 3.4 - 5.0 g/dL   POCT GLUCOSE   Result Value Ref Range    POCT Glucose 171 (H) 74 - 99 mg/dL   POCT GLUCOSE   Result Value Ref Range    POCT Glucose 211 (H) 74 - 99 mg/dL     *Note: Due to a large number of results and/or encounters for the requested time period, some results have not been displayed. A complete set of results can be found in Results Review.       Assessment/Plan   Acute kidney injury superimposed on chronic kidney disease stage 3 creatinine level remains stable at 3.36 however her blood urea nitrogen still elevated she is not uremic and fluid overloaded or hyperkalemic I do not see any indication to start renal replacement therapy we will continue conservative management make arrangements for patient to go to rehab and I will see her in the office in 2 weeks  Chronic kidney disease stage IIIb  Acute respiratory failure continue with oxygen by nasal cannula  Bilateral pneumonia with IV antibiotics  Diabetes mellitus type 2  Anemia of chronic kidney disease  Hypertension  Hyperlipidemia  Obesity      Sixto Slater MD

## 2025-04-05 NOTE — CARE PLAN
The patient's goals for the shift include  to feel better    The clinical goals for the shift include Ambulate, monitor SpO2, no SOB    Over the shift, the patient did not make progress toward the following goals. Barriers to progression include na. Recommendations to address these barriers include na.    Problem: Knowledge Deficit  Goal: Patient/family/caregiver demonstrates understanding of disease process, treatment plan, medications, and discharge instructions  Reactivated     Problem: Mechanical Ventilation  Goal: Patient Will Maintain Patent Airway  Reactivated  Goal: Oral health is maintained or improved  Reactivated  Goal: Tracheostomy will be managed safely  Reactivated  Goal: ET tube will be managed safely  Reactivated  Goal: Ability to express needs and understand communication  Reactivated  Goal: Mobility/activity is maintained at optimum level for patient  Reactivated     Problem: Skin  Goal: Decreased wound size/increased tissue granulation at next dressing change  Outcome: Progressing  Goal: Participates in plan/prevention/treatment measures  Outcome: Progressing  Goal: Prevent/manage excess moisture  Outcome: Progressing  Goal: Prevent/minimize sheer/friction injuries  Outcome: Progressing  Goal: Promote/optimize nutrition  Outcome: Progressing  Goal: Promote skin healing  Outcome: Progressing     Problem: Fall/Injury  Goal: Not fall by end of shift  Outcome: Progressing  Goal: Be free from injury by end of the shift  Outcome: Progressing  Goal: Verbalize understanding of personal risk factors for fall in the hospital  Outcome: Progressing  Goal: Verbalize understanding of risk factor reduction measures to prevent injury from fall in the home  Outcome: Progressing  Goal: Use assistive devices by end of the shift  Outcome: Progressing  Goal: Pace activities to prevent fatigue by end of the shift  Outcome: Progressing     Problem: Respiratory  Goal: Tolerate mechanical ventilation evidenced by  VS/agitation level this shift  Outcome: Progressing  Goal: Wean oxygen to maintain O2 saturation per order/standard this shift  Outcome: Progressing  Goal: Clear secretions with interventions this shift  Outcome: Progressing  Goal: Minimize anxiety/maximize coping throughout shift  Outcome: Progressing  Goal: Minimal/no exertional discomfort or dyspnea this shift  Outcome: Progressing  Goal: Verbalize decreased shortness of breath this shift  Outcome: Progressing     Problem: Safety - Adult  Goal: Free from fall injury  Outcome: Progressing     Problem: Discharge Planning  Goal: Discharge to home or other facility with appropriate resources  Outcome: Progressing     Problem: Chronic Conditions and Co-morbidities  Goal: Patient's chronic conditions and co-morbidity symptoms are monitored and maintained or improved  Outcome: Progressing     Problem: Nutrition  Goal: Nutrient intake appropriate for maintaining nutritional needs  Outcome: Progressing     Problem: Diabetes  Goal: Achieve decreasing blood glucose levels by end of shift  Outcome: Progressing  Goal: Increase stability of blood glucose readings by end of shift  Outcome: Progressing  Goal: Decrease in ketones present in urine by end of shift  Outcome: Progressing  Goal: Maintain electrolyte levels within acceptable range throughout shift  Outcome: Progressing  Goal: Maintain glucose levels >70mg/dl to <250mg/dl throughout shift  Outcome: Progressing  Goal: No changes in neurological exam by end of shift  Outcome: Progressing  Goal: Learn about and adhere to nutrition recommendations by end of shift  Outcome: Progressing  Goal: Vital signs within normal range for age by end of shift  Outcome: Progressing  Goal: Increase self care and/or family involovement by end of shift  Outcome: Progressing  Goal: Receive DSME education by end of shift  Outcome: Progressing

## 2025-04-06 ENCOUNTER — APPOINTMENT (OUTPATIENT)
Dept: RADIOLOGY | Facility: HOSPITAL | Age: 75
DRG: 208 | End: 2025-04-06
Payer: MEDICARE

## 2025-04-06 VITALS
WEIGHT: 293 LBS | HEIGHT: 64 IN | BODY MASS INDEX: 50.02 KG/M2 | DIASTOLIC BLOOD PRESSURE: 59 MMHG | RESPIRATION RATE: 22 BRPM | HEART RATE: 56 BPM | OXYGEN SATURATION: 99 % | TEMPERATURE: 97.3 F | SYSTOLIC BLOOD PRESSURE: 121 MMHG

## 2025-04-06 LAB
ANION GAP BLDA CALCULATED.4IONS-SCNC: 9 MMO/L (ref 10–25)
ANION GAP SERPL CALCULATED.3IONS-SCNC: 10 MMOL/L (ref 10–20)
ARTERIAL PATENCY WRIST A: POSITIVE
BASE EXCESS BLDA CALC-SCNC: -3.8 MMOL/L (ref -2–3)
BASOPHILS # BLD AUTO: 0 X10*3/UL (ref 0–0.1)
BASOPHILS NFR BLD AUTO: 0 %
BODY TEMPERATURE: 37 DEGREES CELSIUS
BUN SERPL-MCNC: 111 MG/DL (ref 6–23)
CA-I BLDA-SCNC: 1.18 MMOL/L (ref 1.1–1.33)
CALCIUM SERPL-MCNC: 7.9 MG/DL (ref 8.6–10.3)
CHLORIDE BLDA-SCNC: 103 MMOL/L (ref 98–107)
CHLORIDE SERPL-SCNC: 105 MMOL/L (ref 98–107)
CO2 SERPL-SCNC: 25 MMOL/L (ref 21–32)
CREAT SERPL-MCNC: 3.47 MG/DL (ref 0.5–1.05)
CRITICAL CALL TIME: 2107
CRITICAL CALLED BY: ABNORMAL
CRITICAL CALLED TO: ABNORMAL
CRITICAL NOTE: ABNORMAL
CRITICAL READ BACK: ABNORMAL
EGFRCR SERPLBLD CKD-EPI 2021: 13 ML/MIN/1.73M*2
EOSINOPHIL # BLD AUTO: 0.04 X10*3/UL (ref 0–0.4)
EOSINOPHIL NFR BLD AUTO: 0.5 %
ERYTHROCYTE [DISTWIDTH] IN BLOOD BY AUTOMATED COUNT: 19.9 % (ref 11.5–14.5)
GLUCOSE BLD MANUAL STRIP-MCNC: 145 MG/DL (ref 74–99)
GLUCOSE BLD MANUAL STRIP-MCNC: 252 MG/DL (ref 74–99)
GLUCOSE BLD MANUAL STRIP-MCNC: 271 MG/DL (ref 74–99)
GLUCOSE BLD MANUAL STRIP-MCNC: 317 MG/DL (ref 74–99)
GLUCOSE BLDA-MCNC: 286 MG/DL (ref 74–99)
GLUCOSE SERPL-MCNC: 168 MG/DL (ref 74–99)
HCO3 BLDA-SCNC: 24.6 MMOL/L (ref 22–26)
HCT VFR BLD AUTO: 32.6 % (ref 36–46)
HCT VFR BLD EST: 28 % (ref 36–46)
HGB BLD-MCNC: 9.3 G/DL (ref 12–16)
HGB BLDA-MCNC: 9.4 G/DL (ref 12–16)
IMM GRANULOCYTES # BLD AUTO: 0.04 X10*3/UL (ref 0–0.5)
IMM GRANULOCYTES NFR BLD AUTO: 0.5 % (ref 0–0.9)
INHALED O2 CONCENTRATION: 32 %
LACTATE BLDA-SCNC: 1 MMOL/L (ref 0.4–2)
LYMPHOCYTES # BLD AUTO: 0.86 X10*3/UL (ref 0.8–3)
LYMPHOCYTES NFR BLD AUTO: 10.9 %
MCH RBC QN AUTO: 22.7 PG (ref 26–34)
MCHC RBC AUTO-ENTMCNC: 28.5 G/DL (ref 32–36)
MCV RBC AUTO: 80 FL (ref 80–100)
MONOCYTES # BLD AUTO: 0.68 X10*3/UL (ref 0.05–0.8)
MONOCYTES NFR BLD AUTO: 8.6 %
NEUTROPHILS # BLD AUTO: 6.28 X10*3/UL (ref 1.6–5.5)
NEUTROPHILS NFR BLD AUTO: 79.5 %
NRBC BLD-RTO: 0 /100 WBCS (ref 0–0)
OXYHGB MFR BLDA: 97.5 % (ref 94–98)
PCO2 BLDA: 63 MM HG (ref 38–42)
PH BLDA: 7.2 PH (ref 7.38–7.42)
PLATELET # BLD AUTO: 190 X10*3/UL (ref 150–450)
PO2 BLDA: 144 MM HG (ref 85–95)
POTASSIUM BLDA-SCNC: 5.3 MMOL/L (ref 3.5–5.3)
POTASSIUM SERPL-SCNC: 4.5 MMOL/L (ref 3.5–5.3)
RBC # BLD AUTO: 4.09 X10*6/UL (ref 4–5.2)
SAO2 % BLDA: 99 % (ref 94–100)
SODIUM BLDA-SCNC: 131 MMOL/L (ref 136–145)
SODIUM SERPL-SCNC: 135 MMOL/L (ref 136–145)
SPECIMEN DRAWN FROM PATIENT: ABNORMAL
WBC # BLD AUTO: 7.9 X10*3/UL (ref 4.4–11.3)

## 2025-04-06 PROCEDURE — 2500000004 HC RX 250 GENERAL PHARMACY W/ HCPCS (ALT 636 FOR OP/ED): Mod: JZ | Performed by: INTERNAL MEDICINE

## 2025-04-06 PROCEDURE — 85025 COMPLETE CBC W/AUTO DIFF WBC: CPT | Performed by: INTERNAL MEDICINE

## 2025-04-06 PROCEDURE — 2500000002 HC RX 250 W HCPCS SELF ADMINISTERED DRUGS (ALT 637 FOR MEDICARE OP, ALT 636 FOR OP/ED): Performed by: INTERNAL MEDICINE

## 2025-04-06 PROCEDURE — 36415 COLL VENOUS BLD VENIPUNCTURE: CPT | Performed by: INTERNAL MEDICINE

## 2025-04-06 PROCEDURE — 2500000001 HC RX 250 WO HCPCS SELF ADMINISTERED DRUGS (ALT 637 FOR MEDICARE OP): Performed by: INTERNAL MEDICINE

## 2025-04-06 PROCEDURE — 36600 WITHDRAWAL OF ARTERIAL BLOOD: CPT

## 2025-04-06 PROCEDURE — 2060000001 HC INTERMEDIATE ICU ROOM DAILY

## 2025-04-06 PROCEDURE — 2500000005 HC RX 250 GENERAL PHARMACY W/O HCPCS: Performed by: INTERNAL MEDICINE

## 2025-04-06 PROCEDURE — 99232 SBSQ HOSP IP/OBS MODERATE 35: CPT | Performed by: INTERNAL MEDICINE

## 2025-04-06 PROCEDURE — 71045 X-RAY EXAM CHEST 1 VIEW: CPT

## 2025-04-06 PROCEDURE — 80048 BASIC METABOLIC PNL TOTAL CA: CPT | Performed by: INTERNAL MEDICINE

## 2025-04-06 PROCEDURE — 84132 ASSAY OF SERUM POTASSIUM: CPT | Performed by: INTERNAL MEDICINE

## 2025-04-06 PROCEDURE — 71045 X-RAY EXAM CHEST 1 VIEW: CPT | Performed by: RADIOLOGY

## 2025-04-06 PROCEDURE — 94640 AIRWAY INHALATION TREATMENT: CPT

## 2025-04-06 PROCEDURE — 2500000004 HC RX 250 GENERAL PHARMACY W/ HCPCS (ALT 636 FOR OP/ED): Performed by: INTERNAL MEDICINE

## 2025-04-06 PROCEDURE — 94660 CPAP INITIATION&MGMT: CPT

## 2025-04-06 PROCEDURE — 82947 ASSAY GLUCOSE BLOOD QUANT: CPT

## 2025-04-06 RX ORDER — MIRTAZAPINE 7.5 MG/1
7.5 TABLET, FILM COATED ORAL NIGHTLY
Status: DISCONTINUED | OUTPATIENT
Start: 2025-04-06 | End: 2025-04-08

## 2025-04-06 RX ADMIN — GABAPENTIN 300 MG: 300 CAPSULE ORAL at 08:53

## 2025-04-06 RX ADMIN — NYSTATIN 1 APPLICATION: 100000 POWDER TOPICAL at 21:16

## 2025-04-06 RX ADMIN — POLYETHYLENE GLYCOL 3350 17 G: 17 POWDER, FOR SOLUTION ORAL at 08:54

## 2025-04-06 RX ADMIN — HYDRALAZINE HYDROCHLORIDE 50 MG: 50 TABLET ORAL at 21:16

## 2025-04-06 RX ADMIN — SERTRALINE HYDROCHLORIDE 50 MG: 50 TABLET ORAL at 08:53

## 2025-04-06 RX ADMIN — Medication 2 L/MIN: at 07:26

## 2025-04-06 RX ADMIN — IPRATROPIUM BROMIDE AND ALBUTEROL SULFATE 3 ML: 2.5; .5 SOLUTION RESPIRATORY (INHALATION) at 07:26

## 2025-04-06 RX ADMIN — Medication 2 L/MIN: at 21:00

## 2025-04-06 RX ADMIN — IPRATROPIUM BROMIDE AND ALBUTEROL SULFATE 3 ML: 2.5; .5 SOLUTION RESPIRATORY (INHALATION) at 19:37

## 2025-04-06 RX ADMIN — NYSTATIN 1 APPLICATION: 100000 POWDER TOPICAL at 16:00

## 2025-04-06 RX ADMIN — HEPARIN SODIUM 7500 UNITS: 5000 INJECTION INTRAVENOUS; SUBCUTANEOUS at 05:42

## 2025-04-06 RX ADMIN — ASPIRIN 81 MG: 81 TABLET, CHEWABLE ORAL at 08:53

## 2025-04-06 RX ADMIN — EZETIMIBE 10 MG: 10 TABLET ORAL at 08:54

## 2025-04-06 RX ADMIN — MIRTAZAPINE 7.5 MG: 7.5 TABLET, FILM COATED ORAL at 21:16

## 2025-04-06 RX ADMIN — PRAVASTATIN SODIUM 40 MG: 20 TABLET ORAL at 21:16

## 2025-04-06 RX ADMIN — ALLOPURINOL 50 MG: 100 TABLET ORAL at 08:54

## 2025-04-06 RX ADMIN — METOPROLOL TARTRATE 50 MG: 50 TABLET, FILM COATED ORAL at 08:53

## 2025-04-06 RX ADMIN — FLUTICASONE PROPIONATE 2 SPRAY: 50 SPRAY, METERED NASAL at 21:16

## 2025-04-06 RX ADMIN — CALCIUM ACETATE 667 MG: 667 CAPSULE ORAL at 17:16

## 2025-04-06 RX ADMIN — INSULIN LISPRO 8 UNITS: 100 INJECTION, SOLUTION INTRAVENOUS; SUBCUTANEOUS at 17:16

## 2025-04-06 RX ADMIN — MONTELUKAST 10 MG: 10 TABLET, FILM COATED ORAL at 21:16

## 2025-04-06 RX ADMIN — HYDRALAZINE HYDROCHLORIDE 50 MG: 50 TABLET ORAL at 08:53

## 2025-04-06 RX ADMIN — METOPROLOL TARTRATE 50 MG: 50 TABLET, FILM COATED ORAL at 21:16

## 2025-04-06 RX ADMIN — HEPARIN SODIUM 7500 UNITS: 5000 INJECTION INTRAVENOUS; SUBCUTANEOUS at 13:49

## 2025-04-06 RX ADMIN — IPRATROPIUM BROMIDE AND ALBUTEROL SULFATE 3 ML: 2.5; .5 SOLUTION RESPIRATORY (INHALATION) at 12:02

## 2025-04-06 RX ADMIN — LEVOTHYROXINE SODIUM 200 MCG: 0.1 TABLET ORAL at 05:42

## 2025-04-06 RX ADMIN — NYSTATIN 1 APPLICATION: 100000 POWDER TOPICAL at 08:58

## 2025-04-06 RX ADMIN — HEPARIN SODIUM 7500 UNITS: 5000 INJECTION INTRAVENOUS; SUBCUTANEOUS at 21:16

## 2025-04-06 RX ADMIN — Medication 2 L/MIN: at 19:53

## 2025-04-06 RX ADMIN — INSULIN LISPRO 6 UNITS: 100 INJECTION, SOLUTION INTRAVENOUS; SUBCUTANEOUS at 12:36

## 2025-04-06 RX ADMIN — METHYLPREDNISOLONE SODIUM SUCCINATE 40 MG: 40 INJECTION, POWDER, FOR SOLUTION INTRAMUSCULAR; INTRAVENOUS at 08:53

## 2025-04-06 RX ADMIN — FLUTICASONE PROPIONATE 2 SPRAY: 50 SPRAY, METERED NASAL at 08:58

## 2025-04-06 RX ADMIN — CALCIUM ACETATE 667 MG: 667 CAPSULE ORAL at 13:49

## 2025-04-06 ASSESSMENT — COGNITIVE AND FUNCTIONAL STATUS - GENERAL
WALKING IN HOSPITAL ROOM: A LOT
EATING MEALS: A LITTLE
MOVING TO AND FROM BED TO CHAIR: A LOT
STANDING UP FROM CHAIR USING ARMS: A LOT
DAILY ACTIVITIY SCORE: 13
HELP NEEDED FOR BATHING: A LOT
WALKING IN HOSPITAL ROOM: A LOT
MOBILITY SCORE: 12
PERSONAL GROOMING: A LITTLE
TOILETING: A LOT
STANDING UP FROM CHAIR USING ARMS: A LOT
DRESSING REGULAR UPPER BODY CLOTHING: A LOT
TURNING FROM BACK TO SIDE WHILE IN FLAT BAD: A LOT
TURNING FROM BACK TO SIDE WHILE IN FLAT BAD: A LOT
DRESSING REGULAR UPPER BODY CLOTHING: A LOT
CLIMB 3 TO 5 STEPS WITH RAILING: A LOT
PERSONAL GROOMING: A LOT
HELP NEEDED FOR BATHING: A LOT
DRESSING REGULAR LOWER BODY CLOTHING: A LOT
CLIMB 3 TO 5 STEPS WITH RAILING: TOTAL
EATING MEALS: A LITTLE
DRESSING REGULAR LOWER BODY CLOTHING: A LOT
MOVING FROM LYING ON BACK TO SITTING ON SIDE OF FLAT BED WITH BEDRAILS: A LOT
MOBILITY SCORE: 11
MOVING FROM LYING ON BACK TO SITTING ON SIDE OF FLAT BED WITH BEDRAILS: A LOT
DAILY ACTIVITIY SCORE: 14
TOILETING: A LOT
MOVING TO AND FROM BED TO CHAIR: A LOT

## 2025-04-06 ASSESSMENT — PAIN SCALES - GENERAL
PAINLEVEL_OUTOF10: 0 - NO PAIN

## 2025-04-06 ASSESSMENT — PAIN - FUNCTIONAL ASSESSMENT
PAIN_FUNCTIONAL_ASSESSMENT: 0-10

## 2025-04-06 NOTE — CARE PLAN
Problem: Skin  Goal: Decreased wound size/increased tissue granulation at next dressing change  4/6/2025 1128 by Pati Aquino RN  Outcome: Progressing  4/6/2025 1128 by Pati Aquino RN  Outcome: Progressing  Goal: Participates in plan/prevention/treatment measures  4/6/2025 1128 by Pati Aquino, RN  Outcome: Progressing  4/6/2025 1128 by Pati Aquino RN  Outcome: Progressing  Goal: Prevent/manage excess moisture  4/6/2025 1128 by Pati Aquino RN  Outcome: Progressing  4/6/2025 1128 by Pati Aquino RN  Outcome: Progressing  Goal: Prevent/minimize sheer/friction injuries  4/6/2025 1128 by Pati Aquino, RN  Outcome: Progressing  4/6/2025 1128 by Pati Aquino RN  Outcome: Progressing  Goal: Promote/optimize nutrition  4/6/2025 1128 by Pati Aquino, RN  Outcome: Progressing  4/6/2025 1128 by Pati Aquino RN  Outcome: Progressing  Goal: Promote skin healing  4/6/2025 1128 by Pati Aquino, RN  Outcome: Progressing  4/6/2025 1128 by Pati Aquino RN  Outcome: Progressing     Problem: Fall/Injury  Goal: Not fall by end of shift  4/6/2025 1128 by Pati Aquino, RN  Outcome: Progressing  4/6/2025 1128 by Pati Aquino RN  Outcome: Progressing  Goal: Be free from injury by end of the shift  4/6/2025 1128 by Pati Aquino, RN  Outcome: Progressing  4/6/2025 1128 by Pati Aquino RN  Outcome: Progressing  Goal: Verbalize understanding of personal risk factors for fall in the hospital  4/6/2025 1128 by Pati Aquino, RN  Outcome: Progressing  4/6/2025 1128 by Pati Aquino RN  Outcome: Progressing  Goal: Verbalize understanding of risk factor reduction measures to prevent injury from fall in the home  4/6/2025 1128 by Pati Aquino, RN  Outcome: Progressing  4/6/2025 1128 by Pati Aquino RN  Outcome: Progressing  Goal: Use assistive devices by end of the shift  4/6/2025 1128 by Pati Aquino RN  Outcome: Progressing  4/6/2025 1128 by Pati  ONEL Aquino RN  Outcome: Progressing  Goal: Pace activities to prevent fatigue by end of the shift  4/6/2025 1128 by Pati Aquino RN  Outcome: Progressing  4/6/2025 1128 by Pati Aquino RN  Outcome: Progressing     Problem: Respiratory  Goal: Tolerate mechanical ventilation evidenced by VS/agitation level this shift  4/6/2025 1128 by Pati Aquino, RN  Outcome: Progressing  4/6/2025 1128 by Pati Aquino RN  Outcome: Progressing  Goal: Wean oxygen to maintain O2 saturation per order/standard this shift  4/6/2025 1128 by Pati Aquino, RN  Outcome: Progressing  4/6/2025 1128 by Pati Aquino RN  Outcome: Progressing  Goal: Clear secretions with interventions this shift  4/6/2025 1128 by Pati Aquino, RN  Outcome: Progressing  4/6/2025 1128 by Pati Aquino RN  Outcome: Progressing  Goal: Minimize anxiety/maximize coping throughout shift  4/6/2025 1128 by Pati Aquino, RN  Outcome: Progressing  4/6/2025 1128 by Pati Aquino RN  Outcome: Progressing  Goal: Minimal/no exertional discomfort or dyspnea this shift  4/6/2025 1128 by Pati Aquino, RN  Outcome: Progressing  4/6/2025 1128 by Pati Aquino RN  Outcome: Progressing  Goal: Verbalize decreased shortness of breath this shift  4/6/2025 1128 by Pati Aquino, RN  Outcome: Progressing  4/6/2025 1128 by Pati Aquino RN  Outcome: Progressing     Problem: Safety - Adult  Goal: Free from fall injury  4/6/2025 1128 by Pati Aquino RN  Outcome: Progressing  4/6/2025 1128 by Pati Aquino RN  Outcome: Progressing     Problem: Discharge Planning  Goal: Discharge to home or other facility with appropriate resources  4/6/2025 1128 by Pati Aquino RN  Outcome: Progressing  4/6/2025 1128 by Pati Aquino RN  Outcome: Progressing     Problem: Chronic Conditions and Co-morbidities  Goal: Patient's chronic conditions and co-morbidity symptoms are monitored and maintained or improved  4/6/2025 1128 by Pati SYKES  Miles, MANINDER  Outcome: Progressing  4/6/2025 1128 by Pati Aquino RN  Outcome: Progressing     Problem: Nutrition  Goal: Nutrient intake appropriate for maintaining nutritional needs  4/6/2025 1128 by Pati Aquino RN  Outcome: Progressing  4/6/2025 1128 by Pati Aquino RN  Outcome: Progressing     Problem: Diabetes  Goal: Achieve decreasing blood glucose levels by end of shift  4/6/2025 1128 by Pati Aquino RN  Outcome: Progressing  4/6/2025 1128 by Pati Aquino RN  Outcome: Progressing  Goal: Increase stability of blood glucose readings by end of shift  4/6/2025 1128 by Pati Aquino RN  Outcome: Progressing  4/6/2025 1128 by Pati Aquino RN  Outcome: Progressing  Goal: Decrease in ketones present in urine by end of shift  4/6/2025 1128 by Pati Aquino RN  Outcome: Progressing  4/6/2025 1128 by Pati Aquino RN  Outcome: Progressing  Goal: Maintain electrolyte levels within acceptable range throughout shift  4/6/2025 1128 by Pati Aquino RN  Outcome: Progressing  4/6/2025 1128 by Pati Aquino RN  Outcome: Progressing  Goal: Maintain glucose levels >70mg/dl to <250mg/dl throughout shift  4/6/2025 1128 by Pati Aquino RN  Outcome: Progressing  4/6/2025 1128 by Pati Aquino RN  Outcome: Progressing  Goal: No changes in neurological exam by end of shift  4/6/2025 1128 by Pati Aquino RN  Outcome: Progressing  4/6/2025 1128 by Pati Aquino RN  Outcome: Progressing  Goal: Learn about and adhere to nutrition recommendations by end of shift  4/6/2025 1128 by Pati Aquino RN  Outcome: Progressing  4/6/2025 1128 by Pati Aquino RN  Outcome: Progressing  Goal: Vital signs within normal range for age by end of shift  4/6/2025 1128 by Pati Aquino RN  Outcome: Progressing  4/6/2025 1128 by Pati Aquino RN  Outcome: Progressing  Goal: Increase self care and/or family involovement by end of shift  4/6/2025 1128 by Pati Aquino,  RN  Outcome: Progressing  4/6/2025 1128 by Pati Aquino RN  Outcome: Progressing  Goal: Receive DSME education by end of shift  4/6/2025 1128 by Pati Aquino, RN  Outcome: Progressing  4/6/2025 1128 by Pati Aquino RN  Outcome: Progressing     Problem: Knowledge Deficit  Goal: Patient/family/caregiver demonstrates understanding of disease process, treatment plan, medications, and discharge instructions  4/6/2025 1128 by Pati Aquino RN  Outcome: Progressing  4/6/2025 1128 by Pati Aquino RN  Outcome: Progressing     Problem: Mechanical Ventilation  Goal: Patient Will Maintain Patent Airway  4/6/2025 1128 by Pati Aquino RN  Outcome: Progressing  4/6/2025 1128 by Pati Aquino RN  Outcome: Progressing  Goal: Oral health is maintained or improved  4/6/2025 1128 by Pati Aquino RN  Outcome: Progressing  4/6/2025 1128 by Pati Aquino RN  Outcome: Progressing  Goal: Tracheostomy will be managed safely  4/6/2025 1128 by Pati Aquino RN  Outcome: Progressing  4/6/2025 1128 by Pati Auqino RN  Outcome: Progressing  Goal: ET tube will be managed safely  4/6/2025 1128 by Pati Aquino RN  Outcome: Progressing  4/6/2025 1128 by Pati Aquino RN  Outcome: Progressing  Goal: Ability to express needs and understand communication  4/6/2025 1128 by Pati Aquino, RN  Outcome: Progressing  4/6/2025 1128 by Pati Aquino RN  Outcome: Progressing  Goal: Mobility/activity is maintained at optimum level for patient  4/6/2025 1128 by Pati Aquino RN  Outcome: Progressing  4/6/2025 1128 by Pati Aquino RN  Outcome: Progressing

## 2025-04-06 NOTE — CARE PLAN
The patient's goals for the shift include rest    The clinical goals for the shift include maintain safety    Over the shift, the patient did not make progress toward the following goals. Barriers to progression include immobility. Recommendations to address these barriers include adequate oxygenation.

## 2025-04-06 NOTE — PROGRESS NOTES
"Analia Murray is a 74 y.o. female on day 12 of admission presenting with Acute respiratory failure with hypoxia.    Subjective    Patient seen for acute renal failure on top of chronic kidney disease stage 3 patient is clinically stable she has no uremic symptoms no shortness of breath daughter by the bedside eating without any problems  Objective     Physical Exam  Constitutional:       General: She is not in acute distress.     Appearance: Normal appearance. She is not toxic-appearing.   Neck:      Vascular: No carotid bruit.   Cardiovascular:      Heart sounds: No murmur heard.     No friction rub. No gallop.   Pulmonary:      Breath sounds: No wheezing, rhonchi or rales.   Abdominal:      Tenderness: There is no abdominal tenderness. There is no right CVA tenderness, left CVA tenderness or rebound.   Musculoskeletal:         General: No tenderness.      Cervical back: Neck supple.      Right lower leg: No edema.      Left lower leg: No edema.   Lymphadenopathy:      Cervical: No cervical adenopathy.         Last Recorded Vitals  Blood pressure 116/62, pulse 62, temperature 36.3 °C (97.3 °F), temperature source Axillary, resp. rate 12, height 1.626 m (5' 4.02\"), weight 145 kg (320 lb 4.8 oz), SpO2 97%.    Intake/Output last 3 Shifts:  I/O last 3 completed shifts:  In: 360 (2.5 mL/kg) [P.O.:360]  Out: 850 (5.9 mL/kg) [Urine:850 (0.2 mL/kg/hr)]  Weight: 145.3 kg     Current Facility-Administered Medications:     albuterol 2.5 mg /3 mL (0.083 %) nebulizer solution 2.5 mg, 2.5 mg, nebulization, q2h PRN, Krysta Ramirez MD    allopurinol (Zyloprim) tablet 50 mg, 50 mg, oral, Every other day, Robbie Olivarez MD, 50 mg at 04/06/25 0854    [Held by provider] amLODIPine (Norvasc) tablet 10 mg, 10 mg, oral, Daily, Sanjiv Neil PA-C, 10 mg at 03/28/25 0948    aspirin chewable tablet 81 mg, 81 mg, oral, Daily, Robbie Olivarez MD, 81 mg at 04/06/25 0853    calcium acetate (Phoslo) capsule 667 mg, 667 mg, oral, TID " after meals, Robbie Olivarez MD, 667 mg at 04/06/25 1349    dextrose 50 % injection 12.5 g, 12.5 g, intravenous, q15 min PRN, Barbara Patel MD    dextrose 50 % injection 25 g, 25 g, intravenous, q15 min PRN, Barbara Patel MD    ezetimibe (Zetia) tablet 10 mg, 10 mg, oral, Daily, Robbie Olivarez MD, 10 mg at 04/06/25 0854    fluticasone (Flonase) nasal spray 2 spray, 2 spray, Each Nostril, BID, Robbie Olivarez MD, 2 spray at 04/06/25 0858    gabapentin (Neurontin) capsule 300 mg, 300 mg, oral, Daily, Krysta Ramirez MD, 300 mg at 04/06/25 0853    glucagon (Glucagen) injection 1 mg, 1 mg, intramuscular, q15 min PRN, Barbara Patel MD    glucagon (Glucagen) injection 1 mg, 1 mg, intramuscular, q15 min PRN, Barbara Patel MD    heparin (porcine) injection 7,500 Units, 7,500 Units, subcutaneous, q8h JAYA, Robbie Olivarez MD, 7,500 Units at 04/06/25 1349    hydrALAZINE (Apresoline) tablet 50 mg, 50 mg, oral, BID, Robbie Olivarez MD, 50 mg at 04/06/25 0853    insulin lispro injection 0-10 Units, 0-10 Units, subcutaneous, TID AC, Krysta Ramirez MD, 6 Units at 04/06/25 1236    insulin lispro injection 6 Units, 6 Units, subcutaneous, Once, Barbara Patel MD    ipratropium-albuteroL (Duo-Neb) 0.5-2.5 mg/3 mL nebulizer solution 3 mL, 3 mL, nebulization, TID, Krysta Ramirez MD, 3 mL at 04/06/25 1202    levothyroxine (Synthroid, Levoxyl) tablet 200 mcg, 200 mcg, oral, Daily, Robbie Olivarez MD, 200 mcg at 04/06/25 0542    methylPREDNISolone sod succinate (SOLU-Medrol) 40 mg/mL injection 40 mg, 40 mg, intravenous, q24h, Robbie Olivarez MD, 40 mg at 04/06/25 0853    [Held by provider] metoprolol succinate XL (Toprol-XL) 24 hr tablet 100 mg, 100 mg, oral, Daily, Sanjiv Neil PA-C    metoprolol tartrate (Lopressor) tablet 50 mg, 50 mg, oral, BID, Robbie Olivarez MD, 50 mg at 04/06/25 0853    montelukast (Singulair) tablet 10 mg, 10 mg, oral, Nightly, Robbie Olivarez MD, 10 mg at 04/05/25  2117    [Held by provider] nitroglycerin (Nitrostat) SL tablet 0.4 mg, 0.4 mg, sublingual, q5 min PRN, Sanjiv Neil PA-C    nystatin (Mycostatin) 100,000 unit/gram powder 1 Application, 1 Application, Topical, TID, Robbie Olivarez MD, 1 Application at 04/06/25 0858    oxygen (O2) therapy, , inhalation, Nightly, Robbie Olivarez MD, 2 L/min at 04/05/25 2203    oxygen (O2) therapy, , inhalation, BID, Robbie Olivarez MD, 2 L/min at 04/06/25 0726    polyethylene glycol (Glycolax, Miralax) packet 17 g, 17 g, oral, Daily, Robbie Olivarez MD, 17 g at 04/06/25 0854    pravastatin (Pravachol) tablet 40 mg, 40 mg, oral, Nightly, Robbie Olivarez MD, 40 mg at 04/05/25 2117    sertraline (Zoloft) tablet 50 mg, 50 mg, oral, Daily, Robbie Olivarez MD, 50 mg at 04/06/25 0853    sulfur hexafluoride microsphr (Lumason) injection 24.28 mg, 2 mL, intravenous, Once in imaging, Robbie Olivarez MD    traZODone (Desyrel) tablet 25 mg, 25 mg, oral, Nightly PRN, Robbie Olivarez MD   Relevant Results    Results for orders placed or performed during the hospital encounter of 03/25/25 (from the past 96 hours)   POCT GLUCOSE   Result Value Ref Range    POCT Glucose 219 (H) 74 - 99 mg/dL   Hemoglobin A1C   Result Value Ref Range    Hemoglobin A1C 6.2 (H) See comment %    Estimated Average Glucose 131 Not Established mg/dL   POCT GLUCOSE   Result Value Ref Range    POCT Glucose 221 (H) 74 - 99 mg/dL   POCT GLUCOSE   Result Value Ref Range    POCT Glucose 219 (H) 74 - 99 mg/dL   CBC and Auto Differential   Result Value Ref Range    WBC 6.5 4.4 - 11.3 x10*3/uL    nRBC 0.0 0.0 - 0.0 /100 WBCs    RBC 3.99 (L) 4.00 - 5.20 x10*6/uL    Hemoglobin 9.1 (L) 12.0 - 16.0 g/dL    Hematocrit 30.2 (L) 36.0 - 46.0 %    MCV 76 (L) 80 - 100 fL    MCH 22.8 (L) 26.0 - 34.0 pg    MCHC 30.1 (L) 32.0 - 36.0 g/dL    RDW 19.4 (H) 11.5 - 14.5 %    Platelets 187 150 - 450 x10*3/uL    Neutrophils % 71.9 40.0 - 80.0 %    Immature Granulocytes %, Automated 0.5 0.0 -  0.9 %    Lymphocytes % 13.8 13.0 - 44.0 %    Monocytes % 13.0 2.0 - 10.0 %    Eosinophils % 0.8 0.0 - 6.0 %    Basophils % 0.0 0.0 - 2.0 %    Neutrophils Absolute 4.66 1.60 - 5.50 x10*3/uL    Immature Granulocytes Absolute, Automated 0.03 0.00 - 0.50 x10*3/uL    Lymphocytes Absolute 0.89 0.80 - 3.00 x10*3/uL    Monocytes Absolute 0.84 (H) 0.05 - 0.80 x10*3/uL    Eosinophils Absolute 0.05 0.00 - 0.40 x10*3/uL    Basophils Absolute 0.00 0.00 - 0.10 x10*3/uL   Renal Function Panel   Result Value Ref Range    Glucose 176 (H) 74 - 99 mg/dL    Sodium 132 (L) 136 - 145 mmol/L    Potassium 4.4 3.5 - 5.3 mmol/L    Chloride 99 98 - 107 mmol/L    Bicarbonate 23 21 - 32 mmol/L    Anion Gap 14 10 - 20 mmol/L    Urea Nitrogen 114 (HH) 6 - 23 mg/dL    Creatinine 4.05 (H) 0.50 - 1.05 mg/dL    eGFR 11 (L) >60 mL/min/1.73m*2    Calcium 7.1 (L) 8.6 - 10.3 mg/dL    Phosphorus 5.7 (H) 2.5 - 4.9 mg/dL    Albumin 2.8 (L) 3.4 - 5.0 g/dL   Magnesium   Result Value Ref Range    Magnesium 2.29 1.60 - 2.40 mg/dL   POCT GLUCOSE   Result Value Ref Range    POCT Glucose 160 (H) 74 - 99 mg/dL   POCT GLUCOSE   Result Value Ref Range    POCT Glucose 203 (H) 74 - 99 mg/dL   POCT GLUCOSE   Result Value Ref Range    POCT Glucose 233 (H) 74 - 99 mg/dL   POCT GLUCOSE   Result Value Ref Range    POCT Glucose 228 (H) 74 - 99 mg/dL   POCT GLUCOSE   Result Value Ref Range    POCT Glucose 182 (H) 74 - 99 mg/dL   Renal function panel   Result Value Ref Range    Glucose 123 (H) 74 - 99 mg/dL    Sodium 136 136 - 145 mmol/L    Potassium 4.3 3.5 - 5.3 mmol/L    Chloride 102 98 - 107 mmol/L    Bicarbonate 25 21 - 32 mmol/L    Anion Gap 13 10 - 20 mmol/L    Urea Nitrogen 117 (HH) 6 - 23 mg/dL    Creatinine 3.73 (H) 0.50 - 1.05 mg/dL    eGFR 12 (L) >60 mL/min/1.73m*2    Calcium 7.6 (L) 8.6 - 10.3 mg/dL    Phosphorus 4.6 2.5 - 4.9 mg/dL    Albumin 2.9 (L) 3.4 - 5.0 g/dL   CBC and Auto Differential   Result Value Ref Range    WBC 8.2 4.4 - 11.3 x10*3/uL    nRBC 0.0 0.0  - 0.0 /100 WBCs    RBC 4.09 4.00 - 5.20 x10*6/uL    Hemoglobin 9.3 (L) 12.0 - 16.0 g/dL    Hematocrit 31.7 (L) 36.0 - 46.0 %    MCV 78 (L) 80 - 100 fL    MCH 22.7 (L) 26.0 - 34.0 pg    MCHC 29.3 (L) 32.0 - 36.0 g/dL    RDW 19.4 (H) 11.5 - 14.5 %    Platelets 180 150 - 450 x10*3/uL    Neutrophils % 74.7 40.0 - 80.0 %    Immature Granulocytes %, Automated 0.5 0.0 - 0.9 %    Lymphocytes % 12.6 13.0 - 44.0 %    Monocytes % 11.4 2.0 - 10.0 %    Eosinophils % 0.7 0.0 - 6.0 %    Basophils % 0.1 0.0 - 2.0 %    Neutrophils Absolute 6.08 (H) 1.60 - 5.50 x10*3/uL    Immature Granulocytes Absolute, Automated 0.04 0.00 - 0.50 x10*3/uL    Lymphocytes Absolute 1.03 0.80 - 3.00 x10*3/uL    Monocytes Absolute 0.93 (H) 0.05 - 0.80 x10*3/uL    Eosinophils Absolute 0.06 0.00 - 0.40 x10*3/uL    Basophils Absolute 0.01 0.00 - 0.10 x10*3/uL   Magnesium   Result Value Ref Range    Magnesium 2.28 1.60 - 2.40 mg/dL   POCT GLUCOSE   Result Value Ref Range    POCT Glucose 93 74 - 99 mg/dL   POCT GLUCOSE   Result Value Ref Range    POCT Glucose 158 (H) 74 - 99 mg/dL   POCT GLUCOSE   Result Value Ref Range    POCT Glucose 292 (H) 74 - 99 mg/dL   POCT GLUCOSE   Result Value Ref Range    POCT Glucose 361 (H) 74 - 99 mg/dL   CBC and Auto Differential   Result Value Ref Range    WBC 7.2 4.4 - 11.3 x10*3/uL    nRBC 0.0 0.0 - 0.0 /100 WBCs    RBC 3.92 (L) 4.00 - 5.20 x10*6/uL    Hemoglobin 8.9 (L) 12.0 - 16.0 g/dL    Hematocrit 29.3 (L) 36.0 - 46.0 %    MCV 75 (L) 80 - 100 fL    MCH 22.7 (L) 26.0 - 34.0 pg    MCHC 30.4 (L) 32.0 - 36.0 g/dL    RDW 19.5 (H) 11.5 - 14.5 %    Platelets 179 150 - 450 x10*3/uL    Neutrophils % 77.5 40.0 - 80.0 %    Immature Granulocytes %, Automated 0.7 0.0 - 0.9 %    Lymphocytes % 11.6 13.0 - 44.0 %    Monocytes % 9.8 2.0 - 10.0 %    Eosinophils % 0.4 0.0 - 6.0 %    Basophils % 0.0 0.0 - 2.0 %    Neutrophils Absolute 5.56 (H) 1.60 - 5.50 x10*3/uL    Immature Granulocytes Absolute, Automated 0.05 0.00 - 0.50 x10*3/uL     Lymphocytes Absolute 0.83 0.80 - 3.00 x10*3/uL    Monocytes Absolute 0.70 0.05 - 0.80 x10*3/uL    Eosinophils Absolute 0.03 0.00 - 0.40 x10*3/uL    Basophils Absolute 0.00 0.00 - 0.10 x10*3/uL   Renal Function Panel   Result Value Ref Range    Glucose 203 (H) 74 - 99 mg/dL    Sodium 137 136 - 145 mmol/L    Potassium 4.3 3.5 - 5.3 mmol/L    Chloride 105 98 - 107 mmol/L    Bicarbonate 23 21 - 32 mmol/L    Anion Gap 13 10 - 20 mmol/L    Urea Nitrogen 113 (HH) 6 - 23 mg/dL    Creatinine 3.36 (H) 0.50 - 1.05 mg/dL    eGFR 14 (L) >60 mL/min/1.73m*2    Calcium 7.7 (L) 8.6 - 10.3 mg/dL    Phosphorus 3.7 2.5 - 4.9 mg/dL    Albumin 2.9 (L) 3.4 - 5.0 g/dL   POCT GLUCOSE   Result Value Ref Range    POCT Glucose 171 (H) 74 - 99 mg/dL   POCT GLUCOSE   Result Value Ref Range    POCT Glucose 211 (H) 74 - 99 mg/dL   POCT GLUCOSE   Result Value Ref Range    POCT Glucose 314 (H) 74 - 99 mg/dL   POCT GLUCOSE   Result Value Ref Range    POCT Glucose 360 (H) 74 - 99 mg/dL   CBC and Auto Differential   Result Value Ref Range    WBC 7.9 4.4 - 11.3 x10*3/uL    nRBC 0.0 0.0 - 0.0 /100 WBCs    RBC 4.09 4.00 - 5.20 x10*6/uL    Hemoglobin 9.3 (L) 12.0 - 16.0 g/dL    Hematocrit 32.6 (L) 36.0 - 46.0 %    MCV 80 80 - 100 fL    MCH 22.7 (L) 26.0 - 34.0 pg    MCHC 28.5 (L) 32.0 - 36.0 g/dL    RDW 19.9 (H) 11.5 - 14.5 %    Platelets 190 150 - 450 x10*3/uL    Neutrophils % 79.5 40.0 - 80.0 %    Immature Granulocytes %, Automated 0.5 0.0 - 0.9 %    Lymphocytes % 10.9 13.0 - 44.0 %    Monocytes % 8.6 2.0 - 10.0 %    Eosinophils % 0.5 0.0 - 6.0 %    Basophils % 0.0 0.0 - 2.0 %    Neutrophils Absolute 6.28 (H) 1.60 - 5.50 x10*3/uL    Immature Granulocytes Absolute, Automated 0.04 0.00 - 0.50 x10*3/uL    Lymphocytes Absolute 0.86 0.80 - 3.00 x10*3/uL    Monocytes Absolute 0.68 0.05 - 0.80 x10*3/uL    Eosinophils Absolute 0.04 0.00 - 0.40 x10*3/uL    Basophils Absolute 0.00 0.00 - 0.10 x10*3/uL   POCT GLUCOSE   Result Value Ref Range    POCT Glucose 145 (H)  74 - 99 mg/dL   Basic Metabolic Panel   Result Value Ref Range    Glucose 168 (H) 74 - 99 mg/dL    Sodium 135 (L) 136 - 145 mmol/L    Potassium 4.5 3.5 - 5.3 mmol/L    Chloride 105 98 - 107 mmol/L    Bicarbonate 25 21 - 32 mmol/L    Anion Gap 10 10 - 20 mmol/L    Urea Nitrogen 111 (HH) 6 - 23 mg/dL    Creatinine 3.47 (H) 0.50 - 1.05 mg/dL    eGFR 13 (L) >60 mL/min/1.73m*2    Calcium 7.9 (L) 8.6 - 10.3 mg/dL   POCT GLUCOSE   Result Value Ref Range    POCT Glucose 271 (H) 74 - 99 mg/dL     *Note: Due to a large number of results and/or encounters for the requested time period, some results have not been displayed. A complete set of results can be found in Results Review.       Assessment/Plan   Acute kidney injury superimposed on chronic kidney disease stage 3 patient is stable creatinine plateaued at this time at 3.4 we will continue to monitor I do not feel strongly about starting renal replacement therapy at this time  Chronic kidney disease stage IIIb  Acute respiratory failure continue with oxygen by nasal cannula  Bilateral pneumonia with IV antibiotics  Diabetes mellitus type 2  Anemia of chronic kidney disease  Hypertension  Hyperlipidemia  Obesity      Sixto Slater MD

## 2025-04-06 NOTE — PROGRESS NOTES
Analia Murray is a 74 y.o. female on day 12 of admission presenting with Acute respiratory failure with hypoxia.      Subjective   Patient did not sleep well last night   she denied chest pain or shortness of breath.    Objective     Last Recorded Vitals  /56 (BP Location: Left arm, Patient Position: Lying)   Pulse 57   Temp 36.2 °C (97.2 °F) (Axillary)   Resp 16   Wt 145 kg (320 lb 4.8 oz)   SpO2 97%   Intake/Output last 3 Shifts:    Intake/Output Summary (Last 24 hours) at 4/6/2025 1009  Last data filed at 4/5/2025 2100  Gross per 24 hour   Intake 240 ml   Output --   Net 240 ml       Admission Weight  Weight: 132 kg (290 lb) (03/25/25 0041)    Daily Weight  04/06/25 : 145 kg (320 lb 4.8 oz)    Image Results  XR chest 1 view  Narrative: Interpreted By:  Jil Washington,   STUDY:  XR CHEST 1 VIEW 4/4/2025 11:56 am      INDICATION:  Signs/Symptoms:acute respiratory failure      COMPARISON:  04/03/2025      ACCESSION NUMBER(S):  [id number]      ORDERING CLINICIAN:  HEATHER STEPHEN      TECHNIQUE:  AP erect view of the chest      FINDINGS:  Cardiac enlargement is unchanged. There is less right basilar  atelectasis and infiltrate. There is persistent linear atelectasis at  the junction of the left mid and lower lung zones. No pleural  abnormality is observed.      Impression: Improved right basilar atelectasis and infiltrate without complete  resolution.      Persistent linear atelectasis at the junction of the left mid and  lower lung fields.      Stable cardiomegaly.      Signed by: Jil Washington 4/4/2025 12:12 PM  Dictation workstation:   [id number]      Physical Exam    General: cooperating during physical exam, morbidly obese  HEENT: Pupils are equal and reactive to light and commendation , oral mucosa moist, no JVD   Cardiovascular: PMI nondisplaced  Lungs: Diminished breath sound at both lung bases, scattered wheezing   abdomen: No hepatosplenomegaly appreciated, soft , not tender, positive bowel  sounds, positive bowel movement.  Neuro: Alert and oriented x2, strength in upper and lower extremities , sensation intact.  Psych: Patient had great insight was going on  Musculoskeletal: No swelling in lower extremities, no limitation in range of motion.  Vascular: Pulses are intact in upper and lower extremities  Skin: No petechiae, ecchymosis or other stigmata for dermatology disease.     Assessment/Plan      Acute respiratory failure with hypoxia and hypercapnia.  Clinically improved.  Patient required intubation on admission.  Continue with oxygen therapy to maintain pulse ox more than 92%.  Clinically improved    Acute kidney injury  Dr. Slater on the case.  Monitor close  Check RFP in AM.      COPD exacerbation  Patient is on Solu-Medrol  Continue with aerosol treatment  Consult pulmonary    Obstructive sleep apnea  On CPAP.  Patient is on oxygen at home.    Morbid obesity  Hyperlipidemia  Advised again diet  On statin and Zetia    Diabetes mellitus type 2  Cover with insulin sliding scale.    Hypothyroidism  TSH was 11.  Continue with levothyroxine 200 mg daily    Anxiety  Depression  Now continue sertraline    Gout  Patient is on allopurinol.    Neuropathic pain   on gabapentin    Insomnia  Add the Remeron    Chronic CHF with diastolic dysfunction  Lasix on hold because of acute kidney injury  Monitor close   from last 2D echo patient had ejection fraction 45- 50%    Check CBC and RFP in AM.  Deconditioning  PT /OT evaluated her, moderate intensity level of care  DVT prophylaxis  Waiting for pre-CERT for skilled nursing facility  Discussed with her daughter present in the room    Krysta Ramirez MD

## 2025-04-06 NOTE — NURSING NOTE
Patient in no visible distress. Patient voices no needs. Bed is low and locked, bed alarm is on . Call light is in reach.

## 2025-04-07 LAB
ALBUMIN SERPL BCP-MCNC: 2.9 G/DL (ref 3.4–5)
ANION GAP BLDA CALCULATED.4IONS-SCNC: 11 MMO/L (ref 10–25)
ANION GAP BLDA CALCULATED.4IONS-SCNC: 11 MMO/L (ref 10–25)
ANION GAP SERPL CALCULATED.3IONS-SCNC: 12 MMOL/L (ref 10–20)
APPARATUS: ABNORMAL
APPARATUS: ABNORMAL
ARTERIAL PATENCY WRIST A: NEGATIVE
ARTERIAL PATENCY WRIST A: POSITIVE
ARTERIAL PATENCY WRIST A: POSITIVE
BASE EXCESS BLDA CALC-SCNC: -3.3 MMOL/L (ref -2–3)
BASE EXCESS BLDA CALC-SCNC: -3.5 MMOL/L (ref -2–3)
BASE EXCESS BLDA CALC-SCNC: -4.5 MMOL/L (ref -2–3)
BASOPHILS # BLD AUTO: 0.01 X10*3/UL (ref 0–0.1)
BASOPHILS NFR BLD AUTO: 0.1 %
BODY TEMPERATURE: 37 DEGREES CELSIUS
BUN SERPL-MCNC: 121 MG/DL (ref 6–23)
CA-I BLDA-SCNC: 1.18 MMOL/L (ref 1.1–1.33)
CA-I BLDA-SCNC: 1.2 MMOL/L (ref 1.1–1.33)
CALCIUM SERPL-MCNC: 8.1 MG/DL (ref 8.6–10.3)
CHLORIDE BLDA-SCNC: 101 MMOL/L (ref 98–107)
CHLORIDE BLDA-SCNC: 102 MMOL/L (ref 98–107)
CHLORIDE SERPL-SCNC: 104 MMOL/L (ref 98–107)
CO2 SERPL-SCNC: 25 MMOL/L (ref 21–32)
CREAT SERPL-MCNC: 3.82 MG/DL (ref 0.5–1.05)
CRITICAL CALL TIME: 1348
CRITICAL CALL TIME: 1625
CRITICAL CALL TIME: 1949
CRITICAL CALLED BY: ABNORMAL
CRITICAL CALLED TO: ABNORMAL
CRITICAL READ BACK: ABNORMAL
EGFRCR SERPLBLD CKD-EPI 2021: 12 ML/MIN/1.73M*2
EOSINOPHIL # BLD AUTO: 0.05 X10*3/UL (ref 0–0.4)
EOSINOPHIL NFR BLD AUTO: 0.7 %
EPAP CMH2O: 10 CM H2O
EPAP CMH2O: 5 CM H2O
ERYTHROCYTE [DISTWIDTH] IN BLOOD BY AUTOMATED COUNT: 19.9 % (ref 11.5–14.5)
GLUCOSE BLD MANUAL STRIP-MCNC: 150 MG/DL (ref 74–99)
GLUCOSE BLD MANUAL STRIP-MCNC: 153 MG/DL (ref 74–99)
GLUCOSE BLD MANUAL STRIP-MCNC: 167 MG/DL (ref 74–99)
GLUCOSE BLD MANUAL STRIP-MCNC: 218 MG/DL (ref 74–99)
GLUCOSE BLDA-MCNC: 193 MG/DL (ref 74–99)
GLUCOSE BLDA-MCNC: 194 MG/DL (ref 74–99)
GLUCOSE SERPL-MCNC: 174 MG/DL (ref 74–99)
HCO3 BLDA-SCNC: 24.1 MMOL/L (ref 22–26)
HCO3 BLDA-SCNC: 24.1 MMOL/L (ref 22–26)
HCO3 BLDA-SCNC: 24.6 MMOL/L (ref 22–26)
HCT VFR BLD AUTO: 29.6 % (ref 36–46)
HCT VFR BLD EST: 26 % (ref 36–46)
HCT VFR BLD EST: 28 % (ref 36–46)
HGB BLD-MCNC: 8.8 G/DL (ref 12–16)
HGB BLDA-MCNC: 8.8 G/DL (ref 12–16)
HGB BLDA-MCNC: 9.3 G/DL (ref 12–16)
IMM GRANULOCYTES # BLD AUTO: 0.04 X10*3/UL (ref 0–0.5)
IMM GRANULOCYTES NFR BLD AUTO: 0.6 % (ref 0–0.9)
INHALED O2 CONCENTRATION: 28 %
INHALED O2 CONCENTRATION: 28 %
INHALED O2 CONCENTRATION: 30 %
IPAP CMH2O: 15 CM H2O
LACTATE BLDA-SCNC: 0.6 MMOL/L (ref 0.4–2)
LACTATE BLDA-SCNC: 0.7 MMOL/L (ref 0.4–2)
LYMPHOCYTES # BLD AUTO: 0.81 X10*3/UL (ref 0.8–3)
LYMPHOCYTES NFR BLD AUTO: 11.6 %
MCH RBC QN AUTO: 23 PG (ref 26–34)
MCHC RBC AUTO-ENTMCNC: 29.7 G/DL (ref 32–36)
MCV RBC AUTO: 77 FL (ref 80–100)
MONOCYTES # BLD AUTO: 0.63 X10*3/UL (ref 0.05–0.8)
MONOCYTES NFR BLD AUTO: 9 %
NEUTROPHILS # BLD AUTO: 5.47 X10*3/UL (ref 1.6–5.5)
NEUTROPHILS NFR BLD AUTO: 78 %
NRBC BLD-RTO: 0 /100 WBCS (ref 0–0)
OXYHGB MFR BLDA: 97.6 % (ref 94–98)
OXYHGB MFR BLDA: 97.8 % (ref 94–98)
OXYHGB MFR BLDA: 97.9 % (ref 94–98)
PCO2 BLDA: 55 MM HG (ref 38–42)
PCO2 BLDA: 59 MM HG (ref 38–42)
PCO2 BLDA: 60 MM HG (ref 38–42)
PH BLDA: 7.22 PH (ref 7.38–7.42)
PH BLDA: 7.22 PH (ref 7.38–7.42)
PH BLDA: 7.25 PH (ref 7.38–7.42)
PHOSPHATE SERPL-MCNC: 4.5 MG/DL (ref 2.5–4.9)
PLATELET # BLD AUTO: 150 X10*3/UL (ref 150–450)
PO2 BLDA: 104 MM HG (ref 85–95)
PO2 BLDA: 108 MM HG (ref 85–95)
PO2 BLDA: 118 MM HG (ref 85–95)
POTASSIUM BLDA-SCNC: 5 MMOL/L (ref 3.5–5.3)
POTASSIUM BLDA-SCNC: 5.3 MMOL/L (ref 3.5–5.3)
POTASSIUM SERPL-SCNC: 5.1 MMOL/L (ref 3.5–5.3)
RBC # BLD AUTO: 3.83 X10*6/UL (ref 4–5.2)
SAO2 % BLDA: 100 % (ref 94–100)
SODIUM BLDA-SCNC: 132 MMOL/L (ref 136–145)
SODIUM BLDA-SCNC: 132 MMOL/L (ref 136–145)
SODIUM SERPL-SCNC: 136 MMOL/L (ref 136–145)
SPECIMEN DRAWN FROM PATIENT: ABNORMAL
TIDAL VOLUME: 500 ML
VENTILATOR RATE: 14 BPM
WBC # BLD AUTO: 7 X10*3/UL (ref 4.4–11.3)

## 2025-04-07 PROCEDURE — 94660 CPAP INITIATION&MGMT: CPT

## 2025-04-07 PROCEDURE — 36415 COLL VENOUS BLD VENIPUNCTURE: CPT | Performed by: INTERNAL MEDICINE

## 2025-04-07 PROCEDURE — 2500000004 HC RX 250 GENERAL PHARMACY W/ HCPCS (ALT 636 FOR OP/ED): Performed by: STUDENT IN AN ORGANIZED HEALTH CARE EDUCATION/TRAINING PROGRAM

## 2025-04-07 PROCEDURE — 2500000005 HC RX 250 GENERAL PHARMACY W/O HCPCS: Performed by: INTERNAL MEDICINE

## 2025-04-07 PROCEDURE — 82810 BLOOD GASES O2 SAT ONLY: CPT | Performed by: INTERNAL MEDICINE

## 2025-04-07 PROCEDURE — 2500000004 HC RX 250 GENERAL PHARMACY W/ HCPCS (ALT 636 FOR OP/ED): Performed by: INTERNAL MEDICINE

## 2025-04-07 PROCEDURE — 97530 THERAPEUTIC ACTIVITIES: CPT | Mod: GO,CO

## 2025-04-07 PROCEDURE — 94760 N-INVAS EAR/PLS OXIMETRY 1: CPT

## 2025-04-07 PROCEDURE — 99233 SBSQ HOSP IP/OBS HIGH 50: CPT | Performed by: INTERNAL MEDICINE

## 2025-04-07 PROCEDURE — 2500000001 HC RX 250 WO HCPCS SELF ADMINISTERED DRUGS (ALT 637 FOR MEDICARE OP): Performed by: INTERNAL MEDICINE

## 2025-04-07 PROCEDURE — 82947 ASSAY GLUCOSE BLOOD QUANT: CPT

## 2025-04-07 PROCEDURE — 5A0935Z ASSISTANCE WITH RESPIRATORY VENTILATION, LESS THAN 24 CONSECUTIVE HOURS: ICD-10-PCS | Performed by: INTERNAL MEDICINE

## 2025-04-07 PROCEDURE — 2500000002 HC RX 250 W HCPCS SELF ADMINISTERED DRUGS (ALT 637 FOR MEDICARE OP, ALT 636 FOR OP/ED): Performed by: INTERNAL MEDICINE

## 2025-04-07 PROCEDURE — 97535 SELF CARE MNGMENT TRAINING: CPT | Mod: GO,CO

## 2025-04-07 PROCEDURE — 36600 WITHDRAWAL OF ARTERIAL BLOOD: CPT

## 2025-04-07 PROCEDURE — 99232 SBSQ HOSP IP/OBS MODERATE 35: CPT | Performed by: INTERNAL MEDICINE

## 2025-04-07 PROCEDURE — 94640 AIRWAY INHALATION TREATMENT: CPT

## 2025-04-07 PROCEDURE — 97116 GAIT TRAINING THERAPY: CPT | Mod: GP,CQ

## 2025-04-07 PROCEDURE — 97530 THERAPEUTIC ACTIVITIES: CPT | Mod: GP,CQ

## 2025-04-07 PROCEDURE — 80069 RENAL FUNCTION PANEL: CPT | Performed by: INTERNAL MEDICINE

## 2025-04-07 PROCEDURE — 84132 ASSAY OF SERUM POTASSIUM: CPT | Performed by: INTERNAL MEDICINE

## 2025-04-07 PROCEDURE — 9420000001 HC RT PATIENT EDUCATION 5 MIN

## 2025-04-07 PROCEDURE — 85025 COMPLETE CBC W/AUTO DIFF WBC: CPT | Performed by: INTERNAL MEDICINE

## 2025-04-07 PROCEDURE — 2060000001 HC INTERMEDIATE ICU ROOM DAILY

## 2025-04-07 RX ORDER — FLUTICASONE FUROATE AND VILANTEROL 100; 25 UG/1; UG/1
1 POWDER RESPIRATORY (INHALATION) DAILY
Status: DISCONTINUED | OUTPATIENT
Start: 2025-04-07 | End: 2025-04-17 | Stop reason: HOSPADM

## 2025-04-07 RX ADMIN — FLUTICASONE PROPIONATE 2 SPRAY: 50 SPRAY, METERED NASAL at 09:25

## 2025-04-07 RX ADMIN — POLYETHYLENE GLYCOL 3350 17 G: 17 POWDER, FOR SOLUTION ORAL at 09:22

## 2025-04-07 RX ADMIN — INSULIN LISPRO 4 UNITS: 100 INJECTION, SOLUTION INTRAVENOUS; SUBCUTANEOUS at 16:49

## 2025-04-07 RX ADMIN — HEPARIN SODIUM 7500 UNITS: 5000 INJECTION INTRAVENOUS; SUBCUTANEOUS at 06:18

## 2025-04-07 RX ADMIN — IPRATROPIUM BROMIDE AND ALBUTEROL SULFATE 3 ML: 2.5; .5 SOLUTION RESPIRATORY (INHALATION) at 08:05

## 2025-04-07 RX ADMIN — Medication 28 PERCENT: at 21:41

## 2025-04-07 RX ADMIN — Medication 2 L/MIN: at 08:08

## 2025-04-07 RX ADMIN — HEPARIN SODIUM 7500 UNITS: 5000 INJECTION INTRAVENOUS; SUBCUTANEOUS at 16:49

## 2025-04-07 RX ADMIN — SERTRALINE HYDROCHLORIDE 50 MG: 50 TABLET ORAL at 09:24

## 2025-04-07 RX ADMIN — HEPARIN SODIUM 7500 UNITS: 5000 INJECTION INTRAVENOUS; SUBCUTANEOUS at 22:10

## 2025-04-07 RX ADMIN — METOPROLOL TARTRATE 50 MG: 50 TABLET, FILM COATED ORAL at 09:24

## 2025-04-07 RX ADMIN — NYSTATIN 1 APPLICATION: 100000 POWDER TOPICAL at 22:10

## 2025-04-07 RX ADMIN — MONTELUKAST 10 MG: 10 TABLET, FILM COATED ORAL at 22:08

## 2025-04-07 RX ADMIN — INSULIN LISPRO 2 UNITS: 100 INJECTION, SOLUTION INTRAVENOUS; SUBCUTANEOUS at 12:24

## 2025-04-07 RX ADMIN — Medication 28 PERCENT: at 21:43

## 2025-04-07 RX ADMIN — EZETIMIBE 10 MG: 10 TABLET ORAL at 09:24

## 2025-04-07 RX ADMIN — IPRATROPIUM BROMIDE AND ALBUTEROL SULFATE 3 ML: 2.5; .5 SOLUTION RESPIRATORY (INHALATION) at 12:08

## 2025-04-07 RX ADMIN — HYDRALAZINE HYDROCHLORIDE 50 MG: 50 TABLET ORAL at 22:08

## 2025-04-07 RX ADMIN — HYDRALAZINE HYDROCHLORIDE 50 MG: 50 TABLET ORAL at 09:24

## 2025-04-07 RX ADMIN — GABAPENTIN 300 MG: 300 CAPSULE ORAL at 09:24

## 2025-04-07 RX ADMIN — NYSTATIN 1 APPLICATION: 100000 POWDER TOPICAL at 09:03

## 2025-04-07 RX ADMIN — ASPIRIN 81 MG: 81 TABLET, CHEWABLE ORAL at 09:24

## 2025-04-07 RX ADMIN — FLUTICASONE PROPIONATE 2 SPRAY: 50 SPRAY, METERED NASAL at 22:10

## 2025-04-07 RX ADMIN — PRAVASTATIN SODIUM 40 MG: 20 TABLET ORAL at 22:08

## 2025-04-07 RX ADMIN — MIRTAZAPINE 7.5 MG: 7.5 TABLET, FILM COATED ORAL at 22:08

## 2025-04-07 RX ADMIN — CALCIUM ACETATE 667 MG: 667 CAPSULE ORAL at 09:29

## 2025-04-07 RX ADMIN — IPRATROPIUM BROMIDE AND ALBUTEROL SULFATE 3 ML: 2.5; .5 SOLUTION RESPIRATORY (INHALATION) at 21:41

## 2025-04-07 RX ADMIN — METOPROLOL TARTRATE 50 MG: 50 TABLET, FILM COATED ORAL at 22:11

## 2025-04-07 RX ADMIN — CALCIUM ACETATE 667 MG: 667 CAPSULE ORAL at 12:25

## 2025-04-07 RX ADMIN — LEVOTHYROXINE SODIUM 200 MCG: 0.1 TABLET ORAL at 06:18

## 2025-04-07 ASSESSMENT — COGNITIVE AND FUNCTIONAL STATUS - GENERAL
MOVING TO AND FROM BED TO CHAIR: A LOT
HELP NEEDED FOR BATHING: A LOT
TOILETING: A LOT
CLIMB 3 TO 5 STEPS WITH RAILING: TOTAL
TURNING FROM BACK TO SIDE WHILE IN FLAT BAD: A LOT
WALKING IN HOSPITAL ROOM: A LOT
DRESSING REGULAR UPPER BODY CLOTHING: A LITTLE
HELP NEEDED FOR BATHING: A LOT
DRESSING REGULAR LOWER BODY CLOTHING: A LOT
TOILETING: A LOT
DAILY ACTIVITIY SCORE: 13
TURNING FROM BACK TO SIDE WHILE IN FLAT BAD: A LOT
TURNING FROM BACK TO SIDE WHILE IN FLAT BAD: A LOT
PERSONAL GROOMING: A LOT
MOVING FROM LYING ON BACK TO SITTING ON SIDE OF FLAT BED WITH BEDRAILS: A LOT
EATING MEALS: A LITTLE
MOVING TO AND FROM BED TO CHAIR: A LOT
WALKING IN HOSPITAL ROOM: A LOT
EATING MEALS: A LOT
DRESSING REGULAR LOWER BODY CLOTHING: A LOT
DAILY ACTIVITIY SCORE: 12
DRESSING REGULAR LOWER BODY CLOTHING: TOTAL
MOBILITY SCORE: 11
PERSONAL GROOMING: A LITTLE
CLIMB 3 TO 5 STEPS WITH RAILING: TOTAL
MOVING FROM LYING ON BACK TO SITTING ON SIDE OF FLAT BED WITH BEDRAILS: A LOT
CLIMB 3 TO 5 STEPS WITH RAILING: TOTAL
WALKING IN HOSPITAL ROOM: A LOT
STANDING UP FROM CHAIR USING ARMS: A LOT
DRESSING REGULAR UPPER BODY CLOTHING: A LOT
EATING MEALS: A LITTLE
MOBILITY SCORE: 11
HELP NEEDED FOR BATHING: A LOT
STANDING UP FROM CHAIR USING ARMS: A LOT
MOVING FROM LYING ON BACK TO SITTING ON SIDE OF FLAT BED WITH BEDRAILS: A LOT
DRESSING REGULAR UPPER BODY CLOTHING: A LOT
MOBILITY SCORE: 11
TOILETING: TOTAL
MOVING TO AND FROM BED TO CHAIR: A LOT
DAILY ACTIVITIY SCORE: 15
STANDING UP FROM CHAIR USING ARMS: A LOT

## 2025-04-07 ASSESSMENT — PAIN SCALES - GENERAL
PAINLEVEL_OUTOF10: 0 - NO PAIN

## 2025-04-07 ASSESSMENT — PAIN - FUNCTIONAL ASSESSMENT
PAIN_FUNCTIONAL_ASSESSMENT: 0-10

## 2025-04-07 ASSESSMENT — ACTIVITIES OF DAILY LIVING (ADL): HOME_MANAGEMENT_TIME_ENTRY: 24

## 2025-04-07 ASSESSMENT — PAIN SCALES - WONG BAKER: WONGBAKER_NUMERICALRESPONSE: NO HURT

## 2025-04-07 NOTE — NURSING NOTE
Pt taken off BiPAP so they can eat but bhavik to be replaced when they are done eating due to unimproved ABG.Pt eating dinner at this time. Care ongoing.

## 2025-04-07 NOTE — PROGRESS NOTES
"Occupational Therapy    OT Treatment    Patient Name: Analia Murray  MRN: [id number]  Department: [address]  Room: [address] [place] [address]  Today's Date: 4/7/2025  Time Calculation  Start Time: 1029  Stop Time: 1053  Time Calculation (min): 24 min      Additional time provided to assist patient back to bed. See \"Other Activity\" Section for details.  Time Calculation  Start Time: 1536  Stop Time: 1546  Time Calculation (min): 10 min      Assessment:  OT Assessment: Gradual progress made towards OT goals. Continue with current OT POC to increase strength, balance and functional tolerance to maximize safety and independence during ADLs.  Barriers to Discharge Home: Physical needs, Caregiver assistance  Evaluation/Treatment Tolerance: Patient limited by fatigue  End of Session Communication: Bedside nurse  End of Session Patient Position: Up in chair, Alarm on  OT Assessment Results: Decreased ADL status, Decreased upper extremity range of motion, Decreased upper extremity strength, Decreased endurance, Decreased functional mobility, Decreased IADLs  Barriers to Discharge: Decreased caregiver support  Evaluation/Treatment Tolerance: Patient limited by fatigue  Plan:  Treatment Interventions: ADL retraining, Functional transfer training, UE strengthening/ROM, Endurance training, Patient/family training, Equipment evaluation/education, Compensatory technique education  OT Frequency: 4 times per week  OT Discharge Recommendations: Moderate intensity level of continued care  Equipment Recommended upon Discharge: Wheeled walker  OT Recommended Transfer Status: Maximum assist, Assist of 2  OT - OK to Discharge: Yes  Treatment Interventions: ADL retraining, Functional transfer training, UE strengthening/ROM, Endurance training, Patient/family training, Equipment evaluation/education, Compensatory technique education    Subjective   Previous Visit Info:  OT Last Visit  OT Received On: 04/07/25  General:  General  Reason for Referral: " impaired ADLs, acute respiratory failure  Past Medical History Relevant to Rehab: ANX, CKD, GERD, HTN, DM, Osteoporosis, HTN, Depression  Family/Caregiver Present: Yes  Caregiver Feedback: sister arriving mid session  Co-Treatment: PT  Co-Treatment Reason: to maximize participation, safety and mobility; pt is a 2 person assist requiring 2 skilled therapists to mobilize  Prior to Session Communication: Bedside nurse  Patient Position Received: Bed, 3 rail up, Alarm on  General Comment: Pt cleared for therapy session per nursing, cooperative this date.  Precautions:  Hearing/Visual Limitations: reading glasses, hearing WFL  Medical Precautions: Fall precautions, Oxygen therapy device and L/min (2L O2 via NC)  Precautions Comment: telemetry, purewick     Date/Time Vitals Session Patient Position Pulse Resp SpO2 BP MAP (mmHg)    04/07/25 1500 --  --  73  20  96 %  110/50  75                 Pain:  Pain Assessment  Pain Assessment: 0-10  0-10 (Numeric) Pain Score: 0 - No pain  Clinical Progression: Not changed    Objective    Cognition:  Cognition  Overall Cognitive Status: Within Functional Limits  Arousal/Alertness: Delayed responses to stimuli  Orientation Level: Oriented X4  Following Commands: Follows one step commands with increased time  Safety/Judgement: Exceptions to WFL  Insight: Mild  Impulsive: Mildly  Task Initiation: Initiates with cues  Processing Speed: Delayed  Coordination:  Movements are Fluid and Coordinated: No  Upper Body Coordination: pt demonstrates slow and delayed movements  Lower Body Coordination: slower rate of movement  Activities of Daily Living: Grooming  Grooming Comments: oral hygiene tasks completed seated EOB with set-up and CGA    UE Bathing  UE Bathing Comments: UB bathing tasks completed seated EOB with Lizz- increased time and verbal cues required for sequencing    UE Dressing  UE Dressing Comments: Lizz required to don/doff hospital gown while seated EOB  Functional Standing  Tolerance:     Bed Mobility/Transfers: Bed Mobility  Bed Mobility: Yes  Bed Mobility 1  Bed Mobility 1: Supine to sitting, Scooting  Level of Assistance 1: Moderate assistance, +2  Bed Mobility Comments 1: HOB elevated to maximum height- increased time and effort required for task completion with use of bed rails and draw sheet    Transfers  Transfer: Yes  Transfer 1  Trials/Comments 1: sit<>stand completed from standard EOB height with FWW and modAx2. bed>chair transfer completed with AD and modAx2- increased time and effort required for task completion.    Other Activity Performed: Yes  Other Activity 1: Returned [id number] to assist nursing staff to transfer pt back to bed. Upon arrival pt sitting in recliner with son present and BiPAP donned. Per RRT pt is to wear BiPAP until 1630. Sit<>stand completed from recliner height with FWW and maxAx2+1 for line management. Verbal/ tactile cues required for proper hand/ foot placement to maximize safety and decrease risk of falls. Observed increased difficulty with BLE motor planning at this time. Chair>bed transfer completed with FWW and maxAx2+1 for line management as stand-pivot transfer. once seated EOB pt required maxAx3 to complete sit>supine for trunk and BLE management onto bed.  Scooting tasks completed in Trendelenburg with maxAx3 and use of draw sheet. Pt remained supine in bed with bed rails x3 up and alarm on, all needs in reach.          Outcome Measures:[facility] Daily Activity  Putting on and taking off regular lower body clothing: Total  Bathing (including washing, rinsing, drying): A lot  Putting on and taking off regular upper body clothing: A little  Toileting, which includes using toilet, bedpan or urinal: Total  Taking care of personal grooming such as brushing teeth: A little  Eating Meals: A little  Daily Activity - Total Score: 13        Education Documentation  ADL Training, taught by ARLEEN Guzman at 4/7/2025  1:41 PM.  Learner:  Patient  Readiness: Acceptance  Method: Explanation, Demonstration  Response: Needs Reinforcement  Comment: safety awareness throughout functional tasks/ transfers    Education Comments  Education provided on role of OT/POC, safety awareness throughout functional tasks/transfers, importance of activity/ rest routine, EC/WS techniques, and use of call light for assistance. Questions, comments and concerns addressed regarding OT.      Goals:  Encounter Problems       Encounter Problems (Active)       OT Goals       ADLs (Progressing)       Start:  03/27/25    Expected End:  04/11/25       Patient will complete ADLs with minimal assistance.          Functional Transfer (Progressing)       Start:  03/27/25    Expected End:  04/11/25       Patient will complete functional transfer with min assistance utilizing least restrictive device.          Therapeutic Exercise  (Progressing)       Start:  03/27/25    Expected End:  04/11/25       Patient will tolerate ~8-10 minutes of therapeutic exercise to increase aerobic capacity and improve activity tolerance.

## 2025-04-07 NOTE — NURSING NOTE
Discussed pts ABG and BiPAP with Dr. Rojas and Rapid Response nurse. Pt will be monitored closely. Care ongoing.

## 2025-04-07 NOTE — PROGRESS NOTES
Analia Murray is a 74 y.o. female on day 13 of admission presenting with Acute respiratory failure with hypoxia.      Subjective   Patient seen in the afternoon.  He is awake but falls asleep multiple times during interview.  ABG just done and shows pH of 7.2.  Family at bedside.    Objective     Last Recorded Vitals  BP 95/72 (BP Location: Left arm, Patient Position: Sitting)   Pulse 57   Temp 36.3 °C (97.3 °F) (Oral)   Resp 20   Wt 146 kg (321 lb 14 oz)   SpO2 100%   Intake/Output last 3 Shifts:    Intake/Output Summary (Last 24 hours) at 4/7/2025 1423  Last data filed at 4/7/2025 0606  Gross per 24 hour   Intake 120 ml   Output 1450 ml   Net -1330 ml       Admission Weight  Weight: 132 kg (290 lb) (03/25/25 0041)    Daily Weight  04/07/25 : 146 kg (321 lb 14 oz)    Image Results  XR chest 1 view  Narrative: Interpreted By:  Jil Washington,   STUDY:  XR CHEST 1 VIEW 4/6/2025 6:26 am      INDICATION:  Signs/Symptoms:acute respiratory failure      COMPARISON:  04/05/2025      ACCESSION NUMBER(S):  [id number]      ORDERING CLINICIAN:  HEATHER STEPHEN      TECHNIQUE:  AP erect view of the chest      FINDINGS:  There is stable cardiac enlargement. There is mild pulmonary vascular  congestion noted with some discoid atelectasis at the right lung  base. The study is limited by the body habitus of this patient.      Impression: Stable cardiomegaly with pulmonary vascular congestion and persistent  right basilar discoid atelectasis.      The study is limited by the body habitus of this patient.      Signed by: Jil Washington 4/6/2025 8:40 PM  Dictation workstation:   [id number]  XR chest 1 view  Narrative: Interpreted By:  Jil Washington,   STUDY:  XR CHEST 1 VIEW 4/5/2025 7:42 am      INDICATION:  Signs/Symptoms:acute respiratory failure      COMPARISON:  04/04/2025      ACCESSION NUMBER(S):  [id number]      ORDERING CLINICIAN:  HEATHER STEPHEN      TECHNIQUE:  AP semi-erect view of the chest      FINDINGS:  There is  enlargement of the cardiac silhouette with calcified plaque  seen in the aortic knob. When compared with the study done 1 day  earlier, there is now increased right basilar platelike atelectasis  with stable atelectasis in the left mid lung zone. There is mild  pulmonary vascular congestion seen.      Impression: Enlarged cardiac silhouette with mild pulmonary vascular congestion.      Right basilar atelectasis is a little worse with stable atelectasis  left mid lung zone.      Signed by: Jil Washington 4/6/2025 10:10 AM  Dictation workstation:   [id number]      Physical Exam    General: Morbidly obese female sitting up in chair.  Awake but fall asleep multiple times during interview  Eyes: Clear sclera  Neck: Increased circumference grossly  Cardio: Regular rate rhythm  Respiratory: Nasal cannula.  Bilateral diminished.  Neuro: Somnolent.  No facial droop.  Extremities: 2+ pitting edema bilaterally  Musculoskeletal: No bony deformity    Assessment/Plan      Acute respiratory failure with hypoxia and hypercapnia.  With worsening hypercapnia requiring BiPAP  Continue breathing treatments.  That we can, do agree with stopping steroids  Discuss pulmonology, and will start intermittent BiPAP here currently.;  Will get repeat ABG in 2 hours    Acute kidney injury  Creatinine is slightly up to 3.8 from 3.4 yesterday  BUN also up; reviewed nephrology, and thought to be secondary steroids which were stopped.    COPD exacerbation  With hypercapnia and steroids as above  Obstructive sleep apnea  On CPAP.  Patient is on oxygen at home.    Morbid obesity  Major comorbidity which I believe is contributing to her respiratory failure  Weight loss has been discussed by partner.    Diabetes mellitus type 2  Cover with insulin sliding scale.    Hypothyroidism  TSH was 11.  Continue with levothyroxine 200 mg daily    Anxiety  Continue sertraline    Insomnia  Add the Remeron    Chronic CHF with diastolic dysfunction  Lasix on hold  because of acute kidney injury    Disposition: With worsening hypercapnia, will need to keep today.  Will monitor on BiPAP, but if acidosis is not improving we will need to move to the ICU.    Rajesh Rojas, DO

## 2025-04-07 NOTE — NURSING NOTE
Pts Bun on morning labs elevated from yesterday,  this morning. Dr. Bryon hemphill chatted and is aware of critical value. No new orders at this time. Care ongoing.

## 2025-04-07 NOTE — PROGRESS NOTES
"Analia Murray is a 74 y.o. female on day 13 of admission presenting with Acute respiratory failure with hypoxia.    Subjective    Patient seen for acute renal failure on top of chronic kidney disease stage 3 patient is little bit lethargic today but able to answer all questions occasional nausea but no vomiting no diarrhea shortness of breath  Objective     Physical Exam  Constitutional:       General: She is not in acute distress.     Appearance: Normal appearance. She is not toxic-appearing.   Neck:      Vascular: No carotid bruit.   Cardiovascular:      Heart sounds: No murmur heard.     No friction rub. No gallop.   Pulmonary:      Breath sounds: No wheezing, rhonchi or rales.   Abdominal:      Tenderness: There is no abdominal tenderness. There is no right CVA tenderness, left CVA tenderness or rebound.   Musculoskeletal:         General: No tenderness.      Cervical back: Neck supple.      Right lower leg: No edema.      Left lower leg: No edema.   Lymphadenopathy:      Cervical: No cervical adenopathy.         Last Recorded Vitals  Blood pressure 113/52, pulse 57, temperature 36.2 °C (97.2 °F), temperature source Oral, resp. rate 18, height 1.626 m (5' 4.02\"), weight 146 kg (321 lb 14 oz), SpO2 99%.    Intake/Output last 3 Shifts:  I/O last 3 completed shifts:  In: 1000 (6.8 mL/kg) [P.O.:1000]  Out: 1450 (9.9 mL/kg) [Urine:1450 (0.3 mL/kg/hr)]  Weight: 146 kg     Current Facility-Administered Medications:     albuterol 2.5 mg /3 mL (0.083 %) nebulizer solution 2.5 mg, 2.5 mg, nebulization, q2h PRN, Krysta Ramirez MD    allopurinol (Zyloprim) tablet 50 mg, 50 mg, oral, Every other day, Robbie Olivarez MD, 50 mg at 04/06/25 0854    [Held by provider] amLODIPine (Norvasc) tablet 10 mg, 10 mg, oral, Daily, Sanjiv Neil PA-C, 10 mg at 03/28/25 0948    aspirin chewable tablet 81 mg, 81 mg, oral, Daily, Robbie Olivarez MD, 81 mg at 04/07/25 0924    calcium acetate (Phoslo) capsule 667 mg, 667 mg, oral, " TID after meals, Robbie Olivarez MD, 667 mg at 04/07/25 0929    dextrose 50 % injection 12.5 g, 12.5 g, intravenous, q15 min PRN, Barbara Patel MD    dextrose 50 % injection 25 g, 25 g, intravenous, q15 min PRN, Barbara Patel MD    ezetimibe (Zetia) tablet 10 mg, 10 mg, oral, Daily, Robbie Olivarez MD, 10 mg at 04/07/25 0924    fluticasone (Flonase) nasal spray 2 spray, 2 spray, Each Nostril, BID, Robbie Olivarez MD, 2 spray at 04/07/25 0925    gabapentin (Neurontin) capsule 300 mg, 300 mg, oral, Daily, Krysta Ramirez MD, 300 mg at 04/07/25 0924    glucagon (Glucagen) injection 1 mg, 1 mg, intramuscular, q15 min PRN, Barbara Patel MD    glucagon (Glucagen) injection 1 mg, 1 mg, intramuscular, q15 min PRN, Barbara Patel MD    heparin (porcine) injection 7,500 Units, 7,500 Units, subcutaneous, q8h JAYA, Robbie Olivarez MD, 7,500 Units at 04/07/25 0618    hydrALAZINE (Apresoline) tablet 50 mg, 50 mg, oral, BID, Robbie Olivarez MD, 50 mg at 04/07/25 0924    insulin lispro injection 0-10 Units, 0-10 Units, subcutaneous, TID AC, Krysta Ramirez MD, 8 Units at 04/06/25 1716    insulin lispro injection 6 Units, 6 Units, subcutaneous, Once, Barbara Patel MD    ipratropium-albuteroL (Duo-Neb) 0.5-2.5 mg/3 mL nebulizer solution 3 mL, 3 mL, nebulization, TID, Krysta Ramirez MD, 3 mL at 04/07/25 0805    levothyroxine (Synthroid, Levoxyl) tablet 200 mcg, 200 mcg, oral, Daily, Robbie Olivarez MD, 200 mcg at 04/07/25 0618    [Held by provider] metoprolol succinate XL (Toprol-XL) 24 hr tablet 100 mg, 100 mg, oral, Daily, Sanjiv Neil PA-C    metoprolol tartrate (Lopressor) tablet 50 mg, 50 mg, oral, BID, Robbie Olivarez MD, 50 mg at 04/07/25 0924    mirtazapine (Remeron) tablet 7.5 mg, 7.5 mg, oral, Nightly, Krysta Ramirez MD, 7.5 mg at 04/06/25 2116    montelukast (Singulair) tablet 10 mg, 10 mg, oral, Nightly, Robbie Olivarez MD, 10 mg at 04/06/25 2116    [Held by provider]  nitroglycerin (Nitrostat) SL tablet 0.4 mg, 0.4 mg, sublingual, q5 min PRN, Sanjiv Neil PA-C    nystatin (Mycostatin) 100,000 unit/gram powder 1 Application, 1 Application, Topical, TID, Robbie Olivarez MD, 1 Application at 04/07/25 0903    oxygen (O2) therapy, , inhalation, Nightly, Robbie Olivarez MD, 2 L/min at 04/06/25 2100    oxygen (O2) therapy, , inhalation, BID, Robbie Olivarez MD, 2 L/min at 04/07/25 0808    polyethylene glycol (Glycolax, Miralax) packet 17 g, 17 g, oral, Daily, Robbie Olivarez MD, 17 g at 04/07/25 0922    pravastatin (Pravachol) tablet 40 mg, 40 mg, oral, Nightly, Robbie Olivarez MD, 40 mg at 04/06/25 2116    sertraline (Zoloft) tablet 50 mg, 50 mg, oral, Daily, Robbie Olivarez MD, 50 mg at 04/07/25 0924    sulfur hexafluoride microsphr (Lumason) injection 24.28 mg, 2 mL, intravenous, Once in imaging, Robbie Olivarez MD    [Held by provider] traZODone (Desyrel) tablet 25 mg, 25 mg, oral, Nightly PRN, Robbie Olivarez MD   Relevant Results    Results for orders placed or performed during the hospital encounter of 03/25/25 (from the past 96 hours)   POCT GLUCOSE   Result Value Ref Range    POCT Glucose 203 (H) 74 - 99 mg/dL   POCT GLUCOSE   Result Value Ref Range    POCT Glucose 233 (H) 74 - 99 mg/dL   POCT GLUCOSE   Result Value Ref Range    POCT Glucose 228 (H) 74 - 99 mg/dL   POCT GLUCOSE   Result Value Ref Range    POCT Glucose 182 (H) 74 - 99 mg/dL   Renal function panel   Result Value Ref Range    Glucose 123 (H) 74 - 99 mg/dL    Sodium 136 136 - 145 mmol/L    Potassium 4.3 3.5 - 5.3 mmol/L    Chloride 102 98 - 107 mmol/L    Bicarbonate 25 21 - 32 mmol/L    Anion Gap 13 10 - 20 mmol/L    Urea Nitrogen 117 (HH) 6 - 23 mg/dL    Creatinine 3.73 (H) 0.50 - 1.05 mg/dL    eGFR 12 (L) >60 mL/min/1.73m*2    Calcium 7.6 (L) 8.6 - 10.3 mg/dL    Phosphorus 4.6 2.5 - 4.9 mg/dL    Albumin 2.9 (L) 3.4 - 5.0 g/dL   CBC and Auto Differential   Result Value Ref Range    WBC 8.2 4.4 - 11.3  x10*3/uL    nRBC 0.0 0.0 - 0.0 /100 WBCs    RBC 4.09 4.00 - 5.20 x10*6/uL    Hemoglobin 9.3 (L) 12.0 - 16.0 g/dL    Hematocrit 31.7 (L) 36.0 - 46.0 %    MCV 78 (L) 80 - 100 fL    MCH 22.7 (L) 26.0 - 34.0 pg    MCHC 29.3 (L) 32.0 - 36.0 g/dL    RDW 19.4 (H) 11.5 - 14.5 %    Platelets 180 150 - 450 x10*3/uL    Neutrophils % 74.7 40.0 - 80.0 %    Immature Granulocytes %, Automated 0.5 0.0 - 0.9 %    Lymphocytes % 12.6 13.0 - 44.0 %    Monocytes % 11.4 2.0 - 10.0 %    Eosinophils % 0.7 0.0 - 6.0 %    Basophils % 0.1 0.0 - 2.0 %    Neutrophils Absolute 6.08 (H) 1.60 - 5.50 x10*3/uL    Immature Granulocytes Absolute, Automated 0.04 0.00 - 0.50 x10*3/uL    Lymphocytes Absolute 1.03 0.80 - 3.00 x10*3/uL    Monocytes Absolute 0.93 (H) 0.05 - 0.80 x10*3/uL    Eosinophils Absolute 0.06 0.00 - 0.40 x10*3/uL    Basophils Absolute 0.01 0.00 - 0.10 x10*3/uL   Magnesium   Result Value Ref Range    Magnesium 2.28 1.60 - 2.40 mg/dL   POCT GLUCOSE   Result Value Ref Range    POCT Glucose 93 74 - 99 mg/dL   POCT GLUCOSE   Result Value Ref Range    POCT Glucose 158 (H) 74 - 99 mg/dL   POCT GLUCOSE   Result Value Ref Range    POCT Glucose 292 (H) 74 - 99 mg/dL   POCT GLUCOSE   Result Value Ref Range    POCT Glucose 361 (H) 74 - 99 mg/dL   CBC and Auto Differential   Result Value Ref Range    WBC 7.2 4.4 - 11.3 x10*3/uL    nRBC 0.0 0.0 - 0.0 /100 WBCs    RBC 3.92 (L) 4.00 - 5.20 x10*6/uL    Hemoglobin 8.9 (L) 12.0 - 16.0 g/dL    Hematocrit 29.3 (L) 36.0 - 46.0 %    MCV 75 (L) 80 - 100 fL    MCH 22.7 (L) 26.0 - 34.0 pg    MCHC 30.4 (L) 32.0 - 36.0 g/dL    RDW 19.5 (H) 11.5 - 14.5 %    Platelets 179 150 - 450 x10*3/uL    Neutrophils % 77.5 40.0 - 80.0 %    Immature Granulocytes %, Automated 0.7 0.0 - 0.9 %    Lymphocytes % 11.6 13.0 - 44.0 %    Monocytes % 9.8 2.0 - 10.0 %    Eosinophils % 0.4 0.0 - 6.0 %    Basophils % 0.0 0.0 - 2.0 %    Neutrophils Absolute 5.56 (H) 1.60 - 5.50 x10*3/uL    Immature Granulocytes Absolute, Automated 0.05  0.00 - 0.50 x10*3/uL    Lymphocytes Absolute 0.83 0.80 - 3.00 x10*3/uL    Monocytes Absolute 0.70 0.05 - 0.80 x10*3/uL    Eosinophils Absolute 0.03 0.00 - 0.40 x10*3/uL    Basophils Absolute 0.00 0.00 - 0.10 x10*3/uL   Renal Function Panel   Result Value Ref Range    Glucose 203 (H) 74 - 99 mg/dL    Sodium 137 136 - 145 mmol/L    Potassium 4.3 3.5 - 5.3 mmol/L    Chloride 105 98 - 107 mmol/L    Bicarbonate 23 21 - 32 mmol/L    Anion Gap 13 10 - 20 mmol/L    Urea Nitrogen 113 (HH) 6 - 23 mg/dL    Creatinine 3.36 (H) 0.50 - 1.05 mg/dL    eGFR 14 (L) >60 mL/min/1.73m*2    Calcium 7.7 (L) 8.6 - 10.3 mg/dL    Phosphorus 3.7 2.5 - 4.9 mg/dL    Albumin 2.9 (L) 3.4 - 5.0 g/dL   POCT GLUCOSE   Result Value Ref Range    POCT Glucose 171 (H) 74 - 99 mg/dL   POCT GLUCOSE   Result Value Ref Range    POCT Glucose 211 (H) 74 - 99 mg/dL   POCT GLUCOSE   Result Value Ref Range    POCT Glucose 314 (H) 74 - 99 mg/dL   POCT GLUCOSE   Result Value Ref Range    POCT Glucose 360 (H) 74 - 99 mg/dL   CBC and Auto Differential   Result Value Ref Range    WBC 7.9 4.4 - 11.3 x10*3/uL    nRBC 0.0 0.0 - 0.0 /100 WBCs    RBC 4.09 4.00 - 5.20 x10*6/uL    Hemoglobin 9.3 (L) 12.0 - 16.0 g/dL    Hematocrit 32.6 (L) 36.0 - 46.0 %    MCV 80 80 - 100 fL    MCH 22.7 (L) 26.0 - 34.0 pg    MCHC 28.5 (L) 32.0 - 36.0 g/dL    RDW 19.9 (H) 11.5 - 14.5 %    Platelets 190 150 - 450 x10*3/uL    Neutrophils % 79.5 40.0 - 80.0 %    Immature Granulocytes %, Automated 0.5 0.0 - 0.9 %    Lymphocytes % 10.9 13.0 - 44.0 %    Monocytes % 8.6 2.0 - 10.0 %    Eosinophils % 0.5 0.0 - 6.0 %    Basophils % 0.0 0.0 - 2.0 %    Neutrophils Absolute 6.28 (H) 1.60 - 5.50 x10*3/uL    Immature Granulocytes Absolute, Automated 0.04 0.00 - 0.50 x10*3/uL    Lymphocytes Absolute 0.86 0.80 - 3.00 x10*3/uL    Monocytes Absolute 0.68 0.05 - 0.80 x10*3/uL    Eosinophils Absolute 0.04 0.00 - 0.40 x10*3/uL    Basophils Absolute 0.00 0.00 - 0.10 x10*3/uL   POCT GLUCOSE   Result Value Ref Range     POCT Glucose 145 (H) 74 - 99 mg/dL   Basic Metabolic Panel   Result Value Ref Range    Glucose 168 (H) 74 - 99 mg/dL    Sodium 135 (L) 136 - 145 mmol/L    Potassium 4.5 3.5 - 5.3 mmol/L    Chloride 105 98 - 107 mmol/L    Bicarbonate 25 21 - 32 mmol/L    Anion Gap 10 10 - 20 mmol/L    Urea Nitrogen 111 (HH) 6 - 23 mg/dL    Creatinine 3.47 (H) 0.50 - 1.05 mg/dL    eGFR 13 (L) >60 mL/min/1.73m*2    Calcium 7.9 (L) 8.6 - 10.3 mg/dL   POCT GLUCOSE   Result Value Ref Range    POCT Glucose 271 (H) 74 - 99 mg/dL   POCT GLUCOSE   Result Value Ref Range    POCT Glucose 317 (H) 74 - 99 mg/dL   Blood Gas Arterial Full Panel   Result Value Ref Range    POCT pH, Arterial 7.20 (LL) 7.38 - 7.42 pH    POCT pCO2, Arterial 63 (H) 38 - 42 mm Hg    POCT pO2, Arterial 144 (H) 85 - 95 mm Hg    POCT SO2, Arterial 99 94 - 100 %    POCT Oxy Hemoglobin, Arterial 97.5 94.0 - 98.0 %    POCT Hematocrit Calculated, Arterial 28.0 (L) 36.0 - 46.0 %    POCT Sodium, Arterial 131 (L) 136 - 145 mmol/L    POCT Potassium, Arterial 5.3 3.5 - 5.3 mmol/L    POCT Chloride, Arterial 103 98 - 107 mmol/L    POCT Ionized Calcium, Arterial 1.18 1.10 - 1.33 mmol/L    POCT Glucose, Arterial 286 (H) 74 - 99 mg/dL    POCT Lactate, Arterial 1.0 0.4 - 2.0 mmol/L    POCT Base Excess, Arterial -3.8 (L) -2.0 - 3.0 mmol/L    POCT HCO3 Calculated, Arterial 24.6 22.0 - 26.0 mmol/L    POCT Hemoglobin, Arterial 9.4 (L) 12.0 - 16.0 g/dL    POCT Anion Gap, Arterial 9 (L) 10 - 25 mmo/L    Patient Temperature 37.0 degrees Celsius    FiO2 32 %    Critical Called By JOSE HEADLEY     [facility]     Critical Call Time 2107     Critical Read Back Y     Critical Note CRITICAL     Site of Arterial Puncture Radial Right     Wes's Test Positive    POCT GLUCOSE   Result Value Ref Range    POCT Glucose 252 (H) 74 - 99 mg/dL   CBC and Auto Differential   Result Value Ref Range    WBC 7.0 4.4 - 11.3 x10*3/uL    nRBC 0.0 0.0 - 0.0 /100 WBCs    RBC 3.83 (L) 4.00 - 5.20  x10*6/uL    Hemoglobin 8.8 (L) 12.0 - 16.0 g/dL    Hematocrit 29.6 (L) 36.0 - 46.0 %    MCV 77 (L) 80 - 100 fL    MCH 23.0 (L) 26.0 - 34.0 pg    MCHC 29.7 (L) 32.0 - 36.0 g/dL    RDW 19.9 (H) 11.5 - 14.5 %    Platelets 150 150 - 450 x10*3/uL    Neutrophils % 78.0 40.0 - 80.0 %    Immature Granulocytes %, Automated 0.6 0.0 - 0.9 %    Lymphocytes % 11.6 13.0 - 44.0 %    Monocytes % 9.0 2.0 - 10.0 %    Eosinophils % 0.7 0.0 - 6.0 %    Basophils % 0.1 0.0 - 2.0 %    Neutrophils Absolute 5.47 1.60 - 5.50 x10*3/uL    Immature Granulocytes Absolute, Automated 0.04 0.00 - 0.50 x10*3/uL    Lymphocytes Absolute 0.81 0.80 - 3.00 x10*3/uL    Monocytes Absolute 0.63 0.05 - 0.80 x10*3/uL    Eosinophils Absolute 0.05 0.00 - 0.40 x10*3/uL    Basophils Absolute 0.01 0.00 - 0.10 x10*3/uL   Renal function panel   Result Value Ref Range    Glucose 174 (H) 74 - 99 mg/dL    Sodium 136 136 - 145 mmol/L    Potassium 5.1 3.5 - 5.3 mmol/L    Chloride 104 98 - 107 mmol/L    Bicarbonate 25 21 - 32 mmol/L    Anion Gap 12 10 - 20 mmol/L    Urea Nitrogen 121 (HH) 6 - 23 mg/dL    Creatinine 3.82 (H) 0.50 - 1.05 mg/dL    eGFR 12 (L) >60 mL/min/1.73m*2    Calcium 8.1 (L) 8.6 - 10.3 mg/dL    Phosphorus 4.5 2.5 - 4.9 mg/dL    Albumin 2.9 (L) 3.4 - 5.0 g/dL   POCT GLUCOSE   Result Value Ref Range    POCT Glucose 150 (H) 74 - 99 mg/dL     *Note: Due to a large number of results and/or encounters for the requested time period, some results have not been displayed. A complete set of results can be found in Results Review.       Assessment/Plan   Acute kidney injury superimposed on chronic kidney disease stage 3   Chronic kidney disease stage IIIb creatinine level is stable however her BUN continue to rise which I think secondary to the use of IV steroids plan is to discontinue Solu-Medrol today and continue to monitor no immediate indication for dialysis at this point  Acute respiratory failure continue with oxygen by nasal cannula  Bilateral pneumonia  with IV antibiotics  Diabetes mellitus type 2  Anemia of chronic kidney disease  Hypertension  Hyperlipidemia  Obesity      Sixto Slater MD

## 2025-04-07 NOTE — NURSING NOTE
Pts BUN increased this morning, Dr. Slater came in to see pt and had conversation with this RN. Dr. Slater states to hold Solumedrol due to kidney function. Pt does not appear dehydrated and has not had any stools for two days. Care ongoing.

## 2025-04-07 NOTE — PROGRESS NOTES
Department of Medicine  Division of Pulmonary, Critical Care, and Sleep Medicine    Analia Murray is a 74 y.o. female on day 13 of admission presenting with Acute respiratory failure with hypoxia.    Subjective   No events over past 24 hours.  Comfortable in chair on 2 L nasal cannula, was on BiPAP last night.       Objective     Vital Signs      4/6/2025     7:54 PM 4/6/2025     9:00 PM 4/6/2025    11:26 PM 4/7/2025     3:29 AM 4/7/2025     5:27 AM 4/7/2025     7:30 AM 4/7/2025    11:31 AM   Vitals   Systolic  120 121   113 95   Diastolic  60 59   52 72   BP Location  Left arm Left arm Left arm  Left arm Left arm   Heart Rate  66 56 57  57 57   Temp  36.6 °C (97.9 °F) 36.3 °C (97.3 °F) 36.4 °C (97.5 °F)  36.2 °C (97.2 °F) 36.3 °C (97.3 °F)   Resp 22 20 22 24  18 16   Weight (lb)     321.87     BMI     55.22 kg/m2     BSA (m2)     2.57 m2          Physical exam  Constitutional: Obese, comfortable in chair, in no distress  HEENT: Normocephalic and atraumatic.  Cardiovascular: Normal rate and regular rhythm.  Pulmonary: Normal respiratory effort, distant breath sounds bilaterally, no wheezing.  Musculoskeletal: No edema, no cyanosis.  Neurological: Awake, alert and oriented x3.  Psychiatric: Normal behavior, mood and affect.    Labs:  Lab Results   Component Value Date    WBC 7.0 04/07/2025    HGB 8.8 (L) 04/07/2025    HCT 29.6 (L) 04/07/2025    MCV 77 (L) 04/07/2025     04/07/2025      Lab Results   Component Value Date    GLUCOSE 174 (H) 04/07/2025    CALCIUM 8.1 (L) 04/07/2025     04/07/2025    K 5.1 04/07/2025    CO2 25 04/07/2025     04/07/2025     (HH) 04/07/2025    CREATININE 3.82 (H) 04/07/2025      Lab Results   Component Value Date    ALT 7 03/25/2025    AST 10 03/25/2025    ALKPHOS 90 03/25/2025    BILITOT 0.5 03/25/2025        Oxygen Therapy  SpO2: 100 %  Medical Gas Therapy: Supplemental oxygen  Medical Gas Delivery Method: Nasal cannula  FiO2 (%):  [28 %-100 %] 28 %  S  RR:  [22] 22    Intake/Output last 3 Shifts:  I/O last 3 completed shifts:  In: 1000 (6.8 mL/kg) [P.O.:1000]  Out: 1450 (9.9 mL/kg) [Urine:1450 (0.3 mL/kg/hr)]  Weight: 146 kg       Medications   Scheduled medications  allopurinol, 50 mg, oral, Every other day  [Held by provider] amLODIPine, 10 mg, oral, Daily  aspirin, 81 mg, oral, Daily  calcium acetate, 667 mg, oral, TID after meals  ezetimibe, 10 mg, oral, Daily  fluticasone, 2 spray, Each Nostril, BID  gabapentin, 300 mg, oral, Daily  heparin (porcine), 7,500 Units, subcutaneous, q8h JAYA  hydrALAZINE, 50 mg, oral, BID  insulin lispro, 0-10 Units, subcutaneous, TID AC  insulin lispro, 6 Units, subcutaneous, Once  ipratropium-albuteroL, 3 mL, nebulization, TID  levothyroxine, 200 mcg, oral, Daily  [Held by provider] metoprolol succinate XL, 100 mg, oral, Daily  metoprolol tartrate, 50 mg, oral, BID  mirtazapine, 7.5 mg, oral, Nightly  montelukast, 10 mg, oral, Nightly  nystatin, 1 Application, Topical, TID  oxygen, , inhalation, Nightly  oxygen, , inhalation, BID  polyethylene glycol, 17 g, oral, Daily  pravastatin, 40 mg, oral, Nightly  sertraline, 50 mg, oral, Daily  sulfur hexafluoride microsphr, 2 mL, intravenous, Once in imaging      Continuous medications     PRN medications  PRN medications: albuterol, dextrose, dextrose, glucagon, glucagon, [Held by provider] nitroglycerin, [Held by provider] traZODone     Allergies  Ciprofloxacin, Morphine, and Penicillins    Chest Radiograph   No results found for this or any previous visit from the past 10 days.       XR chest 1 view 04/06/2025    Narrative  Interpreted By:  Jil Washington,  STUDY:  XR CHEST 1 VIEW 4/6/2025 6:26 am    INDICATION:  Signs/Symptoms:acute respiratory failure    COMPARISON:  04/05/2025    ACCESSION NUMBER(S):  [id number]    ORDERING CLINICIAN:  HEATHER STEPHEN    TECHNIQUE:  AP erect view of the chest    FINDINGS:  There is stable cardiac enlargement. There is mild pulmonary  vascular  congestion noted with some discoid atelectasis at the right lung  base. The study is limited by the body habitus of this patient.    Impression  Stable cardiomegaly with pulmonary vascular congestion and persistent  right basilar discoid atelectasis.    The study is limited by the body habitus of this patient.    Signed by: Jil Washington 4/6/2025 8:40 PM  Dictation workstation:   [id number]      XR chest 1 view 04/05/2025    Narrative  Interpreted By:  Jil Washington,  STUDY:  XR CHEST 1 VIEW 4/5/2025 7:42 am    INDICATION:  Signs/Symptoms:acute respiratory failure    COMPARISON:  04/04/2025    ACCESSION NUMBER(S):  [id number]    ORDERING CLINICIAN:  HEATHER STEPHEN    TECHNIQUE:  AP semi-erect view of the chest    FINDINGS:  There is enlargement of the cardiac silhouette with calcified plaque  seen in the aortic knob. When compared with the study done 1 day  earlier, there is now increased right basilar platelike atelectasis  with stable atelectasis in the left mid lung zone. There is mild  pulmonary vascular congestion seen.    Impression  Enlarged cardiac silhouette with mild pulmonary vascular congestion.    Right basilar atelectasis is a little worse with stable atelectasis  left mid lung zone.    Signed by: Jil Washington 4/6/2025 10:10 AM  Dictation workstation:   [id number]      XR chest 1 view 04/04/2025    Narrative  Interpreted By:  Jil Washington,  STUDY:  XR CHEST 1 VIEW 4/4/2025 11:56 am    INDICATION:  Signs/Symptoms:acute respiratory failure    COMPARISON:  04/03/2025    ACCESSION NUMBER(S):  [id number]    ORDERING CLINICIAN:  HEATHER STEPHEN    TECHNIQUE:  AP erect view of the chest    FINDINGS:  Cardiac enlargement is unchanged. There is less right basilar  atelectasis and infiltrate. There is persistent linear atelectasis at  the junction of the left mid and lower lung zones. No pleural  abnormality is observed.    Impression  Improved right basilar atelectasis and infiltrate without  complete  resolution.    Persistent linear atelectasis at the junction of the left mid and  lower lung fields.    Stable cardiomegaly.    Signed by: Jil Washington 4/4/2025 12:12 PM  Dictation workstation:   [id number]         Assessment and Plan / Recommendations   Assessment/Plan   74-year-old woman with history of asthma (clinical diagnosis), HOLDEN on CPAP, CKD, diabetes admitted with    1.  Acute hypoxic and hypercapnic respiratory failure due to below  2.  Suspected asthma exacerbation (was diagnosed with asthma clinically, PFT from 2020 no obstruction, never smoked)  3.  Pulmonary edema/CHF, chest x-ray from yesterday with worsening bilateral edema/vascular congestion  4.  HOLDEN with hypoventilation    Hypoxia improving, on 2 L nasal cannula today.  ABG from yesterday with worsening hypercapnic acidosis.  Was on BiPAP last night    -Repeat ABG today  -Completed steroids, no wheezing on exam today  -Add Breo, continue Singulair, nebs as needed  -Continue BiPAP at night for now, will need to evaluate if need BiPAP on discharge or can go back on home CPAP  -Recommend to resume diuresis when renal function stable, nephrology following  -Incentive spirometry, Aerobika, wean O2 as tolerated      John Ross MD

## 2025-04-07 NOTE — PROGRESS NOTES
[facility] called pts daughter Tiffanie today [phone], VM received, message left with this workers contact info. Additional SNF choices needed, precert needed.        04/07/25 1255   Discharge Planning   Expected Discharge Disposition SNF

## 2025-04-07 NOTE — NURSING NOTE
Pt has family at bedside at this time. Pt sat up in the chair by therapy this morning, this RN educated pt on the importance of sitting up fore breathing and meals. Pts family ordering lunch, this RN will treat pts blood sugar with insulin. Care ongoing.

## 2025-04-07 NOTE — PROGRESS NOTES
Physical Therapy    Physical Therapy Treatment    Patient Name: Analia Murray  MRN: [id number]  Department: [address]  Room: [address] [place] [address]  Today's Date: 4/7/2025  Time Calculation  Start Time: 1028  Stop Time: 1056  Time Calculation (min): 28 min         Assessment/Plan   PT Assessment  Rehab Prognosis: Good  Barriers to Discharge Home: Caregiver assistance, Physical needs  Caregiver Assistance: Caregiver assistance needed per identified barriers - however, level of patient's required assistance exceeds assistance available at home  Physical Needs: Stair navigation into home limited by function/safety, Ambulating household distances limited by function/safety, High falls risk due to function or environment  End of Session Communication: Bedside nurse  Assessment Comment: Pt continues to present with mobility and strength deficits, pt would benefit from moderate intensity to increase strength and improve functional mobility.  End of Session Patient Position: Up in chair, Alarm on  PT Plan  Inpatient/Swing Bed or Outpatient: Inpatient  PT Plan  Treatment/Interventions: Bed mobility, Transfer training, Gait training, Stair training, Balance training, Neuromuscular re-education, Strengthening, Endurance training, Therapeutic exercise, Therapeutic activity, Home exercise program, Positioning, Postural re-education  PT Plan: Ongoing PT  PT Frequency: 5 times per week  PT Discharge Recommendations: Moderate intensity level of continued care  Equipment Recommended upon Discharge: Wheeled walker  PT Recommended Transfer Status: Assist x2, Assistive device (RW)  PT - OK to Discharge: Yes      General Visit Information:   PT  Visit  PT Received On: 04/07/25  General  Reason for Referral: Impaired functional mobility due to decreased muscular strength. This 74 year old was admitted for acute hypoxic respiratory failure and acute metabolic encephalopathy. Pt intubated 3/25-3/27/25.  Referred By: Dr. Sher MD  Past Medical  History Relevant to Rehab: ANX, CKD, GERD, HTN, DM, Osteoporosis, HTN, Depression  Family/Caregiver Present: Yes  Caregiver Feedback: sister arriving mid session  Co-Treatment: OT  Co-Treatment Reason: to maximize participation, safety and mobility; pt is a 2 person assist requiring 2 skilled therapists to mobilize  Prior to Session Communication: Bedside nurse  Patient Position Received: Bed, 3 rail up, Alarm on  General Comment: Pt cleared for PT by nursing. Pt agreeable to PT services    Subjective   Precautions:  Precautions  Medical Precautions: Fall precautions, Oxygen therapy device and L/min (2L 02 NC)    Objective   Pain:  Pain Assessment  Pain Assessment: 0-10  0-10 (Numeric) Pain Score: 0 - No pain  Cognition:  Cognition  Overall Cognitive Status: Within Functional Limits  Orientation Level: Oriented X4     Postural Control:  Static Sitting Balance  Static Sitting-Balance Support: Bilateral upper extremity supported, Feet supported  Static Sitting-Level of Assistance: Modified independent  Static Sitting-Comment/Number of Minutes: good seated static balance at EOB  Static Standing Balance  Static Standing-Balance Support: Bilateral upper extremity supported  Static Standing-Level of Assistance: Moderate assistance  Static Standing-Comment/Number of Minutes: fair - static stand balance    Treatments:  Therapeutic Exercise  Therapeutic Exercise Performed: Yes  Therapeutic Exercise Activity 1: Seated B x5: chayo FREDERICK    Bed Mobility  Bed Mobility: Yes  Bed Mobility 1  Bed Mobility 1: Supine to sitting  Level of Assistance 1: Moderate assistance, +2  Bed Mobility Comments 1: ModA x2 to raise trunk, clear BLE over EOB, and scoot pelvis to EOB. Increased time and effort to complete. Cues for hand placement, weight shift, and sequencing.    Ambulation/Gait Training  Ambulation/Gait Training Performed: Yes  Ambulation/Gait Training 1  Surface 1: Level tile  Device 1: Rolling walker  Assistance 1: Moderate  assistance (+2)  Quality of Gait 1: Wide base of support, Diminished heel strike, Decreased step length, Forward flexed posture  Comments/Distance (ft) 1: 5 feet from EOB to bedside chair. ModAx2 for stability, AD mgmt, and cues for sequencing/advancement. Pt attempting to rest on R elbow, cues throughout for upright posture.  Transfers  Transfer: Yes  Transfer 1  Transfer From 1: Sit to, Stand to  Transfer to 1: Sit, Stand  Technique 1: Sit to stand, Stand to sit  Transfer Device 1: Walker  Transfer Level of Assistance 1: Moderate assistance, +2  Trials/Comments 1: ModAx2 to raise trunk. Cues for hand placement, weight shift, and upright posture. No knee buckling noted this date.    Outcome Measures:  [facility] Basic Mobility  Turning from your back to your side while in a flat bed without using bedrails: A lot  Moving from lying on your back to sitting on the side of a flat bed without using bedrails: A lot  Moving to and from bed to chair (including a wheelchair): A lot  Standing up from a chair using your arms (e.g. wheelchair or bedside chair): A lot  To walk in hospital room: A lot  Climbing 3-5 steps with railing: Total  Basic Mobility - Total Score: 11    Education Documentation  Precautions, taught by Camelia Mirza PTA at 4/7/2025  1:27 PM.  Learner: Patient  Readiness: Acceptance  Method: Explanation, Demonstration  Response: Verbalizes Understanding, Needs Reinforcement  Comment: POC, OOB mobility, calling for assistance, safe AD use.    Mobility Training, taught by Camelia Mirza PTA at 4/7/2025  1:27 PM.  Learner: Patient  Readiness: Acceptance  Method: Explanation, Demonstration  Response: Verbalizes Understanding, Needs Reinforcement  Comment: POC, OOB mobility, calling for assistance, safe AD use.    Education Comments  No comments found.        Encounter Problems       Encounter Problems (Active)       PT Problem       Pt will transition supine<>sit with close S. (Progressing)       Start:  03/27/25     Expected End:  04/19/25            Pt will transfer sit<>stand with FWW & close S. (Progressing)       Start:  03/27/25    Expected End:  04/19/25            Pt will ambulate >/=25 ft with FWW & close S. (Progressing)       Start:  03/27/25    Expected End:  04/19/25

## 2025-04-07 NOTE — NURSING NOTE
Assumed care of pt. Pt lying in bed resting but arouse-able this morning. BSSR complete. Respirations even and unlabored on 2L NC. No further needs at this time. Care ongoing.

## 2025-04-07 NOTE — CARE PLAN
The patient's goals for the shift include monitor oxygenation    The clinical goals for the shift include monitor oxygenation    Over the shift, the patient did not make progress toward the following goals. Barriers to progression include impaired gas exchange. Recommendations to address these barriers include CPAP.

## 2025-04-07 NOTE — NURSING NOTE
Pt back on Bipap at this time. ABG to be retaken around 7 pm.  Pt resting comfortably in bed with family at bedside. Care ongoing.

## 2025-04-08 ENCOUNTER — APPOINTMENT (OUTPATIENT)
Dept: RADIOLOGY | Facility: HOSPITAL | Age: 75
DRG: 871 | End: 2025-04-08
Payer: MEDICARE

## 2025-04-08 LAB
ALBUMIN SERPL BCP-MCNC: 3 G/DL (ref 3.4–5)
AMMONIA PLAS-SCNC: 30 UMOL/L (ref 16–53)
ANION GAP BLDA CALCULATED.4IONS-SCNC: 12 MMO/L (ref 10–25)
ANION GAP SERPL CALCULATED.3IONS-SCNC: 13 MMOL/L (ref 10–20)
APPARATUS: ABNORMAL
ARTERIAL PATENCY WRIST A: POSITIVE
BASE EXCESS BLDA CALC-SCNC: -3.4 MMOL/L (ref -2–3)
BASOPHILS # BLD AUTO: 0.01 X10*3/UL (ref 0–0.1)
BASOPHILS NFR BLD AUTO: 0.2 %
BODY TEMPERATURE: 37 DEGREES CELSIUS
BUN SERPL-MCNC: 125 MG/DL (ref 6–23)
BURR CELLS BLD QL SMEAR: NORMAL
CA-I BLDA-SCNC: 1.17 MMOL/L (ref 1.1–1.33)
CALCIUM SERPL-MCNC: 8.1 MG/DL (ref 8.6–10.3)
CHLORIDE BLDA-SCNC: 103 MMOL/L (ref 98–107)
CHLORIDE SERPL-SCNC: 102 MMOL/L (ref 98–107)
CO2 SERPL-SCNC: 23 MMOL/L (ref 21–32)
CREAT SERPL-MCNC: 4.12 MG/DL (ref 0.5–1.05)
EGFRCR SERPLBLD CKD-EPI 2021: 11 ML/MIN/1.73M*2
EOSINOPHIL # BLD AUTO: 0.16 X10*3/UL (ref 0–0.4)
EOSINOPHIL NFR BLD AUTO: 2.9 %
EPAP CMH2O: 10 CM H2O
ERYTHROCYTE [DISTWIDTH] IN BLOOD BY AUTOMATED COUNT: 20.2 % (ref 11.5–14.5)
GLUCOSE BLD MANUAL STRIP-MCNC: 120 MG/DL (ref 74–99)
GLUCOSE BLD MANUAL STRIP-MCNC: 136 MG/DL (ref 74–99)
GLUCOSE BLD MANUAL STRIP-MCNC: 151 MG/DL (ref 74–99)
GLUCOSE BLD MANUAL STRIP-MCNC: 170 MG/DL (ref 74–99)
GLUCOSE BLDA-MCNC: 130 MG/DL (ref 74–99)
GLUCOSE SERPL-MCNC: 138 MG/DL (ref 74–99)
HCO3 BLDA-SCNC: 23.1 MMOL/L (ref 22–26)
HCT VFR BLD AUTO: 32.7 % (ref 36–46)
HCT VFR BLD EST: 26 % (ref 36–46)
HGB BLD-MCNC: 9.2 G/DL (ref 12–16)
HGB BLDA-MCNC: 8.7 G/DL (ref 12–16)
IMM GRANULOCYTES # BLD AUTO: 0.04 X10*3/UL (ref 0–0.5)
IMM GRANULOCYTES NFR BLD AUTO: 0.7 % (ref 0–0.9)
INHALED O2 CONCENTRATION: 28 %
IPAP CMH2O: ABNORMAL
LACTATE BLDA-SCNC: 0.4 MMOL/L (ref 0.4–2)
LYMPHOCYTES # BLD AUTO: 0.77 X10*3/UL (ref 0.8–3)
LYMPHOCYTES NFR BLD AUTO: 13.8 %
MCH RBC QN AUTO: 22.8 PG (ref 26–34)
MCHC RBC AUTO-ENTMCNC: 28.1 G/DL (ref 32–36)
MCV RBC AUTO: 81 FL (ref 80–100)
MONOCYTES # BLD AUTO: 0.42 X10*3/UL (ref 0.05–0.8)
MONOCYTES NFR BLD AUTO: 7.5 %
NEUTROPHILS # BLD AUTO: 4.19 X10*3/UL (ref 1.6–5.5)
NEUTROPHILS NFR BLD AUTO: 74.9 %
NRBC BLD-RTO: 0 /100 WBCS (ref 0–0)
OVALOCYTES BLD QL SMEAR: NORMAL
OXYHGB MFR BLDA: 98.2 % (ref 94–98)
PCO2 BLDA: 48 MM HG (ref 38–42)
PH BLDA: 7.29 PH (ref 7.38–7.42)
PHOSPHATE SERPL-MCNC: 4.1 MG/DL (ref 2.5–4.9)
PLATELET # BLD AUTO: 161 X10*3/UL (ref 150–450)
PO2 BLDA: 149 MM HG (ref 85–95)
POLYCHROMASIA BLD QL SMEAR: NORMAL
POTASSIUM BLDA-SCNC: 5.3 MMOL/L (ref 3.5–5.3)
POTASSIUM SERPL-SCNC: 5 MMOL/L (ref 3.5–5.3)
RBC # BLD AUTO: 4.04 X10*6/UL (ref 4–5.2)
RBC MORPH BLD: NORMAL
SAO2 % BLDA: 100 % (ref 94–100)
SCHISTOCYTES BLD QL SMEAR: NORMAL
SODIUM BLDA-SCNC: 133 MMOL/L (ref 136–145)
SODIUM SERPL-SCNC: 133 MMOL/L (ref 136–145)
SPECIMEN DRAWN FROM PATIENT: ABNORMAL
TARGETS BLD QL SMEAR: NORMAL
TIDAL VOLUME: 500 ML
VENTILATOR RATE: 16 BPM
WBC # BLD AUTO: 5.6 X10*3/UL (ref 4.4–11.3)

## 2025-04-08 PROCEDURE — 82947 ASSAY GLUCOSE BLOOD QUANT: CPT

## 2025-04-08 PROCEDURE — 2500000005 HC RX 250 GENERAL PHARMACY W/O HCPCS: Performed by: INTERNAL MEDICINE

## 2025-04-08 PROCEDURE — 2500000004 HC RX 250 GENERAL PHARMACY W/ HCPCS (ALT 636 FOR OP/ED): Performed by: STUDENT IN AN ORGANIZED HEALTH CARE EDUCATION/TRAINING PROGRAM

## 2025-04-08 PROCEDURE — 2500000004 HC RX 250 GENERAL PHARMACY W/ HCPCS (ALT 636 FOR OP/ED): Performed by: INTERNAL MEDICINE

## 2025-04-08 PROCEDURE — 84132 ASSAY OF SERUM POTASSIUM: CPT | Performed by: INTERNAL MEDICINE

## 2025-04-08 PROCEDURE — 36415 COLL VENOUS BLD VENIPUNCTURE: CPT | Performed by: INTERNAL MEDICINE

## 2025-04-08 PROCEDURE — 9420000001 HC RT PATIENT EDUCATION 5 MIN

## 2025-04-08 PROCEDURE — 2500000002 HC RX 250 W HCPCS SELF ADMINISTERED DRUGS (ALT 637 FOR MEDICARE OP, ALT 636 FOR OP/ED): Performed by: INTERNAL MEDICINE

## 2025-04-08 PROCEDURE — 94799 UNLISTED PULMONARY SVC/PX: CPT

## 2025-04-08 PROCEDURE — 71045 X-RAY EXAM CHEST 1 VIEW: CPT

## 2025-04-08 PROCEDURE — 2020000001 HC ICU ROOM DAILY

## 2025-04-08 PROCEDURE — 94660 CPAP INITIATION&MGMT: CPT

## 2025-04-08 PROCEDURE — 85025 COMPLETE CBC W/AUTO DIFF WBC: CPT | Performed by: INTERNAL MEDICINE

## 2025-04-08 PROCEDURE — 2500000005 HC RX 250 GENERAL PHARMACY W/O HCPCS: Performed by: STUDENT IN AN ORGANIZED HEALTH CARE EDUCATION/TRAINING PROGRAM

## 2025-04-08 PROCEDURE — 99233 SBSQ HOSP IP/OBS HIGH 50: CPT | Performed by: INTERNAL MEDICINE

## 2025-04-08 PROCEDURE — 2500000001 HC RX 250 WO HCPCS SELF ADMINISTERED DRUGS (ALT 637 FOR MEDICARE OP): Performed by: STUDENT IN AN ORGANIZED HEALTH CARE EDUCATION/TRAINING PROGRAM

## 2025-04-08 PROCEDURE — 2500000002 HC RX 250 W HCPCS SELF ADMINISTERED DRUGS (ALT 637 FOR MEDICARE OP, ALT 636 FOR OP/ED): Performed by: STUDENT IN AN ORGANIZED HEALTH CARE EDUCATION/TRAINING PROGRAM

## 2025-04-08 PROCEDURE — 71045 X-RAY EXAM CHEST 1 VIEW: CPT | Performed by: RADIOLOGY

## 2025-04-08 PROCEDURE — 36600 WITHDRAWAL OF ARTERIAL BLOOD: CPT

## 2025-04-08 PROCEDURE — 82140 ASSAY OF AMMONIA: CPT | Performed by: INTERNAL MEDICINE

## 2025-04-08 PROCEDURE — 94640 AIRWAY INHALATION TREATMENT: CPT

## 2025-04-08 PROCEDURE — 80069 RENAL FUNCTION PANEL: CPT | Performed by: INTERNAL MEDICINE

## 2025-04-08 PROCEDURE — 99291 CRITICAL CARE FIRST HOUR: CPT | Performed by: STUDENT IN AN ORGANIZED HEALTH CARE EDUCATION/TRAINING PROGRAM

## 2025-04-08 RX ORDER — FUROSEMIDE 10 MG/ML
80 INJECTION INTRAMUSCULAR; INTRAVENOUS ONCE
Status: COMPLETED | OUTPATIENT
Start: 2025-04-08 | End: 2025-04-08

## 2025-04-08 RX ORDER — HYDRALAZINE HYDROCHLORIDE 20 MG/ML
10 INJECTION INTRAMUSCULAR; INTRAVENOUS EVERY 4 HOURS PRN
Status: DISCONTINUED | OUTPATIENT
Start: 2025-04-08 | End: 2025-04-17 | Stop reason: HOSPADM

## 2025-04-08 RX ADMIN — METOPROLOL TARTRATE 50 MG: 50 TABLET, FILM COATED ORAL at 20:29

## 2025-04-08 RX ADMIN — MONTELUKAST 10 MG: 10 TABLET, FILM COATED ORAL at 20:29

## 2025-04-08 RX ADMIN — HYDRALAZINE HYDROCHLORIDE 50 MG: 50 TABLET ORAL at 20:29

## 2025-04-08 RX ADMIN — LEVOTHYROXINE SODIUM 200 MCG: 0.1 TABLET ORAL at 05:04

## 2025-04-08 RX ADMIN — NYSTATIN 1 APPLICATION: 100000 POWDER TOPICAL at 09:30

## 2025-04-08 RX ADMIN — IPRATROPIUM BROMIDE AND ALBUTEROL SULFATE 3 ML: 2.5; .5 SOLUTION RESPIRATORY (INHALATION) at 20:34

## 2025-04-08 RX ADMIN — HEPARIN SODIUM 7500 UNITS: 5000 INJECTION INTRAVENOUS; SUBCUTANEOUS at 21:33

## 2025-04-08 RX ADMIN — NYSTATIN 1 APPLICATION: 100000 POWDER TOPICAL at 20:44

## 2025-04-08 RX ADMIN — FUROSEMIDE 80 MG: 10 INJECTION INTRAMUSCULAR; INTRAVENOUS at 11:33

## 2025-04-08 RX ADMIN — Medication 2 L/MIN: at 20:33

## 2025-04-08 RX ADMIN — Medication 2 L/MIN: at 20:35

## 2025-04-08 RX ADMIN — HYDRALAZINE HYDROCHLORIDE 10 MG: 20 INJECTION INTRAMUSCULAR; INTRAVENOUS at 12:20

## 2025-04-08 RX ADMIN — PRAVASTATIN SODIUM 40 MG: 20 TABLET ORAL at 20:29

## 2025-04-08 RX ADMIN — CALCIUM ACETATE 667 MG: 667 CAPSULE ORAL at 15:03

## 2025-04-08 RX ADMIN — CALCIUM ACETATE 667 MG: 667 CAPSULE ORAL at 18:29

## 2025-04-08 RX ADMIN — HEPARIN SODIUM 7500 UNITS: 5000 INJECTION INTRAVENOUS; SUBCUTANEOUS at 14:00

## 2025-04-08 RX ADMIN — NYSTATIN 1 APPLICATION: 100000 POWDER TOPICAL at 15:03

## 2025-04-08 RX ADMIN — HEPARIN SODIUM 7500 UNITS: 5000 INJECTION INTRAVENOUS; SUBCUTANEOUS at 05:04

## 2025-04-08 RX ADMIN — IPRATROPIUM BROMIDE AND ALBUTEROL SULFATE 3 ML: 2.5; .5 SOLUTION RESPIRATORY (INHALATION) at 11:28

## 2025-04-08 RX ADMIN — IPRATROPIUM BROMIDE AND ALBUTEROL SULFATE 3 ML: 2.5; .5 SOLUTION RESPIRATORY (INHALATION) at 07:17

## 2025-04-08 RX ADMIN — Medication 28 PERCENT: at 07:18

## 2025-04-08 ASSESSMENT — COGNITIVE AND FUNCTIONAL STATUS - GENERAL
DRESSING REGULAR UPPER BODY CLOTHING: A LOT
PERSONAL GROOMING: A LOT
CLIMB 3 TO 5 STEPS WITH RAILING: TOTAL
TOILETING: A LOT
STANDING UP FROM CHAIR USING ARMS: A LOT
MOBILITY SCORE: 11
DRESSING REGULAR LOWER BODY CLOTHING: A LOT
MOVING TO AND FROM BED TO CHAIR: A LOT
MOVING FROM LYING ON BACK TO SITTING ON SIDE OF FLAT BED WITH BEDRAILS: A LOT
EATING MEALS: A LOT
DAILY ACTIVITIY SCORE: 12
TURNING FROM BACK TO SIDE WHILE IN FLAT BAD: A LOT
WALKING IN HOSPITAL ROOM: A LOT
HELP NEEDED FOR BATHING: A LOT

## 2025-04-08 ASSESSMENT — PAIN - FUNCTIONAL ASSESSMENT
PAIN_FUNCTIONAL_ASSESSMENT: CPOT (CRITICAL CARE PAIN OBSERVATION TOOL)
PAIN_FUNCTIONAL_ASSESSMENT: FLACC (FACE, LEGS, ACTIVITY, CRY, CONSOLABILITY)
PAIN_FUNCTIONAL_ASSESSMENT: 0-10

## 2025-04-08 ASSESSMENT — PAIN SCALES - GENERAL
PAINLEVEL_OUTOF10: 0 - NO PAIN

## 2025-04-08 NOTE — NURSING NOTE
Patient being transferred to ICU Due to a decline in respiratory status, Message left for Daughter Tiffanie, spoke with  and explained pt. Current status, spouse stated he had no further question regarding change in level of care.

## 2025-04-08 NOTE — PROGRESS NOTES
"Analia Murray is a 74 y.o. female on day 14 of admission presenting with Acute respiratory failure with hypoxia.    Subjective   The patient seen for acute kidney injury superimposed on chronic kidney disease stage III events are noted patient became more lethargic yesterday ABG showed hypercapnia the patient was transferred to the intensive care unit for continuous BiPAP she is currently on continuous BiPAP able to open her eyes no nausea vomiting diarrhea.       Objective     Physical Exam  Constitutional:       General: She is not in acute distress.     Appearance: Normal appearance. She is not toxic-appearing.   Neck:      Vascular: No carotid bruit.   Cardiovascular:      Heart sounds: No murmur heard.     No friction rub. No gallop.   Pulmonary:      Breath sounds: No wheezing, rhonchi or rales.      Comments: Severely diminished breath sounds in both bases with few crackles  Abdominal:      Tenderness: There is no abdominal tenderness. There is no right CVA tenderness, left CVA tenderness or rebound.   Musculoskeletal:         General: No tenderness.      Cervical back: Neck supple.      Right lower leg: No edema (Mild edema).      Left lower leg: No edema (Mild edema).   Lymphadenopathy:      Cervical: No cervical adenopathy.         Last Recorded Vitals  Blood pressure 144/66, pulse 55, temperature 35.7 °C (96.3 °F), temperature source Temporal, resp. rate 19, height 1.626 m (5' 4.02\"), weight 143 kg (314 lb 6 oz), SpO2 100%.    Intake/Output last 3 Shifts:  I/O last 3 completed shifts:  In: 220 (1.5 mL/kg) [P.O.:220]  Out: 850 (6 mL/kg) [Urine:850 (0.2 mL/kg/hr)]  Weight: 142.6 kg     Current Facility-Administered Medications:     albuterol 2.5 mg /3 mL (0.083 %) nebulizer solution 2.5 mg, 2.5 mg, nebulization, q2h PRN, Roddy Galindo DO    allopurinol (Zyloprim) tablet 50 mg, 50 mg, oral, Every other day, Roddy Galindo DO, 50 mg at 04/06/25 0854    [Held by provider] amLODIPine (Norvasc) tablet " 10 mg, 10 mg, oral, Daily, Sanjiv Neil PA-C, 10 mg at 03/28/25 0948    aspirin chewable tablet 81 mg, 81 mg, oral, Daily, Roddy Galindo DO, 81 mg at 04/07/25 0924    calcium acetate (Phoslo) capsule 667 mg, 667 mg, oral, TID after meals, Roddy Galindo DO, 667 mg at 04/07/25 1225    dextrose 50 % injection 12.5 g, 12.5 g, intravenous, q15 min PRN, Roddy Galindo DO    dextrose 50 % injection 25 g, 25 g, intravenous, q15 min PRN, Roddy Galindo DO    ezetimibe (Zetia) tablet 10 mg, 10 mg, oral, Daily, Roddy Galindo DO, 10 mg at 04/07/25 0924    fluticasone (Flonase) nasal spray 2 spray, 2 spray, Each Nostril, BID, Roddy Galindo DO, 2 spray at 04/07/25 2210    fluticasone furoate-vilanteroL (Breo Ellipta) 100-25 mcg/dose inhaler 1 puff, 1 puff, inhalation, Daily, Roddy Galindo DO    [Held by provider] gabapentin (Neurontin) capsule 300 mg, 300 mg, oral, Daily, Krysta Ramirez MD, 300 mg at 04/07/25 0924    glucagon (Glucagen) injection 1 mg, 1 mg, intramuscular, q15 min PRN, Roddy Galindo DO    glucagon (Glucagen) injection 1 mg, 1 mg, intramuscular, q15 min PRN, Roddy Galindo DO    heparin (porcine) injection 7,500 Units, 7,500 Units, subcutaneous, q8h JAYA, Roddy Galindo DO, 7,500 Units at 04/08/25 0504    hydrALAZINE (Apresoline) tablet 50 mg, 50 mg, oral, BID, Roddy Galindo DO, 50 mg at 04/07/25 2208    insulin lispro injection 0-10 Units, 0-10 Units, subcutaneous, TID AC, Roddy Galindo DO, 4 Units at 04/07/25 1649    insulin lispro injection 6 Units, 6 Units, subcutaneous, Once, Roddy Galindo DO    ipratropium-albuteroL (Duo-Neb) 0.5-2.5 mg/3 mL nebulizer solution 3 mL, 3 mL, nebulization, TID, Roddy Galindo DO, 3 mL at 04/08/25 0717    levothyroxine (Synthroid, Levoxyl) tablet 200 mcg, 200 mcg, oral, Daily, Roddy Galindo DO, 200 mcg at 04/08/25 0504    [Held by provider] metoprolol succinate XL (Toprol-XL) 24 hr tablet 100 mg, 100 mg, oral, Daily, Sanjiv Neil PA-C     metoprolol tartrate (Lopressor) tablet 50 mg, 50 mg, oral, BID, Roddy Galindo DO, 50 mg at 04/07/25 2211    montelukast (Singulair) tablet 10 mg, 10 mg, oral, Nightly, Roddy Galindo DO, 10 mg at 04/07/25 2208    [Held by provider] nitroglycerin (Nitrostat) SL tablet 0.4 mg, 0.4 mg, sublingual, q5 min PRN, Sanjiv Neil PA-C    nystatin (Mycostatin) 100,000 unit/gram powder 1 Application, 1 Application, Topical, TID, Roddy Galindo DO, 1 Application at 04/07/25 2210    oxygen (O2) therapy, , inhalation, BID, Roddy Galindo DO, 28 percent at 04/08/25 0718    oxygen (O2) therapy, , inhalation, Nightly, Roddy Galindo DO, 28 percent at 04/07/25 2141    polyethylene glycol (Glycolax, Miralax) packet 17 g, 17 g, oral, Daily, Roddy Galindo DO, 17 g at 04/07/25 0922    pravastatin (Pravachol) tablet 40 mg, 40 mg, oral, Nightly, Roddy Galindo DO, 40 mg at 04/07/25 2208    sertraline (Zoloft) tablet 50 mg, 50 mg, oral, Daily, Roddy Galindo DO, 50 mg at 04/07/25 0924    sulfur hexafluoride microsphr (Lumason) injection 24.28 mg, 2 mL, intravenous, Once in imaging, Roddy Galindo DO    [Held by provider] traZODone (Desyrel) tablet 25 mg, 25 mg, oral, Nightly PRN, Robbie Olivarez MD   Relevant Results    Results for orders placed or performed during the hospital encounter of 03/25/25 (from the past 96 hours)   POCT GLUCOSE   Result Value Ref Range    POCT Glucose 158 (H) 74 - 99 mg/dL   POCT GLUCOSE   Result Value Ref Range    POCT Glucose 292 (H) 74 - 99 mg/dL   POCT GLUCOSE   Result Value Ref Range    POCT Glucose 361 (H) 74 - 99 mg/dL   CBC and Auto Differential   Result Value Ref Range    WBC 7.2 4.4 - 11.3 x10*3/uL    nRBC 0.0 0.0 - 0.0 /100 WBCs    RBC 3.92 (L) 4.00 - 5.20 x10*6/uL    Hemoglobin 8.9 (L) 12.0 - 16.0 g/dL    Hematocrit 29.3 (L) 36.0 - 46.0 %    MCV 75 (L) 80 - 100 fL    MCH 22.7 (L) 26.0 - 34.0 pg    MCHC 30.4 (L) 32.0 - 36.0 g/dL    RDW 19.5 (H) 11.5 - 14.5 %    Platelets 179 150 -  450 x10*3/uL    Neutrophils % 77.5 40.0 - 80.0 %    Immature Granulocytes %, Automated 0.7 0.0 - 0.9 %    Lymphocytes % 11.6 13.0 - 44.0 %    Monocytes % 9.8 2.0 - 10.0 %    Eosinophils % 0.4 0.0 - 6.0 %    Basophils % 0.0 0.0 - 2.0 %    Neutrophils Absolute 5.56 (H) 1.60 - 5.50 x10*3/uL    Immature Granulocytes Absolute, Automated 0.05 0.00 - 0.50 x10*3/uL    Lymphocytes Absolute 0.83 0.80 - 3.00 x10*3/uL    Monocytes Absolute 0.70 0.05 - 0.80 x10*3/uL    Eosinophils Absolute 0.03 0.00 - 0.40 x10*3/uL    Basophils Absolute 0.00 0.00 - 0.10 x10*3/uL   Renal Function Panel   Result Value Ref Range    Glucose 203 (H) 74 - 99 mg/dL    Sodium 137 136 - 145 mmol/L    Potassium 4.3 3.5 - 5.3 mmol/L    Chloride 105 98 - 107 mmol/L    Bicarbonate 23 21 - 32 mmol/L    Anion Gap 13 10 - 20 mmol/L    Urea Nitrogen 113 (HH) 6 - 23 mg/dL    Creatinine 3.36 (H) 0.50 - 1.05 mg/dL    eGFR 14 (L) >60 mL/min/1.73m*2    Calcium 7.7 (L) 8.6 - 10.3 mg/dL    Phosphorus 3.7 2.5 - 4.9 mg/dL    Albumin 2.9 (L) 3.4 - 5.0 g/dL   POCT GLUCOSE   Result Value Ref Range    POCT Glucose 171 (H) 74 - 99 mg/dL   POCT GLUCOSE   Result Value Ref Range    POCT Glucose 211 (H) 74 - 99 mg/dL   POCT GLUCOSE   Result Value Ref Range    POCT Glucose 314 (H) 74 - 99 mg/dL   POCT GLUCOSE   Result Value Ref Range    POCT Glucose 360 (H) 74 - 99 mg/dL   CBC and Auto Differential   Result Value Ref Range    WBC 7.9 4.4 - 11.3 x10*3/uL    nRBC 0.0 0.0 - 0.0 /100 WBCs    RBC 4.09 4.00 - 5.20 x10*6/uL    Hemoglobin 9.3 (L) 12.0 - 16.0 g/dL    Hematocrit 32.6 (L) 36.0 - 46.0 %    MCV 80 80 - 100 fL    MCH 22.7 (L) 26.0 - 34.0 pg    MCHC 28.5 (L) 32.0 - 36.0 g/dL    RDW 19.9 (H) 11.5 - 14.5 %    Platelets 190 150 - 450 x10*3/uL    Neutrophils % 79.5 40.0 - 80.0 %    Immature Granulocytes %, Automated 0.5 0.0 - 0.9 %    Lymphocytes % 10.9 13.0 - 44.0 %    Monocytes % 8.6 2.0 - 10.0 %    Eosinophils % 0.5 0.0 - 6.0 %    Basophils % 0.0 0.0 - 2.0 %    Neutrophils  Absolute 6.28 (H) 1.60 - 5.50 x10*3/uL    Immature Granulocytes Absolute, Automated 0.04 0.00 - 0.50 x10*3/uL    Lymphocytes Absolute 0.86 0.80 - 3.00 x10*3/uL    Monocytes Absolute 0.68 0.05 - 0.80 x10*3/uL    Eosinophils Absolute 0.04 0.00 - 0.40 x10*3/uL    Basophils Absolute 0.00 0.00 - 0.10 x10*3/uL   POCT GLUCOSE   Result Value Ref Range    POCT Glucose 145 (H) 74 - 99 mg/dL   Basic Metabolic Panel   Result Value Ref Range    Glucose 168 (H) 74 - 99 mg/dL    Sodium 135 (L) 136 - 145 mmol/L    Potassium 4.5 3.5 - 5.3 mmol/L    Chloride 105 98 - 107 mmol/L    Bicarbonate 25 21 - 32 mmol/L    Anion Gap 10 10 - 20 mmol/L    Urea Nitrogen 111 (HH) 6 - 23 mg/dL    Creatinine 3.47 (H) 0.50 - 1.05 mg/dL    eGFR 13 (L) >60 mL/min/1.73m*2    Calcium 7.9 (L) 8.6 - 10.3 mg/dL   POCT GLUCOSE   Result Value Ref Range    POCT Glucose 271 (H) 74 - 99 mg/dL   POCT GLUCOSE   Result Value Ref Range    POCT Glucose 317 (H) 74 - 99 mg/dL   Blood Gas Arterial Full Panel   Result Value Ref Range    POCT pH, Arterial 7.20 (LL) 7.38 - 7.42 pH    POCT pCO2, Arterial 63 (H) 38 - 42 mm Hg    POCT pO2, Arterial 144 (H) 85 - 95 mm Hg    POCT SO2, Arterial 99 94 - 100 %    POCT Oxy Hemoglobin, Arterial 97.5 94.0 - 98.0 %    POCT Hematocrit Calculated, Arterial 28.0 (L) 36.0 - 46.0 %    POCT Sodium, Arterial 131 (L) 136 - 145 mmol/L    POCT Potassium, Arterial 5.3 3.5 - 5.3 mmol/L    POCT Chloride, Arterial 103 98 - 107 mmol/L    POCT Ionized Calcium, Arterial 1.18 1.10 - 1.33 mmol/L    POCT Glucose, Arterial 286 (H) 74 - 99 mg/dL    POCT Lactate, Arterial 1.0 0.4 - 2.0 mmol/L    POCT Base Excess, Arterial -3.8 (L) -2.0 - 3.0 mmol/L    POCT HCO3 Calculated, Arterial 24.6 22.0 - 26.0 mmol/L    POCT Hemoglobin, Arterial 9.4 (L) 12.0 - 16.0 g/dL    POCT Anion Gap, Arterial 9 (L) 10 - 25 mmo/L    Patient Temperature 37.0 degrees Celsius    FiO2 32 %    Critical Called By JOSE HEADLEY     Critical Called To [facility]     Critical Call Time  6698     Critical Read Back Y     Critical Note CRITICAL     Site of Arterial Puncture Radial Right     Wes's Test Positive    POCT GLUCOSE   Result Value Ref Range    POCT Glucose 252 (H) 74 - 99 mg/dL   CBC and Auto Differential   Result Value Ref Range    WBC 7.0 4.4 - 11.3 x10*3/uL    nRBC 0.0 0.0 - 0.0 /100 WBCs    RBC 3.83 (L) 4.00 - 5.20 x10*6/uL    Hemoglobin 8.8 (L) 12.0 - 16.0 g/dL    Hematocrit 29.6 (L) 36.0 - 46.0 %    MCV 77 (L) 80 - 100 fL    MCH 23.0 (L) 26.0 - 34.0 pg    MCHC 29.7 (L) 32.0 - 36.0 g/dL    RDW 19.9 (H) 11.5 - 14.5 %    Platelets 150 150 - 450 x10*3/uL    Neutrophils % 78.0 40.0 - 80.0 %    Immature Granulocytes %, Automated 0.6 0.0 - 0.9 %    Lymphocytes % 11.6 13.0 - 44.0 %    Monocytes % 9.0 2.0 - 10.0 %    Eosinophils % 0.7 0.0 - 6.0 %    Basophils % 0.1 0.0 - 2.0 %    Neutrophils Absolute 5.47 1.60 - 5.50 x10*3/uL    Immature Granulocytes Absolute, Automated 0.04 0.00 - 0.50 x10*3/uL    Lymphocytes Absolute 0.81 0.80 - 3.00 x10*3/uL    Monocytes Absolute 0.63 0.05 - 0.80 x10*3/uL    Eosinophils Absolute 0.05 0.00 - 0.40 x10*3/uL    Basophils Absolute 0.01 0.00 - 0.10 x10*3/uL   Renal function panel   Result Value Ref Range    Glucose 174 (H) 74 - 99 mg/dL    Sodium 136 136 - 145 mmol/L    Potassium 5.1 3.5 - 5.3 mmol/L    Chloride 104 98 - 107 mmol/L    Bicarbonate 25 21 - 32 mmol/L    Anion Gap 12 10 - 20 mmol/L    Urea Nitrogen 121 (HH) 6 - 23 mg/dL    Creatinine 3.82 (H) 0.50 - 1.05 mg/dL    eGFR 12 (L) >60 mL/min/1.73m*2    Calcium 8.1 (L) 8.6 - 10.3 mg/dL    Phosphorus 4.5 2.5 - 4.9 mg/dL    Albumin 2.9 (L) 3.4 - 5.0 g/dL   POCT GLUCOSE   Result Value Ref Range    POCT Glucose 150 (H) 74 - 99 mg/dL   POCT GLUCOSE   Result Value Ref Range    POCT Glucose 167 (H) 74 - 99 mg/dL   Blood Gas Arterial   Result Value Ref Range    POCT pH, Arterial 7.22 (LL) 7.38 - 7.42 pH    POCT pCO2, Arterial 59 (H) 38 - 42 mm Hg    POCT pO2, Arterial 118 (H) 85 - 95 mm Hg    POCT SO2, Arterial  100 94 - 100 %    POCT Oxy Hemoglobin, Arterial 97.6 94.0 - 98.0 %    POCT Base Excess, Arterial -4.5 (L) -2.0 - 3.0 mmol/L    POCT HCO3 Calculated, Arterial 24.1 22.0 - 26.0 mmol/L    Patient Temperature 37.0 degrees Celsius    FiO2 28 %    Apparatus CANNULA     Critical Called By ALFIE HILL RRT     Critical Called To DR.MUSTAFA SEGOVIA     Critical Call Time 1348     Critical Read Back Y     Site of Arterial Puncture Brachial Right     Wes's Test Negative    Blood Gas Arterial Full Panel   Result Value Ref Range    POCT pH, Arterial 7.22 (LL) 7.38 - 7.42 pH    POCT pCO2, Arterial 60 (H) 38 - 42 mm Hg    POCT pO2, Arterial 108 (H) 85 - 95 mm Hg    POCT SO2, Arterial 100 94 - 100 %    POCT Oxy Hemoglobin, Arterial 97.8 94.0 - 98.0 %    POCT Hematocrit Calculated, Arterial 28.0 (L) 36.0 - 46.0 %    POCT Sodium, Arterial 132 (L) 136 - 145 mmol/L    POCT Potassium, Arterial 5.0 3.5 - 5.3 mmol/L    POCT Chloride, Arterial 101 98 - 107 mmol/L    POCT Ionized Calcium, Arterial 1.18 1.10 - 1.33 mmol/L    POCT Glucose, Arterial 194 (H) 74 - 99 mg/dL    POCT Lactate, Arterial 0.7 0.4 - 2.0 mmol/L    POCT Base Excess, Arterial -3.5 (L) -2.0 - 3.0 mmol/L    POCT HCO3 Calculated, Arterial 24.6 22.0 - 26.0 mmol/L    POCT Hemoglobin, Arterial 9.3 (L) 12.0 - 16.0 g/dL    POCT Anion Gap, Arterial 11 10 - 25 mmo/L    Patient Temperature 37.0 degrees Celsius    FiO2 30 %    Apparatus      Ipap CMH2O 15.0 cm H2O    Epap CMH2O 5.0 cm H2O    Critical Called By ALFIE IHLL RRT     Critical Called To      Critical Call Time 1625     Critical Read Back Y     Site of Arterial Puncture Radial Right     Wes's Test Positive    POCT GLUCOSE   Result Value Ref Range    POCT Glucose 218 (H) 74 - 99 mg/dL   Blood Gas Arterial Full Panel   Result Value Ref Range    POCT pH, Arterial 7.25 (LL) 7.38 - 7.42 pH    POCT pCO2, Arterial 55 (H) 38 - 42 mm Hg    POCT pO2, Arterial 104 (H) 85 - 95 mm Hg    POCT SO2, Arterial 100 94 - 100  %    POCT Oxy Hemoglobin, Arterial 97.9 94.0 - 98.0 %    POCT Hematocrit Calculated, Arterial 26.0 (L) 36.0 - 46.0 %    POCT Sodium, Arterial 132 (L) 136 - 145 mmol/L    POCT Potassium, Arterial 5.3 3.5 - 5.3 mmol/L    POCT Chloride, Arterial 102 98 - 107 mmol/L    POCT Ionized Calcium, Arterial 1.20 1.10 - 1.33 mmol/L    POCT Glucose, Arterial 193 (H) 74 - 99 mg/dL    POCT Lactate, Arterial 0.6 0.4 - 2.0 mmol/L    POCT Base Excess, Arterial -3.3 (L) -2.0 - 3.0 mmol/L    POCT HCO3 Calculated, Arterial 24.1 22.0 - 26.0 mmol/L    POCT Hemoglobin, Arterial 8.8 (L) 12.0 - 16.0 g/dL    POCT Anion Gap, Arterial 11 10 - 25 mmo/L    Patient Temperature 37.0 degrees Celsius    FiO2 28 %    Ventilator Rate 14 bpm    Tidal Volume 500 mL    Epap CMH2O 10.0 cm H2O    Critical Called By MARITA GIBSON RRT     Critical Called To TONY JOHNSON PA-C     Critical Call Time 1949     Critical Read Back Y     Site of Arterial Puncture Radial Right     Wes's Test Positive    POCT GLUCOSE   Result Value Ref Range    POCT Glucose 153 (H) 74 - 99 mg/dL   Renal function panel   Result Value Ref Range    Glucose 138 (H) 74 - 99 mg/dL    Sodium 133 (L) 136 - 145 mmol/L    Potassium 5.0 3.5 - 5.3 mmol/L    Chloride 102 98 - 107 mmol/L    Bicarbonate 23 21 - 32 mmol/L    Anion Gap 13 10 - 20 mmol/L    Urea Nitrogen 125 (HH) 6 - 23 mg/dL    Creatinine 4.12 (H) 0.50 - 1.05 mg/dL    eGFR 11 (L) >60 mL/min/1.73m*2    Calcium 8.1 (L) 8.6 - 10.3 mg/dL    Phosphorus 4.1 2.5 - 4.9 mg/dL    Albumin 3.0 (L) 3.4 - 5.0 g/dL   CBC and Auto Differential   Result Value Ref Range    WBC 5.6 4.4 - 11.3 x10*3/uL    nRBC 0.0 0.0 - 0.0 /100 WBCs    RBC 4.04 4.00 - 5.20 x10*6/uL    Hemoglobin 9.2 (L) 12.0 - 16.0 g/dL    Hematocrit 32.7 (L) 36.0 - 46.0 %    MCV 81 80 - 100 fL    MCH 22.8 (L) 26.0 - 34.0 pg    MCHC 28.1 (L) 32.0 - 36.0 g/dL    RDW 20.2 (H) 11.5 - 14.5 %    Platelets 161 150 - 450 x10*3/uL    Neutrophils % 74.9 40.0 - 80.0 %    Immature Granulocytes %,  Automated 0.7 0.0 - 0.9 %    Lymphocytes % 13.8 13.0 - 44.0 %    Monocytes % 7.5 2.0 - 10.0 %    Eosinophils % 2.9 0.0 - 6.0 %    Basophils % 0.2 0.0 - 2.0 %    Neutrophils Absolute 4.19 1.60 - 5.50 x10*3/uL    Immature Granulocytes Absolute, Automated 0.04 0.00 - 0.50 x10*3/uL    Lymphocytes Absolute 0.77 (L) 0.80 - 3.00 x10*3/uL    Monocytes Absolute 0.42 0.05 - 0.80 x10*3/uL    Eosinophils Absolute 0.16 0.00 - 0.40 x10*3/uL    Basophils Absolute 0.01 0.00 - 0.10 x10*3/uL   Morphology   Result Value Ref Range    RBC Morphology See Below     Polychromasia Mild     RBC Fragments Few     Target Cells Few     Ovalocytes Few     [place] Cells Few    Blood Gas Arterial Full Panel   Result Value Ref Range    POCT pH, Arterial 7.29 (L) 7.38 - 7.42 pH    POCT pCO2, Arterial 48 (H) 38 - 42 mm Hg    POCT pO2, Arterial 149 (H) 85 - 95 mm Hg    POCT SO2, Arterial 100 94 - 100 %    POCT Oxy Hemoglobin, Arterial 98.2 (H) 94.0 - 98.0 %    POCT Hematocrit Calculated, Arterial 26.0 (L) 36.0 - 46.0 %    POCT Sodium, Arterial 133 (L) 136 - 145 mmol/L    POCT Potassium, Arterial 5.3 3.5 - 5.3 mmol/L    POCT Chloride, Arterial 103 98 - 107 mmol/L    POCT Ionized Calcium, Arterial 1.17 1.10 - 1.33 mmol/L    POCT Glucose, Arterial 130 (H) 74 - 99 mg/dL    POCT Lactate, Arterial 0.4 0.4 - 2.0 mmol/L    POCT Base Excess, Arterial -3.4 (L) -2.0 - 3.0 mmol/L    POCT HCO3 Calculated, Arterial 23.1 22.0 - 26.0 mmol/L    POCT Hemoglobin, Arterial 8.7 (L) 12.0 - 16.0 g/dL    POCT Anion Gap, Arterial 12 10 - 25 mmo/L    Patient Temperature 37.0 degrees Celsius    FiO2 28 %    Apparatus      Ventilator Rate 16 bpm    Tidal Volume 500 mL    Ipap CMH2O      Epap CMH2O 10.0 cm H2O    Site of Arterial Puncture Radial Right     Wes's Test Positive    POCT GLUCOSE   Result Value Ref Range    POCT Glucose 120 (H) 74 - 99 mg/dL   Ammonia   Result Value Ref Range    Ammonia 30 16 - 53 umol/L       Assessment/Plan   Acute kidney injury superimposed on  chronic kidney disease stage 3 her renal function is slightly worse today she does have high BUN/creatinine ratio secondary to steroids with her mental status change rising creatinine she may very well need renal replacement therapy I will discuss with the family today I will discuss situation with intensive care unit team  Congestive heart failure I will give her 1 dose of IV furosemide 80 mg today  Chronic kidney disease stage IIIb   Acute respiratory failure continue continuous BiPAP  Bilateral pneumonia with IV antibiotics  Diabetes mellitus type 2  Anemia of chronic kidney disease  Hypertension  Hyperlipidemia  Obesity  Sixto Slater MD

## 2025-04-08 NOTE — SIGNIFICANT EVENT
Respiratory therapy for the patient very lethargic this morning.  Yesterday, with the moderate acidosis, we were attempting treat this with intermittent and nighttime BiPAP, and while the acidosis is improved today that was with complete compliance overnight with the noninvasive positive pressure ventilation.  When I see the patient, she is lethargic, will open her eyes to voice but would not keep them open with multiple times.  With the worsening lethargy and BiPAP dependence currently, discussed with ICU who quickly came to the bedside, and patient will be transferred to ICU.

## 2025-04-08 NOTE — CARE PLAN
The patient's goals for the shift include monitor oxygenation    The clinical goals for the shift include improve oxygenation    Over the shift, the patient did not make progress toward the following goals. Barriers to progression include impaired gas exchange. Recommendations to address these barriers include monitor oxygenation.

## 2025-04-08 NOTE — NURSING NOTE
Received report from [facility] RN. Patient transferred to ICU 10 on continuous bipap at this time. Family advised per previous RN. Bed down, call light within reach.

## 2025-04-08 NOTE — PROGRESS NOTES
"Nutrition Follow up Note    Nutrition Assessment      Transferred to ICU today for lethargy, respiratory distress. Nephrology monitoring closely for renal replacement needs. Will continue to follow and monitor nutrition needs.    Nutrition History:  Energy Intake: Fair 50-75 %     Anthropometrics:  Ht: 162.6 cm (5' 4.02\"), Wt: 142 kg (312 lb 9.8 oz), BMI: 53.63  IBW/kg (Dietitian Calculated): 54.54 kg  Percent of IBW: 246 %  Adjusted Body Weight (kg): 79 kg    Weight Change:  Daily Weight  04/08/25 : 142 kg (312 lb 9.8 oz)  03/16/25 : 132 kg (291 lb)  02/10/25 : 139 kg (305 lb 9.6 oz)  01/30/25 : 134 kg (296 lb)  01/27/25 : 135 kg (297 lb 1.6 oz)  01/15/25 : 132 kg (291 lb)  12/11/24 : 127 kg (281 lb)  11/26/24 : 132 kg (291 lb)  10/23/24 : 132 kg (291 lb)  09/29/24 : 127 kg (281 lb)     Nutrition Focused Physical Exam Findings:      Nutrition Significant Labs:  Lab Results   Component Value Date    WBC 5.6 04/08/2025    HGB 9.2 (L) 04/08/2025    HCT 32.7 (L) 04/08/2025     04/08/2025    CHOL 128 06/21/2023    TRIG 72 06/21/2023    HDL 45.1 06/21/2023    ALT 7 03/25/2025    AST 10 03/25/2025     (L) 04/08/2025    K 5.0 04/08/2025     04/08/2025    CREATININE 4.12 (H) 04/08/2025     (HH) 04/08/2025    CO2 23 04/08/2025    TSH 11.37 (H) 03/25/2025    HGBA1C 6.2 (H) 04/02/2025     Nutrition Specific Medications:  allopurinol, 50 mg, oral, Every other day  [Held by provider] amLODIPine, 10 mg, oral, Daily  aspirin, 81 mg, oral, Daily  calcium acetate, 667 mg, oral, TID after meals  ezetimibe, 10 mg, oral, Daily  fluticasone, 2 spray, Each Nostril, BID  fluticasone furoate-vilanteroL, 1 puff, inhalation, Daily  [Held by provider] gabapentin, 300 mg, oral, Daily  heparin (porcine), 7,500 Units, subcutaneous, q8h JAYA  hydrALAZINE, 50 mg, oral, BID  insulin lispro, 0-10 Units, subcutaneous, TID AC  insulin lispro, 6 Units, subcutaneous, Once  ipratropium-albuteroL, 3 mL, nebulization, " TID  levothyroxine, 200 mcg, oral, Daily  [Held by provider] metoprolol succinate XL, 100 mg, oral, Daily  metoprolol tartrate, 50 mg, oral, BID  montelukast, 10 mg, oral, Nightly  nystatin, 1 Application, Topical, TID  oxygen, , inhalation, BID  oxygen, , inhalation, Nightly  polyethylene glycol, 17 g, oral, Daily  pravastatin, 40 mg, oral, Nightly  sertraline, 50 mg, oral, Daily  sulfur hexafluoride microsphr, 2 mL, intravenous, Once in imaging         Dietary Orders (From admission, onward)       Start     Ordered    04/02/25 1415  Oral nutritional supplements  Until discontinued        Comments: chocolate   Question Answer Comment   Deliver with All meals    Select supplement: Sugar Free Mighty Shake        04/02/25 1414    04/02/25 1353  Adult diet Regular, Renal; Potassium Restricted 2 gm (50mEq); 2 - 3 grams Sodium; Thin 0; 1:1 Feeding  Diet effective now        Comments: Compensatory Swallowing Strategies:                                                                                                                                                                                                                                                           Upright 90 degrees as possible for all oral intake   Question Answer Comment   Diet type Regular    Diet type Renal    Potassium restriction: Potassium Restricted 2 gm (50mEq)    Sodium restriction: 2 - 3 grams Sodium    Fluid consistency Thin 0    Select tray type: 1:1 Feeding        04/02/25 1353    03/25/25 0812  May Participate in Room Service  ( ROOM SERVICE MAY PARTICIPATE)  Once        Question:  .  Answer:  Yes    03/25/25 0811                   Estimated Needs:   Estimated Energy Needs  Total Energy Estimated Needs in 24 hours (kCal): 1975 kCal  Energy Estimated Needs per kg Body Weight in 24 hours (kCal/kg): 25 kCal/kg  Method for Estimating Needs: Adj Wt    Estimated Protein Needs  Total Protein Estimated Needs in 24 Hours (g): 95 g  Protein  Estimated Needs per kg Body Weight in 24 Hours (g/kg): 1.2 g/kg  Method for Estimating 24 Hour Protein Needs: Adj Wt    Estimated Fluid Needs  Total Fluid Estimated Needs in 24 Hours (mL): 1975 mL  Method for Estimating 24 Hour Fluid Needs: 1 ml/kcal or per MD      Nutrition Diagnosis   Nutrition Diagnosis:  Malnutrition Diagnosis  Patient has Malnutrition Diagnosis: No    Nutrition Diagnosis  Patient has Nutrition Diagnosis: Yes  Diagnosis Status (1): Resolved  Nutrition Diagnosis 1: Inadequate oral intake  Related to (1): decreased ability to consume sufficient energy  As Evidenced by (1): NPO     Nutrition Interventions/Recommendations   Nutrition Interventions and Recommendations:  Nutrition Prescription: Nutrition prescription for oral nutrition    Nutrition Recommendations:  Individualized Nutrition Prescription Provided for : 1975 calories, 95 gm protein per SLP recs    Nutrition Interventions/Goals:   Food and/or Nutrient Delivery Interventions  Interventions: Meals and snacks, Medical food supplement  Meals and Snacks: General healthful diet  Goal: provide as ordered  Medical Food Supplement: Commercial beverage medical food supplement therapy  Goal: mighty shake TID to provide 200 kcals and 7g protein each    Education Documentation  No documentation found.         Nutrition Monitoring and Evaluation   Monitoring/Evaluation:   Food/Nutrient Related History Monitoring  Monitoring and Evaluation Plan: Estimated Energy Intake  Estimated Energy Intake: Energy intake 50 -75% of estimated energy needs    Goal Status: Some progress toward goal(s)    Follow Up  Time Spent (min): 20 minutes  Last Date of Nutrition Visit: 04/08/25  Nutrition Follow-Up Needed?: 5-7 days  Follow up Comment: 4/14/25

## 2025-04-08 NOTE — H&P
I have seen the patient either independently or with an associated resident physician or advanced practice provider.    In brief, Analia Murray is a 74 y.o. female with past medical history of COPD, CHF (LVEF 45 to 50% 3/25), HOLDEN, CKD, DM, HYPO, MGUS who presents to [facility] ICU for acute on chronic respiratory failure with hypercapnia. Patient has been admitted for acute exacerbation of CHF and acute exacerbation of COPD on 3/25, she was intubated in the ICU later improved and transferred to the floor on 4/1. Over the last 24 hours patient has required increasing requirements for BiPAP/AVAPS, and overnight triggered 8% of her respirations.      Physical Exam  Constitutional:       General: She is not in acute distress.     Comments: Obese, somnolent.   HENT:      Mouth/Throat:      Mouth: Mucous membranes are moist.   Cardiovascular:      Rate and Rhythm: Normal rate and regular rhythm.   Pulmonary:      Effort: No respiratory distress.   Abdominal:      Palpations: Abdomen is soft.      Comments: Large midline, well-healed surgical scar   Musculoskeletal:      Right lower leg: Edema present.      Left lower leg: Edema present.   Skin:     General: Skin is warm and dry.   Neurological:      Comments: Somnolent, rouses to voice         Neuro: Acute metabolic encephalopathy likely secondary to hypercapnia. Hx of anxiety, depression, lumbar radiculopathy. Suspect somnolence may be complicated by recent addition of mirtazapine which is now discontinued. Will continue home sertraline. Hold gabapentin. Hold any potentially sedating medications.  Cardiac: Acute exacerbation of CHF. Hx of HTN, HLD. Continue home hydralazine, aspirin, ezetimibe, pravastatin.  Pulmonary: Acute respiratory failure with hypercapnia. Acute exacerbation of COPD. Hx of HOLDEN. Continue AVAPS 25 max/15 min, EPAP 10, ins time 10, rate 18. Continue duoneb every 8 hours daily. Will monitor response to reduced sedatives prior to reinitiating  steroid course. Continue singulair, breo ellipta.  Gastrointestinal: Renal diet.   Renal: Acute on chronic kidney disease. Uremia. Plan for round of diuresis today with 80 mg IV lasix. Nephro on consult.  Endocrine: Hx of HYPO, DM. Continue synthroid. ISS in place.  Hematology: Hx of MGUS. Heparin for DVT proph.   Infectious Disease: No acute issues at this time.  Musculoskeletal: PT/OT ordered    Lines/Tubes/Drains: PIV      Prophylaxis: subcutaneous heparin, SCDs      Disposition: ICU    ABCDEF Checklist  Analgesia: Spontaneous awakening trial to be pursued if clinically appropriate. RASS goal reviewed if applicable.  Breathing: Spontaneous breathing trial to be pursued if clinically appropriate. Mechanical power of assisted ventilation reviewed if applicable.  Choice of analgesia/sedation: Analgesic and sedative agents adjusted per clinical context.   Delirium assessed by CAM, will avoid exacerbating factors   Early mobility and exercise: Physical and occupational therapy engaged   Family: Plan of care, overall trajectory of patient shared with family. Questions elicited and answered as appropriate.       Due to the high probability of life threatening clinical decompensation, the patient required critical care time evaluating and managing this patient.  Critical care time included obtaining a history, examining the patient, ordering and reviewing studies, discussing, developing, and implementing a management plan, evaluating the patient's response to treatment, and discussion with other care team providers. I saw and evaluated the patient myself.  Critical care time was performed exclusive of billable procedures.    Critical care time: 50 minutes

## 2025-04-08 NOTE — PROGRESS NOTES
Physical Therapy                 Therapy Communication Note    Patient Name: Analia Murray  MRN: [id number]  Department: [facility] S ICU  Room: 10/10-A  Today's Date: 4/8/2025     Discipline: Physical Therapy    PT Missed Visit: Yes     Missed Visit Reason: Missed Visit Reason: Patient placed on medical hold (pt transferred from MCU to SDU; waiting transfer to ICU due to increasing lethargy and need for Bipap dependence; Will cancel for today.)    Missed Time: Cancel    Comment:

## 2025-04-08 NOTE — NURSING NOTE
Pt. Was given a bath in AM was interacting with night shift staff followed commands and participated in care.   Upon assessment per respiratory therapy, the patient status continues to decline despite Cpap / Bipap usage, patient was lethargic and failed to interact with respiratory and hospitalist. Vitals remain stable, pt has generalized progressive edema.

## 2025-04-08 NOTE — CARE PLAN
Problem: Respiratory  Goal: Wean oxygen to maintain O2 saturation per order/standard this shift  Outcome: Progressing  Goal: Verbalize decreased shortness of breath this shift  Outcome: Progressing

## 2025-04-08 NOTE — PROGRESS NOTES
Occupational Therapy                 Therapy Communication Note    Patient Name: Analia Murray  MRN: [id number]  Department: [address]  Room: [address] [place] [address]  Today's Date: 4/8/2025     Discipline: Occupational Therapy    OT Missed Visit: Yes     Missed Visit Reason: Missed Visit Reason: Patient placed on medical hold (Pt with worsening lethargy and BiPAP dependence- awaiting transfer to ICU, will hold OT session this date.)    Missed Time: Cancel at 0821    Comment:

## 2025-04-08 NOTE — CARE PLAN
The patient's goals for the shift include maintain stable hemodynamics    The clinical goals for the shift include improve oxygenation    Over the shift, the patient did make progress toward the following goals.       Problem: Skin  Goal: Decreased wound size/increased tissue granulation at next dressing change  Outcome: Progressing  Flowsheets (Taken 4/5/2025 1258 by Alicia Humphries, RN)  Decreased wound size/increased tissue granulation at next dressing change: Promote sleep for wound healing  Goal: Participates in plan/prevention/treatment measures  Outcome: Progressing  Flowsheets (Taken 4/8/2025 1054)  Participates in plan/prevention/treatment measures: Discuss with provider PT/OT consult  Goal: Prevent/manage excess moisture  Outcome: Progressing  Flowsheets (Taken 4/5/2025 0444 by Kiara Carrasco RN)  Prevent/manage excess moisture:   Cleanse incontinence/protect with barrier cream   Monitor for/manage infection if present  Goal: Prevent/minimize sheer/friction injuries  Outcome: Progressing  Flowsheets (Taken 4/1/2025 0754 by Romelia Easton, MANINDER)  Prevent/minimize sheer/friction injuries:   Complete micro-shifts as needed if patient unable. Adjust patient position to relieve pressure points, not a full turn   Increase activity/out of bed for meals   Use pull sheet   HOB 30 degrees or less   Turn/reposition every 2 hours/use positioning/transfer devices   Utilize specialty bed per algorithm  Goal: Promote/optimize nutrition  Outcome: Progressing  Flowsheets (Taken 4/1/2025 0754 by Romelia Easton, MANINDER)  Promote/optimize nutrition:   Offer water/supplements/favorite foods   Consume > 50% meals/supplements   Monitor/record intake including meals  Goal: Promote skin healing  Outcome: Progressing  Flowsheets (Taken 4/8/2025 1054)  Promote skin healing: Assess skin/pad under line(s)/device(s)     Problem: Fall/Injury  Goal: Not fall by end of shift  Outcome: Progressing  Goal: Be free from injury by end of  the shift  Outcome: Progressing  Goal: Verbalize understanding of personal risk factors for fall in the hospital  Outcome: Progressing  Goal: Verbalize understanding of risk factor reduction measures to prevent injury from fall in the home  Outcome: Progressing  Goal: Use assistive devices by end of the shift  Outcome: Progressing  Goal: Pace activities to prevent fatigue by end of the shift  Outcome: Progressing     Problem: Respiratory  Goal: Tolerate mechanical ventilation evidenced by VS/agitation level this shift  Outcome: Progressing  Goal: Wean oxygen to maintain O2 saturation per order/standard this shift  Outcome: Progressing  Goal: Clear secretions with interventions this shift  Outcome: Progressing  Goal: Minimize anxiety/maximize coping throughout shift  Outcome: Progressing  Goal: Minimal/no exertional discomfort or dyspnea this shift  Outcome: Progressing  Goal: Verbalize decreased shortness of breath this shift  Outcome: Progressing     Problem: Safety - Adult  Goal: Free from fall injury  Outcome: Progressing     Problem: Discharge Planning  Goal: Discharge to home or other facility with appropriate resources  Outcome: Progressing     Problem: Chronic Conditions and Co-morbidities  Goal: Patient's chronic conditions and co-morbidity symptoms are monitored and maintained or improved  Outcome: Progressing     Problem: Nutrition  Goal: Nutrient intake appropriate for maintaining nutritional needs  Outcome: Progressing     Problem: Diabetes  Goal: Achieve decreasing blood glucose levels by end of shift  Outcome: Progressing  Goal: Increase stability of blood glucose readings by end of shift  Outcome: Progressing  Goal: Decrease in ketones present in urine by end of shift  Outcome: Progressing  Goal: Maintain electrolyte levels within acceptable range throughout shift  Outcome: Progressing  Goal: Maintain glucose levels >70mg/dl to <250mg/dl throughout shift  Outcome: Progressing  Goal: No changes in  neurological exam by end of shift  Outcome: Progressing  Goal: Learn about and adhere to nutrition recommendations by end of shift  Outcome: Progressing  Goal: Vital signs within normal range for age by end of shift  Outcome: Progressing  Goal: Increase self care and/or family involovement by end of shift  Outcome: Progressing  Goal: Receive DSME education by end of shift  Outcome: Progressing

## 2025-04-08 NOTE — PROGRESS NOTES
Pt transferred from 4E to ICU due to respiratory distress. Pt is now on continuous bipap.     Regarding dc planning, this worker has not yet received a return call from pts daughter Tiffanie regarding more SNF choices. [facility] had no bed availability. Pt will require a precert to go to SNF.      04/08/25 1909   Discharge Planning   Expected Discharge Disposition SNF

## 2025-04-08 NOTE — PROGRESS NOTES
Department of Medicine  Division of Pulmonary, Critical Care, and Sleep Medicine    Analia Murray is a 74 y.o. female on day 14 of admission presenting with Acute respiratory failure with hypoxia.    Subjective   Transferred to the ICU due to worsening respiratory status/hypercapnic acidosis, not responding to BiPAP, switched to AVAPS yesterday.  Patient is sleepy but arousable and interactive.       Objective     Vital Signs      4/8/2025     9:15 AM 4/8/2025     9:30 AM 4/8/2025    10:00 AM 4/8/2025    10:30 AM 4/8/2025    11:00 AM 4/8/2025    11:30 AM 4/8/2025    12:00 PM   Vitals   Systolic 120 126 112 144 169 121 132   Diastolic 101 60 58 66 71 59 65   Heart Rate 55 61 51 55 57 52 49   Resp 12 15 12 19 14 14 18        Physical exam  Constitutional: Obese, sleepy/comfortable in bed, in no distress  HEENT: Normocephalic and atraumatic.  Cardiovascular: Normal rate and regular rhythm.  Pulmonary: Normal respiratory effort, distant anterior breath sounds on NIV, no wheezing.    Musculoskeletal: No edema, no cyanosis.  Neurological: Sleepy but arousable, alert and interactive.  Psychiatric: Normal behavior, mood and affect.    Labs:  Lab Results   Component Value Date    WBC 5.6 04/08/2025    HGB 9.2 (L) 04/08/2025    HCT 32.7 (L) 04/08/2025    MCV 81 04/08/2025     04/08/2025      Lab Results   Component Value Date    GLUCOSE 138 (H) 04/08/2025    CALCIUM 8.1 (L) 04/08/2025     (L) 04/08/2025    K 5.0 04/08/2025    CO2 23 04/08/2025     04/08/2025     (HH) 04/08/2025    CREATININE 4.12 (H) 04/08/2025      Lab Results   Component Value Date    ALT 7 03/25/2025    AST 10 03/25/2025    ALKPHOS 90 03/25/2025    BILITOT 0.5 03/25/2025        Oxygen Therapy  SpO2: 99 %  Medical Gas Therapy: Supplemental oxygen  Medical Gas Delivery Method: CPAP/Bi-PAP mask  FiO2 (%):  [28 %] 28 %  S RR:  [14-18] 18  S VT:  [500 mL] 500 mL    Intake/Output last 3 Shifts:  I/O last 3 completed shifts:  In:  220 (1.5 mL/kg) [P.O.:220]  Out: 850 (6 mL/kg) [Urine:850 (0.2 mL/kg/hr)]  Weight: 142.6 kg       Medications   Scheduled medications  allopurinol, 50 mg, oral, Every other day  [Held by provider] amLODIPine, 10 mg, oral, Daily  aspirin, 81 mg, oral, Daily  calcium acetate, 667 mg, oral, TID after meals  ezetimibe, 10 mg, oral, Daily  fluticasone, 2 spray, Each Nostril, BID  fluticasone furoate-vilanteroL, 1 puff, inhalation, Daily  [Held by provider] gabapentin, 300 mg, oral, Daily  heparin (porcine), 7,500 Units, subcutaneous, q8h JAYA  hydrALAZINE, 50 mg, oral, BID  insulin lispro, 0-10 Units, subcutaneous, TID AC  insulin lispro, 6 Units, subcutaneous, Once  ipratropium-albuteroL, 3 mL, nebulization, TID  levothyroxine, 200 mcg, oral, Daily  [Held by provider] metoprolol succinate XL, 100 mg, oral, Daily  metoprolol tartrate, 50 mg, oral, BID  montelukast, 10 mg, oral, Nightly  nystatin, 1 Application, Topical, TID  oxygen, , inhalation, BID  oxygen, , inhalation, Nightly  polyethylene glycol, 17 g, oral, Daily  pravastatin, 40 mg, oral, Nightly  sertraline, 50 mg, oral, Daily  sulfur hexafluoride microsphr, 2 mL, intravenous, Once in imaging      Continuous medications     PRN medications  PRN medications: albuterol, dextrose, dextrose, glucagon, glucagon, hydrALAZINE, [Held by provider] nitroglycerin, [Held by provider] traZODone     Allergies  Ciprofloxacin, Morphine, and Penicillins    Chest Radiograph   No results found for this or any previous visit from the past 10 days.       XR chest 1 view 04/06/2025    Narrative  Interpreted By:  Jil Washington,  STUDY:  XR CHEST 1 VIEW 4/6/2025 6:26 am    INDICATION:  Signs/Symptoms:acute respiratory failure    COMPARISON:  04/05/2025    ACCESSION NUMBER(S):  [id number]    ORDERING CLINICIAN:  HEATHER STEPHEN    TECHNIQUE:  AP erect view of the chest    FINDINGS:  There is stable cardiac enlargement. There is mild pulmonary vascular  congestion noted with some discoid  atelectasis at the right lung  base. The study is limited by the body habitus of this patient.    Impression  Stable cardiomegaly with pulmonary vascular congestion and persistent  right basilar discoid atelectasis.    The study is limited by the body habitus of this patient.    Signed by: Jil Washington 4/6/2025 8:40 PM  Dictation workstation:   [id number]      XR chest 1 view 04/05/2025    Narrative  Interpreted By:  Jil Washington,  STUDY:  XR CHEST 1 VIEW 4/5/2025 7:42 am    INDICATION:  Signs/Symptoms:acute respiratory failure    COMPARISON:  04/04/2025    ACCESSION NUMBER(S):  [id number]    ORDERING CLINICIAN:  HEATHER STEPHEN    TECHNIQUE:  AP semi-erect view of the chest    FINDINGS:  There is enlargement of the cardiac silhouette with calcified plaque  seen in the aortic knob. When compared with the study done 1 day  earlier, there is now increased right basilar platelike atelectasis  with stable atelectasis in the left mid lung zone. There is mild  pulmonary vascular congestion seen.    Impression  Enlarged cardiac silhouette with mild pulmonary vascular congestion.    Right basilar atelectasis is a little worse with stable atelectasis  left mid lung zone.    Signed by: Jil Washington 4/6/2025 10:10 AM  Dictation workstation:   [id number]      XR chest 1 view 04/04/2025    Narrative  Interpreted By:  Jil Washington,  STUDY:  XR CHEST 1 VIEW 4/4/2025 11:56 am    INDICATION:  Signs/Symptoms:acute respiratory failure    COMPARISON:  04/03/2025    ACCESSION NUMBER(S):  [id number]    ORDERING CLINICIAN:  HEATHER STEPHEN    TECHNIQUE:  AP erect view of the chest    FINDINGS:  Cardiac enlargement is unchanged. There is less right basilar  atelectasis and infiltrate. There is persistent linear atelectasis at  the junction of the left mid and lower lung zones. No pleural  abnormality is observed.    Impression  Improved right basilar atelectasis and infiltrate without complete  resolution.    Persistent linear atelectasis at  the junction of the left mid and  lower lung fields.    Stable cardiomegaly.    Signed by: Jil Washington 4/4/2025 12:12 PM  Dictation workstation:   [id number]         Assessment and Plan / Recommendations   Assessment/Plan   74-year-old woman with history of asthma (clinical diagnosis), HOLDEN on CPAP, CKD, diabetes admitted with    1.  Acute hypoxic and hypercapnic respiratory failure due to below  2.  Suspected asthma exacerbation (was diagnosed with asthma clinically, PFT from 2020 no obstruction, never smoked)  3.  Pulmonary edema/CHF, chest x-ray from yesterday with worsening bilateral edema/vascular congestion  4.  HOLDEN with hypoventilation    Worsening respiratory status and hypercapnic acidosis suspected due to pulmonary edema, less likely asthma exacerbation at this time-no wheezing on exam.    -Continue AVAPS, can take a break for meals.  Repeat blood gas in a.m.  -Continue diuresis per nephrology, received 1 dose of IV Lasix today, may need RRT (oliguric)  -Completed steroids, no wheezing on exam today  -Continue Breo, continue Singulair, nebs as needed  -Incentive spirometry and Aerobika when off NIV, wean O2 as tolerated      John Ross MD

## 2025-04-09 LAB
ANION GAP BLDA CALCULATED.4IONS-SCNC: 10 MMO/L (ref 10–25)
ANION GAP SERPL CALCULATED.3IONS-SCNC: 12 MMOL/L (ref 10–20)
APPARATUS: ABNORMAL
ARTERIAL PATENCY WRIST A: POSITIVE
BASE EXCESS BLDA CALC-SCNC: 0.2 MMOL/L (ref -2–3)
BASOPHILS # BLD AUTO: 0.01 X10*3/UL (ref 0–0.1)
BASOPHILS NFR BLD AUTO: 0.2 %
BODY TEMPERATURE: 37 DEGREES CELSIUS
BUN SERPL-MCNC: 127 MG/DL (ref 6–23)
CA-I BLDA-SCNC: 1.15 MMOL/L (ref 1.1–1.33)
CALCIUM SERPL-MCNC: 7.9 MG/DL (ref 8.6–10.3)
CHLORIDE BLDA-SCNC: 103 MMOL/L (ref 98–107)
CHLORIDE SERPL-SCNC: 104 MMOL/L (ref 98–107)
CO2 SERPL-SCNC: 25 MMOL/L (ref 21–32)
CREAT SERPL-MCNC: 3.97 MG/DL (ref 0.5–1.05)
EGFRCR SERPLBLD CKD-EPI 2021: 11 ML/MIN/1.73M*2
EOSINOPHIL # BLD AUTO: 0.12 X10*3/UL (ref 0–0.4)
EOSINOPHIL NFR BLD AUTO: 2.1 %
EPAP CMH2O: 10 CM H2O
ERYTHROCYTE [DISTWIDTH] IN BLOOD BY AUTOMATED COUNT: 19.9 % (ref 11.5–14.5)
GLUCOSE BLD MANUAL STRIP-MCNC: 130 MG/DL (ref 74–99)
GLUCOSE BLD MANUAL STRIP-MCNC: 139 MG/DL (ref 74–99)
GLUCOSE BLD MANUAL STRIP-MCNC: 145 MG/DL (ref 74–99)
GLUCOSE BLD MANUAL STRIP-MCNC: 177 MG/DL (ref 74–99)
GLUCOSE BLD MANUAL STRIP-MCNC: 178 MG/DL (ref 74–99)
GLUCOSE BLDA-MCNC: 132 MG/DL (ref 74–99)
GLUCOSE SERPL-MCNC: 132 MG/DL (ref 74–99)
HCO3 BLDA-SCNC: 25.4 MMOL/L (ref 22–26)
HCT VFR BLD AUTO: 27 % (ref 36–46)
HCT VFR BLD EST: 25 % (ref 36–46)
HGB BLD-MCNC: 8.1 G/DL (ref 12–16)
HGB BLDA-MCNC: 8.2 G/DL (ref 12–16)
IMM GRANULOCYTES # BLD AUTO: 0.02 X10*3/UL (ref 0–0.5)
IMM GRANULOCYTES NFR BLD AUTO: 0.3 % (ref 0–0.9)
INHALED O2 CONCENTRATION: 28 %
IPAP CMH2O: 25 CM H2O
LACTATE BLDA-SCNC: 0.3 MMOL/L (ref 0.4–2)
LYMPHOCYTES # BLD AUTO: 0.74 X10*3/UL (ref 0.8–3)
LYMPHOCYTES NFR BLD AUTO: 12.6 %
MCH RBC QN AUTO: 23.1 PG (ref 26–34)
MCHC RBC AUTO-ENTMCNC: 30 G/DL (ref 32–36)
MCV RBC AUTO: 77 FL (ref 80–100)
MONOCYTES # BLD AUTO: 0.47 X10*3/UL (ref 0.05–0.8)
MONOCYTES NFR BLD AUTO: 8 %
NEUTROPHILS # BLD AUTO: 4.49 X10*3/UL (ref 1.6–5.5)
NEUTROPHILS NFR BLD AUTO: 76.8 %
NRBC BLD-RTO: 0 /100 WBCS (ref 0–0)
OXYHGB MFR BLDA: 97.4 % (ref 94–98)
PCO2 BLDA: 43 MM HG (ref 38–42)
PH BLDA: 7.38 PH (ref 7.38–7.42)
PLATELET # BLD AUTO: 146 X10*3/UL (ref 150–450)
PO2 BLDA: 118 MM HG (ref 85–95)
POTASSIUM BLDA-SCNC: 5.4 MMOL/L (ref 3.5–5.3)
POTASSIUM SERPL-SCNC: 5.3 MMOL/L (ref 3.5–5.3)
RBC # BLD AUTO: 3.51 X10*6/UL (ref 4–5.2)
SAO2 % BLDA: 100 % (ref 94–100)
SODIUM BLDA-SCNC: 133 MMOL/L (ref 136–145)
SODIUM SERPL-SCNC: 136 MMOL/L (ref 136–145)
SPECIMEN DRAWN FROM PATIENT: ABNORMAL
TIDAL VOLUME: 500 ML
VENTILATOR MODE: ABNORMAL
VENTILATOR RATE: 18 BPM
WBC # BLD AUTO: 5.9 X10*3/UL (ref 4.4–11.3)

## 2025-04-09 PROCEDURE — 99233 SBSQ HOSP IP/OBS HIGH 50: CPT | Performed by: INTERNAL MEDICINE

## 2025-04-09 PROCEDURE — 84132 ASSAY OF SERUM POTASSIUM: CPT | Performed by: INTERNAL MEDICINE

## 2025-04-09 PROCEDURE — 2500000001 HC RX 250 WO HCPCS SELF ADMINISTERED DRUGS (ALT 637 FOR MEDICARE OP): Performed by: STUDENT IN AN ORGANIZED HEALTH CARE EDUCATION/TRAINING PROGRAM

## 2025-04-09 PROCEDURE — 82947 ASSAY GLUCOSE BLOOD QUANT: CPT

## 2025-04-09 PROCEDURE — 9420000001 HC RT PATIENT EDUCATION 5 MIN

## 2025-04-09 PROCEDURE — 94640 AIRWAY INHALATION TREATMENT: CPT

## 2025-04-09 PROCEDURE — 97535 SELF CARE MNGMENT TRAINING: CPT | Mod: GO,CO

## 2025-04-09 PROCEDURE — 2500000005 HC RX 250 GENERAL PHARMACY W/O HCPCS: Performed by: STUDENT IN AN ORGANIZED HEALTH CARE EDUCATION/TRAINING PROGRAM

## 2025-04-09 PROCEDURE — 2500000002 HC RX 250 W HCPCS SELF ADMINISTERED DRUGS (ALT 637 FOR MEDICARE OP, ALT 636 FOR OP/ED): Performed by: STUDENT IN AN ORGANIZED HEALTH CARE EDUCATION/TRAINING PROGRAM

## 2025-04-09 PROCEDURE — 36600 WITHDRAWAL OF ARTERIAL BLOOD: CPT

## 2025-04-09 PROCEDURE — 85025 COMPLETE CBC W/AUTO DIFF WBC: CPT | Performed by: STUDENT IN AN ORGANIZED HEALTH CARE EDUCATION/TRAINING PROGRAM

## 2025-04-09 PROCEDURE — 84132 ASSAY OF SERUM POTASSIUM: CPT

## 2025-04-09 PROCEDURE — 99291 CRITICAL CARE FIRST HOUR: CPT | Performed by: STUDENT IN AN ORGANIZED HEALTH CARE EDUCATION/TRAINING PROGRAM

## 2025-04-09 PROCEDURE — 2020000001 HC ICU ROOM DAILY

## 2025-04-09 PROCEDURE — 36415 COLL VENOUS BLD VENIPUNCTURE: CPT | Performed by: INTERNAL MEDICINE

## 2025-04-09 PROCEDURE — 97530 THERAPEUTIC ACTIVITIES: CPT | Mod: GP

## 2025-04-09 PROCEDURE — 97530 THERAPEUTIC ACTIVITIES: CPT | Mod: GO,CO

## 2025-04-09 PROCEDURE — 2500000004 HC RX 250 GENERAL PHARMACY W/ HCPCS (ALT 636 FOR OP/ED): Performed by: STUDENT IN AN ORGANIZED HEALTH CARE EDUCATION/TRAINING PROGRAM

## 2025-04-09 PROCEDURE — 97110 THERAPEUTIC EXERCISES: CPT | Mod: GP

## 2025-04-09 RX ORDER — ACETAMINOPHEN 160 MG/5ML
650 SOLUTION ORAL EVERY 4 HOURS PRN
Status: DISCONTINUED | OUTPATIENT
Start: 2025-04-09 | End: 2025-04-17 | Stop reason: HOSPADM

## 2025-04-09 RX ORDER — ACETAMINOPHEN 325 MG/1
650 TABLET ORAL EVERY 4 HOURS PRN
Status: DISCONTINUED | OUTPATIENT
Start: 2025-04-09 | End: 2025-04-17 | Stop reason: HOSPADM

## 2025-04-09 RX ADMIN — IPRATROPIUM BROMIDE AND ALBUTEROL SULFATE 3 ML: 2.5; .5 SOLUTION RESPIRATORY (INHALATION) at 20:38

## 2025-04-09 RX ADMIN — LEVOTHYROXINE SODIUM 200 MCG: 0.1 TABLET ORAL at 10:37

## 2025-04-09 RX ADMIN — HEPARIN SODIUM 7500 UNITS: 5000 INJECTION INTRAVENOUS; SUBCUTANEOUS at 13:59

## 2025-04-09 RX ADMIN — NYSTATIN 1 APPLICATION: 100000 POWDER TOPICAL at 20:31

## 2025-04-09 RX ADMIN — IPRATROPIUM BROMIDE AND ALBUTEROL SULFATE 3 ML: 2.5; .5 SOLUTION RESPIRATORY (INHALATION) at 07:31

## 2025-04-09 RX ADMIN — HEPARIN SODIUM 7500 UNITS: 5000 INJECTION INTRAVENOUS; SUBCUTANEOUS at 23:07

## 2025-04-09 RX ADMIN — HEPARIN SODIUM 7500 UNITS: 5000 INJECTION INTRAVENOUS; SUBCUTANEOUS at 06:40

## 2025-04-09 RX ADMIN — INSULIN LISPRO 2 UNITS: 100 INJECTION, SOLUTION INTRAVENOUS; SUBCUTANEOUS at 17:58

## 2025-04-09 RX ADMIN — EZETIMIBE 10 MG: 10 TABLET ORAL at 10:38

## 2025-04-09 RX ADMIN — INSULIN LISPRO 2 UNITS: 100 INJECTION, SOLUTION INTRAVENOUS; SUBCUTANEOUS at 13:28

## 2025-04-09 RX ADMIN — CALCIUM ACETATE 667 MG: 667 CAPSULE ORAL at 10:37

## 2025-04-09 RX ADMIN — PRAVASTATIN SODIUM 40 MG: 20 TABLET ORAL at 20:18

## 2025-04-09 RX ADMIN — ASPIRIN 81 MG: 81 TABLET, CHEWABLE ORAL at 10:38

## 2025-04-09 RX ADMIN — Medication 2 L/MIN: at 20:38

## 2025-04-09 RX ADMIN — NYSTATIN 1 APPLICATION: 100000 POWDER TOPICAL at 10:38

## 2025-04-09 RX ADMIN — IPRATROPIUM BROMIDE AND ALBUTEROL SULFATE 3 ML: 2.5; .5 SOLUTION RESPIRATORY (INHALATION) at 11:42

## 2025-04-09 RX ADMIN — Medication 28 PERCENT: at 23:37

## 2025-04-09 RX ADMIN — SERTRALINE HYDROCHLORIDE 50 MG: 50 TABLET ORAL at 10:38

## 2025-04-09 RX ADMIN — CALCIUM ACETATE 667 MG: 667 CAPSULE ORAL at 13:57

## 2025-04-09 RX ADMIN — NYSTATIN 1 APPLICATION: 100000 POWDER TOPICAL at 15:30

## 2025-04-09 RX ADMIN — MONTELUKAST 10 MG: 10 TABLET, FILM COATED ORAL at 20:18

## 2025-04-09 RX ADMIN — CALCIUM ACETATE 667 MG: 667 CAPSULE ORAL at 18:06

## 2025-04-09 RX ADMIN — ACETAMINOPHEN 650 MG: 325 TABLET ORAL at 13:57

## 2025-04-09 RX ADMIN — Medication 28 PERCENT: at 07:32

## 2025-04-09 ASSESSMENT — COGNITIVE AND FUNCTIONAL STATUS - GENERAL
DRESSING REGULAR UPPER BODY CLOTHING: A LOT
HELP NEEDED FOR BATHING: A LOT
MOBILITY SCORE: 8
EATING MEALS: A LITTLE
MOVING FROM LYING ON BACK TO SITTING ON SIDE OF FLAT BED WITH BEDRAILS: A LOT
TURNING FROM BACK TO SIDE WHILE IN FLAT BAD: TOTAL
STANDING UP FROM CHAIR USING ARMS: A LOT
DRESSING REGULAR LOWER BODY CLOTHING: TOTAL
PERSONAL GROOMING: A LOT
WALKING IN HOSPITAL ROOM: TOTAL
MOVING TO AND FROM BED TO CHAIR: TOTAL
TOILETING: TOTAL
CLIMB 3 TO 5 STEPS WITH RAILING: TOTAL
DAILY ACTIVITIY SCORE: 11

## 2025-04-09 ASSESSMENT — PAIN - FUNCTIONAL ASSESSMENT
PAIN_FUNCTIONAL_ASSESSMENT: 0-10
PAIN_FUNCTIONAL_ASSESSMENT: CPOT (CRITICAL CARE PAIN OBSERVATION TOOL)
PAIN_FUNCTIONAL_ASSESSMENT: CPOT (CRITICAL CARE PAIN OBSERVATION TOOL)
PAIN_FUNCTIONAL_ASSESSMENT: 0-10
PAIN_FUNCTIONAL_ASSESSMENT: CPOT (CRITICAL CARE PAIN OBSERVATION TOOL)
PAIN_FUNCTIONAL_ASSESSMENT: 0-10

## 2025-04-09 ASSESSMENT — PAIN DESCRIPTION - ORIENTATION: ORIENTATION: LOWER

## 2025-04-09 ASSESSMENT — PAIN SCALES - GENERAL
PAINLEVEL_OUTOF10: 10 - WORST POSSIBLE PAIN
PAINLEVEL_OUTOF10: 7
PAINLEVEL_OUTOF10: 0 - NO PAIN
PAINLEVEL_OUTOF10: 10 - WORST POSSIBLE PAIN
PAINLEVEL_OUTOF10: 0 - NO PAIN
PAINLEVEL_OUTOF10: 0 - NO PAIN
PAINLEVEL_OUTOF10: 10 - WORST POSSIBLE PAIN
PAINLEVEL_OUTOF10: 0 - NO PAIN
PAINLEVEL_OUTOF10: 0 - NO PAIN
PAINLEVEL_OUTOF10: 10 - WORST POSSIBLE PAIN
PAINLEVEL_OUTOF10: 0 - NO PAIN

## 2025-04-09 ASSESSMENT — ACTIVITIES OF DAILY LIVING (ADL): HOME_MANAGEMENT_TIME_ENTRY: 13

## 2025-04-09 ASSESSMENT — PAIN DESCRIPTION - LOCATION: LOCATION: BACK

## 2025-04-09 ASSESSMENT — PAIN DESCRIPTION - DESCRIPTORS
DESCRIPTORS: DISCOMFORT
DESCRIPTORS: ACHING

## 2025-04-09 NOTE — NURSING NOTE
Wound Wednesday full head to toe skin assessment completed. No wounds noted at this time. Patient on waffle mattress. Sacral and heel protectors in place. Q2 turns in place.

## 2025-04-09 NOTE — CARE PLAN
The patient's goals for the shift include monitor oxygenation    The clinical goals for the shift include Patient will remain hemodynamically stable      Problem: Skin  Goal: Decreased wound size/increased tissue granulation at next dressing change  Outcome: Progressing  Flowsheets (Taken 4/9/2025 0420)  Decreased wound size/increased tissue granulation at next dressing change: Promote sleep for wound healing  Goal: Participates in plan/prevention/treatment measures  Outcome: Progressing  Flowsheets (Taken 4/9/2025 0420)  Participates in plan/prevention/treatment measures:   Discuss with provider PT/OT consult   Elevate heels  Goal: Prevent/manage excess moisture  Outcome: Progressing  Flowsheets (Taken 4/9/2025 0420)  Prevent/manage excess moisture:   Cleanse incontinence/protect with barrier cream   Moisturize dry skin   Use wicking fabric (obtain order)  Goal: Prevent/minimize sheer/friction injuries  Outcome: Progressing  Flowsheets (Taken 4/9/2025 0420)  Prevent/minimize sheer/friction injuries:   Complete micro-shifts as needed if patient unable. Adjust patient position to relieve pressure points, not a full turn   HOB 30 degrees or less   Increase activity/out of bed for meals   Turn/reposition every 2 hours/use positioning/transfer devices   Use pull sheet   Utilize specialty bed per algorithm  Goal: Promote/optimize nutrition  Outcome: Progressing  Flowsheets (Taken 4/9/2025 0420)  Promote/optimize nutrition:   Offer water/supplements/favorite foods   Consume > 50% meals/supplements  Goal: Promote skin healing  Outcome: Progressing  Flowsheets (Taken 4/9/2025 0420)  Promote skin healing: Assess skin/pad under line(s)/device(s)     Problem: Fall/Injury  Goal: Not fall by end of shift  Outcome: Progressing  Goal: Be free from injury by end of the shift  Outcome: Progressing  Goal: Verbalize understanding of personal risk factors for fall in the hospital  Outcome: Progressing  Goal: Verbalize understanding of  risk factor reduction measures to prevent injury from fall in the home  Outcome: Progressing  Goal: Use assistive devices by end of the shift  Outcome: Progressing  Goal: Pace activities to prevent fatigue by end of the shift  Outcome: Progressing     Problem: Respiratory  Goal: Tolerate mechanical ventilation evidenced by VS/agitation level this shift  Outcome: Progressing  Goal: Wean oxygen to maintain O2 saturation per order/standard this shift  Outcome: Progressing  Goal: Clear secretions with interventions this shift  Outcome: Progressing  Flowsheets (Taken 4/9/2025 0420)  Clear secretions with interventions this shift:   Encourage/provide pulmonary hygiene/secretion clearance   Incentive spirometry   Suctioning  Goal: Minimize anxiety/maximize coping throughout shift  Outcome: Progressing  Goal: Minimal/no exertional discomfort or dyspnea this shift  Outcome: Progressing  Goal: Verbalize decreased shortness of breath this shift  Outcome: Progressing     Problem: Safety - Adult  Goal: Free from fall injury  Outcome: Progressing  Flowsheets (Taken 4/9/2025 0420)  Free from fall injury: Instruct family/caregiver on patient safety     Problem: Discharge Planning  Goal: Discharge to home or other facility with appropriate resources  Outcome: Progressing     Problem: Chronic Conditions and Co-morbidities  Goal: Patient's chronic conditions and co-morbidity symptoms are monitored and maintained or improved  Outcome: Progressing  Flowsheets (Taken 4/9/2025 0420)  Care Plan - Patient's Chronic Conditions and Co-Morbidity Symptoms are Monitored and Maintained or Improved:   Monitor and assess patient's chronic conditions and comorbid symptoms for stability, deterioration, or improvement   Collaborate with multidisciplinary team to address chronic and comorbid conditions and prevent exacerbation or deterioration   Update acute care plan with appropriate goals if chronic or comorbid symptoms are exacerbated and prevent  overall improvement and discharge     Problem: Nutrition  Goal: Nutrient intake appropriate for maintaining nutritional needs  Outcome: Progressing     Problem: Diabetes  Goal: Achieve decreasing blood glucose levels by end of shift  Outcome: Progressing  Goal: Increase stability of blood glucose readings by end of shift  Outcome: Progressing  Goal: Decrease in ketones present in urine by end of shift  Outcome: Progressing  Goal: Maintain electrolyte levels within acceptable range throughout shift  Outcome: Progressing  Goal: Maintain glucose levels >70mg/dl to <250mg/dl throughout shift  Outcome: Progressing  Goal: No changes in neurological exam by end of shift  Outcome: Progressing  Goal: Learn about and adhere to nutrition recommendations by end of shift  Outcome: Progressing  Goal: Vital signs within normal range for age by end of shift  Outcome: Progressing  Goal: Increase self care and/or family involovement by end of shift  Outcome: Progressing  Goal: Receive DSME education by end of shift  Outcome: Progressing     Problem: Knowledge Deficit  Goal: Patient/family/caregiver demonstrates understanding of disease process, treatment plan, medications, and discharge instructions  Outcome: Progressing  Flowsheets (Taken 4/9/2025 0420)  Patient/family/caregiver demonstrates understanding of disease process, treatment plan, medications, and discharge instructions:   Complete learning assessment and assess knowledge base   Provide teaching at level of understanding   Provide teaching via preferred learning methods     Problem: Mechanical Ventilation  Goal: Patient Will Maintain Patent Airway  Outcome: Progressing  Flowsheets (Taken 4/9/2025 0420)  Patient Will Maintain Patent Airway:   Assess and monitor airway secretions and suction as needed per patient's condition and according to policy   Hyper oxygenate with 100% FiO2 prior to suctioning   Suctioning secretions as indicated to maintain patent airway   Elevate  head of bed 30 degrees if patient has tube feeding  Goal: Oral health is maintained or improved  Outcome: Progressing  Flowsheets (Taken 4/9/2025 0420)  Oral Health is Maintained or Improved:   Assess and monitor condition of lips and mouth and perform oral care per hospital policy   Perform oral care with an oral swab   Apply water-based moisturizer to lips   Suction oral pharynx  Goal: Tracheostomy will be managed safely  Outcome: Progressing  Goal: ET tube will be managed safely  Outcome: Progressing  Goal: Ability to express needs and understand communication  Outcome: Progressing  Flowsheets (Taken 4/9/2025 0420)  Ability to express needs and understand communication:   Assess and monitor patient's ability to understand information and communicate   Implement interventions to promote patient's ability to communicate   Encourage patient to communicate   Keep alphabet and symbol forms within reach  Goal: Mobility/activity is maintained at optimum level for patient  Outcome: Progressing

## 2025-04-09 NOTE — PROGRESS NOTES
Physical Therapy    Physical Therapy Treatment    Patient Name: Analia Murray  MRN: [id number]  Department: [address] ICU  Room: 10/10-A  Today's Date: 4/9/2025  Time Calculation  Start Time: 0916  Stop Time: 1002  Time Calculation (min): 46 min         Assessment/Plan   PT Assessment  Barriers to Discharge Home: Caregiver assistance, Physical needs  Caregiver Assistance: Caregiver assistance needed per identified barriers - however, level of patient's required assistance exceeds assistance available at home  Physical Needs: Stair navigation into home limited by function/safety, Ambulating household distances limited by function/safety, High falls risk due to function or environment  Evaluation/Treatment Tolerance: Patient limited by fatigue, Patient limited by pain  Medical Staff Made Aware: Yes  Strengths: Ability to acquire knowledge  Barriers to Participation: Comorbidities  End of Session Communication: Bedside nurse  Assessment Comment: pt transferred to ICU yesterday due to breathing difficulties; pt now on 2 liters o2 and feeling better however  requiredmax assist of 1-2 for all limited mobility; + high fall risk due to functional deficits.  End of Session Patient Position: Bed, 3 rail up, Alarm on (call button in reach)  PT Plan  Inpatient/Swing Bed or Outpatient: Inpatient  PT Plan  Treatment/Interventions: Bed mobility, Transfer training, Gait training, Stair training, Balance training, Neuromuscular re-education, Strengthening, Endurance training, Therapeutic exercise, Therapeutic activity, Home exercise program, Positioning, Postural re-education  PT Plan: Ongoing PT  PT Frequency: 5 times per week  PT Discharge Recommendations: Moderate intensity level of continued care  Equipment Recommended upon Discharge: Wheeled walker  PT Recommended Transfer Status: Assist x2, Assistive device (FWW)  PT - OK to Discharge: Yes      General Visit Information:   PT  Visit  PT Received On:  "04/09/25  General  Family/Caregiver Present: No  Prior to Session Communication: Bedside nurse  Patient Position Received: Bed, 3 rail up, Alarm off, not on at start of session  Preferred Learning Style: verbal, visual  General Comment: cleared by nurse for therapy; pt agreeable to therapy; + telemetry, purewick    Subjective   Precautions:  Precautions  Hearing/Visual Limitations: reading glasses, hearing WFL  Medical Precautions: Fall precautions, Oxygen therapy device and L/min (2 liters o2 via nc)     Date/Time Vitals Session Patient Position Pulse Resp SpO2 BP MAP (mmHg)    04/09/25 1000 --  --  73  22  97 %  --  --     04/09/25 1038 --  --  60  --  --  104/56  --     04/09/25 1100 --  --  71  20  97 %  117/63  80           Vital Signs Comment: long sitting o2 sat 97% with HR 74 bpm, /70(85); sitting edge of bed o2 sat 94% with HR 83 bpm; ending long sitting o2 sat 94% with HR 75 bpm, /51(67)     Objective   Pain:  Pain Assessment  Pain Assessment: 0-10  0-10 (Numeric) Pain Score: 10 - Worst possible pain  Pain Type: Chronic pain  Pain Location: Back  Pain Orientation: Lower  Pain Interventions: Repositioned  Response to Interventions: Decrease in pain (8/10---alerted nurse)  Cognition:  Cognition  Overall Cognitive Status: Within Functional Limits  Orientation Level: Oriented X4  Following Commands: Follows one step commands with increased time  Cognition Comments: labile today due to \" I just want to get better. I don't feel like I am doing well.\"  Safety/Judgement:  (decreased safety insight during functional mobility)  Insight: Mild  Processing Speed: Delayed  Coordination:  Movements are Fluid and Coordinated: No  Lower Body Coordination: decreased accuracy/timing of all LE active movements  Postural Control:  Postural Control  Posture Comment: morbidly obese with mod forwad head, protracted shldrs  Static Sitting Balance  Static Sitting-Balance Support: Feet supported, Bilateral upper " extremity supported  Static Sitting-Level of Assistance: Contact guard  Static Sitting-Comment/Number of Minutes: pt sat edge of bed x 12 min while working on LE exercises, deep/pursed lip breathing  Extremity/Trunk Assessments:    Activity Tolerance:  Activity Tolerance  Endurance: Decreased tolerance for upright activites  Activity Tolerance Comments: poor due to fatigue, eakness, c/o back pain  Treatments:  Therapeutic Exercise  Therapeutic Exercise Performed: Yes  Therapeutic Exercise Activity 1: supine keely AP x 30 reps  Therapeutic Exercise Activity 2: supine keely GS x 15 reps  Therapeutic Exercise Activity 3: seated keely LAQ's x 15 reps  Therapeutic Exercise Activity 4: supine keely assisted hip abd/add x 5 reps    Therapeutic Activity  Therapeutic Activity Performed: Yes (see bed mobility, sitting activities, standing with FWW, transfers.)    Bed Mobility  Bed Mobility: Yes  Bed Mobility 1  Bed Mobility 1: Supine to sitting  Level of Assistance 1: Maximum assistance, +2, Moderate verbal cues, Moderate tactile cues  Bed Mobility Comments 1: head of bed mildly elevated; max assist of 2 for trunk up, scooting pelvis to edge of bed, keely LE's off of bed, verbal/tactile cues for logrolling, ease of transition.  Bed Mobility 2  Bed Mobility  2: Sitting to supine  Level of Assistance 2: Maximum assistance, +2, Moderate verbal cues  Bed Mobility Comments 2: head of bed flat; max assist of 2 for trunk down, keely LE's onto bed  Bed Mobility 3  Bed Mobility 3: Rolling right, Rolling left, Scooting  Level of Assistance 3: Moderate assistance, Maximum assistance, Minimal verbal cues, Minimal tactile cues  Bed Mobility Comments 3: head of bed flat; mod to max assist of 1 for bilateral rolling, use of bedrail, x 2 trials each way while PT/PCA performed dependent pericare and change of bed lines    Ambulation/Gait Training  Ambulation/Gait Training Performed: Yes  Ambulation/Gait Training 1  Surface 1: Level tile  Device 1:  Rolling walker  Assistance 1: Maximum assistance, Moderate verbal cues, Moderate assistance  Comments/Distance (ft) 1: pt stood at bedside x 2 trials----1st trial x 50 secs; 2nd trial 25 to 30 secs----mod to max assist of 1, verbal cues for erect posture, pursed lip breathing, increased active use of keely UE's pushing down through FWW; overall balance fair -/poor +  Transfers  Transfer: Yes  Transfer 1  Transfer From 1: Bed to  Transfer to 1: Stand  Technique 1: Sit to stand  Transfer Device 1: Walker  Transfer Level of Assistance 1: Maximum assistance, Moderate verbal cues  Trials/Comments 1: max assist of 1 for trunk up, verbal cues for proper keely hand placement on stabilized FWW ( unable to place hands on bed and actively use UE's), forward weight shifting  Transfers 2  Transfer From 2: Stand to  Transfer to 2: Bed  Technique 2: Stand to sit  Transfer Device 2: Walker  Transfer Level of Assistance 2: Moderate assistance, Minimal verbal cues  Trials/Comments 2: mod assist of 1 for trunk down, verbal cues for reaching back with both hands for bed    Stairs  Stairs: No    Outcome Measures:  [facility] Basic Mobility  Turning from your back to your side while in a flat bed without using bedrails: A lot  Moving from lying on your back to sitting on the side of a flat bed without using bedrails: Total  Moving to and from bed to chair (including a wheelchair): Total  Standing up from a chair using your arms (e.g. wheelchair or bedside chair): A lot  To walk in hospital room: Total  Climbing 3-5 steps with railing: Total  Basic Mobility - Total Score: 8    Education Documentation  Precautions, taught by Danna Lozano PT at 4/9/2025 11:42 AM.  Learner: Patient  Readiness: Acceptance  Method: Explanation  Response: Needs Reinforcement  Comment: PT educated pt in safe transfer technique    Mobility Training, taught by Danna Lozano PT at 4/9/2025 11:42 AM.  Learner: Patient  Readiness: Acceptance  Method: Explanation  Response:  Needs Reinforcement  Comment: PT educated pt in safe transfer technique    Education Comments  No comments found.               Encounter Problems       Encounter Problems (Active)       PT Problem       Pt will transition supine<>sit with close S. (Progressing)       Start:  03/27/25    Expected End:  04/19/25            Pt will transfer sit<>stand with FWW & close S. (Progressing)       Start:  03/27/25    Expected End:  04/19/25            Pt will ambulate >/=25 ft with FWW & close S. (Progressing)       Start:  03/27/25    Expected End:  04/19/25

## 2025-04-09 NOTE — PROGRESS NOTES
"Analia Murray is a 74 y.o. female on day 15 of admission presenting with Acute respiratory failure with hypoxia.    Subjective   The patient is seen for MARY ANN on CKD stage III/IV patient is off BiPAP today she is more awake she is on nasal cannula 2 L of oxygen no nausea vomiting diarrhea she did respond very well to IV diuretics       Objective     Physical Exam  Constitutional:       General: She is not in acute distress.     Appearance: Normal appearance. She is not toxic-appearing.   Neck:      Vascular: No carotid bruit.   Cardiovascular:      Heart sounds: No murmur heard.     No friction rub. No gallop.   Pulmonary:      Breath sounds: No wheezing, rhonchi or rales.   Abdominal:      Tenderness: There is no abdominal tenderness. There is no right CVA tenderness, left CVA tenderness or rebound.   Musculoskeletal:         General: No tenderness.      Cervical back: Neck supple.      Right lower leg: No edema (1+ edema).      Left lower leg: No edema (1+ edema).   Lymphadenopathy:      Cervical: No cervical adenopathy.         Last Recorded Vitals  Blood pressure 148/62, pulse 70, temperature 36.9 °C (98.4 °F), temperature source Oral, resp. rate 25, height 1.626 m (5' 4.02\"), weight 142 kg (313 lb 0.9 oz), SpO2 99%.    Intake/Output last 3 Shifts:  I/O last 3 completed shifts:  In: 130 (0.9 mL/kg) [P.O.:100; I.V.:30 (0.2 mL/kg)]  Out: 1350 (9.5 mL/kg) [Urine:1350 (0.3 mL/kg/hr)]  Weight: 142 kg     Current Facility-Administered Medications:     albuterol 2.5 mg /3 mL (0.083 %) nebulizer solution 2.5 mg, 2.5 mg, nebulization, q2h PRN, Roddy Galindo DO    allopurinol (Zyloprim) tablet 50 mg, 50 mg, oral, Every other day, Roddy Galindo DO, 50 mg at 04/06/25 0854    [Held by provider] amLODIPine (Norvasc) tablet 10 mg, 10 mg, oral, Daily, Sanjiv Neil PA-C, 10 mg at 03/28/25 0948    aspirin chewable tablet 81 mg, 81 mg, oral, Daily, Roddy Galindo DO, 81 mg at 04/07/25 0924    calcium acetate (Phoslo) " capsule 667 mg, 667 mg, oral, TID after meals, Roddy Galindo DO, 667 mg at 04/08/25 1829    dextrose 50 % injection 12.5 g, 12.5 g, intravenous, q15 min PRN, Roddy Galindo DO    dextrose 50 % injection 25 g, 25 g, intravenous, q15 min PRN, Roddy Galindo DO    ezetimibe (Zetia) tablet 10 mg, 10 mg, oral, Daily, Roddy Galindo DO, 10 mg at 04/07/25 0924    fluticasone (Flonase) nasal spray 2 spray, 2 spray, Each Nostril, BID, Roddy Galindo DO, 2 spray at 04/07/25 2210    fluticasone furoate-vilanteroL (Breo Ellipta) 100-25 mcg/dose inhaler 1 puff, 1 puff, inhalation, Daily, Roddy Galindo DO    [Held by provider] gabapentin (Neurontin) capsule 300 mg, 300 mg, oral, Daily, Krysta Ramirez MD, 300 mg at 04/07/25 0924    glucagon (Glucagen) injection 1 mg, 1 mg, intramuscular, q15 min PRN, Roddy Galindo DO    glucagon (Glucagen) injection 1 mg, 1 mg, intramuscular, q15 min PRN, Roddy Galindo DO    heparin (porcine) injection 7,500 Units, 7,500 Units, subcutaneous, q8h JAYA, Rodyd Galindo DO, 7,500 Units at 04/09/25 0640    hydrALAZINE (Apresoline) injection 10 mg, 10 mg, intravenous, q4h PRN, Roddy Galindo DO, 10 mg at 04/08/25 1220    hydrALAZINE (Apresoline) tablet 50 mg, 50 mg, oral, BID, Roddy Galindo DO, 50 mg at 04/08/25 2029    insulin lispro injection 0-10 Units, 0-10 Units, subcutaneous, TID AC, Roddy Galindo DO, 4 Units at 04/07/25 1649    insulin lispro injection 6 Units, 6 Units, subcutaneous, Once, Roddy Galindo DO    ipratropium-albuteroL (Duo-Neb) 0.5-2.5 mg/3 mL nebulizer solution 3 mL, 3 mL, nebulization, TID, Roddy Galindo DO, 3 mL at 04/09/25 0731    levothyroxine (Synthroid, Levoxyl) tablet 200 mcg, 200 mcg, oral, Daily, Roddy Galindo DO, 200 mcg at 04/08/25 0504    [Held by provider] metoprolol succinate XL (Toprol-XL) 24 hr tablet 100 mg, 100 mg, oral, Daily, Sanjiv Neil PA-C    metoprolol tartrate (Lopressor) tablet 50 mg, 50 mg, oral, BID, Roddy BONILLA  DO Mateo, 50 mg at 04/08/25 2029    montelukast (Singulair) tablet 10 mg, 10 mg, oral, Nightly, Roddy Galindo DO, 10 mg at 04/08/25 2029    [Held by provider] nitroglycerin (Nitrostat) SL tablet 0.4 mg, 0.4 mg, sublingual, q5 min PRN, Sanjiv Neil PA-C    nystatin (Mycostatin) 100,000 unit/gram powder 1 Application, 1 Application, Topical, TID, Roddy Galindo DO, 1 Application at 04/08/25 2044    oxygen (O2) therapy, , inhalation, BID, Roddy Galindo DO, 28 percent at 04/09/25 0732    oxygen (O2) therapy, , inhalation, Nightly, Roddy Galindo DO, 2 L/min at 04/08/25 2035    polyethylene glycol (Glycolax, Miralax) packet 17 g, 17 g, oral, Daily, Roddy Galindo DO, 17 g at 04/07/25 0922    pravastatin (Pravachol) tablet 40 mg, 40 mg, oral, Nightly, Roddy Galindo DO, 40 mg at 04/08/25 2029    sertraline (Zoloft) tablet 50 mg, 50 mg, oral, Daily, Roddy Galindo DO, 50 mg at 04/07/25 0924    sulfur hexafluoride microsphr (Lumason) injection 24.28 mg, 2 mL, intravenous, Once in imaging, Roddy Galindo DO    [Held by provider] traZODone (Desyrel) tablet 25 mg, 25 mg, oral, Nightly PRN, Robbie Olivarez MD   Relevant Results    Results for orders placed or performed during the hospital encounter of 03/25/25 (from the past 96 hours)   POCT GLUCOSE   Result Value Ref Range    POCT Glucose 211 (H) 74 - 99 mg/dL   POCT GLUCOSE   Result Value Ref Range    POCT Glucose 314 (H) 74 - 99 mg/dL   POCT GLUCOSE   Result Value Ref Range    POCT Glucose 360 (H) 74 - 99 mg/dL   CBC and Auto Differential   Result Value Ref Range    WBC 7.9 4.4 - 11.3 x10*3/uL    nRBC 0.0 0.0 - 0.0 /100 WBCs    RBC 4.09 4.00 - 5.20 x10*6/uL    Hemoglobin 9.3 (L) 12.0 - 16.0 g/dL    Hematocrit 32.6 (L) 36.0 - 46.0 %    MCV 80 80 - 100 fL    MCH 22.7 (L) 26.0 - 34.0 pg    MCHC 28.5 (L) 32.0 - 36.0 g/dL    RDW 19.9 (H) 11.5 - 14.5 %    Platelets 190 150 - 450 x10*3/uL    Neutrophils % 79.5 40.0 - 80.0 %    Immature Granulocytes %,  Automated 0.5 0.0 - 0.9 %    Lymphocytes % 10.9 13.0 - 44.0 %    Monocytes % 8.6 2.0 - 10.0 %    Eosinophils % 0.5 0.0 - 6.0 %    Basophils % 0.0 0.0 - 2.0 %    Neutrophils Absolute 6.28 (H) 1.60 - 5.50 x10*3/uL    Immature Granulocytes Absolute, Automated 0.04 0.00 - 0.50 x10*3/uL    Lymphocytes Absolute 0.86 0.80 - 3.00 x10*3/uL    Monocytes Absolute 0.68 0.05 - 0.80 x10*3/uL    Eosinophils Absolute 0.04 0.00 - 0.40 x10*3/uL    Basophils Absolute 0.00 0.00 - 0.10 x10*3/uL   POCT GLUCOSE   Result Value Ref Range    POCT Glucose 145 (H) 74 - 99 mg/dL   Basic Metabolic Panel   Result Value Ref Range    Glucose 168 (H) 74 - 99 mg/dL    Sodium 135 (L) 136 - 145 mmol/L    Potassium 4.5 3.5 - 5.3 mmol/L    Chloride 105 98 - 107 mmol/L    Bicarbonate 25 21 - 32 mmol/L    Anion Gap 10 10 - 20 mmol/L    Urea Nitrogen 111 (HH) 6 - 23 mg/dL    Creatinine 3.47 (H) 0.50 - 1.05 mg/dL    eGFR 13 (L) >60 mL/min/1.73m*2    Calcium 7.9 (L) 8.6 - 10.3 mg/dL   POCT GLUCOSE   Result Value Ref Range    POCT Glucose 271 (H) 74 - 99 mg/dL   POCT GLUCOSE   Result Value Ref Range    POCT Glucose 317 (H) 74 - 99 mg/dL   Blood Gas Arterial Full Panel   Result Value Ref Range    POCT pH, Arterial 7.20 (LL) 7.38 - 7.42 pH    POCT pCO2, Arterial 63 (H) 38 - 42 mm Hg    POCT pO2, Arterial 144 (H) 85 - 95 mm Hg    POCT SO2, Arterial 99 94 - 100 %    POCT Oxy Hemoglobin, Arterial 97.5 94.0 - 98.0 %    POCT Hematocrit Calculated, Arterial 28.0 (L) 36.0 - 46.0 %    POCT Sodium, Arterial 131 (L) 136 - 145 mmol/L    POCT Potassium, Arterial 5.3 3.5 - 5.3 mmol/L    POCT Chloride, Arterial 103 98 - 107 mmol/L    POCT Ionized Calcium, Arterial 1.18 1.10 - 1.33 mmol/L    POCT Glucose, Arterial 286 (H) 74 - 99 mg/dL    POCT Lactate, Arterial 1.0 0.4 - 2.0 mmol/L    POCT Base Excess, Arterial -3.8 (L) -2.0 - 3.0 mmol/L    POCT HCO3 Calculated, Arterial 24.6 22.0 - 26.0 mmol/L    POCT Hemoglobin, Arterial 9.4 (L) 12.0 - 16.0 g/dL    POCT Anion Gap, Arterial 9  (L) 10 - 25 mmo/L    Patient Temperature 37.0 degrees Celsius    FiO2 32 %    Critical Called By JOSE HEADLEY     Critical Called To AZIZA     Critical Call Time 2107     Critical Read Back Y     Critical Note CRITICAL     Site of Arterial Puncture Radial Right     Wes's Test Positive    POCT GLUCOSE   Result Value Ref Range    POCT Glucose 252 (H) 74 - 99 mg/dL   CBC and Auto Differential   Result Value Ref Range    WBC 7.0 4.4 - 11.3 x10*3/uL    nRBC 0.0 0.0 - 0.0 /100 WBCs    RBC 3.83 (L) 4.00 - 5.20 x10*6/uL    Hemoglobin 8.8 (L) 12.0 - 16.0 g/dL    Hematocrit 29.6 (L) 36.0 - 46.0 %    MCV 77 (L) 80 - 100 fL    MCH 23.0 (L) 26.0 - 34.0 pg    MCHC 29.7 (L) 32.0 - 36.0 g/dL    RDW 19.9 (H) 11.5 - 14.5 %    Platelets 150 150 - 450 x10*3/uL    Neutrophils % 78.0 40.0 - 80.0 %    Immature Granulocytes %, Automated 0.6 0.0 - 0.9 %    Lymphocytes % 11.6 13.0 - 44.0 %    Monocytes % 9.0 2.0 - 10.0 %    Eosinophils % 0.7 0.0 - 6.0 %    Basophils % 0.1 0.0 - 2.0 %    Neutrophils Absolute 5.47 1.60 - 5.50 x10*3/uL    Immature Granulocytes Absolute, Automated 0.04 0.00 - 0.50 x10*3/uL    Lymphocytes Absolute 0.81 0.80 - 3.00 x10*3/uL    Monocytes Absolute 0.63 0.05 - 0.80 x10*3/uL    Eosinophils Absolute 0.05 0.00 - 0.40 x10*3/uL    Basophils Absolute 0.01 0.00 - 0.10 x10*3/uL   Renal function panel   Result Value Ref Range    Glucose 174 (H) 74 - 99 mg/dL    Sodium 136 136 - 145 mmol/L    Potassium 5.1 3.5 - 5.3 mmol/L    Chloride 104 98 - 107 mmol/L    Bicarbonate 25 21 - 32 mmol/L    Anion Gap 12 10 - 20 mmol/L    Urea Nitrogen 121 (HH) 6 - 23 mg/dL    Creatinine 3.82 (H) 0.50 - 1.05 mg/dL    eGFR 12 (L) >60 mL/min/1.73m*2    Calcium 8.1 (L) 8.6 - 10.3 mg/dL    Phosphorus 4.5 2.5 - 4.9 mg/dL    Albumin 2.9 (L) 3.4 - 5.0 g/dL   POCT GLUCOSE   Result Value Ref Range    POCT Glucose 150 (H) 74 - 99 mg/dL   POCT GLUCOSE   Result Value Ref Range    POCT Glucose 167 (H) 74 - 99 mg/dL   Blood Gas Arterial   Result  Value Ref Range    POCT pH, Arterial 7.22 (LL) 7.38 - 7.42 pH    POCT pCO2, Arterial 59 (H) 38 - 42 mm Hg    POCT pO2, Arterial 118 (H) 85 - 95 mm Hg    POCT SO2, Arterial 100 94 - 100 %    POCT Oxy Hemoglobin, Arterial 97.6 94.0 - 98.0 %    POCT Base Excess, Arterial -4.5 (L) -2.0 - 3.0 mmol/L    POCT HCO3 Calculated, Arterial 24.1 22.0 - 26.0 mmol/L    Patient Temperature 37.0 degrees Celsius    FiO2 28 %    Apparatus CANNULA     Critical Called By ALFIE HILL RRT     Critical Called To DR.MUSTAFA SEGOVIA     Critical Call Time 1348     Critical Read Back Y     Site of Arterial Puncture Brachial Right     Wes's Test Negative    Blood Gas Arterial Full Panel   Result Value Ref Range    POCT pH, Arterial 7.22 (LL) 7.38 - 7.42 pH    POCT pCO2, Arterial 60 (H) 38 - 42 mm Hg    POCT pO2, Arterial 108 (H) 85 - 95 mm Hg    POCT SO2, Arterial 100 94 - 100 %    POCT Oxy Hemoglobin, Arterial 97.8 94.0 - 98.0 %    POCT Hematocrit Calculated, Arterial 28.0 (L) 36.0 - 46.0 %    POCT Sodium, Arterial 132 (L) 136 - 145 mmol/L    POCT Potassium, Arterial 5.0 3.5 - 5.3 mmol/L    POCT Chloride, Arterial 101 98 - 107 mmol/L    POCT Ionized Calcium, Arterial 1.18 1.10 - 1.33 mmol/L    POCT Glucose, Arterial 194 (H) 74 - 99 mg/dL    POCT Lactate, Arterial 0.7 0.4 - 2.0 mmol/L    POCT Base Excess, Arterial -3.5 (L) -2.0 - 3.0 mmol/L    POCT HCO3 Calculated, Arterial 24.6 22.0 - 26.0 mmol/L    POCT Hemoglobin, Arterial 9.3 (L) 12.0 - 16.0 g/dL    POCT Anion Gap, Arterial 11 10 - 25 mmo/L    Patient Temperature 37.0 degrees Celsius    FiO2 30 %    Apparatus      Ipap CMH2O 15.0 cm H2O    Epap CMH2O 5.0 cm H2O    Critical Called By ALFIE HILL RRT     Critical Called To      Critical Call Time 1625     Critical Read Back Y     Site of Arterial Puncture Radial Right     Wes's Test Positive    POCT GLUCOSE   Result Value Ref Range    POCT Glucose 218 (H) 74 - 99 mg/dL   Blood Gas Arterial Full Panel   Result Value Ref  Range    POCT pH, Arterial 7.25 (LL) 7.38 - 7.42 pH    POCT pCO2, Arterial 55 (H) 38 - 42 mm Hg    POCT pO2, Arterial 104 (H) 85 - 95 mm Hg    POCT SO2, Arterial 100 94 - 100 %    POCT Oxy Hemoglobin, Arterial 97.9 94.0 - 98.0 %    POCT Hematocrit Calculated, Arterial 26.0 (L) 36.0 - 46.0 %    POCT Sodium, Arterial 132 (L) 136 - 145 mmol/L    POCT Potassium, Arterial 5.3 3.5 - 5.3 mmol/L    POCT Chloride, Arterial 102 98 - 107 mmol/L    POCT Ionized Calcium, Arterial 1.20 1.10 - 1.33 mmol/L    POCT Glucose, Arterial 193 (H) 74 - 99 mg/dL    POCT Lactate, Arterial 0.6 0.4 - 2.0 mmol/L    POCT Base Excess, Arterial -3.3 (L) -2.0 - 3.0 mmol/L    POCT HCO3 Calculated, Arterial 24.1 22.0 - 26.0 mmol/L    POCT Hemoglobin, Arterial 8.8 (L) 12.0 - 16.0 g/dL    POCT Anion Gap, Arterial 11 10 - 25 mmo/L    Patient Temperature 37.0 degrees Celsius    FiO2 28 %    Ventilator Rate 14 bpm    Tidal Volume 500 mL    Epap CMH2O 10.0 cm H2O    Critical Called By MARITA GIBSON RRT     Critical Called To TONY JOHNSON PA-C     Critical Call Time 1949     Critical Read Back Y     Site of Arterial Puncture Radial Right     Wes's Test Positive    POCT GLUCOSE   Result Value Ref Range    POCT Glucose 153 (H) 74 - 99 mg/dL   Renal function panel   Result Value Ref Range    Glucose 138 (H) 74 - 99 mg/dL    Sodium 133 (L) 136 - 145 mmol/L    Potassium 5.0 3.5 - 5.3 mmol/L    Chloride 102 98 - 107 mmol/L    Bicarbonate 23 21 - 32 mmol/L    Anion Gap 13 10 - 20 mmol/L    Urea Nitrogen 125 (HH) 6 - 23 mg/dL    Creatinine 4.12 (H) 0.50 - 1.05 mg/dL    eGFR 11 (L) >60 mL/min/1.73m*2    Calcium 8.1 (L) 8.6 - 10.3 mg/dL    Phosphorus 4.1 2.5 - 4.9 mg/dL    Albumin 3.0 (L) 3.4 - 5.0 g/dL   CBC and Auto Differential   Result Value Ref Range    WBC 5.6 4.4 - 11.3 x10*3/uL    nRBC 0.0 0.0 - 0.0 /100 WBCs    RBC 4.04 4.00 - 5.20 x10*6/uL    Hemoglobin 9.2 (L) 12.0 - 16.0 g/dL    Hematocrit 32.7 (L) 36.0 - 46.0 %    MCV 81 80 - 100 fL    MCH 22.8 (L) 26.0 -  34.0 pg    MCHC 28.1 (L) 32.0 - 36.0 g/dL    RDW 20.2 (H) 11.5 - 14.5 %    Platelets 161 150 - 450 x10*3/uL    Neutrophils % 74.9 40.0 - 80.0 %    Immature Granulocytes %, Automated 0.7 0.0 - 0.9 %    Lymphocytes % 13.8 13.0 - 44.0 %    Monocytes % 7.5 2.0 - 10.0 %    Eosinophils % 2.9 0.0 - 6.0 %    Basophils % 0.2 0.0 - 2.0 %    Neutrophils Absolute 4.19 1.60 - 5.50 x10*3/uL    Immature Granulocytes Absolute, Automated 0.04 0.00 - 0.50 x10*3/uL    Lymphocytes Absolute 0.77 (L) 0.80 - 3.00 x10*3/uL    Monocytes Absolute 0.42 0.05 - 0.80 x10*3/uL    Eosinophils Absolute 0.16 0.00 - 0.40 x10*3/uL    Basophils Absolute 0.01 0.00 - 0.10 x10*3/uL   Morphology   Result Value Ref Range    RBC Morphology See Below     Polychromasia Mild     RBC Fragments Few     Target Cells Few     Ovalocytes Few     [place] Cells Few    Blood Gas Arterial Full Panel   Result Value Ref Range    POCT pH, Arterial 7.29 (L) 7.38 - 7.42 pH    POCT pCO2, Arterial 48 (H) 38 - 42 mm Hg    POCT pO2, Arterial 149 (H) 85 - 95 mm Hg    POCT SO2, Arterial 100 94 - 100 %    POCT Oxy Hemoglobin, Arterial 98.2 (H) 94.0 - 98.0 %    POCT Hematocrit Calculated, Arterial 26.0 (L) 36.0 - 46.0 %    POCT Sodium, Arterial 133 (L) 136 - 145 mmol/L    POCT Potassium, Arterial 5.3 3.5 - 5.3 mmol/L    POCT Chloride, Arterial 103 98 - 107 mmol/L    POCT Ionized Calcium, Arterial 1.17 1.10 - 1.33 mmol/L    POCT Glucose, Arterial 130 (H) 74 - 99 mg/dL    POCT Lactate, Arterial 0.4 0.4 - 2.0 mmol/L    POCT Base Excess, Arterial -3.4 (L) -2.0 - 3.0 mmol/L    POCT HCO3 Calculated, Arterial 23.1 22.0 - 26.0 mmol/L    POCT Hemoglobin, Arterial 8.7 (L) 12.0 - 16.0 g/dL    POCT Anion Gap, Arterial 12 10 - 25 mmo/L    Patient Temperature 37.0 degrees Celsius    FiO2 28 %    Apparatus      Ventilator Rate 16 bpm    Tidal Volume 500 mL    Ipap CMH2O      Epap CMH2O 10.0 cm H2O    Site of Arterial Puncture Radial Right     Wes's Test Positive    POCT GLUCOSE   Result Value Ref  Range    POCT Glucose 120 (H) 74 - 99 mg/dL   Ammonia   Result Value Ref Range    Ammonia 30 16 - 53 umol/L   POCT GLUCOSE   Result Value Ref Range    POCT Glucose 136 (H) 74 - 99 mg/dL   POCT GLUCOSE   Result Value Ref Range    POCT Glucose 151 (H) 74 - 99 mg/dL   POCT GLUCOSE   Result Value Ref Range    POCT Glucose 170 (H) 74 - 99 mg/dL   POCT GLUCOSE   Result Value Ref Range    POCT Glucose 139 (H) 74 - 99 mg/dL   CBC and Auto Differential   Result Value Ref Range    WBC 5.9 4.4 - 11.3 x10*3/uL    nRBC 0.0 0.0 - 0.0 /100 WBCs    RBC 3.51 (L) 4.00 - 5.20 x10*6/uL    Hemoglobin 8.1 (L) 12.0 - 16.0 g/dL    Hematocrit 27.0 (L) 36.0 - 46.0 %    MCV 77 (L) 80 - 100 fL    MCH 23.1 (L) 26.0 - 34.0 pg    MCHC 30.0 (L) 32.0 - 36.0 g/dL    RDW 19.9 (H) 11.5 - 14.5 %    Platelets 146 (L) 150 - 450 x10*3/uL    Neutrophils % 76.8 40.0 - 80.0 %    Immature Granulocytes %, Automated 0.3 0.0 - 0.9 %    Lymphocytes % 12.6 13.0 - 44.0 %    Monocytes % 8.0 2.0 - 10.0 %    Eosinophils % 2.1 0.0 - 6.0 %    Basophils % 0.2 0.0 - 2.0 %    Neutrophils Absolute 4.49 1.60 - 5.50 x10*3/uL    Immature Granulocytes Absolute, Automated 0.02 0.00 - 0.50 x10*3/uL    Lymphocytes Absolute 0.74 (L) 0.80 - 3.00 x10*3/uL    Monocytes Absolute 0.47 0.05 - 0.80 x10*3/uL    Eosinophils Absolute 0.12 0.00 - 0.40 x10*3/uL    Basophils Absolute 0.01 0.00 - 0.10 x10*3/uL   Basic Metabolic Panel   Result Value Ref Range    Glucose 132 (H) 74 - 99 mg/dL    Sodium 136 136 - 145 mmol/L    Potassium 5.3 3.5 - 5.3 mmol/L    Chloride 104 98 - 107 mmol/L    Bicarbonate 25 21 - 32 mmol/L    Anion Gap 12 10 - 20 mmol/L    Urea Nitrogen 127 (HH) 6 - 23 mg/dL    Creatinine 3.97 (H) 0.50 - 1.05 mg/dL    eGFR 11 (L) >60 mL/min/1.73m*2    Calcium 7.9 (L) 8.6 - 10.3 mg/dL   Blood Gas Arterial Full Panel   Result Value Ref Range    POCT pH, Arterial 7.38 7.38 - 7.42 pH    POCT pCO2, Arterial 43 (H) 38 - 42 mm Hg    POCT pO2, Arterial 118 (H) 85 - 95 mm Hg    POCT SO2,  Arterial 100 94 - 100 %    POCT Oxy Hemoglobin, Arterial 97.4 94.0 - 98.0 %    POCT Hematocrit Calculated, Arterial 25.0 (L) 36.0 - 46.0 %    POCT Sodium, Arterial 133 (L) 136 - 145 mmol/L    POCT Potassium, Arterial 5.4 (H) 3.5 - 5.3 mmol/L    POCT Chloride, Arterial 103 98 - 107 mmol/L    POCT Ionized Calcium, Arterial 1.15 1.10 - 1.33 mmol/L    POCT Glucose, Arterial 132 (H) 74 - 99 mg/dL    POCT Lactate, Arterial 0.3 (L) 0.4 - 2.0 mmol/L    POCT Base Excess, Arterial 0.2 -2.0 - 3.0 mmol/L    POCT HCO3 Calculated, Arterial 25.4 22.0 - 26.0 mmol/L    POCT Hemoglobin, Arterial 8.2 (L) 12.0 - 16.0 g/dL    POCT Anion Gap, Arterial 10 10 - 25 mmo/L    Patient Temperature 37.0 degrees Celsius    FiO2 28 %    Apparatus FACE MASK     Ventilator Mode      Ventilator Rate 18 bpm    Tidal Volume 500 mL    Ipap CMH2O 25.0 cm H2O    Epap CMH2O 10.0 cm H2O    Site of Arterial Puncture Radial Left     Wes's Test Positive    POCT GLUCOSE   Result Value Ref Range    POCT Glucose 130 (H) 74 - 99 mg/dL       Assessment/Plan   Acute kidney injury superimposed on chronic kidney disease stage 3 creatinine is trending down however her BUN is still elevated still do not see any immediate indication for renal replacement therapy we will continue to monitor very closely  Congestive heart failure continue diuresis as needed  Chronic kidney disease stage IIIb   Acute respiratory failure is improving  Diabetes mellitus type 2  Anemia of chronic kidney disease  Hypertension  Hyperlipidemia  Obesity    Sixto Slater MD

## 2025-04-09 NOTE — PROGRESS NOTES
Occupational Therapy    OT Treatment    Patient Name: Analia Murray  MRN: [id number]  Department: [facility] S ICU  Room: 10/10-A  Today's Date: 4/9/2025  Time Calculation  Start Time: 1312  Stop Time: 1352  Time Calculation (min): 40 min        Assessment:  OT Assessment: Pt tolerated session fairly, progressing toward POC, required significant encouragement at start of session. Therapeutic use of self utilized throughout tx session.Pt would benefit from continued skilled OT services to improve strength, balance, and activity tolerance to increase independence with ADLs and task efficiency  Barriers to Discharge Home: Physical needs, Caregiver assistance  End of Session Communication: Bedside nurse  End of Session Patient Position: Bed, 3 rail up, Alarm on  OT Assessment Results: Decreased ADL status, Decreased upper extremity range of motion, Decreased upper extremity strength, Decreased endurance, Decreased functional mobility, Decreased IADLs  Plan:  Treatment Interventions: ADL retraining, Functional transfer training, UE strengthening/ROM, Endurance training, Patient/family training, Equipment evaluation/education, Compensatory technique education  OT Frequency: 4 times per week  OT Discharge Recommendations: Moderate intensity level of continued care  Equipment Recommended upon Discharge: Wheeled walker  OT Recommended Transfer Status: Maximum assist, Assist of 2  OT - OK to Discharge: Yes  Treatment Interventions: ADL retraining, Functional transfer training, UE strengthening/ROM, Endurance training, Patient/family training, Equipment evaluation/education, Compensatory technique education    Subjective   Previous Visit Info:  OT Last Visit  OT Received On: 04/09/25  General:  General  Reason for Referral: impaired ADLs, acute respiratory failure  Prior to Session Communication: Bedside nurse  Patient Position Received: Bed, 3 rail up, Alarm on  General Comment: Pt cleared for therapy by RN. Pt found supine in  bed upon arrival and agreeable to tx. Needed significant encouragement with call made to son at start of session, family present in room.  Precautions:  Hearing/Visual Limitations: reading glasses, hearing WFL  Medical Precautions: Fall precautions, Oxygen therapy device and L/min  Precautions Comment: 2L O2 NC     Date/Time Vitals Session Patient Position Pulse Resp SpO2 BP MAP (mmHg)    04/09/25 1300 --  --  79  21  96 %  123/70  83                 Pain:  Pain Assessment  Pain Assessment: 0-10  0-10 (Numeric) Pain Score: 10 - Worst possible pain  Pain Type: Acute pain  Pain Location: Back  Pain Orientation: Lower  Pain Interventions: Repositioned (RN aware and present.)  Response to Interventions: Resting quietly    Objective    Cognition:  Cognition  Orientation Level: Oriented X4  Cognition Comments: improved overall cognitive status with pt becoming very emotional this date, mod insight to deficits.  Insight: Moderate  Impulsive: Mildly  Processing Speed: Delayed  Coordination:  Movements are Fluid and Coordinated: No  Upper Body Coordination: increased time and effort required for the completion of movements.       Bed Mobility/Transfers: Bed Mobility  Bed Mobility: Yes  Bed Mobility 1  Bed Mobility 1: Supine to sitting, Sitting to supine  Level of Assistance 1: Maximum assistance  Bed Mobility Comments 1: max A x2 for trunk elevation, BLE advancement on/off EOB, and boosting to HOB with use of draw sheet, cues provided for pt to pull up from S/ARLEEN for UE support and for use of bed rail.  Bed Mobility 2  Bed Mobility  2: Rolling right, Rolling left  Level of Assistance 2: Maximum assistance, +2  Bed Mobility Comments 2: max A x2 to roll L/R for linen adjustment and pericare hygiene, cues provided for use of bed rail for UE support.    Transfers  Transfer: Yes  Transfer 1  Technique 1: Sit to stand, Stand to sit  Transfer Level of Assistance 1: Maximum assistance, +2, Arm in arm assistance  Trials/Comments 1:  max A x2  with arm in arm for trunk elevation and forward weight shifting with cues for proper hand placement and postural alignment when standing with increased time and effort to demonstrate errect posture., pt able to stand x2 trials for 20 seconds, demonstrated poor balance with repetitive cues for encouragement with pt demonstrating significant fatigue, requiring increased time for rest break b/n trials.    Standing Balance:  Static Standing Balance  Static Standing-Balance Support: Bilateral upper extremity supported  Static Standing-Level of Assistance: Maximum assistance (max A x2)  Static Standing-Comment/Number of Minutes: demonstrating poor balance during transfer trials requiring repetitive cues for postural alignment.       Therapy/Activity: Therapeutic Activity  Therapeutic Activity Performed: Yes  Therapeutic Activity 1: pt tolerated sitting at EOB around 10 min, becoming emotional d/t functional status with significant encouragement and education provided with JUANY BARILLAS/L, RN utilizing therapeutic use of self to comfort pt.      Outcome Measures:[facility] Daily Activity  Putting on and taking off regular lower body clothing: Total  Bathing (including washing, rinsing, drying): A lot  Putting on and taking off regular upper body clothing: A lot  Toileting, which includes using toilet, bedpan or urinal: Total  Taking care of personal grooming such as brushing teeth: A lot  Eating Meals: A little  Daily Activity - Total Score: 11    Education Documentation  ADL Training, taught by CODI Garcia at 4/9/2025  2:47 PM.  Learner: Patient  Readiness: Acceptance  Method: Explanation  Response: Verbalizes Understanding, Needs Reinforcement  Comment: educated on imporatance of engagement in therapy.    Education Comments  No comments found.      Problem: OT Goals  Goal: ADLs  Description: Patient will complete ADLs with minimal assistance.   Outcome: Progressing  Goal: Functional Transfer  Description:  Patient will complete functional transfer with min assistance utilizing least restrictive device.   Outcome: Progressing  Goal: Therapeutic Exercise   Description: Patient will tolerate ~8-10 minutes of therapeutic exercise to increase aerobic capacity and improve activity tolerance.  Outcome: Progressing  Treatment & Documentation completed by Jerica BARILLAS under the direct supervision of Bertha HARRINGTON/WESLEY

## 2025-04-09 NOTE — CARE PLAN
Problem: Respiratory  Goal: Tolerate mechanical ventilation evidenced by VS/agitation level this shift  Outcome: Progressing  Goal: Wean oxygen to maintain O2 saturation per order/standard this shift  Outcome: Progressing  Goal: Clear secretions with interventions this shift  Outcome: Progressing  Goal: Minimize anxiety/maximize coping throughout shift  Outcome: Progressing  Goal: Minimal/no exertional discomfort or dyspnea this shift  Outcome: Progressing  Goal: Verbalize decreased shortness of breath this shift  Outcome: Progressing

## 2025-04-09 NOTE — CARE PLAN
The patient's goals for the shift include monitor oxygenation    The clinical goals for the shift include Patient will remain hemodynamically stable    Over the shift, the patient did make progress toward the following goals.       Problem: Knowledge Deficit  Goal: Patient/family/caregiver demonstrates understanding of disease process, treatment plan, medications, and discharge instructions  Outcome: Progressing     Problem: Mechanical Ventilation  Goal: Patient Will Maintain Patent Airway  Outcome: Progressing  Goal: Oral health is maintained or improved  Outcome: Progressing  Goal: Tracheostomy will be managed safely  Outcome: Progressing  Goal: ET tube will be managed safely  Outcome: Progressing  Goal: Ability to express needs and understand communication  Outcome: Progressing  Goal: Mobility/activity is maintained at optimum level for patient  Outcome: Progressing     Problem: Skin  Goal: Decreased wound size/increased tissue granulation at next dressing change  Outcome: Progressing  Flowsheets (Taken 4/9/2025 0420 by Mi Collier, RN)  Decreased wound size/increased tissue granulation at next dressing change: Promote sleep for wound healing  Goal: Participates in plan/prevention/treatment measures  Outcome: Progressing  Flowsheets (Taken 4/9/2025 0420 by Mi Collier, RN)  Participates in plan/prevention/treatment measures:   Discuss with provider PT/OT consult   Elevate heels  Goal: Prevent/manage excess moisture  Outcome: Progressing  Flowsheets (Taken 4/9/2025 0420 by Mi Collier, RN)  Prevent/manage excess moisture:   Cleanse incontinence/protect with barrier cream   Moisturize dry skin   Use wicking fabric (obtain order)  Goal: Prevent/minimize sheer/friction injuries  Outcome: Progressing  Flowsheets (Taken 4/9/2025 0420 by Mi Collier, RN)  Prevent/minimize sheer/friction injuries:   Complete micro-shifts as needed if patient unable. Adjust patient position to relieve pressure points, not a full  turn   HOB 30 degrees or less   Increase activity/out of bed for meals   Turn/reposition every 2 hours/use positioning/transfer devices   Use pull sheet   Utilize specialty bed per algorithm  Goal: Promote/optimize nutrition  Outcome: Progressing  Flowsheets (Taken 4/9/2025 0420 by Mi Collier, RN)  Promote/optimize nutrition:   Offer water/supplements/favorite foods   Consume > 50% meals/supplements  Goal: Promote skin healing  Outcome: Progressing  Flowsheets (Taken 4/9/2025 0420 by Mi Collier RN)  Promote skin healing: Assess skin/pad under line(s)/device(s)     Problem: Fall/Injury  Goal: Not fall by end of shift  Outcome: Progressing  Goal: Be free from injury by end of the shift  Outcome: Progressing  Goal: Verbalize understanding of personal risk factors for fall in the hospital  Outcome: Progressing  Goal: Verbalize understanding of risk factor reduction measures to prevent injury from fall in the home  Outcome: Progressing  Goal: Use assistive devices by end of the shift  Outcome: Progressing  Goal: Pace activities to prevent fatigue by end of the shift  Outcome: Progressing     Problem: Respiratory  Goal: Tolerate mechanical ventilation evidenced by VS/agitation level this shift  Outcome: Progressing  Goal: Wean oxygen to maintain O2 saturation per order/standard this shift  Outcome: Progressing  Goal: Clear secretions with interventions this shift  Outcome: Progressing  Goal: Minimize anxiety/maximize coping throughout shift  Outcome: Progressing  Goal: Minimal/no exertional discomfort or dyspnea this shift  Outcome: Progressing  Goal: Verbalize decreased shortness of breath this shift  Outcome: Progressing     Problem: Safety - Adult  Goal: Free from fall injury  Outcome: Progressing     Problem: Discharge Planning  Goal: Discharge to home or other facility with appropriate resources  Outcome: Progressing     Problem: Chronic Conditions and Co-morbidities  Goal: Patient's chronic conditions and  co-morbidity symptoms are monitored and maintained or improved  Outcome: Progressing     Problem: Nutrition  Goal: Nutrient intake appropriate for maintaining nutritional needs  Outcome: Progressing     Problem: Diabetes  Goal: Achieve decreasing blood glucose levels by end of shift  Outcome: Progressing  Goal: Increase stability of blood glucose readings by end of shift  Outcome: Progressing  Goal: Decrease in ketones present in urine by end of shift  Outcome: Progressing  Goal: Maintain electrolyte levels within acceptable range throughout shift  Outcome: Progressing  Goal: Maintain glucose levels >70mg/dl to <250mg/dl throughout shift  Outcome: Progressing  Goal: No changes in neurological exam by end of shift  Outcome: Progressing  Goal: Learn about and adhere to nutrition recommendations by end of shift  Outcome: Progressing  Goal: Vital signs within normal range for age by end of shift  Outcome: Progressing  Goal: Increase self care and/or family involovement by end of shift  Outcome: Progressing  Goal: Receive DSME education by end of shift  Outcome: Progressing

## 2025-04-09 NOTE — PROGRESS NOTES
I have seen the patient either independently or with an associated resident physician or advanced practice provider.    Interval Events: Patient's wakefulness improved over last 24 hours. pH and pCo2 improved on most recent ABG. Otherwise no acute events reported.    Physical Exam  Constitutional:       General: She is not in acute distress.     Appearance: She is obese.   HENT:      Mouth/Throat:      Mouth: Mucous membranes are moist.   Cardiovascular:      Rate and Rhythm: Normal rate and regular rhythm.   Pulmonary:      Effort: No respiratory distress.      Breath sounds: No wheezing.   Abdominal:      Palpations: Abdomen is soft.   Musculoskeletal:      Right lower leg: Edema present.      Left lower leg: Edema present.   Skin:     General: Skin is warm and dry.   Neurological:      Mental Status: She is alert.      Comments: Somnolent but rouses to voice. Alert and oriented to person, place, and time.         Neuro: Acute metabolic encephalopathy likely secondary to hypercapnia. Hx of anxiety, depression, lumbar radiculopathy. Suspect somnolence may be complicated by recent addition of mirtazapine which is now discontinued. Continue home sertraline. Hold gabapentin. Hold any potentially sedating medications.   Cardiac: Acute exacerbation of CHF. Hx of HTN, HLD. Continue home hydralazine, aspirin, ezetimibe, pravastatin.   Pulmonary: Acute respiratory failure with hypercapnia. Acute exacerbation of COPD. Hx of HOLDEN. Continue AVAPS 25 max/15 min, EPAP 10, ins time 10, rate 18. Continue duoneb every 8 hours daily. Will monitor response to reduced sedatives prior to reinitiating steroid course. Continue singulair, breo ellipta.   Gastrointestinal: Renal diet.   Renal: Acute on chronic kidney disease. Uremia. Nephro on consult. If trending positive fluid balance today, will continue with diuresis.  Endocrine: Hx of HYPO, DM. Continue synthroid. ISS in place   Hematology: Hx of MGUS. Heparin for DVT proph.    Infectious Disease: No acute issues.  Musculoskeletal: PT/OT on consult. Standing, walking a few steps today.    Lines/Tubes/Drains: PIV  Prophylaxis: Heparin, SCDs    Disposition: ICU    ABCDEF Checklist  Analgesia: Spontaneous awakening trial to be pursued if clinically appropriate. RASS goal reviewed if applicable.  Breathing: Spontaneous breathing trial to be pursued if clinically appropriate. Mechanical power of assisted ventilation reviewed if applicable.  Choice of analgesia/sedation: Analgesic and sedative agents adjusted per clinical context.   Delirium assessed by CAM, will avoid exacerbating factors   Early mobility and exercise: Physical and occupational therapy engaged   Family: Plan of care, overall trajectory of patient shared with family. Questions elicited and answered as appropriate.       Due to the high probability of life threatening clinical decompensation, the patient required critical care time evaluating and managing this patient.  Critical care time included obtaining a history, examining the patient, ordering and reviewing studies, discussing, developing, and implementing a management plan, evaluating the patient's response to treatment, and discussion with other care team providers. I saw and evaluated the patient myself.  Critical care time was performed exclusive of billable procedures.    Critical care time: 35 minutes

## 2025-04-10 LAB
ALBUMIN SERPL BCP-MCNC: 2.9 G/DL (ref 3.4–5)
ALBUMIN SERPL BCP-MCNC: 3 G/DL (ref 3.4–5)
ALP SERPL-CCNC: 66 U/L (ref 33–136)
ALT SERPL W P-5'-P-CCNC: 9 U/L (ref 7–45)
ANION GAP SERPL CALCULATED.3IONS-SCNC: 13 MMOL/L (ref 10–20)
ANION GAP SERPL CALCULATED.3IONS-SCNC: 14 MMOL/L (ref 10–20)
AST SERPL W P-5'-P-CCNC: 9 U/L (ref 9–39)
BASOPHILS # BLD AUTO: 0.01 X10*3/UL (ref 0–0.1)
BASOPHILS NFR BLD AUTO: 0.2 %
BILIRUB SERPL-MCNC: 0.3 MG/DL (ref 0–1.2)
BUN SERPL-MCNC: 123 MG/DL (ref 6–23)
BUN SERPL-MCNC: 124 MG/DL (ref 6–23)
CALCIUM SERPL-MCNC: 8.1 MG/DL (ref 8.6–10.3)
CALCIUM SERPL-MCNC: 8.3 MG/DL (ref 8.6–10.3)
CHLORIDE SERPL-SCNC: 105 MMOL/L (ref 98–107)
CHLORIDE SERPL-SCNC: 105 MMOL/L (ref 98–107)
CO2 SERPL-SCNC: 24 MMOL/L (ref 21–32)
CO2 SERPL-SCNC: 26 MMOL/L (ref 21–32)
CREAT SERPL-MCNC: 3.79 MG/DL (ref 0.5–1.05)
CREAT SERPL-MCNC: 3.91 MG/DL (ref 0.5–1.05)
EGFRCR SERPLBLD CKD-EPI 2021: 11 ML/MIN/1.73M*2
EGFRCR SERPLBLD CKD-EPI 2021: 12 ML/MIN/1.73M*2
EOSINOPHIL # BLD AUTO: 0.11 X10*3/UL (ref 0–0.4)
EOSINOPHIL NFR BLD AUTO: 1.9 %
ERYTHROCYTE [DISTWIDTH] IN BLOOD BY AUTOMATED COUNT: 19.9 % (ref 11.5–14.5)
GLUCOSE BLD MANUAL STRIP-MCNC: 114 MG/DL (ref 74–99)
GLUCOSE BLD MANUAL STRIP-MCNC: 123 MG/DL (ref 74–99)
GLUCOSE BLD MANUAL STRIP-MCNC: 126 MG/DL (ref 74–99)
GLUCOSE BLD MANUAL STRIP-MCNC: 128 MG/DL (ref 74–99)
GLUCOSE BLD MANUAL STRIP-MCNC: 132 MG/DL (ref 74–99)
GLUCOSE BLD MANUAL STRIP-MCNC: 203 MG/DL (ref 74–99)
GLUCOSE SERPL-MCNC: 117 MG/DL (ref 74–99)
GLUCOSE SERPL-MCNC: 120 MG/DL (ref 74–99)
HCT VFR BLD AUTO: 27.6 % (ref 36–46)
HGB BLD-MCNC: 8.3 G/DL (ref 12–16)
HOLD SPECIMEN: NORMAL
IMM GRANULOCYTES # BLD AUTO: 0.01 X10*3/UL (ref 0–0.5)
IMM GRANULOCYTES NFR BLD AUTO: 0.2 % (ref 0–0.9)
LYMPHOCYTES # BLD AUTO: 0.94 X10*3/UL (ref 0.8–3)
LYMPHOCYTES NFR BLD AUTO: 16 %
MAGNESIUM SERPL-MCNC: 2.45 MG/DL (ref 1.6–2.4)
MCH RBC QN AUTO: 23.2 PG (ref 26–34)
MCHC RBC AUTO-ENTMCNC: 30.1 G/DL (ref 32–36)
MCV RBC AUTO: 77 FL (ref 80–100)
MONOCYTES # BLD AUTO: 0.62 X10*3/UL (ref 0.05–0.8)
MONOCYTES NFR BLD AUTO: 10.5 %
NEUTROPHILS # BLD AUTO: 4.2 X10*3/UL (ref 1.6–5.5)
NEUTROPHILS NFR BLD AUTO: 71.2 %
NRBC BLD-RTO: 0 /100 WBCS (ref 0–0)
PHOSPHATE SERPL-MCNC: 3.4 MG/DL (ref 2.5–4.9)
PHOSPHATE SERPL-MCNC: 3.4 MG/DL (ref 2.5–4.9)
PLATELET # BLD AUTO: 146 X10*3/UL (ref 150–450)
POTASSIUM SERPL-SCNC: 5.2 MMOL/L (ref 3.5–5.3)
POTASSIUM SERPL-SCNC: 5.2 MMOL/L (ref 3.5–5.3)
PROT SERPL-MCNC: 6.2 G/DL (ref 6.4–8.2)
RBC # BLD AUTO: 3.58 X10*6/UL (ref 4–5.2)
SODIUM SERPL-SCNC: 138 MMOL/L (ref 136–145)
SODIUM SERPL-SCNC: 139 MMOL/L (ref 136–145)
WBC # BLD AUTO: 5.9 X10*3/UL (ref 4.4–11.3)

## 2025-04-10 PROCEDURE — 82947 ASSAY GLUCOSE BLOOD QUANT: CPT

## 2025-04-10 PROCEDURE — 2500000001 HC RX 250 WO HCPCS SELF ADMINISTERED DRUGS (ALT 637 FOR MEDICARE OP): Performed by: INTERNAL MEDICINE

## 2025-04-10 PROCEDURE — 97530 THERAPEUTIC ACTIVITIES: CPT | Mod: GO,CO

## 2025-04-10 PROCEDURE — 83735 ASSAY OF MAGNESIUM: CPT

## 2025-04-10 PROCEDURE — 97530 THERAPEUTIC ACTIVITIES: CPT | Mod: GP,CQ

## 2025-04-10 PROCEDURE — 94640 AIRWAY INHALATION TREATMENT: CPT

## 2025-04-10 PROCEDURE — 84100 ASSAY OF PHOSPHORUS: CPT

## 2025-04-10 PROCEDURE — 85025 COMPLETE CBC W/AUTO DIFF WBC: CPT | Performed by: STUDENT IN AN ORGANIZED HEALTH CARE EDUCATION/TRAINING PROGRAM

## 2025-04-10 PROCEDURE — 2500000005 HC RX 250 GENERAL PHARMACY W/O HCPCS: Performed by: STUDENT IN AN ORGANIZED HEALTH CARE EDUCATION/TRAINING PROGRAM

## 2025-04-10 PROCEDURE — 2500000004 HC RX 250 GENERAL PHARMACY W/ HCPCS (ALT 636 FOR OP/ED): Performed by: INTERNAL MEDICINE

## 2025-04-10 PROCEDURE — 2500000001 HC RX 250 WO HCPCS SELF ADMINISTERED DRUGS (ALT 637 FOR MEDICARE OP): Performed by: STUDENT IN AN ORGANIZED HEALTH CARE EDUCATION/TRAINING PROGRAM

## 2025-04-10 PROCEDURE — 97110 THERAPEUTIC EXERCISES: CPT | Mod: GO,CO

## 2025-04-10 PROCEDURE — 94668 MNPJ CHEST WALL SBSQ: CPT

## 2025-04-10 PROCEDURE — 36415 COLL VENOUS BLD VENIPUNCTURE: CPT | Performed by: STUDENT IN AN ORGANIZED HEALTH CARE EDUCATION/TRAINING PROGRAM

## 2025-04-10 PROCEDURE — 97110 THERAPEUTIC EXERCISES: CPT | Mod: GP,CQ

## 2025-04-10 PROCEDURE — 2500000004 HC RX 250 GENERAL PHARMACY W/ HCPCS (ALT 636 FOR OP/ED): Performed by: STUDENT IN AN ORGANIZED HEALTH CARE EDUCATION/TRAINING PROGRAM

## 2025-04-10 PROCEDURE — 2500000002 HC RX 250 W HCPCS SELF ADMINISTERED DRUGS (ALT 637 FOR MEDICARE OP, ALT 636 FOR OP/ED): Performed by: STUDENT IN AN ORGANIZED HEALTH CARE EDUCATION/TRAINING PROGRAM

## 2025-04-10 PROCEDURE — 2060000001 HC INTERMEDIATE ICU ROOM DAILY

## 2025-04-10 PROCEDURE — 80053 COMPREHEN METABOLIC PANEL: CPT | Performed by: STUDENT IN AN ORGANIZED HEALTH CARE EDUCATION/TRAINING PROGRAM

## 2025-04-10 PROCEDURE — 2500000002 HC RX 250 W HCPCS SELF ADMINISTERED DRUGS (ALT 637 FOR MEDICARE OP, ALT 636 FOR OP/ED): Performed by: INTERNAL MEDICINE

## 2025-04-10 PROCEDURE — 99232 SBSQ HOSP IP/OBS MODERATE 35: CPT | Performed by: INTERNAL MEDICINE

## 2025-04-10 PROCEDURE — 99291 CRITICAL CARE FIRST HOUR: CPT | Performed by: STUDENT IN AN ORGANIZED HEALTH CARE EDUCATION/TRAINING PROGRAM

## 2025-04-10 PROCEDURE — 9420000001 HC RT PATIENT EDUCATION 5 MIN

## 2025-04-10 RX ORDER — POLYETHYLENE GLYCOL 3350 17 G/17G
17 POWDER, FOR SOLUTION ORAL DAILY PRN
Status: DISCONTINUED | OUTPATIENT
Start: 2025-04-10 | End: 2025-04-17 | Stop reason: HOSPADM

## 2025-04-10 RX ADMIN — METOPROLOL TARTRATE 50 MG: 50 TABLET, FILM COATED ORAL at 09:25

## 2025-04-10 RX ADMIN — ACETAMINOPHEN 650 MG: 325 TABLET ORAL at 09:26

## 2025-04-10 RX ADMIN — HYDRALAZINE HYDROCHLORIDE 50 MG: 50 TABLET ORAL at 09:25

## 2025-04-10 RX ADMIN — IPRATROPIUM BROMIDE AND ALBUTEROL SULFATE 3 ML: 2.5; .5 SOLUTION RESPIRATORY (INHALATION) at 11:37

## 2025-04-10 RX ADMIN — HEPARIN SODIUM 7500 UNITS: 5000 INJECTION INTRAVENOUS; SUBCUTANEOUS at 06:12

## 2025-04-10 RX ADMIN — PRAVASTATIN SODIUM 40 MG: 20 TABLET ORAL at 20:58

## 2025-04-10 RX ADMIN — HYDRALAZINE HYDROCHLORIDE 50 MG: 50 TABLET ORAL at 20:58

## 2025-04-10 RX ADMIN — METOPROLOL TARTRATE 50 MG: 50 TABLET, FILM COATED ORAL at 20:59

## 2025-04-10 RX ADMIN — ALLOPURINOL 50 MG: 100 TABLET ORAL at 09:25

## 2025-04-10 RX ADMIN — FLUTICASONE PROPIONATE 2 SPRAY: 50 SPRAY, METERED NASAL at 20:58

## 2025-04-10 RX ADMIN — LEVOTHYROXINE SODIUM 200 MCG: 0.1 TABLET ORAL at 06:11

## 2025-04-10 RX ADMIN — CALCIUM ACETATE 667 MG: 667 CAPSULE ORAL at 17:43

## 2025-04-10 RX ADMIN — CALCIUM ACETATE 667 MG: 667 CAPSULE ORAL at 13:03

## 2025-04-10 RX ADMIN — EZETIMIBE 10 MG: 10 TABLET ORAL at 09:25

## 2025-04-10 RX ADMIN — CALCIUM ACETATE 667 MG: 667 CAPSULE ORAL at 09:25

## 2025-04-10 RX ADMIN — NYSTATIN 1 APPLICATION: 100000 POWDER TOPICAL at 14:05

## 2025-04-10 RX ADMIN — SERTRALINE HYDROCHLORIDE 50 MG: 50 TABLET ORAL at 09:25

## 2025-04-10 RX ADMIN — HEPARIN SODIUM 7500 UNITS: 5000 INJECTION INTRAVENOUS; SUBCUTANEOUS at 13:01

## 2025-04-10 RX ADMIN — ASPIRIN 81 MG: 81 TABLET, CHEWABLE ORAL at 09:26

## 2025-04-10 RX ADMIN — NYSTATIN 1 APPLICATION: 100000 POWDER TOPICAL at 20:58

## 2025-04-10 RX ADMIN — HEPARIN SODIUM 7500 UNITS: 5000 INJECTION INTRAVENOUS; SUBCUTANEOUS at 20:57

## 2025-04-10 RX ADMIN — IPRATROPIUM BROMIDE AND ALBUTEROL SULFATE 3 ML: 2.5; .5 SOLUTION RESPIRATORY (INHALATION) at 19:27

## 2025-04-10 RX ADMIN — FLUTICASONE PROPIONATE 2 SPRAY: 50 SPRAY, METERED NASAL at 09:30

## 2025-04-10 RX ADMIN — INSULIN LISPRO 4 UNITS: 100 INJECTION, SOLUTION INTRAVENOUS; SUBCUTANEOUS at 12:45

## 2025-04-10 RX ADMIN — Medication 2 L/MIN: at 10:05

## 2025-04-10 RX ADMIN — Medication 2 L/MIN: at 06:38

## 2025-04-10 RX ADMIN — MONTELUKAST 10 MG: 10 TABLET, FILM COATED ORAL at 20:58

## 2025-04-10 RX ADMIN — NYSTATIN 1 APPLICATION: 100000 POWDER TOPICAL at 09:27

## 2025-04-10 RX ADMIN — IPRATROPIUM BROMIDE AND ALBUTEROL SULFATE 3 ML: 2.5; .5 SOLUTION RESPIRATORY (INHALATION) at 06:37

## 2025-04-10 ASSESSMENT — PAIN SCALES - GENERAL
PAINLEVEL_OUTOF10: 7
PAINLEVEL_OUTOF10: 9
PAINLEVEL_OUTOF10: 7
PAINLEVEL_OUTOF10: 0 - NO PAIN
PAINLEVEL_OUTOF10: 7
PAINLEVEL_OUTOF10: 10 - WORST POSSIBLE PAIN
PAINLEVEL_OUTOF10: 0 - NO PAIN
PAINLEVEL_OUTOF10: 0 - NO PAIN

## 2025-04-10 ASSESSMENT — COGNITIVE AND FUNCTIONAL STATUS - GENERAL
PERSONAL GROOMING: A LITTLE
HELP NEEDED FOR BATHING: A LOT
MOVING TO AND FROM BED TO CHAIR: TOTAL
MOVING FROM LYING ON BACK TO SITTING ON SIDE OF FLAT BED WITH BEDRAILS: A LOT
WALKING IN HOSPITAL ROOM: TOTAL
DRESSING REGULAR UPPER BODY CLOTHING: A LOT
TURNING FROM BACK TO SIDE WHILE IN FLAT BAD: A LOT
TOILETING: TOTAL
PERSONAL GROOMING: TOTAL
DRESSING REGULAR LOWER BODY CLOTHING: TOTAL
MOVING FROM LYING ON BACK TO SITTING ON SIDE OF FLAT BED WITH BEDRAILS: TOTAL
EATING MEALS: A LITTLE
HELP NEEDED FOR BATHING: A LOT
MOVING TO AND FROM BED TO CHAIR: A LOT
DAILY ACTIVITIY SCORE: 12
DRESSING REGULAR UPPER BODY CLOTHING: TOTAL
TOILETING: TOTAL
CLIMB 3 TO 5 STEPS WITH RAILING: TOTAL
DRESSING REGULAR UPPER BODY CLOTHING: A LOT
MOBILITY SCORE: 6
DAILY ACTIVITIY SCORE: 12
HELP NEEDED FOR BATHING: TOTAL
STANDING UP FROM CHAIR USING ARMS: A LOT
PERSONAL GROOMING: A LITTLE
EATING MEALS: A LITTLE
WALKING IN HOSPITAL ROOM: A LOT
TOILETING: TOTAL
CLIMB 3 TO 5 STEPS WITH RAILING: TOTAL
DRESSING REGULAR LOWER BODY CLOTHING: TOTAL
EATING MEALS: TOTAL
STANDING UP FROM CHAIR USING ARMS: TOTAL
TURNING FROM BACK TO SIDE WHILE IN FLAT BAD: TOTAL
DAILY ACTIVITIY SCORE: 6
MOBILITY SCORE: 11
DRESSING REGULAR LOWER BODY CLOTHING: TOTAL

## 2025-04-10 ASSESSMENT — PAIN - FUNCTIONAL ASSESSMENT
PAIN_FUNCTIONAL_ASSESSMENT: 0-10
PAIN_FUNCTIONAL_ASSESSMENT: FLACC (FACE, LEGS, ACTIVITY, CRY, CONSOLABILITY)
PAIN_FUNCTIONAL_ASSESSMENT: 0-10
PAIN_FUNCTIONAL_ASSESSMENT: CPOT (CRITICAL CARE PAIN OBSERVATION TOOL)
PAIN_FUNCTIONAL_ASSESSMENT: 0-10

## 2025-04-10 ASSESSMENT — PAIN DESCRIPTION - DESCRIPTORS
DESCRIPTORS: ACHING
DESCRIPTORS: ACHING

## 2025-04-10 ASSESSMENT — PAIN DESCRIPTION - LOCATION: LOCATION: BACK

## 2025-04-10 ASSESSMENT — PAIN DESCRIPTION - ORIENTATION: ORIENTATION: LOWER

## 2025-04-10 NOTE — PROGRESS NOTES
Occupational Therapy    OT Treatment    Patient Name: Analia Murray  MRN: [id number]  Department: [facility] 3 S ICU  Room: 10/10-A  Today's Date: 4/10/2025  Time Calculation  Start Time: 0847  Stop Time: 0916  Time Calculation (min): 29 min        Assessment:  OT Assessment: Pt tolerated session fairly, progressing toward POC, required significant encouragement at start of session. Therapeutic use of self utilized throughout tx session. Pt would benefit from continued skilled OT services to improve strength, balance, and activity tolerance to increase independence with ADLs and task efficiency  Barriers to Discharge Home: Physical needs, Caregiver assistance  End of Session Communication: Bedside nurse  End of Session Patient Position: Up in chair, Alarm on  OT Assessment Results: Decreased ADL status, Decreased upper extremity range of motion, Decreased upper extremity strength, Decreased endurance, Decreased functional mobility, Decreased IADLs  Plan:  Treatment Interventions: ADL retraining, Functional transfer training, UE strengthening/ROM, Endurance training, Patient/family training, Equipment evaluation/education, Compensatory technique education  OT Frequency: 4 times per week  OT Discharge Recommendations: Moderate intensity level of continued care  Equipment Recommended upon Discharge: Wheeled walker  OT Recommended Transfer Status: Maximum assist, Assist of 2  OT - OK to Discharge: Yes  Treatment Interventions: ADL retraining, Functional transfer training, UE strengthening/ROM, Endurance training, Patient/family training, Equipment evaluation/education, Compensatory technique education    Subjective   Previous Visit Info:  OT Last Visit  OT Received On: 04/10/25  General:  General  Reason for Referral: impaired ADLs, acute respiratory failure  Co-Treatment: PT  Co-Treatment Reason: to maximize participation, safety and mobility; pt is a 2 person assist requiring 2 skilled therapists to mobilize  Prior to  Session Communication: Bedside nurse  Patient Position Received: Bed, 3 rail up, Alarm on  General Comment: Pt cleared for therapy by RN. Pt found supine in bed upon arrival and agreeable to tx. Needed significant encouragement at start of session.  Precautions:  Hearing/Visual Limitations: reading glasses, hearing WFL  Medical Precautions: Fall precautions  Precautions Comment: 2L O2 NC     Date/Time Vitals Session Patient Position Pulse Resp SpO2 BP MAP (mmHg)    04/10/25 0850 --  --  68  18  100 %  --  --     04/10/25 0919 --  --  70  14  95 %  126/60  80                 Pain:  Pain Assessment  Pain Assessment: 0-10  0-10 (Numeric) Pain Score: 7  Pain Type: Acute pain  Pain Location: Back  Pain Orientation: Lower  Pain Interventions: Repositioned (RN made aware and present)  Response to Interventions: Resting quietly    Objective    Cognition:  Cognition  Orientation Level: Oriented X4  Cognition Comments: improved overall cognitive status with pt becoming very emotional this date, mod insight to deficits.  Insight: Mild  Impulsive: Mildly  Processing Speed: Delayed  Coordination:  Movements are Fluid and Coordinated: No  Upper Body Coordination: increased time and effort required for the completion of movements.  Bed Mobility/Transfers: Bed Mobility  Bed Mobility: Yes  Bed Mobility 1  Bed Mobility 1: Supine to sitting  Level of Assistance 1: Moderate assistance  Bed Mobility Comments 1: mod A x2 for trunk elevation, BLE advancement off EOB, with cues provided for use of bed rail for UE support. increased time and effort for task completion this date.    Transfers  Transfer: Yes  Transfer 1  Technique 1: Sit to stand, Stand to sit  Transfer Device 1: Walker  Transfer Level of Assistance 1: Maximum assistance, +2, Arm in arm assistance  Trials/Comments 1: max A x2 for trunk elevation and forward weight shifting with cues for proper hand placement and postural alignment when standing with increased time and effort  to demonstrate errect posture    Functional Mobility:  Functional Mobility  Functional Mobility Performed: Yes  Functional Mobility 1  Device 1: Rolling walker  Assistance 1: Maximum assistance (Max A x2)  Comments 1: pt tolerated taking 4-5 steps to chair with FWW, demonstrated fair- balance with cues provided for step sequencing. increased time and effort for task completion.    Therapy/Activity: Therapeutic Exercise  Therapeutic Exercise Performed: Yes  Therapeutic Exercise Activity 1: BUE exercises in shoulder flexion, chest presses and internal/external rotation 1x10 to improve strength and ROM to increase independence with functional activities and transfer tasks.    Therapeutic Activity  Therapeutic Activity Performed: Yes  Therapeutic Activity 1: pt tolerated sitting at EOB around 10 min, becoming emotional d/t functional status with significant encouragement and education provided with ERAN, HARRINGTON/L, PTA utilizing therapeutic use of self to comfort pt    Outcome Measures:[facility] Daily Activity  Putting on and taking off regular lower body clothing: Total  Bathing (including washing, rinsing, drying): A lot  Putting on and taking off regular upper body clothing: A lot  Toileting, which includes using toilet, bedpan or urinal: Total  Taking care of personal grooming such as brushing teeth: A little  Eating Meals: A little  Daily Activity - Total Score: 12    Education Documentation  ADL Training, taught by CODI Garcia at 4/10/2025 10:13 AM.  Learner: Patient  Readiness: Acceptance  Method: Explanation  Response: Verbalizes Understanding, Needs Reinforcement  Comment: educated on energy conservation for increased activityt tolerance.      Problem: OT Goals  Goal: ADLs  Description: Patient will complete ADLs with minimal assistance.   Outcome: Progressing  Goal: Functional Transfer  Description: Patient will complete functional transfer with min assistance utilizing least restrictive device.   Outcome:  Progressing  Goal: Therapeutic Exercise   Description: Patient will tolerate ~8-10 minutes of therapeutic exercise to increase aerobic capacity and improve activity tolerance.  Outcome: Progressing     Treatment & Documentation completed by Jerica BARILLAS under the direct supervision of Bertha BONDS

## 2025-04-10 NOTE — SIGNIFICANT EVENT
Patient coming out of the ICU discussed with ICU staff.  I see her, she is resting comfortably.  Given her prolonged hospitalization including two ICU transfers, I feel that palliative medicine should be involved at this point and I will discuss with  tomorrow, and then place consult.

## 2025-04-10 NOTE — PROGRESS NOTES
"Analia Murray is a 74 y.o. female on day 16 of admission presenting with Acute respiratory failure with hypoxia.    Subjective   The patient is seen for MARY ANN on CKD stage III/IV more awake and responsive she is up in chair feels better no shortness of breath       Objective     Physical Exam  Constitutional:       General: She is not in acute distress.     Appearance: Normal appearance. She is not toxic-appearing.   Neck:      Vascular: No carotid bruit.   Cardiovascular:      Heart sounds: No murmur heard.     No friction rub. No gallop.   Pulmonary:      Breath sounds: No wheezing, rhonchi or rales.   Abdominal:      Tenderness: There is no abdominal tenderness. There is no right CVA tenderness, left CVA tenderness or rebound.   Musculoskeletal:         General: No tenderness.      Cervical back: Neck supple.      Right lower leg: No edema (1+ edema).      Left lower leg: No edema (1+ edema).   Lymphadenopathy:      Cervical: No cervical adenopathy.         Last Recorded Vitals  Blood pressure 126/60, pulse 70, temperature 36.5 °C (97.7 °F), temperature source Temporal, resp. rate 14, height 1.626 m (5' 4.02\"), weight 142 kg (313 lb 4.4 oz), SpO2 95%.    Intake/Output last 3 Shifts:  I/O last 3 completed shifts:  In: 620 (4.4 mL/kg) [P.O.:600; I.V.:20 (0.1 mL/kg)]  Out: 1400 (9.9 mL/kg) [Urine:1400 (0.3 mL/kg/hr)]  Weight: 142.1 kg     Current Facility-Administered Medications:     acetaminophen (Tylenol) oral liquid 650 mg, 650 mg, oral, q4h PRN **OR** acetaminophen (Tylenol) tablet 650 mg, 650 mg, oral, q4h PRN, Roddy Galindo DO, 650 mg at 04/10/25 0926    albuterol 2.5 mg /3 mL (0.083 %) nebulizer solution 2.5 mg, 2.5 mg, nebulization, q2h PRN, Roddy Galindo DO    allopurinol (Zyloprim) tablet 50 mg, 50 mg, oral, Every other day, Roddy Galindo DO, 50 mg at 04/10/25 0925    [Held by provider] amLODIPine (Norvasc) tablet 10 mg, 10 mg, oral, Daily, Sanjiv Neil PA-C, 10 mg at 03/28/25 0959    " aspirin chewable tablet 81 mg, 81 mg, oral, Daily, Roddy Galindo DO, 81 mg at 04/10/25 0926    calcium acetate (Phoslo) capsule 667 mg, 667 mg, oral, TID after meals, Roddy Galindo DO, 667 mg at 04/10/25 0925    dextrose 50 % injection 12.5 g, 12.5 g, intravenous, q15 min PRN, Roddy Galindo DO    dextrose 50 % injection 25 g, 25 g, intravenous, q15 min PRN, Roddy Galindo DO    ezetimibe (Zetia) tablet 10 mg, 10 mg, oral, Daily, Roddy Galindo DO, 10 mg at 04/10/25 0925    fluticasone (Flonase) nasal spray 2 spray, 2 spray, Each Nostril, BID, Roddy Galindo DO, 2 spray at 04/10/25 0930    fluticasone furoate-vilanteroL (Breo Ellipta) 100-25 mcg/dose inhaler 1 puff, 1 puff, inhalation, Daily, Roddy Galindo DO    [Held by provider] gabapentin (Neurontin) capsule 300 mg, 300 mg, oral, Daily, Krysta Ramirez MD, 300 mg at 04/07/25 0924    glucagon (Glucagen) injection 1 mg, 1 mg, intramuscular, q15 min PRN, Roddy Galindo DO    glucagon (Glucagen) injection 1 mg, 1 mg, intramuscular, q15 min PRN, Roddy Galindo DO    heparin (porcine) injection 7,500 Units, 7,500 Units, subcutaneous, q8h JAYA, Roddy Galindo DO, 7,500 Units at 04/10/25 0612    hydrALAZINE (Apresoline) injection 10 mg, 10 mg, intravenous, q4h PRN, Roddy Galindo DO, 10 mg at 04/08/25 1220    hydrALAZINE (Apresoline) tablet 50 mg, 50 mg, oral, BID, Roddy Galindo DO, 50 mg at 04/10/25 0925    insulin lispro injection 0-10 Units, 0-10 Units, subcutaneous, TID AC, Roddy Galindo DO, 2 Units at 04/09/25 1758    insulin lispro injection 6 Units, 6 Units, subcutaneous, Once, Roddy Galindo DO    ipratropium-albuteroL (Duo-Neb) 0.5-2.5 mg/3 mL nebulizer solution 3 mL, 3 mL, nebulization, TID, Roddy Galindo DO, 3 mL at 04/10/25 0637    levothyroxine (Synthroid, Levoxyl) tablet 200 mcg, 200 mcg, oral, Daily, Roddy Galindo DO, 200 mcg at 04/10/25 0611    [Held by provider] metoprolol succinate XL (Toprol-XL) 24 hr tablet 100  mg, 100 mg, oral, Daily, Sanjiv Neil PA-C    metoprolol tartrate (Lopressor) tablet 50 mg, 50 mg, oral, BID, Roddy Galindo DO, 50 mg at 04/10/25 0925    montelukast (Singulair) tablet 10 mg, 10 mg, oral, Nightly, Roddy Galindo DO, 10 mg at 04/09/25 2018    [Held by provider] nitroglycerin (Nitrostat) SL tablet 0.4 mg, 0.4 mg, sublingual, q5 min PRN, Sanjiv Neil PA-C    nystatin (Mycostatin) 100,000 unit/gram powder 1 Application, 1 Application, Topical, TID, Roddy Galindo DO, 1 Application at 04/10/25 0927    oxygen (O2) therapy, , inhalation, Nightly, Roddy Galindo DO, 28 percent at 04/09/25 2337    oxygen (O2) therapy, , inhalation, Continuous PRN - O2/gases, Roddy Galindo DO    polyethylene glycol (Glycolax, Miralax) packet 17 g, 17 g, oral, Daily PRN, Roddy Galindo DO    pravastatin (Pravachol) tablet 40 mg, 40 mg, oral, Nightly, Roddy Galindo DO, 40 mg at 04/09/25 2018    sertraline (Zoloft) tablet 50 mg, 50 mg, oral, Daily, Roddy Galindo DO, 50 mg at 04/10/25 0925    sulfur hexafluoride microsphr (Lumason) injection 24.28 mg, 2 mL, intravenous, Once in imaging, Roddy Galindo DO    [Held by provider] traZODone (Desyrel) tablet 25 mg, 25 mg, oral, Nightly PRN, Robbie Olivarez MD   Relevant Results    Results for orders placed or performed during the hospital encounter of 03/25/25 (from the past 96 hours)   POCT GLUCOSE   Result Value Ref Range    POCT Glucose 271 (H) 74 - 99 mg/dL   POCT GLUCOSE   Result Value Ref Range    POCT Glucose 317 (H) 74 - 99 mg/dL   Blood Gas Arterial Full Panel   Result Value Ref Range    POCT pH, Arterial 7.20 (LL) 7.38 - 7.42 pH    POCT pCO2, Arterial 63 (H) 38 - 42 mm Hg    POCT pO2, Arterial 144 (H) 85 - 95 mm Hg    POCT SO2, Arterial 99 94 - 100 %    POCT Oxy Hemoglobin, Arterial 97.5 94.0 - 98.0 %    POCT Hematocrit Calculated, Arterial 28.0 (L) 36.0 - 46.0 %    POCT Sodium, Arterial 131 (L) 136 - 145 mmol/L    POCT Potassium, Arterial 5.3 3.5 -  5.3 mmol/L    POCT Chloride, Arterial 103 98 - 107 mmol/L    POCT Ionized Calcium, Arterial 1.18 1.10 - 1.33 mmol/L    POCT Glucose, Arterial 286 (H) 74 - 99 mg/dL    POCT Lactate, Arterial 1.0 0.4 - 2.0 mmol/L    POCT Base Excess, Arterial -3.8 (L) -2.0 - 3.0 mmol/L    POCT HCO3 Calculated, Arterial 24.6 22.0 - 26.0 mmol/L    POCT Hemoglobin, Arterial 9.4 (L) 12.0 - 16.0 g/dL    POCT Anion Gap, Arterial 9 (L) 10 - 25 mmo/L    Patient Temperature 37.0 degrees Celsius    FiO2 32 %    Critical Called By JOSE HEADLEY     Critical Called To [facility]     Critical Call Time 6381     Critical Read Back Y     Critical Note CRITICAL     Site of Arterial Puncture Radial Right     Wes's Test Positive    POCT GLUCOSE   Result Value Ref Range    POCT Glucose 252 (H) 74 - 99 mg/dL   CBC and Auto Differential   Result Value Ref Range    WBC 7.0 4.4 - 11.3 x10*3/uL    nRBC 0.0 0.0 - 0.0 /100 WBCs    RBC 3.83 (L) 4.00 - 5.20 x10*6/uL    Hemoglobin 8.8 (L) 12.0 - 16.0 g/dL    Hematocrit 29.6 (L) 36.0 - 46.0 %    MCV 77 (L) 80 - 100 fL    MCH 23.0 (L) 26.0 - 34.0 pg    MCHC 29.7 (L) 32.0 - 36.0 g/dL    RDW 19.9 (H) 11.5 - 14.5 %    Platelets 150 150 - 450 x10*3/uL    Neutrophils % 78.0 40.0 - 80.0 %    Immature Granulocytes %, Automated 0.6 0.0 - 0.9 %    Lymphocytes % 11.6 13.0 - 44.0 %    Monocytes % 9.0 2.0 - 10.0 %    Eosinophils % 0.7 0.0 - 6.0 %    Basophils % 0.1 0.0 - 2.0 %    Neutrophils Absolute 5.47 1.60 - 5.50 x10*3/uL    Immature Granulocytes Absolute, Automated 0.04 0.00 - 0.50 x10*3/uL    Lymphocytes Absolute 0.81 0.80 - 3.00 x10*3/uL    Monocytes Absolute 0.63 0.05 - 0.80 x10*3/uL    Eosinophils Absolute 0.05 0.00 - 0.40 x10*3/uL    Basophils Absolute 0.01 0.00 - 0.10 x10*3/uL   Renal function panel   Result Value Ref Range    Glucose 174 (H) 74 - 99 mg/dL    Sodium 136 136 - 145 mmol/L    Potassium 5.1 3.5 - 5.3 mmol/L    Chloride 104 98 - 107 mmol/L    Bicarbonate 25 21 - 32 mmol/L    Anion Gap 12 10 - 20  mmol/L    Urea Nitrogen 121 (HH) 6 - 23 mg/dL    Creatinine 3.82 (H) 0.50 - 1.05 mg/dL    eGFR 12 (L) >60 mL/min/1.73m*2    Calcium 8.1 (L) 8.6 - 10.3 mg/dL    Phosphorus 4.5 2.5 - 4.9 mg/dL    Albumin 2.9 (L) 3.4 - 5.0 g/dL   POCT GLUCOSE   Result Value Ref Range    POCT Glucose 150 (H) 74 - 99 mg/dL   POCT GLUCOSE   Result Value Ref Range    POCT Glucose 167 (H) 74 - 99 mg/dL   Blood Gas Arterial   Result Value Ref Range    POCT pH, Arterial 7.22 (LL) 7.38 - 7.42 pH    POCT pCO2, Arterial 59 (H) 38 - 42 mm Hg    POCT pO2, Arterial 118 (H) 85 - 95 mm Hg    POCT SO2, Arterial 100 94 - 100 %    POCT Oxy Hemoglobin, Arterial 97.6 94.0 - 98.0 %    POCT Base Excess, Arterial -4.5 (L) -2.0 - 3.0 mmol/L    POCT HCO3 Calculated, Arterial 24.1 22.0 - 26.0 mmol/L    Patient Temperature 37.0 degrees Celsius    FiO2 28 %    Apparatus CANNULA     Critical Called By ALFIE HILL RRT     Critical Called To DR.MUSTAFA SEGOVIA     Critical Call Time 1348     Critical Read Back Y     Site of Arterial Puncture Brachial Right     Wes's Test Negative    Blood Gas Arterial Full Panel   Result Value Ref Range    POCT pH, Arterial 7.22 (LL) 7.38 - 7.42 pH    POCT pCO2, Arterial 60 (H) 38 - 42 mm Hg    POCT pO2, Arterial 108 (H) 85 - 95 mm Hg    POCT SO2, Arterial 100 94 - 100 %    POCT Oxy Hemoglobin, Arterial 97.8 94.0 - 98.0 %    POCT Hematocrit Calculated, Arterial 28.0 (L) 36.0 - 46.0 %    POCT Sodium, Arterial 132 (L) 136 - 145 mmol/L    POCT Potassium, Arterial 5.0 3.5 - 5.3 mmol/L    POCT Chloride, Arterial 101 98 - 107 mmol/L    POCT Ionized Calcium, Arterial 1.18 1.10 - 1.33 mmol/L    POCT Glucose, Arterial 194 (H) 74 - 99 mg/dL    POCT Lactate, Arterial 0.7 0.4 - 2.0 mmol/L    POCT Base Excess, Arterial -3.5 (L) -2.0 - 3.0 mmol/L    POCT HCO3 Calculated, Arterial 24.6 22.0 - 26.0 mmol/L    POCT Hemoglobin, Arterial 9.3 (L) 12.0 - 16.0 g/dL    POCT Anion Gap, Arterial 11 10 - 25 mmo/L    Patient Temperature 37.0 degrees Celsius     FiO2 30 %    Apparatus      Ipap CMH2O 15.0 cm H2O    Epap CMH2O 5.0 cm H2O    Critical Called By ALFIE HILL RRT     Critical Called To      Critical Call Time 1625     Critical Read Back Y     Site of Arterial Puncture Radial Right     Wes's Test Positive    POCT GLUCOSE   Result Value Ref Range    POCT Glucose 218 (H) 74 - 99 mg/dL   Blood Gas Arterial Full Panel   Result Value Ref Range    POCT pH, Arterial 7.25 (LL) 7.38 - 7.42 pH    POCT pCO2, Arterial 55 (H) 38 - 42 mm Hg    POCT pO2, Arterial 104 (H) 85 - 95 mm Hg    POCT SO2, Arterial 100 94 - 100 %    POCT Oxy Hemoglobin, Arterial 97.9 94.0 - 98.0 %    POCT Hematocrit Calculated, Arterial 26.0 (L) 36.0 - 46.0 %    POCT Sodium, Arterial 132 (L) 136 - 145 mmol/L    POCT Potassium, Arterial 5.3 3.5 - 5.3 mmol/L    POCT Chloride, Arterial 102 98 - 107 mmol/L    POCT Ionized Calcium, Arterial 1.20 1.10 - 1.33 mmol/L    POCT Glucose, Arterial 193 (H) 74 - 99 mg/dL    POCT Lactate, Arterial 0.6 0.4 - 2.0 mmol/L    POCT Base Excess, Arterial -3.3 (L) -2.0 - 3.0 mmol/L    POCT HCO3 Calculated, Arterial 24.1 22.0 - 26.0 mmol/L    POCT Hemoglobin, Arterial 8.8 (L) 12.0 - 16.0 g/dL    POCT Anion Gap, Arterial 11 10 - 25 mmo/L    Patient Temperature 37.0 degrees Celsius    FiO2 28 %    Ventilator Rate 14 bpm    Tidal Volume 500 mL    Epap CMH2O 10.0 cm H2O    Critical Called By MARITA GIBSON RRT     Critical Called To TONY JOHNSON PA-C     Critical Call Time 1949     Critical Read Back Y     Site of Arterial Puncture Radial Right     Wes's Test Positive    POCT GLUCOSE   Result Value Ref Range    POCT Glucose 153 (H) 74 - 99 mg/dL   Renal function panel   Result Value Ref Range    Glucose 138 (H) 74 - 99 mg/dL    Sodium 133 (L) 136 - 145 mmol/L    Potassium 5.0 3.5 - 5.3 mmol/L    Chloride 102 98 - 107 mmol/L    Bicarbonate 23 21 - 32 mmol/L    Anion Gap 13 10 - 20 mmol/L    Urea Nitrogen 125 (HH) 6 - 23 mg/dL    Creatinine 4.12 (H) 0.50 - 1.05  mg/dL    eGFR 11 (L) >60 mL/min/1.73m*2    Calcium 8.1 (L) 8.6 - 10.3 mg/dL    Phosphorus 4.1 2.5 - 4.9 mg/dL    Albumin 3.0 (L) 3.4 - 5.0 g/dL   CBC and Auto Differential   Result Value Ref Range    WBC 5.6 4.4 - 11.3 x10*3/uL    nRBC 0.0 0.0 - 0.0 /100 WBCs    RBC 4.04 4.00 - 5.20 x10*6/uL    Hemoglobin 9.2 (L) 12.0 - 16.0 g/dL    Hematocrit 32.7 (L) 36.0 - 46.0 %    MCV 81 80 - 100 fL    MCH 22.8 (L) 26.0 - 34.0 pg    MCHC 28.1 (L) 32.0 - 36.0 g/dL    RDW 20.2 (H) 11.5 - 14.5 %    Platelets 161 150 - 450 x10*3/uL    Neutrophils % 74.9 40.0 - 80.0 %    Immature Granulocytes %, Automated 0.7 0.0 - 0.9 %    Lymphocytes % 13.8 13.0 - 44.0 %    Monocytes % 7.5 2.0 - 10.0 %    Eosinophils % 2.9 0.0 - 6.0 %    Basophils % 0.2 0.0 - 2.0 %    Neutrophils Absolute 4.19 1.60 - 5.50 x10*3/uL    Immature Granulocytes Absolute, Automated 0.04 0.00 - 0.50 x10*3/uL    Lymphocytes Absolute 0.77 (L) 0.80 - 3.00 x10*3/uL    Monocytes Absolute 0.42 0.05 - 0.80 x10*3/uL    Eosinophils Absolute 0.16 0.00 - 0.40 x10*3/uL    Basophils Absolute 0.01 0.00 - 0.10 x10*3/uL   Morphology   Result Value Ref Range    RBC Morphology See Below     Polychromasia Mild     RBC Fragments Few     Target Cells Few     Ovalocytes Few     [place] Cells Few    Blood Gas Arterial Full Panel   Result Value Ref Range    POCT pH, Arterial 7.29 (L) 7.38 - 7.42 pH    POCT pCO2, Arterial 48 (H) 38 - 42 mm Hg    POCT pO2, Arterial 149 (H) 85 - 95 mm Hg    POCT SO2, Arterial 100 94 - 100 %    POCT Oxy Hemoglobin, Arterial 98.2 (H) 94.0 - 98.0 %    POCT Hematocrit Calculated, Arterial 26.0 (L) 36.0 - 46.0 %    POCT Sodium, Arterial 133 (L) 136 - 145 mmol/L    POCT Potassium, Arterial 5.3 3.5 - 5.3 mmol/L    POCT Chloride, Arterial 103 98 - 107 mmol/L    POCT Ionized Calcium, Arterial 1.17 1.10 - 1.33 mmol/L    POCT Glucose, Arterial 130 (H) 74 - 99 mg/dL    POCT Lactate, Arterial 0.4 0.4 - 2.0 mmol/L    POCT Base Excess, Arterial -3.4 (L) -2.0 - 3.0 mmol/L    POCT  HCO3 Calculated, Arterial 23.1 22.0 - 26.0 mmol/L    POCT Hemoglobin, Arterial 8.7 (L) 12.0 - 16.0 g/dL    POCT Anion Gap, Arterial 12 10 - 25 mmo/L    Patient Temperature 37.0 degrees Celsius    FiO2 28 %    Apparatus      Ventilator Rate 16 bpm    Tidal Volume 500 mL    Ipap CMH2O      Epap CMH2O 10.0 cm H2O    Site of Arterial Puncture Radial Right     Wes's Test Positive    POCT GLUCOSE   Result Value Ref Range    POCT Glucose 120 (H) 74 - 99 mg/dL   Ammonia   Result Value Ref Range    Ammonia 30 16 - 53 umol/L   POCT GLUCOSE   Result Value Ref Range    POCT Glucose 136 (H) 74 - 99 mg/dL   POCT GLUCOSE   Result Value Ref Range    POCT Glucose 151 (H) 74 - 99 mg/dL   POCT GLUCOSE   Result Value Ref Range    POCT Glucose 170 (H) 74 - 99 mg/dL   POCT GLUCOSE   Result Value Ref Range    POCT Glucose 139 (H) 74 - 99 mg/dL   CBC and Auto Differential   Result Value Ref Range    WBC 5.9 4.4 - 11.3 x10*3/uL    nRBC 0.0 0.0 - 0.0 /100 WBCs    RBC 3.51 (L) 4.00 - 5.20 x10*6/uL    Hemoglobin 8.1 (L) 12.0 - 16.0 g/dL    Hematocrit 27.0 (L) 36.0 - 46.0 %    MCV 77 (L) 80 - 100 fL    MCH 23.1 (L) 26.0 - 34.0 pg    MCHC 30.0 (L) 32.0 - 36.0 g/dL    RDW 19.9 (H) 11.5 - 14.5 %    Platelets 146 (L) 150 - 450 x10*3/uL    Neutrophils % 76.8 40.0 - 80.0 %    Immature Granulocytes %, Automated 0.3 0.0 - 0.9 %    Lymphocytes % 12.6 13.0 - 44.0 %    Monocytes % 8.0 2.0 - 10.0 %    Eosinophils % 2.1 0.0 - 6.0 %    Basophils % 0.2 0.0 - 2.0 %    Neutrophils Absolute 4.49 1.60 - 5.50 x10*3/uL    Immature Granulocytes Absolute, Automated 0.02 0.00 - 0.50 x10*3/uL    Lymphocytes Absolute 0.74 (L) 0.80 - 3.00 x10*3/uL    Monocytes Absolute 0.47 0.05 - 0.80 x10*3/uL    Eosinophils Absolute 0.12 0.00 - 0.40 x10*3/uL    Basophils Absolute 0.01 0.00 - 0.10 x10*3/uL   Basic Metabolic Panel   Result Value Ref Range    Glucose 132 (H) 74 - 99 mg/dL    Sodium 136 136 - 145 mmol/L    Potassium 5.3 3.5 - 5.3 mmol/L    Chloride 104 98 - 107 mmol/L     Bicarbonate 25 21 - 32 mmol/L    Anion Gap 12 10 - 20 mmol/L    Urea Nitrogen 127 (HH) 6 - 23 mg/dL    Creatinine 3.97 (H) 0.50 - 1.05 mg/dL    eGFR 11 (L) >60 mL/min/1.73m*2    Calcium 7.9 (L) 8.6 - 10.3 mg/dL   Blood Gas Arterial Full Panel   Result Value Ref Range    POCT pH, Arterial 7.38 7.38 - 7.42 pH    POCT pCO2, Arterial 43 (H) 38 - 42 mm Hg    POCT pO2, Arterial 118 (H) 85 - 95 mm Hg    POCT SO2, Arterial 100 94 - 100 %    POCT Oxy Hemoglobin, Arterial 97.4 94.0 - 98.0 %    POCT Hematocrit Calculated, Arterial 25.0 (L) 36.0 - 46.0 %    POCT Sodium, Arterial 133 (L) 136 - 145 mmol/L    POCT Potassium, Arterial 5.4 (H) 3.5 - 5.3 mmol/L    POCT Chloride, Arterial 103 98 - 107 mmol/L    POCT Ionized Calcium, Arterial 1.15 1.10 - 1.33 mmol/L    POCT Glucose, Arterial 132 (H) 74 - 99 mg/dL    POCT Lactate, Arterial 0.3 (L) 0.4 - 2.0 mmol/L    POCT Base Excess, Arterial 0.2 -2.0 - 3.0 mmol/L    POCT HCO3 Calculated, Arterial 25.4 22.0 - 26.0 mmol/L    POCT Hemoglobin, Arterial 8.2 (L) 12.0 - 16.0 g/dL    POCT Anion Gap, Arterial 10 10 - 25 mmo/L    Patient Temperature 37.0 degrees Celsius    FiO2 28 %    Apparatus FACE MASK     Ventilator Mode      Ventilator Rate 18 bpm    Tidal Volume 500 mL    Ipap CMH2O 25.0 cm H2O    Epap CMH2O 10.0 cm H2O    Site of Arterial Puncture Radial Left     Wes's Test Positive    POCT GLUCOSE   Result Value Ref Range    POCT Glucose 130 (H) 74 - 99 mg/dL   POCT GLUCOSE   Result Value Ref Range    POCT Glucose 178 (H) 74 - 99 mg/dL   POCT GLUCOSE   Result Value Ref Range    POCT Glucose 177 (H) 74 - 99 mg/dL   POCT GLUCOSE   Result Value Ref Range    POCT Glucose 145 (H) 74 - 99 mg/dL   POCT GLUCOSE   Result Value Ref Range    POCT Glucose 128 (H) 74 - 99 mg/dL   POCT GLUCOSE   Result Value Ref Range    POCT Glucose 126 (H) 74 - 99 mg/dL   CBC and Auto Differential   Result Value Ref Range    WBC 5.9 4.4 - 11.3 x10*3/uL    nRBC 0.0 0.0 - 0.0 /100 WBCs    RBC 3.58 (L) 4.00 - 5.20  x10*6/uL    Hemoglobin 8.3 (L) 12.0 - 16.0 g/dL    Hematocrit 27.6 (L) 36.0 - 46.0 %    MCV 77 (L) 80 - 100 fL    MCH 23.2 (L) 26.0 - 34.0 pg    MCHC 30.1 (L) 32.0 - 36.0 g/dL    RDW 19.9 (H) 11.5 - 14.5 %    Platelets 146 (L) 150 - 450 x10*3/uL    Neutrophils % 71.2 40.0 - 80.0 %    Immature Granulocytes %, Automated 0.2 0.0 - 0.9 %    Lymphocytes % 16.0 13.0 - 44.0 %    Monocytes % 10.5 2.0 - 10.0 %    Eosinophils % 1.9 0.0 - 6.0 %    Basophils % 0.2 0.0 - 2.0 %    Neutrophils Absolute 4.20 1.60 - 5.50 x10*3/uL    Immature Granulocytes Absolute, Automated 0.01 0.00 - 0.50 x10*3/uL    Lymphocytes Absolute 0.94 0.80 - 3.00 x10*3/uL    Monocytes Absolute 0.62 0.05 - 0.80 x10*3/uL    Eosinophils Absolute 0.11 0.00 - 0.40 x10*3/uL    Basophils Absolute 0.01 0.00 - 0.10 x10*3/uL   PST Top   Result Value Ref Range    Extra Tube Hold for add-ons.    Renal function panel   Result Value Ref Range    Glucose 120 (H) 74 - 99 mg/dL    Sodium 139 136 - 145 mmol/L    Potassium 5.2 3.5 - 5.3 mmol/L    Chloride 105 98 - 107 mmol/L    Bicarbonate 26 21 - 32 mmol/L    Anion Gap 13 10 - 20 mmol/L    Urea Nitrogen 124 (HH) 6 - 23 mg/dL    Creatinine 3.79 (H) 0.50 - 1.05 mg/dL    eGFR 12 (L) >60 mL/min/1.73m*2    Calcium 8.1 (L) 8.6 - 10.3 mg/dL    Phosphorus 3.4 2.5 - 4.9 mg/dL    Albumin 2.9 (L) 3.4 - 5.0 g/dL   POCT GLUCOSE   Result Value Ref Range    POCT Glucose 123 (H) 74 - 99 mg/dL     *Note: Due to a large number of results and/or encounters for the requested time period, some results have not been displayed. A complete set of results can be found in Results Review.       Assessment/Plan   Acute kidney injury superimposed on chronic kidney disease stage III the patient creatinine is slowly trending down however she still has high blood urea nitrogen I do not feel there is any indication for renal replacement therapy at this time we will continue to monitor  Congestive heart failure continue diuresis as needed  Chronic kidney  disease stage IIIb   Acute respiratory failure is improving  Diabetes mellitus type 2  Anemia of chronic kidney disease  Hypertension  Hyperlipidemia  Obesity    Sixto Slater MD

## 2025-04-10 NOTE — PROGRESS NOTES
Department of Medicine  Division of Pulmonary, Critical Care, and Sleep Medicine    Analia Murray is a 74 y.o. female on day 16 of admission presenting with Acute respiratory failure with hypoxia.    Subjective   Feeling better, no events overnight.  Comfortable in chair on 2 L nasal cannula.       Objective     Vital Signs      4/10/2025     9:00 AM 4/10/2025     9:19 AM 4/10/2025    10:00 AM 4/10/2025    10:04 AM 4/10/2025    10:05 AM 4/10/2025    10:06 AM 4/10/2025    10:07 AM   Vitals   Systolic  126 134       Diastolic  60 67       Heart Rate 89 70 72 68 66 68 77   Resp 17 14 13 19 19 19 14        Physical exam  Constitutional: Obese, awake and alert, comfortable in bed.  HEENT: Normocephalic and atraumatic.  Cardiovascular: Normal rate and regular rhythm.  Pulmonary: Normal respiratory effort, distant anterior breath sounds, no wheezing.    Musculoskeletal: No edema, no cyanosis.  Neurological: Awake, alert and oriented x 3  Psychiatric: Normal behavior, mood and affect.    Labs:  Lab Results   Component Value Date    WBC 5.9 04/10/2025    HGB 8.3 (L) 04/10/2025    HCT 27.6 (L) 04/10/2025    MCV 77 (L) 04/10/2025     (L) 04/10/2025      Lab Results   Component Value Date    GLUCOSE 120 (H) 04/10/2025    CALCIUM 8.1 (L) 04/10/2025     04/10/2025    K 5.2 04/10/2025    CO2 26 04/10/2025     04/10/2025     (HH) 04/10/2025    CREATININE 3.79 (H) 04/10/2025      Lab Results   Component Value Date    ALT 7 03/25/2025    AST 10 03/25/2025    ALKPHOS 90 03/25/2025    BILITOT 0.5 03/25/2025        Oxygen Therapy  SpO2: 98 %  Medical Gas Therapy: Respiratory support without O2  Medical Gas Delivery Method: Nasal cannula  FiO2 (%):  [28 %] 28 %  S RR:  [18] 18  S VT:  [500 mL] 500 mL    Intake/Output last 3 Shifts:  I/O last 3 completed shifts:  In: 620 (4.4 mL/kg) [P.O.:600; I.V.:20 (0.1 mL/kg)]  Out: 1400 (9.9 mL/kg) [Urine:1400 (0.3 mL/kg/hr)]  Weight: 142.1 kg       Medications    Scheduled medications  allopurinol, 50 mg, oral, Every other day  [Held by provider] amLODIPine, 10 mg, oral, Daily  aspirin, 81 mg, oral, Daily  calcium acetate, 667 mg, oral, TID after meals  ezetimibe, 10 mg, oral, Daily  fluticasone, 2 spray, Each Nostril, BID  fluticasone furoate-vilanteroL, 1 puff, inhalation, Daily  [Held by provider] gabapentin, 300 mg, oral, Daily  heparin (porcine), 7,500 Units, subcutaneous, q8h JAYA  hydrALAZINE, 50 mg, oral, BID  insulin lispro, 0-10 Units, subcutaneous, TID AC  insulin lispro, 6 Units, subcutaneous, Once  ipratropium-albuteroL, 3 mL, nebulization, TID  levothyroxine, 200 mcg, oral, Daily  [Held by provider] metoprolol succinate XL, 100 mg, oral, Daily  metoprolol tartrate, 50 mg, oral, BID  montelukast, 10 mg, oral, Nightly  nystatin, 1 Application, Topical, TID  oxygen, , inhalation, Nightly  pravastatin, 40 mg, oral, Nightly  sertraline, 50 mg, oral, Daily  sulfur hexafluoride microsphr, 2 mL, intravenous, Once in imaging      Continuous medications     PRN medications  PRN medications: acetaminophen **OR** acetaminophen, albuterol, dextrose, dextrose, glucagon, glucagon, hydrALAZINE, [Held by provider] nitroglycerin, oxygen, polyethylene glycol, [Held by provider] traZODone     Allergies  Ciprofloxacin, Morphine, and Penicillins    Chest Radiograph   No results found for this or any previous visit from the past 10 days.       XR chest 1 view 04/06/2025    Narrative  Interpreted By:  Jil Washington,  STUDY:  XR CHEST 1 VIEW 4/6/2025 6:26 am    INDICATION:  Signs/Symptoms:acute respiratory failure    COMPARISON:  04/05/2025    ACCESSION NUMBER(S):  [id number]    ORDERING CLINICIAN:  HEATHER STEPHEN    TECHNIQUE:  AP erect view of the chest    FINDINGS:  There is stable cardiac enlargement. There is mild pulmonary vascular  congestion noted with some discoid atelectasis at the right lung  base. The study is limited by the body habitus of this  patient.    Impression  Stable cardiomegaly with pulmonary vascular congestion and persistent  right basilar discoid atelectasis.    The study is limited by the body habitus of this patient.    Signed by: Jil Washington 4/6/2025 8:40 PM  Dictation workstation:   [id number]      XR chest 1 view 04/05/2025    Narrative  Interpreted By:  Jil Washington,  STUDY:  XR CHEST 1 VIEW 4/5/2025 7:42 am    INDICATION:  Signs/Symptoms:acute respiratory failure    COMPARISON:  04/04/2025    ACCESSION NUMBER(S):  [id number]    ORDERING CLINICIAN:  HEATHER STEPHEN    TECHNIQUE:  AP semi-erect view of the chest    FINDINGS:  There is enlargement of the cardiac silhouette with calcified plaque  seen in the aortic knob. When compared with the study done 1 day  earlier, there is now increased right basilar platelike atelectasis  with stable atelectasis in the left mid lung zone. There is mild  pulmonary vascular congestion seen.    Impression  Enlarged cardiac silhouette with mild pulmonary vascular congestion.    Right basilar atelectasis is a little worse with stable atelectasis  left mid lung zone.    Signed by: Jil Washington 4/6/2025 10:10 AM  Dictation workstation:   [id number]      XR chest 1 view 04/04/2025    Narrative  Interpreted By:  Jil Washington,  STUDY:  XR CHEST 1 VIEW 4/4/2025 11:56 am    INDICATION:  Signs/Symptoms:acute respiratory failure    COMPARISON:  04/03/2025    ACCESSION NUMBER(S):  [id number]    ORDERING CLINICIAN:  HEATHER STEPHEN    TECHNIQUE:  AP erect view of the chest    FINDINGS:  Cardiac enlargement is unchanged. There is less right basilar  atelectasis and infiltrate. There is persistent linear atelectasis at  the junction of the left mid and lower lung zones. No pleural  abnormality is observed.    Impression  Improved right basilar atelectasis and infiltrate without complete  resolution.    Persistent linear atelectasis at the junction of the left mid and  lower lung fields.    Stable  cardiomegaly.    Signed by: Jil Washington 4/4/2025 12:12 PM  Dictation workstation:   [id number]         Assessment and Plan / Recommendations   Assessment/Plan   74-year-old woman with history of asthma (clinical diagnosis), HOLDEN on CPAP, CKD, diabetes admitted with    1.  Acute hypoxic and hypercapnic respiratory failure due to below  2.  Suspected asthma exacerbation (was diagnosed with asthma clinically, PFT from 2020 no obstruction, never smoked)  3.  Pulmonary edema/CHF, chest x-ray from yesterday with worsening bilateral edema/vascular congestion  4.  HOLDEN with hypoventilation    Improving clinically, stable on 2 L nasal cannula during the day.    -Continue AVAPS at night and as needed during the day  -Completed steroids, no wheezing on exam today  -Continue Breo, continue Singulair, nebs as needed  -Incentive spirometry and Aerobika when off NIV, wean O2 as tolerated  -Diuresis per nephrology, renal function improving      John Ross MD

## 2025-04-10 NOTE — PROGRESS NOTES
Occupational Therapy    OT Treatment    Patient Name: Analia Murray  MRN: [id number]  Department: [facility] S ICU  Room: 10/10-A  Today's Date: 4/10/2025  Time Calculation  Start Time: 1051  Stop Time: 1101  Time Calculation (min): 10 min        Assessment:  OT Assessment: Pt continues to demonstrate genalized weakness impacting functional mobility and ADL status. Pt required max Ax2 for transfer and bed mobility. Pt would benefit from continued acute OT services to facilitate PLOF.  Prognosis: Fair  Barriers to Discharge Home: Physical needs, Caregiver assistance  Caregiver Assistance: Caregiver assistance needed per identified barriers - however, level of patient's required assistance exceeds assistance available at home  Physical Needs: 24hr mobility assistance needed, 24hr ADL assistance needed, High falls risk due to function or environment, Ambulating household distances limited by function/safety  Evaluation/Treatment Tolerance: Patient limited by fatigue  Medical Staff Made Aware: Yes  End of Session Communication: Bedside nurse  End of Session Patient Position: Bed, 3 rail up  OT Assessment Results: Decreased ADL status, Decreased upper extremity range of motion, Decreased upper extremity strength, Decreased endurance, Decreased functional mobility, Decreased IADLs  Prognosis: Fair  Barriers to Discharge: Decreased caregiver support  Evaluation/Treatment Tolerance: Patient limited by fatigue  Medical Staff Made Aware: Yes  Strengths: Attitude of self  Barriers to Participation: Comorbidities  Plan:  Treatment Interventions: ADL retraining, Functional transfer training, Endurance training, UE strengthening/ROM, Patient/family training, Equipment evaluation/education, Compensatory technique education  OT Frequency: 4 times per week  OT Discharge Recommendations: Moderate intensity level of continued care  Equipment Recommended upon Discharge: Wheeled walker  OT Recommended Transfer Status: Maximum assist,  Assist of 2  OT - OK to Discharge: Yes  Treatment Interventions: ADL retraining, Functional transfer training, Endurance training, UE strengthening/ROM, Patient/family training, Equipment evaluation/education, Compensatory technique education    Subjective   Previous Visit Info:  OT Last Visit  OT Received On: 04/10/25  General:  General  Reason for Referral: Activities of daily living  Co-Treatment: PT  Co-Treatment Reason: ICU pt requiring two skilled therapist for safe transfer  Prior to Session Communication: Bedside nurse  Patient Position Received: Up in chair, Alarm on  General Comment: Entered pt room upon request to assist with transfer as pt asking to return to bed  Precautions:  Hearing/Visual Limitations: reading glasses, hearing WFL  Medical Precautions: Fall precautions     Date/Time Vitals Session Patient Position Pulse Resp SpO2 BP MAP (mmHg)    04/10/25 1100 --  --  66  18  94 %  107/56  69     04/10/25 1200 --  --  66  19  92 %  120/108  115                 Pain:  Pain Assessment  Pain Assessment: FLACC (Face, Legs, Activity, Cry, Consolability)  0-10 (Numeric) Pain Score: 7  Pain Type: Chronic pain  Pain Location: Back  Pain Orientation: Lower  Pain Interventions: Repositioned (RN present and aware)  Response to Interventions: Resting quietly    Objective    Cognition:  Cognition  Overall Cognitive Status: Within Functional Limits  Coordination:  Movements are Fluid and Coordinated: No  Upper Body Coordination: decreased rate and accuracy of movements  Activities of Daily Living:    Toileting  Toileting Comments: Pt incontinent of BM and required total assist from nursing to complete hygiene while standing     Bed Mobility/Transfers: Bed Mobility 1  Bed Mobility 1: Sitting to supine  Level of Assistance 1: Maximum assistance, +2  Bed Mobility Comments 1: Pt required max Ax2 to return to supine as pt required assistance with elevating LE and controlled descent of trunk due to pain limiting bed  "mobiltiy    Transfers  Transfer: Yes  Transfer 1  Transfer From 1: Chair with arms to, Stand to  Transfer to 1: Stand, Chair with arms  Technique 1: Sit to stand, Stand to sit  Transfer Device 1: Walker  Transfer Level of Assistance 1: Maximum assistance, +2  Trials/Comments 1: Pt required max Ax2 for elevation from chair and controlled descent into bed due to generalized weakness and pain. Pt demonstrated increased difficulty with transfer as she stated \"you all push me too hard.\"      Functional Mobility:  Functional Mobility  Functional Mobility Performed: Yes  Functional Mobility 1  Surface 1: Level tile  Device 1: Rolling walker  Assistance 1: Maximum assistance (x2)  Comments 1: Pt completed 4-5 steps from chair to bed with increased time and effort. Pt required max Ax2 to compensate for weakness. Pt also required frequent cueing for continuation of movement as pt would demonstrate forward lean over RW.    Outcome Measures:[facility] Daily Activity  Putting on and taking off regular lower body clothing: Total  Bathing (including washing, rinsing, drying): A lot  Putting on and taking off regular upper body clothing: A lot  Toileting, which includes using toilet, bedpan or urinal: Total  Taking care of personal grooming such as brushing teeth: A little  Eating Meals: A little  Daily Activity - Total Score: 12      Education Documentation  No documentation found.  Education Comments  No comments found.        OP EDUCATION:       Goals:  Encounter Problems       Encounter Problems (Active)       OT Goals       ADLs (Progressing)       Start:  03/27/25    Expected End:  04/11/25       Patient will complete ADLs with minimal assistance.          Functional Transfer (Progressing)       Start:  03/27/25    Expected End:  04/11/25       Patient will complete functional transfer with min assistance utilizing least restrictive device.          Therapeutic Exercise  (Progressing)       Start:  03/27/25    Expected End:  " 04/11/25       Patient will tolerate ~8-10 minutes of therapeutic exercise to increase aerobic capacity and improve activity tolerance.

## 2025-04-10 NOTE — NURSING NOTE
Pt resting in bed. No complaints or concerns. Call light within reach.  Bed alarm in place for pt safety.  Pt continues on tele per order.

## 2025-04-10 NOTE — PROGRESS NOTES
Physical Therapy    Physical Therapy Treatment    Patient Name: Analia Murray  MRN: [id number]  Department: [facility] S ICU  Room: 10/10-A  Today's Date: 4/10/2025  Time Calculation  Start Time: 0839  Stop Time: 0905  Time Calculation (min): 26 min         Assessment/Plan   PT Assessment  Barriers to Discharge Home: Caregiver assistance, Physical needs  Caregiver Assistance: Caregiver assistance needed per identified barriers - however, level of patient's required assistance exceeds assistance available at home  End of Session Communication: Bedside nurse  Assessment Comment: Pt continues to present with mild strength, balance and endurance deficits. Pt would continued to benefit from skilled therapy services.  End of Session Patient Position: Up in chair, Alarm on  PT Plan  Inpatient/Swing Bed or Outpatient: Inpatient  PT Plan  Treatment/Interventions: Bed mobility, Transfer training, Gait training, Stair training, Balance training, Neuromuscular re-education, Strengthening, Endurance training, Therapeutic exercise, Therapeutic activity, Home exercise program, Positioning, Postural re-education  PT Plan: Ongoing PT  PT Frequency: 5 times per week  PT Discharge Recommendations: Moderate intensity level of continued care  Equipment Recommended upon Discharge: Wheeled walker  PT Recommended Transfer Status: Assist x2, Assistive device (FWW)  PT - OK to Discharge: Yes      General Visit Information:   PT  Visit  PT Received On: 04/10/25  General  Co-Treatment: OT  Co-Treatment Reason: to maximize participation, safety and mobility; pt is a 2 person assist requiring 2 skilled therapists to mobilize  Prior to Session Communication: Bedside nurse  Patient Position Received: Bed, 3 rail up, Alarm on  General Comment: Cleared by nursing to be seen for therapy, pt agreeable with tx, supine in bed upon arrival.    Subjective   Precautions:  Precautions  Medical Precautions: Fall precautions    Objective   Pain:  Pain  Assessment  Pain Assessment: 0-10  0-10 (Numeric) Pain Score: 7  Pain Type: Acute pain  Pain Location: Back  Pain Orientation: Lower  Pain Interventions: Repositioned  Response to Interventions: No change in pain, Provider notified (RN aware.)    Cognition:  Cognition  Overall Cognitive Status: Within Functional Limits  Orientation Level: Oriented X4     Postural Control:  Static Sitting Balance  Static Sitting-Balance Support: Feet supported, Bilateral upper extremity supported  Static Sitting-Level of Assistance: Contact guard  Static Sitting-Comment/Number of Minutes: Fair seated static balance.  Static Standing Balance  Static Standing-Balance Support: Bilateral upper extremity supported  Static Standing-Level of Assistance: Moderate assistance  Static Standing-Comment/Number of Minutes: Poor static standing balance with bilateral UE support on walker.    Treatments:  Therapeutic Exercise  Therapeutic Exercise Performed: Yes  Therapeutic Exercise Activity 1: Bilateral ankle pumps to tolerance.    Therapeutic Activity  Therapeutic Activity Performed: Yes  Therapeutic Activity 1: EOB x5-6 minutes    Bed Mobility  Bed Mobility: Yes  Bed Mobility 1  Bed Mobility 1: Supine to sitting  Level of Assistance 1: Moderate assistance, Minimal verbal cues, +2  Bed Mobility Comments 1: Mod assist x2 for trunk/bilateral LE's during supine to sit, pt able to initiate LE's towards EOB, cues for use of UE's on bedrail to assist trunk.    Ambulation/Gait Training  Ambulation/Gait Training Performed: Yes  Ambulation/Gait Training 1  Surface 1: Level tile  Device 1: Rolling walker  Assistance 1: Maximum assistance, Minimal verbal cues  Quality of Gait 1: Wide base of support, Diminished heel strike, Decreased step length, Forward flexed posture, Soft knee(s)  Comments/Distance (ft) 1: 4-5 steps (bed to chair) with wheeled walker, presents with significant forward flexed posture, occassional leaning onto forearms onto walker, max  assist x2 for balance.    Transfers  Transfer: Yes  Transfer 1  Transfer From 1: Sit to  Transfer to 1: Stand  Technique 1: Sit to stand, Stand to sit  Transfer Device 1: Walker  Transfer Level of Assistance 1: Maximum assistance, Minimal verbal cues, +2  Trials/Comments 1: Max assist x2 for trunk up during sit to stand, poor ability to achieve an erect posture, decreased eccentric control in sitting.    Outcome Measures:  [facility] Basic Mobility  Turning from your back to your side while in a flat bed without using bedrails: A lot  Moving from lying on your back to sitting on the side of a flat bed without using bedrails: A lot  Moving to and from bed to chair (including a wheelchair): A lot  Standing up from a chair using your arms (e.g. wheelchair or bedside chair): A lot  To walk in hospital room: A lot  Climbing 3-5 steps with railing: Total  Basic Mobility - Total Score: 11    FSS-ICU  Ambulation: Walks <50 feet with any assistance x1 or walks any distance with assistance x2 people  Rolling: Moderate assistance (performs 50 - 74% of task)  Sitting: Supervision or set-up only  Transfer Sit-to-Stand: Maximal assistance (performs 25% - 49% of task)  Transfer Supine-to-Sit: Maximal assistance (performs 25% - 49% of task)  Total Score: 13    Education Documentation  Precautions, taught by Thao Daniel PTA at 4/10/2025 10:24 AM.  Learner: Patient  Readiness: Acceptance  Method: Explanation  Response: Verbalizes Understanding    Mobility Training, taught by Thao Daniel PTA at 4/10/2025 10:24 AM.  Learner: Patient  Readiness: Acceptance  Method: Explanation  Response: Verbalizes Understanding    Education Comments  04/10/25 1024 Thao Daniel PTA  Patient educated on the importance of mobility and participation with therapy. Educated on safety and use of call light for assistance.         Encounter Problems       Encounter Problems (Active)       PT Problem       Pt will transition supine<>sit with close S.  (Progressing)       Start:  03/27/25    Expected End:  04/19/25            Pt will transfer sit<>stand with FWW & close S. (Progressing)       Start:  03/27/25    Expected End:  04/19/25            Pt will ambulate >/=25 ft with FWW & close S. (Progressing)       Start:  03/27/25    Expected End:  04/19/25               Pain - Adult            Addendum: Returned at [id number] for assist transfer chair to bed. Max assist x 2 for sit to stand. Pt pulling up from walker to stand, poor static standing balance, pt unable to achieve an erect posture. Pt able to take a couple steps (chair to bed) with wheeled walker. Max assist x 2 for trunk/bilateral LE's during sit to supine.

## 2025-04-10 NOTE — PROGRESS NOTES
I have seen the patient either independently or with an associated resident physician or advanced practice provider.    Interval Events: No acute events overnight. Patient tolerated nocturnal bipap, remains on NC while awake this morning for breakfast.    Physical Exam  Constitutional:       General: She is not in acute distress.  HENT:      Mouth/Throat:      Mouth: Mucous membranes are moist.   Cardiovascular:      Rate and Rhythm: Normal rate and regular rhythm.   Pulmonary:      Effort: No respiratory distress.   Abdominal:      Palpations: Abdomen is soft.   Musculoskeletal:      Right lower leg: Edema present.      Left lower leg: Edema present.   Skin:     General: Skin is warm and dry.   Neurological:      Mental Status: She is alert and oriented to person, place, and time.         Neuro: Acute metabolic encephalopathy likely secondary to hypercapnia (resolved). Hx of anxiety, depression, lumbar radiculopathy. Suspect somnolence may be complicated by recent addition of mirtazapine which is now discontinued. Continue home sertraline. Hold gabapentin. Hold any potentially sedating medications.   Cardiac: Acute exacerbation of CHF (improved). Hx of HTN, HLD. Continue home hydralazine, aspirin, ezetimibe, pravastatin.   Pulmonary: Acute respiratory failure with hypercapnia (improved). Acute exacerbation of COPD. Hx of HOLDEN. Continue AVAPS 25 max/15 min, EPAP 10, ins time 10, rate 18 while asleep, NC while awake. Continue duoneb every 8 hours daily. Will monitor response to reduced sedatives prior to reinitiating steroid course. Continue singulair, breo ellipta.   Gastrointestinal: Renal diet.   Renal: Acute on chronic kidney disease (improved). Uremia. Nephro on consult. Holding nephrotoxic medications when possible.  Endocrine: Hx of HYPO, DM. Continue synthroid. ISS in place   Hematology: Hx of MGUS. Heparin for DVT proph.   Infectious Disease: No acute issues  Musculoskeletal:  PT/OT on consult.      Lines/Tubes/Drains: PIV  Prophylaxis: Heparin, SCDs    Disposition: Stable for transfer from ICU    ABCDEF Checklist  Analgesia: Spontaneous awakening trial to be pursued if clinically appropriate. RASS goal reviewed if applicable.  Breathing: Spontaneous breathing trial to be pursued if clinically appropriate. Mechanical power of assisted ventilation reviewed if applicable.  Choice of analgesia/sedation: Analgesic and sedative agents adjusted per clinical context.   Delirium assessed by CAM, will avoid exacerbating factors   Early mobility and exercise: Physical and occupational therapy engaged   Family: Plan of care, overall trajectory of patient shared with family. Questions elicited and answered as appropriate.       Due to the high probability of life threatening clinical decompensation, the patient required critical care time evaluating and managing this patient.  Critical care time included obtaining a history, examining the patient, ordering and reviewing studies, discussing, developing, and implementing a management plan, evaluating the patient's response to treatment, and discussion with other care team providers. I saw and evaluated the patient myself.  Critical care time was performed exclusive of billable procedures.    Critical care time: 30 minutes

## 2025-04-10 NOTE — NURSING NOTE
Assumed care of pt at this time from ICU. No complaints or concerns. Call light within reach.  Pt orientated to room, call light, and phone. Call light and personal belongings placed within pt reach.

## 2025-04-11 LAB
ALBUMIN SERPL BCP-MCNC: 2.8 G/DL (ref 3.4–5)
ANION GAP SERPL CALCULATED.3IONS-SCNC: 11 MMOL/L (ref 10–20)
BASOPHILS # BLD AUTO: 0.01 X10*3/UL (ref 0–0.1)
BASOPHILS NFR BLD AUTO: 0.2 %
BUN SERPL-MCNC: 119 MG/DL (ref 6–23)
CALCIUM SERPL-MCNC: 8.1 MG/DL (ref 8.6–10.3)
CHLORIDE SERPL-SCNC: 106 MMOL/L (ref 98–107)
CO2 SERPL-SCNC: 27 MMOL/L (ref 21–32)
CREAT SERPL-MCNC: 3.4 MG/DL (ref 0.5–1.05)
EGFRCR SERPLBLD CKD-EPI 2021: 14 ML/MIN/1.73M*2
EOSINOPHIL # BLD AUTO: 0.22 X10*3/UL (ref 0–0.4)
EOSINOPHIL NFR BLD AUTO: 3.9 %
ERYTHROCYTE [DISTWIDTH] IN BLOOD BY AUTOMATED COUNT: 19.8 % (ref 11.5–14.5)
GLUCOSE BLD MANUAL STRIP-MCNC: 105 MG/DL (ref 74–99)
GLUCOSE BLD MANUAL STRIP-MCNC: 126 MG/DL (ref 74–99)
GLUCOSE BLD MANUAL STRIP-MCNC: 144 MG/DL (ref 74–99)
GLUCOSE SERPL-MCNC: 110 MG/DL (ref 74–99)
HCT VFR BLD AUTO: 25.8 % (ref 36–46)
HGB BLD-MCNC: 7.6 G/DL (ref 12–16)
HOLD SPECIMEN: NORMAL
IMM GRANULOCYTES # BLD AUTO: 0.02 X10*3/UL (ref 0–0.5)
IMM GRANULOCYTES NFR BLD AUTO: 0.4 % (ref 0–0.9)
LYMPHOCYTES # BLD AUTO: 0.81 X10*3/UL (ref 0.8–3)
LYMPHOCYTES NFR BLD AUTO: 14.3 %
MCH RBC QN AUTO: 23 PG (ref 26–34)
MCHC RBC AUTO-ENTMCNC: 29.5 G/DL (ref 32–36)
MCV RBC AUTO: 78 FL (ref 80–100)
MONOCYTES # BLD AUTO: 0.64 X10*3/UL (ref 0.05–0.8)
MONOCYTES NFR BLD AUTO: 11.3 %
NEUTROPHILS # BLD AUTO: 3.97 X10*3/UL (ref 1.6–5.5)
NEUTROPHILS NFR BLD AUTO: 69.9 %
NRBC BLD-RTO: 0 /100 WBCS (ref 0–0)
PHOSPHATE SERPL-MCNC: 3 MG/DL (ref 2.5–4.9)
PLATELET # BLD AUTO: 125 X10*3/UL (ref 150–450)
POTASSIUM SERPL-SCNC: 5.3 MMOL/L (ref 3.5–5.3)
RBC # BLD AUTO: 3.3 X10*6/UL (ref 4–5.2)
SODIUM SERPL-SCNC: 139 MMOL/L (ref 136–145)
WBC # BLD AUTO: 5.7 X10*3/UL (ref 4.4–11.3)

## 2025-04-11 PROCEDURE — 9420000001 HC RT PATIENT EDUCATION 5 MIN

## 2025-04-11 PROCEDURE — 2500000004 HC RX 250 GENERAL PHARMACY W/ HCPCS (ALT 636 FOR OP/ED): Performed by: INTERNAL MEDICINE

## 2025-04-11 PROCEDURE — 85025 COMPLETE CBC W/AUTO DIFF WBC: CPT | Performed by: INTERNAL MEDICINE

## 2025-04-11 PROCEDURE — 2500000001 HC RX 250 WO HCPCS SELF ADMINISTERED DRUGS (ALT 637 FOR MEDICARE OP): Performed by: INTERNAL MEDICINE

## 2025-04-11 PROCEDURE — 82947 ASSAY GLUCOSE BLOOD QUANT: CPT

## 2025-04-11 PROCEDURE — 94668 MNPJ CHEST WALL SBSQ: CPT

## 2025-04-11 PROCEDURE — G0316 PR PROLONGED HOSPITAL INPATIENT OR OBSERVATION CARE EVALUATION AND MANAGEMENT SERVICE(S) BEYOND THE TOTAL TIME FOR THE PRIMARY SERVICE (WHEN THE PRIMARY SERVICE HAS BEEN SELECTED USING TIME: HCPCS | Performed by: NURSE PRACTITIONER

## 2025-04-11 PROCEDURE — 99498 ADVNCD CARE PLAN ADDL 30 MIN: CPT | Performed by: NURSE PRACTITIONER

## 2025-04-11 PROCEDURE — 36415 COLL VENOUS BLD VENIPUNCTURE: CPT | Performed by: INTERNAL MEDICINE

## 2025-04-11 PROCEDURE — 2060000001 HC INTERMEDIATE ICU ROOM DAILY

## 2025-04-11 PROCEDURE — 2500000002 HC RX 250 W HCPCS SELF ADMINISTERED DRUGS (ALT 637 FOR MEDICARE OP, ALT 636 FOR OP/ED): Performed by: INTERNAL MEDICINE

## 2025-04-11 PROCEDURE — 99233 SBSQ HOSP IP/OBS HIGH 50: CPT | Performed by: INTERNAL MEDICINE

## 2025-04-11 PROCEDURE — 99232 SBSQ HOSP IP/OBS MODERATE 35: CPT | Performed by: INTERNAL MEDICINE

## 2025-04-11 PROCEDURE — 94640 AIRWAY INHALATION TREATMENT: CPT

## 2025-04-11 PROCEDURE — 80069 RENAL FUNCTION PANEL: CPT | Performed by: INTERNAL MEDICINE

## 2025-04-11 PROCEDURE — 99497 ADVNCD CARE PLAN 30 MIN: CPT | Performed by: NURSE PRACTITIONER

## 2025-04-11 PROCEDURE — 97110 THERAPEUTIC EXERCISES: CPT | Mod: GP,CQ

## 2025-04-11 PROCEDURE — 99223 1ST HOSP IP/OBS HIGH 75: CPT | Performed by: NURSE PRACTITIONER

## 2025-04-11 PROCEDURE — 97530 THERAPEUTIC ACTIVITIES: CPT | Mod: GP,CQ

## 2025-04-11 RX ORDER — ONDANSETRON HYDROCHLORIDE 2 MG/ML
4 INJECTION, SOLUTION INTRAVENOUS EVERY 6 HOURS PRN
Status: DISCONTINUED | OUTPATIENT
Start: 2025-04-11 | End: 2025-04-17 | Stop reason: HOSPADM

## 2025-04-11 RX ORDER — HYDROCORTISONE 25 MG/G
CREAM TOPICAL 2 TIMES DAILY
Status: DISCONTINUED | OUTPATIENT
Start: 2025-04-11 | End: 2025-04-17 | Stop reason: HOSPADM

## 2025-04-11 RX ADMIN — HEPARIN SODIUM 7500 UNITS: 5000 INJECTION INTRAVENOUS; SUBCUTANEOUS at 21:56

## 2025-04-11 RX ADMIN — NYSTATIN 1 APPLICATION: 100000 POWDER TOPICAL at 15:50

## 2025-04-11 RX ADMIN — CALCIUM ACETATE 667 MG: 667 CAPSULE ORAL at 09:31

## 2025-04-11 RX ADMIN — ACETAMINOPHEN 650 MG: 325 TABLET ORAL at 12:37

## 2025-04-11 RX ADMIN — PRAVASTATIN SODIUM 40 MG: 20 TABLET ORAL at 21:56

## 2025-04-11 RX ADMIN — IPRATROPIUM BROMIDE AND ALBUTEROL SULFATE 3 ML: 2.5; .5 SOLUTION RESPIRATORY (INHALATION) at 20:08

## 2025-04-11 RX ADMIN — IPRATROPIUM BROMIDE AND ALBUTEROL SULFATE 3 ML: 2.5; .5 SOLUTION RESPIRATORY (INHALATION) at 07:19

## 2025-04-11 RX ADMIN — ASPIRIN 81 MG: 81 TABLET, CHEWABLE ORAL at 09:54

## 2025-04-11 RX ADMIN — FLUTICASONE PROPIONATE 2 SPRAY: 50 SPRAY, METERED NASAL at 21:57

## 2025-04-11 RX ADMIN — MONTELUKAST 10 MG: 10 TABLET, FILM COATED ORAL at 21:56

## 2025-04-11 RX ADMIN — ONDANSETRON 4 MG: 2 INJECTION, SOLUTION INTRAMUSCULAR; INTRAVENOUS at 16:02

## 2025-04-11 RX ADMIN — METOPROLOL TARTRATE 50 MG: 50 TABLET, FILM COATED ORAL at 09:31

## 2025-04-11 RX ADMIN — HEPARIN SODIUM 7500 UNITS: 5000 INJECTION INTRAVENOUS; SUBCUTANEOUS at 15:49

## 2025-04-11 RX ADMIN — HYDROCORTISONE: 25 CREAM TOPICAL at 21:57

## 2025-04-11 RX ADMIN — IPRATROPIUM BROMIDE AND ALBUTEROL SULFATE 3 ML: 2.5; .5 SOLUTION RESPIRATORY (INHALATION) at 11:30

## 2025-04-11 RX ADMIN — HYDROCORTISONE: 25 CREAM TOPICAL at 15:33

## 2025-04-11 RX ADMIN — EZETIMIBE 10 MG: 10 TABLET ORAL at 09:31

## 2025-04-11 RX ADMIN — NYSTATIN 1 APPLICATION: 100000 POWDER TOPICAL at 21:57

## 2025-04-11 RX ADMIN — METOPROLOL TARTRATE 50 MG: 50 TABLET, FILM COATED ORAL at 21:56

## 2025-04-11 RX ADMIN — SERTRALINE HYDROCHLORIDE 50 MG: 50 TABLET ORAL at 09:31

## 2025-04-11 RX ADMIN — NYSTATIN 1 APPLICATION: 100000 POWDER TOPICAL at 09:54

## 2025-04-11 RX ADMIN — LEVOTHYROXINE SODIUM 200 MCG: 0.1 TABLET ORAL at 05:18

## 2025-04-11 RX ADMIN — HYDRALAZINE HYDROCHLORIDE 50 MG: 50 TABLET ORAL at 09:31

## 2025-04-11 RX ADMIN — HYDRALAZINE HYDROCHLORIDE 50 MG: 50 TABLET ORAL at 21:56

## 2025-04-11 RX ADMIN — HEPARIN SODIUM 7500 UNITS: 5000 INJECTION INTRAVENOUS; SUBCUTANEOUS at 05:18

## 2025-04-11 ASSESSMENT — COGNITIVE AND FUNCTIONAL STATUS - GENERAL
TOILETING: TOTAL
TOILETING: TOTAL
CLIMB 3 TO 5 STEPS WITH RAILING: TOTAL
CLIMB 3 TO 5 STEPS WITH RAILING: TOTAL
DRESSING REGULAR UPPER BODY CLOTHING: TOTAL
MOBILITY SCORE: 6
MOVING FROM LYING ON BACK TO SITTING ON SIDE OF FLAT BED WITH BEDRAILS: TOTAL
TURNING FROM BACK TO SIDE WHILE IN FLAT BAD: TOTAL
MOBILITY SCORE: 6
WALKING IN HOSPITAL ROOM: TOTAL
STANDING UP FROM CHAIR USING ARMS: TOTAL
MOVING FROM LYING ON BACK TO SITTING ON SIDE OF FLAT BED WITH BEDRAILS: TOTAL
PERSONAL GROOMING: TOTAL
MOVING TO AND FROM BED TO CHAIR: TOTAL
STANDING UP FROM CHAIR USING ARMS: TOTAL
MOVING FROM LYING ON BACK TO SITTING ON SIDE OF FLAT BED WITH BEDRAILS: TOTAL
HELP NEEDED FOR BATHING: TOTAL
EATING MEALS: A LITTLE
PERSONAL GROOMING: TOTAL
MOVING TO AND FROM BED TO CHAIR: TOTAL
TURNING FROM BACK TO SIDE WHILE IN FLAT BAD: TOTAL
TURNING FROM BACK TO SIDE WHILE IN FLAT BAD: TOTAL
DRESSING REGULAR LOWER BODY CLOTHING: TOTAL
WALKING IN HOSPITAL ROOM: TOTAL
STANDING UP FROM CHAIR USING ARMS: TOTAL
DAILY ACTIVITIY SCORE: 8
DRESSING REGULAR UPPER BODY CLOTHING: TOTAL
HELP NEEDED FOR BATHING: TOTAL
MOVING TO AND FROM BED TO CHAIR: TOTAL
DRESSING REGULAR LOWER BODY CLOTHING: TOTAL
DAILY ACTIVITIY SCORE: 8
CLIMB 3 TO 5 STEPS WITH RAILING: TOTAL
MOBILITY SCORE: 6
WALKING IN HOSPITAL ROOM: TOTAL
EATING MEALS: A LITTLE

## 2025-04-11 ASSESSMENT — PAIN SCALES - GENERAL
PAINLEVEL_OUTOF10: 0 - NO PAIN
PAINLEVEL_OUTOF10: 6
PAINLEVEL_OUTOF10: 6
PAINLEVEL_OUTOF10: 0 - NO PAIN
PAINLEVEL_OUTOF10: 0 - NO PAIN

## 2025-04-11 ASSESSMENT — PAIN DESCRIPTION - DESCRIPTORS
DESCRIPTORS: ACHING
DESCRIPTORS: ACHING

## 2025-04-11 ASSESSMENT — ENCOUNTER SYMPTOMS
SHORTNESS OF BREATH: 1
NAUSEA: 1
COUGH: 1
CHEST TIGHTNESS: 1
ACTIVITY CHANGE: 1

## 2025-04-11 ASSESSMENT — PAIN SCALES - WONG BAKER
WONGBAKER_NUMERICALRESPONSE: NO HURT
WONGBAKER_NUMERICALRESPONSE: NO HURT
WONGBAKER_NUMERICALRESPONSE: HURTS LITTLE MORE

## 2025-04-11 ASSESSMENT — PAIN - FUNCTIONAL ASSESSMENT
PAIN_FUNCTIONAL_ASSESSMENT: 0-10

## 2025-04-11 ASSESSMENT — PAIN DESCRIPTION - LOCATION: LOCATION: BACK

## 2025-04-11 ASSESSMENT — PAIN DESCRIPTION - ORIENTATION: ORIENTATION: LOWER

## 2025-04-11 NOTE — CONSULTS
Inpatient consult to Palliative Care  Consult performed by: Cheyenne Feldman, APRN-CNP  Consult ordered by: Rajesh Rojas DO      Patient Location   [facility]  Reason For Consult  Reason for Consult: communication / medical decision making     History Of Present Illness  Analia Murray is a 74 y.o. female with past medical history of Congestive Heart Failure who presented to the ED from home about 2 1/2 weeks ago on 3/25 with shortness of breath and cough that had been worsening for the past 2 weeks. Day of admission pt also had c/o chest tightness and family member checked pulse ox was 83% and called EMS. Spouse had tested flu + a few weeks earlier, but patient tested neg for flu A/B and COVID in ED. CXR notable for mild pulmonary edema, pt initially started on Bipap but was somnolent, acidotic, admit to ICU and quickly required intubation. She was extubated 3/27 transitioned to intermittent HFNC and NIPPV, txfr to SDU 4/2. Pt cont to be hypercapneic despite NIPPV and with increasing O2 requirements was eventually txfr back to ICU 4/8. She is now on AVAPS doing a bit better and back in MCU. Pt also admitted with MARY ANN on CKD4, creat of late have been fairly stable, no urgent indications for dialysis. Pulm and nephrology consultants are following this patient. She has been very anxious and tearful, fearful about prognosis, eventual need for dialysis and so palliative care was consulted to facilitate goals of care discussion.      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), Hyperlipidemia, Hypertension, Morbid (severe) obesity due to excess calories (Multi), Obstructive sleep apnea (adult) (pediatric) (06/27/2018), Other conditions influencing health  status, Personal history of other diseases of the circulatory system, Personal history of other diseases of the digestive system (2018), Personal history of other diseases of the digestive system (2017), Personal history of other endocrine, nutritional and metabolic disease (2020), Personal history of other endocrine, nutritional and metabolic disease, Personal history of other endocrine, nutritional and metabolic disease, Personal history of other mental and behavioral disorders (2018), and Unspecified asthma, uncomplicated ([facility]) (10/05/2022).    Surgical History  She has a past surgical history that includes Colonoscopy (2013);  section, classic (2014); Small intestine surgery (2016); and Cardiac catheterization.     Social History  She reports that she has never smoked. She has never been exposed to tobacco smoke. She has never used smokeless tobacco. She reports that she does not drink alcohol and does not use drugs.    Caregiving/Caregiver Support  Does the patient require assistance in some or all components of his care, including coordination of medical care? Yes  If Yes, which person serves that role?  child   Caregiver emotional or practical needs:  both     Family History  Family History   Problem Relation Name Age of Onset    Coronary artery disease Mother      Heart attack Mother         Allergies  Ciprofloxacin, Morphine, and Penicillins    Scheduled medications  allopurinol, 50 mg, oral, Every other day  [Held by provider] amLODIPine, 10 mg, oral, Daily  aspirin, 81 mg, oral, Daily  calcium acetate, 667 mg, oral, TID after meals  ezetimibe, 10 mg, oral, Daily  fluticasone, 2 spray, Each Nostril, BID  fluticasone furoate-vilanteroL, 1 puff, inhalation, Daily  [Held by provider] gabapentin, 300 mg, oral, Daily  heparin (porcine), 7,500 Units, subcutaneous, q8h JAYA  hydrALAZINE, 50 mg, oral, BID  hydrocortisone, , Topical, BID  insulin lispro, 0-10  Units, subcutaneous, TID AC  insulin lispro, 6 Units, subcutaneous, Once  ipratropium-albuteroL, 3 mL, nebulization, TID  levothyroxine, 200 mcg, oral, Daily  [Held by provider] metoprolol succinate XL, 100 mg, oral, Daily  metoprolol tartrate, 50 mg, oral, BID  montelukast, 10 mg, oral, Nightly  nystatin, 1 Application, Topical, TID  pravastatin, 40 mg, oral, Nightly  sertraline, 50 mg, oral, Daily  sulfur hexafluoride microsphr, 2 mL, intravenous, Once in imaging      Continuous medications     PRN medications  PRN medications: acetaminophen **OR** acetaminophen, albuterol, dextrose, dextrose, glucagon, glucagon, hydrALAZINE, [Held by provider] nitroglycerin, oxygen, oxygen, polyethylene glycol, [Held by provider] traZODone     Relevant Results  Na 139, K 5.3, bun 119, creat 3.40, alb 2.8, hgb 7.6, wbc 5.7, [id number]  Results for orders placed or performed during the hospital encounter of 03/25/25 (from the past 24 hours)   POCT GLUCOSE   Result Value Ref Range    POCT Glucose 132 (H) 74 - 99 mg/dL   POCT GLUCOSE   Result Value Ref Range    POCT Glucose 114 (H) 74 - 99 mg/dL   PST Top   Result Value Ref Range    Extra Tube Hold for add-ons.    Renal function panel   Result Value Ref Range    Glucose 110 (H) 74 - 99 mg/dL    Sodium 139 136 - 145 mmol/L    Potassium 5.3 3.5 - 5.3 mmol/L    Chloride 106 98 - 107 mmol/L    Bicarbonate 27 21 - 32 mmol/L    Anion Gap 11 10 - 20 mmol/L    Urea Nitrogen 119 (HH) 6 - 23 mg/dL    Creatinine 3.40 (H) 0.50 - 1.05 mg/dL    eGFR 14 (L) >60 mL/min/1.73m*2    Calcium 8.1 (L) 8.6 - 10.3 mg/dL    Phosphorus 3.0 2.5 - 4.9 mg/dL    Albumin 2.8 (L) 3.4 - 5.0 g/dL   CBC and Auto Differential   Result Value Ref Range    WBC 5.7 4.4 - 11.3 x10*3/uL    nRBC 0.0 0.0 - 0.0 /100 WBCs    RBC 3.30 (L) 4.00 - 5.20 x10*6/uL    Hemoglobin 7.6 (L) 12.0 - 16.0 g/dL    Hematocrit 25.8 (L) 36.0 - 46.0 %    MCV 78 (L) 80 - 100 fL    MCH 23.0 (L) 26.0 - 34.0 pg    MCHC 29.5 (L) 32.0 - 36.0 g/dL    RDW  19.8 (H) 11.5 - 14.5 %    Platelets 125 (L) 150 - 450 x10*3/uL    Neutrophils % 69.9 40.0 - 80.0 %    Immature Granulocytes %, Automated 0.4 0.0 - 0.9 %    Lymphocytes % 14.3 13.0 - 44.0 %    Monocytes % 11.3 2.0 - 10.0 %    Eosinophils % 3.9 0.0 - 6.0 %    Basophils % 0.2 0.0 - 2.0 %    Neutrophils Absolute 3.97 1.60 - 5.50 x10*3/uL    Immature Granulocytes Absolute, Automated 0.02 0.00 - 0.50 x10*3/uL    Lymphocytes Absolute 0.81 0.80 - 3.00 x10*3/uL    Monocytes Absolute 0.64 0.05 - 0.80 x10*3/uL    Eosinophils Absolute 0.22 0.00 - 0.40 x10*3/uL    Basophils Absolute 0.01 0.00 - 0.10 x10*3/uL   POCT GLUCOSE   Result Value Ref Range    POCT Glucose 105 (H) 74 - 99 mg/dL     *Note: Due to a large number of results and/or encounters for the requested time period, some results have not been displayed. A complete set of results can be found in Results Review.      XR chest 1 view    Result Date: 4/8/2025  Interpreted By:  Marco Antonio Duffy, STUDY: XR CHEST 1 VIEW   INDICATION: Signs/Symptoms:resp failure.   COMPARISON: April 6, 2025   ACCESSION NUMBER(S): [id number]   ORDERING CLINICIAN: ELVI JHONSON   FINDINGS: Cardiomegaly with some mild perihilar interstitial edema suggested.   No large effusion or consolidation.         Cardiomegaly with some mild perihilar interstitial edema suggested.   No large effusion or consolidation.   Signed by: Marco Antonio Duffy 4/8/2025 8:30 AM Dictation workstation:   [id number]    XR chest 1 view    Result Date: 4/6/2025  Interpreted By:  Jil Washington, STUDY: XR CHEST 1 VIEW 4/6/2025 6:26 am   INDICATION: Signs/Symptoms:acute respiratory failure   COMPARISON: 04/05/2025   ACCESSION NUMBER(S): [id number]   ORDERING CLINICIAN: HEATHER STEPHEN   TECHNIQUE: AP erect view of the chest   FINDINGS: There is stable cardiac enlargement. There is mild pulmonary vascular congestion noted with some discoid atelectasis at the right lung base. The study is limited by the body habitus of this patient.        Stable cardiomegaly with pulmonary vascular congestion and persistent right basilar discoid atelectasis.   The study is limited by the body habitus of this patient.   Signed by: Jil Washington 4/6/2025 8:40 PM Dictation workstation:   [id number]    XR chest 1 view    Result Date: 4/6/2025  Interpreted By:  Jil Washington, STUDY: XR CHEST 1 VIEW 4/5/2025 7:42 am   INDICATION: Signs/Symptoms:acute respiratory failure   COMPARISON: 04/04/2025   ACCESSION NUMBER(S): [id number]   ORDERING CLINICIAN: HEATHER STEPHEN   TECHNIQUE: AP semi-erect view of the chest   FINDINGS: There is enlargement of the cardiac silhouette with calcified plaque seen in the aortic knob. When compared with the study done 1 day earlier, there is now increased right basilar platelike atelectasis with stable atelectasis in the left mid lung zone. There is mild pulmonary vascular congestion seen.       Enlarged cardiac silhouette with mild pulmonary vascular congestion.   Right basilar atelectasis is a little worse with stable atelectasis left mid lung zone.   Signed by: Jil Washington 4/6/2025 10:10 AM Dictation workstation:   [id number]    XR chest 1 view    Result Date: 4/4/2025  Interpreted By:  Jil Washington, STUDY: XR CHEST 1 VIEW 4/4/2025 11:56 am   INDICATION: Signs/Symptoms:acute respiratory failure   COMPARISON: 04/03/2025   ACCESSION NUMBER(S): [id number]   ORDERING CLINICIAN: HEATHER STEPHEN   TECHNIQUE: AP erect view of the chest   FINDINGS: Cardiac enlargement is unchanged. There is less right basilar atelectasis and infiltrate. There is persistent linear atelectasis at the junction of the left mid and lower lung zones. No pleural abnormality is observed.       Improved right basilar atelectasis and infiltrate without complete resolution.   Persistent linear atelectasis at the junction of the left mid and lower lung fields.   Stable cardiomegaly.   Signed by: Jil Washington 4/4/2025 12:12 PM Dictation workstation:   [id number]    XR chest 1  view    Result Date: 4/3/2025  Interpreted By:  Jil Washington, STUDY: XR CHEST 1 VIEW 4/3/2025 9:54 am   INDICATION: Signs/Symptoms:acute respiratory failure and dyspnea   COMPARISON: 04/02/2025   ACCESSION NUMBER(S): [id number]   ORDERING CLINICIAN: HEATHER STEPHEN   TECHNIQUE: AP erect view of the chest   FINDINGS: There is enlargement of the cardiac silhouette unchanged. A discoid area of atelectasis is seen at the junction of the left mid and lower lung zones. There is right basilar infiltrate and atelectasis seen. No obvious pleural abnormality is observed.       Stable enlargement of the cardiac silhouette.   New discoid atelectasis at the junction of the left mid and lower lung zones.   Right basilar infiltrate/atelectasis appearing unchanged.   Signed by: Jil Washington 4/3/2025 11:43 AM Dictation workstation:   [id number]    XR chest 1 view    Result Date: 4/2/2025  Interpreted By:  Jil Washington, STUDY: XR CHEST 1 VIEW 4/2/2025 9:44 am   INDICATION: Signs/Symptoms:acute respiratory failure   COMPARISON: 04/01/2025   ACCESSION NUMBER(S): [id number]   ORDERING CLINICIAN: HEATHER STEPHEN   TECHNIQUE: Semi-erect view of the chest at bedside   FINDINGS: There is stable enlargement of the cardiomediastinal silhouette with cardiomegaly and unfolding of the thoracic aorta. There is also right hilar prominence noted.   When compared with yesterday's study, there is now platelike atelectasis at the right lung base with left basilar infiltrate observed. It would be difficult to exclude the presence of small bilateral pleural effusions.       Enlarged cardiomediastinal silhouette with right hilar prominence.   There is right basilar platelike atelectasis now seen with left basilar infiltrate appearing unchanged.   Small bilateral pleural effusions are not excluded.   Signed by: Jil Washington 4/2/2025 10:13 AM Dictation workstation:   [id number]    XR chest 1 view    Result Date: 4/1/2025  Interpreted By:  Ric Payne  STUDY: XR CHEST 1 VIEW; 4/1/2025 5:38 am   INDICATION: CLINICAL INFORMATION: Signs/Symptoms:acute respiratory failure.   COMPARISON: 03/31/2025   ACCESSION NUMBER(S): [id number]   ORDERING CLINICIAN: LORNA SOLORZANO   TECHNIQUE: Portable chest one view.   FINDINGS: The cardiac silhouette is quite prominent suggesting cardiomegaly. Nasogastric tube has been removed. Increased density at both bases is partially secondary to overlying chest wall soft tissue density. There is also suspicion of right basilar atelectasis and/or infiltrate. Left hemithorax is thought to be clear. Small right effusion may be obscured on this portable study.       Right basilar atelectasis or infiltrate is suspected.   MACRO: none   Signed by: Ric Payne 4/1/2025 8:30 AM Dictation workstation:   [id number]    XR chest 1 view    Result Date: 3/31/2025  Interpreted By:  Solitario Caldera, STUDY: XR CHEST 1 VIEW 3/31/2025 5:35 am   INDICATION: Signs/Symptoms:acute respiratory failure   COMPARISON: 03/30/2025   ACCESSION NUMBER(S): [id number]   ORDERING CLINICIAN: LORNA SOLORZANO   TECHNIQUE: Single AP view chest   FINDINGS: NG tube is in place. Examination rotated accentuating the cardiac silhouette. Moderate cardiomegaly demonstrated. Lung bases are not well seen with component of basilar effusion suggested. Generalized increased interstitial prominence component which may be related to rotation. However, superimposed component of interstitial edema not excluded.                 1. Limited examination given rotation with basilar effusions with suggestion of the interstitial edema   Signed by: Solitario Caldera 3/31/2025 8:33 AM Dictation workstation:   [id number]    XR chest 1 view    Result Date: 3/30/2025  Interpreted By:  Quinn Abarca, STUDY: XR CHEST 1 VIEW; 3/30/2025 5:40 am   INDICATION: Signs/Symptoms:acute respiratory failure.   COMPARISON: Chest x-ray 03/29/2025   ACCESSION NUMBER(S): [id number]   ORDERING CLINICIAN: LORNA SOLORZANO    TECHNIQUE: 1 view of the chest was performed.   FINDINGS: Persistent layering bilateral pleural effusion with surrounding atelectasis. Perihilar vascular congestion. No pneumothorax.  The cardiomediastinal silhouette is stable. Distal end of nasogastric tube is not visualized.       1. Cardiomegaly with persistent layering bilateral pleural effusion with surrounding atelectasis, unchanged from prior.   Signed by: Quinn Abarca 3/30/2025 7:18 AM Dictation workstation:   [id number]    XR chest 1 view    Result Date: 3/29/2025  Interpreted By:  Lopez Ruiz, STUDY: XR CHEST 1 VIEW  3/29/2025 8:57 am   INDICATION: Signs/Symptoms:Worsening respiratory status. Tnx.   COMPARISON: 03/27/2025   ACCESSION NUMBER(S): [id number]   ORDERING CLINICIAN: NANNETTE ASKEW   TECHNIQUE: A single AP portable radiograph of the chest was obtained.   FINDINGS: The study is limited by the patient's large body habitus. Multiple cardiac monitoring leads are seen over the chest.  An enteric tube is present, with the tip overlying the mid stomach. Bilateral basilar airspace consolidations are seen and may represent small to moderate-sized pleural effusions, atelectasis and/or pneumonia. No pneumothorax is identified. The cardiac silhouette is prominent, similar to prior studies.       Bibasilar airspace consolidations, as above. Clinical correlation and continued follow-up until clearing is recommended.   MACRO: None.   Signed by: Lopez Ruiz 3/29/2025 10:29 AM Dictation workstation:   [id number]    XR chest 1 view    Result Date: 3/27/2025  Interpreted By:  Roddy Sánchez, STUDY: XR CHEST 1 VIEW;  3/27/2025 8:34 pm   INDICATION: Signs/Symptoms:NG tube placement.     COMPARISON: 03/25/2025   ACCESSION NUMBER(S): [id number]   ORDERING CLINICIAN: MICHAEL STRONG   FINDINGS: NG/OG tube courses below the diaphragm terminating over the gastric body.   The heart is enlarged. There is bibasilar airspace disease and pleural effusion. The  pulmonary vasculature is congested and there is peribronchovascular thickening likely relate to interstitial edema. Right pleural effusion.   No pneumothorax.       Please see above.     MACRO: None.   Signed by: Roddy Sánchez 3/27/2025 9:27 PM Dictation workstation:   [id number]    Transthoracic Echo (TTE) Complete    Result Date: 3/26/2025           [place] [address]            Phone [phone] TRANSTHORACIC ECHOCARDIOGRAM REPORT Patient Name:       BOB Aldridge Physician:    [id number] Randolph Guadarrama DO Study Date:         3/26/2025            Ordering Provider:    [id number] MICHAEL STRONG MRN/PID:            [id number]             Fellow: Accession#:         [id number]         Nurse: Date of Birth/Age:  1950 / 74 years Sonographer:          Theresa Paul RDCS Gender Assigned at  F                    Additional Staff: Birth: Height:             162.56 cm            Admit Date: Weight:             136.99 kg            Admission Status:     Inpatient -                                                                Routine BSA / BMI:          2.33 m2 / 51.84      Department Location:  [facility]                     kg/m2 Blood Pressure: 88 /53 mmHg Study Type:    TRANSTHORACIC ECHO (TTE) COMPLETE Diagnosis/ICD: Shortness of breath-R06.02 Indication:    sob, chf CPT Codes:     Echo Complete w Full Doppler-[id number] Patient History: Diabetes:          Yes BMI:               Obese >30 Pertinent History: CHF, HTN and COPD. bmi 51, vent, resp failure w hypoxia, sob,                    anemia, pain, ckd, cough, enceph., pulm ht. Study Detail: The following Echo studies were performed: 2D, M-Mode, Doppler and               color flow. Technically challenging study due to poor  acoustic               windows, body habitus, patient lying in supine position and               prominent lung artifact. The patient is intubated. Definity used               as a contrast agent for endocardial border definition. Total               contrast used for this procedure was 3 mL via IV push.  PHYSICIAN INTERPRETATION: Left Ventricle: Left ventricular ejection fraction is mildly decreased, by visual estimate at 45-50%. There is moderate eccentric left ventricular hypertrophy. There is global hypokinesis of the left ventricle with minor regional variations. The left ventricular cavity size is mildly dilated. There is normal septal thickness. Spectral Doppler shows a Grade I (impaired relaxation pattern) of left ventricular diastolic filling with normal left atrial filling pressure. Left Atrium: The left atrial size is normal. Right Ventricle: The right ventricle is normal in size. There is normal right ventricular global systolic function. Right Atrium: The right atrial size is normal. Aortic Valve: The aortic valve appears structurally normal. The aortic valve dimensionless index is 0.47. There is no evidence of aortic valve regurgitation. The peak instantaneous gradient of the aortic valve is 8 mmHg. The mean gradient of the aortic valve is 4 mmHg. Mitral Valve: The mitral valve is normal in structure. There is no evidence of mitral valve regurgitation. Tricuspid Valve: The tricuspid valve is structurally normal. There is mild tricuspid regurgitation. The Doppler estimated RVSP is moderately elevated right ventricular systolic pressure at 56.0 mmHg. Pulmonic Valve: The pulmonic valve is structurally normal. There is no indication of pulmonic valve regurgitation. Pericardium: No pericardial effusion noted. Aorta: The aortic root is normal.  CONCLUSIONS:  1. Left ventricular ejection fraction is mildly decreased, by visual estimate at 45-50%.  2. There is global hypokinesis of the left ventricle with  minor regional variations.  3. Spectral Doppler shows a Grade I (impaired relaxation pattern) of left ventricular diastolic filling with normal left atrial filling pressure.  4. Left ventricular cavity size is mildly dilated.  5. There is normal right ventricular global systolic function.  6. Moderately elevated right ventricular systolic pressure. QUANTITATIVE DATA SUMMARY:  2D MEASUREMENTS:             Normal Ranges: LAs:             3.30 cm     (2.7-4.0cm) IVSd:            0.85 cm     (0.6-1.1cm) LVPWd:           0.95 cm     (0.6-1.1cm) LVIDd:           6.61 cm     (3.9-5.9cm) LV Mass Index:   109.2 g/m2 LVEDV Index:     71.91 ml/m2  LV SYSTOLIC FUNCTION:                      Normal Ranges: EF-A4C View:    42 % (>=55%) EF-A2C View:    47 % EF-Biplane:     47 % EF-Visual:      48 % LV EF Reported: 48 %  LV DIASTOLIC FUNCTION:           Normal Ranges: MV Peak E:             0.67 m/s  (0.7-1.2 m/s) MV Peak A:             0.45 m/s  (0.42-0.7 m/s) E/A Ratio:             1.50      (1.0-2.2) MV e'                  0.060 m/s (>8.0) MV lateral e'          0.06 m/s MV medial e'           0.06 m/s E/e' Ratio:            11.06     (<8.0) a'                     0.06 m/s  MITRAL VALVE:          Normal Ranges: MV DT:        223 msec (150-240msec)  AORTIC VALVE:                     Normal Ranges: AoV Vmax:                1.38 m/s (<=1.7m/s) AoV Peak P.6 mmHg (<20mmHg) AoV Mean P.0 mmHg (1.7-11.5mmHg) LVOT Max Eber:            0.59 m/s (<=1.1m/s) AoV VTI:                 27.50 cm (18-25cm) LVOT VTI:                12.90 cm LVOT Diameter:           2.00 cm  (1.8-2.4cm) AoV Area, VTI:           1.47 cm2 (2.5-5.5cm2) AoV Area,Vmax:           1.35 cm2 (2.5-4.5cm2) AoV Dimensionless Index: 0.47  RIGHT VENTRICLE: TAPSE: 21.4 mm RV s'  0.09 m/s  TRICUSPID VALVE/RVSP:          Normal Ranges: Peak TR Velocity:     3.20 m/s RV Syst Pressure:     56 mmHg  (< 30mmHg) IVC Diam:             3.65 cm  PULMONIC  VALVE:          Normal Ranges: PV Accel Time:  87 msec  (>120ms) PV Max Eber:     0.9 m/s  (0.6-0.9m/s) PV Max PG:      3.5 mmHg  [id number] Randolph Guadarrama DO Electronically signed on 3/26/2025 at 2:46:04 PM  ** Final **     US renal complete    Result Date: 3/26/2025  Interpreted By:  Bob Lugo, STUDY: US RENAL COMPLETE;  3/26/2025 10:49 am   INDICATION: Signs/Symptoms:Acute kidney injury.     COMPARISON: None.   ACCESSION NUMBER(S): [id number]   ORDERING CLINICIAN: SHAE CHOI   TECHNIQUE: Real-time sonographic evaluation of the kidneys and urinary bladder was performed.   FINDINGS: RIGHT KIDNEY: Right kidney measures  9.2 cm.  No shadowing calculus or right hydronephrosis is visualized.  No discrete renal lesion is visualized.   LEFT KIDNEY: Left kidney measures  8.4 cm.  No shadowing calculus or left hydronephrosis is visualized.  No discrete renal lesion is visualized.   BLADDER: Urinary bladder is decompressed and could not be evaluated at this time.       No hydronephrosis bilaterally.   Urinary bladder decompressed and could not be evaluated at this time.   MACRO: None.   Signed by: Bob Lugo 3/26/2025 11:05 AM Dictation workstation:   [id number]    XR chest 1 view    Result Date: 3/25/2025  Interpreted By:  Ella To, STUDY: XR CHEST 1 VIEW;  3/25/2025 5:23 am   INDICATION: Signs/Symptoms:intubated and OG placement.     COMPARISON: 03/25/2025   ACCESSION NUMBER(S): [id number]   ORDERING CLINICIAN: MICHAEL STRONG   FINDINGS: AP radiograph of the chest was provided.   Enteric tube terminates approximately 1.5 cm above the bladimir. Enteric tube courses below the diaphragm with tip excluded from imaging.   CARDIOMEDIASTINAL SILHOUETTE: Cardiomediastinal silhouette is stable in size and configuration.   LUNGS: Bilateral hilar prominence similar to prior imaging slightly bulky on the right. Hazy parenchymal and interstitial opacities predominantly in the right lower lobe. No sizable pleural effusion  or pneumothorax.   ABDOMEN: No remarkable upper abdominal findings.   BONES: No acute osseous changes.       1.  Medical devices as detailed. 2. Interval progression of airspace opacities predominantly in the right lower lobe. Clinical correlation suggested.       MACRO: None   Signed by: Ella To 3/25/2025 6:50 AM Dictation workstation:   [id number]    XR chest 1 view    Result Date: 3/25/2025  Interpreted By:  Christian Mendoza, STUDY: XR CHEST 1 VIEW;  3/25/2025 12:49 am   INDICATION: Signs/Symptoms:productive cough and SOB.   COMPARISON: CXR 11/26/2024   ACCESSION NUMBER(S): [id number]   ORDERING CLINICIAN: LELO BECERRA   FINDINGS: Marked cardiomegaly. Cardiomediastinal contours unchanged.   No acute soft tissue or osseous abnormalities. No acute findings in the upper abdomen.   No large pleural effusion or pneumothorax.   Central vascular congestion. Mild prominence of the interstitium.       1. Mild prominence of the interstitium is nonspecific and may represent mild pulmonary edema or viral infectious process. No focal consolidations detected although the retrocardiac space is not well evaluated. 2. Marked cardiomegaly, unchanged.   MACRO: None   Signed by: Christian Mendoza 3/25/2025 1:30 AM Dictation workstation:   [id number]      Review of Systems   Constitutional:  Positive for activity change.   Respiratory:  Positive for cough, chest tightness and shortness of breath.    Gastrointestinal:  Positive for nausea.   Psychiatric/Behavioral:          Anxiety, tearfulness   All other systems reviewed and are negative.    Serious Illness Conversation - pt too anxious and tearful to engage in full SIC  What is your understanding now of where you are with your illness:  understands dialysis may be imminent in near future  How much information about what is likely to be ahead with your illness would you like from me: full  If you become sicker, how much are you willing to go through for the possibility  "of gaining more time:  unsure, but willing to try dialysis  How much does your family know about your priorities and wishes:  present for discussion     Physical Exam  Vitals and nursing note reviewed.   Constitutional:       General: She is not in acute distress.     Appearance: She is obese. She is ill-appearing. She is not toxic-appearing.   HENT:      Mouth/Throat:      Mouth: Mucous membranes are moist.      Pharynx: Oropharynx is clear.   Eyes:      General: No scleral icterus.     Extraocular Movements: Extraocular movements intact.      Pupils: Pupils are equal, round, and reactive to light.   Cardiovascular:      Rate and Rhythm: Normal rate and regular rhythm.      Pulses: Normal pulses.      Heart sounds: No murmur heard.  Pulmonary:      Effort: Pulmonary effort is normal. No respiratory distress.      Breath sounds: No wheezing or rales.   Abdominal:      General: There is no distension.      Palpations: Abdomen is soft.      Tenderness: There is no abdominal tenderness. There is no guarding.   Musculoskeletal:      Cervical back: Normal range of motion.      Right lower leg: No edema.      Left lower leg: No edema.   Skin:     General: Skin is warm and dry.   Neurological:      General: No focal deficit present.      Mental Status: She is alert and oriented to person, place, and time.   Psychiatric:         Mood and Affect: Mood normal.         Behavior: Behavior normal.         Thought Content: Thought content normal.         Judgment: Judgment normal.      Comments: Anxious and tearful, but affect is appropriate given current situation         Last Recorded Vitals  Blood pressure 160/71, pulse 83, temperature 36.6 °C (97.9 °F), temperature source Oral, resp. rate 17, height 1.626 m (5' 4.02\"), weight 147 kg (324 lb 1.2 oz), SpO2 95%.      PALLIATIVE MEDICINE PALLIATIVE PERFORMANCE SCALE     Palliative Performance Scale % (PPS) 40-50%   Oral Intake Moderately reduced (> mouthfuls) (1.0)   Edema " Present (1.0)   Dyspnea at Rest Absent (0)   Delirium Absent (0)   Palliative Prognostic Index (PPI) Total Score 0-2   Note: The scores from each prognostic domain are added.  A score of 0 to 2.0 was associated with a median survival of 90 days; score of 2.1 to 4.0 is 61 days, and score of >4.0 is 12 days.        Assessment/Plan   IMP:    Acute respiratory failure with hypoxia and hypercapnia - improving now with AVAPS pt on room air during day time. Pulmonology is following  MARY ANN on CKD - improved, Dr. Slater following; no urgent indication for dialysis at present  Anxiety and depression - psychiatry has been consulted, will appreciate their input    Palliative Care Encounter  Full Code  Pt is capable  Spouse Jose Murray is legal surrogate  Pt also has two children, To and Tiffanie  No HPOA or LW on record, denies that she has these at present    Goc discussion at bedside with the patient, along with Tiffanie Ariza and pt sister. Pt very tearful. Feeling frightened and overwhelmed with potential need for dialysis in future. States she would be willing to accept dialysis. We reviewed other advanced directives. She would like to appoint son and daughter as HPOA. [place] HPOA form provided for them to review together and discuss. She is agreeable to completion of this prior to hospital discharge. She wants to consider further regarding completion of LW. I also provided a blank [place] LW document for patient and family to review together.   Discussion regarding code status. She is presently FULL MEASURES. Reviewed risks vs benefits CPR and intubation. She was briefly intubated during this hospitalization. Gave recommendation for DNR-CCA due to advanced age, underlying co-morbidities, limited organ reserve. Pt tearful. Daughter Tiffanie verbalized understanding. Will respect pt wishes. She would like to consider. No changes at this time. Pt to remain in full disease modifying model of care. We will cont to follow.   Crystal and Dr. Slater updated.    Code status:   FULL MEASURES  Patient/proxy preference for information  Prefers full information    Provider estimate of survival: 6+ months  Patient Prognostic Awareness: Yes - congruent with provider estimation    Is the patient hospice-eligible?  Potentially.  Was a discussion held re hospice services?   no  Was a decision made re hospice services?  NA    Goals of Care  Treatment model  Symptom Management  Pain: denies  Medications recommended for pain?  No  Tiredness: yes  Nausea: yes  Depression: yes  Anxiety: yes  Drowsiness: no  Appetite: fair   Wellbeing: fair  Dyspnea: denies  Intervention recommended for dyspnea?  no pulmonology following  Other: NA  Intervention recommended for constipation?  No    Other Palliative Support  We will follow up on advanced directives and overall GOC.    Thank you for this consultation. We will follow.    I spent 150 minutes in the professional and overall care of this patient, incl 50 min ACP.      Cheyenne Feldman, APRN-CNP

## 2025-04-11 NOTE — NURSING NOTE
Pts family in at bedside. This RN had conversation with pt and family member about pts plan of care. Care ongoing.

## 2025-04-11 NOTE — PROGRESS NOTES
Department of Medicine  Division of Pulmonary, Critical Care, and Sleep Medicine    Analia Murray is a 74 y.o. female on day 17 of admission presenting with Acute respiratory failure with hypoxia.    Subjective   Respiratory status improving, on room air during the day, no events or complaints.       Objective     Vital Signs      4/10/2025     7:30 PM 4/10/2025    11:15 PM 4/11/2025     3:28 AM 4/11/2025     4:00 AM 4/11/2025     5:27 AM 4/11/2025     5:34 AM 4/11/2025     7:42 AM   Vitals   Systolic 152 159 145    145   Diastolic 59 73 81    64   BP Location Left arm Left arm Left arm    Left arm   Heart Rate 78      74   Temp 37.1 °C (98.8 °F) 37.1 °C (98.8 °F) 36.8 °C (98.2 °F)    37 °C (98.6 °F)   Resp 18 18 18 18   16   Weight (lb)     324.08 324.08    BMI     55.6 kg/m2 55.6 kg/m2    BSA (m2)     2.58 m2 2.58 m2         Physical exam  Constitutional: Obese, awake and alert, comfortable in bed.  HEENT: Normocephalic and atraumatic.  Cardiovascular: Normal rate and regular rhythm.  Pulmonary: Normal respiratory effort, distant anterior breath sounds, no wheezing.    Musculoskeletal: No edema, no cyanosis.  Neurological: Awake, alert and oriented x 3  Psychiatric: Normal behavior, mood and affect.    Labs:  Lab Results   Component Value Date    WBC 5.7 04/11/2025    HGB 7.6 (L) 04/11/2025    HCT 25.8 (L) 04/11/2025    MCV 78 (L) 04/11/2025     (L) 04/11/2025      Lab Results   Component Value Date    GLUCOSE 120 (H) 04/10/2025    GLUCOSE 117 (H) 04/10/2025    CALCIUM 8.1 (L) 04/10/2025    CALCIUM 8.3 (L) 04/10/2025     04/10/2025     04/10/2025    K 5.2 04/10/2025    K 5.2 04/10/2025    CO2 26 04/10/2025    CO2 24 04/10/2025     04/10/2025     04/10/2025     (HH) 04/10/2025     (HH) 04/10/2025    CREATININE 3.79 (H) 04/10/2025    CREATININE 3.91 (H) 04/10/2025      Lab Results   Component Value Date    ALT 9 04/10/2025    AST 9 04/10/2025    ALKPHOS 66  04/10/2025    BILITOT 0.3 04/10/2025        Oxygen Therapy  SpO2: 96 %  Medical Gas Therapy: None (Room air)  Medical Gas Delivery Method: CPAP/Bi-PAP mask  FiO2 (%):  [21 %] 21 %  S RR:  [18] 18  S VT:  [500 mL] 500 mL    Intake/Output last 3 Shifts:  I/O last 3 completed shifts:  In: 480 (3.3 mL/kg) [P.O.:480]  Out: 1450 (9.9 mL/kg) [Urine:1450 (0.3 mL/kg/hr)]  Weight: 147 kg       Medications   Scheduled medications  allopurinol, 50 mg, oral, Every other day  [Held by provider] amLODIPine, 10 mg, oral, Daily  aspirin, 81 mg, oral, Daily  calcium acetate, 667 mg, oral, TID after meals  ezetimibe, 10 mg, oral, Daily  fluticasone, 2 spray, Each Nostril, BID  fluticasone furoate-vilanteroL, 1 puff, inhalation, Daily  [Held by provider] gabapentin, 300 mg, oral, Daily  heparin (porcine), 7,500 Units, subcutaneous, q8h JAYA  hydrALAZINE, 50 mg, oral, BID  insulin lispro, 0-10 Units, subcutaneous, TID AC  insulin lispro, 6 Units, subcutaneous, Once  ipratropium-albuteroL, 3 mL, nebulization, TID  levothyroxine, 200 mcg, oral, Daily  [Held by provider] metoprolol succinate XL, 100 mg, oral, Daily  metoprolol tartrate, 50 mg, oral, BID  montelukast, 10 mg, oral, Nightly  nystatin, 1 Application, Topical, TID  pravastatin, 40 mg, oral, Nightly  sertraline, 50 mg, oral, Daily  sulfur hexafluoride microsphr, 2 mL, intravenous, Once in imaging      Continuous medications     PRN medications  PRN medications: acetaminophen **OR** acetaminophen, albuterol, dextrose, dextrose, glucagon, glucagon, hydrALAZINE, [Held by provider] nitroglycerin, oxygen, oxygen, polyethylene glycol, [Held by provider] traZODone     Allergies  Ciprofloxacin, Morphine, and Penicillins    Chest Radiograph   No results found for this or any previous visit from the past 10 days.       XR chest 1 view 04/06/2025    Narrative  Interpreted By:  Jil Washington,  STUDY:  XR CHEST 1 VIEW 4/6/2025 6:26 am    INDICATION:  Signs/Symptoms:acute respiratory  failure    COMPARISON:  04/05/2025    ACCESSION NUMBER(S):  [id number]    ORDERING CLINICIAN:  HEATHER STEPHEN    TECHNIQUE:  AP erect view of the chest    FINDINGS:  There is stable cardiac enlargement. There is mild pulmonary vascular  congestion noted with some discoid atelectasis at the right lung  base. The study is limited by the body habitus of this patient.    Impression  Stable cardiomegaly with pulmonary vascular congestion and persistent  right basilar discoid atelectasis.    The study is limited by the body habitus of this patient.    Signed by: Jil Washington 4/6/2025 8:40 PM  Dictation workstation:   [id number]      XR chest 1 view 04/05/2025    Narrative  Interpreted By:  Jil Washington,  STUDY:  XR CHEST 1 VIEW 4/5/2025 7:42 am    INDICATION:  Signs/Symptoms:acute respiratory failure    COMPARISON:  04/04/2025    ACCESSION NUMBER(S):  [id number]    ORDERING CLINICIAN:  HEATHER STEPHEN    TECHNIQUE:  AP semi-erect view of the chest    FINDINGS:  There is enlargement of the cardiac silhouette with calcified plaque  seen in the aortic knob. When compared with the study done 1 day  earlier, there is now increased right basilar platelike atelectasis  with stable atelectasis in the left mid lung zone. There is mild  pulmonary vascular congestion seen.    Impression  Enlarged cardiac silhouette with mild pulmonary vascular congestion.    Right basilar atelectasis is a little worse with stable atelectasis  left mid lung zone.    Signed by: Jil Washington 4/6/2025 10:10 AM  Dictation workstation:   [id number]      XR chest 1 view 04/04/2025    Narrative  Interpreted By:  Jil Washington,  STUDY:  XR CHEST 1 VIEW 4/4/2025 11:56 am    INDICATION:  Signs/Symptoms:acute respiratory failure    COMPARISON:  04/03/2025    ACCESSION NUMBER(S):  [id number]    ORDERING CLINICIAN:  HEATHER STEPHEN    TECHNIQUE:  AP erect view of the chest    FINDINGS:  Cardiac enlargement is unchanged. There is less right basilar  atelectasis and  infiltrate. There is persistent linear atelectasis at  the junction of the left mid and lower lung zones. No pleural  abnormality is observed.    Impression  Improved right basilar atelectasis and infiltrate without complete  resolution.    Persistent linear atelectasis at the junction of the left mid and  lower lung fields.    Stable cardiomegaly.    Signed by: Jil Washington 4/4/2025 12:12 PM  Dictation workstation:   [id number]         Assessment and Plan / Recommendations   Assessment/Plan   74-year-old woman with history of asthma (clinical diagnosis), HOLDEN on CPAP, CKD, diabetes admitted with    1.  Acute hypoxic and hypercapnic respiratory failure due to below  2.  Suspected asthma exacerbation (was diagnosed with asthma clinically, PFT from 2020 no obstruction, never smoked)  3.  Pulmonary edema/CHF  4.  HOLDEN with hypoventilation    Improving clinically, on room air during the day now, AVAPS at night    -Continue AVAPS at night, likely needs to be discharged on AVAPS rather than CPAP, to be evaluated for home AVAPS close to discharge (failed CPAP/BiPAP-blood gas with persistent hypercapnic acidosis on both).  -Completed steroids, no wheezing on exam   -Continue Breo, continue Singulair, nebs as needed  -Encourage incentive spirometry and Aerobika  -Diuresis/volume management per nephrology      This dictation was created using voice recognition software. Phonetic and/or minor grammatical errors may exist.      John Ross MD

## 2025-04-11 NOTE — CARE PLAN
The patient's goals for the shift include monitor oxygenation    The clinical goals for the shift include safety, moniyot respiratory effort, review labs    Over the shift, the patient did not make progress toward the following goals. Barriers to progression include na. Recommendations to address these barriers include na.      Problem: Skin  Goal: Decreased wound size/increased tissue granulation at next dressing change  Recent Flowsheet Documentation  Taken 4/11/2025 1138 by Alicia Humphries RN  Decreased wound size/increased tissue granulation at next dressing change:   Utilize specialty bed per algorithm   Protective dressings over bony prominences     Problem: Skin  Goal: Decreased wound size/increased tissue granulation at next dressing change  Outcome: Progressing  Flowsheets (Taken 4/11/2025 1138)  Decreased wound size/increased tissue granulation at next dressing change:   Utilize specialty bed per algorithm   Protective dressings over bony prominences  Goal: Participates in plan/prevention/treatment measures  Outcome: Progressing  Goal: Prevent/manage excess moisture  Outcome: Progressing  Goal: Prevent/minimize sheer/friction injuries  Outcome: Progressing  Goal: Promote/optimize nutrition  Outcome: Progressing  Goal: Promote skin healing  Outcome: Progressing     Problem: Respiratory  Goal: Clear secretions with interventions this shift  Outcome: Progressing  Goal: Minimize anxiety/maximize coping throughout shift  Outcome: Progressing  Goal: No signs of respiratory distress (eg. Use of accessory muscles. Peds grunting)  Outcome: Progressing     Problem: Pain - Adult  Goal: Verbalizes/displays adequate comfort level or baseline comfort level  Outcome: Progressing     Problem: Safety - Adult  Goal: Free from fall injury  Outcome: Progressing     Problem: Discharge Planning  Goal: Discharge to home or other facility with appropriate resources  Outcome: Progressing     Problem: Chronic Conditions and  Co-morbidities  Goal: Patient's chronic conditions and co-morbidity symptoms are monitored and maintained or improved  Outcome: Progressing     Problem: Nutrition  Goal: Nutrient intake appropriate for maintaining nutritional needs  Outcome: Progressing     Problem: Pain  Goal: Takes deep breaths with improved pain control throughout the shift  Outcome: Progressing  Goal: Turns in bed with improved pain control throughout the shift  Outcome: Progressing  Goal: Walks with improved pain control throughout the shift  Outcome: Progressing  Goal: Performs ADL's with improved pain control throughout shift  Outcome: Progressing  Goal: Participates in PT with improved pain control throughout the shift  Outcome: Progressing  Goal: Free from opioid side effects throughout the shift  Outcome: Progressing  Goal: Free from acute confusion related to pain meds throughout the shift  Outcome: Progressing

## 2025-04-11 NOTE — INDIVIDUALIZED OVERALL PLAN OF CARE NOTE
Chart reviewed, and the patient’s case was discussed with nursing.  During our encounter, the patient’s family was present at the bedside. We introduced ourselves and requested permission to speak with the patient; however, she declined engaging with us at this time. We offered to return at a later date, to which the patient responded that tomorrow may be more appropriate.    We appreciate the opportunity to be involved in the patient’s care and will plan to reassess and follow up with her tomorrow.

## 2025-04-11 NOTE — CARE PLAN
The patient's goals for the shift include monitor oxygenation    The clinical goals for the shift include safety and pain control    Over the shift, the patient did not make progress toward the following goals. Barriers to progression include oxygen needs. Recommendations to address these barriers include monitor SPO2 levels.

## 2025-04-11 NOTE — PROGRESS NOTES
Physical Therapy    Physical Therapy Treatment    Patient Name: Analia Murray  MRN: [id number]  Department: [address]  Room: [address] [place] [address]  Today's Date: 4/11/2025  Time Calculation  Start Time: 1215  Stop Time: 1239  Time Calculation (min): 24 min         Assessment/Plan   PT Assessment  Rehab Prognosis: Good  Barriers to Discharge Home: Caregiver assistance, Physical needs  Caregiver Assistance: Caregiver assistance needed per identified barriers - however, level of patient's required assistance exceeds assistance available at home  Physical Needs: Stair navigation into home limited by function/safety, Ambulating household distances limited by function/safety, High falls risk due to function or environment  End of Session Communication: Bedside nurse  Assessment Comment: Pt continues to require MaxAx2 for all functional mobility. Pt very tearful this session and req'd max encouragement throughout session. Pt would continue to benefit from skilled therapy services to address functional deficits.  End of Session Patient Position: Bed, 3 rail up, Alarm off, caregiver present (RN in room.)  PT Plan  Inpatient/Swing Bed or Outpatient: Inpatient  PT Plan  Treatment/Interventions: Bed mobility, Transfer training, Gait training, Stair training, Balance training, Neuromuscular re-education, Strengthening, Endurance training, Therapeutic exercise, Therapeutic activity, Home exercise program, Positioning, Postural re-education  PT Plan: Ongoing PT  PT Frequency: 5 times per week  PT Discharge Recommendations: Moderate intensity level of continued care  Equipment Recommended upon Discharge: Wheeled walker  PT Recommended Transfer Status: Assist x2, Assistive device (FWW)  PT - OK to Discharge: Yes      General Visit Information:   PT  Visit  PT Received On: 04/11/25  General  Reason for Referral: Impaired functional mobility due to decreased muscular strength. This 74 year old was admitted for acute hypoxic respiratory failure  and acute metabolic encephalopathy. Pt intubated 3/25-3/27/25.  Referred By: Dr. Sher MD  Family/Caregiver Present: Yes  Caregiver Feedback: son at bedside  Prior to Session Communication: Bedside nurse  Patient Position Received: Bed, 3 rail up, Alarm off, caregiver present (RN and son in room)  General Comment: Pt cleared for PT by nursing. Pt agreeable to PT services.    Subjective   Precautions:  Precautions  Medical Precautions: Fall precautions      Objective   Pain:  Pain Assessment  Pain Assessment: 0-10  0-10 (Numeric) Pain Score: 6 (RN in room, aware of pain levels.)  Pain Type: Chronic pain  Pain Location: Back  Pain Orientation: Lower  Cognition:  Cognition  Overall Cognitive Status: Within Functional Limits  Orientation Level: Oriented X4     Postural Control:  Static Sitting Balance  Static Sitting-Balance Support: Feet supported, Bilateral upper extremity supported  Static Sitting-Level of Assistance: Maximum assistance  Static Sitting-Comment/Number of Minutes: poor seated static balance this session    Treatments:  Therapeutic Exercise  Therapeutic Exercise Performed: Yes  Therapeutic Exercise Activity 1: Seated B x5: LAQ, hip abd, ankle pumps/circles. Max encouragement req'd for participation    Therapeutic Activity  Therapeutic Activity Performed: Yes  Therapeutic Activity 1: Seated at EOB for ~12 mins with MaxA to prevent retropulsion. Cues for weight shifting, safe hand placement.    Bed Mobility  Bed Mobility: Yes  Bed Mobility 1  Bed Mobility 1: Supine to sitting, Sitting to supine  Level of Assistance 1: Maximum assistance, +2  Bed Mobility Comments 1: MaxAx2 to raise trunk and clear BLE over EOB. Increased time and effort to complete.  Bed Mobility 2  Bed Mobility  2: Rolling right, Rolling left  Level of Assistance 2: Maximum assistance  Bed Mobility Comments 2: MaxA to initiate and maintain roll. Cues for hand placement  Bed Mobility 3  Bed Mobility 3: Scooting  Level of Assistance 3:  Dependent, +2  Bed Mobility Comments 3: Total assist x2 with use of draw sheet to bring hips to EOB and scoot towards HOB.    Ambulation/Gait Training  Ambulation/Gait Training Performed: No  Transfers  Transfer: No (Unsafe to attempt this session)    Outcome Measures:  [facility] Basic Mobility  Turning from your back to your side while in a flat bed without using bedrails: Total  Moving from lying on your back to sitting on the side of a flat bed without using bedrails: Total  Moving to and from bed to chair (including a wheelchair): Total  Standing up from a chair using your arms (e.g. wheelchair or bedside chair): Total  To walk in hospital room: Total  Climbing 3-5 steps with railing: Total  Basic Mobility - Total Score: 6    Education Documentation  Precautions, taught by Camelia Mirza PTA at 4/11/2025  1:56 PM.  Learner: Family, Patient  Readiness: Acceptance  Method: Explanation, Demonstration  Response: Verbalizes Understanding, Needs Reinforcement  Comment: POC, importance of mobility    Mobility Training, taught by Camelia Mirza PTA at 4/11/2025  1:56 PM.  Learner: Family, Patient  Readiness: Acceptance  Method: Explanation, Demonstration  Response: Verbalizes Understanding, Needs Reinforcement  Comment: POC, importance of mobility    Education Comments  No comments found.        Encounter Problems       Encounter Problems (Active)       PT Problem       Pt will transition supine<>sit with close S. (Progressing)       Start:  03/27/25    Expected End:  04/19/25            Pt will transfer sit<>stand with FWW & close S. (Not Progressing)       Start:  03/27/25    Expected End:  04/19/25            Pt will ambulate >/=25 ft with FWW & close S. (Not Progressing)       Start:  03/27/25    Expected End:  04/19/25               Pain - Adult

## 2025-04-11 NOTE — PROGRESS NOTES
"Analia Murray is a 74 y.o. female on day 17 of admission presenting with Acute respiratory failure with hypoxia.    Subjective   The patient is seen for MARY ANN on CKD stage III/IV overall condition is not changed she states she is not feeling well she is very weak not eating very emotional today and crying       Objective     Physical Exam  Constitutional:       General: She is not in acute distress.     Appearance: Normal appearance. She is not toxic-appearing.   Neck:      Vascular: No carotid bruit.   Cardiovascular:      Heart sounds: No murmur heard.     No friction rub. No gallop.   Pulmonary:      Breath sounds: No wheezing, rhonchi or rales.   Abdominal:      Tenderness: There is no abdominal tenderness. There is no right CVA tenderness, left CVA tenderness or rebound.   Musculoskeletal:         General: No tenderness.      Cervical back: Neck supple.      Right lower leg: No edema (1+ edema).      Left lower leg: No edema (1+ edema).   Lymphadenopathy:      Cervical: No cervical adenopathy.         Last Recorded Vitals  Blood pressure 145/64, pulse 74, temperature 37 °C (98.6 °F), temperature source Oral, resp. rate 16, height 1.626 m (5' 4.02\"), weight 147 kg (324 lb 1.2 oz), SpO2 96%.    Intake/Output last 3 Shifts:  I/O last 3 completed shifts:  In: 480 (3.3 mL/kg) [P.O.:480]  Out: 1450 (9.9 mL/kg) [Urine:1450 (0.3 mL/kg/hr)]  Weight: 147 kg     Current Facility-Administered Medications:     acetaminophen (Tylenol) oral liquid 650 mg, 650 mg, oral, q4h PRN **OR** acetaminophen (Tylenol) tablet 650 mg, 650 mg, oral, q4h PRN, Rajesh PARRA Scharlotte, DO, 650 mg at 04/10/25 0926    albuterol 2.5 mg /3 mL (0.083 %) nebulizer solution 2.5 mg, 2.5 mg, nebulization, q2h PRN, Rajesh R Scharlotte, DO    allopurinol (Zyloprim) tablet 50 mg, 50 mg, oral, Every other day, Rajesh Harringtonte, DO, 50 mg at 04/10/25 0925    [Held by provider] amLODIPine (Norvasc) tablet 10 mg, 10 mg, oral, Daily, Sanjiv Neil PA-C, 10 mg " at 03/28/25 0948    aspirin chewable tablet 81 mg, 81 mg, oral, Daily, Rajesh Harringtonte, DO, 81 mg at 04/11/25 0954    calcium acetate (Phoslo) capsule 667 mg, 667 mg, oral, TID after meals, Rajesh Rojas, DO, 667 mg at 04/11/25 0931    dextrose 50 % injection 12.5 g, 12.5 g, intravenous, q15 min PRN, Rajesh Harringtonte, DO    dextrose 50 % injection 25 g, 25 g, intravenous, q15 min PRN, Rajesh Harringtonte, DO    ezetimibe (Zetia) tablet 10 mg, 10 mg, oral, Daily, Rajesh Rojas DO, 10 mg at 04/11/25 0931    fluticasone (Flonase) nasal spray 2 spray, 2 spray, Each Nostril, BID, Rajesh Rojas DO, 2 spray at 04/10/25 2058    fluticasone furoate-vilanteroL (Breo Ellipta) 100-25 mcg/dose inhaler 1 puff, 1 puff, inhalation, Daily, Rajesh Rojas DO    [Held by provider] gabapentin (Neurontin) capsule 300 mg, 300 mg, oral, Daily, Krysta Ramirez MD, 300 mg at 04/07/25 0924    glucagon (Glucagen) injection 1 mg, 1 mg, intramuscular, q15 min PRN, Rajesh Harringtonte, DO    glucagon (Glucagen) injection 1 mg, 1 mg, intramuscular, q15 min PRN, Rajesh Harringtonte, DO    heparin (porcine) injection 7,500 Units, 7,500 Units, subcutaneous, q8h JAYA, Rajesh Rojas, DO, 7,500 Units at 04/11/25 0518    hydrALAZINE (Apresoline) injection 10 mg, 10 mg, intravenous, q4h PRN, Rajesh Rojas DO, 10 mg at 04/08/25 1220    hydrALAZINE (Apresoline) tablet 50 mg, 50 mg, oral, BID, Rajesh Rojas DO, 50 mg at 04/11/25 0931    insulin lispro injection 0-10 Units, 0-10 Units, subcutaneous, TID AC, Rajesh Rojas DO, 4 Units at 04/10/25 1245    insulin lispro injection 6 Units, 6 Units, subcutaneous, Once, Rajesh Rojas DO    ipratropium-albuteroL (Duo-Neb) 0.5-2.5 mg/3 mL nebulizer solution 3 mL, 3 mL, nebulization, TID, Rajesh Rojas DO, 3 mL at 04/11/25 0719    levothyroxine (Synthroid, Levoxyl) tablet 200 mcg, 200 mcg, oral, Daily, Rajesh Rojas DO, 200 mcg at 04/11/25 0584     [Held by provider] metoprolol succinate XL (Toprol-XL) 24 hr tablet 100 mg, 100 mg, oral, Daily, Sanjiv Neil PA-C    metoprolol tartrate (Lopressor) tablet 50 mg, 50 mg, oral, BID, Rjaesh Rojas DO, 50 mg at 04/11/25 0931    montelukast (Singulair) tablet 10 mg, 10 mg, oral, Nightly, Rajesh Rojas DO, 10 mg at 04/10/25 2058    [Held by provider] nitroglycerin (Nitrostat) SL tablet 0.4 mg, 0.4 mg, sublingual, q5 min PRN, Sanjiv Neil PA-C    nystatin (Mycostatin) 100,000 unit/gram powder 1 Application, 1 Application, Topical, TID, Rajesh Rojas DO, 1 Application at 04/11/25 0954    oxygen (O2) therapy, , inhalation, Continuous PRN - O2/gases, Rajesh Rojas DO, 2 L/min at 04/10/25 1005    oxygen (O2) therapy, , inhalation, Continuous PRN - O2/gases, Sixto Slater MD    polyethylene glycol (Glycolax, Miralax) packet 17 g, 17 g, oral, Daily PRN, Rajesh Rojas DO    pravastatin (Pravachol) tablet 40 mg, 40 mg, oral, Nightly, Rajesh Rojas DO, 40 mg at 04/10/25 2058    sertraline (Zoloft) tablet 50 mg, 50 mg, oral, Daily, Rajesh Rojas DO, 50 mg at 04/11/25 0931    sulfur hexafluoride microsphr (Lumason) injection 24.28 mg, 2 mL, intravenous, Once in imaging, Rajesh Rojas DO    [Held by provider] traZODone (Desyrel) tablet 25 mg, 25 mg, oral, Nightly PRN, Robbie Olivarez MD   Relevant Results    Results for orders placed or performed during the hospital encounter of 03/25/25 (from the past 96 hours)   POCT GLUCOSE   Result Value Ref Range    POCT Glucose 167 (H) 74 - 99 mg/dL   Blood Gas Arterial   Result Value Ref Range    POCT pH, Arterial 7.22 (LL) 7.38 - 7.42 pH    POCT pCO2, Arterial 59 (H) 38 - 42 mm Hg    POCT pO2, Arterial 118 (H) 85 - 95 mm Hg    POCT SO2, Arterial 100 94 - 100 %    POCT Oxy Hemoglobin, Arterial 97.6 94.0 - 98.0 %    POCT Base Excess, Arterial -4.5 (L) -2.0 - 3.0 mmol/L    POCT HCO3 Calculated, Arterial 24.1 22.0 - 26.0 mmol/L    Patient  Temperature 37.0 degrees Celsius    FiO2 28 %    Apparatus CANNULA     Critical Called By ALFIE HILL RRT     Critical Called To DR.MUSTAFA SEGOVIA     Critical Call Time 1348     Critical Read Back Y     Site of Arterial Puncture Brachial Right     Wes's Test Negative    Blood Gas Arterial Full Panel   Result Value Ref Range    POCT pH, Arterial 7.22 (LL) 7.38 - 7.42 pH    POCT pCO2, Arterial 60 (H) 38 - 42 mm Hg    POCT pO2, Arterial 108 (H) 85 - 95 mm Hg    POCT SO2, Arterial 100 94 - 100 %    POCT Oxy Hemoglobin, Arterial 97.8 94.0 - 98.0 %    POCT Hematocrit Calculated, Arterial 28.0 (L) 36.0 - 46.0 %    POCT Sodium, Arterial 132 (L) 136 - 145 mmol/L    POCT Potassium, Arterial 5.0 3.5 - 5.3 mmol/L    POCT Chloride, Arterial 101 98 - 107 mmol/L    POCT Ionized Calcium, Arterial 1.18 1.10 - 1.33 mmol/L    POCT Glucose, Arterial 194 (H) 74 - 99 mg/dL    POCT Lactate, Arterial 0.7 0.4 - 2.0 mmol/L    POCT Base Excess, Arterial -3.5 (L) -2.0 - 3.0 mmol/L    POCT HCO3 Calculated, Arterial 24.6 22.0 - 26.0 mmol/L    POCT Hemoglobin, Arterial 9.3 (L) 12.0 - 16.0 g/dL    POCT Anion Gap, Arterial 11 10 - 25 mmo/L    Patient Temperature 37.0 degrees Celsius    FiO2 30 %    Apparatus      Ipap CMH2O 15.0 cm H2O    Epap CMH2O 5.0 cm H2O    Critical Called By ALFIE HILL RRT     Critical Called To      Critical Call Time 1625     Critical Read Back Y     Site of Arterial Puncture Radial Right     Wes's Test Positive    POCT GLUCOSE   Result Value Ref Range    POCT Glucose 218 (H) 74 - 99 mg/dL   Blood Gas Arterial Full Panel   Result Value Ref Range    POCT pH, Arterial 7.25 (LL) 7.38 - 7.42 pH    POCT pCO2, Arterial 55 (H) 38 - 42 mm Hg    POCT pO2, Arterial 104 (H) 85 - 95 mm Hg    POCT SO2, Arterial 100 94 - 100 %    POCT Oxy Hemoglobin, Arterial 97.9 94.0 - 98.0 %    POCT Hematocrit Calculated, Arterial 26.0 (L) 36.0 - 46.0 %    POCT Sodium, Arterial 132 (L) 136 - 145 mmol/L    POCT Potassium,  Arterial 5.3 3.5 - 5.3 mmol/L    POCT Chloride, Arterial 102 98 - 107 mmol/L    POCT Ionized Calcium, Arterial 1.20 1.10 - 1.33 mmol/L    POCT Glucose, Arterial 193 (H) 74 - 99 mg/dL    POCT Lactate, Arterial 0.6 0.4 - 2.0 mmol/L    POCT Base Excess, Arterial -3.3 (L) -2.0 - 3.0 mmol/L    POCT HCO3 Calculated, Arterial 24.1 22.0 - 26.0 mmol/L    POCT Hemoglobin, Arterial 8.8 (L) 12.0 - 16.0 g/dL    POCT Anion Gap, Arterial 11 10 - 25 mmo/L    Patient Temperature 37.0 degrees Celsius    FiO2 28 %    Ventilator Rate 14 bpm    Tidal Volume 500 mL    Epap CMH2O 10.0 cm H2O    Critical Called By MARITA GIBSON RRT     Critical Called To TONY JOHNSON PA-C     Critical Call Time 1949     Critical Read Back Y     Site of Arterial Puncture Radial Right     Wes's Test Positive    POCT GLUCOSE   Result Value Ref Range    POCT Glucose 153 (H) 74 - 99 mg/dL   Renal function panel   Result Value Ref Range    Glucose 138 (H) 74 - 99 mg/dL    Sodium 133 (L) 136 - 145 mmol/L    Potassium 5.0 3.5 - 5.3 mmol/L    Chloride 102 98 - 107 mmol/L    Bicarbonate 23 21 - 32 mmol/L    Anion Gap 13 10 - 20 mmol/L    Urea Nitrogen 125 (HH) 6 - 23 mg/dL    Creatinine 4.12 (H) 0.50 - 1.05 mg/dL    eGFR 11 (L) >60 mL/min/1.73m*2    Calcium 8.1 (L) 8.6 - 10.3 mg/dL    Phosphorus 4.1 2.5 - 4.9 mg/dL    Albumin 3.0 (L) 3.4 - 5.0 g/dL   CBC and Auto Differential   Result Value Ref Range    WBC 5.6 4.4 - 11.3 x10*3/uL    nRBC 0.0 0.0 - 0.0 /100 WBCs    RBC 4.04 4.00 - 5.20 x10*6/uL    Hemoglobin 9.2 (L) 12.0 - 16.0 g/dL    Hematocrit 32.7 (L) 36.0 - 46.0 %    MCV 81 80 - 100 fL    MCH 22.8 (L) 26.0 - 34.0 pg    MCHC 28.1 (L) 32.0 - 36.0 g/dL    RDW 20.2 (H) 11.5 - 14.5 %    Platelets 161 150 - 450 x10*3/uL    Neutrophils % 74.9 40.0 - 80.0 %    Immature Granulocytes %, Automated 0.7 0.0 - 0.9 %    Lymphocytes % 13.8 13.0 - 44.0 %    Monocytes % 7.5 2.0 - 10.0 %    Eosinophils % 2.9 0.0 - 6.0 %    Basophils % 0.2 0.0 - 2.0 %    Neutrophils Absolute 4.19  1.60 - 5.50 x10*3/uL    Immature Granulocytes Absolute, Automated 0.04 0.00 - 0.50 x10*3/uL    Lymphocytes Absolute 0.77 (L) 0.80 - 3.00 x10*3/uL    Monocytes Absolute 0.42 0.05 - 0.80 x10*3/uL    Eosinophils Absolute 0.16 0.00 - 0.40 x10*3/uL    Basophils Absolute 0.01 0.00 - 0.10 x10*3/uL   Morphology   Result Value Ref Range    RBC Morphology See Below     Polychromasia Mild     RBC Fragments Few     Target Cells Few     Ovalocytes Few     [place] Cells Few    Blood Gas Arterial Full Panel   Result Value Ref Range    POCT pH, Arterial 7.29 (L) 7.38 - 7.42 pH    POCT pCO2, Arterial 48 (H) 38 - 42 mm Hg    POCT pO2, Arterial 149 (H) 85 - 95 mm Hg    POCT SO2, Arterial 100 94 - 100 %    POCT Oxy Hemoglobin, Arterial 98.2 (H) 94.0 - 98.0 %    POCT Hematocrit Calculated, Arterial 26.0 (L) 36.0 - 46.0 %    POCT Sodium, Arterial 133 (L) 136 - 145 mmol/L    POCT Potassium, Arterial 5.3 3.5 - 5.3 mmol/L    POCT Chloride, Arterial 103 98 - 107 mmol/L    POCT Ionized Calcium, Arterial 1.17 1.10 - 1.33 mmol/L    POCT Glucose, Arterial 130 (H) 74 - 99 mg/dL    POCT Lactate, Arterial 0.4 0.4 - 2.0 mmol/L    POCT Base Excess, Arterial -3.4 (L) -2.0 - 3.0 mmol/L    POCT HCO3 Calculated, Arterial 23.1 22.0 - 26.0 mmol/L    POCT Hemoglobin, Arterial 8.7 (L) 12.0 - 16.0 g/dL    POCT Anion Gap, Arterial 12 10 - 25 mmo/L    Patient Temperature 37.0 degrees Celsius    FiO2 28 %    Apparatus      Ventilator Rate 16 bpm    Tidal Volume 500 mL    Ipap CMH2O      Epap CMH2O 10.0 cm H2O    Site of Arterial Puncture Radial Right     Wes's Test Positive    POCT GLUCOSE   Result Value Ref Range    POCT Glucose 120 (H) 74 - 99 mg/dL   Ammonia   Result Value Ref Range    Ammonia 30 16 - 53 umol/L   POCT GLUCOSE   Result Value Ref Range    POCT Glucose 136 (H) 74 - 99 mg/dL   POCT GLUCOSE   Result Value Ref Range    POCT Glucose 151 (H) 74 - 99 mg/dL   POCT GLUCOSE   Result Value Ref Range    POCT Glucose 170 (H) 74 - 99 mg/dL   POCT GLUCOSE    Result Value Ref Range    POCT Glucose 139 (H) 74 - 99 mg/dL   CBC and Auto Differential   Result Value Ref Range    WBC 5.9 4.4 - 11.3 x10*3/uL    nRBC 0.0 0.0 - 0.0 /100 WBCs    RBC 3.51 (L) 4.00 - 5.20 x10*6/uL    Hemoglobin 8.1 (L) 12.0 - 16.0 g/dL    Hematocrit 27.0 (L) 36.0 - 46.0 %    MCV 77 (L) 80 - 100 fL    MCH 23.1 (L) 26.0 - 34.0 pg    MCHC 30.0 (L) 32.0 - 36.0 g/dL    RDW 19.9 (H) 11.5 - 14.5 %    Platelets 146 (L) 150 - 450 x10*3/uL    Neutrophils % 76.8 40.0 - 80.0 %    Immature Granulocytes %, Automated 0.3 0.0 - 0.9 %    Lymphocytes % 12.6 13.0 - 44.0 %    Monocytes % 8.0 2.0 - 10.0 %    Eosinophils % 2.1 0.0 - 6.0 %    Basophils % 0.2 0.0 - 2.0 %    Neutrophils Absolute 4.49 1.60 - 5.50 x10*3/uL    Immature Granulocytes Absolute, Automated 0.02 0.00 - 0.50 x10*3/uL    Lymphocytes Absolute 0.74 (L) 0.80 - 3.00 x10*3/uL    Monocytes Absolute 0.47 0.05 - 0.80 x10*3/uL    Eosinophils Absolute 0.12 0.00 - 0.40 x10*3/uL    Basophils Absolute 0.01 0.00 - 0.10 x10*3/uL   Basic Metabolic Panel   Result Value Ref Range    Glucose 132 (H) 74 - 99 mg/dL    Sodium 136 136 - 145 mmol/L    Potassium 5.3 3.5 - 5.3 mmol/L    Chloride 104 98 - 107 mmol/L    Bicarbonate 25 21 - 32 mmol/L    Anion Gap 12 10 - 20 mmol/L    Urea Nitrogen 127 (HH) 6 - 23 mg/dL    Creatinine 3.97 (H) 0.50 - 1.05 mg/dL    eGFR 11 (L) >60 mL/min/1.73m*2    Calcium 7.9 (L) 8.6 - 10.3 mg/dL   Blood Gas Arterial Full Panel   Result Value Ref Range    POCT pH, Arterial 7.38 7.38 - 7.42 pH    POCT pCO2, Arterial 43 (H) 38 - 42 mm Hg    POCT pO2, Arterial 118 (H) 85 - 95 mm Hg    POCT SO2, Arterial 100 94 - 100 %    POCT Oxy Hemoglobin, Arterial 97.4 94.0 - 98.0 %    POCT Hematocrit Calculated, Arterial 25.0 (L) 36.0 - 46.0 %    POCT Sodium, Arterial 133 (L) 136 - 145 mmol/L    POCT Potassium, Arterial 5.4 (H) 3.5 - 5.3 mmol/L    POCT Chloride, Arterial 103 98 - 107 mmol/L    POCT Ionized Calcium, Arterial 1.15 1.10 - 1.33 mmol/L    POCT  Glucose, Arterial 132 (H) 74 - 99 mg/dL    POCT Lactate, Arterial 0.3 (L) 0.4 - 2.0 mmol/L    POCT Base Excess, Arterial 0.2 -2.0 - 3.0 mmol/L    POCT HCO3 Calculated, Arterial 25.4 22.0 - 26.0 mmol/L    POCT Hemoglobin, Arterial 8.2 (L) 12.0 - 16.0 g/dL    POCT Anion Gap, Arterial 10 10 - 25 mmo/L    Patient Temperature 37.0 degrees Celsius    FiO2 28 %    Apparatus FACE MASK     Ventilator Mode      Ventilator Rate 18 bpm    Tidal Volume 500 mL    Ipap CMH2O 25.0 cm H2O    Epap CMH2O 10.0 cm H2O    Site of Arterial Puncture Radial Left     Wes's Test Positive    POCT GLUCOSE   Result Value Ref Range    POCT Glucose 130 (H) 74 - 99 mg/dL   POCT GLUCOSE   Result Value Ref Range    POCT Glucose 178 (H) 74 - 99 mg/dL   POCT GLUCOSE   Result Value Ref Range    POCT Glucose 177 (H) 74 - 99 mg/dL   POCT GLUCOSE   Result Value Ref Range    POCT Glucose 145 (H) 74 - 99 mg/dL   POCT GLUCOSE   Result Value Ref Range    POCT Glucose 128 (H) 74 - 99 mg/dL   POCT GLUCOSE   Result Value Ref Range    POCT Glucose 126 (H) 74 - 99 mg/dL   CBC and Auto Differential   Result Value Ref Range    WBC 5.9 4.4 - 11.3 x10*3/uL    nRBC 0.0 0.0 - 0.0 /100 WBCs    RBC 3.58 (L) 4.00 - 5.20 x10*6/uL    Hemoglobin 8.3 (L) 12.0 - 16.0 g/dL    Hematocrit 27.6 (L) 36.0 - 46.0 %    MCV 77 (L) 80 - 100 fL    MCH 23.2 (L) 26.0 - 34.0 pg    MCHC 30.1 (L) 32.0 - 36.0 g/dL    RDW 19.9 (H) 11.5 - 14.5 %    Platelets 146 (L) 150 - 450 x10*3/uL    Neutrophils % 71.2 40.0 - 80.0 %    Immature Granulocytes %, Automated 0.2 0.0 - 0.9 %    Lymphocytes % 16.0 13.0 - 44.0 %    Monocytes % 10.5 2.0 - 10.0 %    Eosinophils % 1.9 0.0 - 6.0 %    Basophils % 0.2 0.0 - 2.0 %    Neutrophils Absolute 4.20 1.60 - 5.50 x10*3/uL    Immature Granulocytes Absolute, Automated 0.01 0.00 - 0.50 x10*3/uL    Lymphocytes Absolute 0.94 0.80 - 3.00 x10*3/uL    Monocytes Absolute 0.62 0.05 - 0.80 x10*3/uL    Eosinophils Absolute 0.11 0.00 - 0.40 x10*3/uL    Basophils Absolute 0.01  0.00 - 0.10 x10*3/uL   PST Top   Result Value Ref Range    Extra Tube Hold for add-ons.    Renal function panel   Result Value Ref Range    Glucose 120 (H) 74 - 99 mg/dL    Sodium 139 136 - 145 mmol/L    Potassium 5.2 3.5 - 5.3 mmol/L    Chloride 105 98 - 107 mmol/L    Bicarbonate 26 21 - 32 mmol/L    Anion Gap 13 10 - 20 mmol/L    Urea Nitrogen 124 (HH) 6 - 23 mg/dL    Creatinine 3.79 (H) 0.50 - 1.05 mg/dL    eGFR 12 (L) >60 mL/min/1.73m*2    Calcium 8.1 (L) 8.6 - 10.3 mg/dL    Phosphorus 3.4 2.5 - 4.9 mg/dL    Albumin 2.9 (L) 3.4 - 5.0 g/dL   Comprehensive Metabolic Panel   Result Value Ref Range    Glucose 117 (H) 74 - 99 mg/dL    Sodium 138 136 - 145 mmol/L    Potassium 5.2 3.5 - 5.3 mmol/L    Chloride 105 98 - 107 mmol/L    Bicarbonate 24 21 - 32 mmol/L    Anion Gap 14 10 - 20 mmol/L    Urea Nitrogen 123 (HH) 6 - 23 mg/dL    Creatinine 3.91 (H) 0.50 - 1.05 mg/dL    eGFR 11 (L) >60 mL/min/1.73m*2    Calcium 8.3 (L) 8.6 - 10.3 mg/dL    Albumin 3.0 (L) 3.4 - 5.0 g/dL    Alkaline Phosphatase 66 33 - 136 U/L    Total Protein 6.2 (L) 6.4 - 8.2 g/dL    AST 9 9 - 39 U/L    Bilirubin, Total 0.3 0.0 - 1.2 mg/dL    ALT 9 7 - 45 U/L   Magnesium   Result Value Ref Range    Magnesium 2.45 (H) 1.60 - 2.40 mg/dL   Phosphorus   Result Value Ref Range    Phosphorus 3.4 2.5 - 4.9 mg/dL   POCT GLUCOSE   Result Value Ref Range    POCT Glucose 123 (H) 74 - 99 mg/dL   POCT GLUCOSE   Result Value Ref Range    POCT Glucose 203 (H) 74 - 99 mg/dL   POCT GLUCOSE   Result Value Ref Range    POCT Glucose 132 (H) 74 - 99 mg/dL   POCT GLUCOSE   Result Value Ref Range    POCT Glucose 114 (H) 74 - 99 mg/dL   PST Top   Result Value Ref Range    Extra Tube Hold for add-ons.    CBC and Auto Differential   Result Value Ref Range    WBC 5.7 4.4 - 11.3 x10*3/uL    nRBC 0.0 0.0 - 0.0 /100 WBCs    RBC 3.30 (L) 4.00 - 5.20 x10*6/uL    Hemoglobin 7.6 (L) 12.0 - 16.0 g/dL    Hematocrit 25.8 (L) 36.0 - 46.0 %    MCV 78 (L) 80 - 100 fL    MCH 23.0 (L) 26.0  - 34.0 pg    MCHC 29.5 (L) 32.0 - 36.0 g/dL    RDW 19.8 (H) 11.5 - 14.5 %    Platelets 125 (L) 150 - 450 x10*3/uL    Neutrophils % 69.9 40.0 - 80.0 %    Immature Granulocytes %, Automated 0.4 0.0 - 0.9 %    Lymphocytes % 14.3 13.0 - 44.0 %    Monocytes % 11.3 2.0 - 10.0 %    Eosinophils % 3.9 0.0 - 6.0 %    Basophils % 0.2 0.0 - 2.0 %    Neutrophils Absolute 3.97 1.60 - 5.50 x10*3/uL    Immature Granulocytes Absolute, Automated 0.02 0.00 - 0.50 x10*3/uL    Lymphocytes Absolute 0.81 0.80 - 3.00 x10*3/uL    Monocytes Absolute 0.64 0.05 - 0.80 x10*3/uL    Eosinophils Absolute 0.22 0.00 - 0.40 x10*3/uL    Basophils Absolute 0.01 0.00 - 0.10 x10*3/uL   POCT GLUCOSE   Result Value Ref Range    POCT Glucose 105 (H) 74 - 99 mg/dL     *Note: Due to a large number of results and/or encounters for the requested time period, some results have not been displayed. A complete set of results can be found in Results Review.       Assessment/Plan   Acute kidney injury superimposed on chronic kidney disease stage III no labs available today we will check renal function panel today discussed the case in details with Lucio her son if things do not improve we have to consider dialysis also he agreed for a palliative care consultation  Congestive heart failure continue diuresis as needed  Chronic kidney disease stage IIIb   Acute respiratory failure is improving  Diabetes mellitus type 2  Anemia of chronic kidney disease  Hypertension  Hyperlipidemia  Obesity    Sixto Slater MD

## 2025-04-11 NOTE — NURSING NOTE
Assumed care of pt. Respirations even and unlabored on RA. Pt had nose bleed this morning that stopped after four minutes with holding nose bridge. Pt became tearful after this, this RN comforted her and changed linens. Pt is lying in bed watching television at this time. Care ongoing.

## 2025-04-11 NOTE — PROGRESS NOTES
Analia Murray is a 74 y.o. female on day 17 of admission presenting with Acute respiratory failure with hypoxia.      Subjective     Per nursing, patient had epistaxis about 4 minutes after nasal cannula was placed due to patient's request, now resolved.    Patient is very tearful today, upset that she may have to go on dialysis at some point in the future.  She states that she just wants to get better but does recognize that there are issues that will not improve such as the renal function.    No worsening shortness of breath.  See assessment plan for discussion.    Objective     Last Recorded Vitals  /64 (BP Location: Left arm, Patient Position: Lying)   Pulse 74   Temp 37 °C (98.6 °F) (Oral)   Resp 16   Wt 147 kg (324 lb 1.2 oz)   SpO2 96%   Intake/Output last 3 Shifts:    Intake/Output Summary (Last 24 hours) at 4/11/2025 1011  Last data filed at 4/11/2025 0500  Gross per 24 hour   Intake 240 ml   Output 550 ml   Net -310 ml       Admission Weight  Weight: 132 kg (290 lb) (03/25/25 0041)    Daily Weight  04/11/25 : 147 kg (324 lb 1.2 oz)    Image Results  XR chest 1 view  Narrative: Interpreted By:  Marco Antonio Duffy,   STUDY:  XR CHEST 1 VIEW      INDICATION:  Signs/Symptoms:resp failure.      COMPARISON:  April 6, 2025      ACCESSION NUMBER(S):  [id number]      ORDERING CLINICIAN:  ELVI JOHNSON      FINDINGS:  Cardiomegaly with some mild perihilar interstitial edema suggested.      No large effusion or consolidation.      Impression:     Cardiomegaly with some mild perihilar interstitial edema suggested.      No large effusion or consolidation.      Signed by: Marco Antonio Duffy 4/8/2025 8:30 AM  Dictation workstation:   [id number]      Physical Exam    General: Morbidly obese female sitting in bed.  Awake and conversational.  Morbidly obese.  Eyes: Clear sclera  Neck: Increased circumference grossly  Cardio: Regular rate rhythm  Respiratory: Nasal cannula.  Bilateral diminished.  Neuro:  Somnolent.  No facial droop.  Extremities: 2+ pitting edema bilaterally  Musculoskeletal: No bony deformity  Psychiatric: Tearful.    Assessment/Plan      Acute respiratory failure with hypoxia and hypercapnia.  Improving, on room air  Continue noninvasive positive pressure ventilation at night.  Pulmonology on consult; suspected asthma    Acute kidney injury on chronic kidney disease  Creatinine has stayed essentially stable; do not see emergent need for dialysis today  Nephrology on consult.    Morbid obesity  Major comorbidity which I believe is contributing to her respiratory failure  Weight loss has been discussed by partner.    Diabetes mellitus type 2  Cover with insulin sliding scale.    Hypothyroidism  TSH was 11.  Continue with levothyroxine 200 mg daily    Anxiety  Continue sertraline  Patient is very tearful today, will ask for psychiatry consult    Acute on chronic diastolic heart failure  Improved.  With the patient's renal status, defer diuresis to nephrology.    Disposition: In the short-term, the patient's hypoxemia has improved.  Creatinine has been largely stable.  However, patient severe comorbidities of advanced chronic kidney disease and her becoming very worried about needing dialysis, I have consulted psychiatry and also with palliative as I feel the goal of care conversation is very necessary with the patient herself.  I also expressed that her being tearful currently is completely normal and frankly expected given her long hospitalization and comorbidities.    Rajesh Rojas, DO

## 2025-04-11 NOTE — NURSING NOTE
Pt on the bed pan at this time. Family concerned about small wound on pts buttock. Foam dressing applied. Care ongoing.

## 2025-04-12 LAB
ALBUMIN SERPL BCP-MCNC: 2.8 G/DL (ref 3.4–5)
ALP SERPL-CCNC: 60 U/L (ref 33–136)
ALT SERPL W P-5'-P-CCNC: 12 U/L (ref 7–45)
ANION GAP SERPL CALCULATED.3IONS-SCNC: 12 MMOL/L (ref 10–20)
AST SERPL W P-5'-P-CCNC: 12 U/L (ref 9–39)
BASOPHILS # BLD AUTO: 0.01 X10*3/UL (ref 0–0.1)
BASOPHILS NFR BLD AUTO: 0.2 %
BILIRUB SERPL-MCNC: 0.4 MG/DL (ref 0–1.2)
BUN SERPL-MCNC: 100 MG/DL (ref 6–23)
CALCIUM SERPL-MCNC: 7.9 MG/DL (ref 8.6–10.3)
CHLORIDE SERPL-SCNC: 107 MMOL/L (ref 98–107)
CO2 SERPL-SCNC: 27 MMOL/L (ref 21–32)
CREAT SERPL-MCNC: 3.26 MG/DL (ref 0.5–1.05)
EGFRCR SERPLBLD CKD-EPI 2021: 14 ML/MIN/1.73M*2
EOSINOPHIL # BLD AUTO: 0.25 X10*3/UL (ref 0–0.4)
EOSINOPHIL NFR BLD AUTO: 5.5 %
ERYTHROCYTE [DISTWIDTH] IN BLOOD BY AUTOMATED COUNT: 20 % (ref 11.5–14.5)
GLUCOSE BLD MANUAL STRIP-MCNC: 109 MG/DL (ref 74–99)
GLUCOSE BLD MANUAL STRIP-MCNC: 111 MG/DL (ref 74–99)
GLUCOSE BLD MANUAL STRIP-MCNC: 122 MG/DL (ref 74–99)
GLUCOSE BLD MANUAL STRIP-MCNC: 131 MG/DL (ref 74–99)
GLUCOSE BLD MANUAL STRIP-MCNC: 141 MG/DL (ref 74–99)
GLUCOSE SERPL-MCNC: 106 MG/DL (ref 74–99)
HCT VFR BLD AUTO: 23.8 % (ref 36–46)
HCT VFR BLD AUTO: 26.3 % (ref 36–46)
HGB BLD-MCNC: 7.3 G/DL (ref 12–16)
HGB BLD-MCNC: 8.1 G/DL (ref 12–16)
HOLD SPECIMEN: NORMAL
IMM GRANULOCYTES # BLD AUTO: 0.02 X10*3/UL (ref 0–0.5)
IMM GRANULOCYTES NFR BLD AUTO: 0.4 % (ref 0–0.9)
LYMPHOCYTES # BLD AUTO: 0.77 X10*3/UL (ref 0.8–3)
LYMPHOCYTES NFR BLD AUTO: 16.9 %
MAGNESIUM SERPL-MCNC: 2.3 MG/DL (ref 1.6–2.4)
MCH RBC QN AUTO: 23.2 PG (ref 26–34)
MCHC RBC AUTO-ENTMCNC: 30.7 G/DL (ref 32–36)
MCV RBC AUTO: 76 FL (ref 80–100)
MONOCYTES # BLD AUTO: 0.53 X10*3/UL (ref 0.05–0.8)
MONOCYTES NFR BLD AUTO: 11.6 %
NEUTROPHILS # BLD AUTO: 2.97 X10*3/UL (ref 1.6–5.5)
NEUTROPHILS NFR BLD AUTO: 65.4 %
NRBC BLD-RTO: 0 /100 WBCS (ref 0–0)
PHOSPHATE SERPL-MCNC: 3.3 MG/DL (ref 2.5–4.9)
PLATELET # BLD AUTO: 114 X10*3/UL (ref 150–450)
POTASSIUM SERPL-SCNC: 5.4 MMOL/L (ref 3.5–5.3)
POTASSIUM SERPL-SCNC: 5.5 MMOL/L (ref 3.5–5.3)
PROT SERPL-MCNC: 5.9 G/DL (ref 6.4–8.2)
RBC # BLD AUTO: 3.14 X10*6/UL (ref 4–5.2)
SODIUM SERPL-SCNC: 140 MMOL/L (ref 136–145)
WBC # BLD AUTO: 4.6 X10*3/UL (ref 4.4–11.3)

## 2025-04-12 PROCEDURE — 94668 MNPJ CHEST WALL SBSQ: CPT

## 2025-04-12 PROCEDURE — 36415 COLL VENOUS BLD VENIPUNCTURE: CPT | Performed by: INTERNAL MEDICINE

## 2025-04-12 PROCEDURE — 2500000002 HC RX 250 W HCPCS SELF ADMINISTERED DRUGS (ALT 637 FOR MEDICARE OP, ALT 636 FOR OP/ED): Performed by: INTERNAL MEDICINE

## 2025-04-12 PROCEDURE — 2500000001 HC RX 250 WO HCPCS SELF ADMINISTERED DRUGS (ALT 637 FOR MEDICARE OP): Performed by: INTERNAL MEDICINE

## 2025-04-12 PROCEDURE — 2500000004 HC RX 250 GENERAL PHARMACY W/ HCPCS (ALT 636 FOR OP/ED): Performed by: INTERNAL MEDICINE

## 2025-04-12 PROCEDURE — 85025 COMPLETE CBC W/AUTO DIFF WBC: CPT | Performed by: INTERNAL MEDICINE

## 2025-04-12 PROCEDURE — 84100 ASSAY OF PHOSPHORUS: CPT

## 2025-04-12 PROCEDURE — 80053 COMPREHEN METABOLIC PANEL: CPT | Performed by: INTERNAL MEDICINE

## 2025-04-12 PROCEDURE — 84132 ASSAY OF SERUM POTASSIUM: CPT | Performed by: INTERNAL MEDICINE

## 2025-04-12 PROCEDURE — 85014 HEMATOCRIT: CPT | Performed by: INTERNAL MEDICINE

## 2025-04-12 PROCEDURE — 2060000001 HC INTERMEDIATE ICU ROOM DAILY

## 2025-04-12 PROCEDURE — 94640 AIRWAY INHALATION TREATMENT: CPT

## 2025-04-12 PROCEDURE — 99232 SBSQ HOSP IP/OBS MODERATE 35: CPT | Performed by: STUDENT IN AN ORGANIZED HEALTH CARE EDUCATION/TRAINING PROGRAM

## 2025-04-12 PROCEDURE — 99232 SBSQ HOSP IP/OBS MODERATE 35: CPT

## 2025-04-12 PROCEDURE — 99232 SBSQ HOSP IP/OBS MODERATE 35: CPT | Performed by: INTERNAL MEDICINE

## 2025-04-12 PROCEDURE — 83735 ASSAY OF MAGNESIUM: CPT

## 2025-04-12 PROCEDURE — 2500000005 HC RX 250 GENERAL PHARMACY W/O HCPCS: Performed by: INTERNAL MEDICINE

## 2025-04-12 PROCEDURE — 82947 ASSAY GLUCOSE BLOOD QUANT: CPT

## 2025-04-12 RX ORDER — DEXTROSE 50 % IN WATER (D50W) INTRAVENOUS SYRINGE
25 ONCE
Status: COMPLETED | OUTPATIENT
Start: 2025-04-12 | End: 2025-04-12

## 2025-04-12 RX ADMIN — NYSTATIN 1 APPLICATION: 100000 POWDER TOPICAL at 22:19

## 2025-04-12 RX ADMIN — LEVOTHYROXINE SODIUM 200 MCG: 0.1 TABLET ORAL at 05:28

## 2025-04-12 RX ADMIN — METOPROLOL TARTRATE 50 MG: 50 TABLET, FILM COATED ORAL at 22:04

## 2025-04-12 RX ADMIN — FLUTICASONE PROPIONATE 2 SPRAY: 50 SPRAY, METERED NASAL at 22:07

## 2025-04-12 RX ADMIN — ALLOPURINOL 50 MG: 100 TABLET ORAL at 08:45

## 2025-04-12 RX ADMIN — IPRATROPIUM BROMIDE AND ALBUTEROL SULFATE 3 ML: 2.5; .5 SOLUTION RESPIRATORY (INHALATION) at 19:21

## 2025-04-12 RX ADMIN — HEPARIN SODIUM 7500 UNITS: 5000 INJECTION INTRAVENOUS; SUBCUTANEOUS at 05:28

## 2025-04-12 RX ADMIN — NYSTATIN 1 APPLICATION: 100000 POWDER TOPICAL at 08:48

## 2025-04-12 RX ADMIN — HYDROCORTISONE: 25 CREAM TOPICAL at 22:19

## 2025-04-12 RX ADMIN — MONTELUKAST 10 MG: 10 TABLET, FILM COATED ORAL at 22:04

## 2025-04-12 RX ADMIN — HYDRALAZINE HYDROCHLORIDE 50 MG: 50 TABLET ORAL at 08:45

## 2025-04-12 RX ADMIN — ASPIRIN 81 MG: 81 TABLET, CHEWABLE ORAL at 08:45

## 2025-04-12 RX ADMIN — ACETAMINOPHEN 650 MG: 325 TABLET ORAL at 05:28

## 2025-04-12 RX ADMIN — HYDROCORTISONE: 25 CREAM TOPICAL at 08:47

## 2025-04-12 RX ADMIN — DEXTROSE MONOHYDRATE 25 G: 25 INJECTION, SOLUTION INTRAVENOUS at 14:55

## 2025-04-12 RX ADMIN — INSULIN HUMAN 10 UNITS: 100 INJECTION, SOLUTION PARENTERAL at 14:55

## 2025-04-12 RX ADMIN — SERTRALINE HYDROCHLORIDE 50 MG: 50 TABLET ORAL at 08:46

## 2025-04-12 RX ADMIN — DIPHENHYDRAMINE HYDROCHLORIDE, ZINC ACETATE: 2; .1 CREAM TOPICAL at 22:18

## 2025-04-12 RX ADMIN — FLUTICASONE PROPIONATE 2 SPRAY: 50 SPRAY, METERED NASAL at 08:47

## 2025-04-12 RX ADMIN — SODIUM ZIRCONIUM CYCLOSILICATE 10 G: 10 POWDER, FOR SUSPENSION ORAL at 22:37

## 2025-04-12 RX ADMIN — IPRATROPIUM BROMIDE AND ALBUTEROL SULFATE 3 ML: 2.5; .5 SOLUTION RESPIRATORY (INHALATION) at 11:37

## 2025-04-12 RX ADMIN — CALCIUM ACETATE 667 MG: 667 CAPSULE ORAL at 17:49

## 2025-04-12 RX ADMIN — EZETIMIBE 10 MG: 10 TABLET ORAL at 08:46

## 2025-04-12 RX ADMIN — HYDRALAZINE HYDROCHLORIDE 50 MG: 50 TABLET ORAL at 22:04

## 2025-04-12 RX ADMIN — METOPROLOL TARTRATE 50 MG: 50 TABLET, FILM COATED ORAL at 08:45

## 2025-04-12 RX ADMIN — IPRATROPIUM BROMIDE AND ALBUTEROL SULFATE 3 ML: 2.5; .5 SOLUTION RESPIRATORY (INHALATION) at 07:27

## 2025-04-12 RX ADMIN — PRAVASTATIN SODIUM 40 MG: 20 TABLET ORAL at 22:04

## 2025-04-12 ASSESSMENT — COGNITIVE AND FUNCTIONAL STATUS - GENERAL
TOILETING: A LITTLE
MOVING FROM LYING ON BACK TO SITTING ON SIDE OF FLAT BED WITH BEDRAILS: A LOT
TURNING FROM BACK TO SIDE WHILE IN FLAT BAD: A LOT
DAILY ACTIVITIY SCORE: 16
MOVING TO AND FROM BED TO CHAIR: A LITTLE
WALKING IN HOSPITAL ROOM: A LITTLE
HELP NEEDED FOR BATHING: A LOT
STANDING UP FROM CHAIR USING ARMS: A LITTLE
PERSONAL GROOMING: A LITTLE
DAILY ACTIVITIY SCORE: 16
PERSONAL GROOMING: A LITTLE
TURNING FROM BACK TO SIDE WHILE IN FLAT BAD: A LOT
DRESSING REGULAR UPPER BODY CLOTHING: A LOT
WALKING IN HOSPITAL ROOM: A LITTLE
STANDING UP FROM CHAIR USING ARMS: A LITTLE
MOBILITY SCORE: 15
TOILETING: A LITTLE
DRESSING REGULAR UPPER BODY CLOTHING: A LOT
CLIMB 3 TO 5 STEPS WITH RAILING: A LOT
DRESSING REGULAR LOWER BODY CLOTHING: A LOT
CLIMB 3 TO 5 STEPS WITH RAILING: A LOT
DRESSING REGULAR LOWER BODY CLOTHING: A LOT
MOVING TO AND FROM BED TO CHAIR: A LITTLE
MOBILITY SCORE: 15
MOVING FROM LYING ON BACK TO SITTING ON SIDE OF FLAT BED WITH BEDRAILS: A LOT
HELP NEEDED FOR BATHING: A LOT

## 2025-04-12 ASSESSMENT — PAIN SCALES - GENERAL
PAINLEVEL_OUTOF10: 0 - NO PAIN
PAINLEVEL_OUTOF10: 0 - NO PAIN

## 2025-04-12 ASSESSMENT — PAIN - FUNCTIONAL ASSESSMENT: PAIN_FUNCTIONAL_ASSESSMENT: 0-10

## 2025-04-12 NOTE — PROGRESS NOTES
"Analia Murray is a 74 y.o. female on day 18 of admission presenting with Acute respiratory failure with hypoxia.    Subjective   The patient is seen for MARY ANN on CKD stage III/IV consult palliative care yesterday I do appreciate the input the patient clinically feels better she is more awake and responsive family by the bedside no GI symptoms       Objective     Physical Exam  Constitutional:       General: She is not in acute distress.     Appearance: Normal appearance. She is not toxic-appearing.   Neck:      Vascular: No carotid bruit.   Cardiovascular:      Heart sounds: No murmur heard.     No friction rub. No gallop.   Pulmonary:      Breath sounds: No wheezing, rhonchi or rales.   Abdominal:      Tenderness: There is no abdominal tenderness. There is no right CVA tenderness, left CVA tenderness or rebound.   Musculoskeletal:         General: No tenderness.      Cervical back: Neck supple.      Right lower leg: No edema (1+ edema).      Left lower leg: No edema (1+ edema).   Lymphadenopathy:      Cervical: No cervical adenopathy.         Last Recorded Vitals  Blood pressure 124/50, pulse 65, temperature 36.2 °C (97.2 °F), temperature source Axillary, resp. rate 18, height 1.626 m (5' 4.02\"), weight 146 kg (322 lb 15.6 oz), SpO2 100%.    Intake/Output last 3 Shifts:  I/O last 3 completed shifts:  In: 600 (4.1 mL/kg) [P.O.:600]  Out: 1450 (9.9 mL/kg) [Urine:1450 (0.3 mL/kg/hr)]  Weight: 146.5 kg     Current Facility-Administered Medications:     acetaminophen (Tylenol) oral liquid 650 mg, 650 mg, oral, q4h PRN **OR** acetaminophen (Tylenol) tablet 650 mg, 650 mg, oral, q4h PRN, Rajesh Torresrlotte, DO, 650 mg at 04/12/25 0528    albuterol 2.5 mg /3 mL (0.083 %) nebulizer solution 2.5 mg, 2.5 mg, nebulization, q2h PRN, Rajesh Torresrlotte, DO    allopurinol (Zyloprim) tablet 50 mg, 50 mg, oral, Every other day, Rajesh Rojas, DO, 50 mg at 04/12/25 0845    [Held by provider] amLODIPine (Norvasc) tablet 10 " mg, 10 mg, oral, Daily, Sanjiv Neil PA-C, 10 mg at 03/28/25 0948    [Held by provider] aspirin chewable tablet 81 mg, 81 mg, oral, Daily, Rajesh Harringtonte, DO, 81 mg at 04/12/25 0845    calcium acetate (Phoslo) capsule 667 mg, 667 mg, oral, TID after meals, Rajesh Harringtonte, DO, 667 mg at 04/11/25 0931    dextrose 50 % injection 12.5 g, 12.5 g, intravenous, q15 min PRN, Rajesh Harringtonte, DO    dextrose 50 % injection 25 g, 25 g, intravenous, q15 min PRN, Rajesh Rojas DO    ezetimibe (Zetia) tablet 10 mg, 10 mg, oral, Daily, Rajesh Harringtonte, DO, 10 mg at 04/12/25 0846    fluticasone (Flonase) nasal spray 2 spray, 2 spray, Each Nostril, BID, Rajesh Rojas DO, 2 spray at 04/12/25 0847    fluticasone furoate-vilanteroL (Breo Ellipta) 100-25 mcg/dose inhaler 1 puff, 1 puff, inhalation, Daily, Rajesh Rojas DO    [Held by provider] gabapentin (Neurontin) capsule 300 mg, 300 mg, oral, Daily, Krysta Ramirez MD, 300 mg at 04/07/25 0924    glucagon (Glucagen) injection 1 mg, 1 mg, intramuscular, q15 min PRN, Rajesh Harringtonte, DO    glucagon (Glucagen) injection 1 mg, 1 mg, intramuscular, q15 min PRN, Rajesh Rojas, DO    [Held by provider] heparin (porcine) injection 7,500 Units, 7,500 Units, subcutaneous, q8h JAYA, Rajesh Rojas DO, 7,500 Units at 04/12/25 0528    hydrALAZINE (Apresoline) injection 10 mg, 10 mg, intravenous, q4h PRN, Rajesh Rojas, DO, 10 mg at 04/08/25 1220    hydrALAZINE (Apresoline) tablet 50 mg, 50 mg, oral, BID, Rajesh Rojas, DO, 50 mg at 04/12/25 0845    hydrocortisone 2.5 % cream, , Topical, BID, Rajesh Rojas DO, Given at 04/12/25 0847    insulin lispro injection 0-10 Units, 0-10 Units, subcutaneous, TID AC, Rajesh Rojas DO, 4 Units at 04/10/25 1245    insulin lispro injection 6 Units, 6 Units, subcutaneous, Once, Rajesh Rojas DO    ipratropium-albuteroL (Duo-Neb) 0.5-2.5 mg/3 mL nebulizer solution 3 mL, 3 mL, nebulization,  TID, Rajesh Lynnelotte, DO, 3 mL at 04/12/25 1137    levothyroxine (Synthroid, Levoxyl) tablet 200 mcg, 200 mcg, oral, Daily, aRjesh Harringtonte, DO, 200 mcg at 04/12/25 0528    [Held by provider] metoprolol succinate XL (Toprol-XL) 24 hr tablet 100 mg, 100 mg, oral, Daily, Sanjiv Neil PA-C    metoprolol tartrate (Lopressor) tablet 50 mg, 50 mg, oral, BID, Rajesh Harringtonte, DO, 50 mg at 04/12/25 0845    montelukast (Singulair) tablet 10 mg, 10 mg, oral, Nightly, Rajesh Rojas, DO, 10 mg at 04/11/25 2156    [Held by provider] nitroglycerin (Nitrostat) SL tablet 0.4 mg, 0.4 mg, sublingual, q5 min PRN, Sanjiv Neil PA-C    nystatin (Mycostatin) 100,000 unit/gram powder 1 Application, 1 Application, Topical, TID, Rajesh Harringtonte, DO, 1 Application at 04/12/25 0848    ondansetron (Zofran) injection 4 mg, 4 mg, intravenous, q6h PRN, Rajesh Harringtonte, DO, 4 mg at 04/11/25 1602    oxygen (O2) therapy, , inhalation, Continuous PRN - O2/gases, Rajesh Rojas, DO, 2 L/min at 04/10/25 1005    oxygen (O2) therapy, , inhalation, Continuous PRN - O2/gases, Sixto Slater MD    polyethylene glycol (Glycolax, Miralax) packet 17 g, 17 g, oral, Daily PRN, Rajesh Harringtonte, DO    pravastatin (Pravachol) tablet 40 mg, 40 mg, oral, Nightly, Rajesh Harringtonte, DO, 40 mg at 04/11/25 2156    sertraline (Zoloft) tablet 50 mg, 50 mg, oral, Daily, Rajesh Harringtonte, DO, 50 mg at 04/12/25 0846    sulfur hexafluoride microsphr (Lumason) injection 24.28 mg, 2 mL, intravenous, Once in imaging, Rajesh Rojas,     [Held by provider] traZODone (Desyrel) tablet 25 mg, 25 mg, oral, Nightly PRN, Robbie Olivarez MD   Relevant Results    Results for orders placed or performed during the hospital encounter of 03/25/25 (from the past 96 hours)   POCT GLUCOSE   Result Value Ref Range    POCT Glucose 136 (H) 74 - 99 mg/dL   POCT GLUCOSE   Result Value Ref Range    POCT Glucose 151 (H) 74 - 99 mg/dL   POCT GLUCOSE   Result Value  Ref Range    POCT Glucose 170 (H) 74 - 99 mg/dL   POCT GLUCOSE   Result Value Ref Range    POCT Glucose 139 (H) 74 - 99 mg/dL   CBC and Auto Differential   Result Value Ref Range    WBC 5.9 4.4 - 11.3 x10*3/uL    nRBC 0.0 0.0 - 0.0 /100 WBCs    RBC 3.51 (L) 4.00 - 5.20 x10*6/uL    Hemoglobin 8.1 (L) 12.0 - 16.0 g/dL    Hematocrit 27.0 (L) 36.0 - 46.0 %    MCV 77 (L) 80 - 100 fL    MCH 23.1 (L) 26.0 - 34.0 pg    MCHC 30.0 (L) 32.0 - 36.0 g/dL    RDW 19.9 (H) 11.5 - 14.5 %    Platelets 146 (L) 150 - 450 x10*3/uL    Neutrophils % 76.8 40.0 - 80.0 %    Immature Granulocytes %, Automated 0.3 0.0 - 0.9 %    Lymphocytes % 12.6 13.0 - 44.0 %    Monocytes % 8.0 2.0 - 10.0 %    Eosinophils % 2.1 0.0 - 6.0 %    Basophils % 0.2 0.0 - 2.0 %    Neutrophils Absolute 4.49 1.60 - 5.50 x10*3/uL    Immature Granulocytes Absolute, Automated 0.02 0.00 - 0.50 x10*3/uL    Lymphocytes Absolute 0.74 (L) 0.80 - 3.00 x10*3/uL    Monocytes Absolute 0.47 0.05 - 0.80 x10*3/uL    Eosinophils Absolute 0.12 0.00 - 0.40 x10*3/uL    Basophils Absolute 0.01 0.00 - 0.10 x10*3/uL   Basic Metabolic Panel   Result Value Ref Range    Glucose 132 (H) 74 - 99 mg/dL    Sodium 136 136 - 145 mmol/L    Potassium 5.3 3.5 - 5.3 mmol/L    Chloride 104 98 - 107 mmol/L    Bicarbonate 25 21 - 32 mmol/L    Anion Gap 12 10 - 20 mmol/L    Urea Nitrogen 127 (HH) 6 - 23 mg/dL    Creatinine 3.97 (H) 0.50 - 1.05 mg/dL    eGFR 11 (L) >60 mL/min/1.73m*2    Calcium 7.9 (L) 8.6 - 10.3 mg/dL   Blood Gas Arterial Full Panel   Result Value Ref Range    POCT pH, Arterial 7.38 7.38 - 7.42 pH    POCT pCO2, Arterial 43 (H) 38 - 42 mm Hg    POCT pO2, Arterial 118 (H) 85 - 95 mm Hg    POCT SO2, Arterial 100 94 - 100 %    POCT Oxy Hemoglobin, Arterial 97.4 94.0 - 98.0 %    POCT Hematocrit Calculated, Arterial 25.0 (L) 36.0 - 46.0 %    POCT Sodium, Arterial 133 (L) 136 - 145 mmol/L    POCT Potassium, Arterial 5.4 (H) 3.5 - 5.3 mmol/L    POCT Chloride, Arterial 103 98 - 107 mmol/L    POCT  Ionized Calcium, Arterial 1.15 1.10 - 1.33 mmol/L    POCT Glucose, Arterial 132 (H) 74 - 99 mg/dL    POCT Lactate, Arterial 0.3 (L) 0.4 - 2.0 mmol/L    POCT Base Excess, Arterial 0.2 -2.0 - 3.0 mmol/L    POCT HCO3 Calculated, Arterial 25.4 22.0 - 26.0 mmol/L    POCT Hemoglobin, Arterial 8.2 (L) 12.0 - 16.0 g/dL    POCT Anion Gap, Arterial 10 10 - 25 mmo/L    Patient Temperature 37.0 degrees Celsius    FiO2 28 %    Apparatus FACE MASK     Ventilator Mode      Ventilator Rate 18 bpm    Tidal Volume 500 mL    Ipap CMH2O 25.0 cm H2O    Epap CMH2O 10.0 cm H2O    Site of Arterial Puncture Radial Left     Wes's Test Positive    POCT GLUCOSE   Result Value Ref Range    POCT Glucose 130 (H) 74 - 99 mg/dL   POCT GLUCOSE   Result Value Ref Range    POCT Glucose 178 (H) 74 - 99 mg/dL   POCT GLUCOSE   Result Value Ref Range    POCT Glucose 177 (H) 74 - 99 mg/dL   POCT GLUCOSE   Result Value Ref Range    POCT Glucose 145 (H) 74 - 99 mg/dL   POCT GLUCOSE   Result Value Ref Range    POCT Glucose 128 (H) 74 - 99 mg/dL   POCT GLUCOSE   Result Value Ref Range    POCT Glucose 126 (H) 74 - 99 mg/dL   CBC and Auto Differential   Result Value Ref Range    WBC 5.9 4.4 - 11.3 x10*3/uL    nRBC 0.0 0.0 - 0.0 /100 WBCs    RBC 3.58 (L) 4.00 - 5.20 x10*6/uL    Hemoglobin 8.3 (L) 12.0 - 16.0 g/dL    Hematocrit 27.6 (L) 36.0 - 46.0 %    MCV 77 (L) 80 - 100 fL    MCH 23.2 (L) 26.0 - 34.0 pg    MCHC 30.1 (L) 32.0 - 36.0 g/dL    RDW 19.9 (H) 11.5 - 14.5 %    Platelets 146 (L) 150 - 450 x10*3/uL    Neutrophils % 71.2 40.0 - 80.0 %    Immature Granulocytes %, Automated 0.2 0.0 - 0.9 %    Lymphocytes % 16.0 13.0 - 44.0 %    Monocytes % 10.5 2.0 - 10.0 %    Eosinophils % 1.9 0.0 - 6.0 %    Basophils % 0.2 0.0 - 2.0 %    Neutrophils Absolute 4.20 1.60 - 5.50 x10*3/uL    Immature Granulocytes Absolute, Automated 0.01 0.00 - 0.50 x10*3/uL    Lymphocytes Absolute 0.94 0.80 - 3.00 x10*3/uL    Monocytes Absolute 0.62 0.05 - 0.80 x10*3/uL    Eosinophils  Absolute 0.11 0.00 - 0.40 x10*3/uL    Basophils Absolute 0.01 0.00 - 0.10 x10*3/uL   PST Top   Result Value Ref Range    Extra Tube Hold for add-ons.    Renal function panel   Result Value Ref Range    Glucose 120 (H) 74 - 99 mg/dL    Sodium 139 136 - 145 mmol/L    Potassium 5.2 3.5 - 5.3 mmol/L    Chloride 105 98 - 107 mmol/L    Bicarbonate 26 21 - 32 mmol/L    Anion Gap 13 10 - 20 mmol/L    Urea Nitrogen 124 (HH) 6 - 23 mg/dL    Creatinine 3.79 (H) 0.50 - 1.05 mg/dL    eGFR 12 (L) >60 mL/min/1.73m*2    Calcium 8.1 (L) 8.6 - 10.3 mg/dL    Phosphorus 3.4 2.5 - 4.9 mg/dL    Albumin 2.9 (L) 3.4 - 5.0 g/dL   Comprehensive Metabolic Panel   Result Value Ref Range    Glucose 117 (H) 74 - 99 mg/dL    Sodium 138 136 - 145 mmol/L    Potassium 5.2 3.5 - 5.3 mmol/L    Chloride 105 98 - 107 mmol/L    Bicarbonate 24 21 - 32 mmol/L    Anion Gap 14 10 - 20 mmol/L    Urea Nitrogen 123 (HH) 6 - 23 mg/dL    Creatinine 3.91 (H) 0.50 - 1.05 mg/dL    eGFR 11 (L) >60 mL/min/1.73m*2    Calcium 8.3 (L) 8.6 - 10.3 mg/dL    Albumin 3.0 (L) 3.4 - 5.0 g/dL    Alkaline Phosphatase 66 33 - 136 U/L    Total Protein 6.2 (L) 6.4 - 8.2 g/dL    AST 9 9 - 39 U/L    Bilirubin, Total 0.3 0.0 - 1.2 mg/dL    ALT 9 7 - 45 U/L   Magnesium   Result Value Ref Range    Magnesium 2.45 (H) 1.60 - 2.40 mg/dL   Phosphorus   Result Value Ref Range    Phosphorus 3.4 2.5 - 4.9 mg/dL   POCT GLUCOSE   Result Value Ref Range    POCT Glucose 123 (H) 74 - 99 mg/dL   POCT GLUCOSE   Result Value Ref Range    POCT Glucose 203 (H) 74 - 99 mg/dL   POCT GLUCOSE   Result Value Ref Range    POCT Glucose 132 (H) 74 - 99 mg/dL   POCT GLUCOSE   Result Value Ref Range    POCT Glucose 114 (H) 74 - 99 mg/dL   PST Top   Result Value Ref Range    Extra Tube Hold for add-ons.    Renal function panel   Result Value Ref Range    Glucose 110 (H) 74 - 99 mg/dL    Sodium 139 136 - 145 mmol/L    Potassium 5.3 3.5 - 5.3 mmol/L    Chloride 106 98 - 107 mmol/L    Bicarbonate 27 21 - 32 mmol/L     Anion Gap 11 10 - 20 mmol/L    Urea Nitrogen 119 (HH) 6 - 23 mg/dL    Creatinine 3.40 (H) 0.50 - 1.05 mg/dL    eGFR 14 (L) >60 mL/min/1.73m*2    Calcium 8.1 (L) 8.6 - 10.3 mg/dL    Phosphorus 3.0 2.5 - 4.9 mg/dL    Albumin 2.8 (L) 3.4 - 5.0 g/dL   CBC and Auto Differential   Result Value Ref Range    WBC 5.7 4.4 - 11.3 x10*3/uL    nRBC 0.0 0.0 - 0.0 /100 WBCs    RBC 3.30 (L) 4.00 - 5.20 x10*6/uL    Hemoglobin 7.6 (L) 12.0 - 16.0 g/dL    Hematocrit 25.8 (L) 36.0 - 46.0 %    MCV 78 (L) 80 - 100 fL    MCH 23.0 (L) 26.0 - 34.0 pg    MCHC 29.5 (L) 32.0 - 36.0 g/dL    RDW 19.8 (H) 11.5 - 14.5 %    Platelets 125 (L) 150 - 450 x10*3/uL    Neutrophils % 69.9 40.0 - 80.0 %    Immature Granulocytes %, Automated 0.4 0.0 - 0.9 %    Lymphocytes % 14.3 13.0 - 44.0 %    Monocytes % 11.3 2.0 - 10.0 %    Eosinophils % 3.9 0.0 - 6.0 %    Basophils % 0.2 0.0 - 2.0 %    Neutrophils Absolute 3.97 1.60 - 5.50 x10*3/uL    Immature Granulocytes Absolute, Automated 0.02 0.00 - 0.50 x10*3/uL    Lymphocytes Absolute 0.81 0.80 - 3.00 x10*3/uL    Monocytes Absolute 0.64 0.05 - 0.80 x10*3/uL    Eosinophils Absolute 0.22 0.00 - 0.40 x10*3/uL    Basophils Absolute 0.01 0.00 - 0.10 x10*3/uL   POCT GLUCOSE   Result Value Ref Range    POCT Glucose 105 (H) 74 - 99 mg/dL   POCT GLUCOSE   Result Value Ref Range    POCT Glucose 126 (H) 74 - 99 mg/dL   POCT GLUCOSE   Result Value Ref Range    POCT Glucose 144 (H) 74 - 99 mg/dL   CBC and Auto Differential   Result Value Ref Range    WBC 4.6 4.4 - 11.3 x10*3/uL    nRBC 0.0 0.0 - 0.0 /100 WBCs    RBC 3.14 (L) 4.00 - 5.20 x10*6/uL    Hemoglobin 7.3 (L) 12.0 - 16.0 g/dL    Hematocrit 23.8 (L) 36.0 - 46.0 %    MCV 76 (L) 80 - 100 fL    MCH 23.2 (L) 26.0 - 34.0 pg    MCHC 30.7 (L) 32.0 - 36.0 g/dL    RDW 20.0 (H) 11.5 - 14.5 %    Platelets 114 (L) 150 - 450 x10*3/uL    Neutrophils % 65.4 40.0 - 80.0 %    Immature Granulocytes %, Automated 0.4 0.0 - 0.9 %    Lymphocytes % 16.9 13.0 - 44.0 %    Monocytes % 11.6 2.0  - 10.0 %    Eosinophils % 5.5 0.0 - 6.0 %    Basophils % 0.2 0.0 - 2.0 %    Neutrophils Absolute 2.97 1.60 - 5.50 x10*3/uL    Immature Granulocytes Absolute, Automated 0.02 0.00 - 0.50 x10*3/uL    Lymphocytes Absolute 0.77 (L) 0.80 - 3.00 x10*3/uL    Monocytes Absolute 0.53 0.05 - 0.80 x10*3/uL    Eosinophils Absolute 0.25 0.00 - 0.40 x10*3/uL    Basophils Absolute 0.01 0.00 - 0.10 x10*3/uL   Comprehensive Metabolic Panel   Result Value Ref Range    Glucose 106 (H) 74 - 99 mg/dL    Sodium 140 136 - 145 mmol/L    Potassium 5.5 (H) 3.5 - 5.3 mmol/L    Chloride 107 98 - 107 mmol/L    Bicarbonate 27 21 - 32 mmol/L    Anion Gap 12 10 - 20 mmol/L    Urea Nitrogen 100 (HH) 6 - 23 mg/dL    Creatinine 3.26 (H) 0.50 - 1.05 mg/dL    eGFR 14 (L) >60 mL/min/1.73m*2    Calcium 7.9 (L) 8.6 - 10.3 mg/dL    Albumin 2.8 (L) 3.4 - 5.0 g/dL    Alkaline Phosphatase 60 33 - 136 U/L    Total Protein 5.9 (L) 6.4 - 8.2 g/dL    AST 12 9 - 39 U/L    Bilirubin, Total 0.4 0.0 - 1.2 mg/dL    ALT 12 7 - 45 U/L   Magnesium   Result Value Ref Range    Magnesium 2.30 1.60 - 2.40 mg/dL   Phosphorus   Result Value Ref Range    Phosphorus 3.3 2.5 - 4.9 mg/dL   POCT GLUCOSE   Result Value Ref Range    POCT Glucose 111 (H) 74 - 99 mg/dL   POCT GLUCOSE   Result Value Ref Range    POCT Glucose 131 (H) 74 - 99 mg/dL     *Note: Due to a large number of results and/or encounters for the requested time period, some results have not been displayed. A complete set of results can be found in Results Review.       Assessment/Plan   Acute kidney injury superimposed on chronic kidney disease stage III continues stable BUN still high I do not see any immediate indication for renal replacement therapy I will continue to monitor I have discussed with the family in details  Hyperkalemia will treat with dextrose and insulin Tony another potassium this afternoon  Congestive heart failure continue diuresis as needed  Chronic kidney disease stage IIIb   Acute respiratory  failure is improving  Diabetes mellitus type 2  Anemia of chronic kidney disease  Hypertension  Hyperlipidemia  Obesity    Sixto Slater MD

## 2025-04-12 NOTE — NURSING NOTE
Patient has no complaints. Patient in no visible distress. Bed is low and locked, bed alarm is on. Call light is in reach. Patient's family remains at bedside.

## 2025-04-12 NOTE — PROGRESS NOTES
Department of Medicine  Division of Pulmonary, Critical Care, and Sleep Medicine    Analia Murray is a 74 y.o. female on day 18 of admission presenting with Acute respiratory failure with hypoxia.    Subjective   Respiratory status stable. Palliative care and psychiatry consulted yesterday. She feels well and is using incentive spirometer.       Objective     Vital Signs      4/11/2025     8:11 PM 4/12/2025    12:15 AM 4/12/2025    12:27 AM 4/12/2025     3:45 AM 4/12/2025     4:31 AM 4/12/2025     5:30 AM 4/12/2025     7:17 AM   Vitals   Systolic 141 140   150  153   Diastolic 59 81   74  56   BP Location  Left arm   Left arm  Left arm   Heart Rate       62   Temp  36.6 °C (97.9 °F)   36.5 °C (97.7 °F)  36.2 °C (97.2 °F)   Resp  16 21 19 18  18   Weight (lb)      322.97    BMI      55.41 kg/m2    BSA (m2)      2.58 m2         Physical exam  Constitutional: Obese, awake and alert, comfortable in bed.  HEENT: Normocephalic and atraumatic.  Cardiovascular: Normal rate and regular rhythm.  Pulmonary: Normal respiratory effort, distant anterior breath sounds, no wheezing.    Musculoskeletal: No edema, no cyanosis.  Neurological: Awake, alert and oriented x 3  Psychiatric: Normal behavior, mood and affect.    Labs:  Lab Results   Component Value Date    WBC 4.6 04/12/2025    HGB 7.3 (L) 04/12/2025    HCT 23.8 (L) 04/12/2025    MCV 76 (L) 04/12/2025     (L) 04/12/2025      Lab Results   Component Value Date    GLUCOSE 106 (H) 04/12/2025    CALCIUM 7.9 (L) 04/12/2025     04/12/2025    K 5.5 (H) 04/12/2025    CO2 27 04/12/2025     04/12/2025     (HH) 04/12/2025    CREATININE 3.26 (H) 04/12/2025      Lab Results   Component Value Date    ALT 12 04/12/2025    AST 12 04/12/2025    ALKPHOS 60 04/12/2025    BILITOT 0.4 04/12/2025        Oxygen Therapy  SpO2: 100 %  Medical Gas Therapy: Supplemental oxygen  Medical Gas Delivery Method: Nasal cannula (2)  FiO2 (%):  [32 %] 32 %  S RR:  [18] 18  S  VT:  [500 mL] 500 mL    Intake/Output last 3 Shifts:  I/O last 3 completed shifts:  In: 600 (4.1 mL/kg) [P.O.:600]  Out: 1450 (9.9 mL/kg) [Urine:1450 (0.3 mL/kg/hr)]  Weight: 146.5 kg       Medications   Scheduled medications  allopurinol, 50 mg, oral, Every other day  [Held by provider] amLODIPine, 10 mg, oral, Daily  aspirin, 81 mg, oral, Daily  calcium acetate, 667 mg, oral, TID after meals  ezetimibe, 10 mg, oral, Daily  fluticasone, 2 spray, Each Nostril, BID  fluticasone furoate-vilanteroL, 1 puff, inhalation, Daily  [Held by provider] gabapentin, 300 mg, oral, Daily  heparin (porcine), 7,500 Units, subcutaneous, q8h JAYA  hydrALAZINE, 50 mg, oral, BID  hydrocortisone, , Topical, BID  insulin lispro, 0-10 Units, subcutaneous, TID AC  insulin lispro, 6 Units, subcutaneous, Once  ipratropium-albuteroL, 3 mL, nebulization, TID  levothyroxine, 200 mcg, oral, Daily  [Held by provider] metoprolol succinate XL, 100 mg, oral, Daily  metoprolol tartrate, 50 mg, oral, BID  montelukast, 10 mg, oral, Nightly  nystatin, 1 Application, Topical, TID  pravastatin, 40 mg, oral, Nightly  sertraline, 50 mg, oral, Daily  sulfur hexafluoride microsphr, 2 mL, intravenous, Once in imaging      Continuous medications     PRN medications  PRN medications: acetaminophen **OR** acetaminophen, albuterol, dextrose, dextrose, glucagon, glucagon, hydrALAZINE, [Held by provider] nitroglycerin, ondansetron, oxygen, oxygen, polyethylene glycol, [Held by provider] traZODone     Allergies  Ciprofloxacin, Morphine, and Penicillins    Chest Radiograph   No results found for this or any previous visit from the past 10 days.       XR chest 1 view 04/06/2025    Narrative  Interpreted By:  Jil Washington,  STUDY:  XR CHEST 1 VIEW 4/6/2025 6:26 am    INDICATION:  Signs/Symptoms:acute respiratory failure    COMPARISON:  04/05/2025    ACCESSION NUMBER(S):  [id number]    ORDERING CLINICIAN:  HEATHER STEPHEN    TECHNIQUE:  AP erect view of the  chest    FINDINGS:  There is stable cardiac enlargement. There is mild pulmonary vascular  congestion noted with some discoid atelectasis at the right lung  base. The study is limited by the body habitus of this patient.    Impression  Stable cardiomegaly with pulmonary vascular congestion and persistent  right basilar discoid atelectasis.    The study is limited by the body habitus of this patient.    Signed by: Jil Washington 4/6/2025 8:40 PM  Dictation workstation:   [id number]      XR chest 1 view 04/05/2025    Narrative  Interpreted By:  Jil Washington,  STUDY:  XR CHEST 1 VIEW 4/5/2025 7:42 am    INDICATION:  Signs/Symptoms:acute respiratory failure    COMPARISON:  04/04/2025    ACCESSION NUMBER(S):  [id number]    ORDERING CLINICIAN:  HEATHER STEPHEN    TECHNIQUE:  AP semi-erect view of the chest    FINDINGS:  There is enlargement of the cardiac silhouette with calcified plaque  seen in the aortic knob. When compared with the study done 1 day  earlier, there is now increased right basilar platelike atelectasis  with stable atelectasis in the left mid lung zone. There is mild  pulmonary vascular congestion seen.    Impression  Enlarged cardiac silhouette with mild pulmonary vascular congestion.    Right basilar atelectasis is a little worse with stable atelectasis  left mid lung zone.    Signed by: Jil Washington 4/6/2025 10:10 AM  Dictation workstation:   [id number]      XR chest 1 view 04/04/2025    Narrative  Interpreted By:  Jil Washington,  STUDY:  XR CHEST 1 VIEW 4/4/2025 11:56 am    INDICATION:  Signs/Symptoms:acute respiratory failure    COMPARISON:  04/03/2025    ACCESSION NUMBER(S):  [id number]    ORDERING CLINICIAN:  HEATHER STEPHEN    TECHNIQUE:  AP erect view of the chest    FINDINGS:  Cardiac enlargement is unchanged. There is less right basilar  atelectasis and infiltrate. There is persistent linear atelectasis at  the junction of the left mid and lower lung zones. No pleural  abnormality is  observed.    Impression  Improved right basilar atelectasis and infiltrate without complete  resolution.    Persistent linear atelectasis at the junction of the left mid and  lower lung fields.    Stable cardiomegaly.    Signed by: Jil Washington 4/4/2025 12:12 PM  Dictation workstation:   [id number]         Assessment and Plan / Recommendations   Assessment/Plan   74-year-old woman with history of asthma (clinical diagnosis), HOLDEN on CPAP, CKD, diabetes admitted with    1.  Acute hypoxic and hypercapnic respiratory failure due to below  2.  Suspected asthma exacerbation (was diagnosed with asthma clinically, PFT from 2020 no obstruction, never smoked)  3.  Pulmonary edema/CHF  4.  HOLDEN with hypoventilation    Improving clinically, on room air during the day now, AVAPS at night    -Continue AVAPS at night, likely needs to be discharged on AVAPS rather than CPAP, to be evaluated for home AVAPS close to discharge (failed CPAP/BiPAP-blood gas with persistent hypercapnic acidosis on both).  -Completed steroids  -Continue Breo, continue Singulair, nebs as needed  -Encourage incentive spirometry and Aerobika  -Diuresis/volume management per nephrology    Optimized from a  pulmonary standpoint for discharge pending approval of AVAPs capable device at current setting.       Raghav Kennedy MD

## 2025-04-12 NOTE — NURSING NOTE
Patient in no visible distress. Patient has no complaints. Bed is low and locked, bed alarm is on. Call light is in reach.

## 2025-04-12 NOTE — INDIVIDUALIZED OVERALL PLAN OF CARE NOTE
Chart reviewed, and the patient’s case was discussed with the primary care physician and the assigned RN. They reported no behavioral issues. The patient continues to present with a flat affect but is able to express her needs appropriately and without concern.    We attempted to engage with the patient for a second time today to assess and address symptoms of anxiety and depression. However, the patient again stated that today was not a good time for discussion. We advised the patient to notify nursing staff if and when she feels ready to engage, and we reiterated that we remain available to support her during her hospitalization, particularly in managing anxiety and exploring ways we may be of help.    Initially, the patient did not recall our encounter from yesterday but politely declined today’s visit. We informed the primary care physician of the interaction, who subsequently met with the patient. The physician later notified us via secure message that the patient’s son spoke with her and she is now open to meeting with us tomorrow.

## 2025-04-12 NOTE — CARE PLAN
Problem: Pain - Adult  Goal: Verbalizes/displays adequate comfort level or baseline comfort level  Outcome: Progressing     Problem: Safety - Adult  Goal: Free from fall injury  Outcome: Progressing     Problem: Discharge Planning  Goal: Discharge to home or other facility with appropriate resources  Outcome: Progressing     Problem: Chronic Conditions and Co-morbidities  Goal: Patient's chronic conditions and co-morbidity symptoms are monitored and maintained or improved  Outcome: Progressing     Problem: Nutrition  Goal: Nutrient intake appropriate for maintaining nutritional needs  Outcome: Progressing     Problem: Skin  Goal: Decreased wound size/increased tissue granulation at next dressing change  Outcome: Progressing  Goal: Participates in plan/prevention/treatment measures  Outcome: Progressing  Goal: Prevent/manage excess moisture  Outcome: Progressing  Goal: Prevent/minimize sheer/friction injuries  Outcome: Progressing  Goal: Promote/optimize nutrition  Outcome: Progressing  Goal: Promote skin healing  Outcome: Progressing  Flowsheets (Taken 4/12/2025 1410)  Promote skin healing:   Turn/reposition every 2 hours/use positioning/transfer devices   Protective dressings over bony prominences     Problem: Pain  Goal: Takes deep breaths with improved pain control throughout the shift  Outcome: Progressing  Goal: Turns in bed with improved pain control throughout the shift  Outcome: Progressing  Goal: Walks with improved pain control throughout the shift  Outcome: Progressing  Goal: Performs ADL's with improved pain control throughout shift  Outcome: Progressing  Goal: Participates in PT with improved pain control throughout the shift  Outcome: Progressing  Goal: Free from opioid side effects throughout the shift  Outcome: Progressing  Goal: Free from acute confusion related to pain meds throughout the shift  Outcome: Progressing     Problem: Respiratory  Goal: Clear secretions with interventions this  shift  Outcome: Progressing  Goal: Minimize anxiety/maximize coping throughout shift  Outcome: Progressing  Goal: No signs of respiratory distress (eg. Use of accessory muscles. Peds grunting)  Outcome: Progressing

## 2025-04-12 NOTE — PROGRESS NOTES
Analia Murray is a 74 y.o. female on day 18 of admission presenting with Acute respiratory failure with hypoxia.      Subjective     Patient in a better mood today answering my family.  No recent shortness of breath.    She had multiple bowel movements yesterday described as brown.  No melena or hematochezia.    She initially declined to see psychiatry today, but her family did convince her to see tomorrow.    Objective     Last Recorded Vitals  /50 (BP Location: Left arm, Patient Position: Lying)   Pulse 65   Temp 36.2 °C (97.2 °F) (Axillary)   Resp 18   Wt 146 kg (322 lb 15.6 oz)   SpO2 100%   Intake/Output last 3 Shifts:    Intake/Output Summary (Last 24 hours) at 4/12/2025 1506  Last data filed at 4/12/2025 0900  Gross per 24 hour   Intake 360 ml   Output 200 ml   Net 160 ml       Admission Weight  Weight: 132 kg (290 lb) (03/25/25 0041)    Daily Weight  04/12/25 : 146 kg (322 lb 15.6 oz)    Image Results  XR chest 1 view  Narrative: Interpreted By:  Marco Antonio Duffy,   STUDY:  XR CHEST 1 VIEW      INDICATION:  Signs/Symptoms:resp failure.      COMPARISON:  April 6, 2025      ACCESSION NUMBER(S):  [id number]      ORDERING CLINICIAN:  ELVI JOHNSON      FINDINGS:  Cardiomegaly with some mild perihilar interstitial edema suggested.      No large effusion or consolidation.      Impression:     Cardiomegaly with some mild perihilar interstitial edema suggested.      No large effusion or consolidation.      Signed by: Marco Antonio Duffy 4/8/2025 8:30 AM  Dictation workstation:   [id number]      Physical Exam    General: Morbidly obese female sitting in bed.  Awake and conversational.  Morbidly obese.  Eyes: Clear sclera  Neck: Increased circumference grossly  Cardio: Regular rate rhythm  Respiratory: Nasal cannula.  Bilateral diminished.  Neuro: Somnolent.  No facial droop.  Extremities: 2+ pitting edema bilaterally  Musculoskeletal: No bony deformity  Psychiatric: Tearful.    Assessment/Plan      Acute  respiratory failure with hypoxia and hypercapnia.  Back on nasal cannula today but 2 L.  Continue noninvasive positive pressure ventilation at night.  Pulmonology on consult; arranging for AVAPS for discharge.    Acute kidney injury on chronic kidney disease  Creatinine is trending downward though still in the 3 range  Nephrology on consult  Palliative is also met and discussed goals of care given degree of CKD    Morbid obesity  Major comorbidity which I believe is contributing to her respiratory failure  Weight loss has been discussed by partner.    Diabetes mellitus type 2  Cover with insulin sliding scale.    Hypothyroidism  TSH was 11.  Continue with levothyroxine 200 mg daily    Anxiety  Discussed with psychiatry, and will increased sertraline to 100  Patient has been hesitant to see psychiatry x 2, but after convincing by family, will be seen tomorrow.    Acute on chronic diastolic heart failure  Improved.  With the patient's renal status, defer diuresis to nephrology.    Anemia  Acute on chronic downtrending last few days  No evidence of bleeding, my suspicion is that more related to chronic disease and renal failure  We will get an occult blood assay  Get a repeat RFP at 1600.  Hold aspirin heparin for now, but anticipate restarting these tomorrow.    Disposition: Will need eventual placement once pulmonology has arranged for AVAPS.  Patient is stable, but appreciate ongoing goals of care given her severe comorbidities.  Discussed with family at bedside    Rajesh Rojas,

## 2025-04-12 NOTE — PROGRESS NOTES
Analia Murray is a 74 y.o. female on day 18 of admission presenting with Acute respiratory failure with hypoxia.    Subjective   Symptoms (0 - 10, Best to Worst)  [place] Symptom Assessment System  0-10 (Numeric) Pain Score: 0 - No pain  Sumaya, Lucio and Jose present at bedside  Patient just returned to bed, resting comfortably but not interested in conversation  Participated in PT, and was up to chair several hours today     Objective     Physical Exam  Physical Exam  Vitals and nursing note reviewed.   Constitutional:       General: She is not in acute distress.     Appearance: She is obese. She is ill-appearing. She is not toxic-appearing.   HENT:      Mouth/Throat:      Mouth: Mucous membranes are moist.      Pharynx: Oropharynx is clear.   Eyes:      General: No scleral icterus.     Extraocular Movements: Extraocular movements intact.      Pupils: Pupils are equal, round, and reactive to light.   Cardiovascular:      Rate and Rhythm: Normal rate and regular rhythm.      Pulses: Normal pulses.      Heart sounds: No murmur heard.  Pulmonary:      Effort: Pulmonary effort is normal. No respiratory distress.      Breath sounds: No wheezing or rales.   Abdominal:      General: There is no distension.      Palpations: Abdomen is soft.      Tenderness: There is no abdominal tenderness. There is no guarding.   Musculoskeletal:      Cervical back: Normal range of motion.      Right lower leg: No edema.      Left lower leg: No edema.   Skin:     General: Skin is warm and dry.   Neurological:      General: No focal deficit present.      Mental Status: She is alert and oriented to person, place, and time.   Psychiatric:         Mood and Affect: Mood normal.         Behavior: Behavior normal.         Thought Content: Thought content normal.         Judgment: Judgment normal.      Comments:  Less anxiety today - family at bedside     Last Recorded Vitals  Blood pressure 124/50, pulse 65, temperature 36.2 °C (97.2 °F),  "temperature source Axillary, resp. rate 18, height 1.626 m (5' 4.02\"), weight 146 kg (322 lb 15.6 oz), SpO2 100%.  Intake/Output last 3 Shifts:  I/O last 3 completed shifts:  In: 600 (4.1 mL/kg) [P.O.:600]  Out: 1450 (9.9 mL/kg) [Urine:1450 (0.3 mL/kg/hr)]  Weight: 146.5 kg     Relevant Results  Results for orders placed or performed during the hospital encounter of 03/25/25 (from the past 24 hours)   POCT GLUCOSE   Result Value Ref Range    POCT Glucose 126 (H) 74 - 99 mg/dL   POCT GLUCOSE   Result Value Ref Range    POCT Glucose 144 (H) 74 - 99 mg/dL   CBC and Auto Differential   Result Value Ref Range    WBC 4.6 4.4 - 11.3 x10*3/uL    nRBC 0.0 0.0 - 0.0 /100 WBCs    RBC 3.14 (L) 4.00 - 5.20 x10*6/uL    Hemoglobin 7.3 (L) 12.0 - 16.0 g/dL    Hematocrit 23.8 (L) 36.0 - 46.0 %    MCV 76 (L) 80 - 100 fL    MCH 23.2 (L) 26.0 - 34.0 pg    MCHC 30.7 (L) 32.0 - 36.0 g/dL    RDW 20.0 (H) 11.5 - 14.5 %    Platelets 114 (L) 150 - 450 x10*3/uL    Neutrophils % 65.4 40.0 - 80.0 %    Immature Granulocytes %, Automated 0.4 0.0 - 0.9 %    Lymphocytes % 16.9 13.0 - 44.0 %    Monocytes % 11.6 2.0 - 10.0 %    Eosinophils % 5.5 0.0 - 6.0 %    Basophils % 0.2 0.0 - 2.0 %    Neutrophils Absolute 2.97 1.60 - 5.50 x10*3/uL    Immature Granulocytes Absolute, Automated 0.02 0.00 - 0.50 x10*3/uL    Lymphocytes Absolute 0.77 (L) 0.80 - 3.00 x10*3/uL    Monocytes Absolute 0.53 0.05 - 0.80 x10*3/uL    Eosinophils Absolute 0.25 0.00 - 0.40 x10*3/uL    Basophils Absolute 0.01 0.00 - 0.10 x10*3/uL   Comprehensive Metabolic Panel   Result Value Ref Range    Glucose 106 (H) 74 - 99 mg/dL    Sodium 140 136 - 145 mmol/L    Potassium 5.5 (H) 3.5 - 5.3 mmol/L    Chloride 107 98 - 107 mmol/L    Bicarbonate 27 21 - 32 mmol/L    Anion Gap 12 10 - 20 mmol/L    Urea Nitrogen 100 (HH) 6 - 23 mg/dL    Creatinine 3.26 (H) 0.50 - 1.05 mg/dL    eGFR 14 (L) >60 mL/min/1.73m*2    Calcium 7.9 (L) 8.6 - 10.3 mg/dL    Albumin 2.8 (L) 3.4 - 5.0 g/dL    Alkaline " Phosphatase 60 33 - 136 U/L    Total Protein 5.9 (L) 6.4 - 8.2 g/dL    AST 12 9 - 39 U/L    Bilirubin, Total 0.4 0.0 - 1.2 mg/dL    ALT 12 7 - 45 U/L   Magnesium   Result Value Ref Range    Magnesium 2.30 1.60 - 2.40 mg/dL   Phosphorus   Result Value Ref Range    Phosphorus 3.3 2.5 - 4.9 mg/dL   POCT GLUCOSE   Result Value Ref Range    POCT Glucose 111 (H) 74 - 99 mg/dL   POCT GLUCOSE   Result Value Ref Range    POCT Glucose 131 (H) 74 - 99 mg/dL   POCT GLUCOSE   Result Value Ref Range    POCT Glucose 109 (H) 74 - 99 mg/dL   Hemoglobin and hematocrit, blood   Result Value Ref Range    Hemoglobin 8.1 (L) 12.0 - 16.0 g/dL    Hematocrit 26.3 (L) 36.0 - 46.0 %         Scheduled medications  allopurinol, 50 mg, oral, Every other day  [Held by provider] amLODIPine, 10 mg, oral, Daily  [Held by provider] aspirin, 81 mg, oral, Daily  calcium acetate, 667 mg, oral, TID after meals  ezetimibe, 10 mg, oral, Daily  fluticasone, 2 spray, Each Nostril, BID  fluticasone furoate-vilanteroL, 1 puff, inhalation, Daily  [Held by provider] gabapentin, 300 mg, oral, Daily  [Held by provider] heparin (porcine), 7,500 Units, subcutaneous, q8h JAYA  hydrALAZINE, 50 mg, oral, BID  hydrocortisone, , Topical, BID  insulin lispro, 0-10 Units, subcutaneous, TID AC  insulin lispro, 6 Units, subcutaneous, Once  ipratropium-albuteroL, 3 mL, nebulization, TID  levothyroxine, 200 mcg, oral, Daily  [Held by provider] metoprolol succinate XL, 100 mg, oral, Daily  metoprolol tartrate, 50 mg, oral, BID  montelukast, 10 mg, oral, Nightly  nystatin, 1 Application, Topical, TID  pravastatin, 40 mg, oral, Nightly  sertraline, 50 mg, oral, Daily  sulfur hexafluoride microsphr, 2 mL, intravenous, Once in imaging      Continuous medications     PRN medications  PRN medications: acetaminophen **OR** acetaminophen, albuterol, dextrose, dextrose, glucagon, glucagon, hydrALAZINE, [Held by provider] nitroglycerin, ondansetron, oxygen, oxygen, polyethylene glycol,  "[Held by provider] traZODone       Assessment/Plan   Acute respiratory failure with hypoxia and hypercapnia - improving now with AVAPS pt on room air during day time. AVAPS BiPAP overnight.  Pulmonology on board  MARY ANN on CKDIII B (baseline Creat 1.87 w eGFR 28) - Today's labs (4/12: Creat 3.26, eGFR 14) trending down. Dr. Slater, nephrology following - no urgent indication for dialysis at present  Anxiety and depression - psychiatry has been consulted (Both Friday, 4/11 and Saturday, 4/12 pt. declined psychiatric evaluation).  Lucio, son, \" encouraged his mom to meet with psychiatry on Sunday, 4/13 as mental/emotional recovery is important\")     CODE STATUS - FULL CODE  Pt is capable  Spouse Jose Murray is legal surrogate  Pt also has 4 children, Jose (eldest), To, Tiffanie, and a younger daughter  No HPOA or LW on record. Advance Documents given to pt/family to review and complete    4/12/2025  Advanced directives -provided to patient and family on Friday, 4/11 \"need further discussion /consideration, as well as patient's direct participation in putting these in place\" per son, Jose.   Patient's sons state that their mom \"is very much an independent decision maker\" - and has been \"specific in determining her course of health care, including choosing physicians that provide outpatient management.\"  This extended hospital stay has proved to be quite a setback/\" wake-up call\" with realization that patient has serious chronic illnesses, requiring ongoing and vigilant follow-up.    Patient was able to participate in PT and spend time out of bed today which is a marked improvement.  She denied SOB when out of bed, AVAPS BiPAP to be arranged prior to discharge -disposition will be to SNF rehab.  Sons, Lucio and Jose present at bedside.  State patient's goal is to fully recover to baseline.  Prior to this hospitalization she was independent in ADLs, with support provided by her , Jose.  Recommended consideration of home " palliative care and/or a home-based primary care provider program.  Family is interested in this and more information forthcoming.    No palliative related symptoms to address today.  Palliative medicine will continue to follow     I spent 43 noncontinuous minutes in the professional and overall care of this patient.      Kristie Baker, APRN-CNP

## 2025-04-13 VITALS
TEMPERATURE: 98.1 F | HEART RATE: 65 BPM | DIASTOLIC BLOOD PRESSURE: 59 MMHG | HEIGHT: 64 IN | OXYGEN SATURATION: 100 % | BODY MASS INDEX: 50.02 KG/M2 | RESPIRATION RATE: 18 BRPM | WEIGHT: 293 LBS | SYSTOLIC BLOOD PRESSURE: 150 MMHG

## 2025-04-13 LAB
ALBUMIN SERPL BCP-MCNC: 2.9 G/DL (ref 3.4–5)
ALP SERPL-CCNC: 61 U/L (ref 33–136)
ALT SERPL W P-5'-P-CCNC: 13 U/L (ref 7–45)
ANION GAP SERPL CALCULATED.3IONS-SCNC: 10 MMOL/L (ref 10–20)
AST SERPL W P-5'-P-CCNC: 14 U/L (ref 9–39)
BASOPHILS # BLD AUTO: 0.01 X10*3/UL (ref 0–0.1)
BASOPHILS NFR BLD AUTO: 0.2 %
BILIRUB SERPL-MCNC: 0.4 MG/DL (ref 0–1.2)
BUN SERPL-MCNC: 97 MG/DL (ref 6–23)
CALCIUM SERPL-MCNC: 8.1 MG/DL (ref 8.6–10.3)
CHLORIDE SERPL-SCNC: 108 MMOL/L (ref 98–107)
CO2 SERPL-SCNC: 27 MMOL/L (ref 21–32)
CREAT SERPL-MCNC: 3.18 MG/DL (ref 0.5–1.05)
EGFRCR SERPLBLD CKD-EPI 2021: 15 ML/MIN/1.73M*2
EOSINOPHIL # BLD AUTO: 0.29 X10*3/UL (ref 0–0.4)
EOSINOPHIL NFR BLD AUTO: 7.2 %
ERYTHROCYTE [DISTWIDTH] IN BLOOD BY AUTOMATED COUNT: 20 % (ref 11.5–14.5)
GLUCOSE BLD MANUAL STRIP-MCNC: 100 MG/DL (ref 74–99)
GLUCOSE BLD MANUAL STRIP-MCNC: 102 MG/DL (ref 74–99)
GLUCOSE BLD MANUAL STRIP-MCNC: 120 MG/DL (ref 74–99)
GLUCOSE BLD MANUAL STRIP-MCNC: 89 MG/DL (ref 74–99)
GLUCOSE SERPL-MCNC: 101 MG/DL (ref 74–99)
HCT VFR BLD AUTO: 26.2 % (ref 36–46)
HEMOCCULT SP1 STL QL: NEGATIVE
HGB BLD-MCNC: 7.5 G/DL (ref 12–16)
IMM GRANULOCYTES # BLD AUTO: 0.02 X10*3/UL (ref 0–0.5)
IMM GRANULOCYTES NFR BLD AUTO: 0.5 % (ref 0–0.9)
LYMPHOCYTES # BLD AUTO: 0.7 X10*3/UL (ref 0.8–3)
LYMPHOCYTES NFR BLD AUTO: 17.5 %
MAGNESIUM SERPL-MCNC: 2.29 MG/DL (ref 1.6–2.4)
MCH RBC QN AUTO: 23.1 PG (ref 26–34)
MCHC RBC AUTO-ENTMCNC: 28.6 G/DL (ref 32–36)
MCV RBC AUTO: 81 FL (ref 80–100)
MONOCYTES # BLD AUTO: 0.5 X10*3/UL (ref 0.05–0.8)
MONOCYTES NFR BLD AUTO: 12.5 %
NEUTROPHILS # BLD AUTO: 2.49 X10*3/UL (ref 1.6–5.5)
NEUTROPHILS NFR BLD AUTO: 62.1 %
NRBC BLD-RTO: 0 /100 WBCS (ref 0–0)
PHOSPHATE SERPL-MCNC: 3.7 MG/DL (ref 2.5–4.9)
PLATELET # BLD AUTO: 111 X10*3/UL (ref 150–450)
POTASSIUM SERPL-SCNC: 5.1 MMOL/L (ref 3.5–5.3)
PROT SERPL-MCNC: 6 G/DL (ref 6.4–8.2)
RBC # BLD AUTO: 3.24 X10*6/UL (ref 4–5.2)
SODIUM SERPL-SCNC: 140 MMOL/L (ref 136–145)
WBC # BLD AUTO: 4 X10*3/UL (ref 4.4–11.3)

## 2025-04-13 PROCEDURE — 85025 COMPLETE CBC W/AUTO DIFF WBC: CPT | Performed by: INTERNAL MEDICINE

## 2025-04-13 PROCEDURE — 2060000001 HC INTERMEDIATE ICU ROOM DAILY

## 2025-04-13 PROCEDURE — 83735 ASSAY OF MAGNESIUM: CPT

## 2025-04-13 PROCEDURE — 94640 AIRWAY INHALATION TREATMENT: CPT

## 2025-04-13 PROCEDURE — 2500000001 HC RX 250 WO HCPCS SELF ADMINISTERED DRUGS (ALT 637 FOR MEDICARE OP): Performed by: INTERNAL MEDICINE

## 2025-04-13 PROCEDURE — 36415 COLL VENOUS BLD VENIPUNCTURE: CPT | Performed by: INTERNAL MEDICINE

## 2025-04-13 PROCEDURE — 99232 SBSQ HOSP IP/OBS MODERATE 35: CPT | Performed by: INTERNAL MEDICINE

## 2025-04-13 PROCEDURE — 82947 ASSAY GLUCOSE BLOOD QUANT: CPT

## 2025-04-13 PROCEDURE — 99231 SBSQ HOSP IP/OBS SF/LOW 25: CPT | Performed by: STUDENT IN AN ORGANIZED HEALTH CARE EDUCATION/TRAINING PROGRAM

## 2025-04-13 PROCEDURE — 84100 ASSAY OF PHOSPHORUS: CPT

## 2025-04-13 PROCEDURE — 82270 OCCULT BLOOD FECES: CPT | Performed by: INTERNAL MEDICINE

## 2025-04-13 PROCEDURE — 94668 MNPJ CHEST WALL SBSQ: CPT

## 2025-04-13 PROCEDURE — 84075 ASSAY ALKALINE PHOSPHATASE: CPT | Performed by: INTERNAL MEDICINE

## 2025-04-13 PROCEDURE — 2500000002 HC RX 250 W HCPCS SELF ADMINISTERED DRUGS (ALT 637 FOR MEDICARE OP, ALT 636 FOR OP/ED): Performed by: INTERNAL MEDICINE

## 2025-04-13 RX ORDER — SERTRALINE HYDROCHLORIDE 100 MG/1
100 TABLET, FILM COATED ORAL DAILY
Status: DISCONTINUED | OUTPATIENT
Start: 2025-04-14 | End: 2025-04-14

## 2025-04-13 RX ADMIN — METOPROLOL TARTRATE 50 MG: 50 TABLET, FILM COATED ORAL at 08:58

## 2025-04-13 RX ADMIN — IPRATROPIUM BROMIDE AND ALBUTEROL SULFATE 3 ML: 2.5; .5 SOLUTION RESPIRATORY (INHALATION) at 07:27

## 2025-04-13 RX ADMIN — PRAVASTATIN SODIUM 40 MG: 20 TABLET ORAL at 21:24

## 2025-04-13 RX ADMIN — SERTRALINE HYDROCHLORIDE 50 MG: 50 TABLET ORAL at 08:58

## 2025-04-13 RX ADMIN — HYDRALAZINE HYDROCHLORIDE 50 MG: 50 TABLET ORAL at 21:24

## 2025-04-13 RX ADMIN — IPRATROPIUM BROMIDE AND ALBUTEROL SULFATE 3 ML: 2.5; .5 SOLUTION RESPIRATORY (INHALATION) at 11:48

## 2025-04-13 RX ADMIN — HYDROCORTISONE: 25 CREAM TOPICAL at 21:27

## 2025-04-13 RX ADMIN — CALCIUM ACETATE 667 MG: 667 CAPSULE ORAL at 13:33

## 2025-04-13 RX ADMIN — IPRATROPIUM BROMIDE AND ALBUTEROL SULFATE 3 ML: 2.5; .5 SOLUTION RESPIRATORY (INHALATION) at 19:38

## 2025-04-13 RX ADMIN — LEVOTHYROXINE SODIUM 200 MCG: 0.1 TABLET ORAL at 05:55

## 2025-04-13 RX ADMIN — FLUTICASONE PROPIONATE 2 SPRAY: 50 SPRAY, METERED NASAL at 21:25

## 2025-04-13 RX ADMIN — MONTELUKAST 10 MG: 10 TABLET, FILM COATED ORAL at 21:24

## 2025-04-13 RX ADMIN — HYDRALAZINE HYDROCHLORIDE 50 MG: 50 TABLET ORAL at 08:58

## 2025-04-13 RX ADMIN — NYSTATIN 1 APPLICATION: 100000 POWDER TOPICAL at 21:27

## 2025-04-13 RX ADMIN — DIPHENHYDRAMINE HYDROCHLORIDE, ZINC ACETATE: 2; .1 CREAM TOPICAL at 21:26

## 2025-04-13 RX ADMIN — FLUTICASONE FUROATE AND VILANTEROL TRIFENATATE 1 PUFF: 100; 25 POWDER RESPIRATORY (INHALATION) at 07:41

## 2025-04-13 RX ADMIN — HYDROCORTISONE: 25 CREAM TOPICAL at 08:59

## 2025-04-13 RX ADMIN — FLUTICASONE PROPIONATE 2 SPRAY: 50 SPRAY, METERED NASAL at 08:59

## 2025-04-13 RX ADMIN — METOPROLOL TARTRATE 50 MG: 50 TABLET, FILM COATED ORAL at 21:24

## 2025-04-13 RX ADMIN — CALCIUM ACETATE 667 MG: 667 CAPSULE ORAL at 18:39

## 2025-04-13 RX ADMIN — CALCIUM ACETATE 667 MG: 667 CAPSULE ORAL at 08:58

## 2025-04-13 RX ADMIN — NYSTATIN 1 APPLICATION: 100000 POWDER TOPICAL at 08:59

## 2025-04-13 RX ADMIN — EZETIMIBE 10 MG: 10 TABLET ORAL at 08:57

## 2025-04-13 RX ADMIN — ACETAMINOPHEN 650 MG: 325 TABLET ORAL at 05:55

## 2025-04-13 ASSESSMENT — PAIN SCALES - GENERAL: PAINLEVEL_OUTOF10: 0 - NO PAIN

## 2025-04-13 NOTE — PROGRESS NOTES
Department of Medicine  Division of Pulmonary, Critical Care, and Sleep Medicine    Analia Murray is a 74 y.o. female on day 19 of admission presenting with Acute respiratory failure with hypoxia.    Subjective   Respiratory status stable. She denies any dyspnea. She states she did not ware avaps mask overnight. She had not had problems with it when she was wearing it but she thought she was told she didn't need it.        Objective     Vital Signs      4/12/2025    11:00 PM 4/13/2025     3:17 AM 4/13/2025     5:02 AM 4/13/2025     7:22 AM 4/13/2025     8:29 AM 4/13/2025     8:47 AM 4/13/2025    11:48 AM   Vitals   Systolic 145 142  118 134     Diastolic 64 69  48 57     BP Location Right arm Right arm  Right arm Right arm  Right arm   Heart Rate  63  58 78 67 60   Temp 36.7 °C (98.1 °F) 37 °C (98.6 °F)  36.6 °C (97.9 °F)      Resp 18 18  20      Weight (lb)   313.94       BMI   53.86 kg/m2       BSA (m2)   2.53 m2            Physical exam  Constitutional: Obese, awake and alert, comfortable in bed.  HEENT: Normocephalic and atraumatic.  Cardiovascular: Normal rate and regular rhythm.  Pulmonary: Normal respiratory effort, distant anterior breath sounds, no wheezing.    Musculoskeletal: No edema, no cyanosis.  Neurological: Awake, alert and oriented x 3  Psychiatric: Normal behavior, mood and affect.    Labs:  Lab Results   Component Value Date    WBC 4.0 (L) 04/13/2025    HGB 7.5 (L) 04/13/2025    HCT 26.2 (L) 04/13/2025    MCV 81 04/13/2025     (L) 04/13/2025      Lab Results   Component Value Date    GLUCOSE 101 (H) 04/13/2025    CALCIUM 8.1 (L) 04/13/2025     04/13/2025    K 5.1 04/13/2025    CO2 27 04/13/2025     (H) 04/13/2025    BUN 97 (HH) 04/13/2025    CREATININE 3.18 (H) 04/13/2025      Lab Results   Component Value Date    ALT 13 04/13/2025    AST 14 04/13/2025    ALKPHOS 61 04/13/2025    BILITOT 0.4 04/13/2025        Oxygen Therapy  SpO2: 100 %  Medical Gas Therapy: Supplemental  oxygen  Medical Gas Delivery Method: Nasal cannula       Intake/Output last 3 Shifts:  I/O last 3 completed shifts:  In: 720 (5.1 mL/kg) [P.O.:720]  Out: 0 (0 mL/kg)   Weight: 142.4 kg       Medications   Scheduled medications  allopurinol, 50 mg, oral, Every other day  [Held by provider] amLODIPine, 10 mg, oral, Daily  [Held by provider] aspirin, 81 mg, oral, Daily  calcium acetate, 667 mg, oral, TID after meals  ezetimibe, 10 mg, oral, Daily  fluticasone, 2 spray, Each Nostril, BID  fluticasone furoate-vilanteroL, 1 puff, inhalation, Daily  [Held by provider] gabapentin, 300 mg, oral, Daily  [Held by provider] heparin (porcine), 7,500 Units, subcutaneous, q8h JAYA  hydrALAZINE, 50 mg, oral, BID  hydrocortisone, , Topical, BID  insulin lispro, 0-10 Units, subcutaneous, TID AC  insulin lispro, 6 Units, subcutaneous, Once  ipratropium-albuteroL, 3 mL, nebulization, TID  levothyroxine, 200 mcg, oral, Daily  [Held by provider] metoprolol succinate XL, 100 mg, oral, Daily  metoprolol tartrate, 50 mg, oral, BID  montelukast, 10 mg, oral, Nightly  nystatin, 1 Application, Topical, TID  pravastatin, 40 mg, oral, Nightly  [START ON 4/14/2025] sertraline, 100 mg, oral, Daily  sulfur hexafluoride microsphr, 2 mL, intravenous, Once in imaging      Continuous medications     PRN medications  PRN medications: acetaminophen **OR** acetaminophen, albuterol, dextrose, dextrose, diphenhydramine-zinc acetate, glucagon, glucagon, hydrALAZINE, [Held by provider] nitroglycerin, ondansetron, oxygen, oxygen, polyethylene glycol, [Held by provider] traZODone     Allergies  Ciprofloxacin, Morphine, and Penicillins    Chest Radiograph   No results found for this or any previous visit from the past 10 days.       XR chest 1 view 04/06/2025    Narrative  Interpreted By:  Jil Washington,  STUDY:  XR CHEST 1 VIEW 4/6/2025 6:26 am    INDICATION:  Signs/Symptoms:acute respiratory failure    COMPARISON:  04/05/2025    ACCESSION  NUMBER(S):  [id number]    ORDERING CLINICIAN:  HEATHER STEPHEN    TECHNIQUE:  AP erect view of the chest    FINDINGS:  There is stable cardiac enlargement. There is mild pulmonary vascular  congestion noted with some discoid atelectasis at the right lung  base. The study is limited by the body habitus of this patient.    Impression  Stable cardiomegaly with pulmonary vascular congestion and persistent  right basilar discoid atelectasis.    The study is limited by the body habitus of this patient.    Signed by: Jil Washington 4/6/2025 8:40 PM  Dictation workstation:   [id number]      XR chest 1 view 04/05/2025    Narrative  Interpreted By:  Jil Washington,  STUDY:  XR CHEST 1 VIEW 4/5/2025 7:42 am    INDICATION:  Signs/Symptoms:acute respiratory failure    COMPARISON:  04/04/2025    ACCESSION NUMBER(S):  [id number]    ORDERING CLINICIAN:  HEATHER STEPHEN    TECHNIQUE:  AP semi-erect view of the chest    FINDINGS:  There is enlargement of the cardiac silhouette with calcified plaque  seen in the aortic knob. When compared with the study done 1 day  earlier, there is now increased right basilar platelike atelectasis  with stable atelectasis in the left mid lung zone. There is mild  pulmonary vascular congestion seen.    Impression  Enlarged cardiac silhouette with mild pulmonary vascular congestion.    Right basilar atelectasis is a little worse with stable atelectasis  left mid lung zone.    Signed by: Jil Washington 4/6/2025 10:10 AM  Dictation workstation:   [id number]      XR chest 1 view 04/04/2025    Narrative  Interpreted By:  Jil Washington,  STUDY:  XR CHEST 1 VIEW 4/4/2025 11:56 am    INDICATION:  Signs/Symptoms:acute respiratory failure    COMPARISON:  04/03/2025    ACCESSION NUMBER(S):  [id number]    ORDERING CLINICIAN:  HEATHER STEPHEN    TECHNIQUE:  AP erect view of the chest    FINDINGS:  Cardiac enlargement is unchanged. There is less right basilar  atelectasis and infiltrate. There is persistent linear  atelectasis at  the junction of the left mid and lower lung zones. No pleural  abnormality is observed.    Impression  Improved right basilar atelectasis and infiltrate without complete  resolution.    Persistent linear atelectasis at the junction of the left mid and  lower lung fields.    Stable cardiomegaly.    Signed by: Jil Washington 4/4/2025 12:12 PM  Dictation workstation:   [id number]         Assessment and Plan / Recommendations   Assessment/Plan   74-year-old woman with history of asthma (clinical diagnosis), HOLDEN on CPAP, CKD, diabetes admitted with    1.  Acute hypoxic and hypercapnic respiratory failure due to below  2.  Suspected asthma exacerbation (was diagnosed with asthma clinically, PFT from 2020 no obstruction, never smoked)  3.  Pulmonary edema/CHF  4.  HOLDEN with hypoventilation    Improving clinically, on room air during the day now, AVAPS at night    -Continue AVAPS at night, likely needs to be discharged on AVAPS rather than CPAP. Obtain am abg after a night on avaps tonight.   -Completed steroids  -Continue Breo, continue Singulair, nebs as needed  -Encourage incentive spirometry and Aerobika  -Diuresis/volume management per nephrology    Optimized from a  pulmonary standpoint for discharge pending approval of AVAPs capable device at current settings and am blood gas to evaluate if settings are appropriate.       Raghav Kennedy MD

## 2025-04-13 NOTE — PROGRESS NOTES
Analia Murray is a 74 y.o. female on day 19 of admission presenting with Acute respiratory failure with hypoxia.      Subjective     Patient seen with family in the room.  No worsening shortness of breath.  Family is looking at skilled nursing facility.    Objective     Last Recorded Vitals  /71 (BP Location: Right arm, Patient Position: Lying)   Pulse 65   Temp 36.4 °C (97.5 °F) (Oral)   Resp 18   Wt 142 kg (313 lb 15 oz)   SpO2 100%   Intake/Output last 3 Shifts:    Intake/Output Summary (Last 24 hours) at 4/13/2025 1633  Last data filed at 4/13/2025 0900  Gross per 24 hour   Intake 480 ml   Output 0 ml   Net 480 ml       Admission Weight  Weight: 132 kg (290 lb) (03/25/25 0041)    Daily Weight  04/13/25 : 142 kg (313 lb 15 oz)    Image Results  XR chest 1 view  Narrative: Interpreted By:  Marco Antonio Duffy,   STUDY:  XR CHEST 1 VIEW      INDICATION:  Signs/Symptoms:resp failure.      COMPARISON:  April 6, 2025      ACCESSION NUMBER(S):  [id number]      ORDERING CLINICIAN:  ELVI JOHNSON      FINDINGS:  Cardiomegaly with some mild perihilar interstitial edema suggested.      No large effusion or consolidation.      Impression:     Cardiomegaly with some mild perihilar interstitial edema suggested.      No large effusion or consolidation.      Signed by: Marco Antonio Duffy 4/8/2025 8:30 AM  Dictation workstation:   [id number]      Physical Exam    General: Morbidly obese female sitting in bed.  Awake and conversational.  Morbidly obese.  Eyes: Clear sclera  Neck: Increased circumference grossly  Cardio: Regular rate rhythm  Respiratory: Nasal cannula.  Bilateral diminished.  Neuro: Somnolent.  No facial droop.  Extremities: 2+ pitting edema bilaterally  Musculoskeletal: No bony deformity  Psychiatric: Tearful.    Assessment/Plan      Acute respiratory failure with hypoxia and hypercapnia.  Fluctuates between 0 and 2 L nasal cannula.  Continue noninvasive positive pressure ventilation at  night.  Pulmonology on consult; arranging for AVAPS for discharge -ABG will be ordered for tomorrow morning to start process.    Acute kidney injury on chronic kidney disease  Creatinine is trending downward though still in the 3 range  Nephrology on consult  Palliative is also met and discussed goals of care given degree of CKD and underlying lung disease    Morbid obesity  Major comorbidity which I believe is contributing to her respiratory failure  Weight loss has been discussed by partner.    Diabetes mellitus type 2  Cover with insulin sliding scale.    Hypothyroidism  TSH was 11.  Continue with levothyroxine 200 mg daily    Anxiety  Discussed with psychiatry, and will increased sertraline to 100  Patient has been hesitant to see psychiatry x 2, but after convincing by family, will be seen tomorrow.    Acute on chronic diastolic heart failure  Improved.  With the patient's renal status, defer diuresis to nephrology.    Anemia  Has been trending down some but now stabilized.  No reported melena or hematochezia    Disposition: Once AVAPS has been arranged at the next facility, patient can be discharged.    Rajesh Rojas, DO

## 2025-04-13 NOTE — NURSING NOTE
Bed side shift report received. Patient resting comfortably. Reports no current concerns at this time. Patient currently on oxygen. Call light within reach. This nurse assumed care. Bed alarm active. Bed in lowest position.

## 2025-04-13 NOTE — NURSING NOTE
End of shift, bedside shift report given. Patient laying in bed resting comfortably, with call light within reach. Patient verbalizes no new concerns at this time. Bed alarm active. Bed at lowest position. Patient's family at bedside.

## 2025-04-13 NOTE — INDIVIDUALIZED OVERALL PLAN OF CARE NOTE
Chart reviewed, and the case was discussed with the primary care physician and the assigned RN.  We attempted to meet with the patient for a third time. She continues to present with a flat affect and symptoms consistent with depression. Once again, she expressed that she did not feel like engaging, stating, “I’ve been told I need to talk to you, but I’m not feeling it.”  The patient’s son was present at bedside and facilitated a call with her brother, who is the designated POA. We discussed the patient’s continued unwillingness to engage in psychiatric services and informed them that we had communicated with the primary provider regarding our recommendation to increase the patient’s Zoloft dosage ans addressed all their questions.  We spent approximately 27 minutes today interacting with the patient’s family and consulting with the interdisciplinary team.    At this time, we will sign off on this case but remain available to assist further if needed.

## 2025-04-13 NOTE — PROGRESS NOTES
"Analia Murray is a 74 y.o. female on day 19 of admission presenting with Acute respiratory failure with hypoxia.    Subjective   The patient is seen for MARY ANN on CKD stage III/IV the patient continued to improve clinically she is more awake and responsive no shortness of breath no nausea vomiting diarrhea or any uremic symptoms family by the bedside.       Objective     Physical Exam  Constitutional:       General: She is not in acute distress.     Appearance: Normal appearance. She is not toxic-appearing.   Neck:      Vascular: No carotid bruit.   Cardiovascular:      Heart sounds: No murmur heard.     No friction rub. No gallop.   Pulmonary:      Breath sounds: No wheezing, rhonchi or rales.   Abdominal:      Tenderness: There is no abdominal tenderness. There is no right CVA tenderness, left CVA tenderness or rebound.   Musculoskeletal:         General: No tenderness.      Cervical back: Neck supple.      Right lower leg: No edema (1+ edema).      Left lower leg: No edema (1+ edema).   Lymphadenopathy:      Cervical: No cervical adenopathy.         Last Recorded Vitals  Blood pressure 140/60, pulse 60, temperature 36.4 °C (97.5 °F), temperature source Oral, resp. rate 20, height 1.626 m (5' 4.02\"), weight 142 kg (313 lb 15 oz), SpO2 100%.    Intake/Output last 3 Shifts:  I/O last 3 completed shifts:  In: 720 (5.1 mL/kg) [P.O.:720]  Out: 0 (0 mL/kg)   Weight: 142.4 kg     Current Facility-Administered Medications:     acetaminophen (Tylenol) oral liquid 650 mg, 650 mg, oral, q4h PRN **OR** acetaminophen (Tylenol) tablet 650 mg, 650 mg, oral, q4h PRN, Rajesh PARRA Scharlotte, DO, 650 mg at 04/13/25 0555    albuterol 2.5 mg /3 mL (0.083 %) nebulizer solution 2.5 mg, 2.5 mg, nebulization, q2h PRN, Rajesh PARRA Scharlotte, DO    allopurinol (Zyloprim) tablet 50 mg, 50 mg, oral, Every other day, Rajesh Harringtonte, DO, 50 mg at 04/12/25 0845    [Held by provider] amLODIPine (Norvasc) tablet 10 mg, 10 mg, oral, Daily, Sanjiv VAZQUEZ " BETTE Neil, 10 mg at 03/28/25 0948    [Held by provider] aspirin chewable tablet 81 mg, 81 mg, oral, Daily, Rajesh Rojas DO, 81 mg at 04/12/25 0845    calcium acetate (Phoslo) capsule 667 mg, 667 mg, oral, TID after meals, Rajesh Rojas DO, 667 mg at 04/13/25 1333    dextrose 50 % injection 12.5 g, 12.5 g, intravenous, q15 min PRN, Rajesh Harringtonte, DO    dextrose 50 % injection 25 g, 25 g, intravenous, q15 min PRN, Rajesh Rojas, DO    diphenhydramine-zinc acetate cream, , Topical, TID PRN, Ric Solis MD, Given at 04/12/25 2218    ezetimibe (Zetia) tablet 10 mg, 10 mg, oral, Daily, Rajesh Rojas DO, 10 mg at 04/13/25 0857    fluticasone (Flonase) nasal spray 2 spray, 2 spray, Each Nostril, BID, Rajesh Rojas DO, 2 spray at 04/13/25 0859    fluticasone furoate-vilanteroL (Breo Ellipta) 100-25 mcg/dose inhaler 1 puff, 1 puff, inhalation, Daily, Rajesh Rojas DO, 1 puff at 04/13/25 0741    [Held by provider] gabapentin (Neurontin) capsule 300 mg, 300 mg, oral, Daily, Krysta Ramirez MD, 300 mg at 04/07/25 0924    glucagon (Glucagen) injection 1 mg, 1 mg, intramuscular, q15 min PRN, Rajesh Rojas, DO    glucagon (Glucagen) injection 1 mg, 1 mg, intramuscular, q15 min PRN, Rajesh Rojas DO    [Held by provider] heparin (porcine) injection 7,500 Units, 7,500 Units, subcutaneous, q8h JAYA, Rajesh Rojas DO, 7,500 Units at 04/12/25 0528    hydrALAZINE (Apresoline) injection 10 mg, 10 mg, intravenous, q4h PRN, Rajesh Rojas DO, 10 mg at 04/08/25 1220    hydrALAZINE (Apresoline) tablet 50 mg, 50 mg, oral, BID, Rajesh Rojas DO, 50 mg at 04/13/25 0858    hydrocortisone 2.5 % cream, , Topical, BID, Rajesh Rojas DO, Given at 04/13/25 0859    insulin lispro injection 0-10 Units, 0-10 Units, subcutaneous, TID AC, Rajesh Rojas DO, 4 Units at 04/10/25 1245    insulin lispro injection 6 Units, 6 Units, subcutaneous, Once, Rajesh Rojas DO     ipratropium-albuteroL (Duo-Neb) 0.5-2.5 mg/3 mL nebulizer solution 3 mL, 3 mL, nebulization, TID, Rajesh Rojas, DO, 3 mL at 04/13/25 1148    levothyroxine (Synthroid, Levoxyl) tablet 200 mcg, 200 mcg, oral, Daily, Rajesh Harringtonte, DO, 200 mcg at 04/13/25 0555    [Held by provider] metoprolol succinate XL (Toprol-XL) 24 hr tablet 100 mg, 100 mg, oral, Daily, Sanjiv Neil PA-C    metoprolol tartrate (Lopressor) tablet 50 mg, 50 mg, oral, BID, Rajesh Rojas DO, 50 mg at 04/13/25 0858    montelukast (Singulair) tablet 10 mg, 10 mg, oral, Nightly, Rajesh Rojas, DO, 10 mg at 04/12/25 2204    [Held by provider] nitroglycerin (Nitrostat) SL tablet 0.4 mg, 0.4 mg, sublingual, q5 min PRN, Sanjiv Neil PA-C    nystatin (Mycostatin) 100,000 unit/gram powder 1 Application, 1 Application, Topical, TID, Rajesh Rojas DO, 1 Application at 04/13/25 0859    ondansetron (Zofran) injection 4 mg, 4 mg, intravenous, q6h PRN, Rajesh Rojas, DO, 4 mg at 04/11/25 1602    oxygen (O2) therapy, , inhalation, Continuous PRN - O2/gases, Rajesh Harringtonte, DO, 2 L/min at 04/10/25 1005    oxygen (O2) therapy, , inhalation, Continuous PRN - O2/gases, Sixto Slater MD    polyethylene glycol (Glycolax, Miralax) packet 17 g, 17 g, oral, Daily PRN, Rajesh Rojas DO    pravastatin (Pravachol) tablet 40 mg, 40 mg, oral, Nightly, Rajesh Harringtonte, DO, 40 mg at 04/12/25 2204    [START ON 4/14/2025] sertraline (Zoloft) tablet 100 mg, 100 mg, oral, Daily, Rajesh Rojas DO    sulfur hexafluoride microsphr (Lumason) injection 24.28 mg, 2 mL, intravenous, Once in imaging, Rajesh Rojas DO    [Held by provider] traZODone (Desyrel) tablet 25 mg, 25 mg, oral, Nightly PRN, Robbie Olivarez MD   Relevant Results    Results for orders placed or performed during the hospital encounter of 03/25/25 (from the past 96 hours)   POCT GLUCOSE   Result Value Ref Range    POCT Glucose 177 (H) 74 - 99 mg/dL   POCT GLUCOSE    Result Value Ref Range    POCT Glucose 145 (H) 74 - 99 mg/dL   POCT GLUCOSE   Result Value Ref Range    POCT Glucose 128 (H) 74 - 99 mg/dL   POCT GLUCOSE   Result Value Ref Range    POCT Glucose 126 (H) 74 - 99 mg/dL   CBC and Auto Differential   Result Value Ref Range    WBC 5.9 4.4 - 11.3 x10*3/uL    nRBC 0.0 0.0 - 0.0 /100 WBCs    RBC 3.58 (L) 4.00 - 5.20 x10*6/uL    Hemoglobin 8.3 (L) 12.0 - 16.0 g/dL    Hematocrit 27.6 (L) 36.0 - 46.0 %    MCV 77 (L) 80 - 100 fL    MCH 23.2 (L) 26.0 - 34.0 pg    MCHC 30.1 (L) 32.0 - 36.0 g/dL    RDW 19.9 (H) 11.5 - 14.5 %    Platelets 146 (L) 150 - 450 x10*3/uL    Neutrophils % 71.2 40.0 - 80.0 %    Immature Granulocytes %, Automated 0.2 0.0 - 0.9 %    Lymphocytes % 16.0 13.0 - 44.0 %    Monocytes % 10.5 2.0 - 10.0 %    Eosinophils % 1.9 0.0 - 6.0 %    Basophils % 0.2 0.0 - 2.0 %    Neutrophils Absolute 4.20 1.60 - 5.50 x10*3/uL    Immature Granulocytes Absolute, Automated 0.01 0.00 - 0.50 x10*3/uL    Lymphocytes Absolute 0.94 0.80 - 3.00 x10*3/uL    Monocytes Absolute 0.62 0.05 - 0.80 x10*3/uL    Eosinophils Absolute 0.11 0.00 - 0.40 x10*3/uL    Basophils Absolute 0.01 0.00 - 0.10 x10*3/uL   PST Top   Result Value Ref Range    Extra Tube Hold for add-ons.    Renal function panel   Result Value Ref Range    Glucose 120 (H) 74 - 99 mg/dL    Sodium 139 136 - 145 mmol/L    Potassium 5.2 3.5 - 5.3 mmol/L    Chloride 105 98 - 107 mmol/L    Bicarbonate 26 21 - 32 mmol/L    Anion Gap 13 10 - 20 mmol/L    Urea Nitrogen 124 (HH) 6 - 23 mg/dL    Creatinine 3.79 (H) 0.50 - 1.05 mg/dL    eGFR 12 (L) >60 mL/min/1.73m*2    Calcium 8.1 (L) 8.6 - 10.3 mg/dL    Phosphorus 3.4 2.5 - 4.9 mg/dL    Albumin 2.9 (L) 3.4 - 5.0 g/dL   Comprehensive Metabolic Panel   Result Value Ref Range    Glucose 117 (H) 74 - 99 mg/dL    Sodium 138 136 - 145 mmol/L    Potassium 5.2 3.5 - 5.3 mmol/L    Chloride 105 98 - 107 mmol/L    Bicarbonate 24 21 - 32 mmol/L    Anion Gap 14 10 - 20 mmol/L    Urea Nitrogen 123  (HH) 6 - 23 mg/dL    Creatinine 3.91 (H) 0.50 - 1.05 mg/dL    eGFR 11 (L) >60 mL/min/1.73m*2    Calcium 8.3 (L) 8.6 - 10.3 mg/dL    Albumin 3.0 (L) 3.4 - 5.0 g/dL    Alkaline Phosphatase 66 33 - 136 U/L    Total Protein 6.2 (L) 6.4 - 8.2 g/dL    AST 9 9 - 39 U/L    Bilirubin, Total 0.3 0.0 - 1.2 mg/dL    ALT 9 7 - 45 U/L   Magnesium   Result Value Ref Range    Magnesium 2.45 (H) 1.60 - 2.40 mg/dL   Phosphorus   Result Value Ref Range    Phosphorus 3.4 2.5 - 4.9 mg/dL   POCT GLUCOSE   Result Value Ref Range    POCT Glucose 123 (H) 74 - 99 mg/dL   POCT GLUCOSE   Result Value Ref Range    POCT Glucose 203 (H) 74 - 99 mg/dL   POCT GLUCOSE   Result Value Ref Range    POCT Glucose 132 (H) 74 - 99 mg/dL   POCT GLUCOSE   Result Value Ref Range    POCT Glucose 114 (H) 74 - 99 mg/dL   PST Top   Result Value Ref Range    Extra Tube Hold for add-ons.    Renal function panel   Result Value Ref Range    Glucose 110 (H) 74 - 99 mg/dL    Sodium 139 136 - 145 mmol/L    Potassium 5.3 3.5 - 5.3 mmol/L    Chloride 106 98 - 107 mmol/L    Bicarbonate 27 21 - 32 mmol/L    Anion Gap 11 10 - 20 mmol/L    Urea Nitrogen 119 (HH) 6 - 23 mg/dL    Creatinine 3.40 (H) 0.50 - 1.05 mg/dL    eGFR 14 (L) >60 mL/min/1.73m*2    Calcium 8.1 (L) 8.6 - 10.3 mg/dL    Phosphorus 3.0 2.5 - 4.9 mg/dL    Albumin 2.8 (L) 3.4 - 5.0 g/dL   CBC and Auto Differential   Result Value Ref Range    WBC 5.7 4.4 - 11.3 x10*3/uL    nRBC 0.0 0.0 - 0.0 /100 WBCs    RBC 3.30 (L) 4.00 - 5.20 x10*6/uL    Hemoglobin 7.6 (L) 12.0 - 16.0 g/dL    Hematocrit 25.8 (L) 36.0 - 46.0 %    MCV 78 (L) 80 - 100 fL    MCH 23.0 (L) 26.0 - 34.0 pg    MCHC 29.5 (L) 32.0 - 36.0 g/dL    RDW 19.8 (H) 11.5 - 14.5 %    Platelets 125 (L) 150 - 450 x10*3/uL    Neutrophils % 69.9 40.0 - 80.0 %    Immature Granulocytes %, Automated 0.4 0.0 - 0.9 %    Lymphocytes % 14.3 13.0 - 44.0 %    Monocytes % 11.3 2.0 - 10.0 %    Eosinophils % 3.9 0.0 - 6.0 %    Basophils % 0.2 0.0 - 2.0 %    Neutrophils Absolute  3.97 1.60 - 5.50 x10*3/uL    Immature Granulocytes Absolute, Automated 0.02 0.00 - 0.50 x10*3/uL    Lymphocytes Absolute 0.81 0.80 - 3.00 x10*3/uL    Monocytes Absolute 0.64 0.05 - 0.80 x10*3/uL    Eosinophils Absolute 0.22 0.00 - 0.40 x10*3/uL    Basophils Absolute 0.01 0.00 - 0.10 x10*3/uL   POCT GLUCOSE   Result Value Ref Range    POCT Glucose 105 (H) 74 - 99 mg/dL   POCT GLUCOSE   Result Value Ref Range    POCT Glucose 126 (H) 74 - 99 mg/dL   POCT GLUCOSE   Result Value Ref Range    POCT Glucose 144 (H) 74 - 99 mg/dL   CBC and Auto Differential   Result Value Ref Range    WBC 4.6 4.4 - 11.3 x10*3/uL    nRBC 0.0 0.0 - 0.0 /100 WBCs    RBC 3.14 (L) 4.00 - 5.20 x10*6/uL    Hemoglobin 7.3 (L) 12.0 - 16.0 g/dL    Hematocrit 23.8 (L) 36.0 - 46.0 %    MCV 76 (L) 80 - 100 fL    MCH 23.2 (L) 26.0 - 34.0 pg    MCHC 30.7 (L) 32.0 - 36.0 g/dL    RDW 20.0 (H) 11.5 - 14.5 %    Platelets 114 (L) 150 - 450 x10*3/uL    Neutrophils % 65.4 40.0 - 80.0 %    Immature Granulocytes %, Automated 0.4 0.0 - 0.9 %    Lymphocytes % 16.9 13.0 - 44.0 %    Monocytes % 11.6 2.0 - 10.0 %    Eosinophils % 5.5 0.0 - 6.0 %    Basophils % 0.2 0.0 - 2.0 %    Neutrophils Absolute 2.97 1.60 - 5.50 x10*3/uL    Immature Granulocytes Absolute, Automated 0.02 0.00 - 0.50 x10*3/uL    Lymphocytes Absolute 0.77 (L) 0.80 - 3.00 x10*3/uL    Monocytes Absolute 0.53 0.05 - 0.80 x10*3/uL    Eosinophils Absolute 0.25 0.00 - 0.40 x10*3/uL    Basophils Absolute 0.01 0.00 - 0.10 x10*3/uL   Comprehensive Metabolic Panel   Result Value Ref Range    Glucose 106 (H) 74 - 99 mg/dL    Sodium 140 136 - 145 mmol/L    Potassium 5.5 (H) 3.5 - 5.3 mmol/L    Chloride 107 98 - 107 mmol/L    Bicarbonate 27 21 - 32 mmol/L    Anion Gap 12 10 - 20 mmol/L    Urea Nitrogen 100 (HH) 6 - 23 mg/dL    Creatinine 3.26 (H) 0.50 - 1.05 mg/dL    eGFR 14 (L) >60 mL/min/1.73m*2    Calcium 7.9 (L) 8.6 - 10.3 mg/dL    Albumin 2.8 (L) 3.4 - 5.0 g/dL    Alkaline Phosphatase 60 33 - 136 U/L    Total  Protein 5.9 (L) 6.4 - 8.2 g/dL    AST 12 9 - 39 U/L    Bilirubin, Total 0.4 0.0 - 1.2 mg/dL    ALT 12 7 - 45 U/L   Magnesium   Result Value Ref Range    Magnesium 2.30 1.60 - 2.40 mg/dL   Phosphorus   Result Value Ref Range    Phosphorus 3.3 2.5 - 4.9 mg/dL   POCT GLUCOSE   Result Value Ref Range    POCT Glucose 111 (H) 74 - 99 mg/dL   POCT GLUCOSE   Result Value Ref Range    POCT Glucose 131 (H) 74 - 99 mg/dL   POCT GLUCOSE   Result Value Ref Range    POCT Glucose 109 (H) 74 - 99 mg/dL   Hemoglobin and hematocrit, blood   Result Value Ref Range    Hemoglobin 8.1 (L) 12.0 - 16.0 g/dL    Hematocrit 26.3 (L) 36.0 - 46.0 %   PST Top   Result Value Ref Range    Extra Tube Hold for add-ons.    POCT GLUCOSE   Result Value Ref Range    POCT Glucose 141 (H) 74 - 99 mg/dL   Potassium   Result Value Ref Range    Potassium 5.4 (H) 3.5 - 5.3 mmol/L   POCT GLUCOSE   Result Value Ref Range    POCT Glucose 122 (H) 74 - 99 mg/dL   CBC and Auto Differential   Result Value Ref Range    WBC 4.0 (L) 4.4 - 11.3 x10*3/uL    nRBC 0.0 0.0 - 0.0 /100 WBCs    RBC 3.24 (L) 4.00 - 5.20 x10*6/uL    Hemoglobin 7.5 (L) 12.0 - 16.0 g/dL    Hematocrit 26.2 (L) 36.0 - 46.0 %    MCV 81 80 - 100 fL    MCH 23.1 (L) 26.0 - 34.0 pg    MCHC 28.6 (L) 32.0 - 36.0 g/dL    RDW 20.0 (H) 11.5 - 14.5 %    Platelets 111 (L) 150 - 450 x10*3/uL    Neutrophils % 62.1 40.0 - 80.0 %    Immature Granulocytes %, Automated 0.5 0.0 - 0.9 %    Lymphocytes % 17.5 13.0 - 44.0 %    Monocytes % 12.5 2.0 - 10.0 %    Eosinophils % 7.2 0.0 - 6.0 %    Basophils % 0.2 0.0 - 2.0 %    Neutrophils Absolute 2.49 1.60 - 5.50 x10*3/uL    Immature Granulocytes Absolute, Automated 0.02 0.00 - 0.50 x10*3/uL    Lymphocytes Absolute 0.70 (L) 0.80 - 3.00 x10*3/uL    Monocytes Absolute 0.50 0.05 - 0.80 x10*3/uL    Eosinophils Absolute 0.29 0.00 - 0.40 x10*3/uL    Basophils Absolute 0.01 0.00 - 0.10 x10*3/uL   Comprehensive Metabolic Panel   Result Value Ref Range    Glucose 101 (H) 74 - 99  mg/dL    Sodium 140 136 - 145 mmol/L    Potassium 5.1 3.5 - 5.3 mmol/L    Chloride 108 (H) 98 - 107 mmol/L    Bicarbonate 27 21 - 32 mmol/L    Anion Gap 10 10 - 20 mmol/L    Urea Nitrogen 97 (HH) 6 - 23 mg/dL    Creatinine 3.18 (H) 0.50 - 1.05 mg/dL    eGFR 15 (L) >60 mL/min/1.73m*2    Calcium 8.1 (L) 8.6 - 10.3 mg/dL    Albumin 2.9 (L) 3.4 - 5.0 g/dL    Alkaline Phosphatase 61 33 - 136 U/L    Total Protein 6.0 (L) 6.4 - 8.2 g/dL    AST 14 9 - 39 U/L    Bilirubin, Total 0.4 0.0 - 1.2 mg/dL    ALT 13 7 - 45 U/L   Magnesium   Result Value Ref Range    Magnesium 2.29 1.60 - 2.40 mg/dL   Phosphorus   Result Value Ref Range    Phosphorus 3.7 2.5 - 4.9 mg/dL   POCT GLUCOSE   Result Value Ref Range    POCT Glucose 89 74 - 99 mg/dL   Occult Blood, Stool    Specimen: Stool   Result Value Ref Range    Occult Blood, Stool X1 Negative Negative   POCT GLUCOSE   Result Value Ref Range    POCT Glucose 120 (H) 74 - 99 mg/dL     *Note: Due to a large number of results and/or encounters for the requested time period, some results have not been displayed. A complete set of results can be found in Results Review.       Assessment/Plan   Acute kidney injury superimposed on chronic kidney disease stage III continues stable BUN still high I do not see any immediate indication for renal  renal function is slowly improving there is no uremia or fluid overload potassium is normal there is no indication for renal replacement therapy discussed with the family  Hyperkalemia it is resolved   Congestive heart failure continue diuresis as needed  Chronic kidney disease stage IIIb   Acute respiratory failure is improving  Diabetes mellitus type 2  Anemia of chronic kidney disease  Hypertension  Hyperlipidemia  Obesity    Sixto Slater MD

## 2025-04-13 NOTE — TREATMENT PLAN
Attempted to see the patient today.  The patient states that she does not have any palliative care needs at this time.  Goals of care appear to be established.  We will sign off at this time.  Please contact us with any urgent needs.

## 2025-04-13 NOTE — PROGRESS NOTES
Pt is close to being ready for dc. [facility] called and spoke with pts daughter Tiffanie for additional SNF choices. Namrata provided, dtr encouraged to provide more choices, discussion around facilities somewhat close to where pt and  live so  can most easily visit the pt. Daughter stating she would like to visit these facilities today and will let this worker know which ones she would be ok with, in the meantime daughter did give this worker the ok to send pts clinicals to some area facilities discussed to check on whether they would be willing to accept the pt and have bed availability. Clinicals sent to [facility], [facility], [facility] [place] and [facility], await responses. Also, updated PT/OT notes are needed for start of a precert once foc clarified.     Update: [facility] - no, [facility] - will check insurance benefits on Monday when insurance is open, [facility] - no bed availability, [facility] - no bed availability, [facility] - no owl, [place] - Yes. Await follow up from pts ann-marie Moreno regarding facilities of choice as well    Pending: Full medical clearance, PT/OT updates, final foc/acceptance, precert.      04/13/25 0145   Discharge Planning   Expected Discharge Disposition SNF

## 2025-04-14 LAB
ALBUMIN SERPL BCP-MCNC: 2.8 G/DL (ref 3.4–5)
ALP SERPL-CCNC: 60 U/L (ref 33–136)
ALT SERPL W P-5'-P-CCNC: 13 U/L (ref 7–45)
ANION GAP BLDA CALCULATED.4IONS-SCNC: 9 MMO/L (ref 10–25)
ANION GAP SERPL CALCULATED.3IONS-SCNC: 9 MMOL/L (ref 10–20)
APPARATUS: ABNORMAL
ARTERIAL PATENCY WRIST A: POSITIVE
AST SERPL W P-5'-P-CCNC: 12 U/L (ref 9–39)
BASE EXCESS BLDA CALC-SCNC: 0.7 MMOL/L (ref -2–3)
BASOPHILS # BLD AUTO: 0.01 X10*3/UL (ref 0–0.1)
BASOPHILS NFR BLD AUTO: 0.3 %
BILIRUB SERPL-MCNC: 0.4 MG/DL (ref 0–1.2)
BODY TEMPERATURE: 37 DEGREES CELSIUS
BUN SERPL-MCNC: 87 MG/DL (ref 6–23)
CA-I BLDA-SCNC: 1.21 MMOL/L (ref 1.1–1.33)
CALCIUM SERPL-MCNC: 8.2 MG/DL (ref 8.6–10.3)
CHLORIDE BLDA-SCNC: 108 MMOL/L (ref 98–107)
CHLORIDE SERPL-SCNC: 108 MMOL/L (ref 98–107)
CO2 SERPL-SCNC: 29 MMOL/L (ref 21–32)
CREAT SERPL-MCNC: 3.04 MG/DL (ref 0.5–1.05)
CRITICAL CALL TIME: 434
CRITICAL CALLED BY: ABNORMAL
CRITICAL CALLED TO: ABNORMAL
CRITICAL READ BACK: ABNORMAL
EGFRCR SERPLBLD CKD-EPI 2021: 16 ML/MIN/1.73M*2
EOSINOPHIL # BLD AUTO: 0.27 X10*3/UL (ref 0–0.4)
EOSINOPHIL NFR BLD AUTO: 7.4 %
ERYTHROCYTE [DISTWIDTH] IN BLOOD BY AUTOMATED COUNT: 19.9 % (ref 11.5–14.5)
FERRITIN SERPL-MCNC: 158 NG/ML (ref 8–150)
GLUCOSE BLD MANUAL STRIP-MCNC: 123 MG/DL (ref 74–99)
GLUCOSE BLD MANUAL STRIP-MCNC: 133 MG/DL (ref 74–99)
GLUCOSE BLD MANUAL STRIP-MCNC: 86 MG/DL (ref 74–99)
GLUCOSE BLD MANUAL STRIP-MCNC: 96 MG/DL (ref 74–99)
GLUCOSE BLDA-MCNC: 88 MG/DL (ref 74–99)
GLUCOSE SERPL-MCNC: 85 MG/DL (ref 74–99)
HCO3 BLDA-SCNC: 26.9 MMOL/L (ref 22–26)
HCT VFR BLD AUTO: 25.8 % (ref 36–46)
HCT VFR BLD EST: 23 % (ref 36–46)
HGB BLD-MCNC: 7.4 G/DL (ref 12–16)
HGB BLDA-MCNC: 7.5 G/DL (ref 12–16)
IMM GRANULOCYTES # BLD AUTO: 0.01 X10*3/UL (ref 0–0.5)
IMM GRANULOCYTES NFR BLD AUTO: 0.3 % (ref 0–0.9)
INHALED O2 CONCENTRATION: 28 %
IRON SATN MFR SERPL: 14 % (ref 25–45)
IRON SERPL-MCNC: 33 UG/DL (ref 35–150)
LACTATE BLDA-SCNC: 0.3 MMOL/L (ref 0.4–2)
LYMPHOCYTES # BLD AUTO: 0.7 X10*3/UL (ref 0.8–3)
LYMPHOCYTES NFR BLD AUTO: 19.3 %
MAGNESIUM SERPL-MCNC: 2.21 MG/DL (ref 1.6–2.4)
MCH RBC QN AUTO: 23.1 PG (ref 26–34)
MCHC RBC AUTO-ENTMCNC: 28.7 G/DL (ref 32–36)
MCV RBC AUTO: 81 FL (ref 80–100)
MONOCYTES # BLD AUTO: 0.44 X10*3/UL (ref 0.05–0.8)
MONOCYTES NFR BLD AUTO: 12.1 %
NEUTROPHILS # BLD AUTO: 2.2 X10*3/UL (ref 1.6–5.5)
NEUTROPHILS NFR BLD AUTO: 60.6 %
NRBC BLD-RTO: 0 /100 WBCS (ref 0–0)
OXYHGB MFR BLDA: 97.6 % (ref 94–98)
PCO2 BLDA: 51 MM HG (ref 38–42)
PH BLDA: 7.33 PH (ref 7.38–7.42)
PHOSPHATE SERPL-MCNC: 3.8 MG/DL (ref 2.5–4.9)
PLATELET # BLD AUTO: 109 X10*3/UL (ref 150–450)
PO2 BLDA: 128 MM HG (ref 85–95)
POTASSIUM BLDA-SCNC: 5.4 MMOL/L (ref 3.5–5.3)
POTASSIUM SERPL-SCNC: 5.2 MMOL/L (ref 3.5–5.3)
PROT SERPL-MCNC: 5.9 G/DL (ref 6.4–8.2)
RBC # BLD AUTO: 3.2 X10*6/UL (ref 4–5.2)
SAO2 % BLDA: 100 % (ref 94–100)
SODIUM BLDA-SCNC: 138 MMOL/L (ref 136–145)
SODIUM SERPL-SCNC: 141 MMOL/L (ref 136–145)
SPECIMEN DRAWN FROM PATIENT: ABNORMAL
TIBC SERPL-MCNC: 228 UG/DL (ref 240–445)
UIBC SERPL-MCNC: 195 UG/DL (ref 110–370)
WBC # BLD AUTO: 3.6 X10*3/UL (ref 4.4–11.3)

## 2025-04-14 PROCEDURE — 2500000001 HC RX 250 WO HCPCS SELF ADMINISTERED DRUGS (ALT 637 FOR MEDICARE OP): Performed by: INTERNAL MEDICINE

## 2025-04-14 PROCEDURE — 99232 SBSQ HOSP IP/OBS MODERATE 35: CPT

## 2025-04-14 PROCEDURE — 97116 GAIT TRAINING THERAPY: CPT | Mod: GP,CQ

## 2025-04-14 PROCEDURE — 97530 THERAPEUTIC ACTIVITIES: CPT | Mod: GO,CO

## 2025-04-14 PROCEDURE — 94640 AIRWAY INHALATION TREATMENT: CPT

## 2025-04-14 PROCEDURE — 99232 SBSQ HOSP IP/OBS MODERATE 35: CPT | Performed by: INTERNAL MEDICINE

## 2025-04-14 PROCEDURE — 2500000002 HC RX 250 W HCPCS SELF ADMINISTERED DRUGS (ALT 637 FOR MEDICARE OP, ALT 636 FOR OP/ED): Performed by: INTERNAL MEDICINE

## 2025-04-14 PROCEDURE — 85025 COMPLETE CBC W/AUTO DIFF WBC: CPT | Performed by: INTERNAL MEDICINE

## 2025-04-14 PROCEDURE — 82728 ASSAY OF FERRITIN: CPT | Performed by: INTERNAL MEDICINE

## 2025-04-14 PROCEDURE — 82947 ASSAY GLUCOSE BLOOD QUANT: CPT

## 2025-04-14 PROCEDURE — 36415 COLL VENOUS BLD VENIPUNCTURE: CPT | Performed by: INTERNAL MEDICINE

## 2025-04-14 PROCEDURE — 83735 ASSAY OF MAGNESIUM: CPT

## 2025-04-14 PROCEDURE — 97535 SELF CARE MNGMENT TRAINING: CPT | Mod: GO,CO

## 2025-04-14 PROCEDURE — 94668 MNPJ CHEST WALL SBSQ: CPT

## 2025-04-14 PROCEDURE — 84132 ASSAY OF SERUM POTASSIUM: CPT | Performed by: INTERNAL MEDICINE

## 2025-04-14 PROCEDURE — 2060000001 HC INTERMEDIATE ICU ROOM DAILY

## 2025-04-14 PROCEDURE — 36600 WITHDRAWAL OF ARTERIAL BLOOD: CPT

## 2025-04-14 PROCEDURE — 84100 ASSAY OF PHOSPHORUS: CPT

## 2025-04-14 PROCEDURE — 9420000001 HC RT PATIENT EDUCATION 5 MIN

## 2025-04-14 PROCEDURE — 97530 THERAPEUTIC ACTIVITIES: CPT | Mod: GP,CQ

## 2025-04-14 PROCEDURE — 83550 IRON BINDING TEST: CPT | Performed by: INTERNAL MEDICINE

## 2025-04-14 RX ORDER — SERTRALINE HYDROCHLORIDE 50 MG/1
50 TABLET, FILM COATED ORAL DAILY
Status: DISCONTINUED | OUTPATIENT
Start: 2025-04-14 | End: 2025-04-17 | Stop reason: HOSPADM

## 2025-04-14 RX ORDER — SERTRALINE HYDROCHLORIDE 50 MG/1
50 TABLET, FILM COATED ORAL DAILY
Status: DISCONTINUED | OUTPATIENT
Start: 2025-04-15 | End: 2025-04-14

## 2025-04-14 RX ADMIN — NYSTATIN 1 APPLICATION: 100000 POWDER TOPICAL at 21:49

## 2025-04-14 RX ADMIN — FLUTICASONE PROPIONATE 2 SPRAY: 50 SPRAY, METERED NASAL at 21:48

## 2025-04-14 RX ADMIN — IPRATROPIUM BROMIDE AND ALBUTEROL SULFATE 3 ML: 2.5; .5 SOLUTION RESPIRATORY (INHALATION) at 11:25

## 2025-04-14 RX ADMIN — HYDRALAZINE HYDROCHLORIDE 50 MG: 50 TABLET ORAL at 08:37

## 2025-04-14 RX ADMIN — NYSTATIN 1 APPLICATION: 100000 POWDER TOPICAL at 08:40

## 2025-04-14 RX ADMIN — METOPROLOL TARTRATE 50 MG: 50 TABLET, FILM COATED ORAL at 21:45

## 2025-04-14 RX ADMIN — CALCIUM ACETATE 667 MG: 667 CAPSULE ORAL at 08:39

## 2025-04-14 RX ADMIN — METOPROLOL TARTRATE 50 MG: 50 TABLET, FILM COATED ORAL at 08:36

## 2025-04-14 RX ADMIN — HYDROCORTISONE: 25 CREAM TOPICAL at 08:40

## 2025-04-14 RX ADMIN — IPRATROPIUM BROMIDE AND ALBUTEROL SULFATE 3 ML: 2.5; .5 SOLUTION RESPIRATORY (INHALATION) at 20:24

## 2025-04-14 RX ADMIN — SERTRALINE 50 MG: 50 TABLET, FILM COATED ORAL at 10:41

## 2025-04-14 RX ADMIN — HYDRALAZINE HYDROCHLORIDE 50 MG: 50 TABLET ORAL at 21:45

## 2025-04-14 RX ADMIN — LEVOTHYROXINE SODIUM 200 MCG: 0.1 TABLET ORAL at 05:08

## 2025-04-14 RX ADMIN — HYDROCORTISONE: 25 CREAM TOPICAL at 21:49

## 2025-04-14 RX ADMIN — ALLOPURINOL 50 MG: 100 TABLET ORAL at 08:37

## 2025-04-14 RX ADMIN — NYSTATIN 1 APPLICATION: 100000 POWDER TOPICAL at 15:58

## 2025-04-14 RX ADMIN — PRAVASTATIN SODIUM 40 MG: 20 TABLET ORAL at 21:45

## 2025-04-14 RX ADMIN — FLUTICASONE PROPIONATE 2 SPRAY: 50 SPRAY, METERED NASAL at 08:40

## 2025-04-14 RX ADMIN — IPRATROPIUM BROMIDE AND ALBUTEROL SULFATE 3 ML: 2.5; .5 SOLUTION RESPIRATORY (INHALATION) at 07:30

## 2025-04-14 RX ADMIN — EZETIMIBE 10 MG: 10 TABLET ORAL at 08:37

## 2025-04-14 RX ADMIN — MONTELUKAST 10 MG: 10 TABLET, FILM COATED ORAL at 21:45

## 2025-04-14 RX ADMIN — CALCIUM ACETATE 667 MG: 667 CAPSULE ORAL at 19:39

## 2025-04-14 ASSESSMENT — PAIN - FUNCTIONAL ASSESSMENT
PAIN_FUNCTIONAL_ASSESSMENT: 0-10
PAIN_FUNCTIONAL_ASSESSMENT: 0-10

## 2025-04-14 ASSESSMENT — COGNITIVE AND FUNCTIONAL STATUS - GENERAL
WALKING IN HOSPITAL ROOM: A LOT
MOVING FROM LYING ON BACK TO SITTING ON SIDE OF FLAT BED WITH BEDRAILS: A LOT
DRESSING REGULAR UPPER BODY CLOTHING: A LOT
WALKING IN HOSPITAL ROOM: A LOT
HELP NEEDED FOR BATHING: A LOT
MOVING FROM LYING ON BACK TO SITTING ON SIDE OF FLAT BED WITH BEDRAILS: A LOT
TURNING FROM BACK TO SIDE WHILE IN FLAT BAD: A LOT
TOILETING: TOTAL
STANDING UP FROM CHAIR USING ARMS: A LITTLE
STANDING UP FROM CHAIR USING ARMS: A LOT
HELP NEEDED FOR BATHING: A LOT
DRESSING REGULAR LOWER BODY CLOTHING: TOTAL
DAILY ACTIVITIY SCORE: 16
MOBILITY SCORE: 14
CLIMB 3 TO 5 STEPS WITH RAILING: A LOT
TOILETING: A LITTLE
MOBILITY SCORE: 11
DAILY ACTIVITIY SCORE: 12
PERSONAL GROOMING: A LITTLE
TURNING FROM BACK TO SIDE WHILE IN FLAT BAD: A LOT
EATING MEALS: A LITTLE
MOVING TO AND FROM BED TO CHAIR: A LOT
MOVING TO AND FROM BED TO CHAIR: A LITTLE
CLIMB 3 TO 5 STEPS WITH RAILING: TOTAL
PERSONAL GROOMING: A LITTLE
DRESSING REGULAR UPPER BODY CLOTHING: A LOT
DRESSING REGULAR LOWER BODY CLOTHING: A LOT

## 2025-04-14 ASSESSMENT — PAIN SCALES - GENERAL
PAINLEVEL_OUTOF10: 0 - NO PAIN

## 2025-04-14 ASSESSMENT — ACTIVITIES OF DAILY LIVING (ADL): HOME_MANAGEMENT_TIME_ENTRY: 12

## 2025-04-14 NOTE — PROGRESS NOTES
Physical Therapy    Physical Therapy Treatment    Patient Name: Analia Murray  MRN: [id number]  Department: [address]  Room: [facility]458-  Today's Date: 4/14/2025  Time Calculation  Start Time: 0922  Stop Time: 0945  Time Calculation (min): 23 min         Assessment/Plan   PT Assessment  Barriers to Discharge Home: Caregiver assistance, Physical needs  Caregiver Assistance: Caregiver assistance needed per identified barriers - however, level of patient's required assistance exceeds assistance available at home  End of Session Communication: Bedside nurse  Assessment Comment: Pt continues to present with mild strength, balance and endurance deficits. Pt would continued to benefit from skilled therapy services.  End of Session Patient Position: Up in chair, Alarm on  PT Plan  Inpatient/Swing Bed or Outpatient: Inpatient  PT Plan  Treatment/Interventions: Bed mobility, Transfer training, Gait training, Stair training, Balance training, Neuromuscular re-education, Strengthening, Endurance training, Therapeutic exercise, Therapeutic activity, Home exercise program, Positioning, Postural re-education  PT Plan: Ongoing PT  PT Frequency: 5 times per week  PT Discharge Recommendations: Moderate intensity level of continued care  Equipment Recommended upon Discharge: Wheeled walker  PT Recommended Transfer Status: Assist x2, Assistive device (FWW)  PT - OK to Discharge: Yes      General Visit Information:   PT  Visit  PT Received On: 04/14/25  General  Co-Treatment: OT  Co-Treatment Reason: pt requiring two skilled therapist for safe transfer  Prior to Session Communication: Bedside nurse  Patient Position Received: Bed, 3 rail up, Alarm on  General Comment: Cleared by nursing to be seen for therapy, pt agreeable with tx, seated in chair upon arrival.    Subjective   Precautions:  Precautions  Medical Precautions: Fall precautions, Oxygen therapy device and L/min (3L oxygen)    Objective   Pain:  Pain Assessment  Pain  Assessment: 0-10  0-10 (Numeric) Pain Score: 0 - No pain    Cognition:  Cognition  Overall Cognitive Status: Within Functional Limits  Orientation Level: Oriented X4     Postural Control:  Static Sitting Balance  Static Sitting-Balance Support: Feet supported, Bilateral upper extremity supported  Static Sitting-Level of Assistance: Minimum assistance  Static Sitting-Comment/Number of Minutes: Fair seated static balance.  Static Standing Balance  Static Standing-Balance Support: Bilateral upper extremity supported  Static Standing-Level of Assistance: Moderate assistance  Static Standing-Comment/Number of Minutes: Fair- static standing balance with bilateral UE support on walker.     Treatments:  Therapeutic Exercise  Therapeutic Exercise Performed: Yes  Therapeutic Exercise Activity 1: Bilateral ankle pumps x15  Therapeutic Exercise Activity 2: Bilateral hip flexion x15  Therapeutic Exercise Activity 3: Bilateral knee extension x15  Therapeutic Exercise Activity 4: Resisted hip abd/add 15    Therapeutic Activity  Therapeutic Activity Performed: Yes  Therapeutic Activity 1: Tolerated EOB x5 minutes    Bed Mobility  Bed Mobility: Yes  Bed Mobility 1  Bed Mobility 1: Supine to sitting  Level of Assistance 1: Moderate assistance, Minimal verbal cues, +2  Bed Mobility Comments 1: Mod assist x2 for trunk during supine to sit, pt able to assist LE's towards EOB, mod assist to scoot EOB with use of draw sheet.    Ambulation/Gait Training  Ambulation/Gait Training Performed: Yes  Ambulation/Gait Training 1  Surface 1: Level tile  Device 1: Rolling walker  Assistance 1: Moderate assistance, Minimal verbal cues  Quality of Gait 1: Narrow base of support, Diminished heel strike, Decreased step length, Forward flexed posture  Comments/Distance (ft) 1: 4-5 steps (bed to chair) with wheeled walker, presents with flexed posture, occasional leaning onto right forearm onto walker, increased time and effort to advance LE's, mod assist  x2 for balance.    Transfers  Transfer: Yes  Transfer 1  Transfer From 1: Sit to  Transfer to 1: Stand  Technique 1: Sit to stand, Stand to sit  Transfer Device 1: Walker  Transfer Level of Assistance 1: Moderate assistance, +2  Trials/Comments 1: Mod assist x2 for trunk up during sit to stand, cues for proper hand placement, decreased eccentric control in sitting.    Outcome Measures:  [facility] Basic Mobility  Turning from your back to your side while in a flat bed without using bedrails: A lot  Moving from lying on your back to sitting on the side of a flat bed without using bedrails: A lot  Moving to and from bed to chair (including a wheelchair): A lot  Standing up from a chair using your arms (e.g. wheelchair or bedside chair): A lot  To walk in hospital room: A lot  Climbing 3-5 steps with railing: Total  Basic Mobility - Total Score: 11    Education Documentation  Precautions, taught by Thao Daniel PTA at 4/14/2025 10:50 AM.  Learner: Patient  Readiness: Acceptance  Method: Explanation  Response: Verbalizes Understanding    Mobility Training, taught by Thao Daniel PTA at 4/14/2025 10:50 AM.  Learner: Patient  Readiness: Acceptance  Method: Explanation  Response: Verbalizes Understanding    Education Comments  04/14/25 1050 Thao Daniel PTA  Patient educated on the importance of mobility and participation with therapy. Educated on safety and use of call light for assistance.           Encounter Problems       Encounter Problems (Active)       PT Problem       Pt will transition supine<>sit with close S. (Progressing)       Start:  03/27/25    Expected End:  04/19/25            Pt will transfer sit<>stand with FWW & close S. (Progressing)       Start:  03/27/25    Expected End:  04/19/25            Pt will ambulate >/=25 ft with FWW & close S. (Progressing)       Start:  03/27/25    Expected End:  04/19/25               Pain - Adult

## 2025-04-14 NOTE — PROGRESS NOTES
Occupational Therapy    OT Treatment    Patient Name: Analia Murray  MRN: [id number]  Department: [address]  Room: [facility]458-A  Today's Date: 4/14/2025  Time Calculation  Start Time: 0916  Stop Time: 0940  Time Calculation (min): 24 min        Assessment:  OT Assessment: Pt tolerated session well, progressing toward POC, pt demonstrated ability to get to the EOB with less assistance than prior sessions utilizing good use of bed rails, demonstrated errect posturing when in standing and better transfer rate when lowering to chair. Pt would benefit from continued skilled OT services to improve strength, balance, and activity tolerance to increase independence with ADLs and task efficiency  Barriers to Discharge Home: Physical needs, Caregiver assistance  End of Session Communication: Bedside nurse  End of Session Patient Position: Up in chair, Alarm on  OT Assessment Results: Decreased ADL status, Decreased upper extremity range of motion, Decreased upper extremity strength, Decreased endurance, Decreased functional mobility, Decreased IADLs  Plan:  Treatment Interventions: ADL retraining, Functional transfer training, Endurance training, UE strengthening/ROM, Patient/family training, Equipment evaluation/education, Compensatory technique education  OT Frequency: 4 times per week  OT Discharge Recommendations: Moderate intensity level of continued care  Equipment Recommended upon Discharge: Wheeled walker  OT Recommended Transfer Status: Maximum assist, Assist of 2  OT - OK to Discharge: Yes  Treatment Interventions: ADL retraining, Functional transfer training, Endurance training, UE strengthening/ROM, Patient/family training, Equipment evaluation/education, Compensatory technique education    Subjective   Previous Visit Info:  OT Last Visit  OT Received On: 04/14/25  General:  General  Reason for Referral: Activities of daily living  Co-Treatment: PT  Co-Treatment Reason: pt requiring two skilled therapist for safe  transfer  Prior to Session Communication: Bedside nurse  Patient Position Received: Bed, 3 rail up, Alarm on  General Comment: Pt cleared for therapy by RN. Pt found supine in bed upon arrival and agreeable to tx.  Precautions:  Hearing/Visual Limitations: reading glasses, hearing WFL  Medical Precautions: Fall precautions, Oxygen therapy device and L/min  Precautions Comment:  (3L O2 nc)     Date/Time Vitals Session Patient Position Pulse Resp SpO2 BP MAP (mmHg)    04/14/25 0916 --  --  --  --  97 %  --  --                 Pain:  Pain Assessment  Pain Assessment: 0-10  0-10 (Numeric) Pain Score: 0 - No pain    Objective    Cognition:  Cognition  Orientation Level: Oriented X4  Impulsive: Mildly  Activities of Daily Living: Grooming  Grooming Level of Assistance: Setup  Grooming Where Assessed: Chair  Grooming Comments: pt seated in chair with s/u provided for face washing and oral hygiene.  Bed Mobility/Transfers: Bed Mobility  Bed Mobility: Yes  Bed Mobility 1  Bed Mobility 1: Supine to sitting  Level of Assistance 1: Moderate assistance  Bed Mobility Comments 1: assist provided to scoot hips to EOB, pt able to demonstrate good use of bed rails for UE support.    Transfers  Transfer: Yes  Transfer 1  Technique 1: Sit to stand, Stand to sit  Transfer Device 1: Walker  Transfer Level of Assistance 1: Moderate assistance, +2  Trials/Comments 1: assist provided for trunk elevation during sit>stand, pt able to demonstrate errect posturing when in standing, cues provided for proper hand placement when eccentric lowering to chair.    Functional Mobility:  Functional Mobility  Functional Mobility Performed: Yes  Functional Mobility 1  Device 1: Rolling walker  Assistance 1: Moderate assistance (Mod A x2)  Comments 1: pt tolerated taking 4-5 steps with FWW to chair, demonstrated fair- balance during task, cues provided for step sequencing. Increased time and effort for task completion this date.       Therapy/Activity:  Therapeutic Exercise  Therapeutic Exercise Performed: Yes  Therapeutic Exercise Activity 1: BUE exercises in chest presses, internal/external rotation, and bicep curls 1x10 to improve strength and ROM to increase independence with functional activities and transfer tasks.    Therapeutic Activity  Therapeutic Activity Performed: Yes  Therapeutic Activity 1: pt tolerated sitting EOB for around 5 mins, utilizing pursed lip breathing in order to increase activity tolerance.      Outcome Measures:[facility] Daily Activity  Putting on and taking off regular lower body clothing: Total  Bathing (including washing, rinsing, drying): A lot  Putting on and taking off regular upper body clothing: A lot  Toileting, which includes using toilet, bedpan or urinal: Total  Taking care of personal grooming such as brushing teeth: A little  Eating Meals: A little  Daily Activity - Total Score: 12    Education Documentation  ADL Training, taught by CODI Garcia at 4/14/2025 10:37 AM.  Learner: Patient  Readiness: Acceptance  Method: Explanation  Response: Verbalizes Understanding, Needs Reinforcement  Comment: educated on safety awareness.    Education Comments  No comments found.      Problem: OT Goals  Goal: ADLs  Description: Patient will complete ADLs with minimal assistance.   Outcome: Progressing  Goal: Functional Transfer  Description: Patient will complete functional transfer with min assistance utilizing least restrictive device.   Outcome: Progressing  Goal: Therapeutic Exercise   Description: Patient will tolerate ~8-10 minutes of therapeutic exercise to increase aerobic capacity and improve activity tolerance.  Outcome: Progressing   Treatment & Documentation completed by Jerica BARILLAS under the direct supervision of Bertha BONDS

## 2025-04-14 NOTE — CARE PLAN
Problem: Respiratory  Goal: No signs of respiratory distress (eg. Use of accessory muscles. Peds grunting)  Outcome: Progressing      · Last Echo beginning of this month shows severe stenosis     · No further work up needed

## 2025-04-14 NOTE — PROGRESS NOTES
Analia Murray is a 74 y.o. female on day 20 of admission presenting with Acute respiratory failure with hypoxia.      Subjective     No overnight events.    Objective     Last Recorded Vitals  /51 (BP Location: Right arm, Patient Position: Lying)   Pulse 65   Temp 36.8 °C (98.2 °F) (Oral)   Resp 16   Wt 145 kg (320 lb 1.7 oz)   SpO2 97%   Intake/Output last 3 Shifts:    Intake/Output Summary (Last 24 hours) at 4/14/2025 1429  Last data filed at 4/14/2025 0900  Gross per 24 hour   Intake 460 ml   Output --   Net 460 ml       Admission Weight  Weight: 132 kg (290 lb) (03/25/25 0041)    Daily Weight  04/14/25 : 145 kg (320 lb 1.7 oz)    Image Results  XR chest 1 view  Narrative: Interpreted By:  Marco Antonio Duffy,   STUDY:  XR CHEST 1 VIEW      INDICATION:  Signs/Symptoms:resp failure.      COMPARISON:  April 6, 2025      ACCESSION NUMBER(S):  [id number]      ORDERING CLINICIAN:  ELVI JOHNSON      FINDINGS:  Cardiomegaly with some mild perihilar interstitial edema suggested.      No large effusion or consolidation.      Impression:     Cardiomegaly with some mild perihilar interstitial edema suggested.      No large effusion or consolidation.      Signed by: Marco Antonio Duffy 4/8/2025 8:30 AM  Dictation workstation:   [id number]      Physical Exam    General: Morbidly obese female sitting in bed.  Awake and conversational.  Morbidly obese.  Eyes: Clear sclera  Neck: Increased circumference grossly  Cardio: Regular rate rhythm  Respiratory: Nasal cannula.  Bilateral diminished.  No wheezes, rales, rhonchi  Neuro: Somnolent.  No facial droop.  Extremities: 1+ pitting edema bilaterally  Musculoskeletal: No bony deformity  Psychiatric: Tearful.    Assessment/Plan      Acute respiratory failure with hypoxia and hypercapnia.  Fluctuates between 0 and 2 L nasal cannula.  Continue noninvasive positive pressure ventilation at night.  Pulmonology on consult; arranging for AVAPS for discharge -ABG will be ordered  for tomorrow morning to start process.    Acute kidney injury on chronic kidney disease  Creatinine is trending downward though still in the 3 range  Nephrology on consult  Palliative is also met and discussed goals of care given degree of CKD and underlying lung disease    Morbid obesity  Major comorbidity which I believe is contributing to her respiratory failure  Weight loss has been discussed by partner.    Diabetes mellitus type 2  Cover with insulin sliding scale.    Hypothyroidism  TSH was 11.  Continue with levothyroxine 200 mg daily    Anxiety  Discussed with psychiatry, and will increased sertraline to 100  Patient has been hesitant to see psychiatry x 2, but after convincing by family, will be seen tomorrow.    Acute on chronic diastolic heart failure  Improved.  With the patient's renal status, defer diuresis to nephrology.    Anemia  Has been trending down some but now stabilized.  No reported melena or hematochezia    Disposition: Once AVAPS has been arranged at the next facility, patient can be discharged.    04/14/25:    Acute respiratory failure, slowly improving.  Remains stable on 2 L.  Able to tolerate AVAPS    Acute on chronic renal failure, slowly improving.  No indications for dialysis    Hyperkalemia, resolved.    Obesity obesity    Type 2 diabetes.  Continue insulin    Discharge planning: To rehab when bed is available.  Family is looking for local rehabs, social service follows.    Patient is stable for discharge when bed is available.    Barbara Patel MD

## 2025-04-14 NOTE — PROGRESS NOTES
Pulmonary Daily Progress Note   Subjective    Analia Murray is a 74 y.o. year old female patient with h/o asthma, HOLDEN, HTN, DLP, CHF, CKD IV, T2DM, hypothyroidism, anemia, MGUS, who p/w SOB x 2 weeks associated with cough productive of yellow sputum and chest tightness, admitted on 3/25/25    Interval History:  No acute overnight events.  O2 requirement stable at 2 L  Wore AVAPS last night  Overall is feeling well denies shortness of breath cough sputum or wheezing    Meds    Scheduled medications  allopurinol, 50 mg, oral, Every other day  [Held by provider] amLODIPine, 10 mg, oral, Daily  [Held by provider] aspirin, 81 mg, oral, Daily  calcium acetate, 667 mg, oral, TID after meals  ezetimibe, 10 mg, oral, Daily  fluticasone, 2 spray, Each Nostril, BID  fluticasone furoate-vilanteroL, 1 puff, inhalation, Daily  [Held by provider] gabapentin, 300 mg, oral, Daily  [Held by provider] heparin (porcine), 7,500 Units, subcutaneous, q8h JAYA  hydrALAZINE, 50 mg, oral, BID  hydrocortisone, , Topical, BID  insulin lispro, 0-10 Units, subcutaneous, TID AC  insulin lispro, 6 Units, subcutaneous, Once  ipratropium-albuteroL, 3 mL, nebulization, TID  levothyroxine, 200 mcg, oral, Daily  [Held by provider] metoprolol succinate XL, 100 mg, oral, Daily  metoprolol tartrate, 50 mg, oral, BID  montelukast, 10 mg, oral, Nightly  nystatin, 1 Application, Topical, TID  pravastatin, 40 mg, oral, Nightly  sertraline, 50 mg, oral, Daily  sulfur hexafluoride microsphr, 2 mL, intravenous, Once in imaging      Continuous medications     PRN medications  PRN medications: acetaminophen **OR** acetaminophen, albuterol, dextrose, dextrose, diphenhydramine-zinc acetate, glucagon, glucagon, hydrALAZINE, [Held by provider] nitroglycerin, ondansetron, oxygen, oxygen, polyethylene glycol, [Held by provider] traZODone     Objective    Blood pressure 136/57, pulse 69, temperature 36.8 °C (98.2 °F), temperature source Axillary, resp. rate 16,  "height 1.626 m (5' 4.02\"), weight 145 kg (320 lb 1.7 oz), SpO2 94%.   Physical Exam   GENERAL: normal appearance. No respiratory distress  HEAD/SINUSES: no sinus tenderness  LUNGS: Diminished airway entry, clear to auscultation  CARDIAC: Regular rate and rhythm  EXTREMITIES: Generalized edema  NEURO: Normal mental status  SKIN: Defer  PSYCH: Flat affect    Intake/Output Summary (Last 24 hours) at 4/14/2025 1029  Last data filed at 4/13/2025 1830  Gross per 24 hour   Intake 560 ml   Output --   Net 560 ml     Labs:   Results from last 72 hours   Lab Units 04/14/25  0504 04/13/25  0437 04/12/25  1812 04/12/25  0538   SODIUM mmol/L 141 140  --  140   POTASSIUM mmol/L 5.2 5.1 5.4* 5.5*   CHLORIDE mmol/L 108* 108*  --  107   CO2 mmol/L 29 27  --  27   BUN mg/dL 87* 97*  --  100*   CREATININE mg/dL 3.04* 3.18*  --  3.26*   GLUCOSE mg/dL 85 101*  --  106*   CALCIUM mg/dL 8.2* 8.1*  --  7.9*   ANION GAP mmol/L 9* 10  --  12   EGFR mL/min/1.73m*2 16* 15*  --  14*   PHOSPHORUS mg/dL 3.8 3.7  --  3.3      Results from last 72 hours   Lab Units 04/14/25  0504 04/13/25  0437 04/12/25  1538 04/12/25  0538   WBC AUTO x10*3/uL 3.6* 4.0*  --  4.6   HEMOGLOBIN g/dL 7.4* 7.5* 8.1* 7.3*   HEMATOCRIT % 25.8* 26.2* 26.3* 23.8*   PLATELETS AUTO x10*3/uL 109* 111*  --  114*   NEUTROS PCT AUTO % 60.6 62.1  --  65.4   LYMPHS PCT AUTO % 19.3 17.5  --  16.9   MONOS PCT AUTO % 12.1 12.5  --  11.6   EOS PCT AUTO % 7.4 7.2  --  5.5      Results from last 72 hours   Lab Units 04/14/25  0424   POCT PH, ARTERIAL pH 7.33*   POCT PCO2, ARTERIAL mm Hg 51*   POCT PO2, ARTERIAL mm Hg 128*   POCT SO2, ARTERIAL % 100          Micro/ID:   Lab Results   Component Value Date    URINECULTURE  03/16/2025     Growth indicates contamination with mixed bacterial michelle. Repeat culture if clinically indicated.    BLOODCULT No growth at 4 days -  FINAL REPORT 07/30/2024    BLOODCULT No growth at 4 days -  FINAL REPORT 07/30/2024     Summary of key imaging results " from the last 24 hours  CXR - 4/8/25 - Cardiomegaly with some mild perihilar interstitial edema suggested.      No large effusion or consolidation    Impression   Analia Murray is a 74 y.o. year old female patient is being seen by the pulmonary service for   Acute on chronic hypoxic and hypercapnic respiratory failure  Suspected exacerbation of asthma -was diagnosed with asthma clinically, PFTs from 2020 no obstruction, never smoked  Pulmonary edema/CHF  HOLDEN-on CPAP at home currently on AVAPS while in house due to hypoventilation and hypercapnia    Recommendations   As follows:  Clinically is improving on room air during the day and AVAPS at night  Continue AVAPS at night   ABG on AVAPS pH 7.33 PCO2 51 PO2 128 bicarb 26.9  Completed steroid course  Continue with Breo and Singulair nebulizers as needed  Bronchopulmonary hygiene with incentive spirometry and Acapella  Diuresis/volume management per nephrology  Follow-up with pulmonary outpatient with Dr Chavez  Optimized from pulmonary standpoint for discharge, pending approval of AVAPS capable device at current settings    DILCIA Schmidt-CNP   04/14/25 at 10:29 AM     -Only the Medical problems listed under impression were addressed today.   -Please contact primary team for all other concerns and medical problem  -Thank you for your consult       Disclaimer: Documentation completed with the information available at the time of input. Parts of this note may have been scribed or generated using voice dictation software, Dragon.  Homophonic errors may exist.  Please contact me directly if clarification is needed. The times in the chart may not be reflective of actual patient care times, interventions, or procedures. Documentation occurs after the physical care of the patient.

## 2025-04-14 NOTE — PROGRESS NOTES
"Nutrition Follow up Note    Nutrition Assessment      Pt states eating ok but gets hungry between meals. Declined any supplements to have as snacks between meals.   Nutrition History:  Energy Intake: Fair 50-75 %  Food and Nutrient History: pt states hungry before meals come but declined any supplement/snack between meals     Anthropometrics:  Ht: 162.6 cm (5' 4.02\"), Wt: 145 kg (320 lb 1.7 oz), BMI: 54.92  IBW/kg (Dietitian Calculated): 54.54 kg  Percent of IBW: 246 %  Adjusted Body Weight (kg): 79 kg    Weight Change:  Daily Weight  04/14/25 : 145 kg (320 lb 1.7 oz)  03/16/25 : 132 kg (291 lb)  02/10/25 : 139 kg (305 lb 9.6 oz)  01/30/25 : 134 kg (296 lb)  01/27/25 : 135 kg (297 lb 1.6 oz)  01/15/25 : 132 kg (291 lb)  12/11/24 : 127 kg (281 lb)  11/26/24 : 132 kg (291 lb)  10/23/24 : 132 kg (291 lb)  09/29/24 : 127 kg (281 lb)      Nutrition Focused Physical Exam Findings: defer: NFPE inappropriate, no visible deficits     Nutrition Significant Labs:  Lab Results   Component Value Date    WBC 3.6 (L) 04/14/2025    HGB 7.4 (L) 04/14/2025    HCT 25.8 (L) 04/14/2025     (L) 04/14/2025    CHOL 128 06/21/2023    TRIG 72 06/21/2023    HDL 45.1 06/21/2023    ALT 13 04/14/2025    AST 12 04/14/2025     04/14/2025    K 5.2 04/14/2025     (H) 04/14/2025    CREATININE 3.04 (H) 04/14/2025    BUN 87 (H) 04/14/2025    CO2 29 04/14/2025    TSH 11.37 (H) 03/25/2025    HGBA1C 6.2 (H) 04/02/2025     Nutrition Specific Medications:  allopurinol, 50 mg, oral, Every other day  [Held by provider] amLODIPine, 10 mg, oral, Daily  [Held by provider] aspirin, 81 mg, oral, Daily  calcium acetate, 667 mg, oral, TID after meals  ezetimibe, 10 mg, oral, Daily  fluticasone, 2 spray, Each Nostril, BID  fluticasone furoate-vilanteroL, 1 puff, inhalation, Daily  [Held by provider] gabapentin, 300 mg, oral, Daily  [Held by provider] heparin (porcine), 7,500 Units, subcutaneous, q8h JAYA  hydrALAZINE, 50 mg, oral, " BID  hydrocortisone, , Topical, BID  insulin lispro, 0-10 Units, subcutaneous, TID AC  insulin lispro, 6 Units, subcutaneous, Once  ipratropium-albuteroL, 3 mL, nebulization, TID  levothyroxine, 200 mcg, oral, Daily  [Held by provider] metoprolol succinate XL, 100 mg, oral, Daily  metoprolol tartrate, 50 mg, oral, BID  montelukast, 10 mg, oral, Nightly  nystatin, 1 Application, Topical, TID  pravastatin, 40 mg, oral, Nightly  sertraline, 100 mg, oral, Daily  sulfur hexafluoride microsphr, 2 mL, intravenous, Once in imaging      Dietary Orders (From admission, onward)       Start     Ordered    04/02/25 1415  Oral nutritional supplements  Until discontinued        Comments: chocolate   Question Answer Comment   Deliver with All meals    Select supplement: Sugar Free Mighty Shake        04/02/25 1414    04/02/25 1353  Adult diet Regular, Renal; Potassium Restricted 2 gm (50mEq); 2 - 3 grams Sodium; Thin 0; 1:1 Feeding  Diet effective now        Comments: Compensatory Swallowing Strategies:                                                                                                                                                                                                                                                           Upright 90 degrees as possible for all oral intake   Question Answer Comment   Diet type Regular    Diet type Renal    Potassium restriction: Potassium Restricted 2 gm (50mEq)    Sodium restriction: 2 - 3 grams Sodium    Fluid consistency Thin 0    Select tray type: 1:1 Feeding        04/02/25 1353    03/25/25 0812  May Participate in Room Service  ( ROOM SERVICE MAY PARTICIPATE)  Once        Question:  .  Answer:  Yes    03/25/25 0811                  Estimated Needs:   Estimated Energy Needs  Total Energy Estimated Needs in 24 hours (kCal): 1975 kCal  Energy Estimated Needs per kg Body Weight in 24 hours (kCal/kg): 25 kCal/kg  Method for Estimating Needs: Adj Wt    Estimated Protein  Needs  Total Protein Estimated Needs in 24 Hours (g): 95 g  Protein Estimated Needs per kg Body Weight in 24 Hours (g/kg): 1.2 g/kg  Method for Estimating 24 Hour Protein Needs: Adj Wt    Estimated Fluid Needs  Total Fluid Estimated Needs in 24 Hours (mL): 1975 mL  Method for Estimating 24 Hour Fluid Needs: 1 ml/kcal or per MD      Nutrition Diagnosis   Nutrition Diagnosis:  Malnutrition Diagnosis  Patient has Malnutrition Diagnosis: No    Nutrition Diagnosis  Patient has Nutrition Diagnosis: Yes  Diagnosis Status (1): New  Nutrition Diagnosis 1: Self-feeding difficulty  Related to (1): genalized weakness impacting functional mobility and ADL status  As Evidenced by (1): 1:1 feed       Nutrition Interventions/Recommendations   Nutrition Interventions and Recommendations:  Nutrition Prescription: Nutrition prescription for oral nutrition    Nutrition Recommendations:  Individualized Nutrition Prescription Provided for : 1975 calories, 95 gm protein per SLP recs    Nutrition Interventions/Goals:   Food and/or Nutrient Delivery Interventions  Interventions: Meals and snacks, Medical food supplement  Meals and Snacks: Mineral-modified diet, Other (Comment) (renal)  Goal: provide as ordered  Medical Food Supplement: Commercial food medical food supplement therapy  Goal: provide as ordered    Education Documentation  No documentation found.       Nutrition Monitoring and Evaluation   Monitoring/Evaluation:   Food/Nutrient Related History Monitoring  Monitoring and Evaluation Plan: Estimated Energy Intake  Estimated Energy Intake: Energy intake greater or equal to 75% of estimated energy needs     Goal Status: Some progress toward goal(s)    Follow Up  Time Spent (min): 25 minutes  Last Date of Nutrition Visit: 04/14/25  Nutrition Follow-Up Needed?: 5-7 days  Follow up Comment: 4/21/25

## 2025-04-14 NOTE — PROGRESS NOTES
[facility], called son instead and discussed choices for SNF. Made him aware that [facility] has no availability for the forseeable future, discussion around [facility] as second option. Referral has already been sent to [facility] and they had some questions which the son was able to answer, information relayed to facility and updated notes from today also sent, await response.     Pending:  Full acceptance from facility, precert.     04/14/25 5607   Discharge Planning   Expected Discharge Disposition SNF

## 2025-04-14 NOTE — PROGRESS NOTES
Analia Murray is a 74 y.o. female on day 20 of admission presenting with Acute respiratory failure with hypoxia.      Subjective   No complaints, BUN and creatinine are trending down.       Objective          Vitals 24HR  Heart Rate:  [60-75]   Temp:  [36.4 °C (97.5 °F)-36.8 °C (98.2 °F)]   Resp:  [16-20]   BP: (121-150)/(51-71)   Weight:  [145 kg (320 lb 1.7 oz)]   SpO2:  [92 %-100 %]       Intake/Output last 3 Shifts:    Intake/Output Summary (Last 24 hours) at 4/14/2025 1131  Last data filed at 4/14/2025 0900  Gross per 24 hour   Intake 660 ml   Output --   Net 660 ml       Physical Exam  Constitutional:       General: She is awake. She is not in acute distress.  Cardiovascular:      Heart sounds:      No friction rub.   Pulmonary:      Effort: Pulmonary effort is normal.      Comments: Diminished breath sounds  Abdominal:      General: Bowel sounds are normal.      Palpations: Abdomen is soft.      Tenderness: There is no guarding or rebound.   Musculoskeletal:      Comments: Mild peripheral edema   Neurological:      Mental Status: She is alert.         Relevant Results  Results for orders placed or performed during the hospital encounter of 03/25/25 (from the past 24 hours)   POCT GLUCOSE   Result Value Ref Range    POCT Glucose 120 (H) 74 - 99 mg/dL   POCT GLUCOSE   Result Value Ref Range    POCT Glucose 100 (H) 74 - 99 mg/dL   POCT GLUCOSE   Result Value Ref Range    POCT Glucose 102 (H) 74 - 99 mg/dL   Blood Gas Arterial Full Panel   Result Value Ref Range    POCT pH, Arterial 7.33 (L) 7.38 - 7.42 pH    POCT pCO2, Arterial 51 (H) 38 - 42 mm Hg    POCT pO2, Arterial 128 (H) 85 - 95 mm Hg    POCT SO2, Arterial 100 94 - 100 %    POCT Oxy Hemoglobin, Arterial 97.6 94.0 - 98.0 %    POCT Hematocrit Calculated, Arterial 23.0 (L) 36.0 - 46.0 %    POCT Sodium, Arterial 138 136 - 145 mmol/L    POCT Potassium, Arterial 5.4 (H) 3.5 - 5.3 mmol/L    POCT Chloride, Arterial 108 (H) 98 - 107 mmol/L    POCT Ionized  Calcium, Arterial 1.21 1.10 - 1.33 mmol/L    POCT Glucose, Arterial 88 74 - 99 mg/dL    POCT Lactate, Arterial 0.3 (L) 0.4 - 2.0 mmol/L    POCT Base Excess, Arterial 0.7 -2.0 - 3.0 mmol/L    POCT HCO3 Calculated, Arterial 26.9 (H) 22.0 - 26.0 mmol/L    POCT Hemoglobin, Arterial 7.5 (L) 12.0 - 16.0 g/dL    POCT Anion Gap, Arterial 9 (L) 10 - 25 mmo/L    Patient Temperature 37.0 degrees Celsius    FiO2 28 %    Apparatus CANNULA     Critical Called By RAND COLLAZO RRT     Critical Called To DR. JOHNSON     Critical Call Time 434     Critical Read Back Y     Site of Arterial Puncture Radial Right     Wes's Test Positive    CBC and Auto Differential   Result Value Ref Range    WBC 3.6 (L) 4.4 - 11.3 x10*3/uL    nRBC 0.0 0.0 - 0.0 /100 WBCs    RBC 3.20 (L) 4.00 - 5.20 x10*6/uL    Hemoglobin 7.4 (L) 12.0 - 16.0 g/dL    Hematocrit 25.8 (L) 36.0 - 46.0 %    MCV 81 80 - 100 fL    MCH 23.1 (L) 26.0 - 34.0 pg    MCHC 28.7 (L) 32.0 - 36.0 g/dL    RDW 19.9 (H) 11.5 - 14.5 %    Platelets 109 (L) 150 - 450 x10*3/uL    Neutrophils % 60.6 40.0 - 80.0 %    Immature Granulocytes %, Automated 0.3 0.0 - 0.9 %    Lymphocytes % 19.3 13.0 - 44.0 %    Monocytes % 12.1 2.0 - 10.0 %    Eosinophils % 7.4 0.0 - 6.0 %    Basophils % 0.3 0.0 - 2.0 %    Neutrophils Absolute 2.20 1.60 - 5.50 x10*3/uL    Immature Granulocytes Absolute, Automated 0.01 0.00 - 0.50 x10*3/uL    Lymphocytes Absolute 0.70 (L) 0.80 - 3.00 x10*3/uL    Monocytes Absolute 0.44 0.05 - 0.80 x10*3/uL    Eosinophils Absolute 0.27 0.00 - 0.40 x10*3/uL    Basophils Absolute 0.01 0.00 - 0.10 x10*3/uL   Comprehensive Metabolic Panel   Result Value Ref Range    Glucose 85 74 - 99 mg/dL    Sodium 141 136 - 145 mmol/L    Potassium 5.2 3.5 - 5.3 mmol/L    Chloride 108 (H) 98 - 107 mmol/L    Bicarbonate 29 21 - 32 mmol/L    Anion Gap 9 (L) 10 - 20 mmol/L    Urea Nitrogen 87 (H) 6 - 23 mg/dL    Creatinine 3.04 (H) 0.50 - 1.05 mg/dL    eGFR 16 (L) >60 mL/min/1.73m*2    Calcium 8.2  (L) 8.6 - 10.3 mg/dL    Albumin 2.8 (L) 3.4 - 5.0 g/dL    Alkaline Phosphatase 60 33 - 136 U/L    Total Protein 5.9 (L) 6.4 - 8.2 g/dL    AST 12 9 - 39 U/L    Bilirubin, Total 0.4 0.0 - 1.2 mg/dL    ALT 13 7 - 45 U/L   Magnesium   Result Value Ref Range    Magnesium 2.21 1.60 - 2.40 mg/dL   Phosphorus   Result Value Ref Range    Phosphorus 3.8 2.5 - 4.9 mg/dL   Ferritin   Result Value Ref Range    Ferritin 158 (H) 8 - 150 ng/mL   Iron and TIBC   Result Value Ref Range    Iron 33 (L) 35 - 150 ug/dL    UIBC 195 110 - 370 ug/dL    TIBC 228 (L) 240 - 445 ug/dL    % Saturation 14 (L) 25 - 45 %   POCT GLUCOSE   Result Value Ref Range    POCT Glucose 86 74 - 99 mg/dL   POCT GLUCOSE   Result Value Ref Range    POCT Glucose 123 (H) 74 - 99 mg/dL     *Note: Due to a large number of results and/or encounters for the requested time period, some results have not been displayed. A complete set of results can be found in Results Review.            Assessment/Plan   Acute kidney injury superimposed on chronic kidney disease stage III - improving, no indication for renal replacement therapy, renal function is slowly improving, there is no uremia or fluid overload   Hyperkalemia it is resolved   Congestive heart failure   Chronic kidney disease stage IIIb (baseline creatinine 1.7-2)  Acute respiratory failure is improving  Diabetes mellitus type 2  Anemia of chronic kidney disease-check updated iron studies.  Will likely need Epogen  Hypertension  Hyperlipidemia  Obesity    Hoda Slater MD

## 2025-04-14 NOTE — NURSING NOTE
Message sent to Provider Dr. Patel notifying that patient refused to take the increase in dose of 100 mg of Zoloft that was supposed to be started today. Patient requested to return to the 50 mg.  This Rn also notified of patient's down trend in WBC.

## 2025-04-14 NOTE — NURSING NOTE
Bed side shift report received. Patient resting comfortably. Reports no current concerns at this time. Call light within reach. This nurse assumed care. Bed alarm active. Bed in lowest position.   Patient currently getting a breathing treatment.

## 2025-04-15 LAB
ALBUMIN SERPL BCP-MCNC: 3 G/DL (ref 3.4–5)
ALP SERPL-CCNC: 65 U/L (ref 33–136)
ALT SERPL W P-5'-P-CCNC: 13 U/L (ref 7–45)
ANION GAP SERPL CALCULATED.3IONS-SCNC: 11 MMOL/L (ref 10–20)
AST SERPL W P-5'-P-CCNC: 12 U/L (ref 9–39)
BASOPHILS # BLD AUTO: 0.01 X10*3/UL (ref 0–0.1)
BASOPHILS NFR BLD AUTO: 0.3 %
BILIRUB SERPL-MCNC: 0.4 MG/DL (ref 0–1.2)
BUN SERPL-MCNC: 81 MG/DL (ref 6–23)
CALCIUM SERPL-MCNC: 8.3 MG/DL (ref 8.6–10.3)
CHLORIDE SERPL-SCNC: 108 MMOL/L (ref 98–107)
CO2 SERPL-SCNC: 27 MMOL/L (ref 21–32)
CREAT SERPL-MCNC: 2.94 MG/DL (ref 0.5–1.05)
EGFRCR SERPLBLD CKD-EPI 2021: 16 ML/MIN/1.73M*2
EOSINOPHIL # BLD AUTO: 0.24 X10*3/UL (ref 0–0.4)
EOSINOPHIL NFR BLD AUTO: 6.6 %
ERYTHROCYTE [DISTWIDTH] IN BLOOD BY AUTOMATED COUNT: 20 % (ref 11.5–14.5)
GLUCOSE BLD MANUAL STRIP-MCNC: 104 MG/DL (ref 74–99)
GLUCOSE BLD MANUAL STRIP-MCNC: 114 MG/DL (ref 74–99)
GLUCOSE BLD MANUAL STRIP-MCNC: 118 MG/DL (ref 74–99)
GLUCOSE BLD MANUAL STRIP-MCNC: 132 MG/DL (ref 74–99)
GLUCOSE SERPL-MCNC: 103 MG/DL (ref 74–99)
HCT VFR BLD AUTO: 26.9 % (ref 36–46)
HGB BLD-MCNC: 7.6 G/DL (ref 12–16)
IMM GRANULOCYTES # BLD AUTO: 0.01 X10*3/UL (ref 0–0.5)
IMM GRANULOCYTES NFR BLD AUTO: 0.3 % (ref 0–0.9)
LYMPHOCYTES # BLD AUTO: 0.75 X10*3/UL (ref 0.8–3)
LYMPHOCYTES NFR BLD AUTO: 20.5 %
MAGNESIUM SERPL-MCNC: 2.2 MG/DL (ref 1.6–2.4)
MCH RBC QN AUTO: 22.8 PG (ref 26–34)
MCHC RBC AUTO-ENTMCNC: 28.3 G/DL (ref 32–36)
MCV RBC AUTO: 81 FL (ref 80–100)
MONOCYTES # BLD AUTO: 0.41 X10*3/UL (ref 0.05–0.8)
MONOCYTES NFR BLD AUTO: 11.2 %
NEUTROPHILS # BLD AUTO: 2.24 X10*3/UL (ref 1.6–5.5)
NEUTROPHILS NFR BLD AUTO: 61.1 %
NRBC BLD-RTO: 0 /100 WBCS (ref 0–0)
PHOSPHATE SERPL-MCNC: 3.5 MG/DL (ref 2.5–4.9)
PLATELET # BLD AUTO: 110 X10*3/UL (ref 150–450)
POTASSIUM SERPL-SCNC: 5.1 MMOL/L (ref 3.5–5.3)
PROT SERPL-MCNC: 6.2 G/DL (ref 6.4–8.2)
RBC # BLD AUTO: 3.34 X10*6/UL (ref 4–5.2)
SODIUM SERPL-SCNC: 141 MMOL/L (ref 136–145)
WBC # BLD AUTO: 3.7 X10*3/UL (ref 4.4–11.3)

## 2025-04-15 PROCEDURE — 2500000001 HC RX 250 WO HCPCS SELF ADMINISTERED DRUGS (ALT 637 FOR MEDICARE OP)

## 2025-04-15 PROCEDURE — 82947 ASSAY GLUCOSE BLOOD QUANT: CPT

## 2025-04-15 PROCEDURE — 2500000004 HC RX 250 GENERAL PHARMACY W/ HCPCS (ALT 636 FOR OP/ED): Performed by: NURSE PRACTITIONER

## 2025-04-15 PROCEDURE — 84075 ASSAY ALKALINE PHOSPHATASE: CPT | Performed by: INTERNAL MEDICINE

## 2025-04-15 PROCEDURE — 2500000002 HC RX 250 W HCPCS SELF ADMINISTERED DRUGS (ALT 637 FOR MEDICARE OP, ALT 636 FOR OP/ED): Performed by: INTERNAL MEDICINE

## 2025-04-15 PROCEDURE — 9420000001 HC RT PATIENT EDUCATION 5 MIN

## 2025-04-15 PROCEDURE — 36415 COLL VENOUS BLD VENIPUNCTURE: CPT | Performed by: INTERNAL MEDICINE

## 2025-04-15 PROCEDURE — 83735 ASSAY OF MAGNESIUM: CPT

## 2025-04-15 PROCEDURE — 94762 N-INVAS EAR/PLS OXIMTRY CONT: CPT

## 2025-04-15 PROCEDURE — 2500000001 HC RX 250 WO HCPCS SELF ADMINISTERED DRUGS (ALT 637 FOR MEDICARE OP): Performed by: INTERNAL MEDICINE

## 2025-04-15 PROCEDURE — 2060000001 HC INTERMEDIATE ICU ROOM DAILY

## 2025-04-15 PROCEDURE — 2500000004 HC RX 250 GENERAL PHARMACY W/ HCPCS (ALT 636 FOR OP/ED): Performed by: INTERNAL MEDICINE

## 2025-04-15 PROCEDURE — 97110 THERAPEUTIC EXERCISES: CPT | Mod: GO,CO

## 2025-04-15 PROCEDURE — 94660 CPAP INITIATION&MGMT: CPT

## 2025-04-15 PROCEDURE — 84100 ASSAY OF PHOSPHORUS: CPT

## 2025-04-15 PROCEDURE — 97535 SELF CARE MNGMENT TRAINING: CPT | Mod: GO,CO

## 2025-04-15 PROCEDURE — 99232 SBSQ HOSP IP/OBS MODERATE 35: CPT | Performed by: NURSE PRACTITIONER

## 2025-04-15 PROCEDURE — 85025 COMPLETE CBC W/AUTO DIFF WBC: CPT | Performed by: INTERNAL MEDICINE

## 2025-04-15 PROCEDURE — 94640 AIRWAY INHALATION TREATMENT: CPT

## 2025-04-15 PROCEDURE — 99232 SBSQ HOSP IP/OBS MODERATE 35: CPT

## 2025-04-15 RX ADMIN — HYDRALAZINE HYDROCHLORIDE 50 MG: 50 TABLET ORAL at 09:23

## 2025-04-15 RX ADMIN — FLUTICASONE PROPIONATE 2 SPRAY: 50 SPRAY, METERED NASAL at 09:23

## 2025-04-15 RX ADMIN — SALINE NASAL SPRAY 1 SPRAY: 1.5 SOLUTION NASAL at 21:31

## 2025-04-15 RX ADMIN — HYDROCORTISONE: 25 CREAM TOPICAL at 09:25

## 2025-04-15 RX ADMIN — CALCIUM ACETATE 667 MG: 667 CAPSULE ORAL at 18:32

## 2025-04-15 RX ADMIN — HEPARIN SODIUM 7500 UNITS: 5000 INJECTION INTRAVENOUS; SUBCUTANEOUS at 21:31

## 2025-04-15 RX ADMIN — CALCIUM ACETATE 667 MG: 667 CAPSULE ORAL at 14:24

## 2025-04-15 RX ADMIN — EZETIMIBE 10 MG: 10 TABLET ORAL at 09:23

## 2025-04-15 RX ADMIN — IPRATROPIUM BROMIDE AND ALBUTEROL SULFATE 3 ML: 2.5; .5 SOLUTION RESPIRATORY (INHALATION) at 12:34

## 2025-04-15 RX ADMIN — DIPHENHYDRAMINE HYDROCHLORIDE, ZINC ACETATE: 2; .1 CREAM TOPICAL at 21:39

## 2025-04-15 RX ADMIN — NYSTATIN 1 APPLICATION: 100000 POWDER TOPICAL at 09:25

## 2025-04-15 RX ADMIN — SERTRALINE 50 MG: 50 TABLET, FILM COATED ORAL at 09:23

## 2025-04-15 RX ADMIN — FLUTICASONE PROPIONATE 2 SPRAY: 50 SPRAY, METERED NASAL at 21:36

## 2025-04-15 RX ADMIN — METOPROLOL TARTRATE 50 MG: 50 TABLET, FILM COATED ORAL at 21:31

## 2025-04-15 RX ADMIN — IPRATROPIUM BROMIDE AND ALBUTEROL SULFATE 3 ML: 2.5; .5 SOLUTION RESPIRATORY (INHALATION) at 20:46

## 2025-04-15 RX ADMIN — METOPROLOL TARTRATE 50 MG: 50 TABLET, FILM COATED ORAL at 09:24

## 2025-04-15 RX ADMIN — CALCIUM ACETATE 667 MG: 667 CAPSULE ORAL at 09:23

## 2025-04-15 RX ADMIN — HYDROCORTISONE: 25 CREAM TOPICAL at 21:38

## 2025-04-15 RX ADMIN — HYDRALAZINE HYDROCHLORIDE 50 MG: 50 TABLET ORAL at 21:31

## 2025-04-15 RX ADMIN — MONTELUKAST 10 MG: 10 TABLET, FILM COATED ORAL at 21:31

## 2025-04-15 RX ADMIN — IPRATROPIUM BROMIDE AND ALBUTEROL SULFATE 3 ML: 2.5; .5 SOLUTION RESPIRATORY (INHALATION) at 07:43

## 2025-04-15 RX ADMIN — NYSTATIN 1 APPLICATION: 100000 POWDER TOPICAL at 14:28

## 2025-04-15 RX ADMIN — IRON SUCROSE 200 MG: 20 INJECTION, SOLUTION INTRAVENOUS at 12:27

## 2025-04-15 RX ADMIN — PRAVASTATIN SODIUM 40 MG: 20 TABLET ORAL at 21:31

## 2025-04-15 RX ADMIN — LEVOTHYROXINE SODIUM 200 MCG: 0.1 TABLET ORAL at 05:00

## 2025-04-15 ASSESSMENT — COGNITIVE AND FUNCTIONAL STATUS - GENERAL
TOILETING: TOTAL
MOVING FROM LYING ON BACK TO SITTING ON SIDE OF FLAT BED WITH BEDRAILS: A LOT
DRESSING REGULAR LOWER BODY CLOTHING: A LOT
MOBILITY SCORE: 11
PERSONAL GROOMING: A LITTLE
MOBILITY SCORE: 11
DRESSING REGULAR LOWER BODY CLOTHING: A LOT
MOVING TO AND FROM BED TO CHAIR: A LOT
DAILY ACTIVITIY SCORE: 17
MOVING TO AND FROM BED TO CHAIR: A LOT
STANDING UP FROM CHAIR USING ARMS: A LOT
HELP NEEDED FOR BATHING: A LITTLE
WALKING IN HOSPITAL ROOM: A LOT
MOVING FROM LYING ON BACK TO SITTING ON SIDE OF FLAT BED WITH BEDRAILS: A LOT
TURNING FROM BACK TO SIDE WHILE IN FLAT BAD: A LOT
TURNING FROM BACK TO SIDE WHILE IN FLAT BAD: A LOT
PERSONAL GROOMING: A LITTLE
MOVING TO AND FROM BED TO CHAIR: A LOT
MOBILITY SCORE: 12
PERSONAL GROOMING: A LITTLE
DRESSING REGULAR UPPER BODY CLOTHING: A LOT
TOILETING: A LITTLE
CLIMB 3 TO 5 STEPS WITH RAILING: A LOT
DAILY ACTIVITIY SCORE: 16
HELP NEEDED FOR BATHING: A LOT
TOILETING: A LOT
CLIMB 3 TO 5 STEPS WITH RAILING: TOTAL
DAILY ACTIVITIY SCORE: 12
DAILY ACTIVITIY SCORE: 16
WALKING IN HOSPITAL ROOM: A LOT
HELP NEEDED FOR BATHING: A LITTLE
CLIMB 3 TO 5 STEPS WITH RAILING: TOTAL
HELP NEEDED FOR BATHING: A LOT
STANDING UP FROM CHAIR USING ARMS: A LOT
TOILETING: A LOT
TURNING FROM BACK TO SIDE WHILE IN FLAT BAD: A LOT
DRESSING REGULAR UPPER BODY CLOTHING: A LOT
DRESSING REGULAR LOWER BODY CLOTHING: TOTAL
MOVING FROM LYING ON BACK TO SITTING ON SIDE OF FLAT BED WITH BEDRAILS: A LOT
DRESSING REGULAR UPPER BODY CLOTHING: A LITTLE
EATING MEALS: A LITTLE
WALKING IN HOSPITAL ROOM: A LOT
DRESSING REGULAR UPPER BODY CLOTHING: A LOT
STANDING UP FROM CHAIR USING ARMS: A LOT
PERSONAL GROOMING: A LITTLE
DRESSING REGULAR LOWER BODY CLOTHING: A LOT

## 2025-04-15 ASSESSMENT — PAIN SCALES - GENERAL
PAINLEVEL_OUTOF10: 0 - NO PAIN

## 2025-04-15 ASSESSMENT — ACTIVITIES OF DAILY LIVING (ADL): HOME_MANAGEMENT_TIME_ENTRY: 17

## 2025-04-15 ASSESSMENT — PAIN - FUNCTIONAL ASSESSMENT
PAIN_FUNCTIONAL_ASSESSMENT: 0-10
PAIN_FUNCTIONAL_ASSESSMENT: FLACC (FACE, LEGS, ACTIVITY, CRY, CONSOLABILITY)

## 2025-04-15 NOTE — SIGNIFICANT EVENT
Pt found on room air. She has been on room air for 30 minutes. Her Sp02 is 96%.  The pt will remain on room air

## 2025-04-15 NOTE — ASSESSMENT & PLAN NOTE
Acute respiratory failure with hypoxia and hypercapnia.  Fluctuates between 0 and 2 L nasal cannula.  Continue noninvasive positive pressure ventilation at night.  Pulmonology on consult; arranging for AVAPS for discharge, otherwise cleared from pulm standpoint     Acute kidney injury on chronic kidney disease  Creatinine is trending downward though still in the 3 range  Palliative and nephrology consulted, appreciate recs  Cleared by nephrology for discharge     Morbid obesity  Major comorbidity contributing to her respiratory failure  Lifestyle dietary modifications     Diabetes mellitus type 2  Monitor blood glucose  Sliding scale insulin  Hypoglycemia protocol  hgbA1C      Hypothyroidism  TSH was 11.  Continue with levothyroxine 200 mg daily     Anxiety  Continue sertraline     Acute on chronic diastolic heart failure  Improved.  With the patient's renal status, defer diuresis to nephrology.  Strict I's and O's  Daily weights     Anemia  Stable     Plan  Supplemental oxygen as needed  AVAPS nightly  DVT prophylaxis: Resume heparin and daily aspirin  PT/OT  Cleared by pulmonary and nephrology for discharge  Discharge planning to SNF when arrangements can be made, patient will need AVAPS arranged with facility prior to discharge  Patient is medically stable for discharge

## 2025-04-15 NOTE — CARE PLAN
The patient's goals for the shift include hopefully getting to leave.     The clinical goals for the shift include safety and monitor vitals

## 2025-04-15 NOTE — PROGRESS NOTES
Occupational Therapy    OT Treatment    Patient Name: Analia Murray  MRN: [id number]  Department: [address]  Room: [facility]458-  Today's Date: 4/15/2025  Time Calculation  Start Time: 0820  Stop Time: 0852  Time Calculation (min): 32 min        Assessment:  OT Assessment: pt tolerated session fairly and is making gradual progress towards OT POC. pt displaying bed mobility with Max/Mod A and engaged in therapeutic excerises while seated on EOB which displays progress towards goals. pt would benefit from cotninued OT services to increase strentgh, balance, endurance, and activity tolerance for ADLs  Prognosis: Fair  Barriers to Discharge Home: Physical needs, Caregiver assistance  Caregiver Assistance: Caregiver assistance needed per identified barriers - however, level of patient's required assistance exceeds assistance available at home  Physical Needs: 24hr mobility assistance needed, 24hr ADL assistance needed, High falls risk due to function or environment, Ambulating household distances limited by function/safety  Evaluation/Treatment Tolerance: Patient limited by fatigue  Medical Staff Made Aware: Yes  End of Session Communication: Bedside nurse  End of Session Patient Position: Bed, 3 rail up, Alarm on (call light in reach and all needs met: RN made aware of OT session)  OT Assessment Results: Decreased ADL status, Decreased upper extremity range of motion, Decreased upper extremity strength, Decreased endurance, Decreased functional mobility, Decreased IADLs  Prognosis: Fair  Barriers to Discharge: Decreased caregiver support  Evaluation/Treatment Tolerance: Patient limited by fatigue  Medical Staff Made Aware: Yes  Strengths: Ability to acquire knowledge, Attitude of self  Barriers to Participation: Comorbidities  Plan:  Treatment Interventions: ADL retraining, Functional transfer training, Endurance training, UE strengthening/ROM, Patient/family training, Equipment evaluation/education, Compensatory  technique education  OT Frequency: 4 times per week  OT Discharge Recommendations: Moderate intensity level of continued care  Equipment Recommended upon Discharge: Wheeled walker  OT Recommended Transfer Status: Maximum assist, Assist of 2  OT - OK to Discharge: Yes  Treatment Interventions: ADL retraining, Functional transfer training, Endurance training, UE strengthening/ROM, Patient/family training, Equipment evaluation/education, Compensatory technique education    Subjective   Previous Visit Info:  OT Last Visit  OT Received On: 04/15/25  General:  General  Reason for Referral: Activities of daily living  Referred By: Dr. Sher MD  Past Medical History Relevant to Rehab: ANX, CKD, GERD, HTN, DM, Osteoporosis, HTN, Depression  Family/Caregiver Present: No  Prior to Session Communication: Bedside nurse  Patient Position Received: Bed, 3 rail up, Alarm on  Preferred Learning Style: verbal, visual  General Comment: pt cleared for OT treatment by nursing; pt pleasant and agreeable to therapy; requestiung to use bedpan to urinate  Precautions:  Hearing/Visual Limitations: reading glasses, hearing WFL  Medical Precautions: Fall precautions, Oxygen therapy device and L/min (1L O2 via NC and bubbler)     Date/Time Vitals Session Patient Position Pulse Resp SpO2 BP MAP (mmHg)    04/15/25 0804 --  --  73  18  97 %  165/63  88           Vital Signs Comment: while seated on EOB: O2 % and HR 70s     Pain:  Pain Assessment  Pain Assessment: 0-10  0-10 (Numeric) Pain Score: 0 - No pain    Objective    Cognition:  Cognition  Overall Cognitive Status: Within Functional Limits  Orientation Level: Oriented X4  Cognition Comments: pt pleasant and cooperative; motivation required to engage in EOB activity  Coordination:  Movements are Fluid and Coordinated: No  Upper Body Coordination: decreased rate and accuracy of movements  Activities of Daily Living:      Grooming  Grooming Comments: pt stating she got cleaned up earlier  this morning and at this time is declining all ADLs    Toileting  Toileting Level of Assistance: Dependent  Where Assessed: Bed level  Toileting Comments: pt continent of urine and requesting to use bed pan; pt rolling R and L for toileting task; dependent for anterior and posterior care while at bed level    Bed Mobility/Transfers:   Bed Mobility  Bed Mobility: Yes  Bed Mobility 1  Bed Mobility 1: Rolling left, Rolling right  Level of Assistance 1: Moderate assistance  Bed Mobility Comments 1: Mod A to roll R and L during toileting task; cues for BLEs positioning and hand placement to assist in rolling; effortful  Bed Mobility 2  Bed Mobility  2: Supine to sitting  Level of Assistance 2: Maximum assistance  Bed Mobility Comments 2: Max A to bring trunk up into sitting position; pt able to initiate transitioning BLEs towards EOB but required max A to fully position BLEs off EOB into sitting position  Bed Mobility 3  Bed Mobility 3: Sitting to supine  Level of Assistance 3: Moderate assistance  Bed Mobility Comments 3: Mod A to bring BLEs up into supine positon; pt able to lower trunk and position bilateral shoulders into optimal body alignement with verbal cues  Bed Mobility 4  Bed Mobility 4: Scooting  Level of Assistance 4: Maximum assistance, +2  Bed Mobility Comments 4: Max A x2 to scoot towards HOB with use of draw sheet    Transfers  Transfer: No (pt declining transfer tasks this date due to fatigue)    Toilet Transfers  Toilet Transfers Comments: see toileting task and bed mobility     Functional Mobility:  Functional Mobility  Functional Mobility Performed: No  Sitting Balance:  Static Sitting Balance  Static Sitting-Balance Support: Right upper extremity supported, Left upper extremity supported (alternating)  Static Sitting-Level of Assistance: Minimum assistance, Contact guard  Static Sitting-Comment/Number of Minutes: static sitting balance while seated on EOB for ~3-5 minutes before therapeutic  excerises to increase strentgh, balance, and functional seated tolerance for ADL participation; pt displaying fair- seated balance  Dynamic Sitting Balance  Dynamic Sitting-Balance Support: Right upper extremity supported, Left upper extremity supported (alternating)  Dynamic Sitting-Level of Assistance: Contact guard, Minimum assistance  Dynamic Sitting-Balance: Forward lean, Reaching for objects, Reaching across midline  Dynamic Sitting-Comments: CGA - Min A for balance while seated on EOB completing therapeutic exercises; pt displaying fair- seated balance; seated on EOB for 10-12 minutes    Therapy/Activity:   Therapeutic Exercise  Therapeutic Exercise Performed: Yes (pt engaged in BUE therapeutic excerises while seated on EOB to increase strentgh, balance, endurance, and activity tolerance for ADL participation; pt completed with significantly increased time/effort and with mutliple rest breaks throughout)  Therapeutic Exercise Activity 1: 10 reps x1 BUE shoulder flexion/extension  Therapeutic Exercise Activity 2: 10 reps x1 BUE elbow flexion/extension  Therapeutic Exercise Activity 3: 10 reps x1 BUE scapular elevation/depression    Therapeutic Activity  Therapeutic Activity Performed: Yes  Therapeutic Activity 1: seated on EOB for ~15 minutes to increas stength. balance, endurance, and activity tolerance for ADL particpation    Outcome Measures:  [facility] Daily Activity  Putting on and taking off regular lower body clothing: Total  Bathing (including washing, rinsing, drying): A lot  Putting on and taking off regular upper body clothing: A lot  Toileting, which includes using toilet, bedpan or urinal: Total  Taking care of personal grooming such as brushing teeth: A little  Eating Meals: A little  Daily Activity - Total Score: 12      Education Documentation  ADL Training, taught by JUANY Michael at 4/15/2025  9:09 AM.  Learner: Patient  Readiness: Acceptance  Method: Explanation  Response: Verbalizes  Understanding  Comment: pt educated on OT POC, body mechanics, balance/safety technqiues, HEP/therapeutic excerises, and precautions during OT session    Goals:  Encounter Problems       Encounter Problems (Active)       OT Goals       ADLs (Progressing)       Start:  03/27/25    Expected End:  04/11/25       Patient will complete ADLs with minimal assistance.          Functional Transfer (Progressing)       Start:  03/27/25    Expected End:  04/11/25       Patient will complete functional transfer with min assistance utilizing least restrictive device.          Therapeutic Exercise  (Progressing)       Start:  03/27/25    Expected End:  04/11/25       Patient will tolerate ~8-10 minutes of therapeutic exercise to increase aerobic capacity and improve activity tolerance.

## 2025-04-15 NOTE — PROGRESS NOTES
[facility], made her aware that [facility] to be able to accept the pt up until late on the day on 4/14 when they stated they no longer had a bed available. Discussion around [facility] as they have indicated they would accept the pt, daughter states she will make a visit to this facility today and let this worker know if it is ok to initiate precert with insurance.      04/15/25 1344   Discharge Planning   Expected Discharge Disposition SNF

## 2025-04-15 NOTE — NURSING NOTE
Pt A&O x4 currently resting in bed. No complaints or concerns. Call light within reach.  Bed alarm in place for pt safety.  Pt continues on tele per order.

## 2025-04-15 NOTE — PROGRESS NOTES
Analia Murray is a 74 y.o. female on day 21 of admission presenting with Acute respiratory failure with hypoxia.      Subjective   No complaints, BUN and creatinine continue to trend down.       Objective          Vitals 24HR  Heart Rate:  [65-77]   Temp:  [36.7 °C (98.1 °F)-37.1 °C (98.7 °F)]   Resp:  [18-21]   BP: (137-175)/(46-70)   Weight:  [146 kg (321 lb 14 oz)]   SpO2:  [93 %-100 %]       Intake/Output last 3 Shifts:    Intake/Output Summary (Last 24 hours) at 4/15/2025 1237  Last data filed at 4/15/2025 0900  Gross per 24 hour   Intake 720 ml   Output 0 ml   Net 720 ml       Physical Exam  Constitutional:       General: She is awake. She is not in acute distress.  Cardiovascular:      Heart sounds:      No friction rub.   Pulmonary:      Effort: Pulmonary effort is normal.      Comments: Mildly diminished breath sounds  Abdominal:      General: Bowel sounds are normal.      Palpations: Abdomen is soft.      Tenderness: There is no guarding or rebound.   Musculoskeletal:      Comments: Mild peripheral edema     Relevant Results  Results for orders placed or performed during the hospital encounter of 03/25/25 (from the past 24 hours)   POCT GLUCOSE   Result Value Ref Range    POCT Glucose 96 74 - 99 mg/dL   POCT GLUCOSE   Result Value Ref Range    POCT Glucose 133 (H) 74 - 99 mg/dL   CBC and Auto Differential   Result Value Ref Range    WBC 3.7 (L) 4.4 - 11.3 x10*3/uL    nRBC 0.0 0.0 - 0.0 /100 WBCs    RBC 3.34 (L) 4.00 - 5.20 x10*6/uL    Hemoglobin 7.6 (L) 12.0 - 16.0 g/dL    Hematocrit 26.9 (L) 36.0 - 46.0 %    MCV 81 80 - 100 fL    MCH 22.8 (L) 26.0 - 34.0 pg    MCHC 28.3 (L) 32.0 - 36.0 g/dL    RDW 20.0 (H) 11.5 - 14.5 %    Platelets 110 (L) 150 - 450 x10*3/uL    Neutrophils % 61.1 40.0 - 80.0 %    Immature Granulocytes %, Automated 0.3 0.0 - 0.9 %    Lymphocytes % 20.5 13.0 - 44.0 %    Monocytes % 11.2 2.0 - 10.0 %    Eosinophils % 6.6 0.0 - 6.0 %    Basophils % 0.3 0.0 - 2.0 %    Neutrophils  Absolute 2.24 1.60 - 5.50 x10*3/uL    Immature Granulocytes Absolute, Automated 0.01 0.00 - 0.50 x10*3/uL    Lymphocytes Absolute 0.75 (L) 0.80 - 3.00 x10*3/uL    Monocytes Absolute 0.41 0.05 - 0.80 x10*3/uL    Eosinophils Absolute 0.24 0.00 - 0.40 x10*3/uL    Basophils Absolute 0.01 0.00 - 0.10 x10*3/uL   Comprehensive Metabolic Panel   Result Value Ref Range    Glucose 103 (H) 74 - 99 mg/dL    Sodium 141 136 - 145 mmol/L    Potassium 5.1 3.5 - 5.3 mmol/L    Chloride 108 (H) 98 - 107 mmol/L    Bicarbonate 27 21 - 32 mmol/L    Anion Gap 11 10 - 20 mmol/L    Urea Nitrogen 81 (H) 6 - 23 mg/dL    Creatinine 2.94 (H) 0.50 - 1.05 mg/dL    eGFR 16 (L) >60 mL/min/1.73m*2    Calcium 8.3 (L) 8.6 - 10.3 mg/dL    Albumin 3.0 (L) 3.4 - 5.0 g/dL    Alkaline Phosphatase 65 33 - 136 U/L    Total Protein 6.2 (L) 6.4 - 8.2 g/dL    AST 12 9 - 39 U/L    Bilirubin, Total 0.4 0.0 - 1.2 mg/dL    ALT 13 7 - 45 U/L   Magnesium   Result Value Ref Range    Magnesium 2.20 1.60 - 2.40 mg/dL   Phosphorus   Result Value Ref Range    Phosphorus 3.5 2.5 - 4.9 mg/dL   POCT GLUCOSE   Result Value Ref Range    POCT Glucose 104 (H) 74 - 99 mg/dL   POCT GLUCOSE   Result Value Ref Range    POCT Glucose 132 (H) 74 - 99 mg/dL     *Note: Due to a large number of results and/or encounters for the requested time period, some results have not been displayed. A complete set of results can be found in Results Review.            Assessment/Plan   Acute kidney injury superimposed on chronic kidney disease stage III - improving, no indication for renal replacement therapy, there is no uremia or fluid overload.  Okay for discharge from renal standpoint can follow-up with us in 2 weeks with a renal function panel 1 week after discharge.  Notably she does not want to follow-up with her prior nephrologist, she wants to switch to our practice  Hyperkalemia it is resolved   Congestive heart failure   Chronic kidney disease stage IIIb (baseline creatinine 1.7-2)  Acute  respiratory failure is improving  Diabetes mellitus type 2  Anemia of chronic kidney disease-start IV Venofer for iron deficiency and will need epogen after discharge  Hypertension  Hyperlipidemia  Obesity    Hoda Slater MD

## 2025-04-15 NOTE — PROGRESS NOTES
Pulmonary Daily Progress Note   Subjective    Analia Murray is a 74 y.o. year old female patient with h/o asthma, HOLDEN, HTN, DLP, CHF, CKD IV, T2DM, hypothyroidism, anemia, MGUS, who p/w SOB x 2 weeks associated with cough productive of yellow sputum and chest tightness, admitted on 3/25/25     Interval History:   patient wore AVAPS overnight with complaints of a nose bleed now on room air overall feels better denies shortness of breath cough sputum or wheezing    Meds    Scheduled medications  allopurinol, 50 mg, oral, Every other day  [Held by provider] amLODIPine, 10 mg, oral, Daily  [Held by provider] aspirin, 81 mg, oral, Daily  calcium acetate, 667 mg, oral, TID after meals  ezetimibe, 10 mg, oral, Daily  fluticasone, 2 spray, Each Nostril, BID  fluticasone furoate-vilanteroL, 1 puff, inhalation, Daily  [Held by provider] gabapentin, 300 mg, oral, Daily  [Held by provider] heparin (porcine), 7,500 Units, subcutaneous, q8h JAYA  hydrALAZINE, 50 mg, oral, BID  hydrocortisone, , Topical, BID  insulin lispro, 0-10 Units, subcutaneous, TID AC  insulin lispro, 6 Units, subcutaneous, Once  ipratropium-albuteroL, 3 mL, nebulization, TID  levothyroxine, 200 mcg, oral, Daily  [Held by provider] metoprolol succinate XL, 100 mg, oral, Daily  metoprolol tartrate, 50 mg, oral, BID  montelukast, 10 mg, oral, Nightly  nystatin, 1 Application, Topical, TID  pravastatin, 40 mg, oral, Nightly  sertraline, 50 mg, oral, Daily  sulfur hexafluoride microsphr, 2 mL, intravenous, Once in imaging      Continuous medications     PRN medications  PRN medications: acetaminophen **OR** acetaminophen, albuterol, dextrose, dextrose, diphenhydramine-zinc acetate, glucagon, glucagon, hydrALAZINE, [Held by provider] nitroglycerin, ondansetron, oxygen, oxygen, polyethylene glycol, [Held by provider] traZODone     Objective    Blood pressure 165/63, pulse 73, temperature 36.9 °C (98.4 °F), temperature source Oral, resp. rate 18, height 1.626  "m (5' 4.02\"), weight 146 kg (321 lb 14 oz), SpO2 97%.   Physical Exam   GENERAL: normal appearance. No respiratory distress  HEAD/SINUSES: no sinus tenderness  LUNGS: Diminished airway entry, clear to auscultation  CARDIAC: Regular rate and rhythm  EXTREMITIES: Generalized edema  NEURO: Normal mental status  SKIN: Defer  PSYCH: Flat affect    Intake/Output Summary (Last 24 hours) at 4/15/2025 1143  Last data filed at 4/15/2025 0900  Gross per 24 hour   Intake 720 ml   Output 0 ml   Net 720 ml     Labs:   Results from last 72 hours   Lab Units 04/15/25  0532 04/14/25  0504 04/13/25  0437   SODIUM mmol/L 141 141 140   POTASSIUM mmol/L 5.1 5.2 5.1   CHLORIDE mmol/L 108* 108* 108*   CO2 mmol/L 27 29 27   BUN mg/dL 81* 87* 97*   CREATININE mg/dL 2.94* 3.04* 3.18*   GLUCOSE mg/dL 103* 85 101*   CALCIUM mg/dL 8.3* 8.2* 8.1*   ANION GAP mmol/L 11 9* 10   EGFR mL/min/1.73m*2 16* 16* 15*   PHOSPHORUS mg/dL 3.5 3.8 3.7      Results from last 72 hours   Lab Units 04/15/25  0532 04/14/25  0504 04/13/25  0437   WBC AUTO x10*3/uL 3.7* 3.6* 4.0*   HEMOGLOBIN g/dL 7.6* 7.4* 7.5*   HEMATOCRIT % 26.9* 25.8* 26.2*   PLATELETS AUTO x10*3/uL 110* 109* 111*   NEUTROS PCT AUTO % 61.1 60.6 62.1   LYMPHS PCT AUTO % 20.5 19.3 17.5   MONOS PCT AUTO % 11.2 12.1 12.5   EOS PCT AUTO % 6.6 7.4 7.2      Results from last 72 hours   Lab Units 04/14/25  0424   POCT PH, ARTERIAL pH 7.33*   POCT PCO2, ARTERIAL mm Hg 51*   POCT PO2, ARTERIAL mm Hg 128*   POCT SO2, ARTERIAL % 100          Micro/ID:   Lab Results   Component Value Date    URINECULTURE  03/16/2025     Growth indicates contamination with mixed bacterial michelle. Repeat culture if clinically indicated.    BLOODCULT No growth at 4 days -  FINAL REPORT 07/30/2024    BLOODCULT No growth at 4 days -  FINAL REPORT 07/30/2024     Summary of key imaging results from the last 24 hours  CXR - 4/8/25 - Cardiomegaly with some mild perihilar interstitial edema suggested.      No large effusion or " consolidation    Impression   Analia Murray is a 74 y.o. year old female patient is being seen by the pulmonary service for   Acute on chronic hypoxic and hypercapnic respiratory failure  Suspected exacerbation of asthma -was diagnosed with asthma clinically, PFTs from 2020 no obstruction, never smoked  Pulmonary edema/CHF  HOLDEN-on CPAP at home currently on AVAPS while in house due to hypoventilation and hypercapnia    Recommendations   As follows:  Clinically is improving on room air during the day and AVAPS at night  Continue AVAPS at night   ABG on AVAPS pH 7.33 PCO2 51 PO2 128 bicarb 26.9  Completed steroid course  Continue with Breo and Singulair - nebulizers as needed  Bronchopulmonary hygiene with incentive spirometry and Acapella  Diuresis/volume management per nephrology  Follow-up with pulmonary outpatient with Dr Chavez  Optimized from pulmonary standpoint for discharge, will need AVAPS capable device at current settings awaiting SNF acceptance  Will follow peripherally    DILCIA Schmidt-CNP   04/15/25 at 11:43 AM     -Only the Medical problems listed under impression were addressed today.   -Please contact primary team for all other concerns and medical problem  -Thank you for your consult       Disclaimer: Documentation completed with the information available at the time of input. Parts of this note may have been scribed or generated using voice dictation software, Dragon.  Homophonic errors may exist.  Please contact me directly if clarification is needed. The times in the chart may not be reflective of actual patient care times, interventions, or procedures. Documentation occurs after the physical care of the patient.

## 2025-04-15 NOTE — CARE PLAN
The patient's goals for the shift include monitor oxygenation    The clinical goals for the shift include safety, No SOB

## 2025-04-15 NOTE — NURSING NOTE
Pt sitting on edge of bed with family in at bedside. No complaints or concerns. Call light within reach.  Pt continues on tele per order.

## 2025-04-15 NOTE — PROGRESS NOTES
Analia Murray is a 74 y.o. female on day 21 of admission presenting with Acute respiratory failure with hypoxia.      Subjective   Patient seen and examined.  Awake/alert/oriented.  Denies chest pain, shortness of breath, nausea, or vomiting. No abdominal discomfort. Denies lightheadedness or dizziness.        Objective     Last Recorded Vitals  /64 (BP Location: Right arm, Patient Position: Lying)   Pulse 65   Temp 36.8 °C (98.2 °F) (Oral)   Resp 18   Wt 146 kg (321 lb 14 oz)   SpO2 93%   Intake/Output last 3 Shifts:    Intake/Output Summary (Last 24 hours) at 4/15/2025 1348  Last data filed at 4/15/2025 0900  Gross per 24 hour   Intake 480 ml   Output 0 ml   Net 480 ml       Admission Weight  Weight: 132 kg (290 lb) (03/25/25 0041)    Daily Weight  04/15/25 : 146 kg (321 lb 14 oz)    Image Results  XR chest 1 view  Narrative: Interpreted By:  Marco Antonio Duffy,   STUDY:  XR CHEST 1 VIEW      INDICATION:  Signs/Symptoms:resp failure.      COMPARISON:  April 6, 2025      ACCESSION NUMBER(S):  [id number]      ORDERING CLINICIAN:  ELVI JOHNSON      FINDINGS:  Cardiomegaly with some mild perihilar interstitial edema suggested.      No large effusion or consolidation.      Impression:     Cardiomegaly with some mild perihilar interstitial edema suggested.      No large effusion or consolidation.      Signed by: Marco Antonio Duffy 4/8/2025 8:30 AM  Dictation workstation:   [id number]      Physical Exam  Vitals reviewed.   Constitutional:       Appearance: Normal appearance.   HENT:      Head: Normocephalic and atraumatic.   Eyes:      Extraocular Movements: Extraocular movements intact.      Conjunctiva/sclera: Conjunctivae normal.   Cardiovascular:      Rate and Rhythm: Normal rate and regular rhythm.   Pulmonary:      Effort: Pulmonary effort is normal.      Breath sounds: No wheezing, rhonchi or rales.      Comments: Breath sounds clear, diminished bilaterally  Abdominal:      General: Bowel sounds are  normal.      Palpations: Abdomen is soft.      Tenderness: There is no abdominal tenderness.   Musculoskeletal:         General: Normal range of motion.   Skin:     General: Skin is warm and dry.   Neurological:      General: No focal deficit present.      Mental Status: She is alert and oriented to person, place, and time.         Relevant Results  Lab Results   Component Value Date    GLUCOSE 103 (H) 04/15/2025    CALCIUM 8.3 (L) 04/15/2025     04/15/2025    K 5.1 04/15/2025    CO2 27 04/15/2025     (H) 04/15/2025    BUN 81 (H) 04/15/2025    CREATININE 2.94 (H) 04/15/2025      Lab Results   Component Value Date    WBC 3.7 (L) 04/15/2025    HGB 7.6 (L) 04/15/2025    HCT 26.9 (L) 04/15/2025    MCV 81 04/15/2025     (L) 04/15/2025    XR chest 1 view  Result Date: 4/8/2025  Cardiomegaly with some mild perihilar interstitial edema suggested.   No large effusion or consolidation.   Signed by: Marco Antonio Duffy 4/8/2025 8:30 AM Dictation workstation:   [id number]    Transthoracic Echo (TTE) Complete  Result Date: 3/26/2025  CONCLUSIONS:  1. Left ventricular ejection fraction is mildly decreased, by visual estimate at 45-50%.  2. There is global hypokinesis of the left ventricle with minor regional variations.  3. Spectral Doppler shows a Grade I (impaired relaxation pattern) of left ventricular diastolic filling with normal left atrial filling pressure.  4. Left ventricular cavity size is mildly dilated.  5. There is normal right ventricular global systolic function.  6. Moderately elevated right ventricular systolic pressure. Randolph Guadarrama DO Electronically signed on 3/26/2025 at 2:46:04 PM  ** Final **     US renal complete  Result Date: 3/26/2025  No hydronephrosis bilaterally.   Urinary bladder decompressed and could not be evaluated at this time.   MACRO: None.   Signed by: Bob Lugo 3/26/2025 11:05 AM Dictation workstation:   [id number]    XR chest 1 view  Result Date: 3/25/2025  1.  Medical  devices as detailed. 2. Interval progression of airspace opacities predominantly in the right lower lobe. Clinical correlation suggested.       MACRO: None   Signed by: Ella To 3/25/2025 6:50 AM Dictation workstation:   [id number]    XR chest 1 view  Result Date: 3/25/2025  1. Mild prominence of the interstitium is nonspecific and may represent mild pulmonary edema or viral infectious process. No focal consolidations detected although the retrocardiac space is not well evaluated. 2. Marked cardiomegaly, unchanged.   MACRO: None   Signed by: Christian Mendoza 3/25/2025 1:30 AM Dictation workstation:   [id number]         Assessment/Plan      Assessment & Plan  Acute respiratory failure with hypoxia  Acute respiratory failure with hypoxia and hypercapnia.  Fluctuates between 0 and 2 L nasal cannula.  Continue noninvasive positive pressure ventilation at night.  Pulmonology on consult; arranging for AVAPS for discharge, otherwise cleared from pulm standpoint     Acute kidney injury on chronic kidney disease  Creatinine is trending downward though still in the 3 range  Palliative and nephrology consulted, appreciate recs  Cleared by nephrology for discharge     Morbid obesity  Major comorbidity contributing to her respiratory failure  Lifestyle dietary modifications     Diabetes mellitus type 2  Monitor blood glucose  Sliding scale insulin  Hypoglycemia protocol  hgbA1C      Hypothyroidism  TSH was 11.  Continue with levothyroxine 200 mg daily     Anxiety  Continue sertraline     Acute on chronic diastolic heart failure  Improved.  With the patient's renal status, defer diuresis to nephrology.  Strict I's and O's  Daily weights     Anemia  Stable     Plan  Supplemental oxygen as needed  AVAPS nightly  DVT prophylaxis: Resume heparin and daily aspirin  PT/OT  Cleared by pulmonary and nephrology for discharge  Discharge planning to SNF when arrangements can be made, patient will need AVAPS arranged with facility  prior to discharge  Patient is medically stable for discharge                Nisha Pierce, APRN-CNP

## 2025-04-15 NOTE — NURSING NOTE
End of shift, bedside shift report given. Patient laying in bed resting comfortably, with call light within reach. Patient verbalizes no new concerns at this time. Family at bedside. Bed at lowest position.

## 2025-04-15 NOTE — PROGRESS NOTES
Physical Therapy                 Therapy Communication Note    Patient Name: Analia Murray  MRN: [id number]  Department: [address]  Room: [address] [place], [place] [address]  Today's Date: 4/15/2025     Discipline: Physical Therapy    Missed Visit Reason: Missed Visit Reason: Other (Comment) (Attempted tx multiple family members present performing rolling and cleanse to patient in supine. PICC team also present to place an IV. Will attempt again as able.)

## 2025-04-15 NOTE — PROGRESS NOTES
Spiritual Care Visit  Spiritual Care Request    Reason for Visit:  Routine Visit: Introduction     Request Received From:       Focus of Care:  Visited With: Patient         Refer to :          Spiritual Care Assessment    Spiritual Assessment:                      Care Provided:  Intended Effects: Establish rapport and connectedness, Build relationship of care and support, Convey a calming presence  Methods: Offer emotional support    Sense of Community and or Christianity Affiliation:  Roman Catholic         Addressed Needs/Concerns and/or Sergo Through:  Christianity Encounters  Christianity Needs: Prayer, Literature, Sacred text       Outcome:  Outcome of Spiritual Care Visit: Identifying spiritual/emotional issues, Comfort/healing presence     Advance Directives:         Spiritual Care Annotation      Annotation: Patient received a visit from the Spiritual care volunteer while admitted.  This patient was offered emotional and spiritual support no other needs were expressed. Spiritual care will remain available for support as requested.     Volunter Initial : Josr PARRA    Reviewed and Submitted by Chaplain Oconnor

## 2025-04-16 LAB
ALBUMIN SERPL BCP-MCNC: 2.9 G/DL (ref 3.4–5)
ALP SERPL-CCNC: 63 U/L (ref 33–136)
ALT SERPL W P-5'-P-CCNC: 12 U/L (ref 7–45)
ANION GAP SERPL CALCULATED.3IONS-SCNC: 9 MMOL/L (ref 10–20)
AST SERPL W P-5'-P-CCNC: 10 U/L (ref 9–39)
BASO STIPL BLD QL SMEAR: PRESENT
BASOPHILS # BLD AUTO: 0.01 X10*3/UL (ref 0–0.1)
BASOPHILS NFR BLD AUTO: 0.3 %
BILIRUB SERPL-MCNC: 0.4 MG/DL (ref 0–1.2)
BUN SERPL-MCNC: 70 MG/DL (ref 6–23)
CALCIUM SERPL-MCNC: 8.3 MG/DL (ref 8.6–10.3)
CHLORIDE SERPL-SCNC: 109 MMOL/L (ref 98–107)
CO2 SERPL-SCNC: 28 MMOL/L (ref 21–32)
CREAT SERPL-MCNC: 2.81 MG/DL (ref 0.5–1.05)
EGFRCR SERPLBLD CKD-EPI 2021: 17 ML/MIN/1.73M*2
EOSINOPHIL # BLD AUTO: 0.27 X10*3/UL (ref 0–0.4)
EOSINOPHIL NFR BLD AUTO: 8.1 %
ERYTHROCYTE [DISTWIDTH] IN BLOOD BY AUTOMATED COUNT: 20 % (ref 11.5–14.5)
GLUCOSE BLD MANUAL STRIP-MCNC: 89 MG/DL (ref 74–99)
GLUCOSE BLD MANUAL STRIP-MCNC: 93 MG/DL (ref 74–99)
GLUCOSE BLD MANUAL STRIP-MCNC: 97 MG/DL (ref 74–99)
GLUCOSE BLD MANUAL STRIP-MCNC: 99 MG/DL (ref 74–99)
GLUCOSE SERPL-MCNC: 86 MG/DL (ref 74–99)
HCT VFR BLD AUTO: 25.9 % (ref 36–46)
HGB BLD-MCNC: 7.5 G/DL (ref 12–16)
IMM GRANULOCYTES # BLD AUTO: 0.01 X10*3/UL (ref 0–0.5)
IMM GRANULOCYTES NFR BLD AUTO: 0.3 % (ref 0–0.9)
LYMPHOCYTES # BLD AUTO: 0.71 X10*3/UL (ref 0.8–3)
LYMPHOCYTES NFR BLD AUTO: 21.3 %
MCH RBC QN AUTO: 23.1 PG (ref 26–34)
MCHC RBC AUTO-ENTMCNC: 29 G/DL (ref 32–36)
MCV RBC AUTO: 80 FL (ref 80–100)
MONOCYTES # BLD AUTO: 0.36 X10*3/UL (ref 0.05–0.8)
MONOCYTES NFR BLD AUTO: 10.8 %
NEUTROPHILS # BLD AUTO: 1.97 X10*3/UL (ref 1.6–5.5)
NEUTROPHILS NFR BLD AUTO: 59.2 %
NRBC BLD-RTO: 0 /100 WBCS (ref 0–0)
OVALOCYTES BLD QL SMEAR: NORMAL
PLATELET # BLD AUTO: 109 X10*3/UL (ref 150–450)
POTASSIUM SERPL-SCNC: 5 MMOL/L (ref 3.5–5.3)
PROT SERPL-MCNC: 6.1 G/DL (ref 6.4–8.2)
RBC # BLD AUTO: 3.25 X10*6/UL (ref 4–5.2)
RBC MORPH BLD: NORMAL
SCHISTOCYTES BLD QL SMEAR: NORMAL
SODIUM SERPL-SCNC: 141 MMOL/L (ref 136–145)
WBC # BLD AUTO: 3.3 X10*3/UL (ref 4.4–11.3)

## 2025-04-16 PROCEDURE — 6350000001 HC RX 635 EPOETIN >10,000 UNITS: Performed by: INTERNAL MEDICINE

## 2025-04-16 PROCEDURE — 85025 COMPLETE CBC W/AUTO DIFF WBC: CPT | Performed by: INTERNAL MEDICINE

## 2025-04-16 PROCEDURE — 99232 SBSQ HOSP IP/OBS MODERATE 35: CPT | Performed by: NURSE PRACTITIONER

## 2025-04-16 PROCEDURE — 94640 AIRWAY INHALATION TREATMENT: CPT

## 2025-04-16 PROCEDURE — 94668 MNPJ CHEST WALL SBSQ: CPT

## 2025-04-16 PROCEDURE — 2500000001 HC RX 250 WO HCPCS SELF ADMINISTERED DRUGS (ALT 637 FOR MEDICARE OP): Performed by: NURSE PRACTITIONER

## 2025-04-16 PROCEDURE — 2500000001 HC RX 250 WO HCPCS SELF ADMINISTERED DRUGS (ALT 637 FOR MEDICARE OP): Performed by: INTERNAL MEDICINE

## 2025-04-16 PROCEDURE — 2500000002 HC RX 250 W HCPCS SELF ADMINISTERED DRUGS (ALT 637 FOR MEDICARE OP, ALT 636 FOR OP/ED): Performed by: INTERNAL MEDICINE

## 2025-04-16 PROCEDURE — 36415 COLL VENOUS BLD VENIPUNCTURE: CPT | Performed by: INTERNAL MEDICINE

## 2025-04-16 PROCEDURE — 9420000001 HC RT PATIENT EDUCATION 5 MIN

## 2025-04-16 PROCEDURE — 84075 ASSAY ALKALINE PHOSPHATASE: CPT | Performed by: INTERNAL MEDICINE

## 2025-04-16 PROCEDURE — 1100000001 HC PRIVATE ROOM DAILY

## 2025-04-16 PROCEDURE — 2500000004 HC RX 250 GENERAL PHARMACY W/ HCPCS (ALT 636 FOR OP/ED): Mod: JZ | Performed by: INTERNAL MEDICINE

## 2025-04-16 PROCEDURE — 82947 ASSAY GLUCOSE BLOOD QUANT: CPT

## 2025-04-16 PROCEDURE — 2500000004 HC RX 250 GENERAL PHARMACY W/ HCPCS (ALT 636 FOR OP/ED): Performed by: NURSE PRACTITIONER

## 2025-04-16 PROCEDURE — 97110 THERAPEUTIC EXERCISES: CPT | Mod: GP

## 2025-04-16 PROCEDURE — 94660 CPAP INITIATION&MGMT: CPT

## 2025-04-16 RX ADMIN — FLUTICASONE PROPIONATE 2 SPRAY: 50 SPRAY, METERED NASAL at 21:12

## 2025-04-16 RX ADMIN — IRON SUCROSE 200 MG: 20 INJECTION, SOLUTION INTRAVENOUS at 05:21

## 2025-04-16 RX ADMIN — METOPROLOL TARTRATE 50 MG: 50 TABLET, FILM COATED ORAL at 21:12

## 2025-04-16 RX ADMIN — LEVOTHYROXINE SODIUM 200 MCG: 0.1 TABLET ORAL at 05:30

## 2025-04-16 RX ADMIN — NYSTATIN 1 APPLICATION: 100000 POWDER TOPICAL at 13:58

## 2025-04-16 RX ADMIN — HEPARIN SODIUM 7500 UNITS: 5000 INJECTION INTRAVENOUS; SUBCUTANEOUS at 05:22

## 2025-04-16 RX ADMIN — HYDRALAZINE HYDROCHLORIDE 50 MG: 50 TABLET ORAL at 21:12

## 2025-04-16 RX ADMIN — ASPIRIN 81 MG: 81 TABLET, CHEWABLE ORAL at 08:28

## 2025-04-16 RX ADMIN — PRAVASTATIN SODIUM 40 MG: 20 TABLET ORAL at 21:12

## 2025-04-16 RX ADMIN — CALCIUM ACETATE 667 MG: 667 CAPSULE ORAL at 13:09

## 2025-04-16 RX ADMIN — NYSTATIN 1 APPLICATION: 100000 POWDER TOPICAL at 21:12

## 2025-04-16 RX ADMIN — MONTELUKAST 10 MG: 10 TABLET, FILM COATED ORAL at 21:12

## 2025-04-16 RX ADMIN — HEPARIN SODIUM 7500 UNITS: 5000 INJECTION INTRAVENOUS; SUBCUTANEOUS at 13:51

## 2025-04-16 RX ADMIN — EZETIMIBE 10 MG: 10 TABLET ORAL at 08:28

## 2025-04-16 RX ADMIN — FLUTICASONE FUROATE AND VILANTEROL TRIFENATATE 1 PUFF: 100; 25 POWDER RESPIRATORY (INHALATION) at 08:01

## 2025-04-16 RX ADMIN — IPRATROPIUM BROMIDE AND ALBUTEROL SULFATE 3 ML: 2.5; .5 SOLUTION RESPIRATORY (INHALATION) at 11:04

## 2025-04-16 RX ADMIN — SALINE NASAL SPRAY 1 SPRAY: 1.5 SOLUTION NASAL at 21:12

## 2025-04-16 RX ADMIN — HYDROCORTISONE: 25 CREAM TOPICAL at 21:12

## 2025-04-16 RX ADMIN — HYDRALAZINE HYDROCHLORIDE 50 MG: 50 TABLET ORAL at 08:28

## 2025-04-16 RX ADMIN — METOPROLOL TARTRATE 50 MG: 50 TABLET, FILM COATED ORAL at 08:28

## 2025-04-16 RX ADMIN — IPRATROPIUM BROMIDE AND ALBUTEROL SULFATE 3 ML: 2.5; .5 SOLUTION RESPIRATORY (INHALATION) at 20:18

## 2025-04-16 RX ADMIN — EPOETIN ALFA-EPBX 8000 UNITS: 20000 INJECTION, SOLUTION INTRAVENOUS; SUBCUTANEOUS at 12:30

## 2025-04-16 RX ADMIN — HEPARIN SODIUM 7500 UNITS: 5000 INJECTION INTRAVENOUS; SUBCUTANEOUS at 21:12

## 2025-04-16 RX ADMIN — IPRATROPIUM BROMIDE AND ALBUTEROL SULFATE 3 ML: 2.5; .5 SOLUTION RESPIRATORY (INHALATION) at 08:01

## 2025-04-16 RX ADMIN — SERTRALINE 50 MG: 50 TABLET, FILM COATED ORAL at 08:28

## 2025-04-16 RX ADMIN — CALCIUM ACETATE 667 MG: 667 CAPSULE ORAL at 08:28

## 2025-04-16 RX ADMIN — AMLODIPINE BESYLATE 10 MG: 10 TABLET ORAL at 08:33

## 2025-04-16 RX ADMIN — ALLOPURINOL 50 MG: 100 TABLET ORAL at 08:28

## 2025-04-16 ASSESSMENT — COGNITIVE AND FUNCTIONAL STATUS - GENERAL
MOBILITY SCORE: 10
PERSONAL GROOMING: A LITTLE
DRESSING REGULAR LOWER BODY CLOTHING: A LOT
PERSONAL GROOMING: A LITTLE
WALKING IN HOSPITAL ROOM: A LOT
PERSONAL GROOMING: A LITTLE
DRESSING REGULAR LOWER BODY CLOTHING: A LOT
MOBILITY SCORE: 7
CLIMB 3 TO 5 STEPS WITH RAILING: TOTAL
TURNING FROM BACK TO SIDE WHILE IN FLAT BAD: TOTAL
TOILETING: A LITTLE
TOILETING: TOTAL
MOVING TO AND FROM BED TO CHAIR: TOTAL
MOVING TO AND FROM BED TO CHAIR: A LOT
WALKING IN HOSPITAL ROOM: A LOT
MOVING FROM LYING ON BACK TO SITTING ON SIDE OF FLAT BED WITH BEDRAILS: A LOT
DAILY ACTIVITIY SCORE: 17
EATING MEALS: A LITTLE
TURNING FROM BACK TO SIDE WHILE IN FLAT BAD: A LOT
MOBILITY SCORE: 13
MOVING FROM LYING ON BACK TO SITTING ON SIDE OF FLAT BED WITH BEDRAILS: A LITTLE
MOVING FROM LYING ON BACK TO SITTING ON SIDE OF FLAT BED WITH BEDRAILS: A LOT
MOVING TO AND FROM BED TO CHAIR: A LOT
CLIMB 3 TO 5 STEPS WITH RAILING: TOTAL
DRESSING REGULAR UPPER BODY CLOTHING: A LITTLE
STANDING UP FROM CHAIR USING ARMS: A LOT
CLIMB 3 TO 5 STEPS WITH RAILING: TOTAL
DRESSING REGULAR LOWER BODY CLOTHING: A LOT
DAILY ACTIVITIY SCORE: 11
DAILY ACTIVITIY SCORE: 16
STANDING UP FROM CHAIR USING ARMS: A LOT
HELP NEEDED FOR BATHING: TOTAL
MOVING FROM LYING ON BACK TO SITTING ON SIDE OF FLAT BED WITH BEDRAILS: A LOT
WALKING IN HOSPITAL ROOM: TOTAL
WALKING IN HOSPITAL ROOM: TOTAL
MOBILITY SCORE: 11
CLIMB 3 TO 5 STEPS WITH RAILING: TOTAL
TURNING FROM BACK TO SIDE WHILE IN FLAT BAD: A LOT
HELP NEEDED FOR BATHING: A LOT
DRESSING REGULAR UPPER BODY CLOTHING: TOTAL
DRESSING REGULAR UPPER BODY CLOTHING: A LITTLE
STANDING UP FROM CHAIR USING ARMS: TOTAL
TURNING FROM BACK TO SIDE WHILE IN FLAT BAD: A LITTLE
STANDING UP FROM CHAIR USING ARMS: A LOT
HELP NEEDED FOR BATHING: A LOT
TOILETING: A LOT
MOVING TO AND FROM BED TO CHAIR: A LOT

## 2025-04-16 ASSESSMENT — PAIN SCALES - GENERAL
PAINLEVEL_OUTOF10: 0 - NO PAIN

## 2025-04-16 ASSESSMENT — PAIN - FUNCTIONAL ASSESSMENT
PAIN_FUNCTIONAL_ASSESSMENT: 0-10

## 2025-04-16 NOTE — PROGRESS NOTES
Analia Murray is a 74 y.o. female on day 22 of admission presenting with Acute respiratory failure with hypoxia.      Subjective   No complaints today, BUN and creatinine continue to improve.       Objective          Vitals 24HR  Heart Rate:  [65-76]   Temp:  [36.7 °C (98.1 °F)-37.4 °C (99.3 °F)]   Resp:  [18]   BP: (115-175)/(53-96)   Weight:  [144 kg (317 lb 10.9 oz)]   SpO2:  [93 %-100 %]       Intake/Output last 3 Shifts:    Intake/Output Summary (Last 24 hours) at 4/16/2025 1031  Last data filed at 4/15/2025 1755  Gross per 24 hour   Intake 480 ml   Output 450 ml   Net 30 ml       Physical Exam  Constitutional:       General: She is awake. She is not in acute distress.  Cardiovascular:      Heart sounds:      No friction rub.   Pulmonary:      Effort: Pulmonary effort is normal.      Comments: Clear breath sounds  Abdominal:      General: Bowel sounds are normal.      Palpations: Abdomen is soft.      Tenderness: There is no guarding or rebound.   Musculoskeletal:      Comments: Mild peripheral edema     Relevant Results  Results for orders placed or performed during the hospital encounter of 03/25/25 (from the past 24 hours)   POCT GLUCOSE   Result Value Ref Range    POCT Glucose 132 (H) 74 - 99 mg/dL   POCT GLUCOSE   Result Value Ref Range    POCT Glucose 114 (H) 74 - 99 mg/dL   POCT GLUCOSE   Result Value Ref Range    POCT Glucose 118 (H) 74 - 99 mg/dL   CBC and Auto Differential   Result Value Ref Range    WBC 3.3 (L) 4.4 - 11.3 x10*3/uL    nRBC 0.0 0.0 - 0.0 /100 WBCs    RBC 3.25 (L) 4.00 - 5.20 x10*6/uL    Hemoglobin 7.5 (L) 12.0 - 16.0 g/dL    Hematocrit 25.9 (L) 36.0 - 46.0 %    MCV 80 80 - 100 fL    MCH 23.1 (L) 26.0 - 34.0 pg    MCHC 29.0 (L) 32.0 - 36.0 g/dL    RDW 20.0 (H) 11.5 - 14.5 %    Platelets 109 (L) 150 - 450 x10*3/uL    Neutrophils % 59.2 40.0 - 80.0 %    Immature Granulocytes %, Automated 0.3 0.0 - 0.9 %    Lymphocytes % 21.3 13.0 - 44.0 %    Monocytes % 10.8 2.0 - 10.0 %     Eosinophils % 8.1 0.0 - 6.0 %    Basophils % 0.3 0.0 - 2.0 %    Neutrophils Absolute 1.97 1.60 - 5.50 x10*3/uL    Immature Granulocytes Absolute, Automated 0.01 0.00 - 0.50 x10*3/uL    Lymphocytes Absolute 0.71 (L) 0.80 - 3.00 x10*3/uL    Monocytes Absolute 0.36 0.05 - 0.80 x10*3/uL    Eosinophils Absolute 0.27 0.00 - 0.40 x10*3/uL    Basophils Absolute 0.01 0.00 - 0.10 x10*3/uL   Comprehensive Metabolic Panel   Result Value Ref Range    Glucose 86 74 - 99 mg/dL    Sodium 141 136 - 145 mmol/L    Potassium 5.0 3.5 - 5.3 mmol/L    Chloride 109 (H) 98 - 107 mmol/L    Bicarbonate 28 21 - 32 mmol/L    Anion Gap 9 (L) 10 - 20 mmol/L    Urea Nitrogen 70 (H) 6 - 23 mg/dL    Creatinine 2.81 (H) 0.50 - 1.05 mg/dL    eGFR 17 (L) >60 mL/min/1.73m*2    Calcium 8.3 (L) 8.6 - 10.3 mg/dL    Albumin 2.9 (L) 3.4 - 5.0 g/dL    Alkaline Phosphatase 63 33 - 136 U/L    Total Protein 6.1 (L) 6.4 - 8.2 g/dL    AST 10 9 - 39 U/L    Bilirubin, Total 0.4 0.0 - 1.2 mg/dL    ALT 12 7 - 45 U/L   Morphology   Result Value Ref Range    RBC Morphology See Below     RBC Fragments Few     Ovalocytes Few     Basophilic Stippling Present    POCT GLUCOSE   Result Value Ref Range    POCT Glucose 93 74 - 99 mg/dL     *Note: Due to a large number of results and/or encounters for the requested time period, some results have not been displayed. A complete set of results can be found in Results Review.                       Assessment & Plan  Acute respiratory failure with hypoxia    Acute kidney injury superimposed on chronic kidney disease stage III - improving, no indication for renal replacement therapy, there is no uremia or fluid overload.  Okay for discharge from renal standpoint can follow-up with us in 2 weeks with a renal function panel 1 week after discharge.  Notably she does not want to follow-up with her prior nephrologist, she wants to switch to our practice  Hyperkalemia, resolved   Congestive heart failure   Chronic kidney disease stage IIIb  (baseline creatinine 1.7-2)  Acute respiratory failure improved  Diabetes mellitus type 2  Anemia of chronic kidney disease-continue IV Venofer for iron deficiency, start epogen she will need to continue after discharge  Hypertension  Hyperlipidemia  Obesity    Hoda Slater MD

## 2025-04-16 NOTE — NURSING NOTE
Patient repositioned in bed again. Bathed. Linens changed. Satting well on nc. Family at bedside encouraged patient to eat but still not eating. Talking with family and brightened affect at this time. Call light within reach. Bed alarm in use.

## 2025-04-16 NOTE — NURSING NOTE
Assumed care of patient. Patient pleasant and cooperative during BSSR. No needs verbalized. 100% on 2lnc. Call light within reach. Bed alarm in use.

## 2025-04-16 NOTE — PROGRESS NOTES
Pt is medically stable for dc. Pts daughter Tiffanie was to tour [facility] on 4/15 and let this worker know if that was foc however has not called. [facility] called daughter and received message that there person being called is not available and no VM option provided. [facility] called pts son To, explained the importance of figuring out foc as soon as able and wondering if he knew if Tiffanie had toured and chosen [facility]. To asked about the facility and [facility] made him aware of not being able to recommend one facility over another, just that most facilities get mixed reviews. To states he is giving the ok to get a precert started with insurance for [facility], citing the importance of getting something started seeing that his mother is ready for dc. [facility] updated [facility].     Update: PT note is in, request sent to direct submit team for precert to be started.     Update: Precert approved. [facility] called pts son To, received VM, message left with this workers contact info.  [facility] has ordered the bipap and states it should be on site on 4/17, pt cannot dc until it is on site.       04/16/25 0839   Discharge Planning   Expected Discharge Disposition SNF  ([facility])

## 2025-04-16 NOTE — NURSING NOTE
Patient lying in bed comfortably. Repositioned. Refusing bath or to get in chair. PT saw patient. Worked with PT in bed but did not want to get up. Ordered brekfast but states does not want to eat. Took meds crushed in apple sauce. Flat affect. Sating well on 1-2lnc. Call light within reach. Bed alarm in use.

## 2025-04-16 NOTE — NURSING NOTE
Nurse participated in nurse clinician rounds. All questions were clarified and family updated on poc.

## 2025-04-16 NOTE — NURSING NOTE
Assumed care of this pt. Pt is sitting in bed, breathing even and unlabored, NC in place. NAD. Pt denies needs at this time. BSSR complete. Bed low, call light and belongings in reach. Continuing to monitor

## 2025-04-16 NOTE — NURSING NOTE
Patient lying in bed comfortably. Lots of family at bedside. Call light within reach. Bed alarm in use.

## 2025-04-16 NOTE — PROGRESS NOTES
Physical Therapy    Physical Therapy Treatment    Patient Name: Analia Murray  MRN: [id number]  Department: [address]  Room: [facility]458-  Today's Date: 4/16/2025  Time Calculation  Start Time: 0846  Stop Time: 0902  Time Calculation (min): 16 min    Assessment/Plan   PT Assessment  Barriers to Discharge Home: Caregiver assistance, Physical needs  Caregiver Assistance: Caregiver assistance needed per identified barriers - however, level of patient's required assistance exceeds assistance available at home  Physical Needs: Stair navigation into home limited by function/safety, Ambulating household distances limited by function/safety, High falls risk due to function or environment  End of Session Communication: Bedside nurse  Assessment Comment: pt able to perform supine ther ex with several brief rest breaks. fair to fair+ performance of bed mobility. slow progression towards goals. will continued to benfit from skilled PT services to improve her functional performance and maximize safety.  End of Session Patient Position: Bed, 3 rail up, Alarm on (needs in reach)  PT Plan  Inpatient/Swing Bed or Outpatient: Inpatient  PT Plan  Treatment/Interventions: Bed mobility, Transfer training, Gait training, Stair training, Balance training, Neuromuscular re-education, Strengthening, Endurance training, Therapeutic exercise, Therapeutic activity, Home exercise program, Positioning, Postural re-education  PT Plan: Ongoing PT  PT Frequency: 5 times per week  PT Discharge Recommendations: Moderate intensity level of continued care  Equipment Recommended upon Discharge: Wheeled walker  PT Recommended Transfer Status: Assist x2  PT - OK to Discharge: Yes      General Visit Information:   PT  Visit  PT Received On: 04/16/25  Response to Previous Treatment: Patient with no complaints from previous session.  General  Family/Caregiver Present: No  Prior to Session Communication: Bedside nurse  Patient Position Received: Bed, 3 rail  up, Alarm off, not on at start of session  Preferred Learning Style: verbal, visual  General Comment: pt agreeable to exercises in bed, declining sitting on EOB and sitting in chair at this time.    Subjective   Precautions:  Precautions  Hearing/Visual Limitations: reading glasses, hearing WFL  Medical Precautions: Fall precautions, Oxygen therapy device and L/min (2L O2 via NC)     Date/Time Vitals Session Patient Position Pulse Resp SpO2 BP MAP (mmHg)    04/16/25 0801 --  --  --  --  100 %  --  --     04/16/25 0846 --  --  76  --  100 %  --  --           Objective   Pain:  Pain Assessment  Pain Assessment: 0-10  0-10 (Numeric) Pain Score: 0 - No pain  Cognition:  Cognition  Overall Cognitive Status: Impaired  Orientation Level: Oriented X4  Following Commands: Follows one step commands with increased time  Cognition Comments: flat affect, short delay in response to commands and task performance  Coordination:  Coordination Comment: decreased rate and accuracy of movement    Activity Tolerance:  Activity Tolerance  Endurance: Decreased tolerance for upright activites  Activity Tolerance Comments: fair-  Rate of Perceived Exertion (RPE): 4/10  Treatments:  Therapeutic Exercise  Therapeutic Exercise Performed: Yes (B LE supine ther ex)  Therapeutic Exercise Activity 1: ankle pumps x 15 reps  Therapeutic Exercise Activity 2: heel slides x 10 reps  Therapeutic Exercise Activity 3: SAQ x 10 reps  Therapeutic Exercise Activity 4: hip abd x 10 reps    Bed Mobility  Bed Mobility: Yes  Bed Mobility 1  Bed Mobility 1: Rolling left  Level of Assistance 1: Minimum assistance  Bed Mobility Comments 1: performed twice, heavy use of bed rails, pulling trunk over  Bed Mobility 2  Bed Mobility  2: Rolling right  Level of Assistance 2: Moderate assistance  Bed Mobility Comments 2: difficulty rolling trunk to right side, mod A to complete trunk roll in to sidelying.  Bed Mobility 3  Bed Mobility 3: Long sit  Bed Mobility Comments 3:  Attempted long sitting, pt pulling trunk up with hands on bedrails, unable to accomplish task    Outcome Measures:  [facility] Basic Mobility  Turning from your back to your side while in a flat bed without using bedrails: A lot  Moving from lying on your back to sitting on the side of a flat bed without using bedrails: Total  Moving to and from bed to chair (including a wheelchair): Total  Standing up from a chair using your arms (e.g. wheelchair or bedside chair): Total  To walk in hospital room: Total  Climbing 3-5 steps with railing: Total  Basic Mobility - Total Score: 7    Education Documentation  Mobility Training, taught by Ade Munguia PT at 4/16/2025  9:26 AM.  Learner: Patient  Readiness: Acceptance  Method: Explanation  Response: Verbalizes Understanding    Education Comments  04/16/25 0926 Ade Munguia, PT  Patient educated on the importance of mobility and participation with therapy. Educated on safety and use of call light for assistance.       Encounter Problems       Encounter Problems (Active)       PT Problem       Pt will transition supine<>sit with close S. (Progressing)       Start:  03/27/25    Expected End:  04/19/25            Pt will transfer sit<>stand with FWW & close S. (Not Progressing)       Start:  03/27/25    Expected End:  04/19/25            Pt will ambulate >/=25 ft with FWW & close S. (Not Progressing)       Start:  03/27/25    Expected End:  04/19/25               Pain - Adult

## 2025-04-16 NOTE — PROGRESS NOTES
Analia Murray is a 74 y.o. female on day 22 of admission presenting with Acute respiratory failure with hypoxia.      Subjective   Patient seen and examined.  Awake/alert/oriented.  Denies chest pain, shortness of breath, nausea, or vomiting. No abdominal discomfort. Denies lightheadedness or dizziness.        Objective     Last Recorded Vitals  /55 (BP Location: Left arm, Patient Position: Lying)   Pulse 72   Temp 37 °C (98.6 °F) (Oral)   Resp 17   Wt 144 kg (317 lb 10.9 oz)   SpO2 100%   Intake/Output last 3 Shifts:    Intake/Output Summary (Last 24 hours) at 4/16/2025 1252  Last data filed at 4/16/2025 1120  Gross per 24 hour   Intake 500 ml   Output 450 ml   Net 50 ml       Admission Weight  Weight: 132 kg (290 lb) (03/25/25 0041)    Daily Weight  04/16/25 : 144 kg (317 lb 10.9 oz)    Image Results  XR chest 1 view  Narrative: Interpreted By:  Marco Antonio Duffy,   STUDY:  XR CHEST 1 VIEW      INDICATION:  Signs/Symptoms:resp failure.      COMPARISON:  April 6, 2025      ACCESSION NUMBER(S):  [id number]      ORDERING CLINICIAN:  ELVI JOHNSON      FINDINGS:  Cardiomegaly with some mild perihilar interstitial edema suggested.      No large effusion or consolidation.      Impression:     Cardiomegaly with some mild perihilar interstitial edema suggested.      No large effusion or consolidation.      Signed by: Marco Antonio Duffy 4/8/2025 8:30 AM  Dictation workstation:   [id number]      Physical Exam  Vitals reviewed.   Constitutional:       Appearance: Normal appearance.   HENT:      Head: Normocephalic and atraumatic.   Eyes:      Extraocular Movements: Extraocular movements intact.      Conjunctiva/sclera: Conjunctivae normal.   Cardiovascular:      Rate and Rhythm: Normal rate and regular rhythm.   Pulmonary:      Effort: Pulmonary effort is normal.      Breath sounds: No wheezing, rhonchi or rales.      Comments: Breath sounds clear, diminished bilaterally  Abdominal:      General: Bowel sounds are  normal.      Palpations: Abdomen is soft.      Tenderness: There is no abdominal tenderness.   Musculoskeletal:         General: Normal range of motion.   Skin:     General: Skin is warm and dry.   Neurological:      General: No focal deficit present.      Mental Status: She is alert and oriented to person, place, and time.         Relevant Results  Lab Results   Component Value Date    GLUCOSE 86 04/16/2025    CALCIUM 8.3 (L) 04/16/2025     04/16/2025    K 5.0 04/16/2025    CO2 28 04/16/2025     (H) 04/16/2025    BUN 70 (H) 04/16/2025    CREATININE 2.81 (H) 04/16/2025      Lab Results   Component Value Date    WBC 3.3 (L) 04/16/2025    HGB 7.5 (L) 04/16/2025    HCT 25.9 (L) 04/16/2025    MCV 80 04/16/2025     (L) 04/16/2025    XR chest 1 view  Result Date: 4/8/2025  Cardiomegaly with some mild perihilar interstitial edema suggested.   No large effusion or consolidation.   Signed by: Marco Antonio Duffy 4/8/2025 8:30 AM Dictation workstation:   [id number]    Transthoracic Echo (TTE) Complete  Result Date: 3/26/2025  CONCLUSIONS:  1. Left ventricular ejection fraction is mildly decreased, by visual estimate at 45-50%.  2. There is global hypokinesis of the left ventricle with minor regional variations.  3. Spectral Doppler shows a Grade I (impaired relaxation pattern) of left ventricular diastolic filling with normal left atrial filling pressure.  4. Left ventricular cavity size is mildly dilated.  5. There is normal right ventricular global systolic function.  6. Moderately elevated right ventricular systolic pressure. Randolph Guadarrama DO Electronically signed on 3/26/2025 at 2:46:04 PM  ** Final **     US renal complete  Result Date: 3/26/2025  No hydronephrosis bilaterally.   Urinary bladder decompressed and could not be evaluated at this time.   MACRO: None.   Signed by: Bob Lugo 3/26/2025 11:05 AM Dictation workstation:   [id number]    XR chest 1 view  Result Date: 3/25/2025  1.  Medical  devices as detailed. 2. Interval progression of airspace opacities predominantly in the right lower lobe. Clinical correlation suggested.       MACRO: None   Signed by: Ella To 3/25/2025 6:50 AM Dictation workstation:   [id number]    XR chest 1 view  Result Date: 3/25/2025  1. Mild prominence of the interstitium is nonspecific and may represent mild pulmonary edema or viral infectious process. No focal consolidations detected although the retrocardiac space is not well evaluated. 2. Marked cardiomegaly, unchanged.   MACRO: None   Signed by: Christian Mendoza 3/25/2025 1:30 AM Dictation workstation:   [id number]         Assessment/Plan      Assessment & Plan  Acute respiratory failure with hypoxia  Acute respiratory failure with hypoxia and hypercapnia.  Fluctuates between 0 and 2 L nasal cannula.  Continue noninvasive positive pressure ventilation at night.  Pulmonology on consult; arranging for AVAPS for discharge, otherwise cleared from pulm standpoint     Acute kidney injury on chronic kidney disease  Creatinine is trending downward though still in the 3 range  Palliative and nephrology consulted, appreciate recs  Cleared by nephrology for discharge     Morbid obesity  Major comorbidity contributing to her respiratory failure  Lifestyle dietary modifications     Diabetes mellitus type 2  Monitor blood glucose  Sliding scale insulin  Hypoglycemia protocol  hgbA1C      Hypothyroidism  TSH was 11.  Continue with levothyroxine 200 mg daily     Anxiety  Continue sertraline     Acute on chronic diastolic heart failure  Improved.  With the patient's renal status, defer diuresis to nephrology.  Strict I's and O's  Daily weights     Anemia  Stable     Plan  Supplemental oxygen as needed  AVAPS nightly  DVT prophylaxis: Resume heparin and daily aspirin  PT/OT  Cleared by pulmonary and nephrology for discharge  Discharge planning to SNF when arrangements can be made, patient will need AVAPS arranged with facility  prior to discharge  Patient is medically stable for discharge                Nisha Pierce, APRN-CNP

## 2025-04-16 NOTE — CARE PLAN
The patient's goals for the shift include monitor oxygenation    The clinical goals for the shift include safety, monitor labs

## 2025-04-16 NOTE — NURSING NOTE
Patient resting in bed. Repositioned. Family at bedside. No further needs. Call light within reach.

## 2025-04-17 VITALS
DIASTOLIC BLOOD PRESSURE: 59 MMHG | RESPIRATION RATE: 20 BRPM | HEART RATE: 67 BPM | TEMPERATURE: 98.1 F | BODY MASS INDEX: 50.02 KG/M2 | SYSTOLIC BLOOD PRESSURE: 137 MMHG | OXYGEN SATURATION: 100 % | WEIGHT: 293 LBS | HEIGHT: 64 IN

## 2025-04-17 LAB
ALBUMIN SERPL BCP-MCNC: 2.9 G/DL (ref 3.4–5)
ALP SERPL-CCNC: 62 U/L (ref 33–136)
ALT SERPL W P-5'-P-CCNC: 11 U/L (ref 7–45)
ANION GAP SERPL CALCULATED.3IONS-SCNC: 11 MMOL/L (ref 10–20)
AST SERPL W P-5'-P-CCNC: 11 U/L (ref 9–39)
BASO STIPL BLD QL SMEAR: PRESENT
BASOPHILS # BLD AUTO: 0.01 X10*3/UL (ref 0–0.1)
BASOPHILS NFR BLD AUTO: 0.3 %
BILIRUB SERPL-MCNC: 0.4 MG/DL (ref 0–1.2)
BUN SERPL-MCNC: 61 MG/DL (ref 6–23)
CALCIUM SERPL-MCNC: 8.2 MG/DL (ref 8.6–10.3)
CHLORIDE SERPL-SCNC: 110 MMOL/L (ref 98–107)
CO2 SERPL-SCNC: 28 MMOL/L (ref 21–32)
CREAT SERPL-MCNC: 2.78 MG/DL (ref 0.5–1.05)
EGFRCR SERPLBLD CKD-EPI 2021: 17 ML/MIN/1.73M*2
EOSINOPHIL # BLD AUTO: 0.26 X10*3/UL (ref 0–0.4)
EOSINOPHIL NFR BLD AUTO: 7.9 %
ERYTHROCYTE [DISTWIDTH] IN BLOOD BY AUTOMATED COUNT: 20.1 % (ref 11.5–14.5)
GLUCOSE BLD MANUAL STRIP-MCNC: 89 MG/DL (ref 74–99)
GLUCOSE SERPL-MCNC: 79 MG/DL (ref 74–99)
HCT VFR BLD AUTO: 27.1 % (ref 36–46)
HGB BLD-MCNC: 7.7 G/DL (ref 12–16)
HYPOCHROMIA BLD QL SMEAR: NORMAL
IMM GRANULOCYTES # BLD AUTO: 0.01 X10*3/UL (ref 0–0.5)
IMM GRANULOCYTES NFR BLD AUTO: 0.3 % (ref 0–0.9)
LYMPHOCYTES # BLD AUTO: 0.64 X10*3/UL (ref 0.8–3)
LYMPHOCYTES NFR BLD AUTO: 19.5 %
MCH RBC QN AUTO: 22.8 PG (ref 26–34)
MCHC RBC AUTO-ENTMCNC: 28.4 G/DL (ref 32–36)
MCV RBC AUTO: 80 FL (ref 80–100)
MONOCYTES # BLD AUTO: 0.36 X10*3/UL (ref 0.05–0.8)
MONOCYTES NFR BLD AUTO: 10.9 %
NEUTROPHILS # BLD AUTO: 2.01 X10*3/UL (ref 1.6–5.5)
NEUTROPHILS NFR BLD AUTO: 61.1 %
NRBC BLD-RTO: 0 /100 WBCS (ref 0–0)
OVALOCYTES BLD QL SMEAR: NORMAL
PLATELET # BLD AUTO: 100 X10*3/UL (ref 150–450)
POTASSIUM SERPL-SCNC: 5 MMOL/L (ref 3.5–5.3)
PROT SERPL-MCNC: 5.9 G/DL (ref 6.4–8.2)
RBC # BLD AUTO: 3.38 X10*6/UL (ref 4–5.2)
RBC MORPH BLD: NORMAL
SCHISTOCYTES BLD QL SMEAR: NORMAL
SODIUM SERPL-SCNC: 144 MMOL/L (ref 136–145)
WBC # BLD AUTO: 3.3 X10*3/UL (ref 4.4–11.3)

## 2025-04-17 PROCEDURE — 85025 COMPLETE CBC W/AUTO DIFF WBC: CPT | Performed by: INTERNAL MEDICINE

## 2025-04-17 PROCEDURE — 2500000001 HC RX 250 WO HCPCS SELF ADMINISTERED DRUGS (ALT 637 FOR MEDICARE OP): Performed by: NURSE PRACTITIONER

## 2025-04-17 PROCEDURE — 94640 AIRWAY INHALATION TREATMENT: CPT

## 2025-04-17 PROCEDURE — 2500000002 HC RX 250 W HCPCS SELF ADMINISTERED DRUGS (ALT 637 FOR MEDICARE OP, ALT 636 FOR OP/ED): Performed by: INTERNAL MEDICINE

## 2025-04-17 PROCEDURE — 2500000001 HC RX 250 WO HCPCS SELF ADMINISTERED DRUGS (ALT 637 FOR MEDICARE OP): Performed by: INTERNAL MEDICINE

## 2025-04-17 PROCEDURE — 82947 ASSAY GLUCOSE BLOOD QUANT: CPT

## 2025-04-17 PROCEDURE — 2500000004 HC RX 250 GENERAL PHARMACY W/ HCPCS (ALT 636 FOR OP/ED): Mod: JZ | Performed by: INTERNAL MEDICINE

## 2025-04-17 PROCEDURE — 80053 COMPREHEN METABOLIC PANEL: CPT | Performed by: INTERNAL MEDICINE

## 2025-04-17 PROCEDURE — 99239 HOSP IP/OBS DSCHRG MGMT >30: CPT | Performed by: NURSE PRACTITIONER

## 2025-04-17 PROCEDURE — 2500000004 HC RX 250 GENERAL PHARMACY W/ HCPCS (ALT 636 FOR OP/ED): Performed by: NURSE PRACTITIONER

## 2025-04-17 PROCEDURE — 94668 MNPJ CHEST WALL SBSQ: CPT

## 2025-04-17 PROCEDURE — 36415 COLL VENOUS BLD VENIPUNCTURE: CPT | Performed by: INTERNAL MEDICINE

## 2025-04-17 RX ORDER — CALCIUM ACETATE 667 MG/1
667 CAPSULE ORAL
Start: 2025-04-17

## 2025-04-17 RX ORDER — FLUTICASONE FUROATE AND VILANTEROL 100; 25 UG/1; UG/1
1 POWDER RESPIRATORY (INHALATION) DAILY
Start: 2025-04-18

## 2025-04-17 RX ORDER — HYDROCORTISONE 25 MG/G
CREAM TOPICAL 2 TIMES DAILY
Start: 2025-04-17

## 2025-04-17 RX ORDER — METOPROLOL TARTRATE 50 MG/1
50 TABLET ORAL 2 TIMES DAILY
Start: 2025-04-17

## 2025-04-17 RX ORDER — LEVOTHYROXINE SODIUM 200 UG/1
200 TABLET ORAL DAILY
Start: 2025-04-18

## 2025-04-17 RX ORDER — NYSTATIN 100000 [USP'U]/G
1 POWDER TOPICAL 3 TIMES DAILY
Start: 2025-04-17

## 2025-04-17 RX ADMIN — LEVOTHYROXINE SODIUM 200 MCG: 0.1 TABLET ORAL at 06:53

## 2025-04-17 RX ADMIN — CALCIUM ACETATE 667 MG: 667 CAPSULE ORAL at 09:46

## 2025-04-17 RX ADMIN — FLUTICASONE FUROATE AND VILANTEROL TRIFENATATE 1 PUFF: 100; 25 POWDER RESPIRATORY (INHALATION) at 07:34

## 2025-04-17 RX ADMIN — EZETIMIBE 10 MG: 10 TABLET ORAL at 09:46

## 2025-04-17 RX ADMIN — IPRATROPIUM BROMIDE AND ALBUTEROL SULFATE 3 ML: 2.5; .5 SOLUTION RESPIRATORY (INHALATION) at 07:34

## 2025-04-17 RX ADMIN — NYSTATIN 1 APPLICATION: 100000 POWDER TOPICAL at 09:47

## 2025-04-17 RX ADMIN — AMLODIPINE BESYLATE 10 MG: 10 TABLET ORAL at 09:46

## 2025-04-17 RX ADMIN — IRON SUCROSE 200 MG: 20 INJECTION, SOLUTION INTRAVENOUS at 06:53

## 2025-04-17 RX ADMIN — METOPROLOL TARTRATE 50 MG: 50 TABLET, FILM COATED ORAL at 09:46

## 2025-04-17 RX ADMIN — SERTRALINE 50 MG: 50 TABLET, FILM COATED ORAL at 09:46

## 2025-04-17 RX ADMIN — HEPARIN SODIUM 7500 UNITS: 5000 INJECTION INTRAVENOUS; SUBCUTANEOUS at 06:53

## 2025-04-17 RX ADMIN — ASPIRIN 81 MG: 81 TABLET, CHEWABLE ORAL at 09:46

## 2025-04-17 RX ADMIN — HYDRALAZINE HYDROCHLORIDE 50 MG: 50 TABLET ORAL at 09:46

## 2025-04-17 ASSESSMENT — COGNITIVE AND FUNCTIONAL STATUS - GENERAL
DRESSING REGULAR UPPER BODY CLOTHING: A LITTLE
MOVING FROM LYING ON BACK TO SITTING ON SIDE OF FLAT BED WITH BEDRAILS: A LITTLE
TURNING FROM BACK TO SIDE WHILE IN FLAT BAD: A LOT
DAILY ACTIVITIY SCORE: 18
CLIMB 3 TO 5 STEPS WITH RAILING: TOTAL
MOBILITY SCORE: 12
HELP NEEDED FOR BATHING: A LOT
TOILETING: A LITTLE
WALKING IN HOSPITAL ROOM: A LOT
MOVING TO AND FROM BED TO CHAIR: A LOT
STANDING UP FROM CHAIR USING ARMS: A LOT
DRESSING REGULAR LOWER BODY CLOTHING: A LOT

## 2025-04-17 ASSESSMENT — PAIN SCALES - GENERAL: PAINLEVEL_OUTOF10: 0 - NO PAIN

## 2025-04-17 ASSESSMENT — PAIN - FUNCTIONAL ASSESSMENT: PAIN_FUNCTIONAL_ASSESSMENT: 0-10

## 2025-04-17 NOTE — DISCHARGE SUMMARY
Discharge Diagnosis  Acute respiratory failure with hypoxia    Issues Requiring Follow-Up  Follow up with PCP, pulmonary, nephrology outpatient    Test Results Pending At Discharge  Pending Labs       No current pending labs.            Hospital Course   Analia Murray is a 74 y.o. year old female patient with Past Medical History of anxiety, depression, lumbar radiculopathy, osteoporosis, HOLDEN, COPD, asthma, CHF, HTN, HLD, CKD stage IV, T2DM, hypothyroidism, anemia of chronic disease, and MGUS (monoclonal gammopathy of unknown significant) presented to the ED for concern of shortness of breath.  Pt had been notably short of breath for a couple weeks, became worse over a 2-day period.  Pt had an associated cough with some productive yellow sputum, and developed some chest tightness prior to arrival.  Pt pulse ox was checked at home by a family member, and was noted to be 83% prompting her to come to the ED.  Pt had been around her  who tested positive for the flu 2 weeks prior, but denied any fever of chills.      Pt vitals on arrival to the ED include temp 36.8, HR 85, /83, RR 26, SpO2 82% on RA.  CMP significant for glucose 141 and BUN/Cr 30/2.32. BNP elevated at 725.  Troponin 14 with repeat 13.  CBC with WBC 10.5 and H/H 10.4/35.8.  TSH elevated at 11.37 with T4 within normal limits at 0.86.  Pt negative for Flu A/B and Covid-19.  ABG with pH 7.21, pCO2 78, pO2 140, HCO3 31.2.  CXR with mild prominence of the interstitium (may represent mild pulmonary edema or viral infectious process) with no focal consolidations and cardiomegaly.  Pt was placed on BIPAP, given a 1x dose of solumedrol, and 1x dose of lasix and admitted to the ICU for further treatment.      Patient was admitted to ICU for a period of time.  Symptoms improved and patient was transferred to a regular nursing floor.  Patient was treated for acute on chronic respiratory failure with hypoxia and hypercarbia secondary to asthma and  CHF exacerbation.  She was seen by nephrology and pulmonary during admission.  Oxygen weaned to room air alternating with 2 L, AVAPS at night.  She was cleared by pulmonary for discharge, recommending AVAPS on discharge.  Follow-up outpatient.  Patient was treated for acute on CKD.  Renal function improving.  Cleared by nephrology for discharge.  Patient will need repeat labs and follow-up with nephrology outpatient.  Palliative care has also been following.  PT/OT recommended rehab.  Cleared by all consultants.  Patient is stable for discharge to SNF today.    Pertinent Physical Exam At Time of Discharge  Physical Exam  Vitals reviewed.   Constitutional:       Appearance: Normal appearance.   HENT:      Head: Normocephalic and atraumatic.   Eyes:      Extraocular Movements: Extraocular movements intact.      Conjunctiva/sclera: Conjunctivae normal.   Cardiovascular:      Rate and Rhythm: Normal rate and regular rhythm.   Pulmonary:      Effort: Pulmonary effort is normal.      Breath sounds: No wheezing, rhonchi or rales.      Comments: Breath sounds clear, diminished bilaterally  Abdominal:      General: Bowel sounds are normal.      Palpations: Abdomen is soft.      Tenderness: There is no abdominal tenderness.   Musculoskeletal:         General: Normal range of motion.   Skin:     General: Skin is warm and dry.   Neurological:      General: No focal deficit present.      Mental Status: She is alert and oriented to person, place, and time.   Home Medications     Medication List      START taking these medications     calcium acetate 667 mg capsule; Commonly known as: Phoslo; Take 1   capsule (667 mg) by mouth 3 times a day after meals.   fluticasone furoate-vilanteroL 100-25 mcg/dose inhaler; Commonly known   as: Breo Ellipta; Inhale 1 puff once daily.; Start taking on: April 18, 2025   hydrocortisone 2.5 % cream; Apply topically 2 times a day.   levothyroxine 200 mcg tablet; Commonly known as: Synthroid,  Levoxyl;   Take 1 tablet (200 mcg) by mouth early in the morning.. Take on an empty   stomach at the same time each day, either 30 to 60 minutes prior to   breakfast; Start taking on: April 18, 2025   metoprolol tartrate 50 mg tablet; Commonly known as: Lopressor; Take 1   tablet by mouth 2 times a day.   nystatin 100,000 unit/gram powder; Commonly known as: Mycostatin; Apply   1 Application topically 3 times a day.   oxygen gas therapy; Commonly known as: O2; Inhale 1 each once every 24   hours. At night Mode: Bipap/Bilevel Ventilation type: AVAPS Set Tidal   Volume: 500 Max P: 25 Min P: 15 Expiratory pressure: 10 Inspiratory Time   (sec): 0.9 Rise: 3 Set Resp Rate: 18 AVAPS Rate: 18 HS and PRN     CHANGE how you take these medications     sertraline 50 mg tablet; Commonly known as: Zoloft; Take 1 tablet (50   mg) by mouth once daily.; What changed: Another medication with the same   name was removed. Continue taking this medication, and follow the   directions you see here.     CONTINUE taking these medications     Aerochamber MV inhaler; Generic drug: inhalational spacing device; Use   as instructed   allopurinol 100 mg tablet; Commonly known as: Zyloprim; Take 0.5 tablets   (50 mg) by mouth every other day.   amLODIPine 10 mg tablet; Commonly known as: Norvasc; Take 1 tablet (10   mg) by mouth once daily.   aspirin 81 mg EC tablet   cholecalciferol 25 mcg (1,000 units) capsule; Commonly known as: Vitamin   D-3   ezetimibe 10 mg tablet; Commonly known as: Zetia; TAKE 1 TABLET BY MOUTH   ONCE  DAILY   ferrous sulfate  mg ER tablet   fluticasone 50 mcg/actuation nasal spray; Commonly known as: Flonase;   Administer 2 sprays into each nostril 2 times a day.   fluticasone propion-salmeteroL 250-50 mcg/dose diskus inhaler; Commonly   known as: Advair Diskus; Inhale 1 puff 2 times a day. Rinse mouth with   water after use to reduce aftertaste and incidence of candidiasis. Do not   swallow.   gabapentin 100 mg  capsule; Commonly known as: Neurontin; Take 1 capsule   (100 mg) by mouth 3 times a day.   hydrALAZINE 50 mg tablet; Commonly known as: Apresoline; Take 1 tablet   (50 mg) by mouth 2 times a day.   melatonin 5 mg capsule; Take 1 capsule (5 mg) by mouth once daily at   bedtime.   montelukast 10 mg tablet; Commonly known as: Singulair   nitroglycerin 0.4 mg SL tablet; Commonly known as: Nitrostat; Place 1   tablet (0.4 mg) under the tongue every 5 minutes if needed for chest pain.   pravastatin 40 mg tablet; Commonly known as: Pravachol; TAKE 1 TABLET BY   MOUTH ONCE  DAILY AT BEDTIME   Ventolin HFA 90 mcg/actuation inhaler; Generic drug: albuterol; USE 2   INHALATIONS BY MOUTH EVERY 4 HOURS AS NEEDED FOR WHEEZING     STOP taking these medications     furosemide 20 mg tablet; Commonly known as: Lasix   metoprolol succinate  mg 24 hr tablet; Commonly known as:   Toprol-XL   semaglutide 0.25 mg or 0.5 mg (2 mg/3 mL) pen injector       Outpatient Follow-Up  Future Appointments   Date Time Provider Department Center   7/28/2025 11:30 AM Kiel Ackerman MD [id number] [facility]   8/25/2025  1:00 PM SEEMA Pitts [id number] [facility]   8/25/2025  2:30 PM Thais Barber OD [id number] [facility]     Time spent on discharge: 35 minutes     SEEMA Perry

## 2025-04-17 NOTE — PROGRESS NOTES
Pt updated, son To updated,facility updated. Family to receive pt at facility.      04/17/25 0957   Discharge Planning   Expected Discharge Disposition SNF  ([place])   Does the patient need discharge transport arranged? Yes   [facility]Trip coordination needed? Yes   Has discharge transport been arranged? Yes   What day is the transport expected? 04/17/25   What time is the transport expected? 1058

## 2025-04-17 NOTE — CARE PLAN
The patient's goals for the shift include comfort and dc planning    The clinical goals for the shift include no respiratory distress, stable SpO2

## 2025-04-17 NOTE — PROGRESS NOTES
Pt anticipated to dc today. Per [facility] they should have bipap on site today, message sent to them today requesting verification once it's on site.     Update: [facility] confirms bipap with AVAPS capability delivered previous night, pt is good to admit to them today.      04/17/25 0836   Discharge Planning   Expected Discharge Disposition SNF

## 2025-04-17 NOTE — PROGRESS NOTES
"Analia Murray is a 74 y.o. female on day 23 of admission presenting with Acute respiratory failure with hypoxia.      Subjective   Patient seen  for MARY ANN superimposed on CKD stage III, on 2L nasal cannula, denies nausea, vomiting, diarrhea, abdominal pain or SOB. In no acute distress.        Objective   Physical Exam  Constitutional:       General: She is awake. She is not in acute distress.  Cardiovascular:      Heart sounds:      No friction rub.   Pulmonary:      Effort: Pulmonary effort is normal.      Comments: Clear breath sounds  Abdominal:      General: Bowel sounds are normal.      Palpations: Abdomen is soft.      Tenderness: There is no guarding or rebound.   Musculoskeletal:      Comments: Mild peripheral edema      Visit Vitals  /59 (BP Location: Left arm, Patient Position: Lying)   Pulse 67   Temp 36.7 °C (98.1 °F) (Oral)   Resp 20   Ht 1.626 m (5' 4.02\")   Wt 144 kg (317 lb 10.9 oz)   LMP  (LMP Unknown)   SpO2 100%   BMI 54.50 kg/m²   OB Status Postmenopausal   Smoking Status Never   BSA 2.55 m²        Intake/Output last 3 Shifts:    Intake/Output Summary (Last 24 hours) at 4/17/2025 0927  Last data filed at 4/16/2025 1541  Gross per 24 hour   Intake 440 ml   Output 0 ml   Net 440 ml     Current Medications[1]     Results for orders placed or performed during the hospital encounter of 03/25/25 (from the past 24 hours)   POCT GLUCOSE   Result Value Ref Range    POCT Glucose 89 74 - 99 mg/dL   POCT GLUCOSE   Result Value Ref Range    POCT Glucose 97 74 - 99 mg/dL   POCT GLUCOSE   Result Value Ref Range    POCT Glucose 99 74 - 99 mg/dL   CBC and Auto Differential   Result Value Ref Range    WBC 3.3 (L) 4.4 - 11.3 x10*3/uL    nRBC 0.0 0.0 - 0.0 /100 WBCs    RBC 3.38 (L) 4.00 - 5.20 x10*6/uL    Hemoglobin 7.7 (L) 12.0 - 16.0 g/dL    Hematocrit 27.1 (L) 36.0 - 46.0 %    MCV 80 80 - 100 fL    MCH 22.8 (L) 26.0 - 34.0 pg    MCHC 28.4 (L) 32.0 - 36.0 g/dL    RDW 20.1 (H) 11.5 - 14.5 %    Platelets " 100 (L) 150 - 450 x10*3/uL    Neutrophils % 61.1 40.0 - 80.0 %    Immature Granulocytes %, Automated 0.3 0.0 - 0.9 %    Lymphocytes % 19.5 13.0 - 44.0 %    Monocytes % 10.9 2.0 - 10.0 %    Eosinophils % 7.9 0.0 - 6.0 %    Basophils % 0.3 0.0 - 2.0 %    Neutrophils Absolute 2.01 1.60 - 5.50 x10*3/uL    Immature Granulocytes Absolute, Automated 0.01 0.00 - 0.50 x10*3/uL    Lymphocytes Absolute 0.64 (L) 0.80 - 3.00 x10*3/uL    Monocytes Absolute 0.36 0.05 - 0.80 x10*3/uL    Eosinophils Absolute 0.26 0.00 - 0.40 x10*3/uL    Basophils Absolute 0.01 0.00 - 0.10 x10*3/uL   Comprehensive Metabolic Panel   Result Value Ref Range    Glucose 79 74 - 99 mg/dL    Sodium 144 136 - 145 mmol/L    Potassium 5.0 3.5 - 5.3 mmol/L    Chloride 110 (H) 98 - 107 mmol/L    Bicarbonate 28 21 - 32 mmol/L    Anion Gap 11 10 - 20 mmol/L    Urea Nitrogen 61 (H) 6 - 23 mg/dL    Creatinine 2.78 (H) 0.50 - 1.05 mg/dL    eGFR 17 (L) >60 mL/min/1.73m*2    Calcium 8.2 (L) 8.6 - 10.3 mg/dL    Albumin 2.9 (L) 3.4 - 5.0 g/dL    Alkaline Phosphatase 62 33 - 136 U/L    Total Protein 5.9 (L) 6.4 - 8.2 g/dL    AST 11 9 - 39 U/L    Bilirubin, Total 0.4 0.0 - 1.2 mg/dL    ALT 11 7 - 45 U/L   Morphology   Result Value Ref Range    RBC Morphology See Below     Hypochromia Mild     RBC Fragments Few     Ovalocytes Few     Basophilic Stippling Present    POCT GLUCOSE   Result Value Ref Range    POCT Glucose 89 74 - 99 mg/dL     *Note: Due to a large number of results and/or encounters for the requested time period, some results have not been displayed. A complete set of results can be found in Results Review.         Assessment & Plan    Acute kidney injury superimposed on chronic kidney disease stage III - improving, no indication for renal replacement therapy, there is no uremia or fluid overload. Follow renal diet.  Okay for discharge from renal standpoint can follow-up with us in 2 weeks with a renal function panel 1 week after discharge.  Notably she does  not want to follow-up with her prior nephrologist, she wants to switch to our practice  Hyperkalemia, resolved   Congestive heart failure   Chronic kidney disease stage IIIb (baseline creatinine 1.7-2)  Acute respiratory failure improved  Diabetes mellitus type 2  Anemia of chronic kidney disease-continue IV Venofer for iron deficiency, received one dose  epogen she will need to continue after discharge  Hypertension  Hyperlipidemia  Obesity            David Layne, APRFILI-CNP           [1]   Current Facility-Administered Medications:     acetaminophen (Tylenol) oral liquid 650 mg, 650 mg, oral, q4h PRN **OR** acetaminophen (Tylenol) tablet 650 mg, 650 mg, oral, q4h PRN, Rajesh Rojas DO, 650 mg at 04/13/25 0555    albuterol 2.5 mg /3 mL (0.083 %) nebulizer solution 2.5 mg, 2.5 mg, nebulization, q2h PRN, Rajesh Rojas DO    allopurinol (Zyloprim) tablet 50 mg, 50 mg, oral, Every other day, Rajesh Rojas DO, 50 mg at 04/16/25 0828    amLODIPine (Norvasc) tablet 10 mg, 10 mg, oral, Daily, DILCIA Perry-CNP, 10 mg at 04/16/25 0833    aspirin chewable tablet 81 mg, 81 mg, oral, Daily, DILCIA Perry-CNP, 81 mg at 04/16/25 0828    calcium acetate (Phoslo) capsule 667 mg, 667 mg, oral, TID after meals, Rajesh Rojas DO, 667 mg at 04/16/25 1309    dextrose 50 % injection 12.5 g, 12.5 g, intravenous, q15 min PRN, Rajesh Rojas DO    dextrose 50 % injection 25 g, 25 g, intravenous, q15 min PRN, Rajesh Rojas DO    diphenhydramine-zinc acetate cream, , Topical, TID PRN, Ric Solis MD, Given at 04/15/25 2139    ezetimibe (Zetia) tablet 10 mg, 10 mg, oral, Daily, Rajesh Rojas DO, 10 mg at 04/16/25 0828    fluticasone (Flonase) nasal spray 2 spray, 2 spray, Each Nostril, BID, Rajesh Rojas DO, 2 spray at 04/16/25 2112    fluticasone furoate-vilanteroL (Breo Ellipta) 100-25 mcg/dose inhaler 1 puff, 1 puff, inhalation, Daily, Rajesh Rojas DO, 1 puff at  04/17/25 0734    [Held by provider] gabapentin (Neurontin) capsule 300 mg, 300 mg, oral, Daily, Krysta Ramirez MD, 300 mg at 04/07/25 0924    glucagon (Glucagen) injection 1 mg, 1 mg, intramuscular, q15 min PRN, Rajesh Torresrlotte, DO    glucagon (Glucagen) injection 1 mg, 1 mg, intramuscular, q15 min PRN, Rajesh Harringtonte, DO    heparin (porcine) injection 7,500 Units, 7,500 Units, subcutaneous, q8h JAYA, Nisha Pierce, APRN-CNP, 7,500 Units at 04/17/25 0653    hydrALAZINE (Apresoline) injection 10 mg, 10 mg, intravenous, q4h PRN, Rajesh Rojas DO, 10 mg at 04/08/25 1220    hydrALAZINE (Apresoline) tablet 50 mg, 50 mg, oral, BID, Rajesh Harringtonte DO, 50 mg at 04/16/25 2112    hydrocortisone 2.5 % cream, , Topical, BID, Rajesh Harringtonte, DO, Given at 04/16/25 2112    insulin lispro injection 0-10 Units, 0-10 Units, subcutaneous, TID AC, Rajesh Harringtonte DO, 4 Units at 04/10/25 1245    insulin lispro injection 6 Units, 6 Units, subcutaneous, Once, Rajesh Harringtonte DO    ipratropium-albuteroL (Duo-Neb) 0.5-2.5 mg/3 mL nebulizer solution 3 mL, 3 mL, nebulization, TID, Rajesh Harringtonte, DO, 3 mL at 04/17/25 0734    iron sucrose (Venofer) injection 200 mg, 200 mg, intravenous, Daily, Hoda Slater MD, 200 mg at 04/17/25 0653    levothyroxine (Synthroid, Levoxyl) tablet 200 mcg, 200 mcg, oral, Daily, Rajesh Harringtonte DO, 200 mcg at 04/17/25 0653    [Held by provider] metoprolol succinate XL (Toprol-XL) 24 hr tablet 100 mg, 100 mg, oral, Daily, Sanjiv Neil PA-C    metoprolol tartrate (Lopressor) tablet 50 mg, 50 mg, oral, BID, Rajesh Rojas DO, 50 mg at 04/16/25 2112    montelukast (Singulair) tablet 10 mg, 10 mg, oral, Nightly, Rajesh Rojas, DO, 10 mg at 04/16/25 2112    [Held by provider] nitroglycerin (Nitrostat) SL tablet 0.4 mg, 0.4 mg, sublingual, q5 min PRN, Sanjiv Neil PA-C    nystatin (Mycostatin) 100,000 unit/gram powder 1 Application, 1 Application, Topical, TID, Rajesh  FIDEL Rojas DO, 1 Application at 04/16/25 2112    ondansetron (Zofran) injection 4 mg, 4 mg, intravenous, q6h PRN, Rajesh Rojas DO, 4 mg at 04/11/25 1602    oxygen (O2) therapy, , inhalation, Continuous PRN - O2/gases, Rajesh Rojas DO, 2 L/min at 04/10/25 1005    oxygen (O2) therapy, , inhalation, Continuous PRN - O2/gases, Sixto Slater MD    polyethylene glycol (Glycolax, Miralax) packet 17 g, 17 g, oral, Daily PRN, Rajesh Rojas DO    pravastatin (Pravachol) tablet 40 mg, 40 mg, oral, Nightly, Rajesh Rojas DO, 40 mg at 04/16/25 2112    sertraline (Zoloft) tablet 50 mg, 50 mg, oral, Daily, Barbara Patel MD, 50 mg at 04/16/25 0828    sodium chloride (Ocean) 0.65 % nasal spray 1 spray, 1 spray, Each Nostril, BID, Miesha Reyes, APRN-CNP, 1 spray at 04/16/25 2112    sulfur hexafluoride microsphr (Lumason) injection 24.28 mg, 2 mL, intravenous, Once in imaging, Rajesh Rojas DO    [Held by provider] traZODone (Desyrel) tablet 25 mg, 25 mg, oral, Nightly PRN, Robbie Olivarez MD

## 2025-04-18 NOTE — DOCUMENTATION CLARIFICATION NOTE
"    PATIENT:               BOB SANTILLAN  ACCT #:                  [id number]  MRN:                       [id number]  :                       1950  ADMIT DATE:       3/25/2025 12:20 AM  DISCH DATE:        2025 12:22 PM  RESPONDING PROVIDER #:        [id number]          PROVIDER RESPONSE TEXT:    Not sepsis    CDI QUERY TEXT:    Clarification      Instruction:  Based on your assessment of the patient and the clinical information, please provide the requested documentation by clicking on the appropriate radio button and enter any additional information if prompted.    Question: Sepsis was documented in the medical record. Based on the documentation and the clinical information, can the diagnosis be further clarified as    When answering this query, please exercise your independent professional judgment. The fact that a question is being asked, does not imply that any particular answer is desired or expected.    The patient's clinical indicators include:  Clinical Information:74 y.o. year old female patient with Past Medical History, HOLDEN, COPD, asthma, CHF, HTN, HLD, CKD stage IV, T2DM,anemia of chronic disease, and MGUS (monoclonal gammopathy of unknown significant) presented to the ED for concern of shortness of breath. Diagnosed with acute Respiratory failure, acute on chronic CHF, MARY ANN w/ ATN    Documented Diagnosis: \"sepsis\" in 3/26, 3/27 Nephrology consult notes    Clinical Indicators:  -Vital Signs: 3/25 vs: T 98.2, HR 85, R 26, /83, , O2 sat: 82 room air    -WBC:3/25 WBC 10.5, 8.4    -Microbiology Results: None done    -Band Neutrophil Count/percent Band Neutrophil: not done    -Lactic acid:3/25: .5    -BUN/Creat: 3/25 30/2.32    -Bilirubin:3/25:  .5    -MAP: 3/25: 104    -Sheng Coma Scale: 3/25:  15    -SpO2/FIO2: 3/25 82 on rm air    -Procalcitonin: not done    -Platelets:3/25 147    -Other clinical indicators: None    Treatment:  -Azithromycin given x 3 days for \"prophylaxis\"    Risk " Factors:74 y.o with respiratory failure  Options provided:  -- Sepsis was a differential diagnosis and ruled out after study  -- Sepsis was ruled in this admission, Please specify sepsis clinical indicators and associated organ dysfunction  -- Other - I will add my own diagnosis  -- Refer to Clinical Documentation Reviewer    Query created by: Marialuisa Farrar on 4/18/2025 11:24 AM      Electronically signed by:  CRISTY HA-CNP 4/18/2025 2:37 PM

## 2025-04-19 NOTE — DOCUMENTATION CLARIFICATION NOTE
"    PATIENT:               BOB SANTILLAN  ACCT #:                  [id number]  MRN:                       [id number]  :                       1950  ADMIT DATE:       3/25/2025 12:20 AM  DISCH DATE:        2025 12:22 PM  RESPONDING PROVIDER #:        [id number]          PROVIDER RESPONSE TEXT:    Not PNA    CDI QUERY TEXT:    Clarification    Instruction:    Based on your assessment of the patient and the clinical information, please provide the requested documentation by clicking on the appropriate radio button and enter any additional information if prompted.    Question: Based on the information, please further clarify the diagnosis of Pneumonia as    When answering this query, please exercise your independent professional judgment. The fact that a question is being asked, does not imply that any particular answer is desired or expected.    The patient's clinical indicators include:  Clinical Information:74 y.o. year old female patient with Past Medical History, HOLDEN, COPD, asthma, CHF, HTN, HLD, CKD stage IV, T2DM,anemia of chronic disease, and MGUS (monoclonal gammopathy of unknown significant) presented to the ED for concern of shortness of breath. Diagnosed with acute Respiratory failure, acute on chronic CHF, MARY ANN w/ ATN    Documented Diagnosis: \"Bilateral Pneumonia\" beginning 3/26  Nephrology consult notes    Clinical Indicators:    3/25 cxr: IMPRESSION:  1. Mild prominence of the interstitium is nonspecific and may  represent mild pulmonary edema or viral infectious process. No focal  consolidations detected although the retrocardiac space is not well  evaluated.  2. Marked cardiomegaly, unchanged.      3/25 ED provider: \"She was given DuoNebs and IV Solu-Medrol with improvement of her wheezing, there is some persistent wheezing on my reassessment. Workup shows elevated BNP and evidence of pulmonary edema on the chest x-ray. No leukocytosis, no evidence of pneumonia\"    3/27 Pulmonary Note: \"Workup " "shows elevated BNP and evidence of pulmonary edema on the chest x-ray. No leukocytosis, no evidence of pneumonia, viral testing all negative as well.I suspect a mix of COPD exacerbation and CHF exacerbation with pulmonary edema causing her acute respiratory failure with hypoxia.\"      Treatment:  -Azithromycin given x 3 days for \"prophylaxis\"  -Duonebs  -IV solumedrol    Risk Factors:  74 y.o with COPD, CHF, asthma  Options provided:  -- Pneumonia is ruled out  -- Pneumonia ruled in as evidenced by, Please specify additional information below  -- Other - I will add my own diagnosis  -- Refer to Clinical Documentation Reviewer    Query created by: Marialuisa Farrar on 4/18/2025 11:24 AM      Electronically signed by:  CRISTY HA-CNP 4/19/2025 8:02 AM          "

## 2025-04-24 DIAGNOSIS — E11.22 CKD STAGE 3 DUE TO TYPE 2 DIABETES MELLITUS (MULTI): ICD-10-CM

## 2025-04-24 DIAGNOSIS — N18.30 CKD STAGE 3 DUE TO TYPE 2 DIABETES MELLITUS (MULTI): ICD-10-CM

## 2025-04-30 ENCOUNTER — APPOINTMENT (OUTPATIENT)
Dept: RADIOLOGY | Facility: HOSPITAL | Age: 75
End: 2025-04-30
Payer: MEDICARE

## 2025-04-30 ENCOUNTER — HOSPITAL ENCOUNTER (EMERGENCY)
Facility: HOSPITAL | Age: 75
Discharge: HOME | End: 2025-05-01
Attending: STUDENT IN AN ORGANIZED HEALTH CARE EDUCATION/TRAINING PROGRAM
Payer: MEDICARE

## 2025-04-30 DIAGNOSIS — M54.41 ACUTE RIGHT-SIDED LOW BACK PAIN WITH RIGHT-SIDED SCIATICA: Primary | ICD-10-CM

## 2025-04-30 PROCEDURE — 96372 THER/PROPH/DIAG INJ SC/IM: CPT

## 2025-04-30 PROCEDURE — 2500000004 HC RX 250 GENERAL PHARMACY W/ HCPCS (ALT 636 FOR OP/ED): Mod: JZ | Performed by: STUDENT IN AN ORGANIZED HEALTH CARE EDUCATION/TRAINING PROGRAM

## 2025-04-30 PROCEDURE — 2500000005 HC RX 250 GENERAL PHARMACY W/O HCPCS

## 2025-04-30 PROCEDURE — 93971 EXTREMITY STUDY: CPT

## 2025-04-30 PROCEDURE — 2500000004 HC RX 250 GENERAL PHARMACY W/ HCPCS (ALT 636 FOR OP/ED): Mod: JZ

## 2025-04-30 PROCEDURE — 2500000001 HC RX 250 WO HCPCS SELF ADMINISTERED DRUGS (ALT 637 FOR MEDICARE OP)

## 2025-04-30 PROCEDURE — 99284 EMERGENCY DEPT VISIT MOD MDM: CPT | Mod: 25 | Performed by: STUDENT IN AN ORGANIZED HEALTH CARE EDUCATION/TRAINING PROGRAM

## 2025-04-30 PROCEDURE — 96372 THER/PROPH/DIAG INJ SC/IM: CPT | Performed by: STUDENT IN AN ORGANIZED HEALTH CARE EDUCATION/TRAINING PROGRAM

## 2025-04-30 PROCEDURE — 72100 X-RAY EXAM L-S SPINE 2/3 VWS: CPT

## 2025-04-30 PROCEDURE — 72100 X-RAY EXAM L-S SPINE 2/3 VWS: CPT | Performed by: RADIOLOGY

## 2025-04-30 RX ORDER — OXYCODONE AND ACETAMINOPHEN 5; 325 MG/1; MG/1
1 TABLET ORAL EVERY 6 HOURS PRN
Qty: 12 TABLET | Refills: 0 | Status: SHIPPED | OUTPATIENT
Start: 2025-04-30 | End: 2025-05-03

## 2025-04-30 RX ORDER — GABAPENTIN 300 MG/1
300 CAPSULE ORAL ONCE
Status: COMPLETED | OUTPATIENT
Start: 2025-04-30 | End: 2025-04-30

## 2025-04-30 RX ORDER — ORPHENADRINE CITRATE 30 MG/ML
60 INJECTION INTRAMUSCULAR; INTRAVENOUS ONCE
Status: COMPLETED | OUTPATIENT
Start: 2025-04-30 | End: 2025-04-30

## 2025-04-30 RX ORDER — OXYCODONE AND ACETAMINOPHEN 5; 325 MG/1; MG/1
1 TABLET ORAL ONCE
Refills: 0 | Status: COMPLETED | OUTPATIENT
Start: 2025-04-30 | End: 2025-04-30

## 2025-04-30 RX ORDER — LIDOCAINE 560 MG/1
1 PATCH PERCUTANEOUS; TOPICAL; TRANSDERMAL ONCE
Status: DISCONTINUED | OUTPATIENT
Start: 2025-04-30 | End: 2025-05-01 | Stop reason: HOSPADM

## 2025-04-30 RX ADMIN — ORPHENADRINE CITRATE 60 MG: 60 INJECTION INTRAMUSCULAR; INTRAVENOUS at 18:06

## 2025-04-30 RX ADMIN — GABAPENTIN 300 MG: 300 CAPSULE ORAL at 19:31

## 2025-04-30 RX ADMIN — HYDROMORPHONE HYDROCHLORIDE 0.5 MG: 0.5 INJECTION, SOLUTION INTRAMUSCULAR; INTRAVENOUS; SUBCUTANEOUS at 22:25

## 2025-04-30 RX ADMIN — LIDOCAINE 4% 1 PATCH: 40 PATCH TOPICAL at 18:10

## 2025-04-30 RX ADMIN — OXYCODONE HYDROCHLORIDE AND ACETAMINOPHEN 1 TABLET: 5; 325 TABLET ORAL at 19:31

## 2025-04-30 ASSESSMENT — PAIN SCALES - GENERAL
PAINLEVEL_OUTOF10: 10 - WORST POSSIBLE PAIN
PAINLEVEL_OUTOF10: 10 - WORST POSSIBLE PAIN

## 2025-04-30 ASSESSMENT — PAIN DESCRIPTION - LOCATION: LOCATION: LEG

## 2025-04-30 ASSESSMENT — PAIN - FUNCTIONAL ASSESSMENT
PAIN_FUNCTIONAL_ASSESSMENT: 0-10
PAIN_FUNCTIONAL_ASSESSMENT: 0-10

## 2025-04-30 ASSESSMENT — PAIN DESCRIPTION - DESCRIPTORS: DESCRIPTORS: SHARP

## 2025-04-30 NOTE — ED PROVIDER NOTES
HPI   Chief Complaint   Patient presents with    Leg Pain     Pt presented to ED for pain in her lower back, and entire right leg from the thigh to the toes. Pt has no recent falls stated pain just started out of nowhere on Friday 4/25/25. Pain level is 10/10       Patient is a 74-year-old female presenting to the emergency department from [facility] ER nursing facility for evaluation of low back pain and pain in the right leg.  Patient states symptoms started 5 days ago.  She describes it as a shooting pain in her right low back that radiates down her right leg.  Nothing makes the pain better and any movement makes it worse.  She denies any numbness or tingling in the leg.  She denies any recent falls, trauma, injury.  She denies any loss of bowel or bladder control.  She denies any saddle anesthesias.  She does report chest pain discharge from the hospital on 4/17 and sent to a skilled nursing facility.  She was hospitalized for a long time and therefore did not move around much.  Now she is at a rehab facility and has been moving more.  She states that could have flared up her sciatic nerve.  She denies any chest pain, shortness of breath, fevers, chills, nausea, vomiting, abdominal pain.              Patient History   Medical History[1]  Surgical History[2]  Family History[3]  Social History[4]    Physical Exam   ED Triage Vitals [04/30/25 1613]   Temperature Heart Rate Respirations BP   36.2 °C (97.2 °F) 70 15 156/74      Pulse Ox Temp src Heart Rate Source Patient Position   97 % -- -- Lying      BP Location FiO2 (%)     Right arm --       Physical Exam  Vitals and nursing note reviewed.   Constitutional:       General: She is not in acute distress.     Appearance: Normal appearance. She is not ill-appearing or toxic-appearing.   HENT:      Head: Normocephalic and atraumatic.      Nose: Nose normal.      Mouth/Throat:      Mouth: Mucous membranes are dry.   Eyes:      Extraocular Movements: Extraocular movements  intact.      Pupils: Pupils are equal, round, and reactive to light.   Cardiovascular:      Rate and Rhythm: Normal rate and regular rhythm.   Pulmonary:      Effort: Pulmonary effort is normal.      Breath sounds: Normal breath sounds.   Abdominal:      Palpations: Abdomen is soft.      Tenderness: There is no abdominal tenderness.   Musculoskeletal:         General: Tenderness (Right lumbar paraspinal at the sciatic notch) present.      Cervical back: Normal range of motion.      Right lower leg: Edema present.      Left lower leg: Edema present.      Comments: Positive straight leg test on the right.   Skin:     General: Skin is warm and dry.   Neurological:      General: No focal deficit present.      Mental Status: She is alert and oriented to person, place, and time.      Comments: Normal Sensation in the bilateral inner thighs, lower legs, and feet     Psychiatric:         Mood and Affect: Mood normal.         Behavior: Behavior normal.           ED Course & [facility]   ED Course as of 05/01/25 0121   u May 01, 2025   0012 Went to go speak with family prior to discharging the patient.  They are concerned about swelling to her right thigh and would like further imaging of the leg.  I therefore ordered an x-ray of the femur. [AJ]   0121 X-ray of the femur appears unremarkable with no fracture and therefore patient discharge back to facility. [AJ]      ED Course User Index  [AJ] Juani Fan PA-C         Diagnoses as of 05/01/25 0121   Acute right-sided low back pain with right-sided sciatica                 No data recorded     [place] Coma Scale Score: 15 (04/30/25 2051 : Dasia Flanagan RN)                           Medical Decision Making  **Disclaimer parts of this chart have been completed using voice recognition software. Please excuse any errors of transcription.     Patient seen in conjunction with attending physician .     HPI: Detailed above.    Exam: A medically appropriate exam performed,  outlined above, given the known history and presentation.    History obtained from: Patient    Labs/Diagnostics:  XR lumbar spine 2-3 views   Final Result   No acute fracture of the lumbar spine.        Multilevel degenerative change of the lumbar spine.             MACRO:   None        Signed by: Ronnie Shannon 4/30/2025 6:22 PM   Dictation workstation:   [id number]      Lower extremity venous duplex right   Final Result   Negative study.  No deep venous thrombosis of the  right lower   extremity.        MACRO:   None        Signed by: Jose Espinoza 4/30/2025 5:55 PM   Dictation workstation:   [id number]      XR femur right 2+ views    (Results Pending)     EMERGENCY DEPARTMENT COURSE and DIFFERENTIAL DIAGNOSIS/MDM:  Patient is a 74-year-old female presenting to the emergency department for evaluation of low back pain and right leg pain.  On physical exam vital signs remarkable for systolic hypertension but otherwise stable patient is in no acute distress.  Patient has bilateral lower extremity pitting edema which she states is chronic for her.  She is a positive straight leg test on the right.  No saddle anesthesias or bowel or bladder incontinence.  Due to her increased immobility from being hospitalized I did order an ultrasound of the right lower extremity to rule out DVT.  I also ordered an x-ray of the lumbar spine.  She denies any falls or injuries.  Ultrasound of the right lower extremity showed no evidence of DVT.  X-ray of the lumbar spine showed no acute fracture with multilevel degenerative changes.  Patient given p.o. gabapentin, p.o. Percocet, IM Norflex, topical lidocaine patch, and IM Dilaudid with continued improvement in symptoms.  At this time we suspect patient's pain is likely due to sciatica.  We feel that she can be discharged back to her skilled nursing facility and can work on exercises to help with the back pain and mobility.  Patient discharged with a prescription for Percocet.  She  "will return to the emergency department with any new or worsening symptoms.     Based on patient history and exam, low red flag score and normal neuro exam of all lumbar and sacral nerve distributions  I have considered but suspect there is a very low risk for cauda equina syndrome, epidural abscess or hematoma, mass lesion, or other infectious process such as discitis, osteomyelitis or transverse myelitis, severe spinal stenosis, or emergent back pathology. Given low risk while I have considered  advanced imaging such as MRI or CT of the spine, but given low risk these can be deferred at this time and patient can be managed symptomatically.    Vitals:    Vitals:    04/30/25 1613 04/30/25 2052   BP: 156/74 150/57   BP Location: Right arm    Patient Position: Lying    Pulse: 70 72   Resp: 15 20   Temp: 36.2 °C (97.2 °F) 36.5 °C (97.7 °F)   TempSrc:  Temporal   SpO2: 97% 100%   Weight: 133 kg (293 lb)    Height: 1.626 m (5' 4\")      History Limited by:    None    Independent history obtained from:    None    External records reviewed:    Inpatient Notes/Discharge Summary from 4/17/2025  Hospital Course   Analia Murray is a 74 y.o. year old female patient with Past Medical History of anxiety, depression, lumbar radiculopathy, osteoporosis, HOLDEN, COPD, asthma, CHF, HTN, HLD, CKD stage IV, T2DM, hypothyroidism, anemia of chronic disease, and MGUS (monoclonal gammopathy of unknown significant) presented to the ED for concern of shortness of breath.  Pt had been notably short of breath for a couple weeks, became worse over a 2-day period.  Pt had an associated cough with some productive yellow sputum, and developed some chest tightness prior to arrival.  Pt pulse ox was checked at home by a family member, and was noted to be 83% prompting her to come to the ED.  Pt had been around her  who tested positive for the flu 2 weeks prior, but denied any fever of chills.       Pt vitals on arrival to the ED include " temp 36.8, HR 85, /83, RR 26, SpO2 82% on RA.  CMP significant for glucose 141 and BUN/Cr 30/2.32. BNP elevated at 725.  Troponin 14 with repeat 13.  CBC with WBC 10.5 and H/H 10.4/35.8.  TSH elevated at 11.37 with T4 within normal limits at 0.86.  Pt negative for Flu A/B and Covid-19.  ABG with pH 7.21, pCO2 78, pO2 140, HCO3 31.2.  CXR with mild prominence of the interstitium (may represent mild pulmonary edema or viral infectious process) with no focal consolidations and cardiomegaly.  Pt was placed on BIPAP, given a 1x dose of solumedrol, and 1x dose of lasix and admitted to the ICU for further treatment.       Patient was admitted to ICU for a period of time.  Symptoms improved and patient was transferred to a regular nursing floor.  Patient was treated for acute on chronic respiratory failure with hypoxia and hypercarbia secondary to asthma and CHF exacerbation.  She was seen by nephrology and pulmonary during admission.  Oxygen weaned to room air alternating with 2 L, AVAPS at night.  She was cleared by pulmonary for discharge, recommending AVAPS on discharge.  Follow-up outpatient.  Patient was treated for acute on CKD.  Renal function improving.  Cleared by nephrology for discharge.  Patient will need repeat labs and follow-up with nephrology outpatient.  Palliative care has also been following.  PT/OT recommended rehab.  Cleared by all consultants.  Patient is stable for discharge to SNF today.    Diagnostics interpreted by me:    Ultrasound(s) see MDM and Xrays - see my independent interpretation in MDM    Discussions with other clinicians:    None    Chronic conditions impacting care:    Hypertension and COPD    Social determinants of health affecting care:    None    Diagnostic tests considered but not performed: None    ED Medications managed:    Medications   lidocaine 4 % patch 1 patch (1 patch transdermal Medication Applied 4/30/25 1810)   orphenadrine (Norflex) injection 60 mg (60 mg  intramuscular Given 25)   gabapentin (Neurontin) capsule 300 mg (300 mg oral Given 25)   oxyCODONE-acetaminophen (Percocet) 5-325 mg per tablet 1 tablet (1 tablet oral Given 25)   HYDROmorphone (Dilaudid) injection 0.5 mg (0.5 mg intramuscular Given 25)       Prescription drugs considered:    Pain Medications Percocet    Screenings:              Procedure  Procedures         [1]   Past Medical History:  Diagnosis Date    CHF (congestive heart failure)     Chronic kidney disease     Coronary artery disease     Cough, unspecified 2017    Cough    Diverticulitis of large intestine with perforation and abscess without bleeding 10/21/2016    Colonic diverticular abscess    Fistula of intestine 2017    Enterocutaneous fistula    Hyperlipidemia     Hypertension     Morbid (severe) obesity due to excess calories (Multi)     Morbid obesity    Obstructive sleep apnea (adult) (pediatric) 2018    HOLDEN on CPAP    Other conditions influencing health status     DEXA Body Composition Study    Personal history of other diseases of the circulatory system     History of hypertension    Personal history of other diseases of the digestive system 2018    History of esophagitis    Personal history of other diseases of the digestive system 2017    History of diverticulitis    Personal history of other endocrine, nutritional and metabolic disease 2020    History of hyperlipidemia    Personal history of other endocrine, nutritional and metabolic disease     History of hyperlipidemia    Personal history of other endocrine, nutritional and metabolic disease     History of type 1 diabetes mellitus    Personal history of other mental and behavioral disorders 2018    History of anxiety    Unspecified asthma, uncomplicated ([facility]) 10/05/2022    Asthma   [2]   Past Surgical History:  Procedure Laterality Date    CARDIAC CATHETERIZATION       SECTION, CLASSIC   2014     Section    COLONOSCOPY  2013    Complete Colonoscopy    SMALL INTESTINE SURGERY  2016    Small Bowel Resection   [3]   Family History  Problem Relation Name Age of Onset    Coronary artery disease Mother      Heart attack Mother     [4]   Social History  Tobacco Use    Smoking status: Never     Passive exposure: Never    Smokeless tobacco: Never   Vaping Use    Vaping status: Never Used   Substance Use Topics    Alcohol use: Never    Drug use: Never        Juani Fan PA-C  25 0121

## 2025-05-01 ENCOUNTER — APPOINTMENT (OUTPATIENT)
Dept: RADIOLOGY | Facility: HOSPITAL | Age: 75
End: 2025-05-01
Payer: MEDICARE

## 2025-05-01 VITALS
HEART RATE: 63 BPM | TEMPERATURE: 97.7 F | RESPIRATION RATE: 18 BRPM | BODY MASS INDEX: 50.02 KG/M2 | HEIGHT: 64 IN | OXYGEN SATURATION: 96 % | WEIGHT: 293 LBS | SYSTOLIC BLOOD PRESSURE: 132 MMHG | DIASTOLIC BLOOD PRESSURE: 78 MMHG

## 2025-05-01 PROCEDURE — 73552 X-RAY EXAM OF FEMUR 2/>: CPT | Mod: RT

## 2025-05-01 NOTE — DISCHARGE INSTRUCTIONS
Thank you for visiting [facility] emergency department.  It was a pleasure caring for you.    Be sure to take all medications, over the counter medications or prescription medications only as directed.     Be sure to follow up as directed in 1-2 days.  All of the details of your follow up instructions are detailed in the follow up section of this packet.    If you are being discharged with any pains medications or muscle relaxers (norco, Vicodin, hydrocodone products, Percocet, oxycodone products, flexeril, cyclobenzaprine, robaxin, norflex, brand or generic, or any other pain controlling medications with the exception of Ibuprofen and regular Tylenol, do not drive or operate machinery, climb ladders or participate in any activity that could potentially put yourself or others at risk should you get dizzy, or be/feel impaired at all.        It is important to remember that your care does not end here and you must continue to monitor your condition closely. Please return to the emergency department for any worsening or concerning signs or symptoms as directed by our conversations and the discharge instructions. Otherwise please follow up with your doctor in 2 days if no better or worse. If you do not have a doctor please contact the referral number on your discharge instructions. Please contact any physician specialists provided in your discharge notes as it is very important to follow up with them regarding your condition. If you are unable to reach the physicians provided, please come back to the Emergency Department at any time.     As always, please take medications as directed. If you have any questions at all regarding your medications, please contact the pharmacist, the emergency department, or your doctor. Before taking any medication prescribed in the Emergency Department, please review the medication side effects and drug interactions (<http://www.rxlist.com/script/main/hp.asp>) as they may interact with your  home medications.     Having trouble affording medications? Try [employer] <http://[employer]/>! (This is not a hospital endorsed website, merely a recommendation based on my own personal experiences with [employer])      Return to emergency room without delay for ANY new or worsening pains or for any other symptoms or concerns.      Return with worsening pains, nausea, vomiting, trouble breathing, palpitations, shortness of breath, inability to pass stool or urine, loss of control of stool or urine, any numbness or tingling (that is not normal for you), uncontrolled fevers, the passing of blood or other material in stool or urine, rashes, pains or for any other symptoms or concerns you may have.  You are always welcome to return to the ER at any time for any reason or for any other concerns you may have.

## 2025-05-09 DIAGNOSIS — E79.0 HYPERURICEMIA: ICD-10-CM

## 2025-05-09 RX ORDER — ALLOPURINOL 100 MG/1
50 TABLET ORAL EVERY OTHER DAY
Qty: 23 TABLET | Refills: 1 | Status: SHIPPED | OUTPATIENT
Start: 2025-05-09 | End: 2025-11-05

## 2025-05-14 ENCOUNTER — TELEPHONE (OUTPATIENT)
Dept: PRIMARY CARE | Facility: CLINIC | Age: 75
End: 2025-05-14
Payer: MEDICARE

## 2025-05-14 ENCOUNTER — APPOINTMENT (OUTPATIENT)
Dept: RADIOLOGY | Facility: HOSPITAL | Age: 75
End: 2025-05-14
Payer: MEDICARE

## 2025-05-14 ENCOUNTER — HOSPITAL ENCOUNTER (EMERGENCY)
Facility: HOSPITAL | Age: 75
Discharge: HOME | End: 2025-05-14
Attending: EMERGENCY MEDICINE
Payer: MEDICARE

## 2025-05-14 VITALS
HEART RATE: 76 BPM | TEMPERATURE: 98.2 F | DIASTOLIC BLOOD PRESSURE: 68 MMHG | RESPIRATION RATE: 20 BRPM | HEIGHT: 64 IN | OXYGEN SATURATION: 99 % | SYSTOLIC BLOOD PRESSURE: 148 MMHG | WEIGHT: 293 LBS | BODY MASS INDEX: 50.02 KG/M2

## 2025-05-14 DIAGNOSIS — N39.0 ACUTE UTI (URINARY TRACT INFECTION): ICD-10-CM

## 2025-05-14 DIAGNOSIS — N18.9 CHRONIC RENAL IMPAIRMENT, UNSPECIFIED CKD STAGE: ICD-10-CM

## 2025-05-14 DIAGNOSIS — D64.9 ANEMIA, UNSPECIFIED TYPE: ICD-10-CM

## 2025-05-14 DIAGNOSIS — M54.50 ACUTE EXACERBATION OF CHRONIC LOW BACK PAIN: Primary | ICD-10-CM

## 2025-05-14 DIAGNOSIS — G89.29 ACUTE EXACERBATION OF CHRONIC LOW BACK PAIN: Primary | ICD-10-CM

## 2025-05-14 DIAGNOSIS — I10 DIASTOLIC HYPERTENSION: ICD-10-CM

## 2025-05-14 LAB
ALBUMIN SERPL BCP-MCNC: 2.9 G/DL (ref 3.4–5)
ALP SERPL-CCNC: 79 U/L (ref 33–136)
ALT SERPL W P-5'-P-CCNC: 14 U/L (ref 7–45)
ANION GAP SERPL CALCULATED.3IONS-SCNC: 10 MMOL/L (ref 10–20)
APPEARANCE UR: ABNORMAL
AST SERPL W P-5'-P-CCNC: 19 U/L (ref 9–39)
BACTERIA #/AREA URNS AUTO: ABNORMAL /HPF
BASOPHILS # BLD AUTO: 0.02 X10*3/UL (ref 0–0.1)
BASOPHILS NFR BLD AUTO: 0.2 %
BILIRUB SERPL-MCNC: 0.4 MG/DL (ref 0–1.2)
BILIRUB UR STRIP.AUTO-MCNC: NEGATIVE MG/DL
BUN SERPL-MCNC: 42 MG/DL (ref 6–23)
CALCIUM SERPL-MCNC: 8.5 MG/DL (ref 8.6–10.3)
CHLORIDE SERPL-SCNC: 100 MMOL/L (ref 98–107)
CO2 SERPL-SCNC: 32 MMOL/L (ref 21–32)
COLOR UR: ABNORMAL
CREAT SERPL-MCNC: 2.41 MG/DL (ref 0.5–1.05)
EGFRCR SERPLBLD CKD-EPI 2021: 20 ML/MIN/1.73M*2
EOSINOPHIL # BLD AUTO: 0.09 X10*3/UL (ref 0–0.4)
EOSINOPHIL NFR BLD AUTO: 0.9 %
ERYTHROCYTE [DISTWIDTH] IN BLOOD BY AUTOMATED COUNT: 19.4 % (ref 11.5–14.5)
GLUCOSE SERPL-MCNC: 135 MG/DL (ref 74–99)
GLUCOSE UR STRIP.AUTO-MCNC: NORMAL MG/DL
HCT VFR BLD AUTO: 24.8 % (ref 36–46)
HGB BLD-MCNC: 7.3 G/DL (ref 12–16)
IMM GRANULOCYTES # BLD AUTO: 0.04 X10*3/UL (ref 0–0.5)
IMM GRANULOCYTES NFR BLD AUTO: 0.4 % (ref 0–0.9)
KETONES UR STRIP.AUTO-MCNC: NEGATIVE MG/DL
LEUKOCYTE ESTERASE UR QL STRIP.AUTO: ABNORMAL
LYMPHOCYTES # BLD AUTO: 0.92 X10*3/UL (ref 0.8–3)
LYMPHOCYTES NFR BLD AUTO: 9.1 %
MCH RBC QN AUTO: 23.2 PG (ref 26–34)
MCHC RBC AUTO-ENTMCNC: 29.4 G/DL (ref 32–36)
MCV RBC AUTO: 79 FL (ref 80–100)
MONOCYTES # BLD AUTO: 1.21 X10*3/UL (ref 0.05–0.8)
MONOCYTES NFR BLD AUTO: 11.9 %
NEUTROPHILS # BLD AUTO: 7.86 X10*3/UL (ref 1.6–5.5)
NEUTROPHILS NFR BLD AUTO: 77.5 %
NITRITE UR QL STRIP.AUTO: NEGATIVE
NRBC BLD-RTO: 0 /100 WBCS (ref 0–0)
PH UR STRIP.AUTO: 5.5 [PH]
PLATELET # BLD AUTO: 184 X10*3/UL (ref 150–450)
POTASSIUM SERPL-SCNC: 4.2 MMOL/L (ref 3.5–5.3)
PROT SERPL-MCNC: 6.7 G/DL (ref 6.4–8.2)
PROT UR STRIP.AUTO-MCNC: ABNORMAL MG/DL
RBC # BLD AUTO: 3.14 X10*6/UL (ref 4–5.2)
RBC # UR STRIP.AUTO: ABNORMAL MG/DL
RBC #/AREA URNS AUTO: ABNORMAL /HPF
SODIUM SERPL-SCNC: 138 MMOL/L (ref 136–145)
SP GR UR STRIP.AUTO: 1.01
SQUAMOUS #/AREA URNS AUTO: ABNORMAL /HPF
UROBILINOGEN UR STRIP.AUTO-MCNC: NORMAL MG/DL
WBC # BLD AUTO: 10.1 X10*3/UL (ref 4.4–11.3)
WBC #/AREA URNS AUTO: >50 /HPF
WBC CLUMPS #/AREA URNS AUTO: ABNORMAL /HPF

## 2025-05-14 PROCEDURE — 80053 COMPREHEN METABOLIC PANEL: CPT | Performed by: PHYSICIAN ASSISTANT

## 2025-05-14 PROCEDURE — 96375 TX/PRO/DX INJ NEW DRUG ADDON: CPT

## 2025-05-14 PROCEDURE — 2500000001 HC RX 250 WO HCPCS SELF ADMINISTERED DRUGS (ALT 637 FOR MEDICARE OP): Performed by: PHYSICIAN ASSISTANT

## 2025-05-14 PROCEDURE — 99285 EMERGENCY DEPT VISIT HI MDM: CPT | Mod: 25 | Performed by: EMERGENCY MEDICINE

## 2025-05-14 PROCEDURE — 36415 COLL VENOUS BLD VENIPUNCTURE: CPT | Performed by: PHYSICIAN ASSISTANT

## 2025-05-14 PROCEDURE — 85025 COMPLETE CBC W/AUTO DIFF WBC: CPT | Performed by: PHYSICIAN ASSISTANT

## 2025-05-14 PROCEDURE — 74176 CT ABD & PELVIS W/O CONTRAST: CPT | Performed by: RADIOLOGY

## 2025-05-14 PROCEDURE — 73590 X-RAY EXAM OF LOWER LEG: CPT | Mod: 50

## 2025-05-14 PROCEDURE — 2500000004 HC RX 250 GENERAL PHARMACY W/ HCPCS (ALT 636 FOR OP/ED): Performed by: PHYSICIAN ASSISTANT

## 2025-05-14 PROCEDURE — 96374 THER/PROPH/DIAG INJ IV PUSH: CPT

## 2025-05-14 PROCEDURE — 71250 CT THORAX DX C-: CPT | Performed by: RADIOLOGY

## 2025-05-14 PROCEDURE — 87086 URINE CULTURE/COLONY COUNT: CPT | Mod: WESLAB | Performed by: PHYSICIAN ASSISTANT

## 2025-05-14 PROCEDURE — 73590 X-RAY EXAM OF LOWER LEG: CPT | Mod: BILATERAL PROCEDURE | Performed by: INTERNAL MEDICINE

## 2025-05-14 PROCEDURE — 71250 CT THORAX DX C-: CPT

## 2025-05-14 PROCEDURE — 81001 URINALYSIS AUTO W/SCOPE: CPT | Performed by: PHYSICIAN ASSISTANT

## 2025-05-14 PROCEDURE — 96361 HYDRATE IV INFUSION ADD-ON: CPT

## 2025-05-14 RX ORDER — TRAMADOL HYDROCHLORIDE 50 MG/1
50 TABLET, FILM COATED ORAL EVERY 8 HOURS PRN
COMMUNITY

## 2025-05-14 RX ORDER — CEPHALEXIN 500 MG/1
500 CAPSULE ORAL 3 TIMES DAILY
Qty: 21 CAPSULE | Refills: 0 | Status: SHIPPED | OUTPATIENT
Start: 2025-05-14 | End: 2025-05-14

## 2025-05-14 RX ORDER — DEXAMETHASONE SODIUM PHOSPHATE 10 MG/ML
10 INJECTION INTRAMUSCULAR; INTRAVENOUS ONCE
Status: COMPLETED | OUTPATIENT
Start: 2025-05-14 | End: 2025-05-14

## 2025-05-14 RX ORDER — OXYCODONE HYDROCHLORIDE 5 MG/1
5 TABLET ORAL EVERY 6 HOURS PRN
Qty: 12 TABLET | Refills: 0 | Status: SHIPPED | OUTPATIENT
Start: 2025-05-14 | End: 2025-05-17

## 2025-05-14 RX ORDER — ACETAMINOPHEN 500 MG
1000 TABLET ORAL ONCE
Status: DISCONTINUED | OUTPATIENT
Start: 2025-05-14 | End: 2025-05-14

## 2025-05-14 RX ORDER — OXYCODONE AND ACETAMINOPHEN 5; 325 MG/1; MG/1
1 TABLET ORAL EVERY 6 HOURS PRN
COMMUNITY
End: 2025-05-15

## 2025-05-14 RX ORDER — SERTRALINE HYDROCHLORIDE 50 MG/1
50 TABLET, FILM COATED ORAL DAILY
COMMUNITY

## 2025-05-14 RX ORDER — CEPHALEXIN 500 MG/1
500 CAPSULE ORAL 3 TIMES DAILY
Qty: 21 CAPSULE | Refills: 0 | Status: SHIPPED | OUTPATIENT
Start: 2025-05-14 | End: 2025-05-21

## 2025-05-14 RX ORDER — ORPHENADRINE CITRATE 30 MG/ML
60 INJECTION INTRAMUSCULAR; INTRAVENOUS ONCE
Status: COMPLETED | OUTPATIENT
Start: 2025-05-14 | End: 2025-05-14

## 2025-05-14 RX ORDER — CEPHALEXIN 500 MG/1
500 CAPSULE ORAL ONCE
Status: COMPLETED | OUTPATIENT
Start: 2025-05-14 | End: 2025-05-14

## 2025-05-14 RX ORDER — OXYCODONE HYDROCHLORIDE 5 MG/1
5 TABLET ORAL ONCE
Refills: 0 | Status: COMPLETED | OUTPATIENT
Start: 2025-05-14 | End: 2025-05-14

## 2025-05-14 RX ORDER — ACETAMINOPHEN 500 MG
1000 TABLET ORAL EVERY 6 HOURS PRN
Qty: 30 TABLET | Refills: 0 | Status: SHIPPED | OUTPATIENT
Start: 2025-05-14 | End: 2025-06-13

## 2025-05-14 RX ADMIN — OXYCODONE HYDROCHLORIDE 5 MG: 5 TABLET ORAL at 12:55

## 2025-05-14 RX ADMIN — CEPHALEXIN 500 MG: 500 CAPSULE ORAL at 20:17

## 2025-05-14 RX ADMIN — SODIUM CHLORIDE 500 ML: 9 INJECTION, SOLUTION INTRAVENOUS at 16:40

## 2025-05-14 RX ADMIN — DEXAMETHASONE SODIUM PHOSPHATE 10 MG: 10 INJECTION INTRAMUSCULAR; INTRAVENOUS at 11:50

## 2025-05-14 RX ADMIN — OXYCODONE HYDROCHLORIDE 5 MG: 5 TABLET ORAL at 18:44

## 2025-05-14 RX ADMIN — ORPHENADRINE CITRATE 60 MG: 60 INJECTION INTRAMUSCULAR; INTRAVENOUS at 11:50

## 2025-05-14 ASSESSMENT — PAIN - FUNCTIONAL ASSESSMENT
PAIN_FUNCTIONAL_ASSESSMENT: 0-10
PAIN_FUNCTIONAL_ASSESSMENT: 0-10

## 2025-05-14 ASSESSMENT — PAIN SCALES - GENERAL
PAINLEVEL_OUTOF10: 10 - WORST POSSIBLE PAIN
PAINLEVEL_OUTOF10: 5 - MODERATE PAIN
PAINLEVEL_OUTOF10: 5 - MODERATE PAIN

## 2025-05-14 NOTE — TELEPHONE ENCOUNTER
Daughter states they want her admitted so she can be switched to a different nursing home; also recommended she see a pain management doctor.

## 2025-05-14 NOTE — TELEPHONE ENCOUNTER
Patient stated that the doctor at [facility] is requesting to speak to you. She stated the doctor is stating they aren't aware of the transfer. The daughter is requesting a call to the number [phone].

## 2025-05-14 NOTE — PROGRESS NOTES
Pharmacy Medication History Review    Analia Murray is a 74 y.o. female. Pharmacy reviewed the patient's [id number] medications and allergies for accuracy.    Medications ADDED:  Tramadol 50mg  Oxycodone-acetaminophen 5/325mg  Epogen  Lokelma  Sertraline 50mg   Medications CHANGED:  Advair diskus - not taking - not on facility list  Hydrocortisone 2.5% cream - not taking - not on facility list  Nystatin powder - not taking - not on facility list  Sertraline 50mg -  - new entry added  Medications REMOVED:   None    The list below reflects the updated PTA list. Comments regarding how patient may be taking medications differently can be found in the Admit Orders Activity  Prior to Admission Medications   Prescriptions Last Dose Informant   Ventolin HFA 90 mcg/actuation inhaler Unknown Other   Sig: USE 2 INHALATIONS BY MOUTH EVERY 4 HOURS AS NEEDED FOR WHEEZING   allopurinol (Zyloprim) 100 mg tablet Unknown Other   Sig: TAKE 0.5 TABLETS (50 MG) BY MOUTH EVERY OTHER DAY.   amLODIPine (Norvasc) 10 mg tablet Unknown Morning Other   Sig: Take 1 tablet (10 mg) by mouth once daily.   aspirin 81 mg EC tablet Unknown Morning Other   Sig: Take 1 tablet (81 mg) by mouth once daily.   calcium acetate (Phoslo) 667 mg capsule Unknown Other   Sig: Take 1 capsule (667 mg) by mouth 3 times a day after meals.   cholecalciferol (Vitamin D-3) 25 MCG (1000 UT) capsule Unknown Morning Other   Sig: Take 1 capsule (25 mcg) by mouth once daily.   epoetin patricia (Procrit) 10,000 unit/mL injection Unknown Other   Sig: Inject 0.5 mL (5,000 Units) under the skin 1 (one) time per week.    ezetimibe (Zetia) 10 mg tablet Unknown Morning Other   Sig: TAKE 1 TABLET BY MOUTH ONCE  DAILY   ferrous sulfate  mg ER tablet Unknown Morning Other   Sig: Take 1 tablet by mouth once daily.   fluticasone (Flonase) 50 mcg/actuation nasal spray Unknown Other   Sig: Administer 2 sprays into each nostril 2 times a day.   fluticasone  furoate-vilanteroL (Breo Ellipta) 100-25 mcg/dose inhaler Unknown Morning Other   Sig: Inhale 1 puff once daily.   gabapentin (Neurontin) 100 mg capsule Unknown Other   Sig: Take 1 capsule (100 mg) by mouth 3 times a day.   hydrALAZINE (Apresoline) 50 mg tablet Unknown Other   Sig: Take 1 tablet (50 mg) by mouth 2 times a day.   inhalational spacing device (Aerochamber MV) inhaler Unknown Other   Sig: Use as instructed   levothyroxine (Synthroid, Levoxyl) 200 mcg tablet Unknown Other   Sig: Take 1 tablet (200 mcg) by mouth early in the morning.. Take on an empty stomach at the same time each day, either 30 to 60 minutes prior to breakfast   melatonin 5 mg capsule Unknown Other   Sig: Take 1 capsule (5 mg) by mouth once daily at bedtime.   metoprolol tartrate (Lopressor) 50 mg tablet Unknown Other   Sig: Take 1 tablet by mouth 2 times a day.   montelukast (Singulair) 10 mg tablet Unknown Evening Other   Sig: Take 1 tablet (10 mg) by mouth once daily at bedtime.   nitroglycerin (Nitrostat) 0.4 mg SL tablet Unknown Other   Sig: Place 1 tablet (0.4 mg) under the tongue every 5 minutes if needed for chest pain.   oxyCODONE-acetaminophen (Percocet) 5-325 mg tablet Unknown Other   Sig: Take 1 tablet by mouth every 6 hours if needed for severe pain (7 - 10).   oxygen (O2) gas therapy Unknown Other   Sig: Inhale 1 each once every 24 hours. At night Mode: Bipap/Bilevel Ventilation type: AVAPS Set Tidal Volume: 500 Max P: 25 Min P: 15 Expiratory pressure: 10 Inspiratory Time (sec): 0.9 Rise: 3 Set Resp Rate: 18 AVAPS Rate: 18 HS and PRN   pravastatin (Pravachol) 40 mg tablet Unknown Evening Other   Sig: TAKE 1 TABLET BY MOUTH ONCE  DAILY AT BEDTIME   sertraline (Zoloft) 50 mg tablet Unknown Morning Other   Sig: Take 1 tablet (50 mg) by mouth once daily.   sodium zirconium cyclosilicate (Lokelma) 10 gram packet Unknown Other   Sig: Take 10 g by mouth once daily.   traMADol (Ultram) 50 mg tablet Unknown Other   Sig: Take 1  tablet (50 mg) by mouth every 8 hours if needed for severe pain (7 - 10).      Facility-Administered Medications Last Administration Doses Remaining   dulaglutide (TRULICITY) injection 0.75 mg None recorded 1           The list below reflects the updated allergy list. Please review each documented allergy for additional clarification and justification.  Allergies  Reviewed by Sofy Fernandez CPhT on 5/14/2025        Severity Reactions Comments    Ciprofloxacin High Itching, Nausea Only, Rash, Other Chills, N and V    Morphine High Hallucinations, Unknown     Penicillins High Anaphylaxis, Hives, Itching, Swelling > 20 yrs ago            Pharmacy has been updated to [facility].    Sources used to complete the med history include facility medication list. THIS FACILITY DOES NOT INCLUDE LAST DOSES ON THE MAR THEY SEND WITH THE PATIENT - [facility] - [phone]    Below are additional concerns with the patient's PTA list.  None     Sofy Fernandez CPhT-Adv  Please reach out via [facility] Secure Chat for questions

## 2025-05-14 NOTE — DISCHARGE INSTRUCTIONS
Follow-up with your primary care physician within 1 to 2 days for further management of your current symptoms and repeat check of your blood pressure.    Follow-up with orthospine and pain management as scheduled      Return to the emergency department sooner with worsening of symptoms or onset of new symptoms

## 2025-05-14 NOTE — ED PROVIDER NOTES
HPI   Chief Complaint   Patient presents with    Back Pain     Pt has c/o lower back sharp pains that radiates down both legs. Pain has been worsening over the last two weeks. Current medication of percocet no longer helps        HPI  This is a 74-year-old female presenting to the emergency room for evaluation of chronic low back pain.  Patient states this been ongoing for the last several weeks.  Is been gradually worsening.  No injury or trauma.  She does have pain going down both of her legs.  She is at a long-term care facility.  She was brought into the emergency department for further evaluation and management of this chronic low back pain.  Patient was initially started on Ultram and then switched to Percocet.  She has not had any improvement of her pain.  She is quite frustrated upon arrival.  Nursing facility wanted her to be evaluated for a renal infection.  Patient denies any loss of bowel or bladder function, no saddle anesthesia.  No fevers or chills.  Patient is nonambulatory at baseline.    Please see HPI for pertinent positive and negative ROS.      Patient History   Medical History[1]  Surgical History[2]  Family History[3]  Social History[4]    Physical Exam   ED Triage Vitals [05/14/25 1108]   Temperature Heart Rate Respirations BP   36.8 °C (98.2 °F) 67 19 120/59      Pulse Ox Temp src Heart Rate Source Patient Position   98 % -- -- Lying      BP Location FiO2 (%)     Left arm --       Physical Exam  GENERAL APPEARANCE: Awake and alert. No acute respiratory distress.   VITAL SIGNS: As per the nurses' triage record.  HEENT: Normocephalic, atraumatic.   NECK: Soft, nontender and supple  CHEST: Nontender to palpation. Clear to auscultation bilaterally. Symmetric rise and fall of chest wall.   HEART: Clear S1 and S2. Regular rate and rhythm. Strong and equal pulses in the extremities.  ABDOMEN: Soft, nontender, nondistended, obesity MUSCULOSKELETAL: Patient does have range of motion of arms and legs.   She is unable to lift her legs off the bed much due to chronic edema  NEUROLOGICAL: Awake, alert and oriented x 3. Motor power intact in the upper and lower extremities. Sensation is intact to light touch in the upper and lower extremities. Patient answering questions appropriately.   IMMUNOLOGICAL: No lymphatic streaking noted  DERMATOLOGIC: Warm and dry   PYSCH: Cooperative     ED Course & MDM   Diagnoses as of 05/14/25 1850   Acute exacerbation of chronic low back pain   Diastolic hypertension   Anemia, unspecified type   Chronic renal impairment, unspecified CKD stage   Acute UTI (urinary tract infection)                 No data recorded     Sheng Coma Scale Score: 15 (05/14/25 1102 : Mariel Miller LPN)                           Medical Decision Making  Parts of this chart have been completed using voice recognition software. Please excuse any errors of transcription.  My thought process and reason for plan has been formulated from the time that I saw the patient until the time of disposition and is not specific to one specific moment during their visit and furthermore my MDM encompasses this entire chart and not only this text box.      HPI: Detailed above.    Exam: A medically appropriate exam performed, outlined above, given the known history and presentation.    History obtained from: Patient    Medications given during visit:  Medications   cephalexin (Keflex) capsule 500 mg (has no administration in time range)   orphenadrine (Norflex) injection 60 mg (60 mg intravenous Given 5/14/25 1150)   dexAMETHasone (Decadron) injection 10 mg (10 mg intravenous Given 5/14/25 1150)   oxyCODONE (Roxicodone) immediate release tablet 5 mg (5 mg oral Given 5/14/25 1255)   sodium chloride 0.9 % bolus 500 mL (0 mL intravenous Stopped 5/14/25 1830)   oxyCODONE (Roxicodone) immediate release tablet 5 mg (5 mg oral Given 5/14/25 1844)        Diagnostic/tests  Labs Reviewed   CBC WITH AUTO DIFFERENTIAL - Abnormal        Result Value    WBC 10.1      nRBC 0.0      RBC 3.14 (*)     Hemoglobin 7.3 (*)     Hematocrit 24.8 (*)     MCV 79 (*)     MCH 23.2 (*)     MCHC 29.4 (*)     RDW 19.4 (*)     Platelets 184      Neutrophils % 77.5      Immature Granulocytes %, Automated 0.4      Lymphocytes % 9.1      Monocytes % 11.9      Eosinophils % 0.9      Basophils % 0.2      Neutrophils Absolute 7.86 (*)     Immature Granulocytes Absolute, Automated 0.04      Lymphocytes Absolute 0.92      Monocytes Absolute 1.21 (*)     Eosinophils Absolute 0.09      Basophils Absolute 0.02     COMPREHENSIVE METABOLIC PANEL - Abnormal    Glucose 135 (*)     Sodium 138      Potassium 4.2      Chloride 100      Bicarbonate 32      Anion Gap 10      Urea Nitrogen 42 (*)     Creatinine 2.41 (*)     eGFR 20 (*)     Calcium 8.5 (*)     Albumin 2.9 (*)     Alkaline Phosphatase 79      Total Protein 6.7      AST 19      Bilirubin, Total 0.4      ALT 14     URINALYSIS WITH REFLEX CULTURE AND MICROSCOPIC - Abnormal    Color, Urine Light-Orange (*)     Appearance, Urine Ex.Turbid (*)     Specific Gravity, Urine 1.015      pH, Urine 5.5      Protein, Urine 50 (1+) (*)     Glucose, Urine Normal      Blood, Urine 0.1 (1+) (*)     Ketones, Urine NEGATIVE      Bilirubin, Urine NEGATIVE      Urobilinogen, Urine Normal      Nitrite, Urine NEGATIVE      Leukocyte Esterase, Urine 500 Cameron/uL (*)    MICROSCOPIC ONLY, URINE - Abnormal    WBC, Urine >50 (*)     WBC Clumps, Urine MANY      RBC, Urine 3-5      Squamous Epithelial Cells, Urine 10-25 (FEW)      Bacteria, Urine 4+ (*)    URINE CULTURE   URINALYSIS WITH REFLEX CULTURE AND MICROSCOPIC    Narrative:     The following orders were created for panel order Urinalysis with Reflex Culture and Microscopic.  Procedure                               Abnormality         Status                     ---------                               -----------         ------                     Urinalysis with Reflex C...[[id number]]  Abnormal             Final result               Extra Urine Gray Tube[[id number]]                            In process                   Please view results for these tests on the individual orders.   EXTRA URINE GRAY TUBE      XR tibia fibula bilateral 2 views   Final Result   Moderate to severe osteoarthritis of the bilateral knees. No acute   fracture or traumatic malalignment.             MACRO:   None        Signed by: Catalina Vo 5/14/2025 2:50 PM   Dictation workstation:   [id number]      CT chest abdomen pelvis wo IV contrast   Final Result   No acute significant abnormalities.        Progressive degenerative changes involving the lumbar spine.        MACRO:   None        Signed by: Earl Bravo 5/14/2025 1:04 PM   Dictation workstation:   [id number]           Considerations/further MDM:  Patient was seen in conjucntion with my supervising physician,  Dr. Phelps. Please refer to her note.    Patient presents for worsening low back discomfort.  No injury or trauma.  CT chest, abdomen and pelvis without IV contrast shows no acute significant abnormalities.  Aggressive degenerative changes involving the lumbar spine.    CBC shows no leukocytosis.  Microcytic anemia.  CMP with stable renal function consistent with CKD.    Urinalysis was evidence of urinary tract infection.  Patient was given oral cephalexin and discharged with a prescription for cephalexin.    Case was discussed with her PCP, Dr. Sanchez and she plans to have patient follow-up with both pain management and orthospine.  She was made aware that the patient arrived to the emergency department today with increasing pain.    Patient was educated on lab and imaging findings.  Educated on plan for discharge with pain management and orthospine follow-up.  Return precautions were discussed.  Discharged in stable condition.        Procedure  Procedures         [1]   Past Medical History:  Diagnosis Date    CHF (congestive heart failure)     Chronic kidney  disease     Coronary artery disease     Cough, unspecified 2017    Cough    Diverticulitis of large intestine with perforation and abscess without bleeding 10/21/2016    Colonic diverticular abscess    Fistula of intestine 2017    Enterocutaneous fistula    Hyperlipidemia     Hypertension     Morbid (severe) obesity due to excess calories (Multi)     Morbid obesity    Obstructive sleep apnea (adult) (pediatric) 2018    HOLDEN on CPAP    Other conditions influencing health status     DEXA Body Composition Study    Personal history of other diseases of the circulatory system     History of hypertension    Personal history of other diseases of the digestive system 2018    History of esophagitis    Personal history of other diseases of the digestive system 2017    History of diverticulitis    Personal history of other endocrine, nutritional and metabolic disease 2020    History of hyperlipidemia    Personal history of other endocrine, nutritional and metabolic disease     History of hyperlipidemia    Personal history of other endocrine, nutritional and metabolic disease     History of type 1 diabetes mellitus    Personal history of other mental and behavioral disorders 2018    History of anxiety    Unspecified asthma, uncomplicated ([facility]) 10/05/2022    Asthma   [2]   Past Surgical History:  Procedure Laterality Date    CARDIAC CATHETERIZATION       SECTION, CLASSIC  2014     Section    COLONOSCOPY  2013    Complete Colonoscopy    SMALL INTESTINE SURGERY  2016    Small Bowel Resection   [3]   Family History  Problem Relation Name Age of Onset    Coronary artery disease Mother      Heart attack Mother     [4]   Social History  Tobacco Use    Smoking status: Never     Passive exposure: Never    Smokeless tobacco: Never   Vaping Use    Vaping status: Never Used   Substance Use Topics    Alcohol use: Never    Drug use: Never        Thais Solano,  BETTE  05/14/25 2140

## 2025-05-14 NOTE — PROGRESS NOTES
Attestation/Supervisory note for ADAMA Solano      The patient is a 74-year-old female presenting to the emergency department for evaluation of low back pain.  The patient reports that this has been an ongoing issue for several weeks to months and it has been gradually worsening.  She states that has been worse over the past several days.  She states that she has been sent to the emergency room from the [facility] where she resides previously for the same symptoms.  She states that she is frustrated because she does not feel that she is getting any better.  She was reportedly on Ultram initially and then was switched to Percocet but she still has pain.  She states that she has chronic weakness of her lower extremities and has to walk with a walker because of her weakness and pain.  She denies any recent injury or trauma.  No headache or visual changes.  No chest pain or shortness of breath.  No abdominal pain.  No nausea or vomiting.  No diarrhea or constipation.  No urinary complaints.  No vaginal discharge.  She denies any urinary retention or incontinence.  She denies any history of IV drug abuse.  No history of malignancy.  No fever or chills.  All pertinent positives and negatives are recorded above.  All other systems reviewed and otherwise negative.  Vital signs within normal limits.  Physical exam with a well-nourished well-developed female with morbid obesity but no evidence of acute distress.  HEENT exam with dry mucous membranes but otherwise unremarkable.  She does not have any evidence of airway compromise or respiratory distress.  Abdominal exam is benign.  She does have bilateral lower extremity pitting edema.  She does not have any focal midline neck or back pain with palpation.  No step-offs.  She does have generalized weakness of all 4 extremities but it is symmetric.  She reports that it is baseline for her.  She does have palpable pulses in all 4 extremities.  They were equal  bilaterally.      The patient's family members did arrive to the emergency room shortly after her arrival and another daughter of the patient called into the emergency room.  They all verbalized their frustration that they feel that this is a chronic issue that has been gradually worsening but they do not feel that it is being addressed appropriately.  They report that every time she complains of pain at the long[facility] facility that she is given a pain pill and then sent to the emergency room but she never received any further follow-up for her symptoms and they are becoming frustrated that she seems to call them every night to report that she is in pain and is crying and she cannot get any further care other than being given a pain pill and sent to the emergency room.  I did explain to them that I do understand their frustration and that I would try to reach out to their primary care provider to try to coordinate a care plan for the patient and to ensure that she gets timely follow-up but in the meantime, we would assess her for any life or limb threats in the emergency room as the possible source of her symptoms.  The patient initially was very reluctant to have any diagnostic imaging including a CT scan but she ultimately agreed to proceed with a CT scan for further evaluation of her symptoms.  The patient does not want any MRI imaging at this time but would agree to possibly trying it as an outpatient if it open MRI is available.      Oral acetaminophen, IV Decadron, IM Norflex and oral oxycodone ordered with improvement in her symptoms.      Diagnostic labs with chronic renal insufficiency, anemia but otherwise unremarkable.  The results of the urinalysis were pending at the time of my departure.      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 No acute   fracture or traumatic malalignment.             MACRO:   None        Signed by: Catalina  Tavo 5/14/2025 2:50 PM   Dictation workstation:   [id number]      CT chest abdomen pelvis wo IV contrast   Final Result   No acute significant abnormalities.        Progressive degenerative changes involving the lumbar spine.        MACRO:   None        Signed by: Earl Bravo 5/14/2025 1:04 PM   Dictation workstation:   [id number]           The patient does not have any evidence of airway compromise or respiratory distress on exam but she does not have any gross motor, neurologic or vascular deficits on exam other than generalized weakness of all 4 extremities that she reports is a chronic issue.  She has equal pulses bilaterally.  She does have lower extremity pitting edema which she reports is chronic as well.  Diagnostic labs with chronic renal insufficiency and chronic anemia but without acute process.  CT chest abdomen pelvis shows no acute process.  No evidence of pneumonia or pneumothorax.  No evidence of CHF.  No widening of the mediastinum.  She has progressive degenerative changes involving the lumbar spine but no evidence of acute fracture or dislocation.  The patient did have improvement in her symptoms with medications given in the emergency room.  She does have evidence of degenerative joint disease on diagnostic imaging of her lower extremities.      I did discuss the patient care and plan for follow-up with her primary care provider, Dr. Sanchez.  Dr. Sanchez does report that the patient's pain is a chronic issue and that she has been seen previously, in 2022, by a specialist for her pain but she had declined MRI at that time as well.  She does have known degenerative disease of her lumbar spine with chronic pain syndrome.  She states that she feels that it would be in the patient's interest to follow-up with a pain management specialist and to get an MRI but is agreeable for this to be done as an outpatient.  The patient is also agreeable with plan to discharge to home with outpatient  follow-up with her primary care provider for further management of her symptoms, consultation with a pain management specialist which will be arranged by her primary care provider and a referral to orthospine.  She will consider having an MRI as an outpatient if it is an open MRI.      Results of the urinalysis were pending at the end of my departure.  ADAMA Solano will continue to manage the patient primarily.  Anticipate that she will be released to go back to the long-term care facility where she resides but will provide a prescription for antibiotics if it is warranted based on the urinalysis results.      Impression/diagnosis:  Low back pain, acute on chronic  Chronic renal insufficiency  Anemia, unspecified      I personally saw the patient and made/approve the management plan and take responsibility for the patient management.      I independently interpreted the following study (S)diagnostic labs      I personally discussed the patient's management with the patient and her family member      I reviewed the results of the diagnostic labs and diagnostic imaging.  Formal radiology read was completed by the radiologist.      Tammy Phelps MD

## 2025-05-15 LAB — HOLD SPECIMEN: NORMAL

## 2025-05-17 LAB — BACTERIA UR CULT: ABNORMAL

## 2025-05-19 ENCOUNTER — TELEPHONE (OUTPATIENT)
Dept: PHARMACY | Facility: HOSPITAL | Age: 75
End: 2025-05-19
Payer: MEDICARE

## 2025-05-19 NOTE — PROGRESS NOTES
EDPD Note: Lab/Chart Reviewed    Reviewed Mr./Mrs./Ms. Analia Murray 's chart regarding a positive urine culture/result that was taken during their recent emergency room visit. The patient was transferred back to their rehab/LTC facility .Therefore, I have faxed this information to [facility] [place] at fax number [phone].    Susceptibility data from last 90 days.  Collected Specimen Info Organism Amoxicillin/Clavulanate Ampicillin Ampicillin/Sulbactam Aztreonam Cefazolin Cefazolin (uncomplicated UTIs only) Cefepime Cefotaxime Ceftazidime Ceftriaxone Cefuroxime (oral)   05/14/25 Urine from Clean Catch/Voided Escherichia coli  S  R  R  R  R  R  R  R  R  R  R     Collected Specimen Info Organism Ciprofloxacin Ertapenem Gentamicin Levofloxacin Meropenem Nitrofurantoin Piperacillin/Tazobactam Trimethoprim/Sulfamethoxazole   05/14/25 Urine from Clean Catch/Voided Escherichia coli  R  S  S  R  S  S  S  R       No further follow up needed from EDPD Team.     Elena Tay PharmD   Meds PGY-1 Resident   [phone]

## 2025-05-28 ENCOUNTER — OFFICE VISIT (OUTPATIENT)
Dept: PAIN MEDICINE | Facility: HOSPITAL | Age: 75
End: 2025-05-28
Payer: MEDICARE

## 2025-05-28 DIAGNOSIS — E66.813 CLASS 3 SEVERE OBESITY DUE TO EXCESS CALORIES WITH SERIOUS COMORBIDITY AND BODY MASS INDEX (BMI) OF 50.0 TO 59.9 IN ADULT: ICD-10-CM

## 2025-05-28 DIAGNOSIS — G47.30 SEVERE SLEEP APNEA: ICD-10-CM

## 2025-05-28 DIAGNOSIS — N18.4 CHRONIC KIDNEY DISEASE (CKD), STAGE IV (SEVERE) (MULTI): ICD-10-CM

## 2025-05-28 DIAGNOSIS — M54.16 LUMBAR RADICULOPATHY: Primary | ICD-10-CM

## 2025-05-28 PROCEDURE — 3044F HG A1C LEVEL LT 7.0%: CPT | Performed by: PAIN MEDICINE

## 2025-05-28 PROCEDURE — 99214 OFFICE O/P EST MOD 30 MIN: CPT | Performed by: PAIN MEDICINE

## 2025-05-28 PROCEDURE — G2211 COMPLEX E/M VISIT ADD ON: HCPCS | Performed by: PAIN MEDICINE

## 2025-05-28 PROCEDURE — 1125F AMNT PAIN NOTED PAIN PRSNT: CPT | Performed by: PAIN MEDICINE

## 2025-05-28 ASSESSMENT — PAIN - FUNCTIONAL ASSESSMENT: PAIN_FUNCTIONAL_ASSESSMENT: 0-10

## 2025-05-28 ASSESSMENT — PAIN SCALES - GENERAL: PAINLEVEL_OUTOF10: 8

## 2025-05-28 NOTE — PROGRESS NOTES
Subjective   Patient ID: Analia Murray is a 74 y.o. female presenting with lower back pain with radicular symptoms down the bilateral lower extremities.      HPI:   Analia Murray is a 74 y.o. female presenting with lower back pain with radicular symptoms down the bilateral lower extremities.  Patient reportedly has a history of hypertension, hyperlipidemia, diabetes, coronary artery disease. CT scan of lumbar spine completed 03/2022 revealed diffuse mod-severe spondylosis with central canal stenosis.  Patient's pain has reportedly worsened over the past 2 months.  Patient is reportedly almost entirely wheelchair-bound at this point, because of her excessive pain.  Patient reportedly resides in a nursing home.  Patient's pain has reportedly been controlled.  Tramadol, Percocet, acetaminophen.  Patient has reportedly tried physical therapy in the past, but it has been too painful.  Patient is interested in undergoing an epidural steroid injection for pain. Patient has reportedly previously had lumbar epidural steroid injections by prior pain management physician in the past.  Patient describes her pain as 9/10 intensity and sharp in nature.    Review of Systems   13-point ROS done and negative except for HPI.     Current Outpatient Medications   Medication Instructions    acetaminophen (TYLENOL) 1,000 mg, oral, Every 6 hours PRN    allopurinol (ZYLOPRIM) 50 mg, oral, Every other day    amLODIPine (NORVASC) 10 mg, oral, Daily    aspirin 81 mg EC tablet 1 tablet, oral, Daily    calcium acetate (PHOSLO) 667 mg, oral, 3 times daily after meals    cholecalciferol (Vitamin D-3) 25 MCG (1000 UT) capsule Take 1 capsule (25 mcg) by mouth once daily.    epoetin patricia (PROCRIT) 5,000 Units, subcutaneous, Once Weekly, Mondays     ezetimibe (ZETIA) 10 mg, oral, Daily    ferrous sulfate  mg ER tablet 1 tablet, oral, Daily    fluticasone (Flonase) 50 mcg/actuation nasal spray 2 sprays, Each Nostril, 2 times daily     fluticasone furoate-vilanteroL (Breo Ellipta) 100-25 mcg/dose inhaler 1 puff, inhalation, Daily    fluticasone propion-salmeteroL (Advair Diskus) 250-50 mcg/dose diskus inhaler 1 puff, inhalation, 2 times daily RT, Rinse mouth with water after use to reduce aftertaste and incidence of candidiasis. Do not swallow.    gabapentin (NEURONTIN) 100 mg, oral, 3 times daily    hydrALAZINE (APRESOLINE) 50 mg, oral, 2 times daily    hydrocortisone 2.5 % cream Topical, 2 times daily    inhalational spacing device (Aerochamber MV) inhaler Use as instructed    levothyroxine (SYNTHROID, LEVOXYL) 200 mcg, oral, Daily, Take on an empty stomach at the same time each day, either 30 to 60 minutes prior to breakfast    metoprolol tartrate (LOPRESSOR) 50 mg, oral, 2 times daily    montelukast (Singulair) 10 mg tablet 1 tablet, oral, Nightly    nitroglycerin (NITROSTAT) 0.4 mg, sublingual, Every 5 min PRN    nystatin (Mycostatin) 100,000 unit/gram powder 1 Application, Topical, 3 times daily    oxygen (O2) gas therapy 1 each, inhalation, Every 24 hours, At night Mode: Bipap/Bilevel Ventilation type: AVAPS Set Tidal Volume: 500 Max P: 25 Min P: 15 Expiratory pressure: 10 Inspiratory Time (sec): 0.9 Rise: 3 Set Resp Rate: 18 AVAPS Rate: 18 HS and PRN    pravastatin (PRAVACHOL) 40 mg, oral, Nightly    sertraline (ZOLOFT) 50 mg, oral, Daily    sertraline (ZOLOFT) 50 mg, oral, Daily    sodium zirconium cyclosilicate (LOKELMA) 10 g, oral, Daily    traMADol (ULTRAM) 50 mg, oral, Every 8 hours PRN    Ventolin HFA 90 mcg/actuation inhaler USE 2 INHALATIONS BY MOUTH EVERY 4 HOURS AS NEEDED FOR WHEEZING       Medical History[1]     Surgical History[2]     Family History[3]     RX Allergies[4]     Objective     There were no vitals filed for this visit.     Physical Exam  General: NAD, well groomed, well nourished, patient in wheel chair  Eyes: Non-icteric sclera, EOMI  Ears, Nose, Mouth, and Throat: External ears and nose appear to be without  deformity or rash. No lesions or masses noted. Hearing is grossly intact.   Neck: Trachea midline  Respiratory: Nonlabored breathing   Cardiovascular: Peripheral edema noted in bilateral lower extremities  Skin: No rashes or open lesions/ulcers identified on skin.        Assessment/Plan   Analia Murray is a 74 y.o. female presenting with lower back pain with radicular symptoms down the bilateral lower extremities. Patient history and evaluation consistent with lumbar radiculopathy.     Plan:  - Patient to be scheduled for L5-S1 lumbar interlaminar epidural steroid injection under fluoroscopy    Medical Necessity  The patient has failed conservative treatment including different classes of medications and physical therapy.  Patient continues to rate their pain as moderate to severe, affecting quality of sleep, quality of life and  significantly impairing daily activities (ADLs)   We discussed  the risks, benefits and alternatives of the procedure including but not limited to: Lack of efficacy , Transiently worsening pain , Bleeding, Infection , and Nerve Damage and patient is amicable to the plan    Follow up: As needed     The patient was invited to contact us back anytime with any questions or concerns and follow-up with us in the office as needed.     Diagnoses and all orders for this visit:  Lumbar radiculopathy  -     FL pain management; Future  -     Epidural Steroid Injection; Future  Class 3 severe obesity due to excess calories with serious comorbidity and body mass index (BMI) of 50.0 to 59.9 in adult  Severe sleep apnea  Chronic kidney disease (CKD), stage IV (severe) (Multi)  Other orders  -     NPO Diet Except: Sips with meds; Effective now; Standing  -     Height and weight; Standing  -     Insert and maintain peripheral IV; Standing  -     Saline lock IV; Standing  -     POCT Glucose; Standing  -     Adult diet Regular; Standing  -     Vital Signs; Standing  -     Notify provider (specify  parameters); Standing  -     Prior to Discharge O2 Weaning; Standing  -     Pulse oximetry, continuous; Standing      This note was generated with the aid of dictation software, there may be typos despite my attempts at proofreading.         [1]   Past Medical History:  Diagnosis Date    CHF (congestive heart failure)     Chronic kidney disease     Coronary artery disease     Cough, unspecified 2017    Cough    Diverticulitis of large intestine with perforation and abscess without bleeding 10/21/2016    Colonic diverticular abscess    Fistula of intestine 2017    Enterocutaneous fistula    Hyperlipidemia     Hypertension     Morbid (severe) obesity due to excess calories (Multi)     Morbid obesity    Obstructive sleep apnea (adult) (pediatric) 2018    HOLDEN on CPAP    Other conditions influencing health status     DEXA Body Composition Study    Personal history of other diseases of the circulatory system     History of hypertension    Personal history of other diseases of the digestive system 2018    History of esophagitis    Personal history of other diseases of the digestive system 2017    History of diverticulitis    Personal history of other endocrine, nutritional and metabolic disease 2020    History of hyperlipidemia    Personal history of other endocrine, nutritional and metabolic disease     History of hyperlipidemia    Personal history of other endocrine, nutritional and metabolic disease     History of type 1 diabetes mellitus    Personal history of other mental and behavioral disorders 2018    History of anxiety    Unspecified asthma[facility] uncomplicated [facility]) 10/05/2022    Asthma   [2]   Past Surgical History:  Procedure Laterality Date    CARDIAC CATHETERIZATION       SECTION, CLASSIC  2014     Section    COLONOSCOPY  2013    Complete Colonoscopy    SMALL INTESTINE SURGERY  2016    Small Bowel Resection   [3]   Family  History  Problem Relation Name Age of Onset    Coronary artery disease Mother      Heart attack Mother     [4]   Allergies  Allergen Reactions    Ciprofloxacin Itching, Nausea Only, Rash and Other     Chills, N and V    Morphine Hallucinations and Unknown    Penicillins Anaphylaxis, Hives, Itching and Swelling     > 20 yrs ago

## 2025-06-06 ENCOUNTER — TELEPHONE (OUTPATIENT)
Dept: CARDIOLOGY | Facility: HOSPITAL | Age: 75
End: 2025-06-06

## 2025-06-06 NOTE — TELEPHONE ENCOUNTER
I left a VM on patients home line. Unable to leave a VM on the cell phone number listed. Lasix is not on her medication list. She has also been in and out of the hospital recently. I recommend she call our office back to verify what she is taking and when it was prescribed, prior to sending anything to her pharmacy.

## 2025-06-10 ENCOUNTER — TELEPHONE (OUTPATIENT)
Dept: CARDIOLOGY | Facility: HOSPITAL | Age: 75
End: 2025-06-10

## 2025-06-11 DIAGNOSIS — I50.32 CHRONIC DIASTOLIC HEART FAILURE: Primary | ICD-10-CM

## 2025-06-11 RX ORDER — FUROSEMIDE 20 MG/1
20 TABLET ORAL DAILY
Qty: 90 TABLET | Refills: 3 | Status: SHIPPED | OUTPATIENT
Start: 2025-06-11 | End: 2025-06-11

## 2025-06-11 RX ORDER — FUROSEMIDE 20 MG/1
20 TABLET ORAL DAILY
Qty: 90 TABLET | Refills: 3 | Status: SHIPPED | OUTPATIENT
Start: 2025-06-11 | End: 2026-06-11

## 2025-06-24 ENCOUNTER — APPOINTMENT (OUTPATIENT)
Facility: HOSPITAL | Age: 75
End: 2025-06-24
Payer: MEDICARE

## 2025-06-24 VITALS
OXYGEN SATURATION: 96 % | HEART RATE: 71 BPM | DIASTOLIC BLOOD PRESSURE: 83 MMHG | SYSTOLIC BLOOD PRESSURE: 158 MMHG | HEIGHT: 64 IN | TEMPERATURE: 98.2 F | BODY MASS INDEX: 47.8 KG/M2 | RESPIRATION RATE: 16 BRPM | WEIGHT: 280 LBS

## 2025-06-24 DIAGNOSIS — M54.16 LUMBAR RADICULOPATHY: ICD-10-CM

## 2025-06-24 PROCEDURE — 62323 NJX INTERLAMINAR LMBR/SAC: CPT | Performed by: PAIN MEDICINE

## 2025-06-24 PROCEDURE — 2500000004 HC RX 250 GENERAL PHARMACY W/ HCPCS (ALT 636 FOR OP/ED): Performed by: PAIN MEDICINE

## 2025-06-24 PROCEDURE — 2550000001 HC RX 255 CONTRASTS: Mod: JW | Performed by: PAIN MEDICINE

## 2025-06-24 RX ORDER — DEXAMETHASONE SODIUM PHOSPHATE 10 MG/ML
INJECTION INTRAMUSCULAR; INTRAVENOUS
Status: COMPLETED | OUTPATIENT
Start: 2025-06-24 | End: 2025-06-24

## 2025-06-24 RX ORDER — LIDOCAINE HYDROCHLORIDE 5 MG/ML
INJECTION, SOLUTION INFILTRATION; INTRAVENOUS
Status: COMPLETED | OUTPATIENT
Start: 2025-06-24 | End: 2025-06-24

## 2025-06-24 RX ADMIN — DEXAMETHASONE SODIUM PHOSPHATE 10 MG: 10 INJECTION, SOLUTION INTRAMUSCULAR; INTRAVENOUS at 10:25

## 2025-06-24 RX ADMIN — IOHEXOL 1 ML: 240 INJECTION, SOLUTION INTRATHECAL; INTRAVASCULAR; INTRAVENOUS; ORAL at 10:25

## 2025-06-24 RX ADMIN — LIDOCAINE HYDROCHLORIDE 8 ML: 5 INJECTION, SOLUTION INFILTRATION at 10:24

## 2025-06-24 ASSESSMENT — PAIN SCALES - GENERAL
PAINLEVEL_OUTOF10: 9
PAINLEVEL_OUTOF10: 5 - MODERATE PAIN

## 2025-06-24 ASSESSMENT — PAIN - FUNCTIONAL ASSESSMENT: PAIN_FUNCTIONAL_ASSESSMENT: WONG-BAKER FACES

## 2025-06-24 NOTE — DISCHARGE INSTRUCTIONS
DISCHARGE INSTRUCTIONS FOR INJECTIONS     You underwent lumbar interlaminar epidural steroid injection today    After most injections, it is recommended that you relax and limit your activity for the remainder of the day unless you have been told otherwise by your pain physician.  You should not drive a car, operate machinery, or make important legal decisions unless otherwise directed by your pain physician.  You may resume your normal activity, including exercise, tomorrow.      Keep a written pain diary of how much pain relief you experienced following the injection procedure and the length of time of pain relief you experienced pain relief. Following diagnostic injections like medial branch nerve blocks, sacroiliac joint blocks, stellate ganglion injections and other blocks, it is very important you record the specific amount of pain relief you experienced immediately after the injectionand how long it lasted. Your doctor will ask you for this information at your follow up visit.     For all injections, please keep the injection site dry and inspect the site for a couple of days. You may remove the Band-Aid the day of the injection at any time.     Some discomfort, bruising or slight swelling may occur at the injection site. This is not abnormal if it occurs.  If needed you may:    -Take over the counter medication such as Tylenol or Motrin.   -Apply an ice pack for 30 minutes, 2 to 3 times a day for the first 24 hours.     You may shower today; no soaking baths, hot tubs, whirlpools or swimming pools for two days.      If you are given steroids in your injection, it may take 3-5 days for the steroid medication to take effect. You may notice a worsening of your symptoms for 1-2 days after the injection. This is not abnormal.  You may use acetaminophen, ibuprofen, or prescription medication that your doctor may have prescribed for you if you need to do so.     A few common side effects of steroids include facial  flushing, sweating, restlessness, irritability,difficulty sleeping, increase in blood sugar, and increased blood pressure. If you have diabetes, please monitor your blood sugar at least once a day for at least 5 days. If you have poorly controlled high blood pressure, monitoryour blood pressure for at least 2 days and contact your primary care physician if these numbers are unusually high for you.      If you take aspirin or non-steroidal anti-inflammatory drugs (examples are Motrin, Advil, ibuprofen, Naprosyn, Voltaren, Relafen, etc.) you may restart these this evening, but stop taking it 3 days before your next appointment, unless instructed otherwiseby your physician.      You do not need to discontinue non-aspirin-containing pain medications prior to an injection (examples: Celebrex, tramadol, hydrocodone and acetaminophen).      If you take a blood thinning medication (Coumadin, Lovenox, Fragmin,Ticlid, Plavix, Pradaxa, etc.), please discuss this with your primary care physician/cardiologist and your pain physician. These medications MUST be discontinued before you can have an injection safely, without the risk of uncontrolled bleeding. If these medications are not discontinued for an appropriate period of time, you will not be able to receivean injection. Please adhere to instructions given to you about when to restart your blood thinning medication. If you have any questions please reach out to our team.    If you are taking Coumadin, please have your INR checked the morning of your procedure and bring the result to your appointment unless otherwise instructed. If your INR is over 1.2, your injection may need to be rescheduled to avoid uncontrolled bleeding from the needle placement.     Call [facility]  and ask for Pain Management at [phone] between 8am-4pm Monday - Friday if you are experiencing the following:    If you received an epidural or spinal injection:    -Headache that doesnot go away with  medicine, is worse when sitting or standing up, and is greatly relieved upon lying down.   -Severe pain worse than or different than your baseline pain.   -Chills or fever (101º F or greater).   -Drainage or signs of infection at the injection site     Go directly to the Emergency Department if you are experiencing the following and received an epidural or spinal injection:   -Abrupt weakness or progressive weakness in your legs that starts after you leave the clinic.   -Abrupt severe or worsening numbness in your legs.   -Inability to urinate after the injection or loss of bowel or bladder control without the urge to defecate or urinate.     If you have a clinical question that cannot wait until your next appointment, please call [phone] between 8am-4pm Monday - Friday or send a [employer] message. We do our best to return all non-emergency messages within 24 hours, Monday - Friday. A nurse or physician will return your message. You may also try calling and they will do their best to answer your question(s):  - Dr. Luke Renteria's nurse ([phone])  - Dr. Becki Harvey/Dr. Stafford's nurse ([phone])  - Dr. Amilcar Sharp/Leopoldo's nurse ([phone])     If you need to cancel an appointment, please call the scheduling staff at [phone] during normal business hours or leave a message at least 24 hours in advance.     If you are going to be sedated for your next procedure, you MUST have responsible adult who can legally drive accompany you home. You cannot eat or drink for at least eight hours prior to the planned procedure if you are going to receive sedation. You may take your non-blood thinning medications with a small sip of water.

## 2025-06-24 NOTE — H&P
Pain Management H&P    History Of Present Illness  Analia Murray is a 75 y.o. female presents for procedure state below. Endorses no changes in past medical history or medical health since last seen in clinic.      Past Medical History  She has a past medical history of CHF (congestive heart failure), Chronic kidney disease, Coronary artery disease, Cough, unspecified (2017), Diverticulitis of large intestine with perforation and abscess without bleeding (10/21/2016), Fistula of intestine (2017), Hyperlipidemia, Hypertension, Morbid (severe) obesity due to excess calories (Multi), Obstructive sleep apnea (adult) (pediatric) (2018), Other conditions influencing health status, Personal history of other diseases of the circulatory system, Personal history of other diseases of the digestive system (2018), Personal history of other diseases of the digestive system (2017), Personal history of other endocrine, nutritional and metabolic disease (2020), Personal history of other endocrine, nutritional and metabolic disease, Personal history of other endocrine, nutritional and metabolic disease, Personal history of other mental and behavioral disorders (2018), and Unspecified asthma, uncomplicated ([facility]) (10/05/2022).    Surgical History  She has a past surgical history that includes Colonoscopy (2013);  section, classic (2014); Small intestine surgery (2016); and Cardiac catheterization.     Social History  She reports that she has never smoked. She has never been exposed to tobacco smoke. She has never used smokeless tobacco. She reports that she does not drink alcohol and does not use drugs.    Family History  Family History[1]     Allergies  Ciprofloxacin, Morphine, and Penicillins    Review of Symptoms:   Constitutional: Negative for chills, diaphoresis or fever  HENT: Negative for neck swelling  Eyes:.  Negative for eye pain  Respiratory:.   Negative for cough, shortness of breath or wheezing    Cardiovascular:.  Negative for chest pain or palpitations  Gastrointestinal:.  Negative for abdominal pain, nausea and vomiting  Genitourinary:.  Negative for urgency  Musculoskeletal:  Positive for back pain. Positive for joint pain. Denies falls within the past 3 months.  Skin: Negative for wounds or itching   Neurological: Negative for dizziness, seizures, loss of consciousness and weakness  Endo/Heme/Allergies: Does not bruise/bleed easily  Psychiatric/Behavioral: Negative for depression. The patient does not appear anxious.      Pre-sedation Evaluation  ASA class 3  Mallampati score 2     PHYSICAL EXAM  Vitals signs reviewed  Constitutional:       General: Not in acute distress     Appearance: Normal appearance. Not ill-appearing.  HENT:     Head: Normocephalic and atraumatic  Eyes:     Conjunctiva/sclera: Conjunctivae normal  Cardiovascular:     Rate and Rhythm: Normal rate and regular rhythm  Pulmonary:     Effort: No respiratory distress  Abdominal:     Palpations: Abdomen is soft  Musculoskeletal: CARROLL  Skin:     General: Skin is warm and dry  Neurological:     General: No focal deficit present  Psychiatric:         Mood and Affect: Mood normal         Behavior: Behavior normal     Last Recorded Vitals  There were no vitals taken for this visit.    Relevant Results  Current Outpatient Medications   Medication Instructions    allopurinol (ZYLOPRIM) 50 mg, oral, Every other day    amLODIPine (NORVASC) 10 mg, oral, Daily    aspirin 81 mg EC tablet 1 tablet, oral, Daily    calcium acetate (PHOSLO) 667 mg, oral, 3 times daily after meals    cholecalciferol (Vitamin D-3) 25 MCG (1000 UT) capsule Take 1 capsule (25 mcg) by mouth once daily.    epoetin patricia (PROCRIT) 5,000 Units, subcutaneous, Once Weekly, Mondays     ezetimibe (ZETIA) 10 mg, oral, Daily    ferrous sulfate  mg ER tablet 1 tablet, oral, Daily    fluticasone (Flonase) 50 mcg/actuation nasal  spray 2 sprays, Each Nostril, 2 times daily    fluticasone furoate-vilanteroL (Breo Ellipta) 100-25 mcg/dose inhaler 1 puff, inhalation, Daily    fluticasone propion-salmeteroL (Advair Diskus) 250-50 mcg/dose diskus inhaler 1 puff, inhalation, 2 times daily RT, Rinse mouth with water after use to reduce aftertaste and incidence of candidiasis. Do not swallow.    furosemide (LASIX) 20 mg, oral, Daily    gabapentin (NEURONTIN) 100 mg, oral, 3 times daily    hydrALAZINE (APRESOLINE) 50 mg, oral, 2 times daily    hydrocortisone 2.5 % cream Topical, 2 times daily    inhalational spacing device (Aerochamber MV) inhaler Use as instructed    levothyroxine (SYNTHROID, LEVOXYL) 200 mcg, oral, Daily, Take on an empty stomach at the same time each day, either 30 to 60 minutes prior to breakfast    metoprolol tartrate (LOPRESSOR) 50 mg, oral, 2 times daily    montelukast (Singulair) 10 mg tablet 1 tablet, oral, Nightly    nitroglycerin (NITROSTAT) 0.4 mg, sublingual, Every 5 min PRN    nystatin (Mycostatin) 100,000 unit/gram powder 1 Application, Topical, 3 times daily    oxygen (O2) gas therapy 1 each, inhalation, Every 24 hours, At night Mode: Bipap/Bilevel Ventilation type: AVAPS Set Tidal Volume: 500 Max P: 25 Min P: 15 Expiratory pressure: 10 Inspiratory Time (sec): 0.9 Rise: 3 Set Resp Rate: 18 AVAPS Rate: 18 HS and PRN    pravastatin (PRAVACHOL) 40 mg, oral, Nightly    sertraline (ZOLOFT) 50 mg, oral, Daily    sertraline (ZOLOFT) 50 mg, oral, Daily    sodium zirconium cyclosilicate (LOKELMA) 10 g, oral, Daily    traMADol (ULTRAM) 50 mg, oral, Every 8 hours PRN    Ventolin HFA 90 mcg/actuation inhaler USE 2 INHALATIONS BY MOUTH EVERY 4 HOURS AS NEEDED FOR WHEEZING       No results found for this or any previous visit from the past 1000 days.     No image results found.       No diagnosis found.     ASSESSMENT/PLAN  Analia Murray is a 75 y.o. female here for L5-S1 interlaminar epidural steroid injection under  fluoroscopy    Patient denies any recent antibiotic use or infections, takes ASA 81 but no other blood thinner use, and denies contrast or local anesthetic allergies     Risks, benefits, alternatives discussed. All questions answered to the best of my ability. Patient agrees to proceed.      Our plan is as follows:  - Proceed with aforementioned procedure          Zeb Wu MD   Pain fellow          [1]   Family History  Problem Relation Name Age of Onset    Coronary artery disease Mother      Heart attack Mother

## 2025-06-30 ENCOUNTER — APPOINTMENT (OUTPATIENT)
Dept: PAIN MEDICINE | Facility: HOSPITAL | Age: 75
End: 2025-06-30
Payer: MEDICARE

## 2025-07-28 ENCOUNTER — APPOINTMENT (OUTPATIENT)
Dept: CARDIOLOGY | Facility: CLINIC | Age: 75
End: 2025-07-28
Payer: MEDICARE

## 2025-08-05 ENCOUNTER — TELEPHONE (OUTPATIENT)
Dept: PRIMARY CARE | Facility: CLINIC | Age: 75
End: 2025-08-05
Payer: MEDICARE

## 2025-08-05 NOTE — TELEPHONE ENCOUNTER
Dr. Laura Argueta [facility] left a voicemail message stating received a referral from [facility] for Analia Murray for PT and OT would like for her to have PT and OT with them if you will be following for services call them back at [phone] opt#1,she's due to be discharged today.

## 2025-08-12 ENCOUNTER — TELEPHONE (OUTPATIENT)
Dept: PRIMARY CARE | Facility: CLINIC | Age: 75
End: 2025-08-12
Payer: MEDICARE

## 2025-08-25 ENCOUNTER — APPOINTMENT (OUTPATIENT)
Dept: OPHTHALMOLOGY | Facility: CLINIC | Age: 75
End: 2025-08-25
Payer: MEDICARE

## 2025-08-25 ENCOUNTER — OFFICE VISIT (OUTPATIENT)
Dept: HEMATOLOGY/ONCOLOGY | Facility: CLINIC | Age: 75
End: 2025-08-25
Payer: MEDICARE

## 2025-08-25 ENCOUNTER — LAB (OUTPATIENT)
Dept: LAB | Facility: CLINIC | Age: 75
End: 2025-08-25
Payer: MEDICARE

## 2025-08-25 VITALS
HEART RATE: 63 BPM | WEIGHT: 285 LBS | SYSTOLIC BLOOD PRESSURE: 151 MMHG | DIASTOLIC BLOOD PRESSURE: 83 MMHG | TEMPERATURE: 98.2 F | BODY MASS INDEX: 48.92 KG/M2 | OXYGEN SATURATION: 95 % | RESPIRATION RATE: 18 BRPM

## 2025-08-25 DIAGNOSIS — H02.88B MEIBOMIAN GLAND DYSFUNCTION (MGD) OF UPPER AND LOWER LIDS OF BOTH EYES: ICD-10-CM

## 2025-08-25 DIAGNOSIS — H52.223 REGULAR ASTIGMATISM OF BOTH EYES: ICD-10-CM

## 2025-08-25 DIAGNOSIS — H02.88A MEIBOMIAN GLAND DYSFUNCTION (MGD) OF UPPER AND LOWER LIDS OF BOTH EYES: ICD-10-CM

## 2025-08-25 DIAGNOSIS — D47.2 MGUS (MONOCLONAL GAMMOPATHY OF UNKNOWN SIGNIFICANCE): ICD-10-CM

## 2025-08-25 DIAGNOSIS — H16.223 KERATOCONJUNCTIVITIS SICCA OF BOTH EYES NOT SPECIFIED AS SJOGREN'S: ICD-10-CM

## 2025-08-25 DIAGNOSIS — H52.4 PRESBYOPIA: ICD-10-CM

## 2025-08-25 DIAGNOSIS — H25.813 COMBINED FORMS OF AGE-RELATED CATARACT OF BOTH EYES: ICD-10-CM

## 2025-08-25 DIAGNOSIS — E11.9 TYPE 2 DIABETES MELLITUS WITHOUT COMPLICATION, WITHOUT LONG-TERM CURRENT USE OF INSULIN: Primary | ICD-10-CM

## 2025-08-25 DIAGNOSIS — H52.03 HYPERMETROPIA OF BOTH EYES: ICD-10-CM

## 2025-08-25 PROCEDURE — 99215 OFFICE O/P EST HI 40 MIN: CPT

## 2025-08-25 PROCEDURE — 1159F MED LIST DOCD IN RCRD: CPT

## 2025-08-25 PROCEDURE — 1126F AMNT PAIN NOTED NONE PRSNT: CPT

## 2025-08-25 PROCEDURE — 92015 DETERMINE REFRACTIVE STATE: CPT | Performed by: OPTOMETRIST

## 2025-08-25 PROCEDURE — 3079F DIAST BP 80-89 MM HG: CPT

## 2025-08-25 PROCEDURE — 1036F TOBACCO NON-USER: CPT

## 2025-08-25 PROCEDURE — 3077F SYST BP >= 140 MM HG: CPT

## 2025-08-25 PROCEDURE — 99214 OFFICE O/P EST MOD 30 MIN: CPT | Performed by: OPTOMETRIST

## 2025-08-25 RX ORDER — CARBOXYMETHYLCELLULOSE SODIUM 2.5 MG/ML
1 SOLUTION/ DROPS OPHTHALMIC 3 TIMES DAILY
Qty: 30 ML | Refills: 3 | Status: SHIPPED | OUTPATIENT
Start: 2025-08-25 | End: 2025-08-26 | Stop reason: ALTCHOICE

## 2025-08-25 ASSESSMENT — CONF VISUAL FIELD
OS_INFERIOR_NASAL_RESTRICTION: 0
METHOD: COUNTING FINGERS
OS_NORMAL: 1
OS_SUPERIOR_TEMPORAL_RESTRICTION: 0
OS_INFERIOR_TEMPORAL_RESTRICTION: 0
OD_INFERIOR_NASAL_RESTRICTION: 3
OS_SUPERIOR_NASAL_RESTRICTION: 0

## 2025-08-25 ASSESSMENT — ENCOUNTER SYMPTOMS
PSYCHIATRIC NEGATIVE: 0
EYES NEGATIVE: 1
ENDOCRINE NEGATIVE: 1
CARDIOVASCULAR NEGATIVE: 0
EYES NEGATIVE: 0
RESPIRATORY NEGATIVE: 1
PSYCHIATRIC NEGATIVE: 1
RESPIRATORY NEGATIVE: 0
ENDOCRINE NEGATIVE: 0
NEUROLOGICAL NEGATIVE: 1
CARDIOVASCULAR NEGATIVE: 1
CONSTITUTIONAL NEGATIVE: 0
ALLERGIC/IMMUNOLOGIC NEGATIVE: 0
BACK PAIN: 1
GASTROINTESTINAL NEGATIVE: 0
HEMATOLOGIC/LYMPHATIC NEGATIVE: 1
HEMATOLOGIC/LYMPHATIC NEGATIVE: 0
CONSTITUTIONAL NEGATIVE: 1
MUSCULOSKELETAL NEGATIVE: 0
GASTROINTESTINAL NEGATIVE: 1
NEUROLOGICAL NEGATIVE: 0

## 2025-08-25 ASSESSMENT — REFRACTION_MANIFEST
OS_SPHERE: +2.50
OS_ADD: +3.00
OD_SPHERE: +1.25
OD_CYLINDER: +0.25
OS_AXIS: 025
OD_AXIS: 005
OD_ADD: +3.00
OS_CYLINDER: +0.25

## 2025-08-25 ASSESSMENT — TONOMETRY
IOP_METHOD: GOLDMANN APPLANATION
OS_IOP_MMHG: 13
OD_IOP_MMHG: 16

## 2025-08-25 ASSESSMENT — REFRACTION_WEARINGRX
OD_AXIS: 010
OD_ADD: +3.00
OS_ADD: +3.00
OD_SPHERE: +1.00
OS_CYLINDER: +0.50
OS_AXIS: 012
OS_SPHERE: +2.25
OD_CYLINDER: +0.50

## 2025-08-25 ASSESSMENT — REFRACTION
OS_ADD: +3.00
OD_AXIS: 005
OD_SPHERE: +1.00
OS_AXIS: 015
OD_ADD: +3.00
OS_SPHERE: +2.25
OD_CYLINDER: +0.25
OS_CYLINDER: +0.25

## 2025-08-25 ASSESSMENT — VISUAL ACUITY
CORRECTION_TYPE: GLASSES
OS_CC: 20/40
OD_CC: 20/30
OS_CC+: -2
METHOD: SNELLEN - LINEAR

## 2025-08-25 ASSESSMENT — CUP TO DISC RATIO
OD_RATIO: 0.3
OS_RATIO: 0.35

## 2025-08-25 ASSESSMENT — EXTERNAL EXAM - RIGHT EYE: OD_EXAM: NORMAL

## 2025-08-25 ASSESSMENT — EXTERNAL EXAM - LEFT EYE: OS_EXAM: NORMAL

## 2025-08-25 ASSESSMENT — PAIN SCALES - GENERAL: PAINLEVEL_OUTOF10: 0-NO PAIN

## 2025-08-26 ENCOUNTER — APPOINTMENT (OUTPATIENT)
Dept: PRIMARY CARE | Facility: CLINIC | Age: 75
End: 2025-08-26
Payer: MEDICARE

## 2025-08-26 VITALS
WEIGHT: 276 LBS | SYSTOLIC BLOOD PRESSURE: 143 MMHG | OXYGEN SATURATION: 100 % | TEMPERATURE: 97.5 F | BODY MASS INDEX: 47.12 KG/M2 | HEART RATE: 66 BPM | DIASTOLIC BLOOD PRESSURE: 73 MMHG | HEIGHT: 64 IN

## 2025-08-26 DIAGNOSIS — F32.A ANXIETY AND DEPRESSION: ICD-10-CM

## 2025-08-26 DIAGNOSIS — K21.9 GASTROESOPHAGEAL REFLUX DISEASE WITHOUT ESOPHAGITIS: ICD-10-CM

## 2025-08-26 DIAGNOSIS — N18.4 STAGE 4 CHRONIC KIDNEY DISEASE (MULTI): ICD-10-CM

## 2025-08-26 DIAGNOSIS — F41.9 ANXIETY AND DEPRESSION: ICD-10-CM

## 2025-08-26 DIAGNOSIS — E11.65 TYPE 2 DIABETES MELLITUS WITH HYPERGLYCEMIA, WITHOUT LONG-TERM CURRENT USE OF INSULIN: ICD-10-CM

## 2025-08-26 DIAGNOSIS — E03.9 HYPOTHYROIDISM, UNSPECIFIED TYPE: ICD-10-CM

## 2025-08-26 DIAGNOSIS — J45.909 ASTHMA, UNSPECIFIED ASTHMA SEVERITY, UNSPECIFIED WHETHER COMPLICATED, UNSPECIFIED WHETHER PERSISTENT (HHS-HCC): ICD-10-CM

## 2025-08-26 DIAGNOSIS — M80.00XS OSTEOPOROSIS WITH CURRENT PATHOLOGICAL FRACTURE, UNSPECIFIED OSTEOPOROSIS TYPE, SEQUELA: ICD-10-CM

## 2025-08-26 DIAGNOSIS — D47.2 MGUS (MONOCLONAL GAMMOPATHY OF UNKNOWN SIGNIFICANCE): ICD-10-CM

## 2025-08-26 DIAGNOSIS — N25.81 SECONDARY RENAL HYPERPARATHYROIDISM (MULTI): ICD-10-CM

## 2025-08-26 DIAGNOSIS — E78.5 DYSLIPIDEMIA: ICD-10-CM

## 2025-08-26 DIAGNOSIS — I25.10 ATHEROSCLEROSIS OF NATIVE CORONARY ARTERY OF NATIVE HEART WITHOUT ANGINA PECTORIS: ICD-10-CM

## 2025-08-26 DIAGNOSIS — E87.5 HYPERKALEMIA: Primary | ICD-10-CM

## 2025-08-26 DIAGNOSIS — E11.22 TYPE 2 DIABETES MELLITUS WITH CHRONIC KIDNEY DISEASE, WITHOUT LONG-TERM CURRENT USE OF INSULIN, UNSPECIFIED CKD STAGE (MULTI): ICD-10-CM

## 2025-08-26 DIAGNOSIS — R06.00 DYSPNEA, UNSPECIFIED TYPE: ICD-10-CM

## 2025-08-26 DIAGNOSIS — M17.9 OSTEOARTHRITIS OF KNEE, UNSPECIFIED LATERALITY, UNSPECIFIED OSTEOARTHRITIS TYPE: ICD-10-CM

## 2025-08-26 DIAGNOSIS — I10 HTN (HYPERTENSION), BENIGN: ICD-10-CM

## 2025-08-26 PROCEDURE — 99214 OFFICE O/P EST MOD 30 MIN: CPT | Performed by: PEDIATRICS

## 2025-08-26 PROCEDURE — 1126F AMNT PAIN NOTED NONE PRSNT: CPT | Performed by: PEDIATRICS

## 2025-08-26 PROCEDURE — 3078F DIAST BP <80 MM HG: CPT | Performed by: PEDIATRICS

## 2025-08-26 PROCEDURE — 3077F SYST BP >= 140 MM HG: CPT | Performed by: PEDIATRICS

## 2025-08-26 PROCEDURE — G2211 COMPLEX E/M VISIT ADD ON: HCPCS | Performed by: PEDIATRICS

## 2025-08-26 RX ORDER — SERTRALINE HYDROCHLORIDE 50 MG/1
50 TABLET, FILM COATED ORAL DAILY
Qty: 90 TABLET | Refills: 1 | Status: SHIPPED | OUTPATIENT
Start: 2025-08-26

## 2025-08-26 RX ORDER — ATORVASTATIN CALCIUM 40 MG/1
40 TABLET, FILM COATED ORAL DAILY
COMMUNITY

## 2025-08-26 RX ORDER — MONTELUKAST SODIUM 10 MG/1
10 TABLET ORAL NIGHTLY
Qty: 90 TABLET | Refills: 1 | Status: SHIPPED | OUTPATIENT
Start: 2025-08-26

## 2025-08-26 RX ORDER — FLUTICASONE PROPIONATE AND SALMETEROL 250; 50 UG/1; UG/1
1 POWDER RESPIRATORY (INHALATION)
Qty: 60 EACH | Refills: 0 | Status: SHIPPED | OUTPATIENT
Start: 2025-08-26

## 2025-08-26 RX ORDER — NITROGLYCERIN 0.4 MG/1
0.4 TABLET SUBLINGUAL EVERY 5 MIN PRN
Qty: 90 TABLET | Refills: 3 | Status: SHIPPED | OUTPATIENT
Start: 2025-08-26

## 2025-08-26 ASSESSMENT — PAIN SCALES - GENERAL: PAINLEVEL_OUTOF10: 0-NO PAIN

## 2025-08-28 PROBLEM — D50.0 IRON DEFICIENCY ANEMIA DUE TO CHRONIC BLOOD LOSS: Status: RESOLVED | Noted: 2023-06-22 | Resolved: 2025-08-28

## 2025-08-28 PROBLEM — M25.532 LEFT WRIST PAIN: Status: RESOLVED | Noted: 2025-01-30 | Resolved: 2025-08-28

## 2025-08-28 PROBLEM — J96.01 ACUTE RESPIRATORY FAILURE WITH HYPOXIA: Status: RESOLVED | Noted: 2025-03-25 | Resolved: 2025-08-28

## 2025-08-28 PROBLEM — I88.9 LYMPHADENITIS: Status: RESOLVED | Noted: 2023-07-14 | Resolved: 2025-08-28

## 2025-08-28 RX ORDER — EZETIMIBE 10 MG/1
10 TABLET ORAL DAILY
Qty: 90 TABLET | Refills: 3 | Status: SHIPPED | OUTPATIENT
Start: 2025-08-28

## 2026-03-05 ENCOUNTER — APPOINTMENT (OUTPATIENT)
Dept: OPHTHALMOLOGY | Facility: CLINIC | Age: 76
End: 2026-03-05
Payer: MEDICARE

## 2026-08-25 ENCOUNTER — APPOINTMENT (OUTPATIENT)
Dept: OPHTHALMOLOGY | Facility: CLINIC | Age: 76
End: 2026-08-25
Payer: MEDICARE